# Patient Record
Sex: FEMALE | Race: ASIAN | NOT HISPANIC OR LATINO | Employment: OTHER | URBAN - METROPOLITAN AREA
[De-identification: names, ages, dates, MRNs, and addresses within clinical notes are randomized per-mention and may not be internally consistent; named-entity substitution may affect disease eponyms.]

---

## 2017-01-10 ENCOUNTER — HOSPITAL ENCOUNTER (OUTPATIENT)
Dept: RADIOLOGY | Facility: MEDICAL CENTER | Age: 67
End: 2017-01-10
Attending: NURSE PRACTITIONER
Payer: COMMERCIAL

## 2017-01-10 DIAGNOSIS — E04.1 THYROID NODULE: ICD-10-CM

## 2017-01-10 PROCEDURE — 88112 CYTOPATH CELL ENHANCE TECH: CPT

## 2017-01-10 PROCEDURE — 76942 ECHO GUIDE FOR BIOPSY: CPT

## 2017-01-10 PROCEDURE — 10022 HCHG FINE NEEDLE ASP W/IMAGING GUIDANCE: CPT

## 2017-01-10 NOTE — PROGRESS NOTES
US guided right thyroid nodule fine needle aspiration done by Dr. Portillo; right anterior aspect of neck access site; 1 jar of cytolyt obtained and sent to pathology lab; pt tolerated the procedure well; pt hemodynamically stable pre/intra/post procedure; all questions and concerns answered prior to being d/c; pt d/c home.

## 2017-01-20 ENCOUNTER — TELEPHONE (OUTPATIENT)
Dept: HEMATOLOGY ONCOLOGY | Facility: MEDICAL CENTER | Age: 67
End: 2017-01-20

## 2017-01-20 ENCOUNTER — OFFICE VISIT (OUTPATIENT)
Dept: HEMATOLOGY ONCOLOGY | Facility: MEDICAL CENTER | Age: 67
End: 2017-01-20
Payer: COMMERCIAL

## 2017-01-20 VITALS
WEIGHT: 181 LBS | RESPIRATION RATE: 16 BRPM | HEIGHT: 62 IN | HEART RATE: 82 BPM | DIASTOLIC BLOOD PRESSURE: 84 MMHG | BODY MASS INDEX: 33.31 KG/M2 | SYSTOLIC BLOOD PRESSURE: 122 MMHG | TEMPERATURE: 97.5 F | OXYGEN SATURATION: 93 %

## 2017-01-20 DIAGNOSIS — R93.89 ABNORMAL FINDING ON IMAGING: ICD-10-CM

## 2017-01-20 DIAGNOSIS — E04.1 THYROID NODULE: ICD-10-CM

## 2017-01-20 PROCEDURE — 99214 OFFICE O/P EST MOD 30 MIN: CPT | Performed by: INTERNAL MEDICINE

## 2017-01-20 NOTE — Clinical Note
[facility]    [address], [address]  [place] [address]      Date:1/20/2017                     Reference to Serenity Howe    Follow Up Note: Oncology       Date: 1/10/17  Time: 8 am       Primary care physician: Dr. Alex Garg oncologist: Dr. Alicea   Surgery: Dr. Denney  ENT: Dr. Anderson      Diagnosis: History of right breast ductal carcinoma, 1.5 cm, grade 3, positive LVI, 1/11 nodes, ER/LA negative, HER2 positive: pT1c N1a M0      Chief compliant: She is here for a follow up visit.        History of presenting illness:  History of presenting illness:  Ms. Howe is a 66 year old female with right breast malignancy diagnosed in 2006 secondary to palpable mass. He’s S/P right mastectomy and sentinel node biopsy. She was found to have invasive ductal carcinoma, 1.5 cm,  microscopic focus of invasive carcinoma about 1.0-2.0 mm with associated lymphactic vessel invasion, grade 3, positive for lymphatic invasion, 1/11 node positive, ER negative, LA negative and HER2 positive. She was staged as pT1c N1a M0.  She is S/P adjuvant chemotherapy and completion of single agent herceptin.  She did not receive radiation at the time. The records are unavailable despite multiple requests.  Was found to have an abnormality in the left breast imaging and underwent biopsy which was negative for malignancy. 10/2016 CT scan of 2.4 cm alveolar nodule adjacent to the pleura in the right lower lobe and 3.7 cm noncalcified nodule in the right lower lobe.  PET/CT showed interval improvement in the right lower lobe opacity otherwise uptake in the left fornix with mild asymmetry of soft tissue prominence focal uptake in the right neck which was felt to be reactive lymph node and 1 cm lesion in the left parotid gland without uptake. She was also found to have hepatic steatosis.  12/2016 mammogram showed no significant changes. Ultrasound of the neck showed a 2.1 cm thyroid nodule and minute left  colloid cysts.  She underwent biopsy of the thyroid nodule which was benign.          Interval History:  She is here for a follow up visit. She was feeling well since her last visit.  She had two nose bleeds this month.  The bleeding is very minimal.  She was seen Dr. Anderson and was found to have dry nose and no other concerning finding on direct examination. Clinically she has been feeling fairly stable otherwise. She has stable energy, appetite and weight. She denies any fevers, chills or night sweats.          Past Medical History:  1. 2006: right breast cancer  2. HTN  3. Chronic vertigo    4. Impaired fasting sugars  5. Asthma  6. Insomnia    7. Intermittent back pain       Allergies:  Nkda      Medications:  Current Outpatient Prescriptions on File Prior to Visit    Medication  Sig  Dispense  Refill    •   verapamil ER (CALAN SR) 120 MG CAPSULE SR 24 HR  TAKE ONE CAPSULE BY MOUTH EVERY DAY  90 Cap  3    •  benzonatate (TESSALON) 100 MG Cap  Take 1 Cap by mouth 3 times a day as needed for Cough.  60 Cap  0    •  levofloxacin (LEVAQUIN) 500 MG tablet  Take 1 Tab by mouth every day.  14 Tab  0    •  omeprazole (PRILOSEC) 20 MG delayed-release capsule  Take 1 Cap by mouth every day.  30 Cap  11    •  ondansetron (ZOFRAN) 4 MG Tab tablet  Take 1 Tab by mouth every four hours as needed for Nausea/Vomiting.  20 Tab  0    •  fluticasone-salmeterol (ADVAIR DISKUS) 250-50 MCG/DOSE AEROSOL POWDER, BREATH ACTIVATED  Inhale 1 Puff by mouth 2 times a day. INHALE 1 DOSE BY MOUTH TWICE DAILY. RINSE MOUTH AFTER USE  1 Inhaler  11    •  valsartan-hydrochlorothiazide (DIOVAN-HCT) 80-12.5 MG per tablet  TAKE 1 TABLET BY MOUTH EVERY DAY  30 Tab  9    •  Calcium Carbonate-Vit D-Min (CALTRATE PLUS PO)  Take  by mouth.        •  aspirin (ASA) 81 MG CHEW  Take 81 mg by mouth every day.        •  celecoxib (CELEBREX) 200 MG Cap  Take 1 Cap by mouth every day.  30 Cap  6    •  zolpidem (AMBIEN) 5 MG Tab  Take 1 Tab by mouth at bedtime as  "needed for Sleep.  30 Tab  0    •  albuterol (PROVENTIL) 2.5mg/3ml Nebu Soln solution for nebulization  3 mL by Nebulization route every four hours as needed for Shortness of Breath.  75 mL  11    •  fluticasone (FLONASE) 50 MCG/ACT nasal spray  Spray 2 Sprays in nose every day.  16 g  11          No current facility-administered medications on file prior to visit.          Review of Systems:  All other review of systems are negative except what was mentioned above in the HPI.  Constitutional: Negative for fever, chills, and weight loss. Positive for mild malaise/fatigue stable.    HENT: Negative for ear pain. Positive for intermittent vertigo   Eyes: Negative for blurred vision.    Respiratory: Negative for cough, sputum production, and shortness of breath.     Cardiovascular: Negative for chest pain.    Gastrointestinal: Negative for nausea, vomiting and abdominal pain.    Genitourinary: Negative for dysuria    Musculoskeletal: Negative for myalgias and joint pain.    Skin: Negative for rash.    Neurological: Negative for dizziness, sensory change, and focal weakness.    Endo/Heme/Allergies: No bruise/bleed easily.    Psychiatric/Behavioral: Negative for depression and memory loss.        Physical Exam:  Vitals:   Vitals   Filed Vitals:      01/20/17 0756    BP:  122/84    Pulse:  82    Temp:  36.4 °C (97.5 °F)    Resp:  16    Height:  1.575 m (5' 2.01\")    Weight:  82.101 kg (181 lb)    SpO2:  93%         General: Not in acute distress, alert and oriented x 3  HEENT: normalcephalic, atraumatic, pupils equally reactive to light bilaterally, extra ocular muscles intact, moist mucus membranes, and oral cavity without any lesions.  Neck: Supple neck and  full range of motion  Lymph nodes: No palpable bilateral cervical and supraclavicular lymphadenopathy.     CVS: regular rate and rhythm  RESP: Clear to auscultate bilaterally   Breast exam: No concerning masses in the breast, chest wall or bilateral axilla.  ABD: " Soft, non tender, non distended, and positive bowel sounds.  EXT: No lower extremity edema    CNS: Alert and oriented x3, cranial nerves grossly intact, and muscle strength grossly intact.            Imagin. 12/15/16 Mammogram: No change since prior mammograms.  No mammographic evidence of malignancy in the left breast.  Recommend continued annual mammography.  2. 12/15/16 Ultrasound neck: Right upper pole 2.1 cm solid thyroid mass has internal vascular flow and cystic components. This would be amenable to percutaneous biopsy.  Minute left colloid cysts      Pathology:  1. 1/10/17: Right thyroid:         Benign: consistent with benign follicular nodule.         Numerous macrophages.      Assessment and Plan:      Right breast ductal carcinoma, 1.5 cm, grade 3, positive LVI,  nodes, ER/MS negative, HER2 positive pT1c N1a M0: Diagnosed and treated in . She was found to have a ER/MS negative and HER-2 positive disease. She completed adjuvant chemotherapy. She has been on observation without any evidence of recurrence. She is continued on observation.  Her most recent mammogram showed no concerning changes. Her next mammogram is due in 2017.      Abnormal imaging:  Imaging showed 2.4 cm alveolar opacity in the right lower lobe and 3.7 cm noncalcified nodule in the right lower lobe.  She was treated with antibiotics.  PET showed  interval improvement in the right lower lobe lesion. A follow-up CT in 3 months has been ordered and pending.          Left pharynx uptake: PET scan he was found to have uptake in left pharynx in the region of the fossa of Rosenmuller with mild asymmetric soft tissue prominence and focally FDG avidity in the right neck may represent a small reactive lymph node. She was seen by Dr. Anderson and on exam there was no concerning findings and she was felt to have mild irritation.      Left parotid gland lesions: PET scan also showed a 1 cm left parotid gland lesion. I will order CT head  for reassessment.            She was continued on observation. She is to follow-up with CT scan or sooner as needed. She is to get labs prior to CT          She agreed and verbalized her agreement and understanding with the current plan.  I answered all questions and concerns she has at this time and advised her/him to call at any time in the interim with questions or concerns in regards to her care.  Thank you for allowing me to participate in her care, I will continue to follow.      Please note that this dictation was created using voice recognition software. I have made every reasonable attempt to correct obvious errors, but I expect that there are errors of grammar and possibly content that I did not discover before finalizing the note.      Sincerely,  Mirna Garcia  [address] [place] [address], [address]   [phone]

## 2017-01-20 NOTE — PROGRESS NOTES
Follow Up Note: Oncology     Date: 1/10/17  Time: 8 am     Primary care physician: Dr. Johnson  Prior oncologist: Dr. Alicea   Surgery: Dr. Denney  ENT: Dr. Anderson    Diagnosis: History of right breast ductal carcinoma, 1.5 cm, grade 3, positive LVI, 1/11 nodes, ER/[place] negative, HER2 positive: pT1c N1a M0    Chief compliant: She is here for a follow up visit.      History of presenting illness:  History of presenting illness:  Ms. Howe is a 66 year old female with right breast malignancy diagnosed in 2006 secondary to palpable mass. He’s S/P right mastectomy and sentinel node biopsy. She was found to have invasive ductal carcinoma, 1.5 cm,  microscopic focus of invasive carcinoma about 1.0-2.0 mm with associated lymphactic vessel invasion, grade 3, positive for lymphatic invasion, 1/11 node positive, ER negative, [place] negative and HER2 positive. She was staged as pT1c N1a M0.  She is S/P adjuvant chemotherapy and completion of single agent herceptin.  She did not receive radiation at the time. The records are unavailable despite multiple requests.  Was found to have an abnormality in the left breast imaging and underwent biopsy which was negative for malignancy. 10/2016 CT scan of 2.4 cm alveolar nodule adjacent to the pleura in the right lower lobe and 3.7 cm noncalcified nodule in the right lower lobe.  PET/CT showed interval improvement in the right lower lobe opacity otherwise uptake in the left fornix with mild asymmetry of soft tissue prominence focal uptake in the right neck which was felt to be reactive lymph node and 1 cm lesion in the left parotid gland without uptake. She was also found to have hepatic steatosis.  12/2016 mammogram showed no significant changes. Ultrasound of the neck showed a 2.1 cm thyroid nodule and minute left colloid cysts.  She underwent biopsy of the thyroid nodule which was benign.      Interval History:  She is here for a follow up visit. She was feeling well since her last  visit.  She had two nose bleeds this month.  The bleeding is very minimal.  She was seen Dr. Anderson and was found to have dry nose and no other concerning finding on direct examination. Clinically she has been feeling fairly stable otherwise. She has stable energy, appetite and weight. She denies any fevers, chills or night sweats.      Past Medical History:  1. 2006: right breast cancer  2. HTN  3. Chronic vertigo   4. Impaired fasting sugars  5. Asthma  6. Insomnia   7. Intermittent back pain     Allergies:  Nkda    Medications:  Current Outpatient Prescriptions on File Prior to Visit   Medication Sig Dispense Refill   •  verapamil ER (CALAN SR) 120 MG CAPSULE SR 24 HR TAKE ONE CAPSULE BY MOUTH EVERY DAY 90 Cap 3   • benzonatate (TESSALON) 100 MG Cap Take 1 Cap by mouth 3 times a day as needed for Cough. 60 Cap 0   • levofloxacin (LEVAQUIN) 500 MG tablet Take 1 Tab by mouth every day. 14 Tab 0   • omeprazole (PRILOSEC) 20 MG delayed-release capsule Take 1 Cap by mouth every day. 30 Cap 11   • ondansetron (ZOFRAN) 4 MG Tab tablet Take 1 Tab by mouth every four hours as needed for Nausea/Vomiting. 20 Tab 0   • fluticasone-salmeterol (ADVAIR DISKUS) 250-50 MCG/DOSE AEROSOL POWDER, BREATH ACTIVATED Inhale 1 Puff by mouth 2 times a day. INHALE 1 DOSE BY MOUTH TWICE DAILY. RINSE MOUTH AFTER USE 1 Inhaler 11   • valsartan-hydrochlorothiazide (DIOVAN-HCT) 80-12.5 MG per tablet TAKE 1 TABLET BY MOUTH EVERY DAY 30 Tab 9   • Calcium Carbonate-Vit D-Min (CALTRATE PLUS PO) Take  by mouth.     • aspirin (ASA) 81 MG CHEW Take 81 mg by mouth every day.     • celecoxib (CELEBREX) 200 MG Cap Take 1 Cap by mouth every day. 30 Cap 6   • zolpidem (AMBIEN) 5 MG Tab Take 1 Tab by mouth at bedtime as needed for Sleep. 30 Tab 0   • albuterol (PROVENTIL) 2.5mg/3ml Nebu Soln solution for nebulization 3 mL by Nebulization route every four hours as needed for Shortness of Breath. 75 mL 11   • fluticasone (FLONASE) 50 MCG/ACT nasal spray Spray  "2 Sprays in nose every day. 16 g 11     No current facility-administered medications on file prior to visit.       Review of Systems:  All other review of systems are negative except what was mentioned above in the HPI.  Constitutional: Negative for fever, chills, and weight loss. Positive for mild malaise/fatigue stable.    HENT: Negative for ear pain. Positive for intermittent vertigo   Eyes: Negative for blurred vision.    Respiratory: Negative for cough, sputum production, and shortness of breath.    Cardiovascular: Negative for chest pain.    Gastrointestinal: Negative for nausea, vomiting and abdominal pain.    Genitourinary: Negative for dysuria    Musculoskeletal: Negative for myalgias and joint pain.    Skin: Negative for rash.    Neurological: Negative for dizziness, sensory change, and focal weakness.    Endo/Heme/Allergies: No bruise/bleed easily.    Psychiatric/Behavioral: Negative for depression and memory loss.      Physical Exam:  Vitals:  Filed Vitals:    17 0756   BP: 122/84   Pulse: 82   Temp: 36.4 °C (97.5 °F)   Resp: 16   Height: 1.575 m (5' 2.01\")   Weight: 82.101 kg (181 lb)   SpO2: 93%     General: Not in acute distress, alert and oriented x 3  HEENT: normalcephalic, atraumatic, pupils equally reactive to light bilaterally, extra ocular muscles intact, moist mucus membranes, and oral cavity without any lesions.  Neck: Supple neck and  full range of motion  Lymph nodes: No palpable bilateral cervical and supraclavicular lymphadenopathy.    CVS: regular rate and rhythm  RESP: Clear to auscultate bilaterally   Breast exam: No concerning masses in the breast, chest wall or bilateral axilla.  ABD: Soft, non tender, non distended, and positive bowel sounds.  EXT: No lower extremity edema   CNS: Alert and oriented x3, cranial nerves grossly intact, and muscle strength grossly intact.       Imagin. 12/15/16 Mammogram: No change since prior mammograms.  No mammographic evidence of " malignancy in the left breast.  Recommend continued annual mammography.  2. 12/15/16 Ultrasound neck: Right upper pole 2.1 cm solid thyroid mass has internal vascular flow and cystic components. This would be amenable to percutaneous biopsy.  Minute left colloid cysts    Pathology:  1. 1/10/17: Right thyroid:         Benign: consistent with benign follicular nodule.         Numerous macrophages.    Assessment and Plan:    1. Right breast ductal carcinoma, 1.5 cm, grade 3, positive LVI, 1/11 nodes, ER/[place] negative, HER2 positive pT1c N1a M0: Diagnosed and treated in 2003. She was found to have a ER/IA negative and HER-2 positive disease. She completed adjuvant chemotherapy. She has been on observation without any evidence of recurrence. She is continued on observation.  Her most recent mammogram showed no concerning changes. Her next mammogram is due in 5/2017.    2. Abnormal imaging:  Imaging showed 2.4 cm alveolar opacity in the right lower lobe and 3.7 cm noncalcified nodule in the right lower lobe.  She was treated with antibiotics.  PET showed  interval improvement in the right lower lobe lesion. A follow-up CT in 3 months has been ordered and pending.      3. Left pharynx uptake: PET scan he was found to have uptake in left pharynx in the region of the fossa of Rosenmuller with mild asymmetric soft tissue prominence and focally FDG avidity in the right neck may represent a small reactive lymph node. She was seen by Dr. Anderson and on exam there was no concerning findings and she was felt to have mild irritation.    4. Left parotid gland lesions: PET scan also showed a 1 cm left parotid gland lesion. I will order CT head for reassessment.        5. She was continued on observation. She is to follow-up with CT scan or sooner as needed. She is to get labs prior to CT      She agreed and verbalized her agreement and understanding with the current plan.  I answered all questions and concerns she has at this time and  advised her/him to call at any time in the interim with questions or concerns in regards to her care.  Thank you for allowing me to participate in her care, I will continue to follow.    Please note that this dictation was created using voice recognition software. I have made every reasonable attempt to correct obvious errors, but I expect that there are errors of grammar and possibly content that I did not discover before finalizing the note.

## 2017-01-20 NOTE — MR AVS SNAPSHOT
"        Serenity Howe   2017 8:00 AM   Office Visit   MRN: [id number]    Department:  [facility]   Dept Phone:  [phone]    Description:  Female : 1950   Provider:  Mirna Garcia M.D.           Reason for Visit     Follow-Up biopsy results       Allergies as of 2017     Allergen Noted Reactions    Nkda [No Known Drug Allergy] 2008         You were diagnosed with     Abnormal finding on imaging   [991476]       Thyroid nodule   [197593]         Vital Signs     Blood Pressure Pulse Temperature Respirations Height Weight    122/84 mmHg 82 36.4 °C (97.5 °F) 16 1.575 m (5' 2.01\") 82.101 kg (181 lb)    Body Mass Index Oxygen Saturation Breastfeeding? Smoking Status          33.10 kg/m2 93% No Never Smoker         Basic Information     Date Of Birth Sex Race Ethnicity Preferred Language    1950 Female Other Non- English      Your appointments     Mar 02, 2017 10:00 AM   CT Ent With 20 with VISTA CT 1   [facility]    [address] [facility] [address]   [phone]            Mar 02, 2017 10:30 AM   CT BODY WITH with VISTA CT 1   [facility]    [address] [facility] [address]   [phone]           Taking medications as regularly scheduled is strongly encouraged.  *For Abdominal CT-Patient needs to  oral contrast and instruction from the department at least 2 hours prior to exam. Patient may  contrast at any imaging facility.            Mar 09, 2017 10:20 AM   ONCOLOGY EST PATIENT 30 MIN with Mirna Garcia M.D.   [facility] (--)    [address], [address]  [facility] [address] [phone]              Problem List              ICD-10-CM Priority Class Noted - Resolved    Essential hypertension, benign I10   3/16/2015 - Present    Insomnia G47.00   3/16/2015 - Present    Personal history of breast cancer Z85.3   3/16/2015 - Present    Allergic rhinitis J30.9   3/16/2015 - Present    Impaired fasting glucose " R73.01   3/16/2015 - Present    Recurrent knee pain M25.569   7/13/2015 - Present    Shoulder pain, right M25.511   7/29/2015 - Present    Tremor R25.1   7/29/2015 - Present    Vertigo R42   12/17/2015 - Present    Mild persistent asthma without complication (Chronic) J45.30   7/28/2016 - Present    Gastroesophageal reflux disease with esophagitis K21.0   7/28/2016 - Present    Malignant neoplasm of upper-outer quadrant of right female breast (CMS-HCC) C50.411   11/29/2016 - Present      Health Maintenance        Date Due Completion Dates    IMM DTaP/Tdap/Td Vaccine (1 - Tdap) 5/23/1968 ---    IMM ZOSTER VACCINE 5/23/2009 ---    IMM PNEUMOCOCCAL 65+ (ADULT) LOW/MEDIUM RISK SERIES (2 of 2 - PPSV23) 4/12/2017 4/12/2016    MAMMOGRAM 12/15/2017 12/15/2016, 11/2/2015, 10/28/2014, 10/22/2013, 10/17/2012, 10/18/2011, 10/12/2010, 10/8/2009, 10/8/2009, 11/21/2007, 11/21/2007, 6/19/2007, 6/19/2007, 3/20/2007, 3/20/2007    COLONOSCOPY 3/16/2025 3/16/2015 (Postponed)    Override on 3/16/2015: Postponed            Current Immunizations     13-VALENT PCV PREVNAR 4/12/2016    Influenza Vaccine Adult HD 9/30/2016, 9/12/2015      Below and/or attached are the medications your provider expects you to take. Review all of your home medications and newly ordered medications with your provider and/or pharmacist. Follow medication instructions as directed by your provider and/or pharmacist. Please keep your medication list with you and share with your provider. Update the information when medications are discontinued, doses are changed, or new medications (including over-the-counter products) are added; and carry medication information at all times in the event of emergency situations     Allergies:  NKDA - (reactions not documented)               Medications  Valid as of: January 20, 2017 -  8:30 AM    Generic Name Brand Name Tablet Size Instructions for use    Albuterol Sulfate (Nebu Soln) PROVENTIL 2.5mg/3ml 3 mL by Nebulization route  every four hours as needed for Shortness of Breath.        Aspirin (Chew Tab) ASA 81 MG Take 81 mg by mouth every day.        Benzonatate (Cap) TESSALON 100 MG Take 1 Cap by mouth 3 times a day as needed for Cough.        Calcium Carbonate-Vit D-Min   Take  by mouth.        Celecoxib (Cap) CELEBREX 200 MG Take 1 Cap by mouth every day.        Fluticasone Propionate (Suspension) FLONASE 50 MCG/ACT Spray 2 Sprays in nose every day.        Fluticasone-Salmeterol (AEROSOL POWDER, BREATH ACTIVATED) ADVAIR 250-50 MCG/DOSE Inhale 1 Puff by mouth 2 times a day. INHALE 1 DOSE BY MOUTH TWICE DAILY. RINSE MOUTH AFTER USE        LevoFLOXacin (Tab) LEVAQUIN 500 MG Take 1 Tab by mouth every day.        Omeprazole (CAPSULE DELAYED RELEASE) PRILOSEC 20 MG Take 1 Cap by mouth every day.        Ondansetron HCl (Tab) ZOFRAN 4 MG Take 1 Tab by mouth every four hours as needed for Nausea/Vomiting.        Valsartan-Hydrochlorothiazide (Tab) DIOVAN-HCT 80-12.5 MG TAKE 1 TABLET BY MOUTH EVERY DAY        Verapamil HCl (CAPSULE SR 24 HR) CALAN  MG TAKE ONE CAPSULE BY MOUTH EVERY DAY        Zolpidem Tartrate (Tab) AMBIEN 5 MG Take 1 Tab by mouth at bedtime as needed for Sleep.        .                 Medicines prescribed today were sent to:     [facility]/PHARMACY #[id number] - [place], [place] - [address] [place] [address]    Phone: [phone] Fax: [phone]    Open 24 Hours?: No      Medication refill instructions:       If your prescription bottle indicates you have medication refills left, it is not necessary to call your provider’s office. Please contact your pharmacy and they will refill your medication.    If your prescription bottle indicates you do not have any refills left, you may request refills at any time through one of the following ways: The online [employer] system (except Urgent Care), by calling your provider’s office, or by asking your pharmacy to contact your provider’s  office with a refill request. Medication refills are processed only during regular business hours and may not be available until the next business day. Your provider may request additional information or to have a follow-up visit with you prior to refilling your medication.   *Please Note: Medication refills are assigned a new Rx number when refilled electronically. Your pharmacy may indicate that no refills were authorized even though a new prescription for the same medication is available at the pharmacy. Please request the medicine by name with the pharmacy before contacting your provider for a refill.        Your To Do List     Future Labs/Procedures Complete By Expires    CBC WITH DIFFERENTIAL  As directed 1/20/2018    COMP METABOLIC PANEL  As directed 1/20/2018    CT-MAXILLOFACIAL WITH & W/O  As directed 3/5/2018    FREE THYROXINE  As directed 1/20/2018    T3 FREE  As directed 1/20/2018    TSH  As directed 1/20/2018         MyChart Status: Patient Declined

## 2017-01-28 ENCOUNTER — OFFICE VISIT (OUTPATIENT)
Dept: URGENT CARE | Facility: CLINIC | Age: 67
End: 2017-01-28
Payer: COMMERCIAL

## 2017-01-28 VITALS
HEART RATE: 106 BPM | SYSTOLIC BLOOD PRESSURE: 176 MMHG | TEMPERATURE: 98.9 F | OXYGEN SATURATION: 94 % | RESPIRATION RATE: 22 BRPM | DIASTOLIC BLOOD PRESSURE: 94 MMHG

## 2017-01-28 DIAGNOSIS — R06.2 WHEEZING: ICD-10-CM

## 2017-01-28 DIAGNOSIS — J45.41 MODERATE PERSISTENT ASTHMA WITH ACUTE EXACERBATION: ICD-10-CM

## 2017-01-28 DIAGNOSIS — J45.909 ACUTE BRONCHITIS WITH ASTHMA: ICD-10-CM

## 2017-01-28 DIAGNOSIS — R05.9 COUGH: ICD-10-CM

## 2017-01-28 DIAGNOSIS — J20.9 ACUTE BRONCHITIS WITH ASTHMA: ICD-10-CM

## 2017-01-28 PROCEDURE — 96372 THER/PROPH/DIAG INJ SC/IM: CPT | Performed by: NURSE PRACTITIONER

## 2017-01-28 PROCEDURE — 4040F PNEUMOC VAC/ADMIN/RCVD: CPT | Performed by: NURSE PRACTITIONER

## 2017-01-28 PROCEDURE — 99214 OFFICE O/P EST MOD 30 MIN: CPT | Mod: 25 | Performed by: NURSE PRACTITIONER

## 2017-01-28 PROCEDURE — G8432 DEP SCR NOT DOC, RNG: HCPCS | Performed by: NURSE PRACTITIONER

## 2017-01-28 PROCEDURE — G8482 FLU IMMUNIZE ORDER/ADMIN: HCPCS | Performed by: NURSE PRACTITIONER

## 2017-01-28 PROCEDURE — 94640 AIRWAY INHALATION TREATMENT: CPT | Performed by: NURSE PRACTITIONER

## 2017-01-28 PROCEDURE — 3014F SCREEN MAMMO DOC REV: CPT | Performed by: NURSE PRACTITIONER

## 2017-01-28 PROCEDURE — 1036F TOBACCO NON-USER: CPT | Performed by: NURSE PRACTITIONER

## 2017-01-28 PROCEDURE — 1101F PT FALLS ASSESS-DOCD LE1/YR: CPT | Performed by: NURSE PRACTITIONER

## 2017-01-28 PROCEDURE — G8419 CALC BMI OUT NRM PARAM NOF/U: HCPCS | Performed by: NURSE PRACTITIONER

## 2017-01-28 PROCEDURE — 3017F COLORECTAL CA SCREEN DOC REV: CPT | Mod: 1P | Performed by: NURSE PRACTITIONER

## 2017-01-28 RX ORDER — IPRATROPIUM BROMIDE AND ALBUTEROL SULFATE 2.5; .5 MG/3ML; MG/3ML
3 SOLUTION RESPIRATORY (INHALATION) ONCE
Status: COMPLETED | OUTPATIENT
Start: 2017-01-28 | End: 2017-01-28

## 2017-01-28 RX ORDER — PREDNISONE 20 MG/1
TABLET ORAL
Qty: 8 TAB | Refills: 0 | Status: SHIPPED | OUTPATIENT
Start: 2017-01-28 | End: 2017-12-27

## 2017-01-28 RX ORDER — DOXYCYCLINE HYCLATE 100 MG
100 TABLET ORAL 2 TIMES DAILY
Qty: 20 TAB | Refills: 0 | Status: SHIPPED | OUTPATIENT
Start: 2017-01-28 | End: 2017-09-15

## 2017-01-28 RX ORDER — METHYLPREDNISOLONE SODIUM SUCCINATE 125 MG/2ML
125 INJECTION, POWDER, LYOPHILIZED, FOR SOLUTION INTRAMUSCULAR; INTRAVENOUS ONCE
Status: COMPLETED | OUTPATIENT
Start: 2017-01-28 | End: 2017-01-28

## 2017-01-28 RX ADMIN — METHYLPREDNISOLONE SODIUM SUCCINATE 125 MG: 125 INJECTION, POWDER, LYOPHILIZED, FOR SOLUTION INTRAMUSCULAR; INTRAVENOUS at 10:45

## 2017-01-28 RX ADMIN — IPRATROPIUM BROMIDE AND ALBUTEROL SULFATE 3 ML: 2.5; .5 SOLUTION RESPIRATORY (INHALATION) at 10:56

## 2017-01-28 ASSESSMENT — ENCOUNTER SYMPTOMS
WHEEZING: 1
CHILLS: 0
SHORTNESS OF BREATH: 1
COUGH: 1
FEVER: 0

## 2017-01-28 NOTE — Clinical Note
January 28, 2017        Serenity Howe  [address] [place] [address]        Serenity was seen in our clinic today and she is excused from work for today, tomorrow and Monday.  If you have any questions or concerns, please don't hesitate to call.        Sincerely,        JAY TapiaPBETHANY.    Electronically Signed

## 2017-01-28 NOTE — PROGRESS NOTES
Subjective:      Serenity Howe is a 66 y.o. female who presents with Cough            Cough  This is a new problem. The current episode started yesterday. The problem has been unchanged. The problem occurs constantly. The cough is non-productive. Associated symptoms include nasal congestion, postnasal drip, shortness of breath and wheezing. Pertinent negatives include no chills or fever. Nothing aggravates the symptoms. She has tried a beta-agonist inhaler for the symptoms. The treatment provided no relief. Her past medical history is significant for asthma.   Allergies, medications and history reviewed by me today      Review of Systems   Constitutional: Negative for fever and chills.   HENT: Positive for postnasal drip.    Respiratory: Positive for cough, shortness of breath and wheezing.           Objective:     /94 mmHg  Pulse 106  Temp(Src) 37.2 °C (98.9 °F)  Resp 22  SpO2 94%     Physical Exam   Constitutional: She is oriented to person, place, and time. She appears well-developed and well-nourished. No distress.   HENT:   Head: Normocephalic and atraumatic.   Right Ear: External ear and ear canal normal. Tympanic membrane is not injected and not perforated. No middle ear effusion.   Left Ear: External ear and ear canal normal. Tympanic membrane is not injected and not perforated.  No middle ear effusion.   Nose: Mucosal edema present.   Mouth/Throat: Posterior oropharyngeal erythema present. No oropharyngeal exudate.   Eyes: Conjunctivae are normal. Right eye exhibits no discharge. Left eye exhibits no discharge.   Neck: Normal range of motion. Neck supple.   Cardiovascular: Normal rate, regular rhythm and normal heart sounds.    No murmur heard.  Pulmonary/Chest: She is in respiratory distress. She has wheezes.   Musculoskeletal: Normal range of motion.   Normal movement of all 4 extremities.   Lymphadenopathy:     She has no cervical adenopathy.        Right: No supraclavicular adenopathy present.         Left: No supraclavicular adenopathy present.   Neurological: She is alert and oriented to person, place, and time. Gait normal.   Skin: Skin is warm and dry.   Psychiatric: She has a normal mood and affect. Her behavior is normal. Thought content normal.   Nursing note and vitals reviewed.              Assessment/Plan:     1. Cough     2. Wheezing  methylPREDNISolone sod succ (SOLU-MEDROL) 125 MG injection 125 mg    ipratropium-albuterol (DUONEB) nebulizer solution 3 mL   3. Moderate persistent asthma with acute exacerbation     4. Acute bronchitis with asthma  doxycycline (VIBRAMYCIN) 100 MG Tab    predniSONE (DELTASONE) 20 MG Tab     o2 sat up to 94 following neb treatment.  Prednisone to start tomorrow.  Doxy/may continue nebs q 4 hours.  Differential diagnosis, natural history, supportive care, and indications for immediate follow-up discussed at length.

## 2017-01-28 NOTE — MR AVS SNAPSHOT
Serenity Howe   2017 10:45 AM   Office Visit   MRN: [id number]    Department:  [facility]   Dept Phone:  [phone]    Description:  Female : 1950   Provider:  KIMBERLY Tapia           Reason for Visit     Cough Congestion SOB X yesterday       Allergies as of 2017     Allergen Noted Reactions    Nkda [No Known Drug Allergy] 2008         You were diagnosed with     Cough   [786.2.ICD-9-CM]       Wheezing   [786.07.ICD-9-CM]       Moderate persistent asthma with acute exacerbation   [1455784]       Acute bronchitis with asthma   [842653]         Vital Signs     Blood Pressure Pulse Temperature Respirations Oxygen Saturation Smoking Status    176/94 mmHg 106 37.2 °C (98.9 °F) 22 94% Never Smoker       Basic Information     Date Of Birth Sex Race Ethnicity Preferred Language    1950 Female Other Non- English      Your appointments     Mar 02, 2017 10:00 AM   CT Ent With 20 with VISTA CT 1   [facility] ([place])    [address] [facility] [address]   [phone]            Mar 02, 2017 10:30 AM   CT BODY WITH with VISTA CT 1   [facility] ([place])    [address] [facility] [address]   [phone]           Taking medications as regularly scheduled is strongly encouraged.  *For Abdominal CT-Patient needs to  oral contrast and instruction from the department at least 2 hours prior to exam. Patient may  contrast at any imaging facility.            Mar 09, 2017 10:20 AM   ONCOLOGY EST PATIENT 30 MIN with Mirna Garcia M.D.   [facility] (--)    [address] [place] [address], [address]  [facility] [address]   [phone]              Problem List              ICD-10-CM Priority Class Noted - Resolved    Essential hypertension, benign I10   3/16/2015 - Present    Insomnia G47.00   3/16/2015 - Present    Personal history of breast cancer Z85.3   3/16/2015 - Present    Allergic rhinitis J30.9   3/16/2015 - Present    Impaired fasting glucose  R73.01   3/16/2015 - Present    Recurrent knee pain M25.569   7/13/2015 - Present    Shoulder pain, right M25.511   7/29/2015 - Present    Tremor R25.1   7/29/2015 - Present    Vertigo R42   12/17/2015 - Present    Mild persistent asthma without complication (Chronic) J45.30   7/28/2016 - Present    Gastroesophageal reflux disease with esophagitis K21.0   7/28/2016 - Present    Malignant neoplasm of upper-outer quadrant of right female breast (CMS-HCC) C50.411   11/29/2016 - Present      Health Maintenance        Date Due Completion Dates    IMM DTaP/Tdap/Td Vaccine (1 - Tdap) 5/23/1968 ---    IMM ZOSTER VACCINE 5/23/2009 ---    IMM PNEUMOCOCCAL 65+ (ADULT) LOW/MEDIUM RISK SERIES (2 of 2 - PPSV23) 4/12/2017 4/12/2016    MAMMOGRAM 12/15/2017 12/15/2016, 11/2/2015, 10/28/2014, 10/22/2013, 10/17/2012, 10/18/2011, 10/12/2010, 10/8/2009, 10/8/2009, 11/21/2007, 11/21/2007, 6/19/2007, 6/19/2007, 3/20/2007, 3/20/2007    COLONOSCOPY 3/16/2025 3/16/2015 (Postponed)    Override on 3/16/2015: Postponed            Current Immunizations     13-VALENT PCV PREVNAR 4/12/2016    Influenza Vaccine Adult HD 9/30/2016, 9/12/2015      Below and/or attached are the medications your provider expects you to take. Review all of your home medications and newly ordered medications with your provider and/or pharmacist. Follow medication instructions as directed by your provider and/or pharmacist. Please keep your medication list with you and share with your provider. Update the information when medications are discontinued, doses are changed, or new medications (including over-the-counter products) are added; and carry medication information at all times in the event of emergency situations     Allergies:  NKDA - (reactions not documented)               Medications  Valid as of: January 28, 2017 - 11:02 AM    Generic Name Brand Name Tablet Size Instructions for use    Albuterol Sulfate (Nebu Soln) PROVENTIL 2.5mg/3ml 3 mL by Nebulization route  every four hours as needed for Shortness of Breath.        Aspirin (Chew Tab) ASA 81 MG Take 81 mg by mouth every day.        Benzonatate (Cap) TESSALON 100 MG Take 1 Cap by mouth 3 times a day as needed for Cough.        Calcium Carbonate-Vit D-Min   Take  by mouth.        Celecoxib (Cap) CELEBREX 200 MG Take 1 Cap by mouth every day.        Doxycycline Hyclate (Tab) VIBRAMYCIN 100 MG Take 1 Tab by mouth 2 times a day.        Fluticasone Propionate (Suspension) FLONASE 50 MCG/ACT Spray 2 Sprays in nose every day.        Fluticasone-Salmeterol (AEROSOL POWDER, BREATH ACTIVATED) ADVAIR 250-50 MCG/DOSE Inhale 1 Puff by mouth 2 times a day. INHALE 1 DOSE BY MOUTH TWICE DAILY. RINSE MOUTH AFTER USE        LevoFLOXacin (Tab) LEVAQUIN 500 MG Take 1 Tab by mouth every day.        Omeprazole (CAPSULE DELAYED RELEASE) PRILOSEC 20 MG Take 1 Cap by mouth every day.        Ondansetron HCl (Tab) ZOFRAN 4 MG Take 1 Tab by mouth every four hours as needed for Nausea/Vomiting.        PredniSONE (Tab) DELTASONE 20 MG Take 2 tabs daily for 4 days.        Valsartan-Hydrochlorothiazide (Tab) DIOVAN-HCT 80-12.5 MG TAKE 1 TABLET BY MOUTH EVERY DAY        Verapamil HCl (CAPSULE SR 24 HR) CALAN  MG TAKE ONE CAPSULE BY MOUTH EVERY DAY        Zolpidem Tartrate (Tab) AMBIEN 5 MG Take 1 Tab by mouth at bedtime as needed for Sleep.        .                 Medicines prescribed today were sent to:     [facility]/PHARMACY #[id number] - [place], [place] - [address] [place] [address]    Phone: [phone] Fax: [phone]    Open 24 Hours?: No      Medication refill instructions:       If your prescription bottle indicates you have medication refills left, it is not necessary to call your provider’s office. Please contact your pharmacy and they will refill your medication.    If your prescription bottle indicates you do not have any refills left, you may request refills at any time through one of the  following ways: The online [employer] system (except Urgent Care), by calling your provider’s office, or by asking your pharmacy to contact your provider’s office with a refill request. Medication refills are processed only during regular business hours and may not be available until the next business day. Your provider may request additional information or to have a follow-up visit with you prior to refilling your medication.   *Please Note: Medication refills are assigned a new Rx number when refilled electronically. Your pharmacy may indicate that no refills were authorized even though a new prescription for the same medication is available at the pharmacy. Please request the medicine by name with the pharmacy before contacting your provider for a refill.           MyChart Status: Patient Declined

## 2017-03-02 ENCOUNTER — HOSPITAL ENCOUNTER (OUTPATIENT)
Dept: RADIOLOGY | Facility: MEDICAL CENTER | Age: 67
End: 2017-03-02
Attending: INTERNAL MEDICINE
Payer: COMMERCIAL

## 2017-03-02 DIAGNOSIS — R93.89 ABNORMAL CAT SCAN: ICD-10-CM

## 2017-03-02 DIAGNOSIS — R93.89 ABNORMAL FINDING ON IMAGING: ICD-10-CM

## 2017-03-02 PROCEDURE — 71260 CT THORAX DX C+: CPT

## 2017-03-02 PROCEDURE — 700117 HCHG RX CONTRAST REV CODE 255: Performed by: INTERNAL MEDICINE

## 2017-03-02 RX ADMIN — IOHEXOL 75 ML: 350 INJECTION, SOLUTION INTRAVENOUS at 10:25

## 2017-03-09 ENCOUNTER — OFFICE VISIT (OUTPATIENT)
Dept: HEMATOLOGY ONCOLOGY | Facility: MEDICAL CENTER | Age: 67
End: 2017-03-09
Payer: COMMERCIAL

## 2017-03-09 VITALS
OXYGEN SATURATION: 93 % | HEART RATE: 88 BPM | TEMPERATURE: 98.1 F | SYSTOLIC BLOOD PRESSURE: 138 MMHG | WEIGHT: 175.93 LBS | DIASTOLIC BLOOD PRESSURE: 88 MMHG | BODY MASS INDEX: 32.37 KG/M2 | HEIGHT: 62 IN | RESPIRATION RATE: 18 BRPM

## 2017-03-09 DIAGNOSIS — C50.411 MALIGNANT NEOPLASM OF UPPER-OUTER QUADRANT OF RIGHT FEMALE BREAST (HCC): ICD-10-CM

## 2017-03-09 PROCEDURE — G8482 FLU IMMUNIZE ORDER/ADMIN: HCPCS | Performed by: INTERNAL MEDICINE

## 2017-03-09 PROCEDURE — 3014F SCREEN MAMMO DOC REV: CPT | Performed by: INTERNAL MEDICINE

## 2017-03-09 PROCEDURE — 1036F TOBACCO NON-USER: CPT | Performed by: INTERNAL MEDICINE

## 2017-03-09 PROCEDURE — G8432 DEP SCR NOT DOC, RNG: HCPCS | Performed by: INTERNAL MEDICINE

## 2017-03-09 PROCEDURE — 1101F PT FALLS ASSESS-DOCD LE1/YR: CPT | Performed by: INTERNAL MEDICINE

## 2017-03-09 PROCEDURE — 3017F COLORECTAL CA SCREEN DOC REV: CPT | Mod: 1P | Performed by: INTERNAL MEDICINE

## 2017-03-09 PROCEDURE — 4040F PNEUMOC VAC/ADMIN/RCVD: CPT | Performed by: INTERNAL MEDICINE

## 2017-03-09 PROCEDURE — 99214 OFFICE O/P EST MOD 30 MIN: CPT | Performed by: INTERNAL MEDICINE

## 2017-03-09 PROCEDURE — G8419 CALC BMI OUT NRM PARAM NOF/U: HCPCS | Performed by: INTERNAL MEDICINE

## 2017-03-09 ASSESSMENT — PAIN SCALES - GENERAL: PAINLEVEL: NO PAIN

## 2017-03-09 NOTE — PROGRESS NOTES
Follow Up Note: Oncology     Date: 3/9/17  Time: 10:20 am     Primary care physician: Dr. Johnson  Prior oncologist: Dr. Alicea   Surgery: Dr. Denney  ENT: Dr. Anderson    Diagnosis: History of right breast ductal carcinoma, 1.5 cm, grade 3, positive LVI, 1/11 nodes, ER/MO negative, HER2 positive: pT1c N1a M0    Chief compliant: She is here for a follow up visit.      History of presenting illness:  Ms. Howe is a 66 year old female noticed in 2006 with right breast malignancy secondary to a palpable mass.  She underwent a right breast mastectomy and sentinel node biopsy. Pathology was consistent with invasive ductal carcinoma, 1.5 cm, microscopic focus of invasive carcinoma about 1.0-2.0 mm with associated lymphactic vessel invasion, grade 3, positive for lymphatic invasion, 1/11 node positive, ER negative, MO negative and HER2 positive.  She was staged pT1c N1a M0.  She underwent adjuvant chemotherapy with Herceptin and completed one year of Herceptin. Unclear the regimen she received as multiple attends were made to get records but unable to gets any records in regards to her chemotherapeutic regimen. She did not receive radiation at the time./2007 mammogram showed an abnormality in the left breast and she underwent biopsy of this lesion which was negative for malignancy. She had CT scans done in 10/2016 which showed a 2.4 cm alveolar nodule adjacent to the pleura in the right lower lobe and a 3.7 mm noncalcified nodule in the right lower lobe. PET scan at the time showed interval improvement of the right lower lobe opacity and uptake in the  left fornix with mild asymmetry of soft tissue prominence focal uptake in the right neck which was felt to be reactive lymph node and 1 cm lesion in the left parotid gland without uptake. She also had hepatic steatosis.  Last mammogram did not show any concerning findings. Ultrasound of the neck showed a 2.1 cm thyroid nodule and minute left colloid cysts.  She underwent  biopsy of the thyroid nodule which was benign. Repeat CT scan done on 3/2017 showed no significant changes and a stable 4 mm noncalcified pulmonary nodule. Opacification in the posterior lung base likely felt to be scarring or atelectasis. She was found to have a  low-attenuation lesion in the liver which was stable as well. CT of the face and maxillary area was ordered but pending.    Interval History:  She is here for a follow up visit.  He has been feeling fairly well since her last visit. She denies any further cough. She denies any significant shortness of breath. He has not had any further nosebleeds recently. She has stable energy, appetite and weight. She denies any fevers, chills or night sweats. She is anxious about her health.    Past Medical History:  1. 2006: right breast cancer  2. HTN  3. Chronic vertigo   4. Impaired fasting sugars  5. Asthma  6. Insomnia   7. Intermittent back pain     Allergies:  Nkda    Medications:  Current Outpatient Prescriptions on File Prior to Visit   Medication Sig Dispense Refill   • predniSONE (DELTASONE) 20 MG Tab Take 2 tabs daily for 4 days. 8 Tab 0   •  verapamil ER (CALAN SR) 120 MG CAPSULE SR 24 HR TAKE ONE CAPSULE BY MOUTH EVERY DAY 90 Cap 3   • benzonatate (TESSALON) 100 MG Cap Take 1 Cap by mouth 3 times a day as needed for Cough. 60 Cap 0   • omeprazole (PRILOSEC) 20 MG delayed-release capsule Take 1 Cap by mouth every day. 30 Cap 11   • fluticasone-salmeterol (ADVAIR DISKUS) 250-50 MCG/DOSE AEROSOL POWDER, BREATH ACTIVATED Inhale 1 Puff by mouth 2 times a day. INHALE 1 DOSE BY MOUTH TWICE DAILY. RINSE MOUTH AFTER USE 1 Inhaler 11   • valsartan-hydrochlorothiazide (DIOVAN-HCT) 80-12.5 MG per tablet TAKE 1 TABLET BY MOUTH EVERY DAY 30 Tab 9   • Calcium Carbonate-Vit D-Min (CALTRATE PLUS PO) Take  by mouth.     • aspirin (ASA) 81 MG CHEW Take 81 mg by mouth every day.     • doxycycline (VIBRAMYCIN) 100 MG Tab Take 1 Tab by mouth 2 times a day. (Patient not taking:  "Reported on 3/9/2017) 20 Tab 0   • levofloxacin (LEVAQUIN) 500 MG tablet Take 1 Tab by mouth every day. (Patient not taking: Reported on 3/9/2017) 14 Tab 0   • celecoxib (CELEBREX) 200 MG Cap Take 1 Cap by mouth every day. 30 Cap 6   • zolpidem (AMBIEN) 5 MG Tab Take 1 Tab by mouth at bedtime as needed for Sleep. 30 Tab 0   • ondansetron (ZOFRAN) 4 MG Tab tablet Take 1 Tab by mouth every four hours as needed for Nausea/Vomiting. (Patient not taking: Reported on 3/9/2017) 20 Tab 0   • albuterol (PROVENTIL) 2.5mg/3ml Nebu Soln solution for nebulization 3 mL by Nebulization route every four hours as needed for Shortness of Breath. 75 mL 11   • fluticasone (FLONASE) 50 MCG/ACT nasal spray Spray 2 Sprays in nose every day. 16 g 11     No current facility-administered medications on file prior to visit.       Review of Systems:  All other review of systems are negative except what was mentioned above in the HPI.  Constitutional: Negative for fever, chills, and weight loss. Positive for mild malaise/fatigue    HENT: Negative for ear pain. Positive for intermittent vertigo   Eyes: Negative for blurred vision.    Respiratory: Negative for cough, sputum production, and shortness of breath.    Cardiovascular: Negative for chest pain.    Gastrointestinal: Negative for nausea, vomiting and abdominal pain.    Genitourinary: Negative for dysuria    Musculoskeletal: Negative for myalgias and joint pain.    Skin: Negative for rash.    Neurological: Negative for dizziness, sensory change, and focal weakness.    Endo/Heme/Allergies: No bruise/bleed easily.    Psychiatric/Behavioral: Negative for depression and memory loss.      Physical Exam:  Vitals:  Filed Vitals:    03/09/17 1014   BP: 138/88   Pulse: 88   Temp: 36.7 °C (98.1 °F)   Resp: 18   Height: 1.575 m (5' 2.01\")   Weight: 79.8 kg (175 lb 14.8 oz)   SpO2: 93%     General: Not in acute distress, alert and oriented x 3  HEENT: normalcephalic, atraumatic, pupils equally reactive " to light bilaterally, extra ocular muscles intact, moist mucus membranes, and oral cavity without any lesions.  Neck: Supple neck and  full range of motion  Lymph nodes: No palpable bilateral cervical and supraclavicular lymphadenopathy.    CVS: regular rate and rhythm  RESP: Clear to auscultate bilaterally   Breast exam:   Right chest wall: Mastectomy site with no palpable nodules or masses in the chest wall or axilla  Left breast: No concerning palpable mass in the breast or axilla.  ABD: Soft, non tender, non distended, and positive bowel sounds.  EXT: No lower extremity edema   CNS: Alert and oriented x3, cranial nerves grossly intact, and muscle strength grossly intact.     Labs: Reviewed     Imagin. 3/2/17 CT chest:  No significant change in size or appearance of 4 mm noncalcified pulmonary nodule in the right lower pulmonary lobe. Follow-up CT is recommended in 6 months if clinically indicated.  Opacifications in the posterior lung bases bilaterally are again identified which are likely due to scarring or atelectasis.  1 cm low-attenuation liver lesion is again identified and unchanged.  No adenopathy or new abnormalities have developed since the prior exam.      Assessment and Plan:    1. Right breast ductal carcinoma, 1.5 cm, grade 3, positive LVI,  nodes, ER/LA negative, HER2 positive pT1c N1a M0: He was diagnosed and treated in 2006 with ER/LA negative and HER-2 positive disease. She underwent adjuvant chemotherapy and completed Herceptin. Unknown adjuvant regiment. He is stable clinically without any evidence of recurrence. Her next mammogram is due in 2017.    2. Abnormal imaging:  On CT imaging she was found to have alveolar opacity in the right lower lobe as well as a 3.7 mm right lower lobe pulmonary nodule. PET scan did not show any uptake. A repeat CT scan showed stable pulmonary nodule with a PSA D felt to be either atelectasis or scarring. Clinically she is asymptomatic. Continue to  monitor. Plan to repeat another CT of the chest in 6 months.    3.  Left pharynx uptake: PET scan she was found to have uptake in the left for her next in the region of the fossa of Rosenmuller with mild soft tissue asymmetry. Was also a small lymph node with uptake which was felt to be likely reactive lymph node. On direct examination there was no concerning findings but she was found to have some irritation. Repeat CT of the face and maxillary has been ordered with pending.    3. Left parotid gland lesions: PET scan also showed a 1 cm left parotid gland lesion. CT of the face and neck is pending.    4. Thyroid nodule: Patient on imaging was found to have a right thyroid mass biopsy of which was negative for malignancy. Recommend a repeat ultrasound of the neck in 6 months.    5. She is continued on observation. Ultrasound of the neck and CT of the chest have been ordered. She is to follow-up again in 6 months or sooner as needed.    She agreed and verbalized her agreement and understanding with the current plan.  I answered all questions and concerns she has at this time and advised her/him to call at any time in the interim with questions or concerns in regards to her care.  Thank you for allowing me to participate in her care, I will continue to follow.    Please note that this dictation was created using voice recognition software. I have made every reasonable attempt to correct obvious errors, but I expect that there are errors of grammar and possibly content that I did not discover before finalizing the note.

## 2017-03-09 NOTE — Clinical Note
[facility]    [address], [address]  [place] [id number]        Date:3/9/2017                     Reference to Serenity Howe    Follow Up Note: Oncology       Date: 3/9/17  Time: 10:20 am       Primary care physician: Dr. Alex Garg oncologist: Dr. Alicea   Surgery: Dr. Denney  ENT: Dr. Anderson      Diagnosis: History of right breast ductal carcinoma, 1.5 cm, grade 3, positive LVI, 1/11 nodes, ER/OK negative, HER2 positive: pT1c N1a M0      Chief compliant: She is here for a follow up visit.        History of presenting illness:  Ms. Howe is a 66 year old female noticed in 2006 with right breast malignancy secondary to a palpable mass.  She underwent a right breast mastectomy and sentinel node biopsy. Pathology was consistent with invasive ductal carcinoma, 1.5 cm, microscopic focus of invasive carcinoma about 1.0-2.0 mm with associated lymphactic vessel invasion, grade 3, positive for lymphatic invasion, 1/11 node positive, ER negative, OK negative and HER2 positive.  She was staged pT1c N1a M0.  She underwent adjuvant chemotherapy with Herceptin and completed one year of Herceptin. Unclear the regimen she received as multiple attends were made to get records but unable to gets any records in regards to her chemotherapeutic regimen. She did not receive radiation at the time./2007 mammogram showed an abnormality in the left breast and she underwent biopsy of this lesion which was negative for malignancy. She had CT scans done in 10/2016 which showed a 2.4 cm alveolar nodule adjacent to the pleura in the right lower lobe and a 3.7 mm noncalcified nodule in the right lower lobe. PET scan at the time showed interval improvement of the right lower lobe opacity and uptake in the  left fornix with mild asymmetry of soft tissue prominence focal uptake in the right neck which was felt to be reactive lymph node and 1 cm lesion in the left parotid gland without uptake. She also had  hepatic steatosis.  Last mammogram did not show any concerning findings. Ultrasound of the neck showed a 2.1 cm thyroid nodule and minute left colloid cysts.  She underwent biopsy of the thyroid nodule which was benign. Repeat CT scan done on 3/2017 showed no significant changes and a stable 4 mm noncalcified pulmonary nodule. Opacification in the posterior lung base likely felt to be scarring or atelectasis. She was found to have a  low-attenuation lesion in the liver which was stable as well. CT of the face and maxillary area was ordered but pending.      Interval History:  She is here for a follow up visit.  He has been feeling fairly well since her last visit. She denies any further cough. She denies any significant shortness of breath. He has not had any further nosebleeds recently. She has stable energy, appetite and weight. She denies any fevers, chills or night sweats. She is anxious about her health.      Past Medical History:  1. 2006: right breast cancer  2. HTN  3. Chronic vertigo    4. Impaired fasting sugars  5. Asthma  6. Insomnia    7. Intermittent back pain       Allergies:  Nkda      Medications:  Current Outpatient Prescriptions on File Prior to Visit    Medication  Sig  Dispense  Refill    •  predniSONE (DELTASONE) 20 MG Tab  Take 2 tabs daily for 4 days.  8 Tab  0    •   verapamil ER (CALAN SR) 120 MG CAPSULE SR 24 HR  TAKE ONE CAPSULE BY MOUTH EVERY DAY  90 Cap  3    •  benzonatate (TESSALON) 100 MG Cap  Take 1 Cap by mouth 3 times a day as needed for Cough.  60 Cap  0    •  omeprazole (PRILOSEC) 20 MG delayed-release capsule  Take 1 Cap by mouth every day.  30 Cap  11    •  fluticasone-salmeterol (ADVAIR DISKUS) 250-50 MCG/DOSE AEROSOL POWDER, BREATH ACTIVATED  Inhale 1 Puff by mouth 2 times a day. INHALE 1 DOSE BY MOUTH TWICE DAILY. RINSE MOUTH AFTER USE  1 Inhaler  11    •  valsartan-hydrochlorothiazide (DIOVAN-HCT) 80-12.5 MG per tablet  TAKE 1 TABLET BY MOUTH EVERY DAY  30 Tab  9    •   Calcium Carbonate-Vit D-Min (CALTRATE PLUS PO)  Take  by mouth.        •  aspirin (ASA) 81 MG CHEW  Take 81 mg by mouth every day.        •  doxycycline (VIBRAMYCIN) 100 MG Tab  Take 1 Tab by mouth 2 times a day. (Patient not taking: Reported on 3/9/2017)  20 Tab  0    •  levofloxacin (LEVAQUIN) 500 MG tablet  Take 1 Tab by mouth every day. (Patient not taking: Reported on 3/9/2017)  14 Tab  0    •  celecoxib (CELEBREX) 200 MG Cap  Take 1 Cap by mouth every day.  30 Cap  6    •  zolpidem (AMBIEN) 5 MG Tab  Take 1 Tab by mouth at bedtime as needed for Sleep.  30 Tab  0    •  ondansetron (ZOFRAN) 4 MG Tab tablet  Take 1 Tab by mouth every four hours as needed for Nausea/Vomiting. (Patient not taking: Reported on 3/9/2017)  20 Tab  0    •  albuterol (PROVENTIL) 2.5mg/3ml Nebu Soln solution for nebulization  3 mL by Nebulization route every four hours as needed for Shortness of Breath.  75 mL  11    •  fluticasone (FLONASE) 50 MCG/ACT nasal spray  Spray 2 Sprays in nose every day.  16 g  11          No current facility-administered medications on file prior to visit.          Review of Systems:  All other review of systems are negative except what was mentioned above in the HPI.  Constitutional: Negative for fever, chills, and weight loss. Positive for mild malaise/fatigue    HENT: Negative for ear pain. Positive for intermittent vertigo   Eyes: Negative for blurred vision.    Respiratory: Negative for cough, sputum production, and shortness of breath.     Cardiovascular: Negative for chest pain.    Gastrointestinal: Negative for nausea, vomiting and abdominal pain.    Genitourinary: Negative for dysuria    Musculoskeletal: Negative for myalgias and joint pain.    Skin: Negative for rash.    Neurological: Negative for dizziness, sensory change, and focal weakness.    Endo/Heme/Allergies: No bruise/bleed easily.    Psychiatric/Behavioral: Negative for depression and memory loss.        Physical Exam:  Vitals:   Vitals    "  Filed Vitals:      17 1014    BP:  138/88    Pulse:  88    Temp:  36.7 °C (98.1 °F)    Resp:  18    Height:  1.575 m (5' 2.01\")    Weight:  79.8 kg (175 lb 14.8 oz)    SpO2:  93%         General: Not in acute distress, alert and oriented x 3  HEENT: normalcephalic, atraumatic, pupils equally reactive to light bilaterally, extra ocular muscles intact, moist mucus membranes, and oral cavity without any lesions.  Neck: Supple neck and  full range of motion  Lymph nodes: No palpable bilateral cervical and supraclavicular lymphadenopathy.     CVS: regular rate and rhythm  RESP: Clear to auscultate bilaterally   Breast exam:    Right chest wall: Mastectomy site with no palpable nodules or masses in the chest wall or axilla  Left breast: No concerning palpable mass in the breast or axilla.  ABD: Soft, non tender, non distended, and positive bowel sounds.  EXT: No lower extremity edema    CNS: Alert and oriented x3, cranial nerves grossly intact, and muscle strength grossly intact.        Labs: Reviewed       Imagin. 3/2/17 CT chest:  No significant change in size or appearance of 4 mm noncalcified pulmonary nodule in the right lower pulmonary lobe. Follow-up CT is recommended in 6 months if clinically indicated.  Opacifications in the posterior lung bases bilaterally are again identified which are likely due to scarring or atelectasis.  1 cm low-attenuation liver lesion is again identified and unchanged.  No adenopathy or new abnormalities have developed since the prior exam.          Assessment and Plan:      Right breast ductal carcinoma, 1.5 cm, grade 3, positive LVI,  nodes, ER/AZ negative, HER2 positive pT1c N1a M0: He was diagnosed and treated in  with ER/AZ negative and HER-2 positive disease. She underwent adjuvant chemotherapy and completed Herceptin. Unknown adjuvant regiment. He is stable clinically without any evidence of recurrence. Her next mammogram is due in 2017.      Abnormal " imaging:  On CT imaging she was found to have alveolar opacity in the right lower lobe as well as a 3.7 mm right lower lobe pulmonary nodule. PET scan did not show any uptake. A repeat CT scan showed stable pulmonary nodule with a PSA D felt to be either atelectasis or scarring. Clinically she is asymptomatic. Continue to monitor. Plan to repeat another CT of the chest in 6 months.      3.  Left pharynx uptake: PET scan she was found to have uptake in the left for her next in the region of the fossa of Rosenmuller with mild soft tissue asymmetry. Was also a small lymph node with uptake which was felt to be likely reactive lymph node. On direct examination there was no concerning findings but she was found to have some irritation. Repeat CT of the face and maxillary has been ordered with pending.      Left parotid gland lesions: PET scan also showed a 1 cm left parotid gland lesion. CT of the face and neck is pending.      4. Thyroid nodule: Patient on imaging was found to have a right thyroid mass biopsy of which was negative for malignancy. Recommend a repeat ultrasound of the neck in 6 months.      5. She is continued on observation. Ultrasound of the neck and CT of the chest have been ordered. She is to follow-up again in 6 months or sooner as needed.      She agreed and verbalized her agreement and understanding with the current plan.  I answered all questions and concerns she has at this time and advised her/him to call at any time in the interim with questions or concerns in regards to her care.  Thank you for allowing me to participate in her care, I will continue to follow.      Please note that this dictation was created using voice recognition software. I have made every reasonable attempt to correct obvious errors, but I expect that there are errors of grammar and possibly content that I did not discover before finalizing the note.          Sincerely,  Mirna Garcia  [address], [address]      [phone]

## 2017-03-09 NOTE — MR AVS SNAPSHOT
"        Serenity Howe   3/9/2017 10:20 AM   Office Visit   MRN: [id number]    Department:  [facility]   Dept Phone:  [phone]    Description:  Female : 1950   Provider:  Mirna Garcia M.D.           Reason for Visit     Results ct      Allergies as of 3/9/2017     Allergen Noted Reactions    Nkda [No Known Drug Allergy] 2008         You were diagnosed with     Malignant neoplasm of upper-outer quadrant of right female breast (CMS-HCC)   [814651]         Vital Signs     Blood Pressure Pulse Temperature Respirations Height Weight    138/88 mmHg 88 36.7 °C (98.1 °F) 18 1.575 m (5' 2.01\") 79.8 kg (175 lb 14.8 oz)    Body Mass Index Oxygen Saturation Breastfeeding? Smoking Status          32.17 kg/m2 93% No Never Smoker         Basic Information     Date Of Birth Sex Race Ethnicity Preferred Language    1950 Female Other Non- English      Your appointments     Sep 20, 2017  9:30 AM   US [id number] with [employer] US 1   [facility]    [address] [facility] [address]   [phone]            Sep 20, 2017 10:00 AM   CT BODY WITH with VISTA CT 1   [facility] ([place])    [address]  [facility] [address]   [phone]           Taking medications as regularly scheduled is strongly encouraged.  *For Abdominal CT-Patient needs to  oral contrast and instruction from the department at least 2 hours prior to exam. Patient may  contrast at any imaging facility.            Sep 22, 2017 10:00 AM   ONCOLOGY EST PATIENT 30 MIN with Mirna Garcia M.D.   [facility] (--)    [address], [address]  [facility] [id number] [phone]              Problem List              ICD-10-CM Priority Class Noted - Resolved    Essential hypertension, benign I10   3/16/2015 - Present    Insomnia G47.00   3/16/2015 - Present    Personal history of breast cancer Z85.3   3/16/2015 - Present    Allergic rhinitis J30.9   3/16/2015 - Present    Impaired fasting glucose " R73.01   3/16/2015 - Present    Recurrent knee pain M25.569   7/13/2015 - Present    Shoulder pain, right M25.511   7/29/2015 - Present    Tremor R25.1   7/29/2015 - Present    Vertigo R42   12/17/2015 - Present    Mild persistent asthma without complication (Chronic) J45.30   7/28/2016 - Present    Gastroesophageal reflux disease with esophagitis K21.0   7/28/2016 - Present    Malignant neoplasm of upper-outer quadrant of right female breast (CMS-HCC) C50.411   11/29/2016 - Present      Health Maintenance        Date Due Completion Dates    IMM DTaP/Tdap/Td Vaccine (1 - Tdap) 5/23/1968 ---    IMM ZOSTER VACCINE 5/23/2009 ---    IMM PNEUMOCOCCAL 65+ (ADULT) LOW/MEDIUM RISK SERIES (2 of 2 - PPSV23) 4/12/2017 4/12/2016    MAMMOGRAM 12/15/2017 12/15/2016, 11/2/2015, 10/28/2014, 10/22/2013, 10/17/2012, 10/18/2011, 10/12/2010, 10/8/2009, 10/8/2009, 11/21/2007, 11/21/2007, 6/19/2007, 6/19/2007, 3/20/2007, 3/20/2007    COLONOSCOPY 3/16/2025 3/16/2015 (Postponed)    Override on 3/16/2015: Postponed            Current Immunizations     13-VALENT PCV PREVNAR 4/12/2016    Influenza Vaccine Adult HD 9/30/2016, 9/12/2015      Below and/or attached are the medications your provider expects you to take. Review all of your home medications and newly ordered medications with your provider and/or pharmacist. Follow medication instructions as directed by your provider and/or pharmacist. Please keep your medication list with you and share with your provider. Update the information when medications are discontinued, doses are changed, or new medications (including over-the-counter products) are added; and carry medication information at all times in the event of emergency situations     Allergies:  NKDA - (reactions not documented)               Medications  Valid as of: March 09, 2017 - 10:54 AM    Generic Name Brand Name Tablet Size Instructions for use    Albuterol Sulfate (Nebu Soln) PROVENTIL 2.5mg/3ml 3 mL by Nebulization route  every four hours as needed for Shortness of Breath.        Aspirin (Chew Tab) ASA 81 MG Take 81 mg by mouth every day.        Benzonatate (Cap) TESSALON 100 MG Take 1 Cap by mouth 3 times a day as needed for Cough.        Calcium Carbonate-Vit D-Min   Take  by mouth.        Celecoxib (Cap) CELEBREX 200 MG Take 1 Cap by mouth every day.        Doxycycline Hyclate (Tab) VIBRAMYCIN 100 MG Take 1 Tab by mouth 2 times a day.        Fluticasone Propionate (Suspension) FLONASE 50 MCG/ACT Spray 2 Sprays in nose every day.        Fluticasone-Salmeterol (AEROSOL POWDER, BREATH ACTIVATED) ADVAIR 250-50 MCG/DOSE Inhale 1 Puff by mouth 2 times a day. INHALE 1 DOSE BY MOUTH TWICE DAILY. RINSE MOUTH AFTER USE        LevoFLOXacin (Tab) LEVAQUIN 500 MG Take 1 Tab by mouth every day.        Omeprazole (CAPSULE DELAYED RELEASE) PRILOSEC 20 MG Take 1 Cap by mouth every day.        Ondansetron HCl (Tab) ZOFRAN 4 MG Take 1 Tab by mouth every four hours as needed for Nausea/Vomiting.        PredniSONE (Tab) DELTASONE 20 MG Take 2 tabs daily for 4 days.        Valsartan-Hydrochlorothiazide (Tab) DIOVAN-HCT 80-12.5 MG TAKE 1 TABLET BY MOUTH EVERY DAY        Verapamil HCl (CAPSULE SR 24 HR) CALAN  MG TAKE ONE CAPSULE BY MOUTH EVERY DAY        Zolpidem Tartrate (Tab) AMBIEN 5 MG Take 1 Tab by mouth at bedtime as needed for Sleep.        .                 Medicines prescribed today were sent to:     [facility]/PHARMACY #[id number] - [place], [place] - [address] [place] [id number]    Phone: [phone] Fax: [phone]    Open 24 Hours?: No      Medication refill instructions:       If your prescription bottle indicates you have medication refills left, it is not necessary to call your provider’s office. Please contact your pharmacy and they will refill your medication.    If your prescription bottle indicates you do not have any refills left, you may request refills at any time through one of the  following ways: The online [employer] system (except Urgent Care), by calling your provider’s office, or by asking your pharmacy to contact your provider’s office with a refill request. Medication refills are processed only during regular business hours and may not be available until the next business day. Your provider may request additional information or to have a follow-up visit with you prior to refilling your medication.   *Please Note: Medication refills are assigned a new Rx number when refilled electronically. Your pharmacy may indicate that no refills were authorized even though a new prescription for the same medication is available at the pharmacy. Please request the medicine by name with the pharmacy before contacting your provider for a refill.        Your To Do List     Future Labs/Procedures Complete By Expires    US-SOFT TISSUES OF HEAD - NECK  6/12/2017 3/9/2018    CT-CHEST (THORAX) WITH  9/18/2017 3/9/2018    CBC WITH DIFFERENTIAL  As directed 3/9/2018    COMP METABOLIC PANEL  As directed 3/9/2018      Referral     A referral request has been sent to our patient care coordination department. Please allow 3-5 business days for us to process this request and contact you either by phone or mail. If you do not hear from us by the 5th business day, please call us at [phone].           [employer] Status: Patient Declined

## 2017-03-10 ENCOUNTER — TELEPHONE (OUTPATIENT)
Dept: HEMATOLOGY ONCOLOGY | Facility: MEDICAL CENTER | Age: 67
End: 2017-03-10

## 2017-03-10 NOTE — TELEPHONE ENCOUNTER
CT Maxillofacial that was previously cancelled due to insurance denial was approved today after Dr. Garcia in medical oncology did a [id number]. Auth number is [id number].    I left the patient a VM with her new CT appointment time as well as a phone number she can call if that date does not work. She also needs to have labs done before her CT scan. Orders are in Epic.

## 2017-03-22 ENCOUNTER — HOSPITAL ENCOUNTER (OUTPATIENT)
Dept: RADIOLOGY | Facility: MEDICAL CENTER | Age: 67
End: 2017-03-22
Attending: INTERNAL MEDICINE
Payer: COMMERCIAL

## 2017-03-22 PROCEDURE — 700117 HCHG RX CONTRAST REV CODE 255: Performed by: INTERNAL MEDICINE

## 2017-03-22 PROCEDURE — 70487 CT MAXILLOFACIAL W/DYE: CPT

## 2017-03-22 RX ADMIN — IOHEXOL 80 ML: 350 INJECTION, SOLUTION INTRAVENOUS at 10:38

## 2017-03-30 ENCOUNTER — OFFICE VISIT (OUTPATIENT)
Dept: HEMATOLOGY ONCOLOGY | Facility: MEDICAL CENTER | Age: 67
End: 2017-03-30
Payer: COMMERCIAL

## 2017-03-30 VITALS
DIASTOLIC BLOOD PRESSURE: 98 MMHG | SYSTOLIC BLOOD PRESSURE: 136 MMHG | BODY MASS INDEX: 32.5 KG/M2 | HEART RATE: 87 BPM | HEIGHT: 62 IN | TEMPERATURE: 98.4 F | OXYGEN SATURATION: 92 % | WEIGHT: 176.59 LBS | RESPIRATION RATE: 18 BRPM

## 2017-03-30 DIAGNOSIS — R93.0 ABNORMAL CT SCAN OF HEAD: ICD-10-CM

## 2017-03-30 PROCEDURE — 99214 OFFICE O/P EST MOD 30 MIN: CPT | Performed by: INTERNAL MEDICINE

## 2017-03-30 PROCEDURE — 1036F TOBACCO NON-USER: CPT | Performed by: INTERNAL MEDICINE

## 2017-03-30 PROCEDURE — G8432 DEP SCR NOT DOC, RNG: HCPCS | Performed by: INTERNAL MEDICINE

## 2017-03-30 PROCEDURE — G8482 FLU IMMUNIZE ORDER/ADMIN: HCPCS | Performed by: INTERNAL MEDICINE

## 2017-03-30 PROCEDURE — 3014F SCREEN MAMMO DOC REV: CPT | Performed by: INTERNAL MEDICINE

## 2017-03-30 PROCEDURE — 4040F PNEUMOC VAC/ADMIN/RCVD: CPT | Performed by: INTERNAL MEDICINE

## 2017-03-30 PROCEDURE — G8419 CALC BMI OUT NRM PARAM NOF/U: HCPCS | Performed by: INTERNAL MEDICINE

## 2017-03-30 PROCEDURE — 1101F PT FALLS ASSESS-DOCD LE1/YR: CPT | Performed by: INTERNAL MEDICINE

## 2017-03-30 ASSESSMENT — PAIN SCALES - GENERAL: PAINLEVEL: NO PAIN

## 2017-03-30 NOTE — MR AVS SNAPSHOT
"        Serenity Howe   3/30/2017 9:00 AM   Office Visit   MRN: [id number]    Department:  [facility]   Dept Phone:  [phone]    Description:  Female : 1950   Provider:  Mirna Garcia M.D.           Reason for Visit     Follow-Up CT results      Allergies as of 3/30/2017     Allergen Noted Reactions    Nkda [No Known Drug Allergy] 2008         You were diagnosed with     Abnormal CT scan of head   [059434]         Vital Signs     Blood Pressure Pulse Temperature Respirations Height Weight    136/98 mmHg 87 36.9 °C (98.4 °F) 18 1.575 m (5' 2.01\") 80.1 kg (176 lb 9.4 oz)    Body Mass Index Oxygen Saturation Breastfeeding? Smoking Status          32.29 kg/m2 92% No Never Smoker         Basic Information     Date Of Birth Sex Race Ethnicity Preferred Language    1950 Female Other Non- English      Your appointments     Sep 20, 2017  9:30 AM   US [id number] with VISTA US 1   [facility] ([place]    [address] [facility] [address]   [phone]            Sep 20, 2017 10:00 AM   CT BODY WITH with VISTA CT 1   [facility] ([place])    [address] [facility] [address]   [phone]           Taking medications as regularly scheduled is strongly encouraged.  *For Abdominal CT-Patient needs to  oral contrast and instruction from the department at least 2 hours prior to exam. Patient may  contrast at any imaging facility.            Sep 22, 2017 10:00 AM   ONCOLOGY EST PATIENT 30 MIN with Mirna Garcia M.D.   [facility] (--)    [address], [address]  [facility] [address] [phone]              Problem List              ICD-10-CM Priority Class Noted - Resolved    Essential hypertension, benign I10   3/16/2015 - Present    Insomnia G47.00   3/16/2015 - Present    Personal history of breast cancer Z85.3   3/16/2015 - Present    Allergic rhinitis J30.9   3/16/2015 - Present    Impaired fasting glucose R73.01   3/16/2015 - Present    Recurrent " knee pain M25.569   7/13/2015 - Present    Shoulder pain, right M25.511   7/29/2015 - Present    Tremor R25.1   7/29/2015 - Present    Vertigo R42   12/17/2015 - Present    Mild persistent asthma without complication (Chronic) J45.30   7/28/2016 - Present    Gastroesophageal reflux disease with esophagitis K21.0   7/28/2016 - Present    Malignant neoplasm of upper-outer quadrant of right female breast (CMS-HCC) C50.411   11/29/2016 - Present      Health Maintenance        Date Due Completion Dates    IMM DTaP/Tdap/Td Vaccine (1 - Tdap) 5/23/1968 ---    IMM ZOSTER VACCINE 5/23/2009 ---    IMM PNEUMOCOCCAL 65+ (ADULT) LOW/MEDIUM RISK SERIES (2 of 2 - PPSV23) 4/12/2017 4/12/2016    MAMMOGRAM 12/15/2017 12/15/2016, 11/2/2015, 10/28/2014, 10/22/2013, 10/17/2012, 10/18/2011, 10/12/2010, 10/8/2009, 10/8/2009, 11/21/2007, 11/21/2007, 6/19/2007, 6/19/2007, 3/20/2007, 3/20/2007    COLONOSCOPY 3/16/2025 3/16/2015 (Postponed)    Override on 3/16/2015: Postponed            Current Immunizations     13-VALENT PCV PREVNAR 4/12/2016    Influenza Vaccine Adult HD 9/30/2016, 9/12/2015      Below and/or attached are the medications your provider expects you to take. Review all of your home medications and newly ordered medications with your provider and/or pharmacist. Follow medication instructions as directed by your provider and/or pharmacist. Please keep your medication list with you and share with your provider. Update the information when medications are discontinued, doses are changed, or new medications (including over-the-counter products) are added; and carry medication information at all times in the event of emergency situations     Allergies:  NKDA - (reactions not documented)               Medications  Valid as of: March 30, 2017 -  9:19 AM    Generic Name Brand Name Tablet Size Instructions for use    Albuterol Sulfate (Nebu Soln) PROVENTIL 2.5mg/3ml 3 mL by Nebulization route every four hours as needed for Shortness of  Breath.        Aspirin (Chew Tab) ASA 81 MG Take 81 mg by mouth every day.        Benzonatate (Cap) TESSALON 100 MG Take 1 Cap by mouth 3 times a day as needed for Cough.        Calcium Carbonate-Vit D-Min   Take  by mouth.        Celecoxib (Cap) CELEBREX 200 MG Take 1 Cap by mouth every day.        Doxycycline Hyclate (Tab) VIBRAMYCIN 100 MG Take 1 Tab by mouth 2 times a day.        Fluticasone Propionate (Suspension) FLONASE 50 MCG/ACT Spray 2 Sprays in nose every day.        Fluticasone-Salmeterol (AEROSOL POWDER, BREATH ACTIVATED) ADVAIR 250-50 MCG/DOSE Inhale 1 Puff by mouth 2 times a day. INHALE 1 DOSE BY MOUTH TWICE DAILY. RINSE MOUTH AFTER USE        LevoFLOXacin (Tab) LEVAQUIN 500 MG Take 1 Tab by mouth every day.        Omeprazole (CAPSULE DELAYED RELEASE) PRILOSEC 20 MG Take 1 Cap by mouth every day.        Ondansetron HCl (Tab) ZOFRAN 4 MG Take 1 Tab by mouth every four hours as needed for Nausea/Vomiting.        PredniSONE (Tab) DELTASONE 20 MG Take 2 tabs daily for 4 days.        Valsartan-Hydrochlorothiazide (Tab) DIOVAN-HCT 80-12.5 MG TAKE 1 TABLET BY MOUTH EVERY DAY        Verapamil HCl (CAPSULE SR 24 HR) CALAN  MG TAKE ONE CAPSULE BY MOUTH EVERY DAY        Zolpidem Tartrate (Tab) AMBIEN 5 MG Take 1 Tab by mouth at bedtime as needed for Sleep.        .                 Medicines prescribed today were sent to:     [facility]/PHARMACY #[id number] - [place], [place] - [address] [place] [address]    Phone: [phone] Fax: [phone]    Open 24 Hours?: No      Medication refill instructions:       If your prescription bottle indicates you have medication refills left, it is not necessary to call your provider’s office. Please contact your pharmacy and they will refill your medication.    If your prescription bottle indicates you do not have any refills left, you may request refills at any time through one of the following ways: The online [employer] system  (except Urgent Care), by calling your provider’s office, or by asking your pharmacy to contact your provider’s office with a refill request. Medication refills are processed only during regular business hours and may not be available until the next business day. Your provider may request additional information or to have a follow-up visit with you prior to refilling your medication.   *Please Note: Medication refills are assigned a new Rx number when refilled electronically. Your pharmacy may indicate that no refills were authorized even though a new prescription for the same medication is available at the pharmacy. Please request the medicine by name with the pharmacy before contacting your provider for a refill.        Your To Do List     Future Labs/Procedures Complete By Expires    IR-BIOPSY-GENERIC  As directed 3/30/2018         MyChart Status: Patient Declined

## 2017-03-30 NOTE — Clinical Note
[facility]    [address] [facility], [address]  [place] [id number]      Date:4/2/2017                     Reference to Serenity Howe    Follow Up Note: Oncology     Date: 3/30/17  Time: 9 am     Primary care physician: Dr. Alex Garg oncologist: Dr. Alicea   Surgery: Dr. Denney  ENT: Dr. Anderson    Diagnosis: History of right breast ductal carcinoma, 1.5 cm, grade 3, positive LVI, 1/11 nodes, ER/NV negative, HER2 positive: pT1c N1a M0    Chief compliant: She is here for a follow up visit.      History of presenting illness:  Ms. Howe is a 66 year old female with history of right breast malignancy diagnosed in 2006 secondary to a palpable mass.  She is s/p right breast mastectomy and sentinel node biopsy. Pathology was positive for invasive ductal carcinoma, 1.5 cm, microscopic focus of invasive carcinoma about 1.0-2.0 mm with associated lymphactic vessel invasion, grade 3, positive for lymphatic invasion, 1/11 node positive, ER negative, NV negative and HER2 positive. She was staged pT1c N1a M0. She s/p adjuvant chemotherapy with Herceptin and completed one year of Herceptin. Unclear the regimen she received as multiple attends were made to get records but unable to gets any records in regards to her chemotherapeutic regimen. She did not receive radiation at the time.  2007 mammogram showed an abnormality in the left breast and she underwent biopsy of this lesion which was negative for malignancy. 10/2016 CT scans showed a 2.4 cm alveolar nodule adjacent to the pleura in the right lower lobe and a 3.7 mm noncalcified nodule in the right lower lobe. PET scan at the time showed interval improvement of the right lower lobe opacity and uptake in the left fornix with mild asymmetry of soft tissue prominence focal uptake in the right neck which was felt to be reactive lymph node and 1 cm lesion in the left parotid gland without uptake. She also had hepatic steatosis. Her last mammogram did  not show any concerning findings. Ultrasound of the neck showed a 2.1 cm thyroid nodule and minute left colloid cysts. She underwent biopsy of the thyroid nodule which was benign. 3/2017 CT scan showed no significant changes and a stable 4 mm noncalcified pulmonary nodule. Opacification in the posterior lung base likely felt to be scarring or atelectasis. She was found to have a low-attenuation lesion in the liver which was stable as well. CT of the face and maxillary focal area of mild enhancement adjacent to the posterior aspect of the left parotid gland which appears to be partially  from the gland and may represent slightly enlarged lymph node. There was no other suspicious adenopathy.    Interval History:   She is here for a follow up visit. She has been fairly stable since her last visit without any significant issues. She continues to have anxiety which has been persistent. She otherwise has stable energy, appetite and weight. She denies any cough or shortness of breath. She denies any nausea, vomiting or abdominal pain. She denies any fevers, chills or night sweats.    Past Medical History:  1. 2006: right breast cancer  2. HTN  3. Chronic vertigo   4. Impaired fasting sugars  5. Asthma  6. Insomnia   7. Intermittent back pain     Allergies:  Nkda    Medications:  Current Outpatient Prescriptions on File Prior to Visit   Medication Sig Dispense Refill   • predniSONE (DELTASONE) 20 MG Tab Take 2 tabs daily for 4 days. 8 Tab 0   • levofloxacin (LEVAQUIN) 500 MG tablet Take 1 Tab by mouth every day. 14 Tab 0   • omeprazole (PRILOSEC) 20 MG delayed-release capsule Take 1 Cap by mouth every day. 30 Cap 11   • celecoxib (CELEBREX) 200 MG Cap Take 1 Cap by mouth every day. 30 Cap 6   • fluticasone-salmeterol (ADVAIR DISKUS) 250-50 MCG/DOSE AEROSOL POWDER, BREATH ACTIVATED Inhale 1 Puff by mouth 2 times a day. INHALE 1 DOSE BY MOUTH TWICE DAILY. RINSE MOUTH AFTER USE 1 Inhaler 11   • albuterol (PROVENTIL)  2.5mg/3ml Nebu Soln solution for nebulization 3 mL by Nebulization route every four hours as needed for Shortness of Breath. 75 mL 11   • fluticasone (FLONASE) 50 MCG/ACT nasal spray Spray 2 Sprays in nose every day. 16 g 11   • Calcium Carbonate-Vit D-Min (CALTRATE PLUS PO) Take  by mouth.     • aspirin (ASA) 81 MG CHEW Take 81 mg by mouth every day.     • doxycycline (VIBRAMYCIN) 100 MG Tab Take 1 Tab by mouth 2 times a day. (Patient not taking: Reported on 3/9/2017) 20 Tab 0   •  verapamil ER (CALAN SR) 120 MG CAPSULE SR 24 HR TAKE ONE CAPSULE BY MOUTH EVERY DAY 90 Cap 3   • benzonatate (TESSALON) 100 MG Cap Take 1 Cap by mouth 3 times a day as needed for Cough. 60 Cap 0   • zolpidem (AMBIEN) 5 MG Tab Take 1 Tab by mouth at bedtime as needed for Sleep. 30 Tab 0   • ondansetron (ZOFRAN) 4 MG Tab tablet Take 1 Tab by mouth every four hours as needed for Nausea/Vomiting. (Patient not taking: Reported on 3/9/2017) 20 Tab 0   • valsartan-hydrochlorothiazide (DIOVAN-HCT) 80-12.5 MG per tablet TAKE 1 TABLET BY MOUTH EVERY DAY 30 Tab 9     No current facility-administered medications on file prior to visit.       Review of Systems:  All other review of systems are negative except what was mentioned above in the HPI.  Constitutional: Negative for fever, chills, and weight loss. Positive for mild malaise/fatigue    HENT: Negative for ear pain. Positive for chronic intermittent vertigo   Eyes: Negative for blurred vision.    Respiratory: Negative for cough, sputum production, and shortness of breath.    Cardiovascular: Negative for chest pain.    Gastrointestinal: Negative for nausea, vomiting and abdominal pain.    Genitourinary: Negative for dysuria    Musculoskeletal: Negative for myalgias and joint pain.    Skin: Negative for rash.    Neurological: Negative for dizziness, sensory change, and focal weakness.    Endo/Heme/Allergies: No bruise/bleed easily.    Psychiatric/Behavioral: Negative for depression and memory  "loss.      Physical Exam:  Vitals:  Filed Vitals:    17 0900   BP: 136/98   Pulse: 87   Temp: 36.9 °C (98.4 °F)   Resp: 18   Height: 1.575 m (5' 2.01\")   Weight: 80.1 kg (176 lb 9.4 oz)   SpO2: 92%     General: Not in acute distress, alert and oriented x 3  HEENT: normalcephalic, atraumatic, pupils equally reactive to light bilaterally, extra ocular muscles intact, moist mucus membranes, and oral cavity without any lesions.  Neck: Supple neck and  full range of motion  Lymph nodes: No palpable bilateral cervical and supraclavicular lymphadenopathy.    CVS: regular rate and rhythm  RESP: Clear to auscultate bilaterally   Breast exam:Differed   ABD: Soft, non tender, non distended, and positive bowel sounds.  EXT: No lower extremity edema   CNS: Alert and oriented x3, cranial nerves grossly intact, and muscle strength grossly intact.     Labs: No new labs     Imagin. 3/22/17 CT maxilofacila: Focal area of mild enhancement adjacent to the posterior aspect of the left parotid gland which appears to be partially separate from the gland which may represent a slightly enlarged enhancing single lymph node.  No other suspected adenopathy identified in the anterior posterior cervical chain bilaterally.  Diagnostic/follow-up considerations include follow-up PET/CT or biopsy. Otherwise negative CT examination.    Assessment and Plan:  1. Right breast ductal carcinoma, 1.5 cm, grade 3, positive LVI, 1/11 nodes, ER/DE negative, HER2 positive pT1c N1a M0: Diagnosed and treated in 2006 with ER/DE negative and HER-2 positive disease. She is s/p adjuvant chemotherapy and completed Herceptin. Unknown adjuvant regiment. He is stable clinically without any evidence of recurrence. Her next mammogram is due in 2017.  2. Abnormal imaging: CT imaging she was found to have alveolar opacity in the right lower lobe as well as a 3.7 mm right lower lobe pulmonary nodule. PET scan did not show any uptake. Repeat CT scan showed " stable pulmonary nodule with opacity felt to be either atelectasis or scarring. Clinically she is asymptomatic. Continue to monitor. Plan to repeat another CT of the chest in 6 months.  3. Left pharynx uptake: PET scan she was found to have uptake in the left for her next in the region of the fossa of Rosenmuller with mild soft tissue asymmetry. There was also a small lymph node with uptake which was felt to be likely reactive lymph node. She was seen by ENT and on direct examination there was no concerning findings but she was found to have some irritation. Repeat showed no concerning findings.   4. Left parotid gland lesions: PET scan also showed a 1 cm left parotid gland lesion.  CT of the face and maxillary focal area of mild enhancement adjacent to the posterior aspect of the left parotid gland which appears to be partially  from the gland and may represent slightly enlarged lymph node. There was no other suspicious adenopathy.  I recommend biopsy of the lymph node which has been ordered.  5.  Thyroid nodule: Patient on imaging was found to have a right thyroid mass biopsy of which was negative for malignancy. Recommend a repeat ultrasound of the neck in 6 months.   6. She is advised to follow post biopsy or sooner as needed.    I informed the patient that I will be leaving the [place] area shortly. I went over various options for transition of care. After detailed discussion, patient will be scheduled to see Dr. Cha    She agreed and verbalized her agreement and understanding with the current plan.  I answered all questions and concerns she has at this time and advised her/him to call at any time in the interim with questions or concerns in regards to her care.  Thank you for allowing me to participate in her care.    Please note that this dictation was created using voice recognition software. I have made every reasonable attempt to correct obvious errors, but I expect that there are errors of grammar  and possibly content that I did not discover before finalizing the note.          Sincerely,  Mirna Garcia  [address] [place] [address], [address]   [phone]

## 2017-03-30 NOTE — PROGRESS NOTES
Follow Up Note: Oncology     Date: 3/30/17  Time: 9 am     Primary care physician: Dr. Johnson  Prior oncologist: Dr. Alicea   Surgery: Dr. Denney  ENT: Dr. Anderson    Diagnosis: History of right breast ductal carcinoma, 1.5 cm, grade 3, positive LVI, 1/11 nodes, ER/FL negative, HER2 positive: pT1c N1a M0    Chief compliant: She is here for a follow up visit.      History of presenting illness:  Ms. Howe is a 66 year old female with history of right breast malignancy diagnosed in 2006 secondary to a palpable mass.  She is s/p right breast mastectomy and sentinel node biopsy. Pathology was positive for invasive ductal carcinoma, 1.5 cm, microscopic focus of invasive carcinoma about 1.0-2.0 mm with associated lymphactic vessel invasion, grade 3, positive for lymphatic invasion, 1/11 node positive, ER negative, FL negative and HER2 positive. She was staged pT1c N1a M0. She s/p adjuvant chemotherapy with Herceptin and completed one year of Herceptin. Unclear the regimen she received as multiple attends were made to get records but unable to gets any records in regards to her chemotherapeutic regimen. She did not receive radiation at the time.  2007 mammogram showed an abnormality in the left breast and she underwent biopsy of this lesion which was negative for malignancy. 10/2016 CT scans showed a 2.4 cm alveolar nodule adjacent to the pleura in the right lower lobe and a 3.7 mm noncalcified nodule in the right lower lobe. PET scan at the time showed interval improvement of the right lower lobe opacity and uptake in the left fornix with mild asymmetry of soft tissue prominence focal uptake in the right neck which was felt to be reactive lymph node and 1 cm lesion in the left parotid gland without uptake. She also had hepatic steatosis. Her last mammogram did not show any concerning findings. Ultrasound of the neck showed a 2.1 cm thyroid nodule and minute left colloid cysts. She underwent biopsy of the thyroid  nodule which was benign. 3/2017 CT scan showed no significant changes and a stable 4 mm noncalcified pulmonary nodule. Opacification in the posterior lung base likely felt to be scarring or atelectasis. She was found to have a low-attenuation lesion in the liver which was stable as well. CT of the face and maxillary focal area of mild enhancement adjacent to the posterior aspect of the left parotid gland which appears to be partially  from the gland and may represent slightly enlarged lymph node. There was no other suspicious adenopathy.    Interval History:   She is here for a follow up visit. She has been fairly stable since her last visit without any significant issues. She continues to have anxiety which has been persistent. She otherwise has stable energy, appetite and weight. She denies any cough or shortness of breath. She denies any nausea, vomiting or abdominal pain. She denies any fevers, chills or night sweats.    Past Medical History:  1. 2006: right breast cancer  2. HTN  3. Chronic vertigo   4. Impaired fasting sugars  5. Asthma  6. Insomnia   7. Intermittent back pain     Allergies:  Nkda    Medications:  Current Outpatient Prescriptions on File Prior to Visit   Medication Sig Dispense Refill   • predniSONE (DELTASONE) 20 MG Tab Take 2 tabs daily for 4 days. 8 Tab 0   • levofloxacin (LEVAQUIN) 500 MG tablet Take 1 Tab by mouth every day. 14 Tab 0   • omeprazole (PRILOSEC) 20 MG delayed-release capsule Take 1 Cap by mouth every day. 30 Cap 11   • celecoxib (CELEBREX) 200 MG Cap Take 1 Cap by mouth every day. 30 Cap 6   • fluticasone-salmeterol (ADVAIR DISKUS) 250-50 MCG/DOSE AEROSOL POWDER, BREATH ACTIVATED Inhale 1 Puff by mouth 2 times a day. INHALE 1 DOSE BY MOUTH TWICE DAILY. RINSE MOUTH AFTER USE 1 Inhaler 11   • albuterol (PROVENTIL) 2.5mg/3ml Nebu Soln solution for nebulization 3 mL by Nebulization route every four hours as needed for Shortness of Breath. 75 mL 11   • fluticasone  (FLONASE) 50 MCG/ACT nasal spray Spray 2 Sprays in nose every day. 16 g 11   • Calcium Carbonate-Vit D-Min (CALTRATE PLUS PO) Take  by mouth.     • aspirin (ASA) 81 MG CHEW Take 81 mg by mouth every day.     • doxycycline (VIBRAMYCIN) 100 MG Tab Take 1 Tab by mouth 2 times a day. (Patient not taking: Reported on 3/9/2017) 20 Tab 0   •  verapamil ER (CALAN SR) 120 MG CAPSULE SR 24 HR TAKE ONE CAPSULE BY MOUTH EVERY DAY 90 Cap 3   • benzonatate (TESSALON) 100 MG Cap Take 1 Cap by mouth 3 times a day as needed for Cough. 60 Cap 0   • zolpidem (AMBIEN) 5 MG Tab Take 1 Tab by mouth at bedtime as needed for Sleep. 30 Tab 0   • ondansetron (ZOFRAN) 4 MG Tab tablet Take 1 Tab by mouth every four hours as needed for Nausea/Vomiting. (Patient not taking: Reported on 3/9/2017) 20 Tab 0   • valsartan-hydrochlorothiazide (DIOVAN-HCT) 80-12.5 MG per tablet TAKE 1 TABLET BY MOUTH EVERY DAY 30 Tab 9     No current facility-administered medications on file prior to visit.       Review of Systems:  All other review of systems are negative except what was mentioned above in the HPI.  Constitutional: Negative for fever, chills, and weight loss. Positive for mild malaise/fatigue    HENT: Negative for ear pain. Positive for chronic intermittent vertigo   Eyes: Negative for blurred vision.    Respiratory: Negative for cough, sputum production, and shortness of breath.    Cardiovascular: Negative for chest pain.    Gastrointestinal: Negative for nausea, vomiting and abdominal pain.    Genitourinary: Negative for dysuria    Musculoskeletal: Negative for myalgias and joint pain.    Skin: Negative for rash.    Neurological: Negative for dizziness, sensory change, and focal weakness.    Endo/Heme/Allergies: No bruise/bleed easily.    Psychiatric/Behavioral: Negative for depression and memory loss.      Physical Exam:  Vitals:  Filed Vitals:    03/30/17 0900   BP: 136/98   Pulse: 87   Temp: 36.9 °C (98.4 °F)   Resp: 18   Height: 1.575 m (5'  "2.01\")   Weight: 80.1 kg (176 lb 9.4 oz)   SpO2: 92%     General: Not in acute distress, alert and oriented x 3  HEENT: normalcephalic, atraumatic, pupils equally reactive to light bilaterally, extra ocular muscles intact, moist mucus membranes, and oral cavity without any lesions.  Neck: Supple neck and  full range of motion  Lymph nodes: No palpable bilateral cervical and supraclavicular lymphadenopathy.    CVS: regular rate and rhythm  RESP: Clear to auscultate bilaterally   Breast exam:Differed   ABD: Soft, non tender, non distended, and positive bowel sounds.  EXT: No lower extremity edema   CNS: Alert and oriented x3, cranial nerves grossly intact, and muscle strength grossly intact.     Labs: No new labs     Imagin. 3/22/17 CT maxilofacila: Focal area of mild enhancement adjacent to the posterior aspect of the left parotid gland which appears to be partially separate from the gland which may represent a slightly enlarged enhancing single lymph node.  No other suspected adenopathy identified in the anterior posterior cervical chain bilaterally.  Diagnostic/follow-up considerations include follow-up PET/CT or biopsy. Otherwise negative CT examination.    Assessment and Plan:  1. Right breast ductal carcinoma, 1.5 cm, grade 3, positive LVI, 1/11 nodes, ER/NV negative, HER2 positive pT1c N1a M0: Diagnosed and treated in 2006 with ER/NV negative and HER-2 positive disease. She is s/p adjuvant chemotherapy and completed Herceptin. Unknown adjuvant regiment. He is stable clinically without any evidence of recurrence. Her next mammogram is due in 2017.  2. Abnormal imaging: CT imaging she was found to have alveolar opacity in the right lower lobe as well as a 3.7 mm right lower lobe pulmonary nodule. PET scan did not show any uptake. Repeat CT scan showed stable pulmonary nodule with opacity felt to be either atelectasis or scarring. Clinically she is asymptomatic. Continue to monitor. Plan to repeat another " CT of the chest in 6 months.  3. Left pharynx uptake: PET scan she was found to have uptake in the left for her next in the region of the fossa of Rosenmuller with mild soft tissue asymmetry. There was also a small lymph node with uptake which was felt to be likely reactive lymph node. She was seen by ENT and on direct examination there was no concerning findings but she was found to have some irritation. Repeat showed no concerning findings.   4. Left parotid gland lesions: PET scan also showed a 1 cm left parotid gland lesion.  CT of the face and maxillary focal area of mild enhancement adjacent to the posterior aspect of the left parotid gland which appears to be partially  from the gland and may represent slightly enlarged lymph node. There was no other suspicious adenopathy.  I recommend biopsy of the lymph node which has been ordered.  5.  Thyroid nodule: Patient on imaging was found to have a right thyroid mass biopsy of which was negative for malignancy. Recommend a repeat ultrasound of the neck in 6 months.   6. She is advised to follow post biopsy or sooner as needed.    I informed the patient that I will be leaving the [facility] shortly. I went over various options for transition of care. After detailed discussion, patient will be scheduled to see Dr. Cha    She agreed and verbalized her agreement and understanding with the current plan.  I answered all questions and concerns she has at this time and advised her/him to call at any time in the interim with questions or concerns in regards to her care.  Thank you for allowing me to participate in her care.    Please note that this dictation was created using voice recognition software. I have made every reasonable attempt to correct obvious errors, but I expect that there are errors of grammar and possibly content that I did not discover before finalizing the note.

## 2017-04-06 DIAGNOSIS — Z79.899 MEDICATION MANAGEMENT: ICD-10-CM

## 2017-04-06 RX ORDER — VALSARTAN AND HYDROCHLOROTHIAZIDE 80; 12.5 MG/1; MG/1
1 TABLET, FILM COATED ORAL
Qty: 30 TAB | Refills: 9 | Status: SHIPPED | OUTPATIENT
Start: 2017-04-06 | End: 2018-03-21 | Stop reason: SDUPTHER

## 2017-04-11 ENCOUNTER — HOSPITAL ENCOUNTER (OUTPATIENT)
Dept: RADIOLOGY | Facility: MEDICAL CENTER | Age: 67
End: 2017-04-11
Attending: INTERNAL MEDICINE
Payer: COMMERCIAL

## 2017-04-11 DIAGNOSIS — R93.0 ABNORMAL CT SCAN OF HEAD: ICD-10-CM

## 2017-04-11 PROCEDURE — 76942 ECHO GUIDE FOR BIOPSY: CPT

## 2017-04-11 PROCEDURE — 10022 HCHG FINE NEEDLE ASP W/IMAGING GUIDANCE: CPT

## 2017-04-11 PROCEDURE — 88112 CYTOPATH CELL ENHANCE TECH: CPT

## 2017-04-11 NOTE — PROGRESS NOTES
US guided left parotid lesion fine needle aspiration done by Dr. Portillo; left anterior aspect of neck access site; 1 jar of cytolyt obtained and sent to pathology lab; pt tolerated the procedure well; pt hemodynamically stable pre/intra/post procedure; all questions and concerns answered prior to being d/c; patient provided with appropriate education for procedure; pt d/c home.

## 2017-04-13 DIAGNOSIS — C50.411 MALIGNANT NEOPLASM OF UPPER-OUTER QUADRANT OF RIGHT FEMALE BREAST (HCC): ICD-10-CM

## 2017-04-25 ENCOUNTER — APPOINTMENT (OUTPATIENT)
Dept: HEMATOLOGY ONCOLOGY | Facility: MEDICAL CENTER | Age: 67
End: 2017-04-25
Payer: MEDICARE

## 2017-04-28 ENCOUNTER — TELEPHONE (OUTPATIENT)
Dept: HEMATOLOGY ONCOLOGY | Facility: MEDICAL CENTER | Age: 67
End: 2017-04-28

## 2017-04-28 NOTE — TELEPHONE ENCOUNTER
Left voicemail for patient asking her to call me back at medical oncology. She was rescheduled for an appointment with Dr. Garcia on 5/3/17 that the patient does not need. Dr. Garcia has spoken with this patient multiple times and has already given this patient her last biopsy results over the phone. She has already been referred to [facility] to and is scheduled to see Dr. Cha on Monday 5/1/17.  This patient does not need a follow up appointment with Dr. Garcia as she will be establishing with Dr. Cha

## 2017-06-01 ENCOUNTER — HOSPITAL ENCOUNTER (OUTPATIENT)
Dept: RADIOLOGY | Facility: MEDICAL CENTER | Age: 67
End: 2017-06-01
Attending: SURGERY
Payer: COMMERCIAL

## 2017-06-01 DIAGNOSIS — E04.1 THYROID NODULE: ICD-10-CM

## 2017-06-01 PROCEDURE — 88112 CYTOPATH CELL ENHANCE TECH: CPT

## 2017-06-01 PROCEDURE — 10022 HCHG FINE NEEDLE ASP W/IMAGING GUIDANCE: CPT

## 2017-06-01 PROCEDURE — 76942 ECHO GUIDE FOR BIOPSY: CPT

## 2017-06-01 NOTE — PROGRESS NOTES
US guided RIGHT thyroid nodule fine needle aspiration done by Dr. VIGIL; RIGHT anterior aspect of neck access site; 1 jar of cytolyt obtained and sent to pathology lab; pt tolerated the procedure well; pt hemodynamically stable pre/intra/post procedure; all appropriate patient education provided for procedure, pt verbalizes understanding all questions and concerns answered prior to being d/c; pt d/c home; Dr. Vigil assisted by RANDY Abdi

## 2017-06-09 ENCOUNTER — OFFICE VISIT (OUTPATIENT)
Dept: URGENT CARE | Facility: PHYSICIAN GROUP | Age: 67
End: 2017-06-09
Payer: COMMERCIAL

## 2017-06-09 VITALS
SYSTOLIC BLOOD PRESSURE: 132 MMHG | WEIGHT: 176 LBS | HEIGHT: 62 IN | BODY MASS INDEX: 32.39 KG/M2 | OXYGEN SATURATION: 92 % | HEART RATE: 86 BPM | DIASTOLIC BLOOD PRESSURE: 80 MMHG | TEMPERATURE: 97.5 F

## 2017-06-09 DIAGNOSIS — J20.9 ACUTE BRONCHITIS, UNSPECIFIED ORGANISM: ICD-10-CM

## 2017-06-09 PROCEDURE — 99214 OFFICE O/P EST MOD 30 MIN: CPT | Performed by: PHYSICIAN ASSISTANT

## 2017-06-09 RX ORDER — CODEINE PHOSPHATE AND GUAIFENESIN 10; 100 MG/5ML; MG/5ML
5 SOLUTION ORAL EVERY 6 HOURS PRN
Qty: 90 ML | Refills: 0 | Status: SHIPPED | OUTPATIENT
Start: 2017-06-09 | End: 2017-09-15 | Stop reason: SDUPTHER

## 2017-06-09 RX ORDER — DOXYCYCLINE HYCLATE 100 MG
100 TABLET ORAL 2 TIMES DAILY
Qty: 20 TAB | Refills: 0 | Status: SHIPPED | OUTPATIENT
Start: 2017-06-09 | End: 2017-06-19

## 2017-06-09 ASSESSMENT — ENCOUNTER SYMPTOMS
RHINORRHEA: 1
CHILLS: 1
SORE THROAT: 1
HEADACHES: 1
SENSORY CHANGE: 0
ABDOMINAL PAIN: 0
SHORTNESS OF BREATH: 0
TINGLING: 0
FEVER: 1
COUGH: 1
SPUTUM PRODUCTION: 1
MYALGIAS: 0
VOMITING: 0
NAUSEA: 0
DIZZINESS: 0
DIARRHEA: 1
WHEEZING: 0
FOCAL WEAKNESS: 0
PALPITATIONS: 0
SWEATS: 0

## 2017-06-09 NOTE — PROGRESS NOTES
Subjective:      Serenity Howe is a 67 y.o. female who presents with Cough            Cough  This is a new problem. The current episode started yesterday. The problem has been gradually worsening. The cough is productive of purulent sputum. Associated symptoms include chills, a fever, headaches, nasal congestion, rhinorrhea and a sore throat. Pertinent negatives include no chest pain, ear congestion, ear pain, myalgias, postnasal drip, shortness of breath, sweats or wheezing. She has tried a beta-agonist inhaler and ipratropium inhaler (advil, ) for the symptoms. The treatment provided mild relief. Her past medical history is significant for asthma and bronchitis. There is no history of pneumonia.     Past Medical History   Diagnosis Date   • ASTHMA    • Hypertension    • Breast cancer (CMS-[facility])    • Shoulder pain, right 7/29/2015   • Tremor 7/29/2015   • History of breast cancer 7/29/2015     Past Surgical History   Procedure Laterality Date   • Gyn surgery       cs   • Mastectomy       right        Family History   Problem Relation Age of Onset   • Hyperlipidemia Mother        Allergies   Allergen Reactions   • Nkda [No Known Drug Allergy]        Medications, Allergies, and current problem list reviewed today in Epic      Review of Systems   Constitutional: Positive for fever, chills and malaise/fatigue.   HENT: Positive for congestion, rhinorrhea and sore throat. Negative for ear discharge, ear pain and postnasal drip.    Respiratory: Positive for cough and sputum production. Negative for shortness of breath and wheezing.    Cardiovascular: Negative for chest pain, palpitations and leg swelling.   Gastrointestinal: Positive for diarrhea. Negative for nausea, vomiting and abdominal pain.   Musculoskeletal: Negative for myalgias.   Neurological: Positive for headaches. Negative for dizziness, tingling, sensory change and focal weakness.     All other systems reviewed and are negative.        Objective:     BP  "132/80 mmHg  Pulse 86  Temp(Src) 36.4 °C (97.5 °F)  Ht 1.575 m (5' 2\")  Wt 79.833 kg (176 lb)  BMI 32.18 kg/m2  SpO2 92%     Physical Exam   Constitutional: She is oriented to person, place, and time. She appears well-developed and well-nourished. No distress.   HENT:   Head: Normocephalic and atraumatic.   Right Ear: Tympanic membrane, external ear and ear canal normal.   Left Ear: Tympanic membrane, external ear and ear canal normal.   Nose: Mucosal edema and rhinorrhea present.   Mouth/Throat: Uvula is midline and oropharynx is clear and moist. No oropharyngeal exudate or posterior oropharyngeal erythema.   Eyes: Conjunctivae are normal.   Neck: Neck supple.   Cardiovascular: Normal rate, regular rhythm and normal heart sounds.  Exam reveals no gallop and no friction rub.    No murmur heard.  Pulmonary/Chest: Effort normal. No respiratory distress. She has no decreased breath sounds. She has no wheezes. She has rhonchi in the right upper field and the left upper field. She has no rales.   Lymphadenopathy:     She has no cervical adenopathy.   Neurological: She is alert and oriented to person, place, and time. No cranial nerve deficit.   Skin: Skin is warm and dry. No rash noted.   Psychiatric: She has a normal mood and affect. Her behavior is normal. Judgment and thought content normal.               Assessment/Plan:     1. Acute bronchitis, unspecified organism  doxycycline (VIBRAMYCIN) 100 MG Tab    guaifenesin-codeine (CHERATUSSIN AC) Solution oral solution         Current outpatient prescriptions:   •  doxycycline (VIBRAMYCIN) 100 MG Tab, Take 1 Tab by mouth 2 times a day for 10 days., Disp: 20 Tab, Rfl: 0  •  guaifenesin-codeine (CHERATUSSIN AC) Solution oral solution, Take 5 mL by mouth every 6 hours as needed for Cough., Disp: 90 mL, Rfl: 0    Sedation side effects discussed. No Driving or alcohol with medication given.    Encouraged fluids, rest, humidification.  Continue Rescue and maintenance " inhaler.     Differential diagnoses, Supportive care, and indications for immediate follow-up discussed with patient.   Instructed to return to clinic or nearest emergency department for any change in condition, further concerns, or worsening of symptoms.    The patient demonstrated a good understanding and agreed with the treatment plan.    Michaela Ding PA-C

## 2017-06-09 NOTE — MR AVS SNAPSHOT
"Serenity Howe   2017 1:15 PM   Office Visit   MRN: [id number]    Department:  [facility]   Dept Phone:  [phone]    Description:  Female : 1950   Provider:  Michaela Ding PA-C           Reason for Visit     Cough cough, fever, mucus production x 1 day      Allergies as of 2017     Allergen Noted Reactions    Nkda [No Known Drug Allergy] 2008         You were diagnosed with     Acute bronchitis, unspecified organism   [[id number]]         Vital Signs     Blood Pressure Pulse Temperature Height Weight Body Mass Index    132/80 mmHg 86 36.4 °C (97.5 °F) 1.575 m (5' 2\") 79.833 kg (176 lb) 32.18 kg/m2    Oxygen Saturation Smoking Status                92% Never Smoker           Basic Information     Date Of Birth Sex Race Ethnicity Preferred Language    1950 Female Other Non- English      Your appointments     2017  8:00 AM   MR BODY WITH/WITHOUT (60) with VISTA MRI 1   [facility]    [address] [facility] [address]   [phone]            Sep 20, 2017  9:30 AM   US [id number] with VISTA US 1   [facility] ([place])    [address] [place] [address]   [phone]            Sep 20, 2017 10:00 AM   CT BODY WITH with VISTA CT 1   [facility] ([place])    [address] [place] [address]   [phone]           Some exams require specific prep instructions that would have been given to you at time of scheduling. If you have any additional questions about the prep instructions, please call Imaging Scheduling at [phone] and press #2.              Problem List              ICD-10-CM Priority Class Noted - Resolved    Essential hypertension, benign I10   3/16/2015 - Present    Insomnia G47.00   3/16/2015 - Present    Personal history of breast cancer Z85.3   3/16/2015 - Present    Allergic rhinitis J30.9   3/16/2015 - Present    Impaired fasting glucose R73.01   3/16/2015 - Present    Recurrent knee pain M25.569   2015 - Present   " Shoulder pain, right M25.511   7/29/2015 - Present    Tremor R25.1   7/29/2015 - Present    Vertigo R42   12/17/2015 - Present    Mild persistent asthma without complication (Chronic) J45.30   7/28/2016 - Present    Gastroesophageal reflux disease with esophagitis K21.0   7/28/2016 - Present    Malignant neoplasm of upper-outer quadrant of right female breast (CMS-HCC) C50.411   11/29/2016 - Present      Health Maintenance        Date Due Completion Dates    IMM DTaP/Tdap/Td Vaccine (1 - Tdap) 5/23/1968 ---    IMM ZOSTER VACCINE 5/23/2009 ---    IMM PNEUMOCOCCAL 65+ (ADULT) LOW/MEDIUM RISK SERIES (2 of 2 - PPSV23) 4/12/2017 4/12/2016    MAMMOGRAM 12/15/2017 12/15/2016, 11/2/2015, 10/28/2014, 10/22/2013, 10/17/2012, 10/18/2011, 10/12/2010, 10/8/2009, 10/8/2009, 11/21/2007, 11/21/2007, 6/19/2007, 6/19/2007, 3/20/2007, 3/20/2007    COLONOSCOPY 3/16/2025 3/16/2015 (Postponed)    Override on 3/16/2015: Postponed            Current Immunizations     13-VALENT PCV PREVNAR 4/12/2016    Influenza Vaccine Adult HD 9/30/2016, 9/12/2015      Below and/or attached are the medications your provider expects you to take. Review all of your home medications and newly ordered medications with your provider and/or pharmacist. Follow medication instructions as directed by your provider and/or pharmacist. Please keep your medication list with you and share with your provider. Update the information when medications are discontinued, doses are changed, or new medications (including over-the-counter products) are added; and carry medication information at all times in the event of emergency situations     Allergies:  NKDA - (reactions not documented)               Medications  Valid as of: June 09, 2017 -  3:46 PM    Generic Name Brand Name Tablet Size Instructions for use    Albuterol Sulfate (Nebu Soln) PROVENTIL 2.5mg/3ml 3 mL by Nebulization route every four hours as needed for Shortness of Breath.        Aspirin (Chew Tab) ASA 81 MG  Take 81 mg by mouth every day.        Benzonatate (Cap) TESSALON 100 MG Take 1 Cap by mouth 3 times a day as needed for Cough.        Calcium Carbonate-Vit D-Min   Take  by mouth.        Celecoxib (Cap) CELEBREX 200 MG Take 1 Cap by mouth every day.        Doxycycline Hyclate (Tab) VIBRAMYCIN 100 MG Take 1 Tab by mouth 2 times a day.        Doxycycline Hyclate (Tab) VIBRAMYCIN 100 MG Take 1 Tab by mouth 2 times a day for 10 days.        Fluticasone Propionate (Suspension) FLONASE 50 MCG/ACT Spray 2 Sprays in nose every day.        Fluticasone-Salmeterol (AEROSOL POWDER, BREATH ACTIVATED) ADVAIR 250-50 MCG/DOSE Inhale 1 Puff by mouth 2 times a day. INHALE 1 DOSE BY MOUTH TWICE DAILY. RINSE MOUTH AFTER USE        Guaifenesin-Codeine (Solution) ROBITUSSIN -10 mg/5mL Take 5 mL by mouth every 6 hours as needed for Cough.        LevoFLOXacin (Tab) LEVAQUIN 500 MG Take 1 Tab by mouth every day.        Omeprazole (CAPSULE DELAYED RELEASE) PRILOSEC 20 MG Take 1 Cap by mouth every day.        Ondansetron HCl (Tab) ZOFRAN 4 MG Take 1 Tab by mouth every four hours as needed for Nausea/Vomiting.        PredniSONE (Tab) DELTASONE 20 MG Take 2 tabs daily for 4 days.        Valsartan-Hydrochlorothiazide (Tab) DIOVAN-HCT 80-12.5 MG Take 1 Tab by mouth every day.        Verapamil HCl (CAPSULE SR 24 HR) CALAN  MG TAKE ONE CAPSULE BY MOUTH EVERY DAY        Zolpidem Tartrate (Tab) AMBIEN 5 MG Take 1 Tab by mouth at bedtime as needed for Sleep.        .                 Medicines prescribed today were sent to:     [facility]/PHARMACY #[id number] - [place], [place] - [address] [facility] AT [address] [address]    Phone: [phone] Fax: [phone]    Open 24 Hours?: No      Medication refill instructions:       If your prescription bottle indicates you have medication refills left, it is not necessary to call your provider’s office. Please contact your pharmacy and they will refill your medication.      If your prescription bottle indicates you do not have any refills left, you may request refills at any time through one of the following ways: The online [employer] system (except Urgent Care), by calling your provider’s office, or by asking your pharmacy to contact your provider’s office with a refill request. Medication refills are processed only during regular business hours and may not be available until the next business day. Your provider may request additional information or to have a follow-up visit with you prior to refilling your medication.   *Please Note: Medication refills are assigned a new Rx number when refilled electronically. Your pharmacy may indicate that no refills were authorized even though a new prescription for the same medication is available at the pharmacy. Please request the medicine by name with the pharmacy before contacting your provider for a refill.           MyChart Status: Patient Declined

## 2017-06-09 NOTE — Clinical Note
June 9, 2017         Patient: Serenity Howe   YOB: 1950   Date of Visit: 6/9/2017           To Whom it May Concern:    Serenity Howe was seen in my clinic on 6/9/2017 for medical reasons.    If you have any questions or concerns, please don't hesitate to call.        Sincerely,           Michaela Ding PA-C  Electronically Signed

## 2017-06-16 ENCOUNTER — HOSPITAL ENCOUNTER (OUTPATIENT)
Dept: RADIOLOGY | Facility: MEDICAL CENTER | Age: 67
End: 2017-06-16
Attending: OTOLARYNGOLOGY
Payer: COMMERCIAL

## 2017-06-16 DIAGNOSIS — R22.1 NECK MASS: ICD-10-CM

## 2017-06-16 PROCEDURE — 700117 HCHG RX CONTRAST REV CODE 255: Performed by: OTOLARYNGOLOGY

## 2017-06-16 PROCEDURE — 70543 MRI ORBT/FAC/NCK W/O &W/DYE: CPT

## 2017-06-16 PROCEDURE — A9577 INJ MULTIHANCE: HCPCS | Performed by: OTOLARYNGOLOGY

## 2017-06-16 RX ADMIN — GADOBENATE DIMEGLUMINE 10 ML: 529 INJECTION, SOLUTION INTRAVENOUS at 09:01

## 2017-07-18 ENCOUNTER — HOSPITAL ENCOUNTER (OUTPATIENT)
Dept: RADIOLOGY | Facility: MEDICAL CENTER | Age: 67
End: 2017-07-18
Attending: OTOLARYNGOLOGY
Payer: COMMERCIAL

## 2017-07-18 DIAGNOSIS — R22.1 LOCALIZED SWELLING, MASS AND LUMP, NECK: ICD-10-CM

## 2017-07-18 PROCEDURE — 76942 ECHO GUIDE FOR BIOPSY: CPT

## 2017-07-18 PROCEDURE — 10022 HCHG FINE NEEDLE ASP W/IMAGING GUIDANCE: CPT

## 2017-07-18 PROCEDURE — 88112 CYTOPATH CELL ENHANCE TECH: CPT

## 2017-07-18 NOTE — PROGRESS NOTES
US guided left parotid fine needle aspiration done by Dr. Kruse; left anterior aspect of neck access site; 1 jar of cytolyt obtained and sent to pathology lab; pt tolerated the procedure well; pt hemodynamically stable pre/intra/post procedure; all questions and concerns answered prior to being d/c; patient provided with appropriate education for procedure; pt d/c home.

## 2017-07-31 DIAGNOSIS — J44.9 CHRONIC AIRWAY OBSTRUCTION, MIXED TYPE (HCC): ICD-10-CM

## 2017-07-31 NOTE — TELEPHONE ENCOUNTER
Was the patient seen in the last year in this department? Yes     Does patient have an active prescription for medications requested? No     Received Request Via: Pharmacy     Patient not seen since 9/30/16? Please advise

## 2017-08-09 ENCOUNTER — OFFICE VISIT (OUTPATIENT)
Dept: URGENT CARE | Facility: CLINIC | Age: 67
End: 2017-08-09
Payer: COMMERCIAL

## 2017-08-09 ENCOUNTER — HOSPITAL ENCOUNTER (OUTPATIENT)
Dept: LAB | Facility: MEDICAL CENTER | Age: 67
End: 2017-08-09
Attending: FAMILY MEDICINE
Payer: MEDICARE

## 2017-08-09 ENCOUNTER — HOSPITAL ENCOUNTER (OUTPATIENT)
Facility: MEDICAL CENTER | Age: 67
End: 2017-08-09
Attending: FAMILY MEDICINE
Payer: COMMERCIAL

## 2017-08-09 VITALS
BODY MASS INDEX: 31.65 KG/M2 | TEMPERATURE: 97.9 F | HEIGHT: 62 IN | SYSTOLIC BLOOD PRESSURE: 126 MMHG | WEIGHT: 172 LBS | HEART RATE: 96 BPM | DIASTOLIC BLOOD PRESSURE: 90 MMHG | OXYGEN SATURATION: 93 %

## 2017-08-09 DIAGNOSIS — R55 NEAR SYNCOPE: ICD-10-CM

## 2017-08-09 DIAGNOSIS — R82.90 ABNORMAL URINALYSIS: ICD-10-CM

## 2017-08-09 LAB
ANION GAP SERPL CALC-SCNC: 6 MMOL/L (ref 0–11.9)
APPEARANCE UR: CLEAR
BASOPHILS # BLD AUTO: 0.9 % (ref 0–1.8)
BASOPHILS # BLD: 0.07 K/UL (ref 0–0.12)
BILIRUB UR STRIP-MCNC: NORMAL MG/DL
BUN SERPL-MCNC: 20 MG/DL (ref 8–22)
CALCIUM SERPL-MCNC: 10.1 MG/DL (ref 8.5–10.5)
CHLORIDE SERPL-SCNC: 106 MMOL/L (ref 96–112)
CO2 SERPL-SCNC: 29 MMOL/L (ref 20–33)
COLOR UR AUTO: YELLOW
CREAT SERPL-MCNC: 1.05 MG/DL (ref 0.5–1.4)
EOSINOPHIL # BLD AUTO: 0.21 K/UL (ref 0–0.51)
EOSINOPHIL NFR BLD: 2.7 % (ref 0–6.9)
ERYTHROCYTE [DISTWIDTH] IN BLOOD BY AUTOMATED COUNT: 48.9 FL (ref 35.9–50)
GFR SERPL CREATININE-BSD FRML MDRD: 52 ML/MIN/1.73 M 2
GLUCOSE SERPL-MCNC: 104 MG/DL (ref 65–99)
GLUCOSE UR STRIP.AUTO-MCNC: NORMAL MG/DL
HCT VFR BLD AUTO: 45.4 % (ref 37–47)
HGB BLD-MCNC: 15.1 G/DL (ref 12–16)
IMM GRANULOCYTES # BLD AUTO: 0.01 K/UL (ref 0–0.11)
IMM GRANULOCYTES NFR BLD AUTO: 0.1 % (ref 0–0.9)
KETONES UR STRIP.AUTO-MCNC: NORMAL MG/DL
LEUKOCYTE ESTERASE UR QL STRIP.AUTO: NORMAL
LYMPHOCYTES # BLD AUTO: 2.41 K/UL (ref 1–4.8)
LYMPHOCYTES NFR BLD: 30.5 % (ref 22–41)
MCH RBC QN AUTO: 32.3 PG (ref 27–33)
MCHC RBC AUTO-ENTMCNC: 33.3 G/DL (ref 33.6–35)
MCV RBC AUTO: 97 FL (ref 81.4–97.8)
MONOCYTES # BLD AUTO: 0.84 K/UL (ref 0–0.85)
MONOCYTES NFR BLD AUTO: 10.6 % (ref 0–13.4)
NEUTROPHILS # BLD AUTO: 4.36 K/UL (ref 2–7.15)
NEUTROPHILS NFR BLD: 55.2 % (ref 44–72)
NITRITE UR QL STRIP.AUTO: NORMAL
NRBC # BLD AUTO: 0 K/UL
NRBC BLD AUTO-RTO: 0 /100 WBC
PH UR STRIP.AUTO: 6 [PH] (ref 5–8)
PLATELET # BLD AUTO: 250 K/UL (ref 164–446)
PMV BLD AUTO: 9.6 FL (ref 9–12.9)
POTASSIUM SERPL-SCNC: 3.8 MMOL/L (ref 3.6–5.5)
PROT UR QL STRIP: NORMAL MG/DL
RBC # BLD AUTO: 4.68 M/UL (ref 4.2–5.4)
RBC UR QL AUTO: NORMAL
SODIUM SERPL-SCNC: 141 MMOL/L (ref 135–145)
SP GR UR STRIP.AUTO: 1.02
UROBILINOGEN UR STRIP-MCNC: NORMAL MG/DL
WBC # BLD AUTO: 7.9 K/UL (ref 4.8–10.8)

## 2017-08-09 PROCEDURE — 87086 URINE CULTURE/COLONY COUNT: CPT

## 2017-08-09 PROCEDURE — 81002 URINALYSIS NONAUTO W/O SCOPE: CPT | Performed by: FAMILY MEDICINE

## 2017-08-09 PROCEDURE — 85025 COMPLETE CBC W/AUTO DIFF WBC: CPT

## 2017-08-09 PROCEDURE — 99215 OFFICE O/P EST HI 40 MIN: CPT | Performed by: FAMILY MEDICINE

## 2017-08-09 PROCEDURE — 80048 BASIC METABOLIC PNL TOTAL CA: CPT

## 2017-08-09 PROCEDURE — 36415 COLL VENOUS BLD VENIPUNCTURE: CPT

## 2017-08-09 ASSESSMENT — ENCOUNTER SYMPTOMS
WHEEZING: 0
DIARRHEA: 0
COUGH: 0
VOMITING: 0
NERVOUS/ANXIOUS: 0
DOUBLE VISION: 0
HEADACHES: 0
ABDOMINAL PAIN: 0
BLURRED VISION: 0
WEIGHT LOSS: 0
BACK PAIN: 0
CHILLS: 0
DIZZINESS: 1
BLOOD IN STOOL: 0
FOCAL WEAKNESS: 0
MYALGIAS: 0
SENSORY CHANGE: 0
NECK PAIN: 0

## 2017-08-09 ASSESSMENT — LIFESTYLE VARIABLES: SUBSTANCE_ABUSE: 0

## 2017-08-09 NOTE — PROGRESS NOTES
"Subjective:      Serenity Howe is a 67 y.o. female who presents with Dizziness            Dizziness  This is a new problem. Episode onset: 2-3 days intermittent lightheadedness. Worse with am with slightly low BP noted at 115 with associated feeling of almost passing out. No LOC. Pertinent negatives include no abdominal pain, chills, coughing, headaches, myalgias, neck pain, rash or vomiting.   No clear trigger or trauma. Sx's worse when standing from seated position.   No unilateral weakness. No CP. No SOB. No palpitations. No limb swelling. No fever. Mild nausea resolved. No unilateral weakness. No mental status change.     Review of Systems   Constitutional: Negative for chills and weight loss.   HENT: Negative for hearing loss and tinnitus.    Eyes: Negative for blurred vision and double vision.   Respiratory: Negative for cough and wheezing.    Cardiovascular:        As above   Gastrointestinal: Negative for vomiting, abdominal pain, diarrhea and blood in stool.   Genitourinary: Negative for dysuria, urgency, frequency and hematuria.   Musculoskeletal: Negative for myalgias, back pain, joint pain and neck pain.   Skin: Negative for itching and rash.   Neurological: Positive for dizziness. Negative for sensory change, focal weakness and headaches.   Psychiatric/Behavioral: Negative for substance abuse. The patient is not nervous/anxious.    .  Medications, Allergies, and current problem list reviewed today in [facility]  Past Medical History   Diagnosis Date   • ASTHMA    • Hypertension    • Breast cancer (CMS-HCC)    • Shoulder pain, right 7/29/2015   • Tremor 7/29/2015   • History of breast cancer 7/29/2015     Family History   Problem Relation Age of Onset   • Hyperlipidemia Mother      Social History   Substance Use Topics   • Smoking status: Never Smoker    • Smokeless tobacco: Never Used   • Alcohol Use: No            Objective:     /100 mmHg  Pulse 86  Temp(Src) 36.6 °C (97.9 °F)  Ht 1.575 m (5' 2.01\") "  Wt 78.019 kg (172 lb)  BMI 31.45 kg/m2  SpO2 93%     Physical Exam   Constitutional: She is oriented to person, place, and time. She appears well-developed and well-nourished. No distress.   HENT:   Head: Normocephalic and atraumatic.   Right Ear: External ear normal.   Left Ear: External ear normal.   Mouth/Throat: Oropharynx is clear and moist.   Eyes: Conjunctivae and EOM are normal. Pupils are equal, round, and reactive to light.   Neck: Neck supple. No JVD present. No thyromegaly present.   Cardiovascular: Normal rate, regular rhythm and normal heart sounds.    No murmur heard.  Pulmonary/Chest: Effort normal and breath sounds normal. She has no wheezes.   Abdominal: Soft. Bowel sounds are normal. There is no tenderness.   Musculoskeletal: She exhibits no edema or tenderness.   Neurological: She is alert and oriented to person, place, and time. No cranial nerve deficit.   No pronator drift  No focal deficits   Skin: Skin is warm and dry. No rash noted.   Psychiatric: She has a normal mood and affect. Her behavior is normal.               Assessment/Plan:   UA reviewed: tr LE, tr blood  orthos reviewed  EKG: NSR rate:82, normal axis, normal intervals, no evidence of ischemia or hypertrophy.    1. Near syncope  ORTHOSTATIC BLOOD PRESSURE    POCT Urinalysis    EKG - Clinic performed    BASIC METABOLIC PANEL    CBC WITH DIFFERENTIAL   2. Abnormal urinalysis  URINE CULTURE(NEW)     Differential diagnosis, natural history, supportive care, and indications for immediate follow-up discussed at length.   Doing well on disposition from the urgent care. Currently asymptomatic  Will f/u lab and urine culture.

## 2017-08-09 NOTE — MR AVS SNAPSHOT
"        Serenity Howe   2017 9:00 AM   Office Visit   MRN: [id number]    Department:  [facility]   Dept Phone:  [phone]    Description:  Female : 1950   Provider:  Joaquim Meade M.D.           Reason for Visit     Dizziness pt feels very lightheaded has been going on for a few days this morning is the worse pt states she checked her /72      Allergies as of 2017     Allergen Noted Reactions    Nkda [No Known Drug Allergy] 2008         You were diagnosed with     Near syncope   [656289]       Abnormal urinalysis   [029303]         Vital Signs     Blood Pressure Pulse Temperature Height Weight Body Mass Index    126/90 mmHg 96 36.6 °C (97.9 °F) 1.575 m (5' 2.01\") 78.019 kg (172 lb) 31.45 kg/m2    Oxygen Saturation Smoking Status                93% Never Smoker           Basic Information     Date Of Birth Sex Race Ethnicity Preferred Language    1950 Female Other Non- English      Your appointments     Sep 20, 2017  9:30 AM   US [id number] with VISTA US 1   [facility]    [address] [facility] [address] [phone]            Sep 20, 2017 10:00 AM   CT BODY WITH with VISTA CT 1   [facility]    [address] [facility] [address] [phone]           Some exams require specific prep instructions that would have been given to you at time of scheduling. If you have any additional questions about the prep instructions, please call Imaging Scheduling at [phone] and press #2.              Problem List              ICD-10-CM Priority Class Noted - Resolved    Essential hypertension, benign I10   3/16/2015 - Present    Insomnia G47.00   3/16/2015 - Present    Personal history of breast cancer Z85.3   3/16/2015 - Present    Allergic rhinitis J30.9   3/16/2015 - Present    Impaired fasting glucose R73.01   3/16/2015 - Present    Recurrent knee pain M25.569   2015 - Present    Shoulder pain, right M25.511   2015 - Present    Tremor " R25.1   7/29/2015 - Present    Vertigo R42   12/17/2015 - Present    Mild persistent asthma without complication (Chronic) J45.30   7/28/2016 - Present    Gastroesophageal reflux disease with esophagitis K21.0   7/28/2016 - Present    Malignant neoplasm of upper-outer quadrant of right female breast (CMS-HCC) C50.411   11/29/2016 - Present      Health Maintenance        Date Due Completion Dates    IMM DTaP/Tdap/Td Vaccine (1 - Tdap) 5/23/1968 ---    IMM ZOSTER VACCINE 5/23/2009 ---    IMM PNEUMOCOCCAL 65+ (ADULT) LOW/MEDIUM RISK SERIES (2 of 2 - PPSV23) 4/12/2017 4/12/2016    IMM INFLUENZA (1) 9/1/2017 9/30/2016, 9/12/2015    MAMMOGRAM 12/15/2017 12/15/2016, 11/2/2015, 10/28/2014, 10/22/2013, 10/17/2012, 10/18/2011, 10/12/2010, 10/8/2009, 10/8/2009, 11/21/2007, 11/21/2007, 6/19/2007, 6/19/2007, 3/20/2007, 3/20/2007    COLONOSCOPY 3/16/2025 3/16/2015 (Postponed)    Override on 3/16/2015: Postponed            Results     POCT Urinalysis      Component Value Standard Range & Units    POC Color YELLOW Negative    POC Appearance CLEAR Negative    POC Leukocyte Esterase TR Negative    POC Nitrites NEG Negative    POC Urobiligen NEG Negative (0.2) mg/dL    POC Protein NEG Negative mg/dL    POC Urine PH 6.0 5.0 - 8.0    POC Blood TR Negative    POC Specific Gravity 1.025 <1.005 - >1.030    POC Ketones NEG Negative mg/dL    POC Biliruben NEG Negative mg/dL    POC Glucose NEG Negative mg/dL                        Current Immunizations     13-VALENT PCV PREVNAR 4/12/2016    Influenza Vaccine Adult HD 9/30/2016, 9/12/2015      Below and/or attached are the medications your provider expects you to take. Review all of your home medications and newly ordered medications with your provider and/or pharmacist. Follow medication instructions as directed by your provider and/or pharmacist. Please keep your medication list with you and share with your provider. Update the information when medications are discontinued, doses are  changed, or new medications (including over-the-counter products) are added; and carry medication information at all times in the event of emergency situations     Allergies:  NKDA - (reactions not documented)               Medications  Valid as of: August 09, 2017 - 11:50 AM    Generic Name Brand Name Tablet Size Instructions for use    Albuterol Sulfate (Nebu Soln) PROVENTIL 2.5mg/3ml 3 mL by Nebulization route every four hours as needed for Shortness of Breath.        Aspirin (Chew Tab) ASA 81 MG Take 81 mg by mouth every day.        Benzonatate (Cap) TESSALON 100 MG Take 1 Cap by mouth 3 times a day as needed for Cough.        Calcium Carbonate-Vit D-Min   Take  by mouth.        Celecoxib (Cap) CELEBREX 200 MG Take 1 Cap by mouth every day.        Doxycycline Hyclate (Tab) VIBRAMYCIN 100 MG Take 1 Tab by mouth 2 times a day.        Fluticasone Propionate (Suspension) FLONASE 50 MCG/ACT Spray 2 Sprays in nose every day.        Fluticasone-Salmeterol (AEROSOL POWDER, BREATH ACTIVATED) ADVAIR 250-50 MCG/DOSE Inhale 1 Puff by mouth 2 times a day. INHALE 1 DOSE BY MOUTH TWICE DAILY. RINSE MOUTH AFTER USE        Guaifenesin-Codeine (Solution) ROBITUSSIN -10 mg/5mL Take 5 mL by mouth every 6 hours as needed for Cough.        LevoFLOXacin (Tab) LEVAQUIN 500 MG Take 1 Tab by mouth every day.        Omeprazole (CAPSULE DELAYED RELEASE) PRILOSEC 20 MG Take 1 Cap by mouth every day.        Ondansetron HCl (Tab) ZOFRAN 4 MG Take 1 Tab by mouth every four hours as needed for Nausea/Vomiting.        PredniSONE (Tab) DELTASONE 20 MG Take 2 tabs daily for 4 days.        Valsartan-Hydrochlorothiazide (Tab) DIOVAN-HCT 80-12.5 MG Take 1 Tab by mouth every day.        Verapamil HCl (CAPSULE SR 24 HR) CALAN  MG TAKE ONE CAPSULE BY MOUTH EVERY DAY        Zolpidem Tartrate (Tab) AMBIEN 5 MG Take 1 Tab by mouth at bedtime as needed for Sleep.        .                 Medicines prescribed today were sent to:     [facility]/PHARMACY  #[id number] - [place], [place] - [address] [place] [address]    Phone: [phone] Fax: [phone]    Open 24 Hours?: No      Medication refill instructions:       If your prescription bottle indicates you have medication refills left, it is not necessary to call your provider’s office. Please contact your pharmacy and they will refill your medication.    If your prescription bottle indicates you do not have any refills left, you may request refills at any time through one of the following ways: The online [employer] system (except Urgent Care), by calling your provider’s office, or by asking your pharmacy to contact your provider’s office with a refill request. Medication refills are processed only during regular business hours and may not be available until the next business day. Your provider may request additional information or to have a follow-up visit with you prior to refilling your medication.   *Please Note: Medication refills are assigned a new Rx number when refilled electronically. Your pharmacy may indicate that no refills were authorized even though a new prescription for the same medication is available at the pharmacy. Please request the medicine by name with the pharmacy before contacting your provider for a refill.        Your To Do List     Future Labs/Procedures Complete By Expires    BASIC METABOLIC PANEL  As directed 8/9/2018    CBC WITH DIFFERENTIAL  As directed 8/10/2018    URINE CULTURE(NEW)  As directed 8/9/2018         [employer] Status: Patient Declined

## 2017-08-09 NOTE — Clinical Note
August 9, 2017         Patient: Serenity Howe   YOB: 1950   Date of Visit: 8/9/2017           To Whom it May Concern:    Serenity Howe was seen in my clinic on 8/9/2017. Please excuse 8/9 and 8/10/2017.       Sincerely,           Joaquim Meade M.D.  Electronically Signed

## 2017-08-11 LAB
BACTERIA UR CULT: NORMAL
SIGNIFICANT IND 70042: NORMAL
SOURCE SOURCE: NORMAL

## 2017-09-15 ENCOUNTER — OFFICE VISIT (OUTPATIENT)
Dept: MEDICAL GROUP | Facility: MEDICAL CENTER | Age: 67
End: 2017-09-15
Payer: COMMERCIAL

## 2017-09-15 VITALS
WEIGHT: 171 LBS | RESPIRATION RATE: 16 BRPM | HEIGHT: 62 IN | SYSTOLIC BLOOD PRESSURE: 114 MMHG | HEART RATE: 92 BPM | OXYGEN SATURATION: 97 % | TEMPERATURE: 98.8 F | BODY MASS INDEX: 31.47 KG/M2 | DIASTOLIC BLOOD PRESSURE: 70 MMHG

## 2017-09-15 DIAGNOSIS — J45.30 MILD PERSISTENT ASTHMA WITHOUT COMPLICATION: Chronic | ICD-10-CM

## 2017-09-15 DIAGNOSIS — I10 ESSENTIAL HYPERTENSION, BENIGN: ICD-10-CM

## 2017-09-15 DIAGNOSIS — J20.9 ACUTE BRONCHITIS, UNSPECIFIED ORGANISM: ICD-10-CM

## 2017-09-15 DIAGNOSIS — J30.1 NON-SEASONAL ALLERGIC RHINITIS DUE TO POLLEN: ICD-10-CM

## 2017-09-15 DIAGNOSIS — K21.00 GASTROESOPHAGEAL REFLUX DISEASE WITH ESOPHAGITIS: ICD-10-CM

## 2017-09-15 PROCEDURE — 99213 OFFICE O/P EST LOW 20 MIN: CPT | Performed by: INTERNAL MEDICINE

## 2017-09-15 RX ORDER — ALBUTEROL SULFATE 90 UG/1
1-2 AEROSOL, METERED RESPIRATORY (INHALATION) EVERY 6 HOURS PRN
Qty: 1 INHALER | Refills: 3 | Status: SHIPPED | OUTPATIENT
Start: 2017-09-15 | End: 2018-12-09

## 2017-09-15 RX ORDER — AZITHROMYCIN 250 MG/1
TABLET, FILM COATED ORAL
Qty: 6 TAB | Refills: 0 | Status: SHIPPED | OUTPATIENT
Start: 2017-09-15 | End: 2017-12-27

## 2017-09-15 RX ORDER — ALBUTEROL SULFATE 2.5 MG/3ML
2.5 SOLUTION RESPIRATORY (INHALATION) EVERY 4 HOURS PRN
Qty: 75 ML | Refills: 11 | Status: SHIPPED | OUTPATIENT
Start: 2017-09-15 | End: 2019-05-08 | Stop reason: SDUPTHER

## 2017-09-15 RX ORDER — CODEINE PHOSPHATE AND GUAIFENESIN 10; 100 MG/5ML; MG/5ML
5 SOLUTION ORAL EVERY 6 HOURS PRN
Qty: 90 ML | Refills: 0 | Status: SHIPPED | OUTPATIENT
Start: 2017-09-15 | End: 2017-12-27

## 2017-09-15 ASSESSMENT — PATIENT HEALTH QUESTIONNAIRE - PHQ9: CLINICAL INTERPRETATION OF PHQ2 SCORE: 0

## 2017-09-16 NOTE — ASSESSMENT & PLAN NOTE
She reports that her mild asthma has been under fairly good control with taking primarily Advair. She ran out of her albuterol for nebulizer and albuterol inhaler. She wants refills of both of them.

## 2017-09-16 NOTE — ASSESSMENT & PLAN NOTE
When she first came in here today her blood pressure was a little high but repeat reading was satisfactory.

## 2017-09-16 NOTE — ASSESSMENT & PLAN NOTE
Over the last 3 days she's had an increase in her cough productive of some yellowish sputum. The cough does keep her awake at night. She denies significant increase in dyspnea. She denies fevers or chills or pharyngitis. Her usual asthma has been slightly made worse over the last 2 or 3 days. She is out of her albuterol inhaler or albuterol for nebulizer. She is demanding antibiotics.

## 2017-09-16 NOTE — PROGRESS NOTES
Subjective:     Chief Complaint   Patient presents with   • Cough   • Fever   • Congestion     Serentiy Howe is a 67 y.o. female here today forEvaluation of an upper respiratory infection. Actually all she wants his antibiotics.    Mild persistent asthma without complication  She reports that her mild asthma has been under fairly good control with taking primarily Advair. She ran out of her albuterol for nebulizer and albuterol inhaler. She wants refills of both of them.    Gastroesophageal reflux disease with esophagitis  She reports that her gastroesophageal reflux is under good control with Prilosec. She remains overweight.    Essential hypertension, benign  When she first came in here today her blood pressure was a little high but repeat reading was satisfactory.    Allergic rhinitis  Her usual allergic rhinitis is under good control with Flonase.    Acute bronchitis  Over the last 3 days she's had an increase in her cough productive of some yellowish sputum. The cough does keep her awake at night. She denies significant increase in dyspnea. She denies fevers or chills or pharyngitis. Her usual asthma has been slightly made worse over the last 2 or 3 days. She is out of her albuterol inhaler or albuterol for nebulizer. She is demanding antibiotics.       Diagnoses of Acute bronchitis, unspecified organism, Mild persistent asthma without complication, Essential hypertension, benign, Non-seasonal allergic rhinitis due to pollen, and Gastroesophageal reflux disease with esophagitis were pertinent to this visit.    Allergies: Nkda [no known drug allergy]  Current medicines (including changes today)  Current Outpatient Prescriptions   Medication Sig Dispense Refill   • albuterol (PROVENTIL) 2.5mg/3ml Nebu Soln solution for nebulization 3 mL by Nebulization route every four hours as needed for Shortness of Breath. 75 mL 11   • guaifenesin-codeine (CHERATUSSIN AC) Solution oral solution Take 5 mL by mouth every 6 hours  as needed for Cough. 90 mL 0   • albuterol 108 (90 Base) MCG/ACT Aero Soln inhalation aerosol Inhale 1-2 Puffs by mouth every 6 hours as needed for Shortness of Breath. 1 Inhaler 3   • azithromycin (ZITHROMAX) 250 MG Tab 2 tabs by mouth day 1, 1 tab by mouth days 2-5 6 Tab 0   • fluticasone-salmeterol (ADVAIR DISKUS) 250-50 MCG/DOSE AEROSOL POWDER, BREATH ACTIVATED Inhale 1 Puff by mouth 2 times a day. INHALE 1 DOSE BY MOUTH TWICE DAILY. RINSE MOUTH AFTER USE 1 Inhaler 1   • valsartan-hydrochlorothiazide (DIOVAN-HCT) 80-12.5 MG per tablet Take 1 Tab by mouth every day. 30 Tab 9   • predniSONE (DELTASONE) 20 MG Tab Take 2 tabs daily for 4 days. 8 Tab 0   •  verapamil ER (CALAN SR) 120 MG CAPSULE SR 24 HR TAKE ONE CAPSULE BY MOUTH EVERY DAY 90 Cap 3   • benzonatate (TESSALON) 100 MG Cap Take 1 Cap by mouth 3 times a day as needed for Cough. 60 Cap 0   • omeprazole (PRILOSEC) 20 MG delayed-release capsule Take 1 Cap by mouth every day. 30 Cap 11   • celecoxib (CELEBREX) 200 MG Cap Take 1 Cap by mouth every day. 30 Cap 6   • zolpidem (AMBIEN) 5 MG Tab Take 1 Tab by mouth at bedtime as needed for Sleep. 30 Tab 0   • fluticasone (FLONASE) 50 MCG/ACT nasal spray Spray 2 Sprays in nose every day. 16 g 11   • Calcium Carbonate-Vit D-Min (CALTRATE PLUS PO) Take  by mouth.     • aspirin (ASA) 81 MG CHEW Take 81 mg by mouth every day.     • ondansetron (ZOFRAN) 4 MG Tab tablet Take 1 Tab by mouth every four hours as needed for Nausea/Vomiting. (Patient not taking: Reported on 3/9/2017) 20 Tab 0     No current facility-administered medications for this visit.        She  has a past medical history of ASTHMA; Breast cancer (CMS-HCC); History of breast cancer (7/29/2015); Hypertension; Shoulder pain, right (7/29/2015); and Tremor (7/29/2015). She also has no past medical history of COPD.  Health Maintenance:She will get her flu shot in the very near future.  ROS    Patient denies significant change in strength, weight or  "appetite.  No significant lightheadedness or headaches.  No change in vision, hearing, or swallowing. Head congestion.  No new dyspnea, coughing, chest pain, or palpitations except for the cough noted above productive of some yellowish sputum. Asthma is under moderately good control..  No indigestion, abdominal pain, or change in bowel habits. Gastroesophageal reflux is under fairly good control.  No change in urinating.  No new ankle swelling.       Objective:     PE:  /70   Pulse 92   Temp 37.1 °C (98.8 °F)   Resp 16   Ht 1.575 m (5' 2\")   Wt 77.6 kg (171 lb)   SpO2 97%   BMI 31.28 kg/m²    Neck is supple without significant lymphadenopathy or masses.No percussion tenderness over the sinuses.  Lungs are clear with normal breath sounds without wheezes or rales .  Cardiovascular: peripheral circulation is satisfactory, heart sounds are unchanged and unremarkable.  Abdomen is soft, without masses or tenderness, with normal bowel sounds.  Extremities are without significant edema, cyanosis or deformity.      Assessment and Plan:   The following treatment plan was discussed  1. Acute bronchitis, unspecified organism  guaifenesin-codeine (CHERATUSSIN AC) Solution oral solution    I refilled her cough medication and ordered a Z-Wojciech. I refilled her albuterol for nebulizer and inhaler. If she is not better or worsens, she will contact us.   2. Mild persistent asthma without complication      Continue usual dose Advair. I refilled her albuterol. She will report any worsening of asthma.   3. Essential hypertension, benign      She was encouraged to follow a low-salt diet.   4. Non-seasonal allergic rhinitis due to pollen      Continue Flonase.   5. Gastroesophageal reflux disease with esophagitis      We briefly reviewed conservative therapy of gastroesophageal reflux.       Followup:She will keep her next scheduled appointment or contact us as needed sooner.           "

## 2017-09-16 NOTE — ASSESSMENT & PLAN NOTE
She reports that her gastroesophageal reflux is under good control with Prilosec. She remains overweight.

## 2017-11-06 ENCOUNTER — HOSPITAL ENCOUNTER (OUTPATIENT)
Dept: RADIOLOGY | Facility: MEDICAL CENTER | Age: 67
End: 2017-11-06
Attending: INTERNAL MEDICINE
Payer: COMMERCIAL

## 2017-11-06 DIAGNOSIS — Z12.31 ENCOUNTER FOR MAMMOGRAM TO ESTABLISH BASELINE MAMMOGRAM: ICD-10-CM

## 2017-11-06 DIAGNOSIS — C50.211 MALIGNANT NEOPLASM OF UPPER-INNER QUADRANT OF RIGHT FEMALE BREAST (HCC): ICD-10-CM

## 2017-11-06 DIAGNOSIS — J44.9 CHRONIC AIRWAY OBSTRUCTION, MIXED TYPE (HCC): ICD-10-CM

## 2017-11-06 PROCEDURE — G0202 SCR MAMMO BI INCL CAD: HCPCS

## 2017-11-06 PROCEDURE — 71260 CT THORAX DX C+: CPT

## 2017-11-06 PROCEDURE — 700117 HCHG RX CONTRAST REV CODE 255: Performed by: INTERNAL MEDICINE

## 2017-11-06 RX ADMIN — IOHEXOL 75 ML: 300 INJECTION, SOLUTION INTRAVENOUS at 15:29

## 2017-12-06 DIAGNOSIS — J44.9 CHRONIC AIRWAY OBSTRUCTION, MIXED TYPE (HCC): ICD-10-CM

## 2017-12-27 ENCOUNTER — OFFICE VISIT (OUTPATIENT)
Dept: URGENT CARE | Facility: PHYSICIAN GROUP | Age: 67
End: 2017-12-27
Payer: COMMERCIAL

## 2017-12-27 VITALS
TEMPERATURE: 98.3 F | HEIGHT: 62 IN | BODY MASS INDEX: 31.28 KG/M2 | HEART RATE: 95 BPM | OXYGEN SATURATION: 94 % | DIASTOLIC BLOOD PRESSURE: 84 MMHG | SYSTOLIC BLOOD PRESSURE: 124 MMHG | WEIGHT: 170 LBS

## 2017-12-27 DIAGNOSIS — J02.9 PHARYNGITIS, UNSPECIFIED ETIOLOGY: ICD-10-CM

## 2017-12-27 DIAGNOSIS — J45.21 MILD INTERMITTENT ASTHMA WITH EXACERBATION: ICD-10-CM

## 2017-12-27 LAB
INT CON NEG: NEGATIVE
INT CON POS: POSITIVE
S PYO AG THROAT QL: NORMAL

## 2017-12-27 PROCEDURE — 99214 OFFICE O/P EST MOD 30 MIN: CPT | Performed by: NURSE PRACTITIONER

## 2017-12-27 PROCEDURE — 87880 STREP A ASSAY W/OPTIC: CPT | Performed by: NURSE PRACTITIONER

## 2017-12-27 RX ORDER — IPRATROPIUM BROMIDE AND ALBUTEROL SULFATE 2.5; .5 MG/3ML; MG/3ML
3 SOLUTION RESPIRATORY (INHALATION) EVERY 4 HOURS PRN
Qty: 30 BULLET | Refills: 0 | Status: SHIPPED | OUTPATIENT
Start: 2017-12-27 | End: 2018-03-03

## 2017-12-27 RX ORDER — IPRATROPIUM BROMIDE AND ALBUTEROL SULFATE 2.5; .5 MG/3ML; MG/3ML
3 SOLUTION RESPIRATORY (INHALATION) ONCE
Status: COMPLETED | OUTPATIENT
Start: 2017-12-27 | End: 2017-12-27

## 2017-12-27 RX ORDER — PREDNISONE 10 MG/1
40 TABLET ORAL DAILY
Qty: 20 TAB | Refills: 0 | Status: SHIPPED | OUTPATIENT
Start: 2017-12-27 | End: 2018-01-01

## 2017-12-27 RX ORDER — AZITHROMYCIN 250 MG/1
TABLET, FILM COATED ORAL
Qty: 6 TAB | Refills: 0 | Status: SHIPPED | OUTPATIENT
Start: 2017-12-27 | End: 2017-12-27 | Stop reason: CLARIF

## 2017-12-27 RX ORDER — AMOXICILLIN AND CLAVULANATE POTASSIUM 875; 125 MG/1; MG/1
1 TABLET, FILM COATED ORAL 2 TIMES DAILY
Qty: 20 TAB | Refills: 0 | Status: ON HOLD | OUTPATIENT
Start: 2017-12-27 | End: 2018-01-07

## 2017-12-27 RX ADMIN — IPRATROPIUM BROMIDE AND ALBUTEROL SULFATE 3 ML: 2.5; .5 SOLUTION RESPIRATORY (INHALATION) at 17:25

## 2017-12-27 ASSESSMENT — ENCOUNTER SYMPTOMS
FEVER: 1
DIZZINESS: 0
WHEEZING: 1
SORE THROAT: 0
EYE PAIN: 0
SHORTNESS OF BREATH: 1
COUGH: 1
MYALGIAS: 0
NAUSEA: 0
SPUTUM PRODUCTION: 1
CHILLS: 1
VOMITING: 0

## 2017-12-27 ASSESSMENT — PAIN SCALES - GENERAL: PAINLEVEL: NO PAIN

## 2017-12-27 NOTE — LETTER
December 27, 2017         Patient: Serenity Howe   YOB: 1950   Date of Visit: 12/27/2017           To Whom it May Concern:    Serenity Howe was seen in my clinic on 12/27/2017. She may return to work on 12/30/17.    If you have any questions or concerns, please don't hesitate to call.        Sincerely,           EM Giordano.  Electronically Signed

## 2017-12-28 NOTE — PROGRESS NOTES
Subjective:     Serenity Howe is a 67 y.o. female who presents for Cough (Cough chest congestion x2 days)       Cough   This is a new problem. The current episode started yesterday. The problem has been gradually worsening. The problem occurs constantly. The cough is productive of sputum, productive of purulent sputum and productive of brown sputum. Associated symptoms include chills, a fever, shortness of breath and wheezing. Pertinent negatives include no chest pain, myalgias, nasal congestion, rash or sore throat. Nothing aggravates the symptoms. She has tried OTC cough suppressant and a beta-agonist inhaler for the symptoms. The treatment provided no relief. Her past medical history is significant for asthma.     Past Medical History:   Diagnosis Date   • ASTHMA    • Breast cancer (CMS-[facility])    • History of breast cancer 7/29/2015   • Hypertension    • Shoulder pain, right 7/29/2015   • Tremor 7/29/2015     Past Surgical History:   Procedure Laterality Date   • GYN SURGERY      cs   • MASTECTOMY      right      Social History     Social History   • Marital status:      Spouse name: N/A   • Number of children: N/A   • Years of education: N/A     Occupational History   • Not on file.     Social History Main Topics   • Smoking status: Never Smoker   • Smokeless tobacco: Never Used   • Alcohol use No   • Drug use: No   • Sexual activity: Not Currently     Other Topics Concern   • Not on file     Social History Narrative   • No narrative on file      Family History   Problem Relation Age of Onset   • Hyperlipidemia Mother     Review of Systems   Constitutional: Positive for chills, fever and malaise/fatigue.   HENT: Negative for sore throat.    Eyes: Negative for pain.   Respiratory: Positive for cough, sputum production, shortness of breath and wheezing.    Cardiovascular: Negative for chest pain.   Gastrointestinal: Negative for nausea and vomiting.   Genitourinary: Negative for hematuria.  "  Musculoskeletal: Negative for myalgias.   Skin: Negative for rash.   Neurological: Negative for dizziness.     Allergies   Allergen Reactions   • Nkda [No Known Drug Allergy]       Objective:   /84   Pulse 95   Temp 36.8 °C (98.3 °F)   Ht 1.575 m (5' 2\")   Wt 77.1 kg (170 lb)   SpO2 94%   Breastfeeding? No   BMI 31.09 kg/m²   Physical Exam   Constitutional: She is oriented to person, place, and time. She appears well-developed and well-nourished. No distress.   HENT:   Head: Normocephalic and atraumatic.   Right Ear: Tympanic membrane normal.   Left Ear: Tympanic membrane normal.   Nose: Nose normal. Right sinus exhibits no maxillary sinus tenderness and no frontal sinus tenderness. Left sinus exhibits no maxillary sinus tenderness and no frontal sinus tenderness.   Mouth/Throat: Uvula is midline and mucous membranes are normal. Posterior oropharyngeal erythema present. No posterior oropharyngeal edema or tonsillar abscesses. No tonsillar exudate.   Eyes: Conjunctivae and EOM are normal. Pupils are equal, round, and reactive to light. Right eye exhibits no discharge. Left eye exhibits no discharge.   Cardiovascular: Normal rate and regular rhythm.    No murmur heard.  Pulmonary/Chest: Effort normal. No respiratory distress. She has wheezes. She has rhonchi.   Lung sounds improved with breathing treatment. PO2 reassessed and adequate.      Abdominal: Soft. She exhibits no distension. There is no tenderness.   Neurological: She is alert and oriented to person, place, and time. She has normal reflexes. No sensory deficit.   Skin: Skin is warm, dry and intact.   Psychiatric: She has a normal mood and affect.         Assessment/Plan:   Assessment    1. Pharyngitis, unspecified etiology  - POCT Rapid Strep A  Strep negative     2. Mild intermittent asthma with exacerbation  - ipratropium-albuterol (DUONEB) nebulizer solution 3 mL; 3 mL by Nebulization route Once.  - predniSONE (DELTASONE) 10 MG Tab; Take 4 " Tabs by mouth every day for 5 days.  Dispense: 20 Tab; Refill: 0  - ipratropium-albuterol (DUONEB) 0.5-2.5 (3) MG/3ML nebulizer solution; 3 mL by Nebulization route every four hours as needed for Shortness of Breath.  Dispense: 30 Bullet; Refill: 0  - amoxicillin-clavulanate (AUGMENTIN) 875-125 MG Tab; Take 1 Tab by mouth 2 times a day for 10 days.  Dispense: 20 Tab; Refill: 0    Lung sounds improved with breathing treatment. PO2 reassessed and adequate. Patient has nebulizer machine at home. We will refill nebulizer bullets advised patient to use as needed for shortness of breath.  Advised to continue supportive care with Tylenol and/or ibuprofen for fevers and discomfort. Increased fluids and electrolytes.     Patient given precautionary s/sx that mandate immediate follow up and evaluation in the ED. Advised of risks of not doing so.    DDX, Supportive care, and indications for immediate follow-up discussed with patient.    Instructed to return to clinic or nearest emergency department if we are not available for any change in condition, further concerns, or worsening of symptoms.    The patient demonstrated a good understanding and agreed with the treatment plan.

## 2018-01-05 ENCOUNTER — APPOINTMENT (OUTPATIENT)
Dept: RADIOLOGY | Facility: MEDICAL CENTER | Age: 68
DRG: 202 | End: 2018-01-05
Attending: EMERGENCY MEDICINE
Payer: COMMERCIAL

## 2018-01-05 ENCOUNTER — APPOINTMENT (OUTPATIENT)
Dept: RADIOLOGY | Facility: MEDICAL CENTER | Age: 68
DRG: 202 | End: 2018-01-05
Payer: COMMERCIAL

## 2018-01-05 ENCOUNTER — HOSPITAL ENCOUNTER (INPATIENT)
Facility: MEDICAL CENTER | Age: 68
LOS: 2 days | DRG: 202 | End: 2018-01-07
Attending: EMERGENCY MEDICINE | Admitting: INTERNAL MEDICINE
Payer: COMMERCIAL

## 2018-01-05 ENCOUNTER — RESOLUTE PROFESSIONAL BILLING HOSPITAL PROF FEE (OUTPATIENT)
Dept: HOSPITALIST | Facility: MEDICAL CENTER | Age: 68
End: 2018-01-05
Payer: COMMERCIAL

## 2018-01-05 DIAGNOSIS — R79.89 ELEVATED LACTIC ACID LEVEL: ICD-10-CM

## 2018-01-05 DIAGNOSIS — J18.9 PNEUMONIA DUE TO INFECTIOUS ORGANISM, UNSPECIFIED LATERALITY, UNSPECIFIED PART OF LUNG: ICD-10-CM

## 2018-01-05 PROBLEM — M79.89 LOCALIZED SWELLING OF LOWER EXTREMITY: Status: ACTIVE | Noted: 2018-01-05

## 2018-01-05 PROBLEM — J45.909 ASTHMA: Status: ACTIVE | Noted: 2018-01-05

## 2018-01-05 PROBLEM — J40 BRONCHITIS: Status: ACTIVE | Noted: 2018-01-05

## 2018-01-05 LAB
ALBUMIN SERPL BCP-MCNC: 3.7 G/DL (ref 3.2–4.9)
ALBUMIN/GLOB SERPL: 1 G/DL
ALP SERPL-CCNC: 61 U/L (ref 30–99)
ALT SERPL-CCNC: 22 U/L (ref 2–50)
ANION GAP SERPL CALC-SCNC: 10 MMOL/L (ref 0–11.9)
APPEARANCE UR: CLEAR
AST SERPL-CCNC: 19 U/L (ref 12–45)
BACTERIA #/AREA URNS HPF: NEGATIVE /HPF
BASOPHILS # BLD AUTO: 0.3 % (ref 0–1.8)
BASOPHILS # BLD: 0.04 K/UL (ref 0–0.12)
BILIRUB SERPL-MCNC: 0.5 MG/DL (ref 0.1–1.5)
BILIRUB UR QL STRIP.AUTO: NEGATIVE
BNP SERPL-MCNC: 52 PG/ML (ref 0–100)
BUN SERPL-MCNC: 17 MG/DL (ref 8–22)
CALCIUM SERPL-MCNC: 9.1 MG/DL (ref 8.5–10.5)
CAOX CRY #/AREA URNS HPF: NORMAL /HPF
CHLORIDE SERPL-SCNC: 106 MMOL/L (ref 96–112)
CO2 SERPL-SCNC: 21 MMOL/L (ref 20–33)
COLOR UR: YELLOW
CREAT SERPL-MCNC: 0.82 MG/DL (ref 0.5–1.4)
EKG IMPRESSION: NORMAL
EOSINOPHIL # BLD AUTO: 0.36 K/UL (ref 0–0.51)
EOSINOPHIL NFR BLD: 3 % (ref 0–6.9)
EPI CELLS #/AREA URNS HPF: NEGATIVE /HPF
ERYTHROCYTE [DISTWIDTH] IN BLOOD BY AUTOMATED COUNT: 47.5 FL (ref 35.9–50)
FLUAV RNA SPEC QL NAA+PROBE: NEGATIVE
FLUBV RNA SPEC QL NAA+PROBE: NEGATIVE
GFR SERPL CREATININE-BSD FRML MDRD: >60 ML/MIN/1.73 M 2
GLOBULIN SER CALC-MCNC: 3.8 G/DL (ref 1.9–3.5)
GLUCOSE SERPL-MCNC: 132 MG/DL (ref 65–99)
GLUCOSE UR STRIP.AUTO-MCNC: NEGATIVE MG/DL
HCT VFR BLD AUTO: 45.6 % (ref 37–47)
HGB BLD-MCNC: 15.4 G/DL (ref 12–16)
HYALINE CASTS #/AREA URNS LPF: NORMAL /LPF
IMM GRANULOCYTES # BLD AUTO: 0.04 K/UL (ref 0–0.11)
IMM GRANULOCYTES NFR BLD AUTO: 0.3 % (ref 0–0.9)
KETONES UR STRIP.AUTO-MCNC: NEGATIVE MG/DL
LACTATE BLD-SCNC: 1.6 MMOL/L (ref 0.5–2)
LACTATE BLD-SCNC: 2.1 MMOL/L (ref 0.5–2)
LEUKOCYTE ESTERASE UR QL STRIP.AUTO: ABNORMAL
LYMPHOCYTES # BLD AUTO: 2.24 K/UL (ref 1–4.8)
LYMPHOCYTES NFR BLD: 18.9 % (ref 22–41)
MCH RBC QN AUTO: 32.8 PG (ref 27–33)
MCHC RBC AUTO-ENTMCNC: 33.8 G/DL (ref 33.6–35)
MCV RBC AUTO: 97.2 FL (ref 81.4–97.8)
MICRO URNS: ABNORMAL
MONOCYTES # BLD AUTO: 0.95 K/UL (ref 0–0.85)
MONOCYTES NFR BLD AUTO: 8 % (ref 0–13.4)
NEUTROPHILS # BLD AUTO: 8.23 K/UL (ref 2–7.15)
NEUTROPHILS NFR BLD: 69.5 % (ref 44–72)
NITRITE UR QL STRIP.AUTO: NEGATIVE
NRBC # BLD AUTO: 0 K/UL
NRBC BLD-RTO: 0 /100 WBC
PH UR STRIP.AUTO: 5 [PH]
PLATELET # BLD AUTO: 241 K/UL (ref 164–446)
PMV BLD AUTO: 9 FL (ref 9–12.9)
POTASSIUM SERPL-SCNC: 3.7 MMOL/L (ref 3.6–5.5)
PROT SERPL-MCNC: 7.5 G/DL (ref 6–8.2)
PROT UR QL STRIP: NEGATIVE MG/DL
RBC # BLD AUTO: 4.69 M/UL (ref 4.2–5.4)
RBC # URNS HPF: NORMAL /HPF
RBC UR QL AUTO: NEGATIVE
SODIUM SERPL-SCNC: 137 MMOL/L (ref 135–145)
SP GR UR STRIP.AUTO: 1.02
TSH SERPL DL<=0.005 MIU/L-ACNC: 0.65 UIU/ML (ref 0.38–5.33)
URATE CRY #/AREA URNS HPF: POSITIVE /HPF
UROBILINOGEN UR STRIP.AUTO-MCNC: 0.2 MG/DL
WBC # BLD AUTO: 11.9 K/UL (ref 4.8–10.8)
WBC #/AREA URNS HPF: NORMAL /HPF

## 2018-01-05 PROCEDURE — 99223 1ST HOSP IP/OBS HIGH 75: CPT | Performed by: INTERNAL MEDICINE

## 2018-01-05 PROCEDURE — 84443 ASSAY THYROID STIM HORMONE: CPT

## 2018-01-05 PROCEDURE — 700102 HCHG RX REV CODE 250 W/ 637 OVERRIDE(OP): Performed by: INTERNAL MEDICINE

## 2018-01-05 PROCEDURE — 87502 INFLUENZA DNA AMP PROBE: CPT

## 2018-01-05 PROCEDURE — 87040 BLOOD CULTURE FOR BACTERIA: CPT | Mod: 91

## 2018-01-05 PROCEDURE — 99285 EMERGENCY DEPT VISIT HI MDM: CPT

## 2018-01-05 PROCEDURE — 36415 COLL VENOUS BLD VENIPUNCTURE: CPT

## 2018-01-05 PROCEDURE — 83880 ASSAY OF NATRIURETIC PEPTIDE: CPT

## 2018-01-05 PROCEDURE — 700105 HCHG RX REV CODE 258: Performed by: EMERGENCY MEDICINE

## 2018-01-05 PROCEDURE — 83605 ASSAY OF LACTIC ACID: CPT | Mod: 91

## 2018-01-05 PROCEDURE — 80053 COMPREHEN METABOLIC PANEL: CPT

## 2018-01-05 PROCEDURE — 94760 N-INVAS EAR/PLS OXIMETRY 1: CPT

## 2018-01-05 PROCEDURE — 93005 ELECTROCARDIOGRAM TRACING: CPT | Performed by: EMERGENCY MEDICINE

## 2018-01-05 PROCEDURE — 700111 HCHG RX REV CODE 636 W/ 250 OVERRIDE (IP): Performed by: INTERNAL MEDICINE

## 2018-01-05 PROCEDURE — 87086 URINE CULTURE/COLONY COUNT: CPT

## 2018-01-05 PROCEDURE — 770006 HCHG ROOM/CARE - MED/SURG/GYN SEMI*

## 2018-01-05 PROCEDURE — A9270 NON-COVERED ITEM OR SERVICE: HCPCS | Performed by: INTERNAL MEDICINE

## 2018-01-05 PROCEDURE — 81001 URINALYSIS AUTO W/SCOPE: CPT

## 2018-01-05 PROCEDURE — 93005 ELECTROCARDIOGRAM TRACING: CPT

## 2018-01-05 PROCEDURE — 96375 TX/PRO/DX INJ NEW DRUG ADDON: CPT

## 2018-01-05 PROCEDURE — 96361 HYDRATE IV INFUSION ADD-ON: CPT

## 2018-01-05 PROCEDURE — 85025 COMPLETE CBC W/AUTO DIFF WBC: CPT

## 2018-01-05 PROCEDURE — 71045 X-RAY EXAM CHEST 1 VIEW: CPT

## 2018-01-05 PROCEDURE — 96365 THER/PROPH/DIAG IV INF INIT: CPT

## 2018-01-05 PROCEDURE — 700111 HCHG RX REV CODE 636 W/ 250 OVERRIDE (IP): Performed by: EMERGENCY MEDICINE

## 2018-01-05 RX ORDER — AZITHROMYCIN 500 MG/1
500 INJECTION, POWDER, LYOPHILIZED, FOR SOLUTION INTRAVENOUS ONCE
Status: DISCONTINUED | OUTPATIENT
Start: 2018-01-05 | End: 2018-01-05

## 2018-01-05 RX ORDER — AZITHROMYCIN 250 MG/1
250 TABLET, FILM COATED ORAL DAILY
Status: DISCONTINUED | OUTPATIENT
Start: 2018-01-06 | End: 2018-01-06

## 2018-01-05 RX ORDER — CEFTRIAXONE 1 G/1
1 INJECTION, POWDER, FOR SOLUTION INTRAMUSCULAR; INTRAVENOUS ONCE
Status: COMPLETED | OUTPATIENT
Start: 2018-01-05 | End: 2018-01-05

## 2018-01-05 RX ORDER — BENZONATATE 100 MG/1
100 CAPSULE ORAL 3 TIMES DAILY PRN
Status: DISCONTINUED | OUTPATIENT
Start: 2018-01-05 | End: 2018-01-07 | Stop reason: HOSPADM

## 2018-01-05 RX ORDER — AMOXICILLIN 250 MG
2 CAPSULE ORAL 2 TIMES DAILY
Status: DISCONTINUED | OUTPATIENT
Start: 2018-01-05 | End: 2018-01-07 | Stop reason: HOSPADM

## 2018-01-05 RX ORDER — POLYETHYLENE GLYCOL 3350 17 G/17G
1 POWDER, FOR SOLUTION ORAL
Status: DISCONTINUED | OUTPATIENT
Start: 2018-01-05 | End: 2018-01-07 | Stop reason: HOSPADM

## 2018-01-05 RX ORDER — ASPIRIN 81 MG/1
81 TABLET, CHEWABLE ORAL DAILY
Status: DISCONTINUED | OUTPATIENT
Start: 2018-01-05 | End: 2018-01-07 | Stop reason: HOSPADM

## 2018-01-05 RX ORDER — VERAPAMIL HYDROCHLORIDE 120 MG/1
120 CAPSULE, EXTENDED RELEASE ORAL
Status: DISCONTINUED | OUTPATIENT
Start: 2018-01-05 | End: 2018-01-07 | Stop reason: HOSPADM

## 2018-01-05 RX ORDER — BUDESONIDE AND FORMOTEROL FUMARATE DIHYDRATE 160; 4.5 UG/1; UG/1
1 AEROSOL RESPIRATORY (INHALATION)
Status: DISCONTINUED | OUTPATIENT
Start: 2018-01-05 | End: 2018-01-07 | Stop reason: HOSPADM

## 2018-01-05 RX ORDER — ALBUTEROL SULFATE 2.5 MG/3ML
2.5 SOLUTION RESPIRATORY (INHALATION)
Status: DISCONTINUED | OUTPATIENT
Start: 2018-01-05 | End: 2018-01-07 | Stop reason: HOSPADM

## 2018-01-05 RX ORDER — ONDANSETRON 4 MG/1
4 TABLET, ORALLY DISINTEGRATING ORAL EVERY 4 HOURS PRN
Status: DISCONTINUED | OUTPATIENT
Start: 2018-01-05 | End: 2018-01-07 | Stop reason: HOSPADM

## 2018-01-05 RX ORDER — SODIUM CHLORIDE 9 MG/ML
1000 INJECTION, SOLUTION INTRAVENOUS CONTINUOUS
Status: ACTIVE | OUTPATIENT
Start: 2018-01-05 | End: 2018-01-05

## 2018-01-05 RX ORDER — OMEPRAZOLE 20 MG/1
20 CAPSULE, DELAYED RELEASE ORAL DAILY
Status: DISCONTINUED | OUTPATIENT
Start: 2018-01-06 | End: 2018-01-07 | Stop reason: HOSPADM

## 2018-01-05 RX ORDER — ONDANSETRON 2 MG/ML
4 INJECTION INTRAMUSCULAR; INTRAVENOUS EVERY 4 HOURS PRN
Status: DISCONTINUED | OUTPATIENT
Start: 2018-01-05 | End: 2018-01-07 | Stop reason: HOSPADM

## 2018-01-05 RX ORDER — BISACODYL 10 MG
10 SUPPOSITORY, RECTAL RECTAL
Status: DISCONTINUED | OUTPATIENT
Start: 2018-01-05 | End: 2018-01-07 | Stop reason: HOSPADM

## 2018-01-05 RX ORDER — HEPARIN SODIUM 5000 [USP'U]/ML
5000 INJECTION, SOLUTION INTRAVENOUS; SUBCUTANEOUS EVERY 8 HOURS
Status: DISCONTINUED | OUTPATIENT
Start: 2018-01-05 | End: 2018-01-07 | Stop reason: HOSPADM

## 2018-01-05 RX ORDER — HYDROCHLOROTHIAZIDE 12.5 MG/1
12.5 TABLET ORAL
Status: DISCONTINUED | OUTPATIENT
Start: 2018-01-06 | End: 2018-01-07 | Stop reason: HOSPADM

## 2018-01-05 RX ORDER — ACETAMINOPHEN 325 MG/1
650 TABLET ORAL EVERY 6 HOURS PRN
Status: DISCONTINUED | OUTPATIENT
Start: 2018-01-05 | End: 2018-01-07 | Stop reason: HOSPADM

## 2018-01-05 RX ORDER — VALSARTAN 80 MG/1
80 TABLET ORAL
Status: DISCONTINUED | OUTPATIENT
Start: 2018-01-06 | End: 2018-01-07 | Stop reason: HOSPADM

## 2018-01-05 RX ADMIN — BENZONATATE 100 MG: 100 CAPSULE ORAL at 20:39

## 2018-01-05 RX ADMIN — HEPARIN SODIUM 5000 UNITS: 5000 INJECTION, SOLUTION INTRAVENOUS; SUBCUTANEOUS at 20:40

## 2018-01-05 RX ADMIN — ASPIRIN 81 MG: 81 TABLET, CHEWABLE ORAL at 19:12

## 2018-01-05 RX ADMIN — HYDROCODONE BITARTRATE AND HOMATROPINE METHYLBROMIDE 2.5 ML: 5; 1.5 SOLUTION ORAL at 22:21

## 2018-01-05 RX ADMIN — AZITHROMYCIN FOR INJECTION INJECTION, POWDER, LYOPHILIZED, FOR SOLUTION 500 MG: 500 INJECTION INTRAVENOUS at 16:38

## 2018-01-05 RX ADMIN — CEFTRIAXONE SODIUM 1 G: 1 INJECTION, POWDER, FOR SOLUTION INTRAMUSCULAR; INTRAVENOUS at 15:51

## 2018-01-05 RX ADMIN — BUDESONIDE AND FORMOTEROL FUMARATE DIHYDRATE 1 PUFF: 160; 4.5 AEROSOL RESPIRATORY (INHALATION) at 22:02

## 2018-01-05 RX ADMIN — SODIUM CHLORIDE 1000 ML: 9 INJECTION, SOLUTION INTRAVENOUS at 15:39

## 2018-01-05 RX ADMIN — VERAPAMIL HYDROCHLORIDE 120 MG: 120 CAPSULE, DELAYED RELEASE PELLETS ORAL at 19:12

## 2018-01-05 ASSESSMENT — PATIENT HEALTH QUESTIONNAIRE - PHQ9
SUM OF ALL RESPONSES TO PHQ QUESTIONS 1-9: 0
SUM OF ALL RESPONSES TO PHQ9 QUESTIONS 1 AND 2: 0
2. FEELING DOWN, DEPRESSED, IRRITABLE, OR HOPELESS: NOT AT ALL
1. LITTLE INTEREST OR PLEASURE IN DOING THINGS: NOT AT ALL

## 2018-01-05 ASSESSMENT — COPD QUESTIONNAIRES
DO YOU EVER COUGH UP ANY MUCUS OR PHLEGM?: NO/ONLY WITH OCCASIONAL COLDS OR INFECTIONS
COPD SCREENING SCORE: 3
HAVE YOU SMOKED AT LEAST 100 CIGARETTES IN YOUR ENTIRE LIFE: NO/DON'T KNOW
DURING THE PAST 4 WEEKS HOW MUCH DID YOU FEEL SHORT OF BREATH: NONE/LITTLE OF THE TIME

## 2018-01-05 ASSESSMENT — ENCOUNTER SYMPTOMS
HEMOPTYSIS: 0
COUGH: 1
CHILLS: 0
LOSS OF CONSCIOUSNESS: 0
HEARTBURN: 0
NEUROLOGICAL NEGATIVE: 1
ABDOMINAL PAIN: 0
BRUISES/BLEEDS EASILY: 0
VOMITING: 1
DIZZINESS: 0
FOCAL WEAKNESS: 0
SPUTUM PRODUCTION: 0
PALPITATIONS: 0
FEVER: 0
MYALGIAS: 0
DEPRESSION: 0
SHORTNESS OF BREATH: 1
BLURRED VISION: 0
NAUSEA: 1

## 2018-01-05 ASSESSMENT — LIFESTYLE VARIABLES
EVER_SMOKED: NEVER
EVER_SMOKED: NEVER
ALCOHOL_USE: NO

## 2018-01-05 ASSESSMENT — PAIN SCALES - GENERAL: PAINLEVEL_OUTOF10: 0

## 2018-01-05 NOTE — ED NOTES
Pt comes in complaining of a cough for approx 3 days. Pt stating back pain and chest pain as well. Pt appears SOB in triage.

## 2018-01-05 NOTE — ED NOTES
The Medication Reconciliation process has been completed by interviewing the patient    Allergies have been reviewed  Antibiotic use in 30 days - On Augmentin since 12/27/17    Home Pharmacy:  [facility] [address]

## 2018-01-06 ENCOUNTER — APPOINTMENT (OUTPATIENT)
Dept: RADIOLOGY | Facility: MEDICAL CENTER | Age: 68
DRG: 202 | End: 2018-01-06
Attending: INTERNAL MEDICINE
Payer: COMMERCIAL

## 2018-01-06 PROBLEM — J45.901 ASTHMA WITH ACUTE EXACERBATION: Status: ACTIVE | Noted: 2018-01-05

## 2018-01-06 LAB
ANION GAP SERPL CALC-SCNC: 8 MMOL/L (ref 0–11.9)
BASOPHILS # BLD AUTO: 0.3 % (ref 0–1.8)
BASOPHILS # BLD: 0.03 K/UL (ref 0–0.12)
BUN SERPL-MCNC: 17 MG/DL (ref 8–22)
CALCIUM SERPL-MCNC: 8.4 MG/DL (ref 8.5–10.5)
CHLORIDE SERPL-SCNC: 108 MMOL/L (ref 96–112)
CO2 SERPL-SCNC: 22 MMOL/L (ref 20–33)
CREAT SERPL-MCNC: 0.9 MG/DL (ref 0.5–1.4)
EOSINOPHIL # BLD AUTO: 0.37 K/UL (ref 0–0.51)
EOSINOPHIL NFR BLD: 3.3 % (ref 0–6.9)
ERYTHROCYTE [DISTWIDTH] IN BLOOD BY AUTOMATED COUNT: 46.2 FL (ref 35.9–50)
GFR SERPL CREATININE-BSD FRML MDRD: >60 ML/MIN/1.73 M 2
GLUCOSE SERPL-MCNC: 96 MG/DL (ref 65–99)
HCT VFR BLD AUTO: 40.2 % (ref 37–47)
HGB BLD-MCNC: 13.6 G/DL (ref 12–16)
IMM GRANULOCYTES # BLD AUTO: 0.04 K/UL (ref 0–0.11)
IMM GRANULOCYTES NFR BLD AUTO: 0.4 % (ref 0–0.9)
LV EJECT FRACT  99904: 70
LV EJECT FRACT MOD 2C 99903: 74.03
LV EJECT FRACT MOD 4C 99902: 72.08
LV EJECT FRACT MOD BP 99901: 72.68
LYMPHOCYTES # BLD AUTO: 1.9 K/UL (ref 1–4.8)
LYMPHOCYTES NFR BLD: 16.8 % (ref 22–41)
MAGNESIUM SERPL-MCNC: 1.8 MG/DL (ref 1.5–2.5)
MCH RBC QN AUTO: 32.5 PG (ref 27–33)
MCHC RBC AUTO-ENTMCNC: 33.8 G/DL (ref 33.6–35)
MCV RBC AUTO: 95.9 FL (ref 81.4–97.8)
MONOCYTES # BLD AUTO: 1.18 K/UL (ref 0–0.85)
MONOCYTES NFR BLD AUTO: 10.5 % (ref 0–13.4)
NEUTROPHILS # BLD AUTO: 7.77 K/UL (ref 2–7.15)
NEUTROPHILS NFR BLD: 68.7 % (ref 44–72)
NRBC # BLD AUTO: 0 K/UL
NRBC BLD-RTO: 0 /100 WBC
PLATELET # BLD AUTO: 231 K/UL (ref 164–446)
PMV BLD AUTO: 9.3 FL (ref 9–12.9)
POTASSIUM SERPL-SCNC: 3.5 MMOL/L (ref 3.6–5.5)
RBC # BLD AUTO: 4.19 M/UL (ref 4.2–5.4)
SODIUM SERPL-SCNC: 138 MMOL/L (ref 135–145)
WBC # BLD AUTO: 11.3 K/UL (ref 4.8–10.8)

## 2018-01-06 PROCEDURE — 36415 COLL VENOUS BLD VENIPUNCTURE: CPT

## 2018-01-06 PROCEDURE — 83735 ASSAY OF MAGNESIUM: CPT

## 2018-01-06 PROCEDURE — A9270 NON-COVERED ITEM OR SERVICE: HCPCS | Performed by: INTERNAL MEDICINE

## 2018-01-06 PROCEDURE — 80048 BASIC METABOLIC PNL TOTAL CA: CPT

## 2018-01-06 PROCEDURE — 700111 HCHG RX REV CODE 636 W/ 250 OVERRIDE (IP): Performed by: INTERNAL MEDICINE

## 2018-01-06 PROCEDURE — 770006 HCHG ROOM/CARE - MED/SURG/GYN SEMI*

## 2018-01-06 PROCEDURE — 93306 TTE W/DOPPLER COMPLETE: CPT

## 2018-01-06 PROCEDURE — 71045 X-RAY EXAM CHEST 1 VIEW: CPT

## 2018-01-06 PROCEDURE — 700102 HCHG RX REV CODE 250 W/ 637 OVERRIDE(OP): Performed by: INTERNAL MEDICINE

## 2018-01-06 PROCEDURE — 99232 SBSQ HOSP IP/OBS MODERATE 35: CPT | Performed by: INTERNAL MEDICINE

## 2018-01-06 PROCEDURE — 93306 TTE W/DOPPLER COMPLETE: CPT | Mod: 26 | Performed by: INTERNAL MEDICINE

## 2018-01-06 PROCEDURE — 700101 HCHG RX REV CODE 250: Performed by: INTERNAL MEDICINE

## 2018-01-06 PROCEDURE — 85025 COMPLETE CBC W/AUTO DIFF WBC: CPT

## 2018-01-06 RX ORDER — MAGNESIUM SULFATE HEPTAHYDRATE 40 MG/ML
2 INJECTION, SOLUTION INTRAVENOUS ONCE
Status: COMPLETED | OUTPATIENT
Start: 2018-01-06 | End: 2018-01-06

## 2018-01-06 RX ORDER — FLUTICASONE PROPIONATE 50 MCG
2 SPRAY, SUSPENSION (ML) NASAL DAILY
Status: DISCONTINUED | OUTPATIENT
Start: 2018-01-06 | End: 2018-01-07 | Stop reason: HOSPADM

## 2018-01-06 RX ORDER — POTASSIUM CHLORIDE 20 MEQ/1
40 TABLET, EXTENDED RELEASE ORAL ONCE
Status: COMPLETED | OUTPATIENT
Start: 2018-01-06 | End: 2018-01-06

## 2018-01-06 RX ORDER — LEVOFLOXACIN 750 MG/1
750 TABLET, FILM COATED ORAL DAILY
Status: DISCONTINUED | OUTPATIENT
Start: 2018-01-06 | End: 2018-01-07 | Stop reason: HOSPADM

## 2018-01-06 RX ADMIN — HEPARIN SODIUM 5000 UNITS: 5000 INJECTION, SOLUTION INTRAVENOUS; SUBCUTANEOUS at 05:35

## 2018-01-06 RX ADMIN — HYDROCHLOROTHIAZIDE 12.5 MG: 25 TABLET ORAL at 08:32

## 2018-01-06 RX ADMIN — AZITHROMYCIN 250 MG: 250 TABLET, FILM COATED ORAL at 08:31

## 2018-01-06 RX ADMIN — VALSARTAN 80 MG: 80 TABLET ORAL at 08:32

## 2018-01-06 RX ADMIN — OMEPRAZOLE 20 MG: 20 CAPSULE, DELAYED RELEASE ORAL at 08:31

## 2018-01-06 RX ADMIN — BUDESONIDE AND FORMOTEROL FUMARATE DIHYDRATE 1 PUFF: 160; 4.5 AEROSOL RESPIRATORY (INHALATION) at 22:42

## 2018-01-06 RX ADMIN — POTASSIUM CHLORIDE 40 MEQ: 1500 TABLET, EXTENDED RELEASE ORAL at 08:30

## 2018-01-06 RX ADMIN — HEPARIN SODIUM 5000 UNITS: 5000 INJECTION, SOLUTION INTRAVENOUS; SUBCUTANEOUS at 15:12

## 2018-01-06 RX ADMIN — BUDESONIDE AND FORMOTEROL FUMARATE DIHYDRATE 1 PUFF: 160; 4.5 AEROSOL RESPIRATORY (INHALATION) at 08:32

## 2018-01-06 RX ADMIN — VERAPAMIL HYDROCHLORIDE 120 MG: 120 CAPSULE, DELAYED RELEASE PELLETS ORAL at 18:40

## 2018-01-06 RX ADMIN — WATER 1000 MG: 1 INJECTION INTRAMUSCULAR; INTRAVENOUS; SUBCUTANEOUS at 08:35

## 2018-01-06 RX ADMIN — LEVOFLOXACIN 750 MG: 750 TABLET, FILM COATED ORAL at 12:38

## 2018-01-06 RX ADMIN — FLUTICASONE PROPIONATE 100 MCG: 50 SPRAY, METERED NASAL at 12:40

## 2018-01-06 RX ADMIN — HEPARIN SODIUM 5000 UNITS: 5000 INJECTION, SOLUTION INTRAVENOUS; SUBCUTANEOUS at 22:34

## 2018-01-06 RX ADMIN — STANDARDIZED SENNA CONCENTRATE AND DOCUSATE SODIUM 2 TABLET: 8.6; 5 TABLET, FILM COATED ORAL at 08:31

## 2018-01-06 RX ADMIN — MAGNESIUM SULFATE IN WATER 2 G: 40 INJECTION, SOLUTION INTRAVENOUS at 10:42

## 2018-01-06 RX ADMIN — ASPIRIN 81 MG: 81 TABLET, CHEWABLE ORAL at 08:31

## 2018-01-06 ASSESSMENT — PAIN SCALES - GENERAL
PAINLEVEL_OUTOF10: 0

## 2018-01-06 NOTE — PROGRESS NOTES
"Report received from Nurse Bushra. Patient transported to room via [place].  Patient is AAOx4. Patient appears calm and in no acute distress. Patient oriented to room and call light. 2 RN skin check completed. Labs and orders reviewed. Assessment completed. Patient denies any pain at this time. Patient complain of having a cough, patient provided with PRN cough medication, refer to MAR. Patient provided with scheduled medication, refer to MAR. Plan of care discussed with patient; questions have been answered at this time. Patient needs have been met at this time. Patient educated to call when needing assistance. Call light within reach. Non-Skid socks in place. Bed locked and in the lowest position. Hourly rounding in place. /61   Pulse 96   Temp 37.3 °C (99.2 °F)   Resp 18   Ht 1.575 m (5' 2\")   Wt 77.1 kg (170 lb)   SpO2 95%   Breastfeeding? No   BMI 31.09 kg/m² .   "

## 2018-01-06 NOTE — ED PROVIDER NOTES
"ED Provider Note    CHIEF COMPLAINT  No chief complaint on file.      HPI  Serenity Howe is a 67 y.o. female who presents to emergency room today with complaint of cough, shortness of breath. Patient states his symptoms with progressive for over a week with productive cough. Seen at urgent care placed on Augmentin but she states \"this is not helping\". Patient short of breath and states she has chills at times sore throat and difficulty swallowing. Symptoms have progressively worse her home and last 12-24 hours problem to come to the emergency room. Is noted to have elevated heart rate with fever and placed on septic protocol.    REVIEW OF SYSTEMS  See HPI for further details. All other systems are negative.     PAST MEDICAL HISTORY  Past Medical History:   Diagnosis Date   • Shoulder pain, right 7/29/2015   • Tremor 7/29/2015   • History of breast cancer 7/29/2015   • ASTHMA    • Breast cancer (CMS-[facility])    • Hypertension        FAMILY HISTORY  [unfilled]    SOCIAL HISTORY  Social History     Social History   • Marital status:      Spouse name: N/A   • Number of children: N/A   • Years of education: N/A     Social History Main Topics   • Smoking status: Never Smoker   • Smokeless tobacco: Never Used   • Alcohol use No   • Drug use: No   • Sexual activity: Not Currently     Other Topics Concern   • Not on file     Social History Narrative   • No narrative on file       SURGICAL HISTORY  Past Surgical History:   Procedure Laterality Date   • GYN SURGERY      cs   • MASTECTOMY      right        CURRENT MEDICATIONS  Home Medications     Reviewed by Trista Zavala (Pharmacy Tech) on 01/05/18 at 1536  Med List Status: Complete   Medication Last Dose Status    verapamil ER (CALAN SR) 120 MG CAPSULE SR 24 HR 1/4/2018 Active   ADVAIR DISKUS 250-50 MCG/DOSE AEROSOL POWDER, BREATH ACTIVATED 1/5/2018 Active   albuterol (PROVENTIL) 2.5mg/3ml Nebu Soln solution for nebulization 1/4/2018 Active   albuterol 108 " (90 Base) MCG/ACT Aero Soln inhalation aerosol 1/5/2018 Active   amoxicillin-clavulanate (AUGMENTIN) 875-125 MG Tab 1/5/2018 Active   aspirin (ASA) 81 MG CHEW 1/4/2018 Active   benzonatate (TESSALON) 100 MG Cap 1/4/2018 Active   Calcium Carbonate-Vit D-Min (CALTRATE PLUS PO) 1/4/2018 Active   ipratropium-albuterol (DUONEB) 0.5-2.5 (3) MG/3ML nebulizer solution 1/4/2018 Active   omeprazole (PRILOSEC) 20 MG delayed-release capsule 1/5/2018 Active   valsartan-hydrochlorothiazide (DIOVAN-HCT) 80-12.5 MG per tablet 1/5/2018 Active                ALLERGIES  Allergies   Allergen Reactions   • Nkda [No Known Drug Allergy]        PHYSICAL EXAM  VITAL SIGNS: /72   Pulse 98   Temp 37.8 °C (100.1 °F)   Resp 20   Wt 77.4 kg (170 lb 10.2 oz)   SpO2 92%   BMI 31.21 kg/m²  Room air O2: 95    Constitutional: Well developed, Well nourished,  acute distress, Non-toxic appearance.   HENT: Normocephalic, Atraumatic, Bilateral external ears normal, Oropharynx moist, No oral exudates, Nose normal.   Eyes: PERRLA, EOMI, Conjunctiva normal, No discharge.   Neck: Normal range of motion, No tenderness, Supple, No stridor.   Lymphatic: No lymphadenopathy noted.   Cardiovascular: Normal heart rate, Normal rhythm, No murmurs, No rubs, No gallops.   Thorax & Lungs: Rhonchi type breath sounds auscultated in both lung fields, No respiratory distress, No wheezing, No chest tenderness.   Abdomen: Bowel sounds normal, Soft, No tenderness, No masses, No pulsatile masses.   Skin: Warm, Dry, No erythema, No rash.   Back: No tenderness, No CVA tenderness.    Extremities: Intact distal pulses, No edema, No tenderness, No cyanosis, No clubbing.   Musculoskeletal: Good range of motion in all major joints. No tenderness to palpation or major deformities noted.   Neurologic: Alert & oriented x 3, Normal motor function, Normal sensory function, No focal deficits noted.   Psychiatric: Affect normal, Judgment normal, Mood normal.     EKG  Normal sinus  rhythm at 90 beats per minute, no ST elevation or depression, no widening of the QRS complex, good R wave progression, KS intervals are normal, no diagnostic Q waves, this a 12-lead EKG there is no previous EKG available at this time for comparison.    RADIOLOGY/PROCEDURES  DX-CHEST-PORTABLE (1 VIEW)   Final Result      No consolidation.      ECHOCARDIOGRAM COMP W/O CONT    (Results Pending)         COURSE & MEDICAL DECISION MAKING  Pertinent Labs & Imaging studies reviewed. (See chart for details)  Patient admitted to the hospital for possible occult pneumonia she has elevated white count with left shift and elevated lactic acid, mildly elevated.  IV fluids here in emergency starting antibiotics Rocephin and Zithromax. Discussed case with hospitalist resources for further planning care.    FINAL IMPRESSION  1. Acute occult pneumonia  2. Failure of outpatient treatment  3. Hypertension by history  4. Asthma by history  5. Elevated lactic acid         Electronically signed by: Theodore eH, 1/5/2018 6:34 PM

## 2018-01-06 NOTE — PROGRESS NOTES
[facility] Hospitalist Progress Note    Date of Service: 2018    Chief Complaint  Shyam is a 67 y.o. female w/h/o history of asthma and recent bronchitis who presents worsening of cough and shortness of breath, failure of outpatient treatment of acute bronchitis.    Interval Problem Update  Patient reports significant subjective improvement. However, bilateral tight wheezes noted on exam. White blood cells 11.3, comparing to 11.9 yesterday. Afebrile, vitals stable. On RA.  She was started on IV antibiotic for developing pneumonia. We'll probably repeat chest x-ray and pro-calcitonin  Potassium and magnesium replaced.      Consultants/Specialty  None    Disposition   Home when medically cleared, when wheezes improved        ROS  12 system have been reviewed. Negative except as mentioned in interval problem update note.    Physical Exam  Laboratory/Imaging   Hemodynamics  Temp (24hrs), Av.4 °C (99.4 °F), Min:37.3 °C (99.2 °F), Max:37.7 °C (99.8 °F)   Temperature: 37.4 °C (99.3 °F)  Pulse  Av.6  Min: 86  Max: 98 Heart Rate (Monitored): 96  Blood Pressure : 132/61, NIBP: 157/68      Respiratory      Respiration: 18, Pulse Oximetry: 92 %, O2 Daily Delivery Respiratory : Room Air with O2 Available        RUL Breath Sounds: Diminished, RML Breath Sounds: Diminished, RLL Breath Sounds: Diminished, GARRETT Breath Sounds: Diminished, LLL Breath Sounds: Diminished    Fluids    Intake/Output Summary (Last 24 hours) at 18 1143  Last data filed at 18 0600   Gross per 24 hour   Intake              240 ml   Output                0 ml   Net              240 ml       Nutrition  Orders Placed This Encounter   Procedures   • Diet Order     Standing Status:   Standing     Number of Occurrences:   1     Order Specific Question:   Diet:     Answer:   Cardiac [6]     Physical Exam  Constitutional:  well developed, well nourished, non-toxic, no acute distress  HENMT: Normocephalic, atraumatic, b/l ears normal, nose  normal  Eyes:  EOMI, conjunctiva normal, no discharge  Neck: no tracheal deviation, supple  Cardiovascular: normal heart rate, normal rhythm, no murmurs, no rubs or gallops; no cyanosis, clubbing, 2+ pitting edema bilateral lower extremity is equal and symmetric  Lungs: Respiratory effort is normal, normal breath sounds. Bilateral diffuse wheezes, rhonchi.   Abdomen: soft, non-tender, no guarding or rebound  Skin: warm, dry, no erythema, no rash  Neurologic: Alert and oriented, strength 5 out of 5 throughout, no focal deficits, CN II-XII normal  Psychiatric: No anxiety or depression      Recent Labs      01/05/18   1411  01/06/18   0024   WBC  11.9*  11.3*   RBC  4.69  4.19*   HEMOGLOBIN  15.4  13.6   HEMATOCRIT  45.6  40.2   MCV  97.2  95.9   MCH  32.8  32.5   MCHC  33.8  33.8   RDW  47.5  46.2   PLATELETCT  241  231   MPV  9.0  9.3     Recent Labs      01/05/18   1411  01/06/18   0024   SODIUM  137  138   POTASSIUM  3.7  3.5*   CHLORIDE  106  108   CO2  21  22   GLUCOSE  132*  96   BUN  17  17   CREATININE  0.82  0.90   CALCIUM  9.1  8.4*         Recent Labs      01/05/18   1411   BNPBTYPENAT  52              Assessment/Plan     * Bronchitis- (present on admission)   Assessment & Plan    -clear chest x-ray; white blood cells elevation noted.  -Started on IV ceftriaxone and azithromycin on admission. Sputum cultures. I suspect it could be bacterial infection resistant to penicillins and cephalosporins, as patient was treated on Augmentin on outpatient before admission to the hospital, so I'll change to levofloxacin  - echocardiogram to rule out underlying heart failure done, however BNP is low and clinically I don't suspect CHF.  Patient might be able to go home once clinically improved, wheezes resolved.        Asthma with acute exacerbation- (present on admission)   Assessment & Plan    Probably secondary to upper respiratory infection  -expiratory respiratory wheezing  -Bronchodilators and respiratory therapy  protocol and outpatient inhalers        Allergic rhinitis- (present on admission)   Assessment & Plan    -Start Flonase        Essential hypertension, benign- (present on admission)   Assessment & Plan    continue outpatient blood pressure medications and adjust as needed          Quality-Core Measures

## 2018-01-06 NOTE — RESPIRATORY CARE
COPD EDUCATION by COPD CLINICAL EDUCATOR  1/6/2018 at 6:46 AM by Airam Garcia     Patient reviewed by COPD education team. Patient does not qualify for COPD program.

## 2018-01-06 NOTE — CARE PLAN
Problem: Safety  Goal: Will remain free from injury    Intervention: Provide assistance with mobility  Patient is up self, steady gait. Patient educated to call when needing assistance. Call light within reach. Non-skid socks in placed. Bed locked and in the lowest position. Hourly rounding in place.       Problem: Knowledge Deficit  Goal: Knowledge of the prescribed therapeutic regimen will improve    Intervention: Discuss information regarding therpeutic regimen and document in education  Plan of care discussed with patient, questions have been answered at this time. Patient encouraged to voice feeling.

## 2018-01-06 NOTE — PROGRESS NOTES
Isabella Liu Fall Risk Assessment:     Last Known Fall: No falls  Mobility: No limitations  Medications: Cardiovascular or central nervous system meds  Mental Status/LOC/Awareness: Awake, alert, and oriented to date, place, and person  Toileting Needs: No needs  Volume/Electrolyte Status: No problems  Communication/Sensory: Visual (Glasses)/hearing deficit  Behavior: Appropriate behavior  Isabella Liu Fall Risk Total: 5  Fall Risk Level: NO RISK     Universal Fall Precautions:  call light/belongings in reach, bed in low position and locked, siderails up x 2, use non-slip footwear, adequate lighting, clutter free and spill free environment, educate to call for assistance     Fall Risk Level Interventions:           Patient Specific Interventions:      Medication: review medications with patient and family  Mental Status/LOC/Awareness: check on patient hourly and reinforce the use of call light  Toileting: instruct patient/family on the need to call for assistance when toileting  Volume/Electrolyte Status: ensure patient remains hydrated and monitor abnormal lab values  Communication/Sensory: update plan of care on whiteboard, ensure proper positioning when transferrng/ambulating and ensure patient has glasses/contacts and hearing aids/dentures  Behavioral: encourage patient to voice feelings and administer medication as ordered  Mobility: ensure bed is locked and in lowest position, provide appropriate assistive device and instruct patient to exit bed on their strongest side

## 2018-01-06 NOTE — CARE PLAN
Problem: Infection  Goal: Will remain free from infection  Pt educated on s/s infection, pneumonia, antibiotics, hand washing. Pt educated on infection prevention and hand washing. Pt encouraged to use I.s.

## 2018-01-06 NOTE — ASSESSMENT & PLAN NOTE
-clear chest x-ray; white blood cells elevation noted.  -Started on IV ceftriaxone and azithromycin on admission. Sputum cultures. I suspect it could be bacterial infection resistant to penicillins and cephalosporins, as patient was treated on Augmentin on outpatient before admission to the hospital, so I'll change to levofloxacin  - echocardiogram to rule out underlying heart failure done, however BNP is low and clinically I don't suspect CHF.  Patient might be able to go home once clinically improved, wheezes resolved.

## 2018-01-06 NOTE — H&P
HOSPITAL MEDICINE HISTORY/ PHYSICAL    Date of Service:  1/5/2018   6:39 PM       Patient ID:   Name: Serenity Howe. YOB: 1950. Age: 67 y.o. female. MRN: [id number]    Admitting Attending:  Rudolph Castillo     PCP : Everett Johnson M.D.          Chief Complaint:       Worsening cough and shortness of breath    History of Present Illness:    Shyam is a 67 y.o. female w/h/o history of asthma and recent bronchitis who presents with above chief complaint. Patient visited urgent care on December 27, 2017 where she had persistent cough and chest congestion and was prescribed Augmentin for possible acute bronchitis. She's been taking his medication diligently presented no relief in symptoms and came in today because she's had worsening dry cough that's very annoying to the point where she can't even sleep at night or during the day. She has been using her Advair at home with no relief in symptoms has been using this twice a day. She also has some mild nausea and vomiting but denies any actual fevers at home. She says her appetite is quite poor denies any diarrhea. She is unsure if she got a flu shot or not this year. She has a new skin rash lesion excoriations denies any obvious paroxysmal nocturnal dyspnea or orthopnea or lower extremity swelling.    Review of Systems:    Has Review of Systems   Constitutional: Negative for chills and fever.   HENT: Negative for hearing loss and tinnitus.    Eyes: Negative for blurred vision.   Respiratory: Positive for cough and shortness of breath. Negative for hemoptysis and sputum production.    Cardiovascular: Negative for chest pain, palpitations and leg swelling.   Gastrointestinal: Positive for nausea and vomiting. Negative for abdominal pain and heartburn.   Genitourinary: Negative for dysuria and urgency.   Musculoskeletal: Negative for myalgias.   Skin: Negative for itching.   Neurological: Negative.  Negative for dizziness, focal weakness and loss of  consciousness.   Endo/Heme/Allergies: Does not bruise/bleed easily.   Psychiatric/Behavioral: Negative for depression.   All other systems reviewed and are negative.    Please see HPI, all other systems were reviewed and are negative (AMA/CMS criteria)              Past Medical/ Family / Social history (PFSH):   Past Medical History:   Diagnosis Date   • Shoulder pain, right 7/29/2015   • Tremor 7/29/2015   • History of breast cancer 7/29/2015   • ASTHMA    • Breast cancer (CMS-HCC)    • Hypertension        Past Surgical History:   Procedure Laterality Date   • GYN SURGERY      cs   • MASTECTOMY      right        Current Outpatient Medications:  No current facility-administered medications on file prior to encounter.      Current Outpatient Prescriptions on File Prior to Encounter   Medication Sig Dispense Refill   • ipratropium-albuterol (DUONEB) 0.5-2.5 (3) MG/3ML nebulizer solution 3 mL by Nebulization route every four hours as needed for Shortness of Breath. 30 Bullet 0   • amoxicillin-clavulanate (AUGMENTIN) 875-125 MG Tab Take 1 Tab by mouth 2 times a day for 10 days. 20 Tab 0   • ADVAIR DISKUS 250-50 MCG/DOSE AEROSOL POWDER, BREATH ACTIVATED INHALE 1 PUFF BY MOUTH 2 TIMES A DAY. INHALE 1 DOSE BY MOUTH TWICE DAILY. RINSE MOUTH AFTER USE 1 Inhaler 6   • albuterol (PROVENTIL) 2.5mg/3ml Nebu Soln solution for nebulization 3 mL by Nebulization route every four hours as needed for Shortness of Breath. 75 mL 11   • albuterol 108 (90 Base) MCG/ACT Aero Soln inhalation aerosol Inhale 1-2 Puffs by mouth every 6 hours as needed for Shortness of Breath. 1 Inhaler 3   • valsartan-hydrochlorothiazide (DIOVAN-HCT) 80-12.5 MG per tablet Take 1 Tab by mouth every day. 30 Tab 9   •  verapamil ER (CALAN SR) 120 MG CAPSULE SR 24 HR TAKE ONE CAPSULE BY MOUTH EVERY DAY 90 Cap 3   • benzonatate (TESSALON) 100 MG Cap Take 1 Cap by mouth 3 times a day as needed for Cough. 60 Cap 0   • omeprazole (PRILOSEC) 20 MG delayed-release  capsule Take 1 Cap by mouth every day. 30 Cap 11   • Calcium Carbonate-Vit D-Min (CALTRATE PLUS PO) Take 1 Tab by mouth every day.     • aspirin (ASA) 81 MG CHEW Take 81 mg by mouth every day.         Medication Allergy/Sensitivities:  Allergies   Allergen Reactions   • Nkda [No Known Drug Allergy]        Family History:  Family History   Problem Relation Age of Onset   • Hyperlipidemia Mother       Family History   Problem Relation Age of Onset   • Hyperlipidemia Mother        Social History:  Social History   Substance Use Topics   • Smoking status: Never Smoker   • Smokeless tobacco: Never Used   • Alcohol use No     #################################################################  Physical Exam:   Vitals/ General Appearance:   Weight/BMI: Body mass index is 31.21 kg/m².  Blood pressure 141/72, pulse 98, temperature 37.8 °C (100.1 °F), resp. rate 20, weight 77.4 kg (170 lb 10.2 oz), SpO2 92 %.   Vitals:    01/05/18 1538 01/05/18 1701 01/05/18 1726 01/05/18 1801   BP:       Pulse: 86 89  98   Resp: 16 18 20    Temp:       SpO2: 97% 94%  92%   Weight:        Oxygen Therapy:  Pulse Oximetry: 92 %, O2 (LPM): 0, O2 Delivery: None (Room Air)    Constitutional:  well developed, well nourished, non-toxic, no acute distress  HENMT: Normocephalic, atraumatic, b/l ears normal, nose normal  Eyes:  EOMI, conjunctiva normal, no discharge  Neck: no tracheal deviation, supple  Cardiovascular: normal heart rate, normal rhythm, no murmurs, no rubs or gallops; no cyanosis, clubbing, 2+ pitting edema bilateral lower extremity is equal and symmetric  Lungs: Respiratory effort is normal, normal breath sounds, breath sounds clear to auscultation b/l, no rales, rhonchi or wheezing, speaking in full sentences  Abdomen: soft, non-tender, no guarding or rebound  Skin: warm, dry, no erythema, no rash  Neurologic: Alert and oriented, strength 5 out of 5 throughout, no focal deficits, CN II-XII normal  Psychiatric: No anxiety or  depression    #################################################################  Lab Data Review:    Objective   Recent Results (from the past 24 hour(s))   EKG (ER)    Collection Time: 18 11:25 AM   Result Value Ref Range    Report       [facility] Emergency Dept.    Test Date:  2018  Pt Name:    STEPHEN BETANCUR                Department: ER  MRN:        [id number]                      Room:       [facility]  Gender:     Female                       Technician: [id number]  :        1950                   Requested By:ER TRIAGE PROTOCOL  Order #:    [id number]                    Reading MD: ROBBIE COELLO DO    Measurements  Intervals                                Axis  Rate:       84                           P:          69  IA:         148                          QRS:        50  QRSD:       82                           T:          52  QT:         368  QTc:        436    Interpretive Statements  SINUS RHYTHM  Compared to ECG 2014 12:50:36  No significant changes    Electronically Signed On 2018 18:36:45 PST by ROBBIE COELLO DO     Lactic acid (lactate)    Collection Time: 18  2:11 PM   Result Value Ref Range    Lactic Acid 2.1 (H) 0.5 - 2.0 mmol/L   CBC WITH DIFFERENTIAL    Collection Time: 18  2:11 PM   Result Value Ref Range    WBC 11.9 (H) 4.8 - 10.8 K/uL    RBC 4.69 4.20 - 5.40 M/uL    Hemoglobin 15.4 12.0 - 16.0 g/dL    Hematocrit 45.6 37.0 - 47.0 %    MCV 97.2 81.4 - 97.8 fL    MCH 32.8 27.0 - 33.0 pg    MCHC 33.8 33.6 - 35.0 g/dL    RDW 47.5 35.9 - 50.0 fL    Platelet Count 241 164 - 446 K/uL    MPV 9.0 9.0 - 12.9 fL    Neutrophils-Polys 69.50 44.00 - 72.00 %    Lymphocytes 18.90 (L) 22.00 - 41.00 %    Monocytes 8.00 0.00 - 13.40 %    Eosinophils 3.00 0.00 - 6.90 %    Basophils 0.30 0.00 - 1.80 %    Immature Granulocytes 0.30 0.00 - 0.90 %    Nucleated RBC 0.00 /100 WBC    Neutrophils (Absolute) 8.23 (H) 2.00 - 7.15 K/uL    Lymphs (Absolute) 2.24 1.00  - 4.80 K/uL    Monos (Absolute) 0.95 (H) 0.00 - 0.85 K/uL    Eos (Absolute) 0.36 0.00 - 0.51 K/uL    Baso (Absolute) 0.04 0.00 - 0.12 K/uL    Immature Granulocytes (abs) 0.04 0.00 - 0.11 K/uL    NRBC (Absolute) 0.00 K/uL   COMP METABOLIC PANEL    Collection Time: 01/05/18  2:11 PM   Result Value Ref Range    Sodium 137 135 - 145 mmol/L    Potassium 3.7 3.6 - 5.5 mmol/L    Chloride 106 96 - 112 mmol/L    Co2 21 20 - 33 mmol/L    Anion Gap 10.0 0.0 - 11.9    Glucose 132 (H) 65 - 99 mg/dL    Bun 17 8 - 22 mg/dL    Creatinine 0.82 0.50 - 1.40 mg/dL    Calcium 9.1 8.5 - 10.5 mg/dL    AST(SGOT) 19 12 - 45 U/L    ALT(SGPT) 22 2 - 50 U/L    Alkaline Phosphatase 61 30 - 99 U/L    Total Bilirubin 0.5 0.1 - 1.5 mg/dL    Albumin 3.7 3.2 - 4.9 g/dL    Total Protein 7.5 6.0 - 8.2 g/dL    Globulin 3.8 (H) 1.9 - 3.5 g/dL    A-G Ratio 1.0 g/dL   INFLUENZA A/B BY PCR    Collection Time: 01/05/18  2:11 PM   Result Value Ref Range    Influenza virus A RNA Negative Negative    Influenza virus B, PCR Negative Negative   ESTIMATED GFR    Collection Time: 01/05/18  2:11 PM   Result Value Ref Range    GFR If African American >60 >60 mL/min/1.73 m 2    GFR If Non African American >60 >60 mL/min/1.73 m 2   TSH (Thyroid Stimulating Hormone)    Collection Time: 01/05/18  2:11 PM   Result Value Ref Range    TSH 0.650 0.380 - 5.330 uIU/mL   BTYPE NATRIURETIC PEPTIDE    Collection Time: 01/05/18  2:11 PM   Result Value Ref Range    B Natriuretic Peptide 52 0 - 100 pg/mL   URINALYSIS    Collection Time: 01/05/18  3:35 PM   Result Value Ref Range    Color Yellow     Character Clear     Specific Gravity 1.017 <1.035    Ph 5.0 5.0 - 8.0    Glucose Negative Negative mg/dL    Ketones Negative Negative mg/dL    Protein Negative Negative mg/dL    Bilirubin Negative Negative    Urobilinogen, Urine 0.2 Negative    Nitrite Negative Negative    Leukocyte Esterase Trace (A) Negative    Occult Blood Negative Negative    Micro Urine Req Microscopic    URINE  MICROSCOPIC (W/UA)    Collection Time: 01/05/18  3:35 PM   Result Value Ref Range    WBC 0-2 /hpf    RBC 0-2 /hpf    Bacteria Negative None /hpf    Epithelial Cells Negative /hpf    Ca Oxalate Crystal Few /hpf    Uric Acid Crystal Positive /hpf    Hyaline Cast 0-2 /lpf   Lactic acid (lactate)    Collection Time: 01/05/18  4:48 PM   Result Value Ref Range    Lactic Acid 1.6 0.5 - 2.0 mmol/L       (click the triangle to expand results)    My interpretation of lab results:     Imaging/Procedures Review:    DX-CHEST-PORTABLE (1 VIEW)   Final Result      No consolidation.      ECHOCARDIOGRAM COMP W/O CONT    (Results Pending)       EKG:   per my independent read:  Sinus rhythm heart rate 81 no evidence of acute ST segment changes    Assessment and Plan:      * Bronchitis- (present on admission)   Assessment & Plan    -No rhonchi or rales on exam and clear chest x-ray  -Would expect to have a pneumonia developing by now but will convert to IV ceftriaxone and azithromycin. Sputum cultures  -And concern of her lower extremity edema and will also get an echocardiogram to rule out underlying heart failure and check a BNP        Asthma- (present on admission)   Assessment & Plan    -No clear exacerbation is no expiratory respiratory wheezing  -Bronchodilators and respiratory therapy protocol and outpatient inhalers        Localized swelling of lower extremity- (present on admission)   Assessment & Plan    -Check an echocardiogram, asymmetrical therefore my suspicion for blood clot is low        Allergic rhinitis- (present on admission)   Assessment & Plan    -Consider Flonase        Essential hypertension, benign- (present on admission)   Assessment & Plan    -Elevated here in the hospital and continue outpatient blood pressure medications and adjust as needed              1. Prophylaxis: sc heparin  2. Code: Full code per patient with herself present  3. Dispo: She will be admitted to inpatient for management that is expected  to take greater than 2 midnights    This dictation was created using voice recognition software. The accuracy of the dictation is limited to the abilities of the software. I expect there may be some errors of grammar and possibly content.

## 2018-01-06 NOTE — ASSESSMENT & PLAN NOTE
Probably secondary to upper respiratory infection  -expiratory respiratory wheezing  -Bronchodilators and respiratory therapy protocol and outpatient inhalers

## 2018-01-07 VITALS
RESPIRATION RATE: 16 BRPM | HEART RATE: 73 BPM | DIASTOLIC BLOOD PRESSURE: 76 MMHG | SYSTOLIC BLOOD PRESSURE: 133 MMHG | BODY MASS INDEX: 31.28 KG/M2 | TEMPERATURE: 98.1 F | WEIGHT: 170 LBS | HEIGHT: 62 IN | OXYGEN SATURATION: 93 %

## 2018-01-07 LAB
ANION GAP SERPL CALC-SCNC: 9 MMOL/L (ref 0–11.9)
BACTERIA UR CULT: NORMAL
BASOPHILS # BLD AUTO: 0.7 % (ref 0–1.8)
BASOPHILS # BLD: 0.06 K/UL (ref 0–0.12)
BUN SERPL-MCNC: 16 MG/DL (ref 8–22)
CALCIUM SERPL-MCNC: 8.8 MG/DL (ref 8.5–10.5)
CHLORIDE SERPL-SCNC: 106 MMOL/L (ref 96–112)
CO2 SERPL-SCNC: 22 MMOL/L (ref 20–33)
CREAT SERPL-MCNC: 0.89 MG/DL (ref 0.5–1.4)
EOSINOPHIL # BLD AUTO: 0.43 K/UL (ref 0–0.51)
EOSINOPHIL NFR BLD: 5.3 % (ref 0–6.9)
ERYTHROCYTE [DISTWIDTH] IN BLOOD BY AUTOMATED COUNT: 45.5 FL (ref 35.9–50)
GFR SERPL CREATININE-BSD FRML MDRD: >60 ML/MIN/1.73 M 2
GLUCOSE SERPL-MCNC: 105 MG/DL (ref 65–99)
HCT VFR BLD AUTO: 41.9 % (ref 37–47)
HGB BLD-MCNC: 14.5 G/DL (ref 12–16)
IMM GRANULOCYTES # BLD AUTO: 0.02 K/UL (ref 0–0.11)
IMM GRANULOCYTES NFR BLD AUTO: 0.2 % (ref 0–0.9)
LYMPHOCYTES # BLD AUTO: 2.17 K/UL (ref 1–4.8)
LYMPHOCYTES NFR BLD: 27 % (ref 22–41)
MAGNESIUM SERPL-MCNC: 2.1 MG/DL (ref 1.5–2.5)
MCH RBC QN AUTO: 33 PG (ref 27–33)
MCHC RBC AUTO-ENTMCNC: 34.6 G/DL (ref 33.6–35)
MCV RBC AUTO: 95.4 FL (ref 81.4–97.8)
MONOCYTES # BLD AUTO: 1.29 K/UL (ref 0–0.85)
MONOCYTES NFR BLD AUTO: 16 % (ref 0–13.4)
NEUTROPHILS # BLD AUTO: 4.08 K/UL (ref 2–7.15)
NEUTROPHILS NFR BLD: 50.8 % (ref 44–72)
NRBC # BLD AUTO: 0 K/UL
NRBC BLD-RTO: 0 /100 WBC
PLATELET # BLD AUTO: 216 K/UL (ref 164–446)
PMV BLD AUTO: 9.2 FL (ref 9–12.9)
POTASSIUM SERPL-SCNC: 3.4 MMOL/L (ref 3.6–5.5)
PROCALCITONIN SERPL-MCNC: 0.06 NG/ML
RBC # BLD AUTO: 4.39 M/UL (ref 4.2–5.4)
SIGNIFICANT IND 70042: NORMAL
SITE SITE: NORMAL
SODIUM SERPL-SCNC: 137 MMOL/L (ref 135–145)
SOURCE SOURCE: NORMAL
WBC # BLD AUTO: 8.1 K/UL (ref 4.8–10.8)

## 2018-01-07 PROCEDURE — 84145 PROCALCITONIN (PCT): CPT

## 2018-01-07 PROCEDURE — 700102 HCHG RX REV CODE 250 W/ 637 OVERRIDE(OP): Performed by: INTERNAL MEDICINE

## 2018-01-07 PROCEDURE — 85025 COMPLETE CBC W/AUTO DIFF WBC: CPT

## 2018-01-07 PROCEDURE — 80048 BASIC METABOLIC PNL TOTAL CA: CPT

## 2018-01-07 PROCEDURE — 36415 COLL VENOUS BLD VENIPUNCTURE: CPT

## 2018-01-07 PROCEDURE — 99239 HOSP IP/OBS DSCHRG MGMT >30: CPT | Performed by: HOSPITALIST

## 2018-01-07 PROCEDURE — 700111 HCHG RX REV CODE 636 W/ 250 OVERRIDE (IP): Performed by: INTERNAL MEDICINE

## 2018-01-07 PROCEDURE — A9270 NON-COVERED ITEM OR SERVICE: HCPCS | Performed by: INTERNAL MEDICINE

## 2018-01-07 PROCEDURE — 83735 ASSAY OF MAGNESIUM: CPT

## 2018-01-07 RX ORDER — PREDNISONE 10 MG/1
TABLET ORAL
Qty: 10.5 TAB | Refills: 0 | Status: SHIPPED | OUTPATIENT
Start: 2018-01-07 | End: 2018-03-03

## 2018-01-07 RX ORDER — LEVOFLOXACIN 750 MG/1
750 TABLET, FILM COATED ORAL DAILY
Qty: 2 TAB | Refills: 0 | Status: SHIPPED | OUTPATIENT
Start: 2018-01-07 | End: 2018-01-09

## 2018-01-07 RX ORDER — GUAIFENESIN 600 MG/1
600 TABLET, EXTENDED RELEASE ORAL EVERY 12 HOURS
Qty: 28 TAB | Refills: 0 | Status: SHIPPED | OUTPATIENT
Start: 2018-01-07 | End: 2018-03-14

## 2018-01-07 RX ORDER — FLUTICASONE PROPIONATE 50 MCG
2 SPRAY, SUSPENSION (ML) NASAL DAILY
Qty: 16 G | Refills: 0 | Status: SHIPPED | OUTPATIENT
Start: 2018-01-07 | End: 2018-03-03

## 2018-01-07 RX ADMIN — HYDROCHLOROTHIAZIDE 12.5 MG: 25 TABLET ORAL at 09:34

## 2018-01-07 RX ADMIN — BUDESONIDE AND FORMOTEROL FUMARATE DIHYDRATE 1 PUFF: 160; 4.5 AEROSOL RESPIRATORY (INHALATION) at 09:38

## 2018-01-07 RX ADMIN — LEVOFLOXACIN 750 MG: 750 TABLET, FILM COATED ORAL at 09:34

## 2018-01-07 RX ADMIN — OMEPRAZOLE 20 MG: 20 CAPSULE, DELAYED RELEASE ORAL at 09:35

## 2018-01-07 RX ADMIN — HEPARIN SODIUM 5000 UNITS: 5000 INJECTION, SOLUTION INTRAVENOUS; SUBCUTANEOUS at 05:35

## 2018-01-07 RX ADMIN — VALSARTAN 80 MG: 80 TABLET ORAL at 09:35

## 2018-01-07 RX ADMIN — ASPIRIN 81 MG: 81 TABLET, CHEWABLE ORAL at 09:35

## 2018-01-07 NOTE — PROGRESS NOTES
Reviewed discharge paperwork with patient. No farther questions or concerns. Dressed appropriately, all belongings accounted for and IV removed. Awaiting transport.

## 2018-01-07 NOTE — PROGRESS NOTES
Received report and assumed care of pt upon transfer to the floor.  Pt is stable and independent.  Pt oriented to the unit and the room.  Two RN skin check complete.  Generalized dry skin noted.  No wounds or skin injury present.  No complaints at this time.  Will continue to monitor.

## 2018-01-07 NOTE — CARE PLAN
Problem: Communication  Goal: The ability to communicate needs accurately and effectively will improve  Outcome: PROGRESSING AS EXPECTED      Problem: Safety  Goal: Will remain free from injury  Outcome: PROGRESSING AS EXPECTED      Problem: Discharge Barriers/Planning  Goal: Patient's continuum of care needs will be met  Outcome: PROGRESSING AS EXPECTED      Problem: Respiratory:  Goal: Respiratory status will improve  Outcome: PROGRESSING AS EXPECTED

## 2018-01-07 NOTE — CARE PLAN
Problem: Safety  Goal: Will remain free from injury  Outcome: PROGRESSING AS EXPECTED    Goal: Will remain free from falls  Outcome: PROGRESSING AS EXPECTED      Problem: Venous Thromboembolism (VTW)/Deep Vein Thrombosis (DVT) Prevention:  Goal: Patient will participate in Venous Thrombosis (VTE)/Deep Vein Thrombosis (DVT)Prevention Measures  Outcome: PROGRESSING AS EXPECTED

## 2018-01-07 NOTE — PROGRESS NOTES
Report received from day RN. Assumed patient care at 1900. Patient appears calm and in no acute distress. Patient is transferring to [address]. Report given to Nurse Gricelda. Patient informed of transport, patient verbalizes understanding. Patient transported off unit via wheelchair @2003.

## 2018-01-07 NOTE — DISCHARGE INSTRUCTIONS
Discharge patient Home   Diet regular with 9-13 servings of fruits and vegetables   Activities as tolerated   Follow ups with PCP in 7-10 days, call for appointment   Meds per med rec sheet   No smoking, no alcohol, no caffeine   Wear seat belt in motorized vehicle   Take medications as perscribed   Keep appointments   If symptoms worsen call PCP, 911 or urgent care.      Bronchitis  Bronchitis is a problem of the air tubes leading to your lungs. This problem makes it hard for air to get in and out of the lungs. You may cough a lot because your air tubes are narrow. Going without care can cause lasting (chronic) bronchitis.  HOME CARE   · Drink enough fluids to keep your pee (urine) clear or pale yellow.  · Use a cool mist humidifier.  · Quit smoking if you smoke. If you keep smoking, the bronchitis might not get better.  · Only take medicine as told by your doctor.  GET HELP RIGHT AWAY IF:   · Coughing keeps you awake.  · You start to wheeze.  · You become more sick or weak.  · You have a hard time breathing or get short of breath.  · You cough up blood.  · Coughing lasts more than 2 weeks.  · You have a fever.  · Your baby is older than 3 months with a rectal temperature of 102° F (38.9° C) or higher.  · Your baby is 3 months old or younger with a rectal temperature of 100.4° F (38° C) or higher.  MAKE SURE YOU:  · Understand these instructions.  · Will watch your condition.  · Will get help right away if you are not doing well or get worse.  Document Released: 06/05/2009 Document Revised: 03/11/2013 Document Reviewed: 11/19/2010  [employer]® Patient Information ©2014 [employer].    Discharge Instructions    Discharged to home by car with relative. Discharged via wheelchair, hospital escort: Yes.  Special equipment needed: Not Applicable    Be sure to schedule a follow-up appointment with your primary care doctor or any specialists as instructed.     Discharge Plan:   Pneumococcal Vaccine Given - only chart on  this line when given:  (Not given-contraindicated per pharm D)  Influenza Vaccine Indication: Patient Refuses    I understand that a diet low in cholesterol, fat, and sodium is recommended for good health. Unless I have been given specific instructions below for another diet, I accept this instruction as my diet prescription.   Other diet: Cardiac healthy as tolerated    Special Instructions: None    · Is patient discharged on Warfarin / Coumadin?   No     · Is patient Post Blood Transfusion?  No    Depression / Suicide Risk    As you are discharged from this [facility] facility, it is important to learn how to keep safe from harming yourself.    Recognize the warning signs:  · Abrupt changes in personality, positive or negative- including increase in energy   · Giving away possessions  · Change in eating patterns- significant weight changes-  positive or negative  · Change in sleeping patterns- unable to sleep or sleeping all the time   · Unwillingness or inability to communicate  · Depression  · Unusual sadness, discouragement and loneliness  · Talk of wanting to die  · Neglect of personal appearance   · Rebelliousness- reckless behavior  · Withdrawal from people/activities they love  · Confusion- inability to concentrate     If you or a loved one observes any of these behaviors or has concerns about self-harm, here's what you can do:  · Talk about it- your feelings and reasons for harming yourself  · Remove any means that you might use to hurt yourself (examples: pills, rope, extension cords, firearm)  · Get professional help from the community (Mental Health, Substance Abuse, psychological counseling)  · Do not be alone:Call your Safe Contact- someone whom you trust who will be there for you.  · Call your local CRISIS HOTLINE 431-9555 or 079-996-7396  · Call your local Children's Mobile Crisis Response Team [place] (015) 651-8160 or www.[employer]  · Call the toll free National Suicide Prevention  Hotlines   · National Suicide Prevention Lifeline 829-505-ZTAV (3232)  · National Hope Line Network 800-SUICIDE (466-6573)

## 2018-01-08 NOTE — DISCHARGE SUMMARY
CHIEF COMPLAINT ON ADMISSION  No chief complaint on file.      CODE STATUS  Prior    HPI & HOSPITAL COURSE  This is a 67 y.o. female here with acute shortness of breath and upper respiratory tract infection patient was diagnosed to have bronchitis. Patient's chest x-ray this morning is clear. The patient at this point has had her vitals evaluated and she has stabilized. She does not need oxygen. The patient's course at this point was controlled with antibiotics and the patient this point can be discharged with outpatient follow-ups and management. For discharge the patient is discharged with oral antibiotic in the form of Levaquin. The patient is also given at this point Mucinex or nuchal lysis the patient resumes her outpatient inhalers. Patient does have chronic asthma she did not have an asthma exacerbation though.    Therefore, she is discharged in good and stable condition with close outpatient follow-up.    SPECIFIC OUTPATIENT FOLLOW-UP  Follow-up with the primary care physician in 7-10 days    DISCHARGE PROBLEM LIST  Principal Problem:    Bronchitis POA: Yes  Active Problems:    Essential hypertension, benign POA: Yes    Allergic rhinitis POA: Yes    Localized swelling of lower extremity POA: Yes    Asthma with acute exacerbation POA: Yes  Resolved Problems:    * No resolved hospital problems. *      FOLLOW UP  No future appointments.  Everett Johnson M.D.  [address] [place] [id number]  [phone]      7-10 days      MEDICATIONS ON DISCHARGE   Serenity Howe   Home Medication Instructions KAMILA:[id number]    Printed on:01/07/18 7870   Medication Information                       verapamil ER (CALAN SR) 120 MG CAPSULE SR 24 HR  TAKE ONE CAPSULE BY MOUTH EVERY DAY             ADVAIR DISKUS 250-50 MCG/DOSE AEROSOL POWDER, BREATH ACTIVATED  INHALE 1 PUFF BY MOUTH 2 TIMES A DAY. INHALE 1 DOSE BY MOUTH TWICE DAILY. RINSE MOUTH AFTER USE             albuterol (PROVENTIL) 2.5mg/3ml Nebu Soln  solution for nebulization  3 mL by Nebulization route every four hours as needed for Shortness of Breath.             albuterol 108 (90 Base) MCG/ACT Aero Soln inhalation aerosol  Inhale 1-2 Puffs by mouth every 6 hours as needed for Shortness of Breath.             aspirin (ASA) 81 MG CHEW  Take 81 mg by mouth every day.             Calcium Carbonate-Vit D-Min (CALTRATE PLUS PO)  Take 1 Tab by mouth every day.             fluticasone (FLONASE) 50 MCG/ACT nasal spray  Spray 2 Sprays in nose every day.             guaifenesin LA (MUCINEX) 600 MG TABLET SR 12 HR  Take 1 Tab by mouth every 12 hours.             ipratropium-albuterol (DUONEB) 0.5-2.5 (3) MG/3ML nebulizer solution  3 mL by Nebulization route every four hours as needed for Shortness of Breath.             levofloxacin (LEVAQUIN) 750 MG tablet  Take 1 Tab by mouth every day for 2 days.             omeprazole (PRILOSEC) 20 MG delayed-release capsule  Take 1 Cap by mouth every day.             predniSONE (DELTASONE) 10 MG Tab  20 mg daily for 3 days than 10 mg daily for 3 days than 5 mg daily for 3 days than stop             valsartan-hydrochlorothiazide (DIOVAN-HCT) 80-12.5 MG per tablet  Take 1 Tab by mouth every day.                 DIET  No orders of the defined types were placed in this encounter.      ACTIVITY  As tolerated.  Weight bearing as tolerated      CONSULTATIONS  None    PROCEDURES  None    LABORATORY  Lab Results   Component Value Date/Time    SODIUM 137 01/07/2018 03:09 AM    POTASSIUM 3.4 (L) 01/07/2018 03:09 AM    CHLORIDE 106 01/07/2018 03:09 AM    CO2 22 01/07/2018 03:09 AM    GLUCOSE 105 (H) 01/07/2018 03:09 AM    BUN 16 01/07/2018 03:09 AM    CREATININE 0.89 01/07/2018 03:09 AM    CREATININE 1.0 03/19/2008 10:45 PM        Lab Results   Component Value Date/Time    WBC 8.1 01/07/2018 03:09 AM    HEMOGLOBIN 14.5 01/07/2018 03:09 AM    HEMATOCRIT 41.9 01/07/2018 03:09 AM    PLATELETCT 216 01/07/2018 03:09 AM        Total time of the  discharge process exceeds 45 minutes

## 2018-01-10 LAB
BACTERIA BLD CULT: NORMAL
BACTERIA BLD CULT: NORMAL
SIGNIFICANT IND 70042: NORMAL
SIGNIFICANT IND 70042: NORMAL
SITE SITE: NORMAL
SITE SITE: NORMAL
SOURCE SOURCE: NORMAL
SOURCE SOURCE: NORMAL

## 2018-01-17 RX ORDER — VERAPAMIL HYDROCHLORIDE 120 MG/1
120 CAPSULE, EXTENDED RELEASE ORAL
Qty: 90 CAP | Refills: 3 | Status: SHIPPED | OUTPATIENT
Start: 2018-01-17 | End: 2018-03-03

## 2018-02-21 ENCOUNTER — OFFICE VISIT (OUTPATIENT)
Dept: URGENT CARE | Facility: PHYSICIAN GROUP | Age: 68
End: 2018-02-21
Payer: COMMERCIAL

## 2018-02-21 ENCOUNTER — HOSPITAL ENCOUNTER (OUTPATIENT)
Facility: MEDICAL CENTER | Age: 68
End: 2018-02-21
Attending: PHYSICIAN ASSISTANT
Payer: COMMERCIAL

## 2018-02-21 VITALS
HEART RATE: 85 BPM | BODY MASS INDEX: 31.28 KG/M2 | RESPIRATION RATE: 16 BRPM | OXYGEN SATURATION: 95 % | DIASTOLIC BLOOD PRESSURE: 92 MMHG | SYSTOLIC BLOOD PRESSURE: 136 MMHG | TEMPERATURE: 97.7 F | WEIGHT: 170 LBS | HEIGHT: 62 IN

## 2018-02-21 DIAGNOSIS — R42 VERTIGO: ICD-10-CM

## 2018-02-21 LAB
ALBUMIN SERPL BCP-MCNC: 4.1 G/DL (ref 3.2–4.9)
ALBUMIN/GLOB SERPL: 1.1 G/DL
ALP SERPL-CCNC: 59 U/L (ref 30–99)
ALT SERPL-CCNC: 20 U/L (ref 2–50)
ANION GAP SERPL CALC-SCNC: 6 MMOL/L (ref 0–11.9)
AST SERPL-CCNC: 22 U/L (ref 12–45)
BASOPHILS # BLD AUTO: 0.7 % (ref 0–1.8)
BASOPHILS # BLD: 0.05 K/UL (ref 0–0.12)
BILIRUB SERPL-MCNC: 0.5 MG/DL (ref 0.1–1.5)
BUN SERPL-MCNC: 21 MG/DL (ref 8–22)
CALCIUM SERPL-MCNC: 9.8 MG/DL (ref 8.5–10.5)
CHLORIDE SERPL-SCNC: 105 MMOL/L (ref 96–112)
CO2 SERPL-SCNC: 26 MMOL/L (ref 20–33)
CREAT SERPL-MCNC: 0.86 MG/DL (ref 0.5–1.4)
EOSINOPHIL # BLD AUTO: 0.14 K/UL (ref 0–0.51)
EOSINOPHIL NFR BLD: 2 % (ref 0–6.9)
ERYTHROCYTE [DISTWIDTH] IN BLOOD BY AUTOMATED COUNT: 45.5 FL (ref 35.9–50)
GLOBULIN SER CALC-MCNC: 3.7 G/DL (ref 1.9–3.5)
GLUCOSE BLD-MCNC: 95 MG/DL (ref 70–100)
GLUCOSE SERPL-MCNC: 89 MG/DL (ref 65–99)
HCT VFR BLD AUTO: 47.2 % (ref 37–47)
HGB BLD-MCNC: 16.2 G/DL (ref 12–16)
IMM GRANULOCYTES # BLD AUTO: 0.02 K/UL (ref 0–0.11)
IMM GRANULOCYTES NFR BLD AUTO: 0.3 % (ref 0–0.9)
LYMPHOCYTES # BLD AUTO: 2.48 K/UL (ref 1–4.8)
LYMPHOCYTES NFR BLD: 36.1 % (ref 22–41)
MCH RBC QN AUTO: 33.3 PG (ref 27–33)
MCHC RBC AUTO-ENTMCNC: 34.3 G/DL (ref 33.6–35)
MCV RBC AUTO: 97.1 FL (ref 81.4–97.8)
MONOCYTES # BLD AUTO: 0.87 K/UL (ref 0–0.85)
MONOCYTES NFR BLD AUTO: 12.7 % (ref 0–13.4)
NEUTROPHILS # BLD AUTO: 3.31 K/UL (ref 2–7.15)
NEUTROPHILS NFR BLD: 48.2 % (ref 44–72)
NRBC # BLD AUTO: 0 K/UL
NRBC BLD-RTO: 0 /100 WBC
PLATELET # BLD AUTO: 237 K/UL (ref 164–446)
PMV BLD AUTO: 10.5 FL (ref 9–12.9)
POTASSIUM SERPL-SCNC: 3.6 MMOL/L (ref 3.6–5.5)
PROT SERPL-MCNC: 7.8 G/DL (ref 6–8.2)
RBC # BLD AUTO: 4.86 M/UL (ref 4.2–5.4)
SODIUM SERPL-SCNC: 137 MMOL/L (ref 135–145)
WBC # BLD AUTO: 6.9 K/UL (ref 4.8–10.8)

## 2018-02-21 PROCEDURE — 80053 COMPREHEN METABOLIC PANEL: CPT

## 2018-02-21 PROCEDURE — 82962 GLUCOSE BLOOD TEST: CPT | Performed by: PHYSICIAN ASSISTANT

## 2018-02-21 PROCEDURE — 99214 OFFICE O/P EST MOD 30 MIN: CPT | Performed by: PHYSICIAN ASSISTANT

## 2018-02-21 PROCEDURE — 85025 COMPLETE CBC W/AUTO DIFF WBC: CPT

## 2018-02-21 RX ORDER — MECLIZINE HYDROCHLORIDE 25 MG/1
25 TABLET ORAL 3 TIMES DAILY PRN
Qty: 30 TAB | Refills: 0 | Status: SHIPPED | OUTPATIENT
Start: 2018-02-21 | End: 2018-03-03

## 2018-02-21 ASSESSMENT — ENCOUNTER SYMPTOMS
LOSS OF CONSCIOUSNESS: 0
DIZZINESS: 1
WEAKNESS: 0
NAUSEA: 0
FEVER: 0
MUSCULOSKELETAL NEGATIVE: 1
HEADACHES: 0
VOMITING: 0
DIARRHEA: 0
COUGH: 0
SORE THROAT: 0
CHANGE IN BOWEL HABIT: 0
SPUTUM PRODUCTION: 0
VISUAL CHANGE: 0
CHILLS: 0
ABDOMINAL PAIN: 0
SHORTNESS OF BREATH: 0
FATIGUE: 0

## 2018-02-21 ASSESSMENT — PAIN SCALES - GENERAL: PAINLEVEL: NO PAIN

## 2018-02-21 NOTE — LETTER
February 21, 2018         Patient: Serenity Howe   YOB: 1950   Date of Visit: 2/21/2018           To Whom it May Concern:    Serenity Howe was seen in my clinic on 2/21/2018. Please excuse her absence today, 2/21/18.    If you have any questions or concerns, please don't hesitate to call.        Sincerely,           Geri Kapoor P.A.-C.  Electronically Signed

## 2018-02-21 NOTE — PROGRESS NOTES
"Subjective:      Serenity Howe is a 67 y.o. female who presents with Dizziness (intermittent, x 3 weeks)            Dizziness   This is a recurrent problem. The current episode started 1 to 4 weeks ago (3 weeks). The problem occurs intermittently. The problem has been waxing and waning. Pertinent negatives include no abdominal pain, change in bowel habit, chest pain, chills, congestion, coughing, fatigue, fever, headaches, nausea, rash, sore throat, urinary symptoms, visual change, vomiting or weakness. Exacerbated by: movement. She has tried nothing for the symptoms.     Patient presents to urgent care reporting a 3 week history of intermittent dizziness, usually associated with movement. She was recently treated in the hospital for pneumonia and has finished all antibiotics and states she is feeling better. Her symptoms started about 1 week after leaving the hospital. She denies any fevers, chills, body aches, chest pain, palpitations, SOB, change of vision, headaches, abdominal pain, or episodes of syncope or near syncope. Patient denies history of cardiac disease. She had an EKG and echo performed while in the hospital, results were reviewed and no significant abnormalities were identified.     Review of Systems   Constitutional: Negative for chills, fatigue and fever.   HENT: Negative for congestion, ear pain and sore throat.    Respiratory: Negative for cough, sputum production and shortness of breath.    Cardiovascular: Negative for chest pain.   Gastrointestinal: Negative for abdominal pain, change in bowel habit, diarrhea, nausea and vomiting.   Genitourinary: Negative.    Musculoskeletal: Negative.    Skin: Negative for rash.   Neurological: Positive for dizziness. Negative for loss of consciousness, weakness and headaches.        Objective:     /92   Pulse 85   Temp 36.5 °C (97.7 °F)   Resp 16   Ht 1.575 m (5' 2\")   Wt 77.1 kg (170 lb)   SpO2 95%   BMI 31.09 kg/m²      Physical Exam "   Constitutional: She is oriented to person, place, and time. She appears well-developed and well-nourished. No distress.   HENT:   Head: Normocephalic and atraumatic.   Right Ear: Hearing, tympanic membrane, external ear and ear canal normal.   Left Ear: Hearing, tympanic membrane, external ear and ear canal normal.   Mouth/Throat: Oropharynx is clear and moist. No oropharyngeal exudate, posterior oropharyngeal edema or posterior oropharyngeal erythema.   Eyes: Conjunctivae, EOM and lids are normal. Pupils are equal, round, and reactive to light. Lids are everted and swept, no foreign bodies found. Right eye exhibits no discharge. Left eye exhibits no discharge.   Neck: Normal range of motion.   Cardiovascular: Normal rate, regular rhythm and normal heart sounds.    No murmur heard.  Orthostatic blood pressure readings:   Supine: 142/82  Sittin/90  Standin/92   Pulmonary/Chest: Effort normal and breath sounds normal. No respiratory distress. She has no wheezes. She has no rales.   Musculoskeletal: Normal range of motion.   Lymphadenopathy:     She has no cervical adenopathy.   Neurological: She is alert and oriented to person, place, and time.   Skin: Skin is warm and dry. She is not diaphoretic.   Psychiatric: She has a normal mood and affect. Her behavior is normal.   Nursing note and vitals reviewed.         PMH:  has a past medical history of ASTHMA; Breast cancer (CMS-[facility]); History of breast cancer (2015); Hypertension; Shoulder pain, right (2015); and Tremor (2015). She also has no past medical history of COPD.  MEDS:   Current Outpatient Prescriptions:   •  meclizine (ANTIVERT) 25 MG Tab, Take 1 Tab by mouth 3 times a day as needed., Disp: 30 Tab, Rfl: 0  •  ipratropium-albuterol (DUONEB) 0.5-2.5 (3) MG/3ML nebulizer solution, 3 mL by Nebulization route every four hours as needed for Shortness of Breath., Disp: 30 Bullet, Rfl: 0  •  ADVAIR DISKUS 250-50 MCG/DOSE AEROSOL POWDER,  BREATH ACTIVATED, INHALE 1 PUFF BY MOUTH 2 TIMES A DAY. INHALE 1 DOSE BY MOUTH TWICE DAILY. RINSE MOUTH AFTER USE, Disp: 1 Inhaler, Rfl: 6  •  albuterol (PROVENTIL) 2.5mg/3ml Nebu Soln solution for nebulization, 3 mL by Nebulization route every four hours as needed for Shortness of Breath., Disp: 75 mL, Rfl: 11  •  albuterol 108 (90 Base) MCG/ACT Aero Soln inhalation aerosol, Inhale 1-2 Puffs by mouth every 6 hours as needed for Shortness of Breath., Disp: 1 Inhaler, Rfl: 3  •  valsartan-hydrochlorothiazide (DIOVAN-HCT) 80-12.5 MG per tablet, Take 1 Tab by mouth every day., Disp: 30 Tab, Rfl: 9  •  omeprazole (PRILOSEC) 20 MG delayed-release capsule, Take 1 Cap by mouth every day., Disp: 30 Cap, Rfl: 11  •  Calcium Carbonate-Vit D-Min (CALTRATE PLUS PO), Take 1 Tab by mouth every day., Disp: , Rfl:   •  aspirin (ASA) 81 MG CHEW, Take 81 mg by mouth every day., Disp: , Rfl:   •   verapamil ER (CALAN SR) 120 MG CAPSULE SR 24 HR, Take 1 Cap by mouth every day., Disp: 90 Cap, Rfl: 3  •  fluticasone (FLONASE) 50 MCG/ACT nasal spray, Spray 2 Sprays in nose every day., Disp: 16 g, Rfl: 0  •  guaifenesin LA (MUCINEX) 600 MG TABLET SR 12 HR, Take 1 Tab by mouth every 12 hours., Disp: 28 Tab, Rfl: 0  •  predniSONE (DELTASONE) 10 MG Tab, 20 mg daily for 3 days than 10 mg daily for 3 days than 5 mg daily for 3 days than stop, Disp: 10.5 Tab, Rfl: 0  ALLERGIES:   Allergies   Allergen Reactions   • Nkda [No Known Drug Allergy]      SURGHX:   Past Surgical History:   Procedure Laterality Date   • GYN SURGERY      cs   • MASTECTOMY      right      SOCHX:  reports that she has never smoked. She has never used smokeless tobacco. She reports that she does not drink alcohol or use drugs.  FH: family history includes Hyperlipidemia in her mother.       Assessment/Plan:     1. Vertigo  - POCT Glucose --> 95 (non-fasting)  - CBC WITH DIFFERENTIAL; Future  - COMP METABOLIC PANEL; Future  - meclizine (ANTIVERT) 25 MG Tab; Take 1 Tab by  mouth 3 times a day as needed.  Dispense: 30 Tab; Refill: 0    Trial of meclizine given at today's visit and get blood work, pending results. Encouraged patient to follow up with her PCP if symptoms persist/worsen over the next 1-2 weeks. The patient demonstrated a good understanding and agreed with the treatment plan.

## 2018-02-22 ENCOUNTER — TELEPHONE (OUTPATIENT)
Dept: URGENT CARE | Facility: CLINIC | Age: 68
End: 2018-02-22

## 2018-02-23 NOTE — TELEPHONE ENCOUNTER
Spoke to patient and informed her of normal blood work results. She states she is feeling better and appreciates the phone call.

## 2018-03-03 ENCOUNTER — APPOINTMENT (OUTPATIENT)
Dept: RADIOLOGY | Facility: MEDICAL CENTER | Age: 68
DRG: 190 | End: 2018-03-03
Attending: EMERGENCY MEDICINE
Payer: COMMERCIAL

## 2018-03-03 ENCOUNTER — HOSPITAL ENCOUNTER (INPATIENT)
Facility: MEDICAL CENTER | Age: 68
LOS: 7 days | DRG: 190 | End: 2018-03-10
Attending: EMERGENCY MEDICINE | Admitting: INTERNAL MEDICINE
Payer: COMMERCIAL

## 2018-03-03 ENCOUNTER — RESOLUTE PROFESSIONAL BILLING HOSPITAL PROF FEE (OUTPATIENT)
Dept: HOSPITALIST | Facility: MEDICAL CENTER | Age: 68
End: 2018-03-03
Payer: COMMERCIAL

## 2018-03-03 LAB
ALBUMIN SERPL BCP-MCNC: 3.8 G/DL (ref 3.2–4.9)
ALBUMIN/GLOB SERPL: 1 G/DL
ALP SERPL-CCNC: 55 U/L (ref 30–99)
ALT SERPL-CCNC: 16 U/L (ref 2–50)
ANION GAP SERPL CALC-SCNC: 13 MMOL/L (ref 0–11.9)
APPEARANCE UR: CLEAR
APTT PPP: 31.6 SEC (ref 24.7–36)
AST SERPL-CCNC: 24 U/L (ref 12–45)
BACTERIA #/AREA URNS HPF: NEGATIVE /HPF
BASOPHILS # BLD AUTO: 0.5 % (ref 0–1.8)
BASOPHILS # BLD: 0.05 K/UL (ref 0–0.12)
BILIRUB SERPL-MCNC: 0.4 MG/DL (ref 0.1–1.5)
BILIRUB UR QL STRIP.AUTO: NEGATIVE
BNP SERPL-MCNC: 109 PG/ML (ref 0–100)
BUN SERPL-MCNC: 12 MG/DL (ref 8–22)
CALCIUM SERPL-MCNC: 8.6 MG/DL (ref 8.5–10.5)
CHLORIDE SERPL-SCNC: 106 MMOL/L (ref 96–112)
CO2 SERPL-SCNC: 18 MMOL/L (ref 20–33)
COLOR UR: YELLOW
CREAT SERPL-MCNC: 0.78 MG/DL (ref 0.5–1.4)
EKG IMPRESSION: NORMAL
EOSINOPHIL # BLD AUTO: 0.04 K/UL (ref 0–0.51)
EOSINOPHIL NFR BLD: 0.4 % (ref 0–6.9)
EPI CELLS #/AREA URNS HPF: ABNORMAL /HPF
ERYTHROCYTE [DISTWIDTH] IN BLOOD BY AUTOMATED COUNT: 45.5 FL (ref 35.9–50)
FLUAV RNA SPEC QL NAA+PROBE: NEGATIVE
FLUBV RNA SPEC QL NAA+PROBE: NEGATIVE
GLOBULIN SER CALC-MCNC: 3.8 G/DL (ref 1.9–3.5)
GLUCOSE SERPL-MCNC: 86 MG/DL (ref 65–99)
GLUCOSE UR STRIP.AUTO-MCNC: NEGATIVE MG/DL
HCT VFR BLD AUTO: 43.7 % (ref 37–47)
HGB BLD-MCNC: 14.9 G/DL (ref 12–16)
HYALINE CASTS #/AREA URNS LPF: ABNORMAL /LPF
IMM GRANULOCYTES # BLD AUTO: 0.03 K/UL (ref 0–0.11)
IMM GRANULOCYTES NFR BLD AUTO: 0.3 % (ref 0–0.9)
INR PPP: 1.1 (ref 0.87–1.13)
KETONES UR STRIP.AUTO-MCNC: NEGATIVE MG/DL
LACTATE BLD-SCNC: 1.9 MMOL/L (ref 0.5–2)
LACTATE BLD-SCNC: 2.8 MMOL/L (ref 0.5–2)
LEUKOCYTE ESTERASE UR QL STRIP.AUTO: ABNORMAL
LYMPHOCYTES # BLD AUTO: 1.1 K/UL (ref 1–4.8)
LYMPHOCYTES NFR BLD: 10.5 % (ref 22–41)
MCH RBC QN AUTO: 32.8 PG (ref 27–33)
MCHC RBC AUTO-ENTMCNC: 34.1 G/DL (ref 33.6–35)
MCV RBC AUTO: 96.3 FL (ref 81.4–97.8)
MICRO URNS: ABNORMAL
MONOCYTES # BLD AUTO: 1.63 K/UL (ref 0–0.85)
MONOCYTES NFR BLD AUTO: 15.6 % (ref 0–13.4)
NEUTROPHILS # BLD AUTO: 7.62 K/UL (ref 2–7.15)
NEUTROPHILS NFR BLD: 72.7 % (ref 44–72)
NITRITE UR QL STRIP.AUTO: NEGATIVE
NRBC # BLD AUTO: 0 K/UL
NRBC BLD-RTO: 0 /100 WBC
PH UR STRIP.AUTO: 6.5 [PH]
PLATELET # BLD AUTO: 191 K/UL (ref 164–446)
PMV BLD AUTO: 9.6 FL (ref 9–12.9)
POTASSIUM SERPL-SCNC: 4.5 MMOL/L (ref 3.6–5.5)
PROCALCITONIN SERPL-MCNC: 0.09 NG/ML
PROT SERPL-MCNC: 7.6 G/DL (ref 6–8.2)
PROT UR QL STRIP: NEGATIVE MG/DL
PROTHROMBIN TIME: 13.9 SEC (ref 12–14.6)
RBC # BLD AUTO: 4.54 M/UL (ref 4.2–5.4)
RBC # URNS HPF: ABNORMAL /HPF
RBC UR QL AUTO: NEGATIVE
SODIUM SERPL-SCNC: 137 MMOL/L (ref 135–145)
SP GR UR STRIP.AUTO: 1.03
TROPONIN I SERPL-MCNC: <0.01 NG/ML (ref 0–0.04)
TSH SERPL DL<=0.005 MIU/L-ACNC: 0.6 UIU/ML (ref 0.38–5.33)
UROBILINOGEN UR STRIP.AUTO-MCNC: 0.2 MG/DL
WBC # BLD AUTO: 10.5 K/UL (ref 4.8–10.8)
WBC #/AREA URNS HPF: ABNORMAL /HPF

## 2018-03-03 PROCEDURE — 83880 ASSAY OF NATRIURETIC PEPTIDE: CPT

## 2018-03-03 PROCEDURE — 85025 COMPLETE CBC W/AUTO DIFF WBC: CPT

## 2018-03-03 PROCEDURE — 99285 EMERGENCY DEPT VISIT HI MDM: CPT

## 2018-03-03 PROCEDURE — 99223 1ST HOSP IP/OBS HIGH 75: CPT | Mod: AI | Performed by: INTERNAL MEDICINE

## 2018-03-03 PROCEDURE — 85730 THROMBOPLASTIN TIME PARTIAL: CPT

## 2018-03-03 PROCEDURE — 83605 ASSAY OF LACTIC ACID: CPT | Mod: 91

## 2018-03-03 PROCEDURE — 96367 TX/PROPH/DG ADDL SEQ IV INF: CPT

## 2018-03-03 PROCEDURE — 700111 HCHG RX REV CODE 636 W/ 250 OVERRIDE (IP): Performed by: INTERNAL MEDICINE

## 2018-03-03 PROCEDURE — 700105 HCHG RX REV CODE 258: Performed by: INTERNAL MEDICINE

## 2018-03-03 PROCEDURE — 84443 ASSAY THYROID STIM HORMONE: CPT

## 2018-03-03 PROCEDURE — 96361 HYDRATE IV INFUSION ADD-ON: CPT

## 2018-03-03 PROCEDURE — 80053 COMPREHEN METABOLIC PANEL: CPT

## 2018-03-03 PROCEDURE — 700117 HCHG RX CONTRAST REV CODE 255: Performed by: EMERGENCY MEDICINE

## 2018-03-03 PROCEDURE — 94760 N-INVAS EAR/PLS OXIMETRY 1: CPT

## 2018-03-03 PROCEDURE — 84484 ASSAY OF TROPONIN QUANT: CPT

## 2018-03-03 PROCEDURE — 700111 HCHG RX REV CODE 636 W/ 250 OVERRIDE (IP): Performed by: EMERGENCY MEDICINE

## 2018-03-03 PROCEDURE — 96375 TX/PRO/DX INJ NEW DRUG ADDON: CPT

## 2018-03-03 PROCEDURE — 87040 BLOOD CULTURE FOR BACTERIA: CPT

## 2018-03-03 PROCEDURE — 93005 ELECTROCARDIOGRAM TRACING: CPT | Performed by: EMERGENCY MEDICINE

## 2018-03-03 PROCEDURE — 700101 HCHG RX REV CODE 250: Performed by: EMERGENCY MEDICINE

## 2018-03-03 PROCEDURE — 87086 URINE CULTURE/COLONY COUNT: CPT

## 2018-03-03 PROCEDURE — 87502 INFLUENZA DNA AMP PROBE: CPT

## 2018-03-03 PROCEDURE — 71045 X-RAY EXAM CHEST 1 VIEW: CPT

## 2018-03-03 PROCEDURE — 96372 THER/PROPH/DIAG INJ SC/IM: CPT

## 2018-03-03 PROCEDURE — A9270 NON-COVERED ITEM OR SERVICE: HCPCS | Performed by: INTERNAL MEDICINE

## 2018-03-03 PROCEDURE — 700105 HCHG RX REV CODE 258: Performed by: EMERGENCY MEDICINE

## 2018-03-03 PROCEDURE — 81001 URINALYSIS AUTO W/SCOPE: CPT

## 2018-03-03 PROCEDURE — 71275 CT ANGIOGRAPHY CHEST: CPT

## 2018-03-03 PROCEDURE — 94640 AIRWAY INHALATION TREATMENT: CPT

## 2018-03-03 PROCEDURE — 700101 HCHG RX REV CODE 250: Performed by: INTERNAL MEDICINE

## 2018-03-03 PROCEDURE — 96365 THER/PROPH/DIAG IV INF INIT: CPT

## 2018-03-03 PROCEDURE — 770020 HCHG ROOM/CARE - TELE (206)

## 2018-03-03 PROCEDURE — 84145 PROCALCITONIN (PCT): CPT

## 2018-03-03 PROCEDURE — 96366 THER/PROPH/DIAG IV INF ADDON: CPT

## 2018-03-03 PROCEDURE — 85610 PROTHROMBIN TIME: CPT

## 2018-03-03 PROCEDURE — 700102 HCHG RX REV CODE 250 W/ 637 OVERRIDE(OP): Performed by: INTERNAL MEDICINE

## 2018-03-03 RX ORDER — IPRATROPIUM BROMIDE AND ALBUTEROL SULFATE 2.5; .5 MG/3ML; MG/3ML
3 SOLUTION RESPIRATORY (INHALATION)
Status: COMPLETED | OUTPATIENT
Start: 2018-03-03 | End: 2018-03-03

## 2018-03-03 RX ORDER — ACETAMINOPHEN 325 MG/1
650 TABLET ORAL EVERY 6 HOURS PRN
Status: DISCONTINUED | OUTPATIENT
Start: 2018-03-03 | End: 2018-03-10 | Stop reason: HOSPADM

## 2018-03-03 RX ORDER — BISACODYL 10 MG
10 SUPPOSITORY, RECTAL RECTAL
Status: DISCONTINUED | OUTPATIENT
Start: 2018-03-03 | End: 2018-03-10 | Stop reason: HOSPADM

## 2018-03-03 RX ORDER — SODIUM CHLORIDE 9 MG/ML
30 INJECTION, SOLUTION INTRAVENOUS
Status: DISCONTINUED | OUTPATIENT
Start: 2018-03-03 | End: 2018-03-10 | Stop reason: HOSPADM

## 2018-03-03 RX ORDER — AMOXICILLIN 250 MG
2 CAPSULE ORAL 2 TIMES DAILY
Status: DISCONTINUED | OUTPATIENT
Start: 2018-03-03 | End: 2018-03-10 | Stop reason: HOSPADM

## 2018-03-03 RX ORDER — SODIUM CHLORIDE 9 MG/ML
1000 INJECTION, SOLUTION INTRAVENOUS ONCE
Status: COMPLETED | OUTPATIENT
Start: 2018-03-03 | End: 2018-03-03

## 2018-03-03 RX ORDER — ONDANSETRON 2 MG/ML
4 INJECTION INTRAMUSCULAR; INTRAVENOUS EVERY 4 HOURS PRN
Status: DISCONTINUED | OUTPATIENT
Start: 2018-03-03 | End: 2018-03-10 | Stop reason: HOSPADM

## 2018-03-03 RX ORDER — MECLIZINE HYDROCHLORIDE 25 MG/1
25 TABLET ORAL 3 TIMES DAILY PRN
COMMUNITY
End: 2020-06-03

## 2018-03-03 RX ORDER — VERAPAMIL HYDROCHLORIDE 120 MG/1
120 CAPSULE, EXTENDED RELEASE ORAL EVERY EVENING
COMMUNITY
End: 2018-12-09

## 2018-03-03 RX ORDER — ONDANSETRON 4 MG/1
4 TABLET, ORALLY DISINTEGRATING ORAL EVERY 4 HOURS PRN
Status: DISCONTINUED | OUTPATIENT
Start: 2018-03-03 | End: 2018-03-10 | Stop reason: HOSPADM

## 2018-03-03 RX ORDER — LABETALOL HYDROCHLORIDE 5 MG/ML
10 INJECTION, SOLUTION INTRAVENOUS EVERY 4 HOURS PRN
Status: DISCONTINUED | OUTPATIENT
Start: 2018-03-03 | End: 2018-03-10 | Stop reason: HOSPADM

## 2018-03-03 RX ORDER — POLYETHYLENE GLYCOL 3350 17 G/17G
1 POWDER, FOR SOLUTION ORAL
Status: DISCONTINUED | OUTPATIENT
Start: 2018-03-03 | End: 2018-03-10 | Stop reason: HOSPADM

## 2018-03-03 RX ORDER — SODIUM CHLORIDE 9 MG/ML
1000 INJECTION, SOLUTION INTRAVENOUS
Status: DISCONTINUED | OUTPATIENT
Start: 2018-03-03 | End: 2018-03-10 | Stop reason: HOSPADM

## 2018-03-03 RX ORDER — SODIUM CHLORIDE 9 MG/ML
INJECTION, SOLUTION INTRAVENOUS CONTINUOUS
Status: DISCONTINUED | OUTPATIENT
Start: 2018-03-03 | End: 2018-03-04

## 2018-03-03 RX ORDER — GUAIFENESIN 600 MG/1
600 TABLET, EXTENDED RELEASE ORAL EVERY 12 HOURS
Status: DISCONTINUED | OUTPATIENT
Start: 2018-03-03 | End: 2018-03-10 | Stop reason: HOSPADM

## 2018-03-03 RX ADMIN — SODIUM CHLORIDE 1000 ML: 9 INJECTION, SOLUTION INTRAVENOUS at 14:45

## 2018-03-03 RX ADMIN — ACETAMINOPHEN 650 MG: 325 TABLET, FILM COATED ORAL at 22:42

## 2018-03-03 RX ADMIN — IOHEXOL 50 ML: 350 INJECTION, SOLUTION INTRAVENOUS at 16:47

## 2018-03-03 RX ADMIN — IPRATROPIUM BROMIDE AND ALBUTEROL SULFATE 3 ML: .5; 3 SOLUTION RESPIRATORY (INHALATION) at 15:01

## 2018-03-03 RX ADMIN — STANDARDIZED SENNA CONCENTRATE AND DOCUSATE SODIUM 2 TABLET: 8.6; 5 TABLET, FILM COATED ORAL at 21:25

## 2018-03-03 RX ADMIN — ENOXAPARIN SODIUM 40 MG: 100 INJECTION SUBCUTANEOUS at 21:27

## 2018-03-03 RX ADMIN — CEFEPIME 2 G: 2 INJECTION, POWDER, FOR SOLUTION INTRAMUSCULAR; INTRAVENOUS at 18:00

## 2018-03-03 RX ADMIN — LABETALOL HYDROCHLORIDE 10 MG: 5 INJECTION, SOLUTION INTRAVENOUS at 21:38

## 2018-03-03 RX ADMIN — VANCOMYCIN HYDROCHLORIDE 1900 MG: 100 INJECTION, POWDER, LYOPHILIZED, FOR SOLUTION INTRAVENOUS at 18:45

## 2018-03-03 RX ADMIN — GUAIFENESIN 600 MG: 600 TABLET, EXTENDED RELEASE ORAL at 22:42

## 2018-03-03 RX ADMIN — SODIUM CHLORIDE: 9 INJECTION, SOLUTION INTRAVENOUS at 21:26

## 2018-03-03 ASSESSMENT — PATIENT HEALTH QUESTIONNAIRE - PHQ9
1. LITTLE INTEREST OR PLEASURE IN DOING THINGS: NOT AT ALL
2. FEELING DOWN, DEPRESSED, IRRITABLE, OR HOPELESS: NOT AT ALL
SUM OF ALL RESPONSES TO PHQ QUESTIONS 1-9: 0
SUM OF ALL RESPONSES TO PHQ9 QUESTIONS 1 AND 2: 0

## 2018-03-03 ASSESSMENT — COGNITIVE AND FUNCTIONAL STATUS - GENERAL
CLIMB 3 TO 5 STEPS WITH RAILING: A LITTLE
WALKING IN HOSPITAL ROOM: A LITTLE
SUGGESTED CMS G CODE MODIFIER DAILY ACTIVITY: CJ
MOBILITY SCORE: 22
TOILETING: A LITTLE
SUGGESTED CMS G CODE MODIFIER MOBILITY: CJ
DAILY ACTIVITIY SCORE: 22
HELP NEEDED FOR BATHING: A LITTLE

## 2018-03-03 ASSESSMENT — PAIN SCALES - GENERAL: PAINLEVEL_OUTOF10: 0

## 2018-03-03 ASSESSMENT — LIFESTYLE VARIABLES
EVER_SMOKED: NEVER
ALCOHOL_USE: NO

## 2018-03-03 NOTE — FLOWSHEET NOTE
03/03/18 1502   Interdisciplinary Plan of Care-Goals (Indications)   Obstructive Ventilatory Defect or Pulmonary Disease without Obvious Obstruction History / Diagnosis   Interdisciplinary Plan of Care-Outcomes    Bronchodilator Outcome Diminished Wheezing and Volume of Air Movement Increased   Education   Education Yes - Pt. / Family has been Instructed in use of Respiratory Equipment   RT Assessment of Delivered Medications   Evaluation of Medication Delivery Daily Yes-- Pt /Family has been Instructed in use of Respiratory Medications and Adverse Reactions   SVN Group   #SVN Performed Yes   Given By: Mouthpiece   Date SVN Last Changed 03/03/18   Date SVN Next Change Due (Q 7 Days) 03/10/18   Chest Exam   Respiration 16   Pulse 98   Breath Sounds   RUL Breath Sounds Expiratory Wheezes   RML Breath Sounds Expiratory Wheezes   RLL Breath Sounds Expiratory Wheezes   GARRETT Breath Sounds Expiratory Wheezes   LLL Breath Sounds Expiratory Wheezes   Oxygen   Pulse Oximetry 95 %   O2 (LPM) 0   O2 (FiO2) 21   O2 Daily Delivery Respiratory  Room Air with O2 Available

## 2018-03-03 NOTE — ED NOTES
Med rec updated and complete.  Allergies reviewed . Met with pt at bedside and dicussed  Current medications and last doses taken.  Pt denies antibiotic use in last 30 days.  Removed al medications that pt is not currently taking.

## 2018-03-04 PROBLEM — J18.9 HCAP (HEALTHCARE-ASSOCIATED PNEUMONIA): Status: ACTIVE | Noted: 2018-03-04

## 2018-03-04 PROBLEM — I10 HYPERTENSION: Status: ACTIVE | Noted: 2018-03-04

## 2018-03-04 LAB
ANION GAP SERPL CALC-SCNC: 7 MMOL/L (ref 0–11.9)
BASOPHILS # BLD AUTO: 0.4 % (ref 0–1.8)
BASOPHILS # BLD: 0.03 K/UL (ref 0–0.12)
BUN SERPL-MCNC: 11 MG/DL (ref 8–22)
CALCIUM SERPL-MCNC: 8.6 MG/DL (ref 8.5–10.5)
CHLORIDE SERPL-SCNC: 106 MMOL/L (ref 96–112)
CHOLEST SERPL-MCNC: 133 MG/DL (ref 100–199)
CO2 SERPL-SCNC: 22 MMOL/L (ref 20–33)
CREAT SERPL-MCNC: 0.88 MG/DL (ref 0.5–1.4)
EOSINOPHIL # BLD AUTO: 0.21 K/UL (ref 0–0.51)
EOSINOPHIL NFR BLD: 2.6 % (ref 0–6.9)
ERYTHROCYTE [DISTWIDTH] IN BLOOD BY AUTOMATED COUNT: 46.6 FL (ref 35.9–50)
GLUCOSE SERPL-MCNC: 104 MG/DL (ref 65–99)
GRAM STN SPEC: NORMAL
HCT VFR BLD AUTO: 40.8 % (ref 37–47)
HDLC SERPL-MCNC: 48 MG/DL
HGB BLD-MCNC: 13.9 G/DL (ref 12–16)
IMM GRANULOCYTES # BLD AUTO: 0.03 K/UL (ref 0–0.11)
IMM GRANULOCYTES NFR BLD AUTO: 0.4 % (ref 0–0.9)
LDLC SERPL CALC-MCNC: 70 MG/DL
LYMPHOCYTES # BLD AUTO: 1.39 K/UL (ref 1–4.8)
LYMPHOCYTES NFR BLD: 17.4 % (ref 22–41)
MCH RBC QN AUTO: 33 PG (ref 27–33)
MCHC RBC AUTO-ENTMCNC: 34.1 G/DL (ref 33.6–35)
MCV RBC AUTO: 96.9 FL (ref 81.4–97.8)
MONOCYTES # BLD AUTO: 0.96 K/UL (ref 0–0.85)
MONOCYTES NFR BLD AUTO: 12 % (ref 0–13.4)
MRSA DNA SPEC QL NAA+PROBE: ABNORMAL
NEUTROPHILS # BLD AUTO: 5.38 K/UL (ref 2–7.15)
NEUTROPHILS NFR BLD: 67.2 % (ref 44–72)
NRBC # BLD AUTO: 0 K/UL
NRBC BLD-RTO: 0 /100 WBC
PLATELET # BLD AUTO: 198 K/UL (ref 164–446)
PMV BLD AUTO: 9.8 FL (ref 9–12.9)
POTASSIUM SERPL-SCNC: 3.2 MMOL/L (ref 3.6–5.5)
PROCALCITONIN SERPL-MCNC: 0.13 NG/ML
RBC # BLD AUTO: 4.21 M/UL (ref 4.2–5.4)
SIGNIFICANT IND 70042: ABNORMAL
SIGNIFICANT IND 70042: NORMAL
SITE SITE: ABNORMAL
SITE SITE: NORMAL
SODIUM SERPL-SCNC: 135 MMOL/L (ref 135–145)
SOURCE SOURCE: ABNORMAL
SOURCE SOURCE: NORMAL
TRIGL SERPL-MCNC: 77 MG/DL (ref 0–149)
WBC # BLD AUTO: 8 K/UL (ref 4.8–10.8)

## 2018-03-04 PROCEDURE — 80061 LIPID PANEL: CPT

## 2018-03-04 PROCEDURE — 700101 HCHG RX REV CODE 250: Performed by: STUDENT IN AN ORGANIZED HEALTH CARE EDUCATION/TRAINING PROGRAM

## 2018-03-04 PROCEDURE — 99232 SBSQ HOSP IP/OBS MODERATE 35: CPT | Performed by: FAMILY MEDICINE

## 2018-03-04 PROCEDURE — 80048 BASIC METABOLIC PNL TOTAL CA: CPT

## 2018-03-04 PROCEDURE — 700102 HCHG RX REV CODE 250 W/ 637 OVERRIDE(OP): Performed by: FAMILY MEDICINE

## 2018-03-04 PROCEDURE — 84145 PROCALCITONIN (PCT): CPT

## 2018-03-04 PROCEDURE — 94640 AIRWAY INHALATION TREATMENT: CPT

## 2018-03-04 PROCEDURE — 700101 HCHG RX REV CODE 250: Performed by: INTERNAL MEDICINE

## 2018-03-04 PROCEDURE — 85025 COMPLETE CBC W/AUTO DIFF WBC: CPT

## 2018-03-04 PROCEDURE — 700102 HCHG RX REV CODE 250 W/ 637 OVERRIDE(OP): Performed by: INTERNAL MEDICINE

## 2018-03-04 PROCEDURE — 770020 HCHG ROOM/CARE - TELE (206)

## 2018-03-04 PROCEDURE — 87205 SMEAR GRAM STAIN: CPT

## 2018-03-04 PROCEDURE — A9270 NON-COVERED ITEM OR SERVICE: HCPCS | Performed by: FAMILY MEDICINE

## 2018-03-04 PROCEDURE — 36415 COLL VENOUS BLD VENIPUNCTURE: CPT

## 2018-03-04 PROCEDURE — 700111 HCHG RX REV CODE 636 W/ 250 OVERRIDE (IP): Performed by: INTERNAL MEDICINE

## 2018-03-04 PROCEDURE — 700105 HCHG RX REV CODE 258: Performed by: INTERNAL MEDICINE

## 2018-03-04 PROCEDURE — A9270 NON-COVERED ITEM OR SERVICE: HCPCS | Performed by: INTERNAL MEDICINE

## 2018-03-04 PROCEDURE — 87070 CULTURE OTHR SPECIMN AEROBIC: CPT

## 2018-03-04 PROCEDURE — 87641 MR-STAPH DNA AMP PROBE: CPT

## 2018-03-04 PROCEDURE — 94760 N-INVAS EAR/PLS OXIMETRY 1: CPT

## 2018-03-04 RX ORDER — VERAPAMIL HYDROCHLORIDE 120 MG/1
120 CAPSULE, EXTENDED RELEASE ORAL EVERY EVENING
Status: DISCONTINUED | OUTPATIENT
Start: 2018-03-04 | End: 2018-03-10 | Stop reason: HOSPADM

## 2018-03-04 RX ORDER — VALSARTAN 80 MG/1
80 TABLET ORAL
Status: DISCONTINUED | OUTPATIENT
Start: 2018-03-04 | End: 2018-03-10 | Stop reason: HOSPADM

## 2018-03-04 RX ORDER — ASPIRIN 81 MG/1
81 TABLET, CHEWABLE ORAL DAILY
Status: DISCONTINUED | OUTPATIENT
Start: 2018-03-04 | End: 2018-03-10 | Stop reason: HOSPADM

## 2018-03-04 RX ORDER — IPRATROPIUM BROMIDE AND ALBUTEROL SULFATE 2.5; .5 MG/3ML; MG/3ML
3 SOLUTION RESPIRATORY (INHALATION)
Status: DISCONTINUED | OUTPATIENT
Start: 2018-03-04 | End: 2018-03-05

## 2018-03-04 RX ORDER — HYDRALAZINE HYDROCHLORIDE 20 MG/ML
20 INJECTION INTRAMUSCULAR; INTRAVENOUS EVERY 6 HOURS PRN
Status: DISCONTINUED | OUTPATIENT
Start: 2018-03-04 | End: 2018-03-10 | Stop reason: HOSPADM

## 2018-03-04 RX ORDER — LABETALOL HYDROCHLORIDE 5 MG/ML
10 INJECTION, SOLUTION INTRAVENOUS ONCE
Status: COMPLETED | OUTPATIENT
Start: 2018-03-04 | End: 2018-03-04

## 2018-03-04 RX ORDER — BUDESONIDE AND FORMOTEROL FUMARATE DIHYDRATE 160; 4.5 UG/1; UG/1
2 AEROSOL RESPIRATORY (INHALATION)
Status: DISCONTINUED | OUTPATIENT
Start: 2018-03-04 | End: 2018-03-10 | Stop reason: HOSPADM

## 2018-03-04 RX ORDER — HYDROCHLOROTHIAZIDE 25 MG/1
12.5 TABLET ORAL
Status: DISCONTINUED | OUTPATIENT
Start: 2018-03-04 | End: 2018-03-04

## 2018-03-04 RX ORDER — POTASSIUM CHLORIDE 20 MEQ/1
40 TABLET, EXTENDED RELEASE ORAL ONCE
Status: COMPLETED | OUTPATIENT
Start: 2018-03-04 | End: 2018-03-04

## 2018-03-04 RX ORDER — VALSARTAN AND HYDROCHLOROTHIAZIDE 80; 12.5 MG/1; MG/1
1 TABLET, FILM COATED ORAL
Status: DISCONTINUED | OUTPATIENT
Start: 2018-03-04 | End: 2018-03-04

## 2018-03-04 RX ADMIN — CEFEPIME 2 G: 2 INJECTION, POWDER, FOR SOLUTION INTRAMUSCULAR; INTRAVENOUS at 20:31

## 2018-03-04 RX ADMIN — IPRATROPIUM BROMIDE AND ALBUTEROL SULFATE 3 ML: .5; 3 SOLUTION RESPIRATORY (INHALATION) at 18:49

## 2018-03-04 RX ADMIN — HYDROCHLOROTHIAZIDE 12.5 MG: 25 TABLET ORAL at 08:53

## 2018-03-04 RX ADMIN — BUDESONIDE AND FORMOTEROL FUMARATE DIHYDRATE 2 PUFF: 160; 4.5 AEROSOL RESPIRATORY (INHALATION) at 08:41

## 2018-03-04 RX ADMIN — CEFEPIME 2 G: 2 INJECTION, POWDER, FOR SOLUTION INTRAMUSCULAR; INTRAVENOUS at 12:51

## 2018-03-04 RX ADMIN — VERAPAMIL HYDROCHLORIDE 120 MG: 120 CAPSULE, DELAYED RELEASE PELLETS ORAL at 01:17

## 2018-03-04 RX ADMIN — GUAIFENESIN 600 MG: 600 TABLET, EXTENDED RELEASE ORAL at 20:15

## 2018-03-04 RX ADMIN — IPRATROPIUM BROMIDE AND ALBUTEROL SULFATE 3 ML: .5; 3 SOLUTION RESPIRATORY (INHALATION) at 15:13

## 2018-03-04 RX ADMIN — ACETAMINOPHEN 650 MG: 325 TABLET, FILM COATED ORAL at 20:25

## 2018-03-04 RX ADMIN — POTASSIUM CHLORIDE 40 MEQ: 1500 TABLET, EXTENDED RELEASE ORAL at 12:51

## 2018-03-04 RX ADMIN — GUAIFENESIN 600 MG: 600 TABLET, EXTENDED RELEASE ORAL at 08:53

## 2018-03-04 RX ADMIN — VANCOMYCIN HYDROCHLORIDE 1600 MG: 100 INJECTION, POWDER, LYOPHILIZED, FOR SOLUTION INTRAVENOUS at 18:16

## 2018-03-04 RX ADMIN — VALSARTAN 80 MG: 80 TABLET ORAL at 08:52

## 2018-03-04 RX ADMIN — SODIUM CHLORIDE: 9 INJECTION, SOLUTION INTRAVENOUS at 08:59

## 2018-03-04 RX ADMIN — ENOXAPARIN SODIUM 40 MG: 100 INJECTION SUBCUTANEOUS at 08:55

## 2018-03-04 RX ADMIN — CEFEPIME 2 G: 2 INJECTION, POWDER, FOR SOLUTION INTRAMUSCULAR; INTRAVENOUS at 02:56

## 2018-03-04 RX ADMIN — IPRATROPIUM BROMIDE AND ALBUTEROL SULFATE 3 ML: .5; 3 SOLUTION RESPIRATORY (INHALATION) at 08:39

## 2018-03-04 RX ADMIN — LABETALOL HYDROCHLORIDE 10 MG: 5 INJECTION, SOLUTION INTRAVENOUS at 00:16

## 2018-03-04 RX ADMIN — IPRATROPIUM BROMIDE AND ALBUTEROL SULFATE 3 ML: .5; 3 SOLUTION RESPIRATORY (INHALATION) at 23:10

## 2018-03-04 RX ADMIN — BUDESONIDE AND FORMOTEROL FUMARATE DIHYDRATE 2 PUFF: 160; 4.5 AEROSOL RESPIRATORY (INHALATION) at 18:49

## 2018-03-04 RX ADMIN — IPRATROPIUM BROMIDE AND ALBUTEROL SULFATE 3 ML: .5; 3 SOLUTION RESPIRATORY (INHALATION) at 03:02

## 2018-03-04 RX ADMIN — ASPIRIN 81 MG: 81 TABLET, CHEWABLE ORAL at 08:53

## 2018-03-04 RX ADMIN — VERAPAMIL HYDROCHLORIDE 120 MG: 120 CAPSULE, DELAYED RELEASE PELLETS ORAL at 20:15

## 2018-03-04 RX ADMIN — IPRATROPIUM BROMIDE AND ALBUTEROL SULFATE 3 ML: .5; 3 SOLUTION RESPIRATORY (INHALATION) at 11:14

## 2018-03-04 ASSESSMENT — PATIENT HEALTH QUESTIONNAIRE - PHQ9
2. FEELING DOWN, DEPRESSED, IRRITABLE, OR HOPELESS: NOT AT ALL
1. LITTLE INTEREST OR PLEASURE IN DOING THINGS: NOT AT ALL
SUM OF ALL RESPONSES TO PHQ9 QUESTIONS 1 AND 2: 0
SUM OF ALL RESPONSES TO PHQ QUESTIONS 1-9: 0

## 2018-03-04 ASSESSMENT — ENCOUNTER SYMPTOMS
NAUSEA: 0
WEAKNESS: 1
DOUBLE VISION: 0
BACK PAIN: 0
FEVER: 1
NECK PAIN: 0
CLAUDICATION: 0
SPUTUM PRODUCTION: 1
TINGLING: 0
PHOTOPHOBIA: 0
COUGH: 1
CHILLS: 1
HEADACHES: 0
BLURRED VISION: 0
SHORTNESS OF BREATH: 1
ABDOMINAL PAIN: 0
VOMITING: 0
TREMORS: 0
DIAPHORESIS: 0

## 2018-03-04 ASSESSMENT — PAIN SCALES - GENERAL
PAINLEVEL_OUTOF10: 0

## 2018-03-04 ASSESSMENT — COPD QUESTIONNAIRES
DO YOU EVER COUGH UP ANY MUCUS OR PHLEGM?: NO/ONLY WITH OCCASIONAL COLDS OR INFECTIONS
HAVE YOU SMOKED AT LEAST 100 CIGARETTES IN YOUR ENTIRE LIFE: NO/DON'T KNOW
COPD SCREENING SCORE: 4
DURING THE PAST 4 WEEKS HOW MUCH DID YOU FEEL SHORT OF BREATH: SOME OF THE TIME

## 2018-03-04 ASSESSMENT — LIFESTYLE VARIABLES
DO YOU DRINK ALCOHOL: NO
EVER_SMOKED: NEVER

## 2018-03-04 NOTE — PROGRESS NOTES
[facility] Progress Note    Date of Service: 3/4/2018    Chief Complaint  67 y.o. female admitted 3/3/2018 with PNEUMONIA AND ASTHMA    Interval Problem Update  NONE    Consultants/Specialty  NONE    Disposition  PENDING        Review of Systems   Constitutional: Positive for malaise/fatigue. Negative for diaphoresis.   HENT: Negative for ear pain, hearing loss and tinnitus.    Eyes: Negative for blurred vision, double vision and photophobia.   Respiratory: Positive for cough, sputum production and shortness of breath.    Cardiovascular: Negative for chest pain and claudication.   Gastrointestinal: Negative for abdominal pain, nausea and vomiting.   Genitourinary: Negative for dysuria, frequency and urgency.   Musculoskeletal: Negative for back pain and neck pain.   Skin: Negative for itching and rash.   Neurological: Positive for weakness. Negative for tingling, tremors and headaches.      Physical Exam  Laboratory/Imaging   Hemodynamics  Temp (24hrs), Av.6 °C (99.6 °F), Min:36.8 °C (98.3 °F), Max:38.4 °C (101.1 °F)   Temperature: 37.1 °C (98.7 °F)  Pulse  Av.9  Min: 78  Max: 107    Blood Pressure : 144/77, NIBP: 156/71      Respiratory      Respiration: 20, Pulse Oximetry: 97 %, O2 Daily Delivery Respiratory : Silicone Nasal Cannula     Given By:: Mouthpiece, #MDI/DPI Given: MDI/DPI x 1, Work Of Breathing / Effort: Mild  RUL Breath Sounds: Diminished, RML Breath Sounds: Diminished, RLL Breath Sounds: Diminished;Expiratory Wheezes, GARRETT Breath Sounds: Diminished, LLL Breath Sounds: Diminished;Expiratory Wheezes    Fluids  No intake or output data in the 24 hours ending 18 1122    Nutrition  Orders Placed This Encounter   Procedures   • Diet Order     Standing Status:   Standing     Number of Occurrences:   1     Order Specific Question:   Diet:     Answer:   Regular [1]     Physical Exam   Constitutional: She is oriented to person, place, and time. No distress.   HENT:   Head: Normocephalic  and atraumatic.   Eyes: Right eye exhibits no discharge.   Neck: Neck supple. No JVD present.   Cardiovascular: Normal rate and regular rhythm.    Pulmonary/Chest: No stridor. She has wheezes. She has rales.   Abdominal: She exhibits no distension. There is no tenderness. There is no rebound.   Musculoskeletal: She exhibits no edema or tenderness.   Neurological: She is alert and oriented to person, place, and time.   Skin: Skin is warm and dry. She is not diaphoretic.       Recent Labs      03/03/18   1441  03/04/18   0311   WBC  10.5  8.0   RBC  4.54  4.21   HEMOGLOBIN  14.9  13.9   HEMATOCRIT  43.7  40.8   MCV  96.3  96.9   MCH  32.8  33.0   MCHC  34.1  34.1   RDW  45.5  46.6   PLATELETCT  191  198   MPV  9.6  9.8     Recent Labs      03/03/18   1441  03/04/18   0311   SODIUM  137  135   POTASSIUM  4.5  3.2*   CHLORIDE  106  106   CO2  18*  22   GLUCOSE  86  104*   BUN  12  11   CREATININE  0.78  0.88   CALCIUM  8.6  8.6     Recent Labs      03/03/18   2150   APTT  31.6   INR  1.10     Recent Labs      03/03/18   1441   BNPBTYPENAT  109*     Recent Labs      03/04/18   0311   TRIGLYCERIDE  77   HDL  48   LDL  70          Assessment/Plan     HCAP (healthcare-associated pneumonia)- (present on admission)   Assessment & Plan    PT HAS BEEN IN THE HOSPITAL RECENTLY  VANCOMYCIN AS PER PHARMACY  CEFEPIME  CTA RULES OUT PE  PT NEEDS REPEAT CT OF THE CHEST AS AN OUTPT WITH THIS CT FINDINGS        Mild persistent asthma with acute exacerbation- (present on admission)   Assessment & Plan    PREDNISONE  RESP PROTOCOL  DUONEB  SYMBICORT          Hypertension   Assessment & Plan    VERAPAMIL  VALSARTAN  IV HYDRALAZINE PRN        Malignant neoplasm of upper-outer quadrant of right female breast (CMS-HCC)- (present on admission)   Assessment & Plan    Status post right mastectomy and chemotherapy  Follow-up with oncology          Quality-Core Measures   DVT prophylaxis pharmacological::  Enoxaparin (Lovenox)

## 2018-03-04 NOTE — PROGRESS NOTES
2 RN skin check completed with Sara RN    Patient bilateral ears pink and blanching. Patient wears glasses. Silicone oxygen tubing in place.   Bilateral feet/heels dry and flaky.   Overall skin condition is dry.   All other skin is intact.

## 2018-03-04 NOTE — PROGRESS NOTES
"Pharmacy Kinetics 67 y.o. female on vancomycin day # 1 3/3/2018    Patient received Vancomycin 1,900 mg IV x 1 loading dose in the ER    Indication for Treatment: Pneumonia (HCAP)    Pertinent history per medical record: Admitted on 3/3/2018 for pneumonia/sepsis.    Mrs. Howe is a 66 y/o female who presented to the [facility] ER with productive cough and SOB for two days. She was discharged from [facility] on [address] after a two-day admission for bronchitis (was discharged on levofloxacin PO). The patient has a PMHx of breast cancer s/p R mastectomy, asthma, and HTN. Her chest CT showed \"Lungs show bilateral dependent atelectasis\". On physical exam, the patient had crackles and mild end expiratory wheezing.     Other antibiotics: Cefepime 2 g IV every 8 hours     Allergies: Nkda [no known drug allergy]     List concerns for renal function: BMI: 31.25 kg/m 2, age    Pertinent cultures to date:   3/3/18 Blood culture x 2 - in process   3/3/18 Urine culture - in process   3/3/18 Respiratory culture with gram stain - in process     Recent Labs      18   1441   WBC  10.5   NEUTSPOLYS  72.70*     Recent Labs      18   1441   BUN  12   CREATININE  0.78   ALBUMIN  3.8      3/3/2018 16:04   Procalcitonin 0.09      3/3/2018 16:04   Lactic Acid 1.9     Blood pressure 156/84, pulse 86, temperature (!) 38.4 °C (101.1 °F), resp. rate 18, height 1.575 m (5' 2\"), weight 77.5 kg (170 lb 13.7 oz), SpO2 96 %, not currently breastfeeding. Temp (24hrs), Av.8 °C (100 °F), Min:36.8 °C (98.3 °F), Max:38.4 °C (101.1 °F)    A/P   1. Vancomycin dose change: 1,600 mg IV Q24h (~20 mg/kg) (1800)  2. Next vancomycin level: Recommend to obtain a trough prior to the 3rd total dose (level not yet ordered)  3. Goal trough: 16-20 mcg/mL  4. Comments: Patient's renal function currently appears to be at baseline (CrCl ~ 66 mL/min). Will schedule patient on vancomycin maintenance regimen of ~20 mg/kg daily. Will obtain trough level at steady " state. Consider trending procalcitonin levels. Pharmacy will continue to follow and recommend abx de-escalation as appropriate.      Angela Gauthier, PharmD, BCPS

## 2018-03-04 NOTE — RESPIRATORY CARE
COPD EDUCATION by COPD CLINICAL EDUCATOR  3/4/2018 at 7:46 AM by Jessica Floyd     Patient reviewed by COPD education team. Patient does not qualify for COPD program.

## 2018-03-04 NOTE — PROGRESS NOTES
Notified Dr. Maria of patient's elevated blood pressure. Orders received for another one time dose of labetalol 10mg.

## 2018-03-04 NOTE — ASSESSMENT & PLAN NOTE
Symptoms much improved.  Continue treatment with cefepime  Nasal swab positive for MRSA, likely colonization.  No need to treat.  Continue RT protocol, BT, and supplemental o2.

## 2018-03-04 NOTE — PROGRESS NOTES
Phone report received from ED RN April. Patient's chart and MAR reviewed. Patient is awake in bed. Patient is AOx4. Patient was updated on plan of care for the shift. Patient denies any additional needs at this time. White board updated. Call light and personal belongings within reach, bed in lowest position.

## 2018-03-04 NOTE — H&P
Hospital Medicine History and Physical    Date of Service  3/3/2018    Chief Complaint  Chief Complaint   Patient presents with   • Shortness of Breath   • Cough       History of Presenting Illness  67 y.o. female with a past medical history of breast cancer status post right mastectomy and chemotherapy, asthma, hypertension who presented 3/3/2018 with shortness of breath and cough for the past 3 days. Patient reports a productive cough with green phlegm. She has been using her inhalers without relief. She also endorses fevers and chills. She denies any chest pain, leg swelling, nausea, vomiting, abdominal pain or diarrhea. She was hospitalized in January for pneumonia.      Primary Care Physician  Everett Johnson M.D.    Consultants  none    Code Status  Full code    Review of Systems  Review of Systems   Constitutional: Positive for chills, fever and malaise/fatigue.   Respiratory: Positive for cough, sputum production and shortness of breath.    Cardiovascular: Negative for chest pain and leg swelling.   Gastrointestinal: Negative for abdominal pain and nausea.   Genitourinary: Negative for dysuria.   All other systems reviewed and are negative.       Past Medical History  Past Medical History:   Diagnosis Date   • Shoulder pain, right 7/29/2015   • Tremor 7/29/2015   • History of breast cancer 7/29/2015   • ASTHMA    • Breast cancer (CMS-[facility], located [facility])    • Hypertension        Surgical History  Past Surgical History:   Procedure Laterality Date   • GYN SURGERY      cs   • MASTECTOMY      right        Medications  No current facility-administered medications on file prior to encounter.      Current Outpatient Prescriptions on File Prior to Encounter   Medication Sig Dispense Refill   • guaifenesin LA (MUCINEX) 600 MG TABLET SR 12 HR Take 1 Tab by mouth every 12 hours. 28 Tab 0   • ADVAIR DISKUS 250-50 MCG/DOSE AEROSOL POWDER, BREATH ACTIVATED INHALE 1 PUFF BY MOUTH 2 TIMES A DAY. INHALE 1 DOSE BY MOUTH TWICE DAILY. RINSE  MOUTH AFTER USE 1 Inhaler 6   • albuterol (PROVENTIL) 2.5mg/3ml Nebu Soln solution for nebulization 3 mL by Nebulization route every four hours as needed for Shortness of Breath. 75 mL 11   • albuterol 108 (90 Base) MCG/ACT Aero Soln inhalation aerosol Inhale 1-2 Puffs by mouth every 6 hours as needed for Shortness of Breath. 1 Inhaler 3   • valsartan-hydrochlorothiazide (DIOVAN-HCT) 80-12.5 MG per tablet Take 1 Tab by mouth every day. 30 Tab 9   • Calcium Carbonate-Vit D-Min (CALTRATE PLUS PO) Take 1 Tab by mouth every day.     • aspirin (ASA) 81 MG CHEW Take 81 mg by mouth every day.         Family History  Family History   Problem Relation Age of Onset   • Hyperlipidemia Mother        Social History  Social History   Substance Use Topics   • Smoking status: Never Smoker   • Smokeless tobacco: Never Used   • Alcohol use No       Allergies  Allergies   Allergen Reactions   • Nkda [No Known Drug Allergy]         Physical Exam  Laboratory   Hemodynamics  Temp (24hrs), Av.8 °C (100 °F), Min:36.8 °C (98.3 °F), Max:38.4 °C (101.1 °F)   Temperature: 37.7 °C (99.8 °F)  Pulse  Av.9  Min: 86  Max: 107    Blood Pressure : (!) 175/98 (rn notified), NIBP: 156/71      Respiratory      Respiration: 18, Pulse Oximetry: 98 %, O2 Daily Delivery Respiratory : Room Air with O2 Available     Given By:: Mouthpiece, Work Of Breathing / Effort: Mild  RUL Breath Sounds: Expiratory Wheezes, RML Breath Sounds: Expiratory Wheezes, RLL Breath Sounds: Expiratory Wheezes, GARRETT Breath Sounds: Expiratory Wheezes, LLL Breath Sounds: Expiratory Wheezes    Physical Exam   Constitutional: She is oriented to person, place, and time. She appears well-developed and well-nourished. No distress.   HENT:   Head: Normocephalic and atraumatic.   Mouth/Throat: Oropharynx is clear and moist.   Eyes: Pupils are equal, round, and reactive to light.   Neck: Neck supple.   Cardiovascular: Regular rhythm.    Tachycardic   Pulmonary/Chest: She is in  respiratory distress.   Diminished air exchange  Scattered crackles  Mild end expiratory wheezing   Abdominal: Soft. Bowel sounds are normal. She exhibits no distension. There is no tenderness.   Musculoskeletal: Normal range of motion. She exhibits no edema.   Neurological: She is alert and oriented to person, place, and time. No cranial nerve deficit. Coordination normal.   Skin: Skin is warm.   Psychiatric: She has a normal mood and affect. Her behavior is normal.   Nursing note and vitals reviewed.      Recent Labs      03/03/18   1441   WBC  10.5   RBC  4.54   HEMOGLOBIN  14.9   HEMATOCRIT  43.7   MCV  96.3   MCH  32.8   MCHC  34.1   RDW  45.5   PLATELETCT  191   MPV  9.6     Recent Labs      03/03/18   1441   SODIUM  137   POTASSIUM  4.5   CHLORIDE  106   CO2  18*   GLUCOSE  86   BUN  12   CREATININE  0.78   CALCIUM  8.6     Recent Labs      03/03/18   1441   ALTSGPT  16   ASTSGOT  24   ALKPHOSPHAT  55   TBILIRUBIN  0.4   GLUCOSE  86     Recent Labs      03/03/18   2150   APTT  31.6   INR  1.10     Recent Labs      03/03/18   1441   BNPBTYPENAT  109*         Lab Results   Component Value Date    TROPONINI <0.01 03/03/2018     Urinalysis:    Lab Results  Component Value Date/Time   SPECGRAVITY 1.028 03/03/2018 1657   GLUCOSEUR Negative 03/03/2018 1657   KETONES Negative 03/03/2018 1657   NITRITE Negative 03/03/2018 1657   WBCURINE 10-20 (A) 03/03/2018 1657   RBCURINE 0-2 03/03/2018 1657   BACTERIA Negative 03/03/2018 1657   EPITHELCELL Moderate (A) 03/03/2018 1657        Imaging  CT-CTA CHEST PULMONARY ARTERY W/ RECONS   Final Result      1.  No CT evidence for pulmonary emboli.   2.  Clusters of ill-defined nodules in the RIGHT middle lobe and superior segment RIGHT lower lobe which may be inflammatory or neoplastic.   3.  Mild bilateral dependent atelectasis.   4.  Stable RIGHT lower lobe nodule.   5.  Stable low-density lesion in the lateral segment LEFT lobe liver.               DX-CHEST-PORTABLE (1 VIEW)    Final Result      No acute cardiopulmonary disease.           Assessment/Plan     I anticipate this patient will require at least two midnights for appropriate medical management, necessitating inpatient admission.    HCAP (healthcare-associated pneumonia)- (present on admission)   Assessment & Plan    Patient is started on IV vancomycin and cefepime  Continue RT protocol and supplemental oxygen  Mucinex for mucus expectoration  Check pro calcitonin  Follow blood cultures        Mild persistent asthma with acute exacerbation- (present on admission)   Assessment & Plan    I'll start the patient on prednisone 40 mg daily ×5 days  DuoNeb breathing treatments  Continue Symbicort  RT protocol and supplemental oxygen          Hypertension   Assessment & Plan    Uncontrolled  Continue home regimen of verapamil, valsartan and HCTZ  IV hydralazine when necessary for SBP greater than 160        Malignant neoplasm of upper-outer quadrant of right female breast (CMS-HCC)- (present on admission)   Assessment & Plan    Status post right mastectomy and chemotherapy  Follow-up with oncology            VTE prophylaxis: lovenox.

## 2018-03-04 NOTE — PROGRESS NOTES
"Pharmacy Kinetics 67 y.o. female on vancomycin day # 2 3/4/2018    Currently on Vancomycin  1600 mg iv q24hr    Indication for Treatment: Pneumonia    Pertinent history per medical record: Admitted on 3/3/2018 for pneumonia/sepsis. Presented to ED with cough and SOB x 2 days. Recent admission in January d/t bronchitis and d/c'ed on levofloxacin. PMH breast CA s/p R mastectomy, asthma, and HTN. Lung CT shows bilateral dependent atelectasis.     Other antibiotics: cefepime 2 g IV q8h,     Allergies: Nkda [no known drug allergy]     List concerns for renal function: advanced age, BMI 31 kg/m2    Pertinent cultures to date:   3/3 PBC x2 - in process  3/3 urine - in process  3/3 sputum - in process    Recent Labs      18   1441  18   0311   WBC  10.5  8.0   NEUTSPOLYS  72.70*  67.20     Recent Labs      18   1441  18   0311   BUN  12  11   CREATININE  0.78  0.88   ALBUMIN  3.8   --      Blood pressure 132/68, pulse 80, temperature 37.1 °C (98.8 °F), resp. rate 18, height 1.575 m (5' 2\"), weight 77.5 kg (170 lb 13.7 oz), SpO2 98 %, not currently breastfeeding. Temp (24hrs), Av.6 °C (99.7 °F), Min:36.8 °C (98.3 °F), Max:38.4 °C (101.1 °F)      A/P   1. Vancomycin dose change: continue with same  2. Next vancomycin level: 3/5 @1730 (prior to 3rd dose)  3. Goal trough: 16-20 mcg/mL  4. Comments: Patient afebrile, no leukocytosis. Renal function stable. Concerns for accumulation as mentioned above. Cultures are all in process. Initial procalcitonin was 0.09. Potentially caught early in the disease progression, will order another level for tonight. Will recommend MRSA nares. Trough ordered prior to 3 rd dose. Pharmacy will continue to follow.    Leila Pozo, PharmD      "

## 2018-03-04 NOTE — CARE PLAN
Problem: Oxygenation:  Goal: Maintain adequate oxygenation dependent on patient condition    Pt O2 titrated to 1LNC throughout shift with sats holding > 92%, pt states she does not normally wear O2        Problem: Bronchoconstriction:  Goal: Improve in air movement and diminished wheezing    Duoneb Q4 with improving SOB

## 2018-03-05 LAB
ANION GAP SERPL CALC-SCNC: 12 MMOL/L (ref 0–11.9)
BACTERIA UR CULT: NORMAL
BASOPHILS # BLD AUTO: 0.6 % (ref 0–1.8)
BASOPHILS # BLD: 0.04 K/UL (ref 0–0.12)
BUN SERPL-MCNC: 10 MG/DL (ref 8–22)
CALCIUM SERPL-MCNC: 8.7 MG/DL (ref 8.5–10.5)
CHLORIDE SERPL-SCNC: 104 MMOL/L (ref 96–112)
CO2 SERPL-SCNC: 22 MMOL/L (ref 20–33)
CREAT SERPL-MCNC: 0.86 MG/DL (ref 0.5–1.4)
EOSINOPHIL # BLD AUTO: 0.13 K/UL (ref 0–0.51)
EOSINOPHIL NFR BLD: 1.9 % (ref 0–6.9)
ERYTHROCYTE [DISTWIDTH] IN BLOOD BY AUTOMATED COUNT: 45.8 FL (ref 35.9–50)
GLUCOSE SERPL-MCNC: 96 MG/DL (ref 65–99)
HCT VFR BLD AUTO: 43.5 % (ref 37–47)
HGB BLD-MCNC: 14.7 G/DL (ref 12–16)
IMM GRANULOCYTES # BLD AUTO: 0.03 K/UL (ref 0–0.11)
IMM GRANULOCYTES NFR BLD AUTO: 0.4 % (ref 0–0.9)
LYMPHOCYTES # BLD AUTO: 1.18 K/UL (ref 1–4.8)
LYMPHOCYTES NFR BLD: 17.1 % (ref 22–41)
MCH RBC QN AUTO: 32.7 PG (ref 27–33)
MCHC RBC AUTO-ENTMCNC: 33.8 G/DL (ref 33.6–35)
MCV RBC AUTO: 96.7 FL (ref 81.4–97.8)
MONOCYTES # BLD AUTO: 0.91 K/UL (ref 0–0.85)
MONOCYTES NFR BLD AUTO: 13.2 % (ref 0–13.4)
NEUTROPHILS # BLD AUTO: 4.6 K/UL (ref 2–7.15)
NEUTROPHILS NFR BLD: 66.8 % (ref 44–72)
NRBC # BLD AUTO: 0 K/UL
NRBC BLD-RTO: 0 /100 WBC
PLATELET # BLD AUTO: 177 K/UL (ref 164–446)
PMV BLD AUTO: 9.6 FL (ref 9–12.9)
POTASSIUM SERPL-SCNC: 3.4 MMOL/L (ref 3.6–5.5)
RBC # BLD AUTO: 4.5 M/UL (ref 4.2–5.4)
SIGNIFICANT IND 70042: NORMAL
SITE SITE: NORMAL
SODIUM SERPL-SCNC: 138 MMOL/L (ref 135–145)
SOURCE SOURCE: NORMAL
VANCOMYCIN TROUGH SERPL-MCNC: 8.2 UG/ML (ref 10–20)
WBC # BLD AUTO: 6.9 K/UL (ref 4.8–10.8)

## 2018-03-05 PROCEDURE — 700111 HCHG RX REV CODE 636 W/ 250 OVERRIDE (IP): Performed by: INTERNAL MEDICINE

## 2018-03-05 PROCEDURE — 85025 COMPLETE CBC W/AUTO DIFF WBC: CPT

## 2018-03-05 PROCEDURE — 700105 HCHG RX REV CODE 258: Performed by: INTERNAL MEDICINE

## 2018-03-05 PROCEDURE — 700111 HCHG RX REV CODE 636 W/ 250 OVERRIDE (IP): Performed by: FAMILY MEDICINE

## 2018-03-05 PROCEDURE — 94640 AIRWAY INHALATION TREATMENT: CPT

## 2018-03-05 PROCEDURE — A9270 NON-COVERED ITEM OR SERVICE: HCPCS | Performed by: INTERNAL MEDICINE

## 2018-03-05 PROCEDURE — 36415 COLL VENOUS BLD VENIPUNCTURE: CPT

## 2018-03-05 PROCEDURE — 80048 BASIC METABOLIC PNL TOTAL CA: CPT

## 2018-03-05 PROCEDURE — 700102 HCHG RX REV CODE 250 W/ 637 OVERRIDE(OP): Performed by: FAMILY MEDICINE

## 2018-03-05 PROCEDURE — A9270 NON-COVERED ITEM OR SERVICE: HCPCS | Performed by: FAMILY MEDICINE

## 2018-03-05 PROCEDURE — 700101 HCHG RX REV CODE 250

## 2018-03-05 PROCEDURE — 700101 HCHG RX REV CODE 250: Performed by: INTERNAL MEDICINE

## 2018-03-05 PROCEDURE — 80202 ASSAY OF VANCOMYCIN: CPT

## 2018-03-05 PROCEDURE — 94760 N-INVAS EAR/PLS OXIMETRY 1: CPT

## 2018-03-05 PROCEDURE — 99232 SBSQ HOSP IP/OBS MODERATE 35: CPT | Performed by: FAMILY MEDICINE

## 2018-03-05 PROCEDURE — 700102 HCHG RX REV CODE 250 W/ 637 OVERRIDE(OP): Performed by: INTERNAL MEDICINE

## 2018-03-05 PROCEDURE — 770020 HCHG ROOM/CARE - TELE (206)

## 2018-03-05 RX ORDER — POTASSIUM CHLORIDE 20 MEQ/1
40 TABLET, EXTENDED RELEASE ORAL ONCE
Status: COMPLETED | OUTPATIENT
Start: 2018-03-05 | End: 2018-03-05

## 2018-03-05 RX ORDER — IPRATROPIUM BROMIDE AND ALBUTEROL SULFATE 2.5; .5 MG/3ML; MG/3ML
SOLUTION RESPIRATORY (INHALATION)
Status: COMPLETED
Start: 2018-03-05 | End: 2018-03-05

## 2018-03-05 RX ORDER — IPRATROPIUM BROMIDE AND ALBUTEROL SULFATE 2.5; .5 MG/3ML; MG/3ML
3 SOLUTION RESPIRATORY (INHALATION)
Status: DISCONTINUED | OUTPATIENT
Start: 2018-03-06 | End: 2018-03-10 | Stop reason: HOSPADM

## 2018-03-05 RX ORDER — IPRATROPIUM BROMIDE AND ALBUTEROL SULFATE 2.5; .5 MG/3ML; MG/3ML
SOLUTION RESPIRATORY (INHALATION)
Status: ACTIVE
Start: 2018-03-05 | End: 2018-03-05

## 2018-03-05 RX ORDER — PREDNISONE 50 MG/1
50 TABLET ORAL DAILY
Status: DISCONTINUED | OUTPATIENT
Start: 2018-03-05 | End: 2018-03-06

## 2018-03-05 RX ADMIN — VALSARTAN 80 MG: 80 TABLET ORAL at 11:28

## 2018-03-05 RX ADMIN — IPRATROPIUM BROMIDE AND ALBUTEROL SULFATE 3 ML: .5; 3 SOLUTION RESPIRATORY (INHALATION) at 19:11

## 2018-03-05 RX ADMIN — VANCOMYCIN HYDROCHLORIDE 1600 MG: 100 INJECTION, POWDER, LYOPHILIZED, FOR SOLUTION INTRAVENOUS at 18:15

## 2018-03-05 RX ADMIN — IPRATROPIUM BROMIDE AND ALBUTEROL SULFATE 3 ML: .5; 3 SOLUTION RESPIRATORY (INHALATION) at 07:52

## 2018-03-05 RX ADMIN — PREDNISONE 50 MG: 50 TABLET ORAL at 11:27

## 2018-03-05 RX ADMIN — CEFEPIME 2 G: 2 INJECTION, POWDER, FOR SOLUTION INTRAMUSCULAR; INTRAVENOUS at 20:49

## 2018-03-05 RX ADMIN — IPRATROPIUM BROMIDE AND ALBUTEROL SULFATE 3 ML: .5; 3 SOLUTION RESPIRATORY (INHALATION) at 11:08

## 2018-03-05 RX ADMIN — HYDRALAZINE HYDROCHLORIDE 20 MG: 20 INJECTION INTRAMUSCULAR; INTRAVENOUS at 20:48

## 2018-03-05 RX ADMIN — IPRATROPIUM BROMIDE AND ALBUTEROL SULFATE 3 ML: .5; 3 SOLUTION RESPIRATORY (INHALATION) at 15:50

## 2018-03-05 RX ADMIN — BUDESONIDE AND FORMOTEROL FUMARATE DIHYDRATE 2 PUFF: 160; 4.5 AEROSOL RESPIRATORY (INHALATION) at 19:14

## 2018-03-05 RX ADMIN — GUAIFENESIN 600 MG: 600 TABLET, EXTENDED RELEASE ORAL at 11:28

## 2018-03-05 RX ADMIN — VERAPAMIL HYDROCHLORIDE 120 MG: 120 CAPSULE, DELAYED RELEASE PELLETS ORAL at 20:48

## 2018-03-05 RX ADMIN — CEFEPIME 2 G: 2 INJECTION, POWDER, FOR SOLUTION INTRAMUSCULAR; INTRAVENOUS at 12:16

## 2018-03-05 RX ADMIN — CEFEPIME 2 G: 2 INJECTION, POWDER, FOR SOLUTION INTRAMUSCULAR; INTRAVENOUS at 02:43

## 2018-03-05 RX ADMIN — GUAIFENESIN 600 MG: 600 TABLET, EXTENDED RELEASE ORAL at 20:48

## 2018-03-05 RX ADMIN — ENOXAPARIN SODIUM 40 MG: 100 INJECTION SUBCUTANEOUS at 11:29

## 2018-03-05 RX ADMIN — IPRATROPIUM BROMIDE AND ALBUTEROL SULFATE 3 ML: .5; 3 SOLUTION RESPIRATORY (INHALATION) at 22:33

## 2018-03-05 RX ADMIN — BUDESONIDE AND FORMOTEROL FUMARATE DIHYDRATE 2 PUFF: 160; 4.5 AEROSOL RESPIRATORY (INHALATION) at 07:53

## 2018-03-05 RX ADMIN — ASPIRIN 81 MG: 81 TABLET, CHEWABLE ORAL at 11:28

## 2018-03-05 RX ADMIN — POTASSIUM CHLORIDE 40 MEQ: 1500 TABLET, EXTENDED RELEASE ORAL at 11:29

## 2018-03-05 ASSESSMENT — ENCOUNTER SYMPTOMS
VOMITING: 0
CLAUDICATION: 0
DOUBLE VISION: 0
DIAPHORESIS: 0
NAUSEA: 0
TINGLING: 0
SHORTNESS OF BREATH: 1
COUGH: 1
BACK PAIN: 0
HEADACHES: 0
WEAKNESS: 1
ABDOMINAL PAIN: 0
TREMORS: 0
NECK PAIN: 0
PHOTOPHOBIA: 0
SPUTUM PRODUCTION: 1
BLURRED VISION: 0

## 2018-03-05 ASSESSMENT — PAIN SCALES - GENERAL
PAINLEVEL_OUTOF10: 0

## 2018-03-05 ASSESSMENT — LIFESTYLE VARIABLES: EVER_SMOKED: NEVER

## 2018-03-05 NOTE — PROGRESS NOTES
Received bedside report from day shift nurse.      Patient declines any needs at this time. No s/s of distress. Patient instructed to call for assistance. Call light within reach, bed in low and locked position.   See specific flowchart for details.

## 2018-03-05 NOTE — CARE PLAN
Problem: Oxygenation:  Goal: Maintain adequate oxygenation dependent on patient condition  Outcome: PROGRESSING AS EXPECTED  Respiratory Therapy Update    Interdisciplinary Plan of Care-Goals (Indications)  Obstructive Ventilatory Defect or Pulmonary Disease without Obvious Obstruction: History / Diagnosis (03/04/18 1851)  Interdisciplinary Plan of Care-Outcomes   Bronchodilator Outcome: Diminished Wheezing and Volume of Air Movement Increased (03/04/18 1851)          #SVN Performed: Yes (03/04/18 2311)    Cough: Non Productive (03/04/18 2311)  Sputum Amount: Unable to Evaluate (03/04/18 2311)  Sputum Color: Unable to Evaluate (03/04/18 1514)  Sputum Consistency: Unable to Evaluate (03/04/18 1514)               O2 (FiO2): 28 (03/04/18 2311)  O2 (LPM): 2 (03/04/18 2311)  O2 Daily Delivery Respiratory : Silicone Nasal Cannula (03/04/18 2311)    Breath Sounds  Pre/Post Intervention: Pre Intervention Assessment (03/04/18 2311)  RUL Breath Sounds: Diminished;Expiratory Wheezes (03/04/18 2311)  RML Breath Sounds: Diminished;Expiratory Wheezes (03/04/18 2311)  RLL Breath Sounds: Diminished (03/04/18 2311)  GARRETT Breath Sounds: Diminished;Expiratory Wheezes (03/04/18 2311)  LLL Breath Sounds: Diminished (03/04/18 2311)  Events/Summary/Plan: svn given (03/04/18 1514)

## 2018-03-05 NOTE — PROGRESS NOTES
[facility]    Date of Service: 3/5/2018    Chief Complaint  67 y.o. female admitted 3/3/2018 with PNEUMONIA AND ASTHMA    Interval Problem Update  NONE    Consultants/Specialty  NONE    Disposition  PENDING        Review of Systems   Constitutional: Positive for malaise/fatigue. Negative for diaphoresis.   HENT: Negative for ear pain, hearing loss and tinnitus.    Eyes: Negative for blurred vision, double vision and photophobia.   Respiratory: Positive for cough, sputum production and shortness of breath.    Cardiovascular: Negative for chest pain and claudication.   Gastrointestinal: Negative for abdominal pain, nausea and vomiting.   Genitourinary: Negative for dysuria, frequency and urgency.   Musculoskeletal: Negative for back pain and neck pain.   Skin: Negative for itching and rash.   Neurological: Positive for weakness. Negative for tingling, tremors and headaches.      Physical Exam  Laboratory/Imaging   Hemodynamics  Temp (24hrs), Av.8 °C (100.1 °F), Min:36.9 °C (98.5 °F), Max:39.6 °C (103.3 °F)   Temperature: 37 °C (98.6 °F)  Pulse  Av.7  Min: 78  Max: 107 Heart Rate (Monitored): 99  Blood Pressure : 153/89      Respiratory      Respiration: 18, Pulse Oximetry: 96 %, O2 Daily Delivery Respiratory : Silicone Nasal Cannula     Given By:: Mouthpiece, #MDI/DPI Given: MDI/DPI x 1, Work Of Breathing / Effort: Mild  RUL Breath Sounds: Diminished, RML Breath Sounds: Diminished, RLL Breath Sounds: Diminished, GARRETT Breath Sounds: Expiratory Wheezes, LLL Breath Sounds: Diminished    Fluids    Intake/Output Summary (Last 24 hours) at 18 1112  Last data filed at 18 0400   Gross per 24 hour   Intake                0 ml   Output              500 ml   Net             -500 ml       Nutrition  Orders Placed This Encounter   Procedures   • Diet Order     Standing Status:   Standing     Number of Occurrences:   1     Order Specific Question:   Diet:     Answer:   Regular [1]      Physical Exam   Constitutional: She is oriented to person, place, and time. No distress.   HENT:   Head: Normocephalic and atraumatic.   Eyes: Right eye exhibits no discharge.   Neck: Neck supple. No JVD present.   Cardiovascular: Normal rate and regular rhythm.    Pulmonary/Chest: No stridor. She has wheezes. She has rales.   Abdominal: She exhibits no distension. There is no tenderness. There is no rebound.   Musculoskeletal: She exhibits no edema or tenderness.   Neurological: She is alert and oriented to person, place, and time.   Skin: Skin is warm and dry. She is not diaphoretic.       Recent Labs      03/03/18   1441  03/04/18   0311  03/05/18   0418   WBC  10.5  8.0  6.9   RBC  4.54  4.21  4.50   HEMOGLOBIN  14.9  13.9  14.7   HEMATOCRIT  43.7  40.8  43.5   MCV  96.3  96.9  96.7   MCH  32.8  33.0  32.7   MCHC  34.1  34.1  33.8   RDW  45.5  46.6  45.8   PLATELETCT  191  198  177   MPV  9.6  9.8  9.6     Recent Labs      03/03/18   1441  03/04/18   0311  03/05/18   0419   SODIUM  137  135  138   POTASSIUM  4.5  3.2*  3.4*   CHLORIDE  106  106  104   CO2  18*  22  22   GLUCOSE  86  104*  96   BUN  12  11  10   CREATININE  0.78  0.88  0.86   CALCIUM  8.6  8.6  8.7     Recent Labs      03/03/18   2150   APTT  31.6   INR  1.10     Recent Labs      03/03/18   1441   BNPBTYPENAT  109*     Recent Labs      03/04/18   0311   TRIGLYCERIDE  77   HDL  48   LDL  70          Assessment/Plan     HCAP (healthcare-associated pneumonia)- (present on admission)   Assessment & Plan    PT HAS BEEN IN THE HOSPITAL RECENTLY  VANCOMYCIN AS PER PHARMACY  CEFEPIME  CTA RULES OUT PE  PT NEEDS REPEAT CT OF THE CHEST AS AN OUTPT   SHE IS STILL SHORT OF BREATH        Mild persistent asthma with acute exacerbation- (present on admission)   Assessment & Plan    INCREASED PREDNISONE  RESP PROTOCOL  DUONEB  SYMBICORT          Hypertension   Assessment & Plan    VERAPAMIL  VALSARTAN  IV HYDRALAZINE PRN        Malignant neoplasm of  upper-outer quadrant of right female breast (CMS-HCC)- (present on admission)   Assessment & Plan    Status post right mastectomy and chemotherapy  Follow-up with oncology          Quality-Core Measures   DVT prophylaxis pharmacological::  Enoxaparin (Lovenox)

## 2018-03-05 NOTE — PROGRESS NOTES
Bedside report received, assumed pt care @5309. Pt A&Ox4. She denies any pain. POC discussed, she verbalized understanding. Pt up self and steady on her feet. Call light within reach

## 2018-03-05 NOTE — PROGRESS NOTES
Pharmacy Kinetics 67 y.o. female on vancomycin day # 3  3/5/2018    Currently Dose: Vancomycin 1600 mg iv q24hr (~20 mg/kg)  Received Load Dose: Yes    Indication for Treatment: pneumonia  ID Service Following: No    Pertinent history per medical record: Admitted on 3/3/2018 for sepsis secondary to suspected pneumonia. Provider with HCAP concerns given noted hospital exposure prior to admission (history of malignancy and asthma). Currently admitted to telemetry.    Other antibiotics: cefepime 2 gm iv q8h    Allergies: Nkda [no known drug allergy]     List concerns for accumulation of vancomycin: age ~67, electrolyte derangement,     Pertinent cultures to date:   Results     Procedure Component Value Units Date/Time    URINE CULTURE(NEW) [[id number]] Collected:  03/03/18 1657    Order Status:  Completed Specimen:  Urine Updated:  03/05/18 0905     Significant Indicator NEG     Source UR     Site --     Urine Culture Mixed skin windy ;* ,000 cfu/mL    Narrative:       Indication for culture:->Emergency Room Patient    GRAM STAIN [[id number]] Collected:  03/04/18 1300    Order Status:  Completed Specimen:  Respirate Updated:  03/04/18 2139     Significant Indicator .     Source RESP     Site SPUTUM     Gram Stain Result Many WBCs.  Many mixed bacteria, no predominant organism seen.  Moderate epithelial cells.  Specimen Quality Score: 2+      Narrative:       Collected By:[id number] MYRTLE SALES    MRSA BY PCR (AMP) [[id number]]  (Abnormal) Collected:  03/04/18 1334    Order Status:  Completed Specimen:  Respirate from Nares Updated:  03/04/18 1854     Significant Indicator POS (POS)     Source RESP     Site NARES     MRSA PCR POSITIVE for MRSA by PCR. (A)    Narrative:       Collected By:[id number] MATTHEW ROBERTS RN please collect to assist in de escalation of antibiotics    CULTURE RESPIRATORY W/ GRM STN [[id number]] Collected:  03/04/18 1300    Order Status:  Completed Specimen:  Respirate from Sputum Updated:   "03/04/18 1330    Narrative:       Collected By:[id number] MYRTLE SALES    BLOOD CULTURE [[id number]] Collected:  03/03/18 1559    Order Status:  Completed Specimen:  Blood from Peripheral Updated:  03/04/18 0933     Significant Indicator NEG     Source BLD     Site PERIPHERAL     Blood Culture No Growth    Note: Blood cultures are incubated for 5 days and  are monitored continuously.Positive blood cultures  are called to the RN and reported as soon as  they are identified.      Narrative:       Per Hospital Policy: Only change Specimen Src: to \"Line\" if  specified by physician order.    BLOOD CULTURE [[id number]] Collected:  03/03/18 1606    Order Status:  Completed Specimen:  Blood from Peripheral Updated:  03/04/18 0933     Significant Indicator NEG     Source BLD     Site PERIPHERAL     Blood Culture No Growth    Note: Blood cultures are incubated for 5 days and  are monitored continuously.Positive blood cultures  are called to the RN and reported as soon as  they are identified.      Narrative:       Per Hospital Policy: Only change Specimen Src: to \"Line\" if  specified by physician order.    Culture Respiratory W/ GRM STN [[id number]] Collected:  03/03/18 2130    Order Status:  Canceled Specimen:  Sputum from Sputum Updated:  03/03/18 2151    URINALYSIS [[id number]]  (Abnormal) Collected:  03/03/18 1657    Order Status:  Completed Specimen:  Urine Updated:  03/03/18 1704     Color Yellow     Character Clear     Specific Gravity 1.028     Ph 6.5     Glucose Negative mg/dL      Ketones Negative mg/dL      Protein Negative mg/dL      Bilirubin Negative     Urobilinogen, Urine 0.2     Nitrite Negative     Leukocyte Esterase Moderate (A)     Occult Blood Negative     Micro Urine Req Microscopic    Narrative:       Indication for culture:->Emergency Room Patient    Influenza By PCR, A/B [[id number]] Collected:  03/03/18 1440    Order Status:  Completed Specimen:  Urine from Nasopharyngeal Updated:  03/03/18 1527     " "Influenza virus A RNA Negative     Influenza virus B, PCR Negative    Narrative:       Indication for culture:->Emergency Room Patient    BLOOD CULTURE [[id number]] Collected:  18 0000    Order Status:  Canceled Specimen:  Other from Peripheral     BLOOD CULTURE [[id number]] Collected:  18 0000    Order Status:  Canceled Specimen:  Other from Peripheral     Culture Respiratory W/ GRM STN [[id number]] Collected:  18 0000    Order Status:  Canceled Specimen:  Sputum from Sputum         Recent Labs      18   1441  18   0311  18   0418   WBC  10.5  8.0  6.9   NEUTSPOLYS  72.70*  67.20  66.80     Recent Labs      18   1441  18   0311  18   0419   BUN  12  11  10   CREATININE  0.78  0.88  0.86   ALBUMIN  3.8   --    --      Intake/Output Summary (Last 24 hours) at 18 1047  Last data filed at 18 0400   Gross per 24 hour   Intake                0 ml   Output              500 ml   Net             -500 ml      Blood pressure 153/89, pulse 90, temperature 37 °C (98.6 °F), resp. rate 20, height 1.575 m (5' 2\"), weight 80.7 kg (177 lb 14.6 oz), SpO2 98 %, not currently breastfeeding. Temp (24hrs), Av.8 °C (100.1 °F), Min:36.9 °C (98.5 °F), Max:39.6 °C (103.3 °F)    Estimated Creatinine Clearance: 61.6 mL/min (by C-G formula based on SCr of 0.86 mg/dL).    A/P   1. Vancomycin dose change: not indicated   2. Next vancomycin level: 3/5/18 @1730  3. Goal trough: 16-20 mcg/mL  4. Comments: VS stable. Afebrile. WBC down/stable. CrCl ~62 mL/min (SCr with up trend since admission). MRSA nares positive. Other microbiology pending. Would consider ID consult. Trough in place prior to 3/5/18 dose to ensure clearance. Pharmacy will follow and continue to adjust as appropriate.     Alex Alexander, PharmD  "

## 2018-03-06 LAB
ANION GAP SERPL CALC-SCNC: 8 MMOL/L (ref 0–11.9)
BACTERIA SPEC RESP CULT: NORMAL
BASOPHILS # BLD AUTO: 0.2 % (ref 0–1.8)
BASOPHILS # BLD: 0.01 K/UL (ref 0–0.12)
BUN SERPL-MCNC: 14 MG/DL (ref 8–22)
CALCIUM SERPL-MCNC: 9.8 MG/DL (ref 8.5–10.5)
CHLORIDE SERPL-SCNC: 108 MMOL/L (ref 96–112)
CO2 SERPL-SCNC: 20 MMOL/L (ref 20–33)
CREAT SERPL-MCNC: 0.75 MG/DL (ref 0.5–1.4)
EOSINOPHIL # BLD AUTO: 0 K/UL (ref 0–0.51)
EOSINOPHIL NFR BLD: 0 % (ref 0–6.9)
ERYTHROCYTE [DISTWIDTH] IN BLOOD BY AUTOMATED COUNT: 45 FL (ref 35.9–50)
GLUCOSE SERPL-MCNC: 106 MG/DL (ref 65–99)
GRAM STN SPEC: NORMAL
HCT VFR BLD AUTO: 42.3 % (ref 37–47)
HGB BLD-MCNC: 14.3 G/DL (ref 12–16)
IMM GRANULOCYTES # BLD AUTO: 0.02 K/UL (ref 0–0.11)
IMM GRANULOCYTES NFR BLD AUTO: 0.3 % (ref 0–0.9)
LYMPHOCYTES # BLD AUTO: 1.08 K/UL (ref 1–4.8)
LYMPHOCYTES NFR BLD: 17.5 % (ref 22–41)
MCH RBC QN AUTO: 32.6 PG (ref 27–33)
MCHC RBC AUTO-ENTMCNC: 33.8 G/DL (ref 33.6–35)
MCV RBC AUTO: 96.4 FL (ref 81.4–97.8)
MONOCYTES # BLD AUTO: 0.8 K/UL (ref 0–0.85)
MONOCYTES NFR BLD AUTO: 12.9 % (ref 0–13.4)
NEUTROPHILS # BLD AUTO: 4.27 K/UL (ref 2–7.15)
NEUTROPHILS NFR BLD: 69.1 % (ref 44–72)
NRBC # BLD AUTO: 0 K/UL
NRBC BLD-RTO: 0 /100 WBC
PLATELET # BLD AUTO: 202 K/UL (ref 164–446)
PMV BLD AUTO: 9.5 FL (ref 9–12.9)
POTASSIUM SERPL-SCNC: 3.9 MMOL/L (ref 3.6–5.5)
RBC # BLD AUTO: 4.39 M/UL (ref 4.2–5.4)
SIGNIFICANT IND 70042: NORMAL
SITE SITE: NORMAL
SODIUM SERPL-SCNC: 136 MMOL/L (ref 135–145)
SOURCE SOURCE: NORMAL
WBC # BLD AUTO: 6.2 K/UL (ref 4.8–10.8)

## 2018-03-06 PROCEDURE — 85025 COMPLETE CBC W/AUTO DIFF WBC: CPT

## 2018-03-06 PROCEDURE — 700111 HCHG RX REV CODE 636 W/ 250 OVERRIDE (IP): Performed by: HOSPITALIST

## 2018-03-06 PROCEDURE — 700105 HCHG RX REV CODE 258: Performed by: INTERNAL MEDICINE

## 2018-03-06 PROCEDURE — A9270 NON-COVERED ITEM OR SERVICE: HCPCS | Performed by: HOSPITALIST

## 2018-03-06 PROCEDURE — 700101 HCHG RX REV CODE 250: Performed by: FAMILY MEDICINE

## 2018-03-06 PROCEDURE — 80048 BASIC METABOLIC PNL TOTAL CA: CPT

## 2018-03-06 PROCEDURE — 99233 SBSQ HOSP IP/OBS HIGH 50: CPT | Performed by: HOSPITALIST

## 2018-03-06 PROCEDURE — 94760 N-INVAS EAR/PLS OXIMETRY 1: CPT

## 2018-03-06 PROCEDURE — 94640 AIRWAY INHALATION TREATMENT: CPT

## 2018-03-06 PROCEDURE — 770006 HCHG ROOM/CARE - MED/SURG/GYN SEMI*

## 2018-03-06 PROCEDURE — 700102 HCHG RX REV CODE 250 W/ 637 OVERRIDE(OP): Performed by: INTERNAL MEDICINE

## 2018-03-06 PROCEDURE — A9270 NON-COVERED ITEM OR SERVICE: HCPCS | Performed by: INTERNAL MEDICINE

## 2018-03-06 PROCEDURE — 700111 HCHG RX REV CODE 636 W/ 250 OVERRIDE (IP): Performed by: INTERNAL MEDICINE

## 2018-03-06 PROCEDURE — 700102 HCHG RX REV CODE 250 W/ 637 OVERRIDE(OP): Performed by: HOSPITALIST

## 2018-03-06 PROCEDURE — 36415 COLL VENOUS BLD VENIPUNCTURE: CPT

## 2018-03-06 PROCEDURE — 700111 HCHG RX REV CODE 636 W/ 250 OVERRIDE (IP): Performed by: FAMILY MEDICINE

## 2018-03-06 RX ORDER — TEMAZEPAM 15 MG/1
15 CAPSULE ORAL
Status: DISCONTINUED | OUTPATIENT
Start: 2018-03-06 | End: 2018-03-10 | Stop reason: HOSPADM

## 2018-03-06 RX ORDER — METHYLPREDNISOLONE SODIUM SUCCINATE 125 MG/2ML
62.5 INJECTION, POWDER, LYOPHILIZED, FOR SOLUTION INTRAMUSCULAR; INTRAVENOUS EVERY 8 HOURS
Status: DISCONTINUED | OUTPATIENT
Start: 2018-03-06 | End: 2018-03-07

## 2018-03-06 RX ADMIN — IPRATROPIUM BROMIDE AND ALBUTEROL SULFATE 3 ML: .5; 3 SOLUTION RESPIRATORY (INHALATION) at 14:53

## 2018-03-06 RX ADMIN — CEFEPIME 2 G: 2 INJECTION, POWDER, FOR SOLUTION INTRAMUSCULAR; INTRAVENOUS at 12:29

## 2018-03-06 RX ADMIN — PREDNISONE 50 MG: 50 TABLET ORAL at 08:21

## 2018-03-06 RX ADMIN — BUDESONIDE AND FORMOTEROL FUMARATE DIHYDRATE 2 PUFF: 160; 4.5 AEROSOL RESPIRATORY (INHALATION) at 07:27

## 2018-03-06 RX ADMIN — METHYLPREDNISOLONE SODIUM SUCCINATE 62.5 MG: 125 INJECTION, POWDER, FOR SOLUTION INTRAMUSCULAR; INTRAVENOUS at 15:09

## 2018-03-06 RX ADMIN — VERAPAMIL HYDROCHLORIDE 120 MG: 120 CAPSULE, DELAYED RELEASE PELLETS ORAL at 23:50

## 2018-03-06 RX ADMIN — IPRATROPIUM BROMIDE AND ALBUTEROL SULFATE 3 ML: .5; 3 SOLUTION RESPIRATORY (INHALATION) at 11:28

## 2018-03-06 RX ADMIN — BUDESONIDE AND FORMOTEROL FUMARATE DIHYDRATE 2 PUFF: 160; 4.5 AEROSOL RESPIRATORY (INHALATION) at 20:16

## 2018-03-06 RX ADMIN — VANCOMYCIN HYDROCHLORIDE 1100 MG: 100 INJECTION, POWDER, LYOPHILIZED, FOR SOLUTION INTRAVENOUS at 09:07

## 2018-03-06 RX ADMIN — GUAIFENESIN 600 MG: 600 TABLET, EXTENDED RELEASE ORAL at 08:21

## 2018-03-06 RX ADMIN — ASPIRIN 81 MG: 81 TABLET, CHEWABLE ORAL at 08:21

## 2018-03-06 RX ADMIN — VALSARTAN 80 MG: 80 TABLET ORAL at 08:21

## 2018-03-06 RX ADMIN — METHYLPREDNISOLONE SODIUM SUCCINATE 62.5 MG: 125 INJECTION, POWDER, FOR SOLUTION INTRAMUSCULAR; INTRAVENOUS at 21:13

## 2018-03-06 RX ADMIN — HYDRALAZINE HYDROCHLORIDE 20 MG: 20 INJECTION INTRAMUSCULAR; INTRAVENOUS at 16:29

## 2018-03-06 RX ADMIN — TEMAZEPAM 15 MG: 15 CAPSULE ORAL at 21:13

## 2018-03-06 RX ADMIN — IPRATROPIUM BROMIDE AND ALBUTEROL SULFATE 3 ML: .5; 3 SOLUTION RESPIRATORY (INHALATION) at 20:16

## 2018-03-06 RX ADMIN — CEFEPIME 2 G: 2 INJECTION, POWDER, FOR SOLUTION INTRAMUSCULAR; INTRAVENOUS at 18:26

## 2018-03-06 RX ADMIN — CEFEPIME 2 G: 2 INJECTION, POWDER, FOR SOLUTION INTRAMUSCULAR; INTRAVENOUS at 04:50

## 2018-03-06 RX ADMIN — ENOXAPARIN SODIUM 40 MG: 100 INJECTION SUBCUTANEOUS at 08:21

## 2018-03-06 RX ADMIN — GUAIFENESIN 600 MG: 600 TABLET, EXTENDED RELEASE ORAL at 21:13

## 2018-03-06 RX ADMIN — STANDARDIZED SENNA CONCENTRATE AND DOCUSATE SODIUM 2 TABLET: 8.6; 5 TABLET, FILM COATED ORAL at 08:21

## 2018-03-06 RX ADMIN — IPRATROPIUM BROMIDE AND ALBUTEROL SULFATE 3 ML: .5; 3 SOLUTION RESPIRATORY (INHALATION) at 07:27

## 2018-03-06 ASSESSMENT — ENCOUNTER SYMPTOMS
NERVOUS/ANXIOUS: 0
CHILLS: 0
VOMITING: 0
CONSTITUTIONAL NEGATIVE: 1
SHORTNESS OF BREATH: 1
MUSCULOSKELETAL NEGATIVE: 1
FEVER: 0
INSOMNIA: 1
BRUISES/BLEEDS EASILY: 0
GASTROINTESTINAL NEGATIVE: 1
NAUSEA: 0
DIZZINESS: 0
LOSS OF CONSCIOUSNESS: 0
WEAKNESS: 0
WHEEZING: 1
NEUROLOGICAL NEGATIVE: 1
DEPRESSION: 0
BACK PAIN: 0
FLANK PAIN: 0
ABDOMINAL PAIN: 0
SPUTUM PRODUCTION: 1
COUGH: 1
WEIGHT LOSS: 0
MYALGIAS: 0
CARDIOVASCULAR NEGATIVE: 1

## 2018-03-06 ASSESSMENT — PAIN SCALES - GENERAL
PAINLEVEL_OUTOF10: 0

## 2018-03-06 NOTE — PROGRESS NOTES
Discussed plan for possible D/C with pt. Pt concerned about leaving today. Pt still SOB SpO2 at 90 -91% at rest, increased SOB with ambulation. Will consult dr about pt concern.

## 2018-03-06 NOTE — CARE PLAN
Problem: Bronchoconstriction:  Goal: Improve in air movement and diminished wheezing  Outcome: PROGRESSING AS EXPECTED  DUO Q4HRS  Intervention: Evaluate and manage medication effects  DUO Q4HRS

## 2018-03-06 NOTE — PROGRESS NOTES
Received report from Argelia, at pt bedside. Pt C/O SOB at times with SPO2 above 94% on RA, POC discussed. Call light and belongings within reach. Bed locked and in low position. Alarm on and fall precautions in place.

## 2018-03-06 NOTE — PROGRESS NOTES
Assumed care of patient bedside report received from RANDY De Los Santos updated on POC, call light within reach and fall precautions in place. Bed locked and in lowest position. Patient instructed to call for assistance before getting out of bed. All questions answered, no other needs at this time.

## 2018-03-06 NOTE — PROGRESS NOTES
Pt complaining of dyspnea with exertion, O2 at 94% on room air.  Pt is not complaining of any pain or dizziness.

## 2018-03-06 NOTE — PROGRESS NOTES
Pharmacy Kinetics Addendum:    67 y.o. female on vancomycin day #  3/6/2018    Currently Dose: Vancomycin 1600 mg iv q24hr (~20 mg/kg)  Trough prior to the 3rd dose - 8.2 mcg/ml    Indication for Treatment: pneumonia    Recent Labs      03/03/18   1441  03/04/18   0311  03/05/18   0419  03/06/18   0252   BUN  12  11  10  14   CREATININE  0.78  0.88  0.86  0.75   ALBUMIN  3.8   --    --    --      Recent Labs      03/05/18   1720   VANCOTROUGH  8.2*       A/P   1. Vancomycin dose change: increase to 14mg/kg q12hr (1100mg)  2. Next vancomycin level: To be determined by day team, consider prior to 3rd or 4th on new interval  3. Goal trough: 16-20 mcg/mL  4. Comments: Trough subtherapeutic, do not expect will be within goal once reaches steady state. Will increase to q12 hr dosing. Concerned for accumulation due to age and body habitus (BMI 32). Pharmacy will follow.    Jordin Coburn, PharmD, BCPS

## 2018-03-07 ENCOUNTER — PATIENT OUTREACH (OUTPATIENT)
Dept: HEALTH INFORMATION MANAGEMENT | Facility: OTHER | Age: 68
End: 2018-03-07

## 2018-03-07 LAB — PROCALCITONIN SERPL-MCNC: 0.1 NG/ML

## 2018-03-07 PROCEDURE — 36415 COLL VENOUS BLD VENIPUNCTURE: CPT

## 2018-03-07 PROCEDURE — 94760 N-INVAS EAR/PLS OXIMETRY 1: CPT

## 2018-03-07 PROCEDURE — 700101 HCHG RX REV CODE 250: Performed by: FAMILY MEDICINE

## 2018-03-07 PROCEDURE — 700102 HCHG RX REV CODE 250 W/ 637 OVERRIDE(OP): Performed by: INTERNAL MEDICINE

## 2018-03-07 PROCEDURE — 700111 HCHG RX REV CODE 636 W/ 250 OVERRIDE (IP): Performed by: INTERNAL MEDICINE

## 2018-03-07 PROCEDURE — 700105 HCHG RX REV CODE 258: Performed by: INTERNAL MEDICINE

## 2018-03-07 PROCEDURE — 770006 HCHG ROOM/CARE - MED/SURG/GYN SEMI*

## 2018-03-07 PROCEDURE — A9270 NON-COVERED ITEM OR SERVICE: HCPCS | Performed by: INTERNAL MEDICINE

## 2018-03-07 PROCEDURE — 99233 SBSQ HOSP IP/OBS HIGH 50: CPT | Performed by: HOSPITALIST

## 2018-03-07 PROCEDURE — 94640 AIRWAY INHALATION TREATMENT: CPT

## 2018-03-07 PROCEDURE — 700105 HCHG RX REV CODE 258

## 2018-03-07 PROCEDURE — 700102 HCHG RX REV CODE 250 W/ 637 OVERRIDE(OP): Performed by: HOSPITALIST

## 2018-03-07 PROCEDURE — 700111 HCHG RX REV CODE 636 W/ 250 OVERRIDE (IP): Performed by: HOSPITALIST

## 2018-03-07 PROCEDURE — 84145 PROCALCITONIN (PCT): CPT

## 2018-03-07 PROCEDURE — A9270 NON-COVERED ITEM OR SERVICE: HCPCS | Performed by: HOSPITALIST

## 2018-03-07 RX ORDER — METHYLPREDNISOLONE SODIUM SUCCINATE 125 MG/2ML
125 INJECTION, POWDER, LYOPHILIZED, FOR SOLUTION INTRAMUSCULAR; INTRAVENOUS EVERY 8 HOURS
Status: DISCONTINUED | OUTPATIENT
Start: 2018-03-07 | End: 2018-03-09

## 2018-03-07 RX ORDER — SODIUM CHLORIDE 9 MG/ML
INJECTION, SOLUTION INTRAVENOUS
Status: COMPLETED
Start: 2018-03-07 | End: 2018-03-07

## 2018-03-07 RX ADMIN — ENOXAPARIN SODIUM 40 MG: 100 INJECTION SUBCUTANEOUS at 07:42

## 2018-03-07 RX ADMIN — GUAIFENESIN 600 MG: 600 TABLET, EXTENDED RELEASE ORAL at 07:42

## 2018-03-07 RX ADMIN — METOPROLOL TARTRATE 25 MG: 25 TABLET, FILM COATED ORAL at 12:27

## 2018-03-07 RX ADMIN — ACETAMINOPHEN 650 MG: 325 TABLET, FILM COATED ORAL at 17:56

## 2018-03-07 RX ADMIN — IPRATROPIUM BROMIDE AND ALBUTEROL SULFATE 3 ML: .5; 3 SOLUTION RESPIRATORY (INHALATION) at 07:23

## 2018-03-07 RX ADMIN — STANDARDIZED SENNA CONCENTRATE AND DOCUSATE SODIUM 2 TABLET: 8.6; 5 TABLET, FILM COATED ORAL at 07:42

## 2018-03-07 RX ADMIN — TEMAZEPAM 15 MG: 15 CAPSULE ORAL at 21:44

## 2018-03-07 RX ADMIN — BUDESONIDE AND FORMOTEROL FUMARATE DIHYDRATE 2 PUFF: 160; 4.5 AEROSOL RESPIRATORY (INHALATION) at 07:23

## 2018-03-07 RX ADMIN — GUAIFENESIN 600 MG: 600 TABLET, EXTENDED RELEASE ORAL at 20:05

## 2018-03-07 RX ADMIN — VALSARTAN 80 MG: 80 TABLET ORAL at 07:42

## 2018-03-07 RX ADMIN — SODIUM CHLORIDE 500 ML: 9 INJECTION, SOLUTION INTRAVENOUS at 05:05

## 2018-03-07 RX ADMIN — IPRATROPIUM BROMIDE AND ALBUTEROL SULFATE 3 ML: .5; 3 SOLUTION RESPIRATORY (INHALATION) at 19:17

## 2018-03-07 RX ADMIN — VERAPAMIL HYDROCHLORIDE 120 MG: 120 CAPSULE, DELAYED RELEASE PELLETS ORAL at 20:05

## 2018-03-07 RX ADMIN — CEFEPIME 2 G: 2 INJECTION, POWDER, FOR SOLUTION INTRAMUSCULAR; INTRAVENOUS at 17:56

## 2018-03-07 RX ADMIN — HYDRALAZINE HYDROCHLORIDE 20 MG: 20 INJECTION INTRAMUSCULAR; INTRAVENOUS at 05:06

## 2018-03-07 RX ADMIN — IPRATROPIUM BROMIDE AND ALBUTEROL SULFATE 3 ML: .5; 3 SOLUTION RESPIRATORY (INHALATION) at 11:08

## 2018-03-07 RX ADMIN — METHYLPREDNISOLONE SODIUM SUCCINATE 125 MG: 125 INJECTION, POWDER, FOR SOLUTION INTRAMUSCULAR; INTRAVENOUS at 20:05

## 2018-03-07 RX ADMIN — CEFEPIME 2 G: 2 INJECTION, POWDER, FOR SOLUTION INTRAMUSCULAR; INTRAVENOUS at 03:43

## 2018-03-07 RX ADMIN — METHYLPREDNISOLONE SODIUM SUCCINATE 62.5 MG: 125 INJECTION, POWDER, FOR SOLUTION INTRAMUSCULAR; INTRAVENOUS at 05:05

## 2018-03-07 RX ADMIN — METOPROLOL TARTRATE 25 MG: 25 TABLET, FILM COATED ORAL at 20:05

## 2018-03-07 RX ADMIN — ASPIRIN 81 MG: 81 TABLET, CHEWABLE ORAL at 07:42

## 2018-03-07 RX ADMIN — HYDRALAZINE HYDROCHLORIDE 20 MG: 20 INJECTION INTRAMUSCULAR; INTRAVENOUS at 15:45

## 2018-03-07 RX ADMIN — METHYLPREDNISOLONE SODIUM SUCCINATE 62.5 MG: 125 INJECTION, POWDER, FOR SOLUTION INTRAMUSCULAR; INTRAVENOUS at 14:26

## 2018-03-07 RX ADMIN — IPRATROPIUM BROMIDE AND ALBUTEROL SULFATE 3 ML: .5; 3 SOLUTION RESPIRATORY (INHALATION) at 15:28

## 2018-03-07 RX ADMIN — CEFEPIME 2 G: 2 INJECTION, POWDER, FOR SOLUTION INTRAMUSCULAR; INTRAVENOUS at 10:56

## 2018-03-07 RX ADMIN — BUDESONIDE AND FORMOTEROL FUMARATE DIHYDRATE 2 PUFF: 160; 4.5 AEROSOL RESPIRATORY (INHALATION) at 19:22

## 2018-03-07 ASSESSMENT — PAIN SCALES - GENERAL
PAINLEVEL_OUTOF10: 0
PAINLEVEL_OUTOF10: 6
PAINLEVEL_OUTOF10: 0

## 2018-03-07 ASSESSMENT — ENCOUNTER SYMPTOMS
LOSS OF CONSCIOUSNESS: 0
FLANK PAIN: 0
FEVER: 0
INSOMNIA: 0
SPUTUM PRODUCTION: 1
NAUSEA: 0
DIZZINESS: 0
WHEEZING: 1
COUGH: 1
WEIGHT LOSS: 0
MUSCULOSKELETAL NEGATIVE: 1
CARDIOVASCULAR NEGATIVE: 1
NERVOUS/ANXIOUS: 0
WEAKNESS: 0
CONSTITUTIONAL NEGATIVE: 1
VOMITING: 0
ABDOMINAL PAIN: 0
SHORTNESS OF BREATH: 1
NEUROLOGICAL NEGATIVE: 1
DEPRESSION: 0
CHILLS: 0
BRUISES/BLEEDS EASILY: 0
GASTROINTESTINAL NEGATIVE: 1
MYALGIAS: 0
BACK PAIN: 0

## 2018-03-07 ASSESSMENT — LIFESTYLE VARIABLES: DO YOU DRINK ALCOHOL: NO

## 2018-03-07 NOTE — PROGRESS NOTES
[facility] Hospitalist Progress Note    Date of Service: 3/6/2018    Chief Complaint  67 y.o. female admitted 3/3/2018 with shortness of breath cough, wheezing; found to have pneumonia. Recently in the hospital.    Interval Problem Update  She complains of wheezing still short of breath and coughing. She is very concerned about her blood pressure. Patient had considerable shortness of breath just with using the restroom today.    Consultants/Specialty  None    Disposition  Home when medically cleared        Review of Systems   Constitutional: Negative.  Negative for chills, fever, malaise/fatigue and weight loss.   HENT: Negative.    Respiratory: Positive for cough, sputum production, shortness of breath and wheezing.    Cardiovascular: Negative.  Negative for chest pain and leg swelling.   Gastrointestinal: Negative.  Negative for abdominal pain, nausea and vomiting.   Genitourinary: Negative.  Negative for dysuria and flank pain.   Musculoskeletal: Negative.  Negative for back pain and myalgias.   Neurological: Negative.  Negative for dizziness, loss of consciousness and weakness.   Endo/Heme/Allergies: Negative.  Does not bruise/bleed easily.   Psychiatric/Behavioral: Negative for depression. The patient has insomnia. The patient is not nervous/anxious.    All other systems reviewed and are negative.     Physical Exam  Laboratory/Imaging   Hemodynamics  Temp (24hrs), Av.8 °C (98.3 °F), Min:36.4 °C (97.6 °F), Max:37.3 °C (99.1 °F)   Temperature: 37.3 °C (99.1 °F)  Pulse  Av.4  Min: 78  Max: 110    Blood Pressure : (!) 174/84 (RN notified)      Respiratory      Respiration: (!) 21 (RN notified), Pulse Oximetry: 93 %, O2 Daily Delivery Respiratory : Room Air with O2 Available     Given By:: Mouthpiece, #MDI/DPI Given: MDI/DPI x 1, Work Of Breathing / Effort: Mild  RUL Breath Sounds: Expiratory Wheezes, RML Breath Sounds: Expiratory Wheezes, RLL Breath Sounds: Expiratory Wheezes, GARRETT Breath Sounds: Expiratory  Wheezes, LLL Breath Sounds: Expiratory Wheezes    Fluids  No intake or output data in the 24 hours ending 03/06/18 1730    Nutrition  Orders Placed This Encounter   Procedures   • Diet Order     Standing Status:   Standing     Number of Occurrences:   1     Order Specific Question:   Diet:     Answer:   Regular [1]     Physical Exam   Constitutional: She is oriented to person, place, and time. She appears well-developed and well-nourished. No distress.   HENT:   Nose: Nose normal.   Mouth/Throat: Oropharynx is clear and moist. No oropharyngeal exudate.   Eyes: Conjunctivae are normal. Right eye exhibits no discharge. Left eye exhibits no discharge. No scleral icterus.   Neck: No JVD present. No tracheal deviation present.   Cardiovascular: Normal rate, regular rhythm and normal heart sounds.    Pulmonary/Chest: No stridor. She is in respiratory distress. She has wheezes. She has no rales. She exhibits no tenderness.   Abdominal: Soft. Bowel sounds are normal. She exhibits no distension. There is no tenderness.   Musculoskeletal: She exhibits no edema or tenderness.   Neurological: She is alert and oriented to person, place, and time. No cranial nerve deficit. She exhibits normal muscle tone.   Skin: Skin is warm and dry. She is not diaphoretic. No pallor.   Psychiatric: She has a normal mood and affect. Her behavior is normal.   Nursing note and vitals reviewed.      Recent Labs      03/04/18 0311 03/05/18 0418 03/06/18   0252   WBC  8.0  6.9  6.2   RBC  4.21  4.50  4.39   HEMOGLOBIN  13.9  14.7  14.3   HEMATOCRIT  40.8  43.5  42.3   MCV  96.9  96.7  96.4   MCH  33.0  32.7  32.6   MCHC  34.1  33.8  33.8   RDW  46.6  45.8  45.0   PLATELETCT  198  177  202   MPV  9.8  9.6  9.5     Recent Labs      03/04/18 0311 03/05/18 0419 03/06/18   0252   SODIUM  135  138  136   POTASSIUM  3.2*  3.4*  3.9   CHLORIDE  106  104  108   CO2  22  22  20   GLUCOSE  104*  96  106*   BUN  11  10  14   CREATININE  0.88  0.86   0.75   CALCIUM  8.6  8.7  9.8     Recent Labs      03/03/18   2150   APTT  31.6   INR  1.10         Recent Labs      03/04/18   0311   TRIGLYCERIDE  77   HDL  48   LDL  70          Assessment/Plan     HCAP (healthcare-associated pneumonia)- (present on admission)   Assessment & Plan    Continue treatment with cefepime  Stop vancomycin MRSA swab of nares is negative  RT protocol and treatment of copd exacerbation associated with pneumonia        Mild persistent asthma with acute exacerbation- (present on admission)   Assessment & Plan    Not improving still wheezing despite breathing treatments and oral prednisone.  Change to IV methylprednisolone, every 8 hours and observe.  Continue RT protocol.          Hypertension- (present on admission)   Assessment & Plan    Continue valsartan and verapamil        Malignant neoplasm of upper-outer quadrant of right female breast (CMS-HCC)- (present on admission)   Assessment & Plan    Status post right mastectomy and chemotherapy  Follow-up with oncology        Insomnia- (present on admission)   Assessment & Plan    Patient requests sleeping pill, start restoril at night          Quality-Core Measures   Reviewed items::  Labs reviewed, Medications reviewed and Radiology images reviewed

## 2018-03-07 NOTE — CARE PLAN
Problem: Oxygenation:  Goal: Maintain adequate oxygenation dependent on patient condition  Outcome: PROGRESSING AS EXPECTED  Respiratory Therapy Update    Interdisciplinary Plan of Care-Goals (Indications)  Obstructive Ventilatory Defect or Pulmonary Disease without Obvious Obstruction: History / Diagnosis (03/06/18 2017)  Interdisciplinary Plan of Care-Outcomes   Bronchodilator Outcome: Diminished Wheezing and Volume of Air Movement Increased (03/06/18 2017)          #SVN Performed: Yes (03/06/18 2017)    Cough: Productive (03/06/18 2017)  Sputum Amount: Unable to Evaluate (03/06/18 2017)  Sputum Color: Unable to Evaluate (03/06/18 2000)  Sputum Consistency: Unable to Evaluate (03/06/18 2000)               O2 (FiO2): 21 (03/06/18 2017)  O2 (LPM): 0 (03/07/18 0421)  O2 Daily Delivery Respiratory : Room Air with O2 Available (03/06/18 2017)    Breath Sounds  Pre/Post Intervention: Pre Intervention Assessment (03/06/18 0478)  RUL Breath Sounds: Diminished (03/06/18 2017)  RML Breath Sounds: Diminished (03/06/18 2017)  RLL Breath Sounds: Diminished (03/06/18 2017)  GARRETT Breath Sounds: Diminished (03/06/18 2017)  LLL Breath Sounds: Diminished (03/06/18 2017)      Events/Summary/Plan: SVN (03/06/18 1129)

## 2018-03-07 NOTE — CARE PLAN
Problem: Safety  Goal: Will remain free from injury  Outcome: PROGRESSING AS EXPECTED      Problem: Infection  Goal: Will remain free from infection  Outcome: PROGRESSING AS EXPECTED      Problem: Venous Thromboembolism (VTW)/Deep Vein Thrombosis (DVT) Prevention:  Goal: Patient will participate in Venous Thrombosis (VTE)/Deep Vein Thrombosis (DVT)Prevention Measures  Outcome: PROGRESSING AS EXPECTED      Problem: Knowledge Deficit  Goal: Knowledge of the prescribed therapeutic regimen will improve  Outcome: PROGRESSING AS EXPECTED

## 2018-03-07 NOTE — PROGRESS NOTES
Received report from Argelia, at pt bedside. Pt resting in bed, POC discussed. Call light and belongings within reach. Bed locked and in low position. Alarm on and fall precautions in place.

## 2018-03-07 NOTE — CARE PLAN
Problem: Communication  Goal: The ability to communicate needs accurately and effectively will improve  Outcome: PROGRESSING AS EXPECTED      Problem: Safety  Goal: Will remain free from injury  Outcome: PROGRESSING AS EXPECTED      Problem: Venous Thromboembolism (VTW)/Deep Vein Thrombosis (DVT) Prevention:  Goal: Patient will participate in Venous Thrombosis (VTE)/Deep Vein Thrombosis (DVT)Prevention Measures  Outcome: PROGRESSING AS EXPECTED      Problem: Respiratory:  Goal: Respiratory status will improve  Outcome: PROGRESSING AS EXPECTED

## 2018-03-07 NOTE — CARE PLAN
Problem: Knowledge Deficit  Goal: Knowledge of disease process/condition, treatment plan, diagnostic tests, and medications will improve  Outcome: PROGRESSING AS EXPECTED  Discussed POC with pt. Answered all questions appropriately. White board updated. All medications and interventions explained before performed. Pt verbalized understanding.    Problem: Respiratory:  Goal: Respiratory status will improve  Outcome: PROGRESSING AS EXPECTED  Will continue to assess and monitor breathing and O2 saturation.

## 2018-03-08 PROCEDURE — 770006 HCHG ROOM/CARE - MED/SURG/GYN SEMI*

## 2018-03-08 PROCEDURE — A9270 NON-COVERED ITEM OR SERVICE: HCPCS | Performed by: INTERNAL MEDICINE

## 2018-03-08 PROCEDURE — 700102 HCHG RX REV CODE 250 W/ 637 OVERRIDE(OP): Performed by: INTERNAL MEDICINE

## 2018-03-08 PROCEDURE — 700105 HCHG RX REV CODE 258: Performed by: INTERNAL MEDICINE

## 2018-03-08 PROCEDURE — 700111 HCHG RX REV CODE 636 W/ 250 OVERRIDE (IP): Performed by: HOSPITALIST

## 2018-03-08 PROCEDURE — 99233 SBSQ HOSP IP/OBS HIGH 50: CPT | Performed by: HOSPITALIST

## 2018-03-08 PROCEDURE — A9270 NON-COVERED ITEM OR SERVICE: HCPCS | Performed by: HOSPITALIST

## 2018-03-08 PROCEDURE — 94760 N-INVAS EAR/PLS OXIMETRY 1: CPT

## 2018-03-08 PROCEDURE — 700102 HCHG RX REV CODE 250 W/ 637 OVERRIDE(OP): Performed by: HOSPITALIST

## 2018-03-08 PROCEDURE — 94640 AIRWAY INHALATION TREATMENT: CPT

## 2018-03-08 PROCEDURE — 700101 HCHG RX REV CODE 250: Performed by: FAMILY MEDICINE

## 2018-03-08 PROCEDURE — 700111 HCHG RX REV CODE 636 W/ 250 OVERRIDE (IP): Performed by: INTERNAL MEDICINE

## 2018-03-08 RX ORDER — TRIAMTERENE AND HYDROCHLOROTHIAZIDE 37.5; 25 MG/1; MG/1
1 CAPSULE ORAL
Status: DISCONTINUED | OUTPATIENT
Start: 2018-03-08 | End: 2018-03-10 | Stop reason: HOSPADM

## 2018-03-08 RX ADMIN — CEFEPIME 2 G: 2 INJECTION, POWDER, FOR SOLUTION INTRAMUSCULAR; INTRAVENOUS at 18:25

## 2018-03-08 RX ADMIN — ASPIRIN 81 MG: 81 TABLET, CHEWABLE ORAL at 08:29

## 2018-03-08 RX ADMIN — VERAPAMIL HYDROCHLORIDE 120 MG: 120 CAPSULE, DELAYED RELEASE PELLETS ORAL at 20:53

## 2018-03-08 RX ADMIN — IPRATROPIUM BROMIDE AND ALBUTEROL SULFATE 3 ML: .5; 3 SOLUTION RESPIRATORY (INHALATION) at 07:37

## 2018-03-08 RX ADMIN — TEMAZEPAM 15 MG: 15 CAPSULE ORAL at 20:53

## 2018-03-08 RX ADMIN — BUDESONIDE AND FORMOTEROL FUMARATE DIHYDRATE 2 PUFF: 160; 4.5 AEROSOL RESPIRATORY (INHALATION) at 07:38

## 2018-03-08 RX ADMIN — ENOXAPARIN SODIUM 40 MG: 100 INJECTION SUBCUTANEOUS at 08:30

## 2018-03-08 RX ADMIN — CEFEPIME 2 G: 2 INJECTION, POWDER, FOR SOLUTION INTRAMUSCULAR; INTRAVENOUS at 11:26

## 2018-03-08 RX ADMIN — IPRATROPIUM BROMIDE AND ALBUTEROL SULFATE 3 ML: .5; 3 SOLUTION RESPIRATORY (INHALATION) at 21:54

## 2018-03-08 RX ADMIN — BUDESONIDE AND FORMOTEROL FUMARATE DIHYDRATE 2 PUFF: 160; 4.5 AEROSOL RESPIRATORY (INHALATION) at 21:55

## 2018-03-08 RX ADMIN — METOPROLOL TARTRATE 25 MG: 25 TABLET, FILM COATED ORAL at 08:30

## 2018-03-08 RX ADMIN — IPRATROPIUM BROMIDE AND ALBUTEROL SULFATE 3 ML: .5; 3 SOLUTION RESPIRATORY (INHALATION) at 15:20

## 2018-03-08 RX ADMIN — HYDRALAZINE HYDROCHLORIDE 20 MG: 20 INJECTION INTRAMUSCULAR; INTRAVENOUS at 08:46

## 2018-03-08 RX ADMIN — METHYLPREDNISOLONE SODIUM SUCCINATE 125 MG: 125 INJECTION, POWDER, FOR SOLUTION INTRAMUSCULAR; INTRAVENOUS at 20:53

## 2018-03-08 RX ADMIN — METHYLPREDNISOLONE SODIUM SUCCINATE 125 MG: 125 INJECTION, POWDER, FOR SOLUTION INTRAMUSCULAR; INTRAVENOUS at 14:15

## 2018-03-08 RX ADMIN — VALSARTAN 80 MG: 80 TABLET ORAL at 08:30

## 2018-03-08 RX ADMIN — TRIAMTERENE AND HYDROCHLOROTHIAZIDE 1 CAPSULE: 25; 37.5 CAPSULE ORAL at 17:40

## 2018-03-08 RX ADMIN — IPRATROPIUM BROMIDE AND ALBUTEROL SULFATE 3 ML: .5; 3 SOLUTION RESPIRATORY (INHALATION) at 11:28

## 2018-03-08 RX ADMIN — GUAIFENESIN 600 MG: 600 TABLET, EXTENDED RELEASE ORAL at 08:30

## 2018-03-08 RX ADMIN — METOPROLOL TARTRATE 25 MG: 25 TABLET, FILM COATED ORAL at 20:53

## 2018-03-08 RX ADMIN — CEFEPIME 2 G: 2 INJECTION, POWDER, FOR SOLUTION INTRAMUSCULAR; INTRAVENOUS at 03:51

## 2018-03-08 RX ADMIN — STANDARDIZED SENNA CONCENTRATE AND DOCUSATE SODIUM 2 TABLET: 8.6; 5 TABLET, FILM COATED ORAL at 08:30

## 2018-03-08 RX ADMIN — METHYLPREDNISOLONE SODIUM SUCCINATE 125 MG: 125 INJECTION, POWDER, FOR SOLUTION INTRAMUSCULAR; INTRAVENOUS at 05:01

## 2018-03-08 ASSESSMENT — ENCOUNTER SYMPTOMS
ABDOMINAL PAIN: 0
SHORTNESS OF BREATH: 1
GASTROINTESTINAL NEGATIVE: 1
WHEEZING: 1
NAUSEA: 0
LOSS OF CONSCIOUSNESS: 0
DEPRESSION: 0
MUSCULOSKELETAL NEGATIVE: 1
DIZZINESS: 0
WEIGHT LOSS: 0
CARDIOVASCULAR NEGATIVE: 1
INSOMNIA: 0
BRUISES/BLEEDS EASILY: 0
CONSTITUTIONAL NEGATIVE: 1
SPUTUM PRODUCTION: 1
HEADACHES: 1
FEVER: 0
COUGH: 1
VOMITING: 0
NERVOUS/ANXIOUS: 0
MYALGIAS: 0
FLANK PAIN: 0
CHILLS: 0
BACK PAIN: 0
WEAKNESS: 0

## 2018-03-08 ASSESSMENT — PAIN SCALES - GENERAL
PAINLEVEL_OUTOF10: 0

## 2018-03-08 NOTE — PROGRESS NOTES
Patient resting quietly in bed, no S/S of distress, AA&Ox4. Denies SOB, chest pain, dizziness. Medications and IV antibiotics given per MAR. Call light within reach, pt calls appropriately, up self to restroom. Bed in lowest position, bed locked, RN and CNA numbers provided, no further needs at this time. No changes from EPIC. Hourly rounding in place.

## 2018-03-08 NOTE — CARE PLAN
Problem: Communication  Goal: The ability to communicate needs accurately and effectively will improve  Outcome: PROGRESSING AS EXPECTED  Pt educated on POC and medications.     Problem: Infection  Goal: Will remain free from infection  Outcome: PROGRESSING AS EXPECTED  Assessed pt for signs and symptoms of infection. Handwashing performed before and after pt encounter. Scrubbed the hub for 10-15 seconds before administering IV medications.

## 2018-03-08 NOTE — CARE PLAN
Problem: Bronchoconstriction:  Goal: Improve in air movement and diminished wheezing  Outcome: PROGRESSING AS EXPECTED    Intervention: Implement inhaled treatments  DUO QID   Symbicort BID

## 2018-03-08 NOTE — FLOWSHEET NOTE
03/07/18 1922   Interdisciplinary Plan of Care-Goals (Indications)   Obstructive Ventilatory Defect or Pulmonary Disease without Obvious Obstruction History / Diagnosis   Interdisciplinary Plan of Care-Outcomes    Bronchodilator Outcome Diminished Wheezing and Volume of Air Movement Increased   Education   Education Yes - Pt. / Family has been Instructed in use of Respiratory Equipment;Yes - Pt. / Family has been Instructed in use of Respiratory Medications and Adverse Reactions   RT Assessment of Delivered Medications   Evaluation of Medication Delivery Daily Yes-- Pt /Family has been Instructed in use of Respiratory Medications and Adverse Reactions   SVN Group   #SVN Performed Yes   Given By: Mouthpiece   MDI/DPI Group   #MDI/DPI Given MDI/DPI x 1   Respiratory WDL   Respiratory (WDL) X   Chest Exam   Work Of Breathing / Effort Mild   Respiration 18   Pulse 96   Breath Sounds   Pre/Post Intervention Post Intervention Assessment   RUL Breath Sounds Expiratory Wheezes   RML Breath Sounds Expiratory Wheezes   RLL Breath Sounds Diminished   GARRETT Breath Sounds Expiratory Wheezes   LLL Breath Sounds Diminished   Oximetry   Continuous Oximetry Yes   Oxygen   Pulse Oximetry 94 %   O2 (LPM) 0   O2 (FiO2) 21   O2 Daily Delivery Respiratory  Room Air with O2 Available

## 2018-03-08 NOTE — PROGRESS NOTES
Assumed care of patient bedside report received from RANDY Elder updated on POC, call light within reach and fall precautions in place. Bed locked and in lowest position. Patient instructed to call for assistance before getting out of bed. All questions answered, no other needs at this time.

## 2018-03-08 NOTE — CARE PLAN
Problem: Safety  Goal: Will remain free from falls  Outcome: PROGRESSING AS EXPECTED  Fall precautions in place: treaded slipper socks on, mobility signs posted, hourly rounding, bed in lowest position, belongings and call light are within reach    Problem: Venous Thromboembolism (VTW)/Deep Vein Thrombosis (DVT) Prevention:  Goal: Patient will participate in Venous Thrombosis (VTE)/Deep Vein Thrombosis (DVT)Prevention Measures  Outcome: PROGRESSING AS EXPECTED  Pt given Lovenox for DVT/VTE prophylaxis.  Pt ambulates frequently in room and halls.    Problem: Respiratory:  Goal: Respiratory status will improve  Outcome: PROGRESSING AS EXPECTED  On room air, sats above 90%. Patient verbalizes understanding of oxygen and other therapeutic interventions. Patient participates in procedures to optimize oxygenation and in management regimen within level of capability/condition. Patient displays no symptoms of respiratory distress.

## 2018-03-08 NOTE — PROGRESS NOTES
[facility] Hospitalist Progress Note    Date of Service: 3/7/2018    Chief Complaint  67 y.o. female admitted 3/3/2018 with shortness of breath cough, wheezing; found to have pneumonia. Recently in the hospital.    Interval Problem Update  She complains of wheezing still short of breath and coughing. She is very concerned about her blood pressure. Patient had considerable shortness of breath just with using the restroom today.    Consultants/Specialty  None    Disposition  Home when medically cleared        Review of Systems   Constitutional: Negative.  Negative for chills, fever, malaise/fatigue and weight loss.   HENT: Negative.    Respiratory: Positive for cough, sputum production, shortness of breath and wheezing.    Cardiovascular: Negative.  Negative for chest pain and leg swelling.   Gastrointestinal: Negative.  Negative for abdominal pain, nausea and vomiting.   Genitourinary: Negative.  Negative for dysuria and flank pain.   Musculoskeletal: Negative.  Negative for back pain and myalgias.   Neurological: Negative.  Negative for dizziness, loss of consciousness and weakness.   Endo/Heme/Allergies: Negative.  Does not bruise/bleed easily.   Psychiatric/Behavioral: Negative for depression. The patient is not nervous/anxious and does not have insomnia.    All other systems reviewed and are negative.     Physical Exam  Laboratory/Imaging   Hemodynamics  Temp (24hrs), Av.6 °C (97.9 °F), Min:36 °C (96.8 °F), Max:37.1 °C (98.8 °F)   Temperature: 36.9 °C (98.5 °F)  Pulse  Av.4  Min: 78  Max: 110    Blood Pressure : (!) 162/87      Respiratory      Respiration: 16, Pulse Oximetry: 95 %, O2 Daily Delivery Respiratory : Room Air with O2 Available     Given By:: Mouthpiece, #MDI/DPI Given: MDI/DPI x 1, Work Of Breathing / Effort: Mild  RUL Breath Sounds: Expiratory Wheezes, RML Breath Sounds: Diminished, RLL Breath Sounds: Diminished, GARRETT Breath Sounds: Expiratory Wheezes, LLL Breath Sounds:  Diminished    Fluids    Intake/Output Summary (Last 24 hours) at 03/07/18 1715  Last data filed at 03/06/18 2000   Gross per 24 hour   Intake               60 ml   Output                0 ml   Net               60 ml       Nutrition  Orders Placed This Encounter   Procedures   • Diet Order     Standing Status:   Standing     Number of Occurrences:   1     Order Specific Question:   Diet:     Answer:   Regular [1]     Physical Exam   Constitutional: She is oriented to person, place, and time. She appears well-developed and well-nourished. No distress.   HENT:   Nose: Nose normal.   Mouth/Throat: Oropharynx is clear and moist. No oropharyngeal exudate.   Eyes: Conjunctivae are normal. Right eye exhibits no discharge. Left eye exhibits no discharge. No scleral icterus.   Neck: No JVD present. No tracheal deviation present.   Cardiovascular: Normal rate, regular rhythm and normal heart sounds.    Pulmonary/Chest: No stridor. She is in respiratory distress. She has wheezes. She has no rales. She exhibits no tenderness.   Abdominal: Soft. Bowel sounds are normal. She exhibits no distension. There is no tenderness.   Musculoskeletal: She exhibits no edema or tenderness.   Neurological: She is alert and oriented to person, place, and time. No cranial nerve deficit. She exhibits normal muscle tone.   Skin: Skin is warm and dry. She is not diaphoretic. No pallor.   Psychiatric: She has a normal mood and affect. Her behavior is normal.   Nursing note and vitals reviewed.      Recent Labs      03/05/18 0418 03/06/18   0252   WBC  6.9  6.2   RBC  4.50  4.39   HEMOGLOBIN  14.7  14.3   HEMATOCRIT  43.5  42.3   MCV  96.7  96.4   MCH  32.7  32.6   MCHC  33.8  33.8   RDW  45.8  45.0   PLATELETCT  177  202   MPV  9.6  9.5     Recent Labs      03/05/18   0419  03/06/18   0252   SODIUM  138  136   POTASSIUM  3.4*  3.9   CHLORIDE  104  108   CO2  22  20   GLUCOSE  96  106*   BUN  10  14   CREATININE  0.86  0.75   CALCIUM  8.7  9.8                       Assessment/Plan     HCAP (healthcare-associated pneumonia)- (present on admission)   Assessment & Plan    Continue treatment with cefepime  Stopped vancomycin MRSA swab of nares is negative  RT protocol and treatment of copd exacerbation associated with pneumonia        Mild persistent asthma with acute exacerbation- (present on admission)   Assessment & Plan    Not improving still wheezing despite breathing treatments and methylprednisolone  Changed to IV methylprednisolone increase to 125 mg, every 8 hours and observe.  Continue RT protocol.          Hypertension- (present on admission)   Assessment & Plan    Continue valsartan and verapamil  Pressures still quite high and with heart rate in the 90s will start metoprolol 25 mg twice daily and observe.        Malignant neoplasm of upper-outer quadrant of right female breast (CMS-HCC)- (present on admission)   Assessment & Plan    Status post right mastectomy and chemotherapy  Follow-up with oncology        Insomnia- (present on admission)   Assessment & Plan    Restoril helping, continue.          Quality-Core Measures   Reviewed items::  Labs reviewed, Medications reviewed and Radiology images reviewed

## 2018-03-09 PROCEDURE — 700102 HCHG RX REV CODE 250 W/ 637 OVERRIDE(OP): Performed by: HOSPITALIST

## 2018-03-09 PROCEDURE — 99233 SBSQ HOSP IP/OBS HIGH 50: CPT | Performed by: INTERNAL MEDICINE

## 2018-03-09 PROCEDURE — 700105 HCHG RX REV CODE 258: Performed by: INTERNAL MEDICINE

## 2018-03-09 PROCEDURE — 770006 HCHG ROOM/CARE - MED/SURG/GYN SEMI*

## 2018-03-09 PROCEDURE — 700111 HCHG RX REV CODE 636 W/ 250 OVERRIDE (IP): Performed by: NURSE PRACTITIONER

## 2018-03-09 PROCEDURE — 700101 HCHG RX REV CODE 250: Performed by: FAMILY MEDICINE

## 2018-03-09 PROCEDURE — 94640 AIRWAY INHALATION TREATMENT: CPT

## 2018-03-09 PROCEDURE — 700102 HCHG RX REV CODE 250 W/ 637 OVERRIDE(OP): Performed by: INTERNAL MEDICINE

## 2018-03-09 PROCEDURE — 94760 N-INVAS EAR/PLS OXIMETRY 1: CPT

## 2018-03-09 PROCEDURE — 700111 HCHG RX REV CODE 636 W/ 250 OVERRIDE (IP): Performed by: INTERNAL MEDICINE

## 2018-03-09 PROCEDURE — A9270 NON-COVERED ITEM OR SERVICE: HCPCS | Performed by: INTERNAL MEDICINE

## 2018-03-09 PROCEDURE — A9270 NON-COVERED ITEM OR SERVICE: HCPCS | Performed by: NURSE PRACTITIONER

## 2018-03-09 PROCEDURE — 700102 HCHG RX REV CODE 250 W/ 637 OVERRIDE(OP): Performed by: NURSE PRACTITIONER

## 2018-03-09 PROCEDURE — A9270 NON-COVERED ITEM OR SERVICE: HCPCS | Performed by: HOSPITALIST

## 2018-03-09 PROCEDURE — 700111 HCHG RX REV CODE 636 W/ 250 OVERRIDE (IP): Performed by: HOSPITALIST

## 2018-03-09 RX ORDER — METHYLPREDNISOLONE SODIUM SUCCINATE 125 MG/2ML
62.5 INJECTION, POWDER, LYOPHILIZED, FOR SOLUTION INTRAMUSCULAR; INTRAVENOUS EVERY 8 HOURS
Status: DISCONTINUED | OUTPATIENT
Start: 2018-03-09 | End: 2018-03-10 | Stop reason: HOSPADM

## 2018-03-09 RX ADMIN — METHYLPREDNISOLONE SODIUM SUCCINATE 62.5 MG: 125 INJECTION, POWDER, FOR SOLUTION INTRAMUSCULAR; INTRAVENOUS at 20:57

## 2018-03-09 RX ADMIN — IPRATROPIUM BROMIDE AND ALBUTEROL SULFATE 3 ML: .5; 3 SOLUTION RESPIRATORY (INHALATION) at 08:01

## 2018-03-09 RX ADMIN — IPRATROPIUM BROMIDE AND ALBUTEROL SULFATE 3 ML: .5; 3 SOLUTION RESPIRATORY (INHALATION) at 22:30

## 2018-03-09 RX ADMIN — METOPROLOL TARTRATE 50 MG: 25 TABLET, FILM COATED ORAL at 08:50

## 2018-03-09 RX ADMIN — CEFEPIME 2 G: 2 INJECTION, POWDER, FOR SOLUTION INTRAMUSCULAR; INTRAVENOUS at 18:10

## 2018-03-09 RX ADMIN — ASPIRIN 81 MG: 81 TABLET, CHEWABLE ORAL at 08:51

## 2018-03-09 RX ADMIN — METOPROLOL TARTRATE 50 MG: 25 TABLET, FILM COATED ORAL at 20:57

## 2018-03-09 RX ADMIN — GUAIFENESIN 600 MG: 600 TABLET, EXTENDED RELEASE ORAL at 20:57

## 2018-03-09 RX ADMIN — CEFEPIME 2 G: 2 INJECTION, POWDER, FOR SOLUTION INTRAMUSCULAR; INTRAVENOUS at 02:49

## 2018-03-09 RX ADMIN — ENOXAPARIN SODIUM 40 MG: 100 INJECTION SUBCUTANEOUS at 08:51

## 2018-03-09 RX ADMIN — HYDRALAZINE HYDROCHLORIDE 20 MG: 20 INJECTION INTRAMUSCULAR; INTRAVENOUS at 02:54

## 2018-03-09 RX ADMIN — CEFEPIME 2 G: 2 INJECTION, POWDER, FOR SOLUTION INTRAMUSCULAR; INTRAVENOUS at 12:01

## 2018-03-09 RX ADMIN — BUDESONIDE AND FORMOTEROL FUMARATE DIHYDRATE 2 PUFF: 160; 4.5 AEROSOL RESPIRATORY (INHALATION) at 08:05

## 2018-03-09 RX ADMIN — GUAIFENESIN 600 MG: 600 TABLET, EXTENDED RELEASE ORAL at 08:51

## 2018-03-09 RX ADMIN — STANDARDIZED SENNA CONCENTRATE AND DOCUSATE SODIUM 2 TABLET: 8.6; 5 TABLET, FILM COATED ORAL at 20:57

## 2018-03-09 RX ADMIN — IPRATROPIUM BROMIDE AND ALBUTEROL SULFATE 3 ML: .5; 3 SOLUTION RESPIRATORY (INHALATION) at 15:15

## 2018-03-09 RX ADMIN — BUDESONIDE AND FORMOTEROL FUMARATE DIHYDRATE 2 PUFF: 160; 4.5 AEROSOL RESPIRATORY (INHALATION) at 20:57

## 2018-03-09 RX ADMIN — TEMAZEPAM 15 MG: 15 CAPSULE ORAL at 21:05

## 2018-03-09 RX ADMIN — STANDARDIZED SENNA CONCENTRATE AND DOCUSATE SODIUM 2 TABLET: 8.6; 5 TABLET, FILM COATED ORAL at 08:50

## 2018-03-09 RX ADMIN — IPRATROPIUM BROMIDE AND ALBUTEROL SULFATE 3 ML: .5; 3 SOLUTION RESPIRATORY (INHALATION) at 11:12

## 2018-03-09 RX ADMIN — METHYLPREDNISOLONE SODIUM SUCCINATE 125 MG: 125 INJECTION, POWDER, FOR SOLUTION INTRAMUSCULAR; INTRAVENOUS at 06:09

## 2018-03-09 RX ADMIN — TRIAMTERENE AND HYDROCHLOROTHIAZIDE 1 CAPSULE: 25; 37.5 CAPSULE ORAL at 08:51

## 2018-03-09 RX ADMIN — METHYLPREDNISOLONE SODIUM SUCCINATE 62.5 MG: 125 INJECTION, POWDER, FOR SOLUTION INTRAMUSCULAR; INTRAVENOUS at 14:53

## 2018-03-09 RX ADMIN — VERAPAMIL HYDROCHLORIDE 120 MG: 120 CAPSULE, DELAYED RELEASE PELLETS ORAL at 20:57

## 2018-03-09 RX ADMIN — VALSARTAN 80 MG: 80 TABLET ORAL at 08:51

## 2018-03-09 ASSESSMENT — ENCOUNTER SYMPTOMS
DEPRESSION: 0
CONSTIPATION: 0
DIARRHEA: 0
PALPITATIONS: 0
SORE THROAT: 0
NERVOUS/ANXIOUS: 0
TINGLING: 0
SHORTNESS OF BREATH: 0
NAUSEA: 0
MYALGIAS: 0
WHEEZING: 1
SENSORY CHANGE: 0
COUGH: 1
HEADACHES: 0
DIZZINESS: 0
SPUTUM PRODUCTION: 0
CHILLS: 0
INSOMNIA: 0
TREMORS: 0
VOMITING: 0
FEVER: 0
DOUBLE VISION: 0
ABDOMINAL PAIN: 0
WEAKNESS: 0
BRUISES/BLEEDS EASILY: 0
BLURRED VISION: 0

## 2018-03-09 ASSESSMENT — PAIN SCALES - GENERAL
PAINLEVEL_OUTOF10: 4
PAINLEVEL_OUTOF10: 0
PAINLEVEL_OUTOF10: 0

## 2018-03-09 NOTE — CARE PLAN
Problem: Safety  Goal: Will remain free from falls  Outcome: PROGRESSING AS EXPECTED  Pt up self with steady gait, fall precautions in place     Problem: Respiratory:  Goal: Respiratory status will improve  Outcome: PROGRESSING AS EXPECTED  Pt maintaining >90% O2 sats on RA

## 2018-03-09 NOTE — PROGRESS NOTES
Pt arrived to floor. AAOx4, STAS. Denies pain. Denies SOB, satting well on RA. Up self in room with steady gait. Some wheezing heard on expiration. Oriented to room. Call light given. Denies further needs.

## 2018-03-09 NOTE — PROGRESS NOTES
Assumed care at 1915. Bedside report received from Day RN Gricelda. Patient's chart and MAR reviewed. 12 hour chart check complete. Patient was updated on plan of care for the night. Questions answered and concerns addressed.  Pt denies any additional needs at this time. White board updated. Call light, phone and personal belongings within reach. Vital signs stable

## 2018-03-09 NOTE — CARE PLAN
Problem: Safety  Goal: Will remain free from falls    Intervention: Assess risk factors for falls  Up ad jeanne with steady gait.       Problem: Respiratory:  Goal: Respiratory status will improve    Intervention: Assess and monitor pulmonary status  Denies SOB. On RA saturating greater than 90%.

## 2018-03-09 NOTE — PROGRESS NOTES
Pt transferred from T827-1 to S189-2 with this RN. Pt states she will update family of her transfer. All belongings with patient. Medications and chart tubed to floor.

## 2018-03-09 NOTE — PROGRESS NOTES
[facility] Hospitalist Progress Note    Date of Service: 3/9/2018    Chief Complaint  67 y.o. female admitted 3/3/2018 with shortness of breath cough, wheezing; found to have pneumonia. Recently in the hospital.    Interval Problem Update  Wheezing and shortness of breath much improved.  Wean steroids.  Stable on RA.  VSS.  Afebrile.    Consultants/Specialty  None    Disposition  Home when medically cleared        Review of Systems   Constitutional: Negative for chills and fever.   HENT: Negative for congestion and sore throat.    Eyes: Negative for blurred vision and double vision.   Respiratory: Positive for cough and wheezing. Negative for sputum production and shortness of breath.    Cardiovascular: Negative for chest pain, palpitations and leg swelling.   Gastrointestinal: Negative for abdominal pain, constipation, diarrhea, nausea and vomiting.   Genitourinary: Negative for dysuria.   Musculoskeletal: Negative for myalgias.   Skin: Negative for itching and rash.   Neurological: Negative for dizziness, tingling, tremors, sensory change, weakness and headaches.   Endo/Heme/Allergies: Negative.  Does not bruise/bleed easily.   Psychiatric/Behavioral: Negative for depression. The patient is not nervous/anxious and does not have insomnia.       Physical Exam  Laboratory/Imaging   Hemodynamics  Temp (24hrs), Av.6 °C (97.9 °F), Min:36.3 °C (97.3 °F), Max:36.8 °C (98.3 °F)   Temperature: 36.8 °C (98.2 °F)  Pulse  Av.4  Min: 78  Max: 110    Blood Pressure : 160/80      Respiratory      Respiration: 18, Pulse Oximetry: 96 %, O2 Daily Delivery Respiratory : Room Air with O2 Available     Given By:: Mouthpiece, #MDI/DPI Given: MDI/DPI x 1, Work Of Breathing / Effort: Mild  RUL Breath Sounds: Expiratory Wheezes, RML Breath Sounds: Expiratory Wheezes, RLL Breath Sounds: Diminished, GARRETT Breath Sounds: Expiratory Wheezes, LLL Breath Sounds: Diminished    Fluids  No intake or output data in the 24 hours ending 18  1347    Nutrition  Orders Placed This Encounter   Procedures   • Diet Order     Standing Status:   Standing     Number of Occurrences:   1     Order Specific Question:   Diet:     Answer:   Regular [1]     Physical Exam   Constitutional: She is oriented to person, place, and time. She appears well-developed and well-nourished.   HENT:   Head: Normocephalic and atraumatic.   Eyes: Conjunctivae are normal. No scleral icterus.   Neck: Neck supple. No tracheal deviation present.   Cardiovascular: Normal rate, regular rhythm and normal heart sounds.    No murmur heard.  Pulmonary/Chest: Effort normal. No stridor. No respiratory distress. She has wheezes.   Abdominal: Soft. Bowel sounds are normal. She exhibits no distension. There is no tenderness. There is no rebound.   Musculoskeletal: She exhibits no edema.   Neurological: She is alert and oriented to person, place, and time. No cranial nerve deficit.   Skin: Skin is warm and dry. She is not diaphoretic. No erythema.   Psychiatric: She has a normal mood and affect. Her behavior is normal.   Nursing note and vitals reviewed.                               Assessment/Plan     HCAP (healthcare-associated pneumonia)- (present on admission)   Assessment & Plan    Symptoms much improved.  Continue treatment with cefepime  Nasal swab positive for MRSA, likely colonization.  No need to treat.  Continue RT protocol, BT, and supplemental o2.        Mild persistent asthma with acute exacerbation- (present on admission)   Assessment & Plan    Wheezing much improved.  Stable on RA.  Start tapering IV steroids.  Continue RT protocol.            Hypertension- (present on admission)   Assessment & Plan    Continue valsartan, triamterene/hctz, and verapamil  Increase metoprolol.          Malignant neoplasm of upper-outer quadrant of right female breast (CMS-HCC)- (present on admission)   Assessment & Plan    Status post right mastectomy and chemotherapy  Continue outpatient f/u with  oncology.        Insomnia- (present on admission)   Assessment & Plan    Continue restoril.          Quality-Core Measures   Reviewed items::  Labs reviewed and Medications reviewed  Ramírez catheter::  No Ramírez

## 2018-03-09 NOTE — PROGRESS NOTES
Report received from night shift RN and care accepted with preceptor RN. Patient is A&Ox4, pleasant, and voices no further concerns at this time.

## 2018-03-09 NOTE — PROGRESS NOTES
[facility] Hospitalist Progress Note    Date of Service: 3/8/2018    Chief Complaint  67 y.o. female admitted 3/3/2018 with shortness of breath cough, wheezing; found to have pneumonia. Recently in the hospital.    Interval Problem Update  Shortness of breath and wheezing still present but improving. Blood pressures remain high she had received several doses of IV hydralazine in last 24 hours. She has a slight headache.    Consultants/Specialty  None    Disposition  Home when medically cleared        Review of Systems   Constitutional: Negative.  Negative for chills, fever, malaise/fatigue and weight loss.   HENT: Negative.    Respiratory: Positive for cough, sputum production, shortness of breath and wheezing.    Cardiovascular: Negative.  Negative for chest pain and leg swelling.   Gastrointestinal: Negative.  Negative for abdominal pain, nausea and vomiting.   Genitourinary: Negative.  Negative for dysuria and flank pain.   Musculoskeletal: Negative.  Negative for back pain and myalgias.   Neurological: Positive for headaches. Negative for dizziness, loss of consciousness and weakness.   Endo/Heme/Allergies: Negative.  Does not bruise/bleed easily.   Psychiatric/Behavioral: Negative for depression. The patient is not nervous/anxious and does not have insomnia.    All other systems reviewed and are negative.     Physical Exam  Laboratory/Imaging   Hemodynamics  Temp (24hrs), Av.8 °C (98.3 °F), Min:36.7 °C (98 °F), Max:37.3 °C (99.1 °F)   Temperature: 36.7 °C (98 °F)  Pulse  Av  Min: 78  Max: 110    Blood Pressure : 144/70      Respiratory      Respiration: 20, Pulse Oximetry: 95 %, O2 Daily Delivery Respiratory : Room Air with O2 Available     Given By:: Mouthpiece, #MDI/DPI Given: MDI/DPI x 1, Work Of Breathing / Effort: Mild  RUL Breath Sounds: Expiratory Wheezes, RML Breath Sounds: Expiratory Wheezes, RLL Breath Sounds: Diminished, GARRETT Breath Sounds: Expiratory Wheezes, LLL Breath Sounds:  Diminished    Fluids    Intake/Output Summary (Last 24 hours) at 03/08/18 1649  Last data filed at 03/07/18 2000   Gross per 24 hour   Intake              120 ml   Output                0 ml   Net              120 ml       Nutrition  Orders Placed This Encounter   Procedures   • Diet Order     Standing Status:   Standing     Number of Occurrences:   1     Order Specific Question:   Diet:     Answer:   Regular [1]     Physical Exam   Constitutional: She is oriented to person, place, and time. She appears well-developed and well-nourished. No distress.   HENT:   Nose: Nose normal.   Mouth/Throat: Oropharynx is clear and moist. No oropharyngeal exudate.   Eyes: Conjunctivae are normal. Right eye exhibits no discharge. Left eye exhibits no discharge. No scleral icterus.   Neck: No JVD present. No tracheal deviation present.   Cardiovascular: Normal rate, regular rhythm and normal heart sounds.    Pulmonary/Chest: No stridor. She is in respiratory distress. She has wheezes. She has no rales. She exhibits no tenderness.   Abdominal: Soft. Bowel sounds are normal. She exhibits no distension. There is no tenderness.   Musculoskeletal: She exhibits no edema or tenderness.   Neurological: She is alert and oriented to person, place, and time. No cranial nerve deficit. She exhibits normal muscle tone.   Skin: Skin is warm and dry. She is not diaphoretic. No pallor.   Psychiatric: She has a normal mood and affect. Her behavior is normal.   Nursing note and vitals reviewed.      Recent Labs      03/06/18   0252   WBC  6.2   RBC  4.39   HEMOGLOBIN  14.3   HEMATOCRIT  42.3   MCV  96.4   MCH  32.6   MCHC  33.8   RDW  45.0   PLATELETCT  202   MPV  9.5     Recent Labs      03/06/18   0252   SODIUM  136   POTASSIUM  3.9   CHLORIDE  108   CO2  20   GLUCOSE  106*   BUN  14   CREATININE  0.75   CALCIUM  9.8                      Assessment/Plan     HCAP (healthcare-associated pneumonia)- (present on admission)   Assessment & Plan     Continue treatment with cefepime  Stopped vancomycin MRSA swab of nares is negative  RT protocol and treatment of copd exacerbation associated with pneumonia        Mild persistent asthma with acute exacerbation- (present on admission)   Assessment & Plan    Not improving still wheezing despite breathing treatments and methylprednisolone  Changed to IV methylprednisolone increase to 125 mg, every 8 hours starting to improve.  Continue RT protocol.          Hypertension- (present on admission)   Assessment & Plan    Continue valsartan, metoprolol and verapamil  Pressures still quite high, try addition of dyazide  Continue IV hydralazine prn        Malignant neoplasm of upper-outer quadrant of right female breast (CMS-HCC)- (present on admission)   Assessment & Plan    Status post right mastectomy and chemotherapy  Follow-up with oncology        Insomnia- (present on admission)   Assessment & Plan    Restoril helping, continue.          Quality-Core Measures   Reviewed items::  Labs reviewed, Medications reviewed and Radiology images reviewed

## 2018-03-10 VITALS
HEART RATE: 84 BPM | HEIGHT: 62 IN | WEIGHT: 174.6 LBS | OXYGEN SATURATION: 93 % | BODY MASS INDEX: 32.13 KG/M2 | DIASTOLIC BLOOD PRESSURE: 87 MMHG | SYSTOLIC BLOOD PRESSURE: 155 MMHG | RESPIRATION RATE: 16 BRPM | TEMPERATURE: 97.5 F

## 2018-03-10 PROCEDURE — 700105 HCHG RX REV CODE 258: Performed by: INTERNAL MEDICINE

## 2018-03-10 PROCEDURE — 94640 AIRWAY INHALATION TREATMENT: CPT

## 2018-03-10 PROCEDURE — 700111 HCHG RX REV CODE 636 W/ 250 OVERRIDE (IP): Performed by: NURSE PRACTITIONER

## 2018-03-10 PROCEDURE — 99239 HOSP IP/OBS DSCHRG MGMT >30: CPT | Performed by: INTERNAL MEDICINE

## 2018-03-10 PROCEDURE — 700102 HCHG RX REV CODE 250 W/ 637 OVERRIDE(OP): Performed by: HOSPITALIST

## 2018-03-10 PROCEDURE — A9270 NON-COVERED ITEM OR SERVICE: HCPCS | Performed by: INTERNAL MEDICINE

## 2018-03-10 PROCEDURE — A9270 NON-COVERED ITEM OR SERVICE: HCPCS | Performed by: HOSPITALIST

## 2018-03-10 PROCEDURE — 94760 N-INVAS EAR/PLS OXIMETRY 1: CPT

## 2018-03-10 PROCEDURE — 700102 HCHG RX REV CODE 250 W/ 637 OVERRIDE(OP): Performed by: INTERNAL MEDICINE

## 2018-03-10 PROCEDURE — 700111 HCHG RX REV CODE 636 W/ 250 OVERRIDE (IP): Performed by: INTERNAL MEDICINE

## 2018-03-10 PROCEDURE — A9270 NON-COVERED ITEM OR SERVICE: HCPCS | Performed by: NURSE PRACTITIONER

## 2018-03-10 PROCEDURE — 700102 HCHG RX REV CODE 250 W/ 637 OVERRIDE(OP): Performed by: NURSE PRACTITIONER

## 2018-03-10 PROCEDURE — 700101 HCHG RX REV CODE 250: Performed by: FAMILY MEDICINE

## 2018-03-10 RX ORDER — METOPROLOL TARTRATE 50 MG/1
50 TABLET, FILM COATED ORAL 2 TIMES DAILY
Qty: 60 TAB | Refills: 3 | Status: SHIPPED | OUTPATIENT
Start: 2018-03-10 | End: 2018-12-09

## 2018-03-10 RX ORDER — PREDNISONE 20 MG/1
TABLET ORAL
Qty: 13 TAB | Refills: 0 | Status: SHIPPED | OUTPATIENT
Start: 2018-03-10 | End: 2018-03-14

## 2018-03-10 RX ADMIN — METHYLPREDNISOLONE SODIUM SUCCINATE 62.5 MG: 125 INJECTION, POWDER, FOR SOLUTION INTRAMUSCULAR; INTRAVENOUS at 04:28

## 2018-03-10 RX ADMIN — IPRATROPIUM BROMIDE AND ALBUTEROL SULFATE 3 ML: .5; 3 SOLUTION RESPIRATORY (INHALATION) at 10:18

## 2018-03-10 RX ADMIN — CEFEPIME 2 G: 2 INJECTION, POWDER, FOR SOLUTION INTRAMUSCULAR; INTRAVENOUS at 04:28

## 2018-03-10 RX ADMIN — METOPROLOL TARTRATE 50 MG: 25 TABLET, FILM COATED ORAL at 08:15

## 2018-03-10 RX ADMIN — ASPIRIN 81 MG: 81 TABLET, CHEWABLE ORAL at 08:13

## 2018-03-10 RX ADMIN — BUDESONIDE AND FORMOTEROL FUMARATE DIHYDRATE 2 PUFF: 160; 4.5 AEROSOL RESPIRATORY (INHALATION) at 06:50

## 2018-03-10 RX ADMIN — IPRATROPIUM BROMIDE AND ALBUTEROL SULFATE 3 ML: .5; 3 SOLUTION RESPIRATORY (INHALATION) at 06:48

## 2018-03-10 RX ADMIN — GUAIFENESIN 600 MG: 600 TABLET, EXTENDED RELEASE ORAL at 08:14

## 2018-03-10 RX ADMIN — TRIAMTERENE AND HYDROCHLOROTHIAZIDE 1 CAPSULE: 25; 37.5 CAPSULE ORAL at 08:14

## 2018-03-10 RX ADMIN — ENOXAPARIN SODIUM 40 MG: 100 INJECTION SUBCUTANEOUS at 08:15

## 2018-03-10 RX ADMIN — VALSARTAN 80 MG: 80 TABLET ORAL at 08:14

## 2018-03-10 ASSESSMENT — PAIN SCALES - GENERAL
PAINLEVEL_OUTOF10: 0

## 2018-03-10 ASSESSMENT — LIFESTYLE VARIABLES: EVER_SMOKED: NEVER

## 2018-03-10 NOTE — PROGRESS NOTES
Patient VSS, alert and oriented x 4, motor strength 5/5.  Discharge instructions and medications reviewed.  Return to work note, prescriptions, and medication held in pharmacy were given to patient.

## 2018-03-10 NOTE — DISCHARGE INSTRUCTIONS
Discharge Instructions    Discharged to home by car with relative. Discharged via wheelchair, hospital escort: Yes.  Special equipment needed: Not Applicable    Be sure to schedule a follow-up appointment with your primary care doctor or any specialists as instructed.     Discharge Plan:   Diet Plan: Discussed  Activity Level: Discussed  Confirmed Follow up Appointment: No (Comments)  Confirmed Symptoms Management: Discussed  Medication Reconciliation Updated: Yes  Influenza Vaccine Indication: Not indicated: Previously immunized this influenza season and > 8 years of age    I understand that a diet low in cholesterol, fat, and sodium is recommended for good health. Unless I have been given specific instructions below for another diet, I accept this instruction as my diet prescription.   Other diet: Regular     Special Instructions: None    · Is patient discharged on Warfarin / Coumadin?   No     Depression / Suicide Risk    As you are discharged from this [facility] facility, it is important to learn how to keep safe from harming yourself.    Recognize the warning signs:  · Abrupt changes in personality, positive or negative- including increase in energy   · Giving away possessions  · Change in eating patterns- significant weight changes-  positive or negative  · Change in sleeping patterns- unable to sleep or sleeping all the time   · Unwillingness or inability to communicate  · Depression  · Unusual sadness, discouragement and loneliness  · Talk of wanting to die  · Neglect of personal appearance   · Rebelliousness- reckless behavior  · Withdrawal from people/activities they love  · Confusion- inability to concentrate     If you or a loved one observes any of these behaviors or has concerns about self-harm, here's what you can do:  · Talk about it- your feelings and reasons for harming yourself  · Remove any means that you might use to hurt yourself (examples: pills, rope, extension cords, firearm)  · Get  professional help from the community (Mental Health, Substance Abuse, psychological counseling)  · Do not be alone:Call your Safe Contact- someone whom you trust who will be there for you.  · Call your local CRISIS HOTLINE 670-3527 or 426-915-2113  · Call your local Children's Mobile Crisis Response Team [place] (610) 476-7596 or www.[employer]  · Call the toll free National Suicide Prevention Hotlines   · National Suicide Prevention Lifeline 533-257-SHJM (3312)  · National Hope Line Network 800-SUICIDE (251-2769)

## 2018-03-10 NOTE — DISCHARGE SUMMARY
CHIEF COMPLAINT ON ADMISSION  Shortness of breath, cough    HPI & HOSPITAL COURSE  This is a 67 y.o. year old female with a past medical history of asthma, breast cancer status post right mastectomy and chemotherapy here with complaints of worsening shortness of breath and productive cough. The patient had recently been discharged in January for treatment of pneumonia. The patient on admission was treated for recurrent pneumonia and due to her recent hospitalization she was started on vancomycin and cefepime. The patient was ultimately taken off of vancomycin as there is no indication for MRSA bacteremia. Her nasal swab was positive for MRSA but this was likely secondary to colonization and requires no treatment. The patient also appeared to be having an asthma exacerbation and she was wheezy throughout all lung fields. She was started on breathing treatments, supplemental O2, and high-dose steroids. She had progressive improvement of her symptoms. She remained afebrile. She ultimately had complete resolution of her shortness of breath and productive cough. She was found to have a consistently elevated blood pressure which was improved with adjustment of her antihypertensive medications. She did undergo CTA chest which did reveal multiple pulmonary nodules. She does need follow-up imaging in 6-12 months. At this time the patient's symptoms have improved and she has no further need for acute intervention. She will be discharged home with close outpatient follow-up and continued stable condition.    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 [address]       MEDICATIONS ON DISCHARGE   Serenity Howe   Home Medication Instructions KAMILA:[id number]    Printed on:03/10/18 1312   Medication Information                       verapamil ER (VERELAN) 120 MG CAPSULE SR 24 HR  Take 120 mg by mouth every evening.             ADVAIR DISKUS 250-50 MCG/DOSE AEROSOL POWDER, BREATH ACTIVATED  INHALE 1 PUFF BY MOUTH 2 TIMES A DAY. INHALE 1 DOSE BY MOUTH TWICE DAILY. RINSE MOUTH AFTER USE             albuterol (PROVENTIL) 2.5mg/3ml Nebu Soln solution for nebulization  3 mL by Nebulization route every four hours as needed for Shortness of Breath.             albuterol 108 (90 Base) MCG/ACT Aero Soln inhalation aerosol  Inhale 1-2 Puffs by mouth every 6 hours as needed for Shortness of Breath.             aspirin (ASA) 81 MG CHEW  Take 81 mg by mouth every day.             Calcium Carbonate-Vit D-Min (CALTRATE PLUS PO)  Take 1 Tab by mouth every day.             guaifenesin LA (MUCINEX) 600 MG TABLET SR 12 HR  Take 1 Tab by mouth every 12 hours.             meclizine (ANTIVERT) 25 MG Tab  Take 25 mg by mouth 3 times a day as needed for Vertigo.             metoprolol (LOPRESSOR) 50 MG Tab  Take 1 Tab by mouth 2 Times a Day.             predniSONE (DELTASONE) 20 MG Tab  Day 1-2:  Take 60 mg by mouth daily.  Day 3-4:  Take 40 mg by mouth daily.  Day 5-6:  Take 20 mg by mouth daily.  Day 7-8:  Take 10 mg by mouth daily.             valsartan-hydrochlorothiazide (DIOVAN-HCT) 80-12.5 MG per tablet  Take 1 Tab by mouth every day.                 DIET  Orders Placed This Encounter   Procedures   • Diet Order     Standing Status:   Standing     Number of Occurrences:   1     Order Specific Question:   Diet:     Answer:   Regular [1]       ACTIVITY  As tolerated.      LABORATORY  Lab Results   Component Value Date/Time    SODIUM 136 03/06/2018 02:52 AM    POTASSIUM 3.9 03/06/2018 02:52 AM    CHLORIDE 108 03/06/2018 02:52 AM     CO2 20 03/06/2018 02:52 AM    GLUCOSE 106 (H) 03/06/2018 02:52 AM    BUN 14 03/06/2018 02:52 AM    CREATININE 0.75 03/06/2018 02:52 AM    CREATININE 1.0 03/19/2008 10:45 PM        Lab Results   Component Value Date/Time    WBC 6.2 03/06/2018 02:52 AM    HEMOGLOBIN 14.3 03/06/2018 02:52 AM    HEMATOCRIT 42.3 03/06/2018 02:52 AM    PLATELETCT 202 03/06/2018 02:52 AM        Total time of the discharge process was 34 minutes.

## 2018-03-10 NOTE — CARE PLAN
Problem: Bronchoconstriction:  Goal: Improve in air movement and diminished wheezing  Outcome: PROGRESSING AS EXPECTED  Respiratory Therapy Update    Interdisciplinary Plan of Care-Goals (Indications)  Obstructive Ventilatory Defect or Pulmonary Disease without Obvious Obstruction: History / Diagnosis (03/09/18 2230)  Interdisciplinary Plan of Care-Outcomes   Bronchodilator Outcome: Diminished Wheezing and Volume of Air Movement Increased;Patient at Stable Baseline (03/09/18 2230)          #SVN Performed: Yes (03/09/18 2230)    Cough: Strong;Congested (03/09/18 2230)  Sputum Amount: Unable to Evaluate (03/09/18 2230)  Sputum Color: Unable to Evaluate (03/09/18 2230)  Sputum Consistency: Unable to Evaluate (03/09/18 2230)               O2 (FiO2): 21 (03/09/18 2230)  O2 (LPM): 0 (03/09/18 2230)  O2 Daily Delivery Respiratory : Room Air with O2 Available (03/09/18 2230)    Breath Sounds  Pre/Post Intervention: Pre Intervention Assessment (03/09/18 2230)  RUL Breath Sounds: Clear (wheezing in the upper throat area ) (03/09/18 2230)  RML Breath Sounds: Clear (03/09/18 2230)  RLL Breath Sounds: Diminished (03/09/18 2230)  GARRETT Breath Sounds: Clear (03/09/18 2230)  LLL Breath Sounds: Diminished (03/09/18 2230)    Therapy changed to   Events/Summary/Plan: svn given mdi rn gave  (03/09/18 2230)

## 2018-03-14 ENCOUNTER — OFFICE VISIT (OUTPATIENT)
Dept: MEDICAL GROUP | Facility: MEDICAL CENTER | Age: 68
End: 2018-03-14
Payer: COMMERCIAL

## 2018-03-14 VITALS
SYSTOLIC BLOOD PRESSURE: 122 MMHG | HEIGHT: 62 IN | HEART RATE: 88 BPM | RESPIRATION RATE: 16 BRPM | DIASTOLIC BLOOD PRESSURE: 68 MMHG | TEMPERATURE: 98 F | WEIGHT: 166 LBS | BODY MASS INDEX: 30.55 KG/M2 | OXYGEN SATURATION: 92 %

## 2018-03-14 DIAGNOSIS — E66.9 BMI (BODY MASS INDEX) PEDIATRIC, > 99% FOR AGE, OBESE CHILD, TERTIARY CARE INTERVENTION: ICD-10-CM

## 2018-03-14 DIAGNOSIS — R91.8 LUNG NODULES: ICD-10-CM

## 2018-03-14 DIAGNOSIS — Z85.3 PERSONAL HISTORY OF BREAST CANCER: ICD-10-CM

## 2018-03-14 DIAGNOSIS — J18.9 HCAP (HEALTHCARE-ASSOCIATED PNEUMONIA): ICD-10-CM

## 2018-03-14 DIAGNOSIS — R19.7 DIARRHEA, UNSPECIFIED TYPE: ICD-10-CM

## 2018-03-14 PROBLEM — J40 BRONCHITIS: Status: RESOLVED | Noted: 2018-01-05 | Resolved: 2018-03-14

## 2018-03-14 PROCEDURE — 99214 OFFICE O/P EST MOD 30 MIN: CPT | Performed by: INTERNAL MEDICINE

## 2018-03-14 RX ORDER — AMOXICILLIN AND CLAVULANATE POTASSIUM 875; 125 MG/1; MG/1
1 TABLET, FILM COATED ORAL 2 TIMES DAILY
Qty: 20 TAB | Refills: 0 | Status: SHIPPED | OUTPATIENT
Start: 2018-03-14 | End: 2018-03-24

## 2018-03-14 RX ORDER — METHYLPREDNISOLONE 4 MG/1
TABLET ORAL
Qty: 21 TAB | Refills: 0 | Status: SHIPPED | OUTPATIENT
Start: 2018-03-14 | End: 2018-04-16

## 2018-03-14 NOTE — PROGRESS NOTES
CC: follow-up hospitalization for pneumonia with abnormal CT.    HPI:   Serenity presents today with the following.    1. HCAP (healthcare-associated pneumonia)  Presents having been treated with IV antibiotics discharge from the hospital without further antibiotic course. She was given a course of steroids but not have them filled. She does report continued chills but does not have a thermometer. Cough is improved but still present. X-ray was normal however CT shows possible pneumonia in the upper lobes but cannot be distinguished from neoplasm.    2. Personal history of breast cancer  No obvious recurrence followed by oncology.    3. Lung nodules  Again as mentioned above CT showing concerns for possible malignancy and upper lobes.    4. BMI (body mass index) pediatric, > 99% for age, obese child, tertiary care intervention  Persistently elevated has gone down with recent illness.    5. Diarrhea, unspecified type  She is complaining of episodes of diarrhea over the last 2 days. No abdominal pain no blood in her stool or black stools.      Patient Active Problem List    Diagnosis Date Noted   • HCAP (healthcare-associated pneumonia) 03/04/2018     Priority: High   • Mild persistent asthma with acute exacerbation 07/28/2016     Priority: Medium   • Hypertension 03/04/2018   • Localized swelling of lower extremity 01/05/2018   • Asthma with acute exacerbation 01/05/2018   • Malignant neoplasm of upper-outer quadrant of right female breast (CMS-HCC) 11/29/2016   • Gastroesophageal reflux disease with esophagitis 07/28/2016   • Vertigo 12/17/2015   • Shoulder pain, right 07/29/2015   • Tremor 07/29/2015   • Recurrent knee pain 07/13/2015   • Essential hypertension, benign 03/16/2015   • Insomnia 03/16/2015   • Personal history of breast cancer 03/16/2015   • Allergic rhinitis 03/16/2015   • Impaired fasting glucose 03/16/2015       Current Outpatient Prescriptions   Medication Sig Dispense Refill   •  "amoxicillin-clavulanate (AUGMENTIN) 875-125 MG Tab Take 1 Tab by mouth 2 times a day for 10 days. 20 Tab 0   • MethylPREDNISolone (MEDROL DOSEPAK) 4 MG Tablet Therapy Pack Per package insert. 21 Tab 0   • predniSONE (DELTASONE) 20 MG Tab Day 1-2:  Take 60 mg by mouth daily.  Day 3-4:  Take 40 mg by mouth daily.  Day 5-6:  Take 20 mg by mouth daily.  Day 7-8:  Take 10 mg by mouth daily. 13 Tab 0   •  verapamil ER (VERELAN) 120 MG CAPSULE SR 24 HR Take 120 mg by mouth every evening.     • meclizine (ANTIVERT) 25 MG Tab Take 25 mg by mouth 3 times a day as needed for Vertigo.     • ADVAIR DISKUS 250-50 MCG/DOSE AEROSOL POWDER, BREATH ACTIVATED INHALE 1 PUFF BY MOUTH 2 TIMES A DAY. INHALE 1 DOSE BY MOUTH TWICE DAILY. RINSE MOUTH AFTER USE 1 Inhaler 6   • albuterol (PROVENTIL) 2.5mg/3ml Nebu Soln solution for nebulization 3 mL by Nebulization route every four hours as needed for Shortness of Breath. 75 mL 11   • albuterol 108 (90 Base) MCG/ACT Aero Soln inhalation aerosol Inhale 1-2 Puffs by mouth every 6 hours as needed for Shortness of Breath. 1 Inhaler 3   • valsartan-hydrochlorothiazide (DIOVAN-HCT) 80-12.5 MG per tablet Take 1 Tab by mouth every day. 30 Tab 9   • Calcium Carbonate-Vit D-Min (CALTRATE PLUS PO) Take 1 Tab by mouth every day.     • aspirin (ASA) 81 MG CHEW Take 81 mg by mouth every day.     • metoprolol (LOPRESSOR) 50 MG Tab Take 1 Tab by mouth 2 Times a Day. (Patient not taking: Reported on 3/14/2018) 60 Tab 3     No current facility-administered medications for this visit.          Allergies as of 03/14/2018 - Reviewed 03/14/2018   Allergen Reaction Noted   • Nkda [no known drug allergy]  03/19/2008   • Shellfish allergy Hives 03/08/2018        ROS: Denies Chest pain, SOB, LE edema.    /68   Pulse 88   Temp 36.7 °C (98 °F)   Resp 16   Ht 1.575 m (5' 2\")   Wt 75.3 kg (166 lb)   SpO2 92%   BMI 30.36 kg/m²      Physical Exam:  Gen:         Alert and oriented, No apparent distress.  Neck:    "     No Lymphadenopathy or Bruits.  Lungs:     Clear to auscultation bilaterally  CV:          Regular rate and rhythm. No murmurs, rubs or gallops.               Ext:          No clubbing, cyanosis, edema.      Assessment and Plan.   67 y.o. female with the following issues.    1. HCAP (healthcare-associated pneumonia)  Placing on Augmentin for an additional 10 days. Will await results of stool tests decide if need to continue.  - amoxicillin-clavulanate (AUGMENTIN) 875-125 MG Tab; Take 1 Tab by mouth 2 times a day for 10 days.  Dispense: 20 Tab; Refill: 0    2. Personal history of breast cancer  Get notes from pulmonology.      3. Lung nodules  Repeat CT in approximately 3 weeks.    - CT-CHEST (THORAX) W/O; Future    4. BMI (body mass index) pediatric, > 99% for age, obese child, tertiary care intervention  Discussion about weight loss  - Patient identified as having weight management issue.  Appropriate orders and counseling given.    5. Diarrhea, unspecified type  Sending to the lab for C. difficile.  - CDIFF BY PCR; Future

## 2018-03-14 NOTE — LETTER
March 14, 2018        Serenity Howe      Please excuse work 3/14/18.                      Everett Johnson

## 2018-03-21 DIAGNOSIS — Z79.899 MEDICATION MANAGEMENT: ICD-10-CM

## 2018-03-21 RX ORDER — VALSARTAN AND HYDROCHLOROTHIAZIDE 80; 12.5 MG/1; MG/1
1 TABLET, FILM COATED ORAL
Qty: 30 TAB | Refills: 9 | Status: SHIPPED | OUTPATIENT
Start: 2018-03-21 | End: 2018-11-22 | Stop reason: SDUPTHER

## 2018-04-03 ENCOUNTER — HOSPITAL ENCOUNTER (OUTPATIENT)
Dept: RADIOLOGY | Facility: MEDICAL CENTER | Age: 68
End: 2018-04-03
Attending: INTERNAL MEDICINE
Payer: COMMERCIAL

## 2018-04-03 DIAGNOSIS — R91.8 LUNG NODULES: ICD-10-CM

## 2018-04-03 PROCEDURE — 71250 CT THORAX DX C-: CPT

## 2018-04-16 ENCOUNTER — OFFICE VISIT (OUTPATIENT)
Dept: MEDICAL GROUP | Facility: MEDICAL CENTER | Age: 68
End: 2018-04-16
Payer: COMMERCIAL

## 2018-04-16 VITALS
HEIGHT: 62 IN | WEIGHT: 169 LBS | TEMPERATURE: 98.1 F | RESPIRATION RATE: 16 BRPM | BODY MASS INDEX: 31.1 KG/M2 | SYSTOLIC BLOOD PRESSURE: 136 MMHG | HEART RATE: 87 BPM | DIASTOLIC BLOOD PRESSURE: 82 MMHG | OXYGEN SATURATION: 95 %

## 2018-04-16 DIAGNOSIS — R91.1 PULMONARY NODULE: ICD-10-CM

## 2018-04-16 PROBLEM — J18.9 HCAP (HEALTHCARE-ASSOCIATED PNEUMONIA): Status: RESOLVED | Noted: 2018-03-04 | Resolved: 2018-04-16

## 2018-04-16 PROCEDURE — 99213 OFFICE O/P EST LOW 20 MIN: CPT | Performed by: INTERNAL MEDICINE

## 2018-04-16 NOTE — PROGRESS NOTES
CC: Follow-up CT of the lungs.    HPI:   Serenity presents today with the following.    1. Pulmonary nodule  Presents after an episode of pneumonia showing abnormalities on CT. She had a repeat CT completed showing normalization of the groundglass opacities there is a persistent nodule that is unchanged in size. She denies any significant smoking history has no cough and reports her breathing status is back to baseline.      Patient Active Problem List    Diagnosis Date Noted   • Mild persistent asthma with acute exacerbation 07/28/2016     Priority: Medium   • Pulmonary nodule 04/16/2018   • Hypertension 03/04/2018   • Localized swelling of lower extremity 01/05/2018   • Asthma with acute exacerbation 01/05/2018   • Malignant neoplasm of upper-outer quadrant of right female breast (CMS-HCC) 11/29/2016   • Gastroesophageal reflux disease with esophagitis 07/28/2016   • Vertigo 12/17/2015   • Shoulder pain, right 07/29/2015   • Tremor 07/29/2015   • Recurrent knee pain 07/13/2015   • Essential hypertension, benign 03/16/2015   • Insomnia 03/16/2015   • Personal history of breast cancer 03/16/2015   • Allergic rhinitis 03/16/2015   • Impaired fasting glucose 03/16/2015       Current Outpatient Prescriptions   Medication Sig Dispense Refill   • valsartan-hydrochlorothiazide (DIOVAN-HCT) 80-12.5 MG per tablet Take 1 Tab by mouth every day. 30 Tab 9   • MethylPREDNISolone (MEDROL DOSEPAK) 4 MG Tablet Therapy Pack Per package insert. 21 Tab 0   •  verapamil ER (VERELAN) 120 MG CAPSULE SR 24 HR Take 120 mg by mouth every evening.     • meclizine (ANTIVERT) 25 MG Tab Take 25 mg by mouth 3 times a day as needed for Vertigo.     • ADVAIR DISKUS 250-50 MCG/DOSE AEROSOL POWDER, BREATH ACTIVATED INHALE 1 PUFF BY MOUTH 2 TIMES A DAY. INHALE 1 DOSE BY MOUTH TWICE DAILY. RINSE MOUTH AFTER USE 1 Inhaler 6   • albuterol (PROVENTIL) 2.5mg/3ml Nebu Soln solution for nebulization 3 mL by Nebulization route every four hours as needed for  "Shortness of Breath. 75 mL 11   • albuterol 108 (90 Base) MCG/ACT Aero Soln inhalation aerosol Inhale 1-2 Puffs by mouth every 6 hours as needed for Shortness of Breath. 1 Inhaler 3   • Calcium Carbonate-Vit D-Min (CALTRATE PLUS PO) Take 1 Tab by mouth every day.     • aspirin (ASA) 81 MG CHEW Take 81 mg by mouth every day.     • metoprolol (LOPRESSOR) 50 MG Tab Take 1 Tab by mouth 2 Times a Day. (Patient not taking: Reported on 3/14/2018) 60 Tab 3     No current facility-administered medications for this visit.          Allergies as of 04/16/2018 - Reviewed 04/16/2018   Allergen Reaction Noted   • Nkda [no known drug allergy]  03/19/2008   • Shellfish allergy Hives 03/08/2018        ROS: Denies Chest pain, SOB, LE edema.    /82   Pulse 87   Temp 36.7 °C (98.1 °F)   Resp 16   Ht 1.575 m (5' 2\")   Wt 76.7 kg (169 lb)   SpO2 95%   BMI 30.91 kg/m²      Physical Exam:  Gen:         Alert and oriented, No apparent distress.  Neck:        No Lymphadenopathy or Bruits.  Lungs:     Clear to auscultation bilaterally  CV:          Regular rate and rhythm. No murmurs, rubs or gallops.               Ext:          No clubbing, cyanosis, edema.      Assessment and Plan.   67 y.o. female with the following issues.    1. Pulmonary nodule  Given lack of risk factors repeat CT in one year. Otherwise doing well she will follow-up as needed breathing symptoms should return.      "

## 2018-06-25 ENCOUNTER — OFFICE VISIT (OUTPATIENT)
Dept: URGENT CARE | Facility: PHYSICIAN GROUP | Age: 68
End: 2018-06-25
Payer: COMMERCIAL

## 2018-06-25 VITALS
RESPIRATION RATE: 16 BRPM | HEIGHT: 62 IN | WEIGHT: 169 LBS | DIASTOLIC BLOOD PRESSURE: 78 MMHG | OXYGEN SATURATION: 92 % | TEMPERATURE: 97.3 F | BODY MASS INDEX: 31.1 KG/M2 | HEART RATE: 94 BPM | SYSTOLIC BLOOD PRESSURE: 120 MMHG

## 2018-06-25 DIAGNOSIS — R05.9 COUGH: ICD-10-CM

## 2018-06-25 DIAGNOSIS — J22 LRTI (LOWER RESPIRATORY TRACT INFECTION): ICD-10-CM

## 2018-06-25 DIAGNOSIS — Z87.01 HISTORY OF PNEUMONIA: ICD-10-CM

## 2018-06-25 DIAGNOSIS — J45.41 MODERATE PERSISTENT ASTHMA WITH EXACERBATION: ICD-10-CM

## 2018-06-25 PROCEDURE — 94760 N-INVAS EAR/PLS OXIMETRY 1: CPT | Performed by: FAMILY MEDICINE

## 2018-06-25 PROCEDURE — 99214 OFFICE O/P EST MOD 30 MIN: CPT | Performed by: FAMILY MEDICINE

## 2018-06-25 RX ORDER — PROMETHAZINE HYDROCHLORIDE AND CODEINE PHOSPHATE 6.25; 1 MG/5ML; MG/5ML
5 SYRUP ORAL 4 TIMES DAILY PRN
Qty: 120 ML | Refills: 0 | Status: SHIPPED | OUTPATIENT
Start: 2018-06-25 | End: 2018-07-02

## 2018-06-25 RX ORDER — DOXYCYCLINE HYCLATE 100 MG
100 TABLET ORAL 2 TIMES DAILY
Qty: 20 TAB | Refills: 0 | Status: SHIPPED | OUTPATIENT
Start: 2018-06-25 | End: 2018-07-05

## 2018-06-25 RX ORDER — PREDNISONE 20 MG/1
40 TABLET ORAL DAILY
Qty: 10 TAB | Refills: 0 | Status: SHIPPED | OUTPATIENT
Start: 2018-06-25 | End: 2018-06-30

## 2018-06-25 RX ORDER — PROMETHAZINE HYDROCHLORIDE AND CODEINE PHOSPHATE 6.25; 1 MG/5ML; MG/5ML
5 SYRUP ORAL 4 TIMES DAILY PRN
Qty: 70 ML | Refills: 0 | Status: SHIPPED | OUTPATIENT
Start: 2018-06-25 | End: 2018-06-25

## 2018-06-25 ASSESSMENT — ENCOUNTER SYMPTOMS
HEADACHES: 0
WEIGHT LOSS: 0
MYALGIAS: 1

## 2018-06-25 ASSESSMENT — PAIN SCALES - GENERAL: PAINLEVEL: NO PAIN

## 2018-06-25 NOTE — LETTER
June 25, 2018         Patient: Serenity Howe   YOB: 1950   Date of Visit: 6/25/2018           To Whom it May Concern:    Serenity Howe was seen in my clinic on 6/25/2018. Please excuse 6/26 and 6/27/2018. May return sooner if symptoms are improving.       Sincerely,           Joaquim Meade M.D.  Electronically Signed

## 2018-06-25 NOTE — PROGRESS NOTES
"Subjective:      Serenity Howe is a 68 y.o. female who presents with Cough (chest congestion, x 3 days)            3-4 productive cough without blood in sputum. No fever. +SOB/wheeze. +PMH asthma and needed albuterol x2 yesterday. +PMH CAP requiring hospitalization last year. +ST. No nasal congestion. No OTC medications. Sx's moderate and worse at night. No other aggravating or alleviating factors.          Review of Systems   Constitutional: Positive for malaise/fatigue. Negative for weight loss.   HENT: Negative for ear discharge and ear pain.    Cardiovascular: Negative for leg swelling.   Musculoskeletal: Positive for myalgias. Negative for joint pain.   Skin: Negative for itching and rash.   Neurological: Negative for headaches.     .  Medications, Allergies, and current problem list reviewed today in Epic       Objective:     /78   Pulse 94   Temp 36.3 °C (97.3 °F)   Resp 16   Ht 1.575 m (5' 2\")   Wt 76.7 kg (169 lb)   SpO2 92%   BMI 30.91 kg/m²      Physical Exam   Constitutional: She appears well-developed and well-nourished. No distress.   HENT:   Head: Normocephalic.   Eyes: Conjunctivae are normal.   Cardiovascular: Normal rate, regular rhythm and normal heart sounds.    Pulmonary/Chest: Effort normal. Wheezes: scattered expiratory.   Neurological:   Speech is clear. Patient is appropriate and cooperative.     Skin: Skin is warm and dry. No rash noted.               Assessment/Plan:     Pulse ox adequate    1. LRTI (lower respiratory tract infection)  doxycycline (VIBRAMYCIN) 100 MG Tab   2. Moderate persistent asthma with exacerbation  predniSONE (DELTASONE) 20 MG Tab   3. History of pneumonia  doxycycline (VIBRAMYCIN) 100 MG Tab   4. Cough  promethazine-codeine (PHENERGAN-CODEINE) 6.25-10 MG/5ML Syrup    DISCONTINUED: promethazine-codeine (PHENERGAN-CODEINE) 6.25-10 MG/5ML Syrup     Differential diagnosis, natural history, supportive care, and indications for immediate follow-up " discussed at length.

## 2018-07-16 ENCOUNTER — HOSPITAL ENCOUNTER (OUTPATIENT)
Dept: RADIOLOGY | Facility: MEDICAL CENTER | Age: 68
End: 2018-07-16
Attending: FAMILY MEDICINE
Payer: COMMERCIAL

## 2018-07-16 ENCOUNTER — OFFICE VISIT (OUTPATIENT)
Dept: URGENT CARE | Facility: PHYSICIAN GROUP | Age: 68
End: 2018-07-16
Payer: COMMERCIAL

## 2018-07-16 VITALS
WEIGHT: 167 LBS | HEART RATE: 92 BPM | OXYGEN SATURATION: 93 % | HEIGHT: 62 IN | BODY MASS INDEX: 30.73 KG/M2 | RESPIRATION RATE: 16 BRPM | SYSTOLIC BLOOD PRESSURE: 148 MMHG | DIASTOLIC BLOOD PRESSURE: 90 MMHG | TEMPERATURE: 97.1 F

## 2018-07-16 DIAGNOSIS — S29.019A THORACIC MYOFASCIAL STRAIN, INITIAL ENCOUNTER: ICD-10-CM

## 2018-07-16 PROCEDURE — 99214 OFFICE O/P EST MOD 30 MIN: CPT | Performed by: FAMILY MEDICINE

## 2018-07-16 PROCEDURE — 72072 X-RAY EXAM THORAC SPINE 3VWS: CPT

## 2018-07-16 RX ORDER — CYCLOBENZAPRINE HCL 5 MG
5-10 TABLET ORAL 3 TIMES DAILY PRN
Qty: 30 TAB | Refills: 0 | Status: SHIPPED | OUTPATIENT
Start: 2018-07-16 | End: 2018-12-09

## 2018-07-16 ASSESSMENT — PAIN SCALES - GENERAL: PAINLEVEL: 7=MODERATE-SEVERE PAIN

## 2018-07-16 NOTE — PROGRESS NOTES
"Chief Complaint   Patient presents with   • Back Pain     Right upper [id number]          Back Pain  This is a new problem. The current episode started in the past 7 days. The problem occurs constantly. The problem is unchanged. The pain is present in the bilat thoracic spine. The quality of the pain is described as aching. The pain does not radiate. The pain is moderate. The symptoms are aggravated by position. Pertinent negatives include no bladder incontinence, bowel incontinence, dysuria, fever, headaches, numbness or weakness. Treatments tried: motrin.  The treatment provided no relief.     Social History   Substance Use Topics   • Smoking status: Never Smoker   • Smokeless tobacco: Never Used   • Alcohol use No       Family hx was reviewed - no pertinent past family hx      Past medical history was unremarkable and not pertinent to current issue    Review of Systems   Constitutional: Negative for fever.   Gastrointestinal: Negative for bowel incontinence.   Genitourinary: Negative for bladder incontinence, dysuria, urgency and frequency.   Musculoskeletal: Positive for back pain.   Neurological: Negative for weakness, numbness and headaches.   All other systems reviewed and are negative.         Objective:     Blood pressure 148/90, pulse 92, temperature 36.2 °C (97.1 °F), resp. rate 16, height 1.575 m (5' 2\"), weight 75.8 kg (167 lb), SpO2 93 %.    Physical Exam   Constitutional: pt is oriented to person, place, and time. Pt appears well-developed and well-nourished. No distress.   HENT:   Head: Normocephalic and atraumatic.   Eyes: EOM are normal. Pupils are equal, round, and reactive to light. No scleral icterus.   Neck: Normal range of motion. Neck supple. No thyromegaly present.   Cardiovascular: Normal rate, regular rhythm and normal heart sounds.    Pulmonary/Chest: Effort normal and breath sounds normal. No respiratory distress.  no wheezes.   no rales.  no tenderness.   Musculoskeletal: pt exhibits no " edema.           Thoracic spine: pt exhibits   bilat spasm and tenderness . Pt exhibits no bony tenderness, no swelling, no edema, no deformity.    Neurological: patient is alert and oriented to person, place, and time. Patient has normal reflexes. No cranial nerve deficit. Patient exhibits normal muscle tone.      Skin: Skin is warm and dry. No rash noted. No erythema.   Psychiatric: patient has a normal mood and affect.  behavior is normal.   Nursing note and vitals reviewed.              Assessment/Plan:     1. Thoracic myofascial strain, initial encounter     - Diclofenac Sodium (VOLTAREN) 1 % Gel; Apply 4 g to skin as directed 3 times a day as needed.  Dispense: 1 Tube; Refill: 0  - cyclobenzaprine (FLEXERIL) 5 MG tablet; Take 1-2 Tabs by mouth 3 times a day as needed.  Dispense: 30 Tab; Refill: 0     Follow up in one week if no improvement, sooner if symptoms worsen.

## 2018-08-29 ENCOUNTER — OFFICE VISIT (OUTPATIENT)
Dept: URGENT CARE | Facility: PHYSICIAN GROUP | Age: 68
End: 2018-08-29
Payer: COMMERCIAL

## 2018-08-29 ENCOUNTER — HOSPITAL ENCOUNTER (OUTPATIENT)
Dept: LAB | Facility: MEDICAL CENTER | Age: 68
End: 2018-08-29
Attending: NURSE PRACTITIONER
Payer: COMMERCIAL

## 2018-08-29 ENCOUNTER — HOSPITAL ENCOUNTER (OUTPATIENT)
Dept: RADIOLOGY | Facility: MEDICAL CENTER | Age: 68
End: 2018-08-29
Attending: NURSE PRACTITIONER
Payer: COMMERCIAL

## 2018-08-29 VITALS
HEART RATE: 100 BPM | WEIGHT: 165 LBS | OXYGEN SATURATION: 94 % | HEIGHT: 62 IN | SYSTOLIC BLOOD PRESSURE: 140 MMHG | TEMPERATURE: 96.4 F | BODY MASS INDEX: 30.36 KG/M2 | RESPIRATION RATE: 14 BRPM | DIASTOLIC BLOOD PRESSURE: 72 MMHG

## 2018-08-29 DIAGNOSIS — M79.671 RIGHT FOOT PAIN: ICD-10-CM

## 2018-08-29 LAB — URATE SERPL-MCNC: 10.4 MG/DL (ref 1.9–8.2)

## 2018-08-29 PROCEDURE — 73630 X-RAY EXAM OF FOOT: CPT | Mod: RT

## 2018-08-29 PROCEDURE — 99213 OFFICE O/P EST LOW 20 MIN: CPT | Performed by: NURSE PRACTITIONER

## 2018-08-29 PROCEDURE — 36415 COLL VENOUS BLD VENIPUNCTURE: CPT

## 2018-08-29 PROCEDURE — 84550 ASSAY OF BLOOD/URIC ACID: CPT

## 2018-08-29 RX ORDER — INDOMETHACIN 50 MG/1
50 CAPSULE ORAL 3 TIMES DAILY
Qty: 30 CAP | Refills: 0 | Status: SHIPPED | OUTPATIENT
Start: 2018-08-29 | End: 2018-10-05

## 2018-08-29 ASSESSMENT — PAIN SCALES - GENERAL: PAINLEVEL: 9=SEVERE PAIN

## 2018-08-29 ASSESSMENT — ENCOUNTER SYMPTOMS
CHILLS: 0
FEVER: 0

## 2018-08-29 NOTE — PROGRESS NOTES
"Subjective:      Serenity Howe is a 68 y.o. female who presents with Foot Pain (R [id number])     Past Medical History:   Diagnosis Date   • ASTHMA    • Breast cancer (HCC)    • History of breast cancer 7/29/2015   • Hypertension    • Shoulder pain, right 7/29/2015   • Tremor 7/29/2015     Social History     Social History   • Marital status:      Spouse name: N/A   • Number of children: N/A   • Years of education: N/A     Occupational History   • Not on file.     Social History Main Topics   • Smoking status: Never Smoker   • Smokeless tobacco: Never Used   • Alcohol use No   • Drug use: No   • Sexual activity: Not Currently     Other Topics Concern   • Not on file     Social History Narrative   • No narrative on file     Family History   Problem Relation Age of Onset   • Hyperlipidemia Mother        Allergies: Nkda [no known drug allergy] and Shellfish allergy    Patient is a 68-year-old female who presents with complaint of right foot pain for last 2 days. No known injury. States she is having pain throughout the foot and she is having pain with weightbearing. Patient states she has a positive cardiac history of gout checked. She is taking Tylenol for this without relief.           Other   This is a new problem. The problem occurs constantly. Pertinent negatives include no chills or fever. Associated symptoms comments: Foot pain . Nothing aggravates the symptoms. She has tried nothing for the symptoms. The treatment provided no relief.       Review of Systems   Constitutional: Negative for chills and fever.   Musculoskeletal:        Foot pain    All other systems reviewed and are negative.         Objective:     /72   Pulse 100   Temp (!) 35.8 °C (96.4 °F)   Resp 14   Ht 1.575 m (5' 2\")   Wt 74.8 kg (165 lb)   SpO2 94%   BMI 30.18 kg/m²      Physical Exam   Constitutional: She appears well-developed and well-nourished.   Musculoskeletal:        Feet:    Tenderness throughout the dorsal " aspect of the foot. No erythema, redness, or swelling.    Skin: Skin is warm. Capillary refill takes less than 2 seconds.   Psychiatric: She has a normal mood and affect. Her behavior is normal. Judgment and thought content normal.   Vitals reviewed.    XR foot:           HISTORY/REASON FOR EXAM:  Atraumatic Pain/Swelling/Deformity  Right foot pain    TECHNIQUE/EXAM DESCRIPTION AND NUMBER OF VIEWS:  3 views of the RIGHT foot.    COMPARISON:  None.    FINDINGS:    There is no fracture.    There is no malalignment.    Midfoot and interphalangeal joint degenerative changes are present.    There is a small bunion.    There is spurring at the insertion of achilles' tendon and origin of plantar fascia.   Impression       1.  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  Calcaneal spurring    3.  Small bunion            Assessment/Plan:     1. Right foot pain  -Uric acid; will call  -indocin  -elevate  -ice PRN  -follow up for persistent or worsening of symptoms .

## 2018-08-30 ENCOUNTER — TELEPHONE (OUTPATIENT)
Dept: URGENT CARE | Facility: PHYSICIAN GROUP | Age: 68
End: 2018-08-30

## 2018-08-30 NOTE — TELEPHONE ENCOUNTER
Patient notified by phone of uric acid level. Elevated at 10.4. She states significant relief from indocin.  I have advised patient also to follow up with her PCP for frequent episodes of gout as she may need to be on daily treatment. Patient verbalized understanding and agreement with plan of care.

## 2018-10-05 ENCOUNTER — OFFICE VISIT (OUTPATIENT)
Dept: MEDICAL GROUP | Facility: MEDICAL CENTER | Age: 68
End: 2018-10-05
Payer: COMMERCIAL

## 2018-10-05 VITALS
WEIGHT: 166.89 LBS | HEART RATE: 96 BPM | BODY MASS INDEX: 30.71 KG/M2 | SYSTOLIC BLOOD PRESSURE: 112 MMHG | DIASTOLIC BLOOD PRESSURE: 80 MMHG | OXYGEN SATURATION: 96 % | HEIGHT: 62 IN | TEMPERATURE: 96.3 F

## 2018-10-05 DIAGNOSIS — J45.31 MILD PERSISTENT ASTHMA WITH ACUTE EXACERBATION: ICD-10-CM

## 2018-10-05 DIAGNOSIS — I10 ESSENTIAL HYPERTENSION: ICD-10-CM

## 2018-10-05 DIAGNOSIS — M10.9 GOUT, ARTHRITIS: ICD-10-CM

## 2018-10-05 PROBLEM — J45.901 ASTHMA WITH ACUTE EXACERBATION: Status: RESOLVED | Noted: 2018-01-05 | Resolved: 2018-10-05

## 2018-10-05 PROCEDURE — 99214 OFFICE O/P EST MOD 30 MIN: CPT | Performed by: INTERNAL MEDICINE

## 2018-10-05 RX ORDER — AZITHROMYCIN 250 MG/1
250 TABLET, FILM COATED ORAL DAILY
Qty: 6 TAB | Refills: 0 | Status: SHIPPED | OUTPATIENT
Start: 2018-10-05 | End: 2018-10-10

## 2018-10-05 RX ORDER — METHYLPREDNISOLONE 4 MG/1
TABLET ORAL
Qty: 21 TAB | Refills: 0 | Status: SHIPPED | OUTPATIENT
Start: 2018-10-05 | End: 2018-12-09

## 2018-10-05 NOTE — PROGRESS NOTES
CC: Follow-up gout, asthma, blood pressure.    HPI:   Serenity presents today with the following.    1. Gout, arthritis  Presents recently being seen in urgent care with acutely swollen and red ankle.  Uric acid at that time was 10.4 diagnosed with gout treated with indomethacin with significant improvement.  She reports a similar event several years ago and does report her brother has gout as well.  She has not had any further attacks.  She was not started on prophylactic medications    2. Mild persistent asthma with acute exacerbation  Does have asthma currently complaining of a cough for the past 3-4 days.  Slightly productive no difficulty breathing no sore throat earaches only mild congestion.  No other localizing infectious symptoms.  Current no efforts to treat.    3. Essential hypertension  Well-controlled on current regimen.      Patient Active Problem List    Diagnosis Date Noted   • Gout, arthritis 10/05/2018   • Pulmonary nodule 04/16/2018   • Localized swelling of lower extremity 01/05/2018   • Malignant neoplasm of upper-outer quadrant of right female breast (HCC) 11/29/2016   • Gastroesophageal reflux disease with esophagitis 07/28/2016   • Vertigo 12/17/2015   • Shoulder pain, right 07/29/2015   • Tremor 07/29/2015   • Recurrent knee pain 07/13/2015   • Essential hypertension 03/16/2015   • Insomnia 03/16/2015   • Personal history of breast cancer 03/16/2015   • Allergic rhinitis 03/16/2015   • Impaired fasting glucose 03/16/2015       Current Outpatient Prescriptions   Medication Sig Dispense Refill   • MethylPREDNISolone (MEDROL DOSEPAK) 4 MG Tablet Therapy Pack Per package insert. 21 Tab 0   • azithromycin (ZITHROMAX Z-JUAN) 250 MG Tab Take 1 Tab by mouth every day for 5 days. Take 2 tabs on day 1 6 Tab 0   • Diclofenac Sodium (VOLTAREN) 1 % Gel Apply 4 g to skin as directed 3 times a day as needed. 1 Tube 0   • cyclobenzaprine (FLEXERIL) 5 MG tablet Take 1-2 Tabs by mouth 3 times a day as needed. 30  "Tab 0   • valsartan-hydrochlorothiazide (DIOVAN-HCT) 80-12.5 MG per tablet Take 1 Tab by mouth every day. 30 Tab 9   • metoprolol (LOPRESSOR) 50 MG Tab Take 1 Tab by mouth 2 Times a Day. 60 Tab 3   •  verapamil ER (VERELAN) 120 MG CAPSULE SR 24 HR Take 120 mg by mouth every evening.     • meclizine (ANTIVERT) 25 MG Tab Take 25 mg by mouth 3 times a day as needed for Vertigo.     • ADVAIR DISKUS 250-50 MCG/DOSE AEROSOL POWDER, BREATH ACTIVATED INHALE 1 PUFF BY MOUTH 2 TIMES A DAY. INHALE 1 DOSE BY MOUTH TWICE DAILY. RINSE MOUTH AFTER USE 1 Inhaler 6   • albuterol (PROVENTIL) 2.5mg/3ml Nebu Soln solution for nebulization 3 mL by Nebulization route every four hours as needed for Shortness of Breath. 75 mL 11   • albuterol 108 (90 Base) MCG/ACT Aero Soln inhalation aerosol Inhale 1-2 Puffs by mouth every 6 hours as needed for Shortness of Breath. 1 Inhaler 3   • Calcium Carbonate-Vit D-Min (CALTRATE PLUS PO) Take 1 Tab by mouth every day.     • aspirin (ASA) 81 MG CHEW Take 81 mg by mouth every day.       No current facility-administered medications for this visit.          Allergies as of 10/05/2018 - Reviewed 10/05/2018   Allergen Reaction Noted   • Nkda [no known drug allergy]  03/19/2008   • Shellfish allergy Hives 03/08/2018        ROS: Denies Chest pain, SOB, LE edema.    /80 (BP Location: Left arm, Patient Position: Sitting, BP Cuff Size: Adult)   Pulse 96   Temp (!) 35.7 °C (96.3 °F)   Ht 1.575 m (5' 2\")   Wt 75.7 kg (166 lb 14.2 oz)   SpO2 96%   BMI 30.52 kg/m²     Physical Exam:  Gen:         Alert and oriented, No apparent distress.  Heent:       TMs clear bilaterally, oropharynx without erythema or exudates  Neck:        No Lymphadenopathy or Bruits.  Lungs:     Clear to auscultation bilaterally  CV:          Regular rate and rhythm. No murmurs, rubs or gallops.               Ext:          No clubbing, cyanosis, edema.      Assessment and Plan.   68 y.o. female with the following issues.    1. " Gout, arthritis  Discussion about diet for gout if she has another attack will follow-up to discuss prophylactic medications.    2. Mild persistent asthma with acute exacerbation  Current upper respiratory infection likely viral in nature with no abnormal breath sounds or difficulty breathing.  Have given a Medrol Dosepak and antibiotic to have on hand for exacerbations otherwise no change to therapy other than supportive care.  - MethylPREDNISolone (MEDROL DOSEPAK) 4 MG Tablet Therapy Pack; Per package insert.  Dispense: 21 Tab; Refill: 0  - azithromycin (ZITHROMAX Z-JUAN) 250 MG Tab; Take 1 Tab by mouth every day for 5 days. Take 2 tabs on day 1  Dispense: 6 Tab; Refill: 0    3. Essential hypertension  Currently well controlled, Discuss diet, exercise and salt restriction.  No change to medication therapy.

## 2018-10-05 NOTE — LETTER
October 5, 2018        Serenity Howe        Please excuse work 10/5/18-10/8/18.  May retrurn early as able.                       Everett Johnson MD

## 2018-11-21 DIAGNOSIS — J44.9 CHRONIC AIRWAY OBSTRUCTION, MIXED TYPE (HCC): ICD-10-CM

## 2018-11-22 DIAGNOSIS — Z79.899 MEDICATION MANAGEMENT: ICD-10-CM

## 2018-11-24 RX ORDER — VALSARTAN AND HYDROCHLOROTHIAZIDE 80; 12.5 MG/1; MG/1
1 TABLET, FILM COATED ORAL
Qty: 90 TAB | Refills: 3 | Status: SHIPPED | OUTPATIENT
Start: 2018-11-24 | End: 2019-12-02 | Stop reason: SDUPTHER

## 2018-12-09 ENCOUNTER — OFFICE VISIT (OUTPATIENT)
Dept: URGENT CARE | Facility: PHYSICIAN GROUP | Age: 68
End: 2018-12-09
Payer: COMMERCIAL

## 2018-12-09 VITALS
WEIGHT: 174 LBS | OXYGEN SATURATION: 92 % | RESPIRATION RATE: 18 BRPM | SYSTOLIC BLOOD PRESSURE: 144 MMHG | HEART RATE: 99 BPM | BODY MASS INDEX: 32.02 KG/M2 | HEIGHT: 62 IN | TEMPERATURE: 98.6 F | DIASTOLIC BLOOD PRESSURE: 88 MMHG

## 2018-12-09 DIAGNOSIS — J22 ACUTE LOWER RESPIRATORY INFECTION: ICD-10-CM

## 2018-12-09 DIAGNOSIS — J20.9 ACUTE BRONCHITIS, UNSPECIFIED ORGANISM: ICD-10-CM

## 2018-12-09 DIAGNOSIS — R05.9 COUGH: ICD-10-CM

## 2018-12-09 PROCEDURE — 99214 OFFICE O/P EST MOD 30 MIN: CPT | Performed by: FAMILY MEDICINE

## 2018-12-09 RX ORDER — AMOXICILLIN AND CLAVULANATE POTASSIUM 875; 125 MG/1; MG/1
TABLET, FILM COATED ORAL
Qty: 20 TAB | Refills: 0 | Status: SHIPPED | OUTPATIENT
Start: 2018-12-09 | End: 2019-01-29

## 2018-12-09 RX ORDER — METHYLPREDNISOLONE 4 MG/1
TABLET ORAL
Qty: 21 TAB | Refills: 0 | Status: SHIPPED | OUTPATIENT
Start: 2018-12-09 | End: 2019-01-29

## 2018-12-09 RX ORDER — PROMETHAZINE HYDROCHLORIDE AND CODEINE PHOSPHATE 6.25; 1 MG/5ML; MG/5ML
SYRUP ORAL
Qty: 140 ML | Refills: 0 | Status: SHIPPED | OUTPATIENT
Start: 2018-12-09 | End: 2018-12-17

## 2018-12-09 RX ORDER — ALBUTEROL SULFATE 90 UG/1
AEROSOL, METERED RESPIRATORY (INHALATION)
Qty: 1 INHALER | Refills: 5 | Status: SHIPPED | OUTPATIENT
Start: 2018-12-09 | End: 2020-01-11

## 2018-12-09 NOTE — PROGRESS NOTES
Chief Complaint:    Chief Complaint   Patient presents with   • Cough     fever, [id number]       History of Present Illness:    This is a new problem. Symptoms x 3 days; has had subjective fever, headache, and cough productive of purulent mucus. Overall at least moderate severity and not getting better. No significant nasal symptoms. No sore throat. OTC meds have not been helpful. Has Asthma and has Advair to use but has run out of her Albuterol MDI and she is requesting refill. She has been rx'd different meds for similar symptoms in past, feels Augmentin, Medrol Dose Wojciech, and Promethazine-Codeine have been most helpful for similar previous symptoms.      Review of Systems:    Constitutional: See HPI.  Eyes: Negative for change in vision, photophobia, pain, redness, and discharge.  ENT: Negative for ear pain, ear discharge, hearing loss, tinnitus, nasal congestion, nosebleeds, and sore throat.  Respiratory: See HPI.  Cardiovascular: Negative for chest pain, palpitations, orthopnea, claudication, leg swelling, and PND.   Gastrointestinal: Negative for abdominal pain, nausea, vomiting, diarrhea, constipation, blood in stool, and melena.   Genitourinary: Negative for dysuria, urinary urgency, urinary frequency, hematuria, and flank pain.   Musculoskeletal: Negative for myalgias, joint pain, neck pain, and back pain.   Skin: Negative for rash and itching.   Neurological: See HPI.   Endo: Negative for polydipsia.   Heme: Does not bruise/bleed easily.   Psychiatric/Behavioral: Negative for depression, suicidal ideas, hallucinations, memory loss and substance abuse. The patient is not nervous/anxious and does not have insomnia.        Past Medical History:    Past Medical History:   Diagnosis Date   • ASTHMA    • Breast cancer (HCC)    • History of breast cancer 7/29/2015   • Hypertension    • Shoulder pain, right 7/29/2015   • Tremor 7/29/2015     Past Surgical History:    Past Surgical History:   Procedure  "Laterality Date   • GYN SURGERY      cs   • MASTECTOMY      right      Social History:    Social History     Social History   • Marital status:      Spouse name: N/A   • Number of children: N/A   • Years of education: N/A     Occupational History   • Not on file.     Social History Main Topics   • Smoking status: Never Smoker   • Smokeless tobacco: Never Used   • Alcohol use No   • Drug use: No   • Sexual activity: No     Other Topics Concern   • Not on file     Social History Narrative   • No narrative on file     Family History:    Family History   Problem Relation Age of Onset   • Hyperlipidemia Mother      Medications:    Current Outpatient Prescriptions on File Prior to Visit   Medication Sig Dispense Refill   • valsartan-hydrochlorothiazide (DIOVAN-HCT) 80-12.5 MG per tablet TAKE 1 TAB BY MOUTH EVERY DAY. 90 Tab 3   • ADVAIR DISKUS 250-50 MCG/DOSE AEROSOL POWDER, BREATH ACTIVATED INHALE 1 PUFF BY MOUTH 2 TIMES A DAY. INHALE 1 DOSE BY MOUTH TWICE DAILY. RINSE MOUTH AFTER USE 3 Inhaler 3   • meclizine (ANTIVERT) 25 MG Tab Take 25 mg by mouth 3 times a day as needed for Vertigo.     • albuterol (PROVENTIL) 2.5mg/3ml Nebu Soln solution for nebulization 3 mL by Nebulization route every four hours as needed for Shortness of Breath. 75 mL 11   • Calcium Carbonate-Vit D-Min (CALTRATE PLUS PO) Take 1 Tab by mouth every day.     • aspirin (ASA) 81 MG CHEW Take 81 mg by mouth every day.       No current facility-administered medications on file prior to visit.      Allergies:    Allergies   Allergen Reactions   • Nkda [No Known Drug Allergy]    • Shellfish Allergy Hives       Vitals:    Vitals:    12/09/18 1005   BP: 144/88   Pulse: 99   Resp: 18   Temp: 37 °C (98.6 °F)   TempSrc: Temporal   SpO2: 92%   Weight: 78.9 kg (174 lb)   Height: 1.575 m (5' 2\")       Physical Exam:    Constitutional: Vital signs reviewed. Appears well-developed and well-nourished. Occl cough. No acute distress.   Eyes: Sclera white, " conjunctivae clear.   ENT: External ears normal. Hearing normal. Nasal mucosa pink. Lips/teeth are normal. Oral mucosa pink and moist. Posterior pharynx: WNL.  Neck: Neck supple.   Cardiovascular: Regular rate and rhythm. No murmur.  Pulmonary/Chest: Respirations non-labored. Mild rales and rhonchi bilaterally.  Lymph: Cervical nodes without tenderness or enlargement.  Musculoskeletal: Normal gait. No muscular atrophy or weakness.  Neurological: Alert and oriented to person, place, and time. Muscle tone normal. Coordination normal.   Skin: No rashes or lesions. Warm, dry, normal turgor.  Psychiatric: Normal mood and affect. Behavior is normal. Judgment and thought content normal.       Assessment / Plan:    1. Acute lower respiratory infection  - amoxicillin-clavulanate (AUGMENTIN) 875-125 MG Tab; 1 TAB TWICE A DAY X 10 DAYS. TAKE WITH FOOD.  Dispense: 20 Tab; Refill: 0    2. Acute bronchitis, unspecified organism  - MethylPREDNISolone (MEDROL DOSEPAK) 4 MG Tablet Therapy Pack; TAKE AS DIRECTED ON PACKAGE.  Dispense: 21 Tab; Refill: 0  - albuterol 108 (90 Base) MCG/ACT Aero Soln inhalation aerosol; 2 PUFFS EVERY 4 HOURS ONLY IF NEEDED FOR COUGH, WHEEZING, OR SHORTNESS OF BREATH.  Dispense: 1 Inhaler; Refill: 5  - promethazine-codeine (PHENERGAN-CODEINE) 6.25-10 MG/5ML Syrup; 5 ML (1 TEASPOON) EVERY 6 HOURS ONLY IF NEEDED FOR COUGH FOR UP TO 7 DAYS. MAY CAUSE DROWSINESS.  Dispense: 140 mL; Refill: 0    3. Cough  - promethazine-codeine (PHENERGAN-CODEINE) 6.25-10 MG/5ML Syrup; 5 ML (1 TEASPOON) EVERY 6 HOURS ONLY IF NEEDED FOR COUGH FOR UP TO 7 DAYS. MAY CAUSE DROWSINESS.  Dispense: 140 mL; Refill: 0      Discussed with her DDX, management options, and risks, benefits, and alternatives to treatment plan agreed upon.    Agreeable to medications prescribed.  report checked - last Rx was Promethazine-Codeine #120 ml on 6/25/18.    Discussed expected course of duration, time for improvement, and to seek follow-up in  Emergency Room, urgent care, or with PCP if getting worse at any time or not improving within expected time frame.

## 2019-01-21 RX ORDER — VERAPAMIL HYDROCHLORIDE 120 MG/1
120 CAPSULE, EXTENDED RELEASE ORAL
Qty: 90 CAP | Refills: 2 | Status: SHIPPED | OUTPATIENT
Start: 2019-01-21 | End: 2019-10-06 | Stop reason: SDUPTHER

## 2019-01-29 ENCOUNTER — APPOINTMENT (OUTPATIENT)
Dept: RADIOLOGY | Facility: MEDICAL CENTER | Age: 69
End: 2019-01-29
Attending: EMERGENCY MEDICINE
Payer: COMMERCIAL

## 2019-01-29 ENCOUNTER — HOSPITAL ENCOUNTER (EMERGENCY)
Facility: MEDICAL CENTER | Age: 69
End: 2019-01-29
Attending: EMERGENCY MEDICINE
Payer: COMMERCIAL

## 2019-01-29 VITALS
TEMPERATURE: 99.9 F | HEIGHT: 62 IN | BODY MASS INDEX: 31.28 KG/M2 | DIASTOLIC BLOOD PRESSURE: 95 MMHG | SYSTOLIC BLOOD PRESSURE: 178 MMHG | WEIGHT: 169.97 LBS | HEART RATE: 100 BPM | OXYGEN SATURATION: 94 % | RESPIRATION RATE: 18 BRPM

## 2019-01-29 DIAGNOSIS — J20.9 ACUTE BRONCHITIS, UNSPECIFIED ORGANISM: ICD-10-CM

## 2019-01-29 DIAGNOSIS — J44.9 CHRONIC AIRWAY OBSTRUCTION, MIXED TYPE (HCC): ICD-10-CM

## 2019-01-29 DIAGNOSIS — J22 ACUTE LOWER RESPIRATORY INFECTION: ICD-10-CM

## 2019-01-29 LAB
ALBUMIN SERPL BCP-MCNC: 3.7 G/DL (ref 3.2–4.9)
ALBUMIN/GLOB SERPL: 0.9 G/DL
ALP SERPL-CCNC: 53 U/L (ref 30–99)
ALT SERPL-CCNC: 23 U/L (ref 2–50)
ANION GAP SERPL CALC-SCNC: 8 MMOL/L (ref 0–11.9)
AST SERPL-CCNC: 25 U/L (ref 12–45)
BASOPHILS # BLD AUTO: 0.7 % (ref 0–1.8)
BASOPHILS # BLD: 0.05 K/UL (ref 0–0.12)
BILIRUB SERPL-MCNC: 0.5 MG/DL (ref 0.1–1.5)
BNP SERPL-MCNC: 92 PG/ML (ref 0–100)
BUN SERPL-MCNC: 13 MG/DL (ref 8–22)
CALCIUM SERPL-MCNC: 9.2 MG/DL (ref 8.5–10.5)
CHLORIDE SERPL-SCNC: 103 MMOL/L (ref 96–112)
CO2 SERPL-SCNC: 29 MMOL/L (ref 20–33)
CREAT SERPL-MCNC: 0.94 MG/DL (ref 0.5–1.4)
EKG IMPRESSION: NORMAL
EOSINOPHIL # BLD AUTO: 0.22 K/UL (ref 0–0.51)
EOSINOPHIL NFR BLD: 3.1 % (ref 0–6.9)
ERYTHROCYTE [DISTWIDTH] IN BLOOD BY AUTOMATED COUNT: 44.7 FL (ref 35.9–50)
FLUAV RNA SPEC QL NAA+PROBE: NEGATIVE
FLUBV RNA SPEC QL NAA+PROBE: NEGATIVE
GLOBULIN SER CALC-MCNC: 4 G/DL (ref 1.9–3.5)
GLUCOSE SERPL-MCNC: 76 MG/DL (ref 65–99)
HCT VFR BLD AUTO: 47 % (ref 37–47)
HGB BLD-MCNC: 15.9 G/DL (ref 12–16)
IMM GRANULOCYTES # BLD AUTO: 0.02 K/UL (ref 0–0.11)
IMM GRANULOCYTES NFR BLD AUTO: 0.3 % (ref 0–0.9)
LYMPHOCYTES # BLD AUTO: 1.45 K/UL (ref 1–4.8)
LYMPHOCYTES NFR BLD: 20.3 % (ref 22–41)
MCH RBC QN AUTO: 33.3 PG (ref 27–33)
MCHC RBC AUTO-ENTMCNC: 33.8 G/DL (ref 33.6–35)
MCV RBC AUTO: 98.5 FL (ref 81.4–97.8)
MONOCYTES # BLD AUTO: 0.43 K/UL (ref 0–0.85)
MONOCYTES NFR BLD AUTO: 6 % (ref 0–13.4)
NEUTROPHILS # BLD AUTO: 4.96 K/UL (ref 2–7.15)
NEUTROPHILS NFR BLD: 69.6 % (ref 44–72)
NRBC # BLD AUTO: 0 K/UL
NRBC BLD-RTO: 0 /100 WBC
PLATELET # BLD AUTO: 211 K/UL (ref 164–446)
PMV BLD AUTO: 9.4 FL (ref 9–12.9)
POTASSIUM SERPL-SCNC: 3.7 MMOL/L (ref 3.6–5.5)
PROT SERPL-MCNC: 7.7 G/DL (ref 6–8.2)
RBC # BLD AUTO: 4.77 M/UL (ref 4.2–5.4)
SODIUM SERPL-SCNC: 140 MMOL/L (ref 135–145)
TROPONIN I SERPL-MCNC: <0.01 NG/ML (ref 0–0.04)
WBC # BLD AUTO: 7.1 K/UL (ref 4.8–10.8)

## 2019-01-29 PROCEDURE — 700111 HCHG RX REV CODE 636 W/ 250 OVERRIDE (IP): Performed by: EMERGENCY MEDICINE

## 2019-01-29 PROCEDURE — 87502 INFLUENZA DNA AMP PROBE: CPT

## 2019-01-29 PROCEDURE — 71045 X-RAY EXAM CHEST 1 VIEW: CPT

## 2019-01-29 PROCEDURE — 99284 EMERGENCY DEPT VISIT MOD MDM: CPT

## 2019-01-29 PROCEDURE — A9270 NON-COVERED ITEM OR SERVICE: HCPCS | Performed by: EMERGENCY MEDICINE

## 2019-01-29 PROCEDURE — 85025 COMPLETE CBC W/AUTO DIFF WBC: CPT

## 2019-01-29 PROCEDURE — 700102 HCHG RX REV CODE 250 W/ 637 OVERRIDE(OP): Performed by: EMERGENCY MEDICINE

## 2019-01-29 PROCEDURE — 700101 HCHG RX REV CODE 250: Performed by: EMERGENCY MEDICINE

## 2019-01-29 PROCEDURE — 94640 AIRWAY INHALATION TREATMENT: CPT

## 2019-01-29 PROCEDURE — 84484 ASSAY OF TROPONIN QUANT: CPT

## 2019-01-29 PROCEDURE — 93005 ELECTROCARDIOGRAM TRACING: CPT

## 2019-01-29 PROCEDURE — 83880 ASSAY OF NATRIURETIC PEPTIDE: CPT

## 2019-01-29 PROCEDURE — 80053 COMPREHEN METABOLIC PANEL: CPT

## 2019-01-29 PROCEDURE — 93005 ELECTROCARDIOGRAM TRACING: CPT | Performed by: EMERGENCY MEDICINE

## 2019-01-29 RX ORDER — PREDNISONE 20 MG/1
60 TABLET ORAL ONCE
Status: DISCONTINUED | OUTPATIENT
Start: 2019-01-29 | End: 2019-01-29 | Stop reason: HOSPADM

## 2019-01-29 RX ORDER — AZITHROMYCIN 250 MG/1
TABLET, FILM COATED ORAL
Qty: 6 TAB | Refills: 0 | Status: SHIPPED | OUTPATIENT
Start: 2019-01-29 | End: 2019-05-08

## 2019-01-29 RX ORDER — PREDNISONE 20 MG/1
40 TABLET ORAL DAILY
Qty: 10 TAB | Refills: 0 | Status: SHIPPED | OUTPATIENT
Start: 2019-01-29 | End: 2019-02-03

## 2019-01-29 RX ORDER — AZITHROMYCIN 250 MG/1
500 TABLET, FILM COATED ORAL ONCE
Status: DISCONTINUED | OUTPATIENT
Start: 2019-01-29 | End: 2019-01-29 | Stop reason: HOSPADM

## 2019-01-29 RX ADMIN — ALBUTEROL SULFATE 2.5 MG: 2.5 SOLUTION RESPIRATORY (INHALATION) at 14:12

## 2019-01-29 NOTE — DISCHARGE INSTRUCTIONS
Use your inhalers as prescribed  Take Z-Wojciech for bronchitis as prescribed  Return to the ER for worsening symptoms, difficulty breathing, or other concerns  Follow-up with Dr. Johnson for recheck in 24-48 hours

## 2019-01-29 NOTE — ED PROVIDER NOTES
"ED Provider Note    CHIEF COMPLAINT  Chief Complaint   Patient presents with   • Cough     Productive cough since Sunday   • Fever   • Shortness of Breath     Hx pneumonia       HPI  Serenity Howe is a 68 y.o. female with a history of asthma, pneumonia and hypertension who presents complaining of cough and shortness of breath.    Patient states 2 days ago she had a subjective fever at home.  She took ibuprofen at 1:00 this morning.  She reports a cough that is productive of yellow sputum with shortness of breath noticed mainly on activity.  Patient reports rigors.      She denies sick contacts, chest pain, nausea, vomiting, leg swelling, calf pain, hemoptysis, abdominal pain, diarrhea.    Patient states she ran out of her Advair and has not used her albuterol inhaler recently.      ALLERGIES  Allergies   Allergen Reactions   • Shellfish Allergy Hives       CURRENT MEDICATIONS  Verapamil, albuterol, Advair    PAST MEDICAL HISTORY   has a past medical history of ASTHMA; Breast cancer (HCC); History of breast cancer (7/29/2015); Hypertension; Shoulder pain, right (7/29/2015); and Tremor (7/29/2015).    SURGICAL HISTORY   has a past surgical history that includes gyn surgery and mastectomy.    SOCIAL HISTORY  Social History     Social History Main Topics   • Smoking status: Never Smoker   • Smokeless tobacco: Never Used   • Alcohol use No   • Drug use: No   • Sexual activity: No       REVIEW OF SYSTEMS  See HPI for further details.  All other systems are negative except as above in HPI.      PHYSICAL EXAM  VITAL SIGNS: BP (!) 178/95   Pulse 100   Temp 36.9 °C (98.4 °F) (Temporal)   Resp 18   Ht 1.575 m (5' 2\")   Wt 77.1 kg (169 lb 15.6 oz)   SpO2 94%   BMI 31.09 kg/m²     General:  WD obese, nontoxic appearing in NAD; A+Ox3; V/S as above   Skin: warm and dry; good color; no rash  HEENT: NCAT; EOMs intact; PERRL; no scleral icterus   Neck: FROM; no meningismus, no LAD; no JVD  Cardiovascular: Regular heart " rate and rhythm.  No murmurs, rubs, or gallops; pulses 2+ bilaterally radially and DP areas  Lungs: Clear to auscultation except for crackles at the right base with decreased air movement bilaterally.  No wheezes or rhonchi.  Abdomen: BS present; soft; NTND; no rebound, guarding, or rigidity.  No organomegaly or pulsatile mass  Extremities: MCCLELLAND x 4; no e/o trauma; no pedal edema; neg Elena's  Neurologic: CNs III-XII grossly intact; speech clear; distal sensation intact; strength 5/5 UE/LEs  Psychiatric: Appropriate affect, normal mood    LABS  Results for orders placed or performed during the hospital encounter of 01/29/19   CBC WITH DIFFERENTIAL   Result Value Ref Range    WBC 7.1 4.8 - 10.8 K/uL    RBC 4.77 4.20 - 5.40 M/uL    Hemoglobin 15.9 12.0 - 16.0 g/dL    Hematocrit 47.0 37.0 - 47.0 %    MCV 98.5 (H) 81.4 - 97.8 fL    MCH 33.3 (H) 27.0 - 33.0 pg    MCHC 33.8 33.6 - 35.0 g/dL    RDW 44.7 35.9 - 50.0 fL    Platelet Count 211 164 - 446 K/uL    MPV 9.4 9.0 - 12.9 fL    Neutrophils-Polys 69.60 44.00 - 72.00 %    Lymphocytes 20.30 (L) 22.00 - 41.00 %    Monocytes 6.00 0.00 - 13.40 %    Eosinophils 3.10 0.00 - 6.90 %    Basophils 0.70 0.00 - 1.80 %    Immature Granulocytes 0.30 0.00 - 0.90 %    Nucleated RBC 0.00 /100 WBC    Neutrophils (Absolute) 4.96 2.00 - 7.15 K/uL    Lymphs (Absolute) 1.45 1.00 - 4.80 K/uL    Monos (Absolute) 0.43 0.00 - 0.85 K/uL    Eos (Absolute) 0.22 0.00 - 0.51 K/uL    Baso (Absolute) 0.05 0.00 - 0.12 K/uL    Immature Granulocytes (abs) 0.02 0.00 - 0.11 K/uL    NRBC (Absolute) 0.00 K/uL   COMP METABOLIC PANEL   Result Value Ref Range    Sodium 140 135 - 145 mmol/L    Potassium 3.7 3.6 - 5.5 mmol/L    Chloride 103 96 - 112 mmol/L    Co2 29 20 - 33 mmol/L    Anion Gap 8.0 0.0 - 11.9    Glucose 76 65 - 99 mg/dL    Bun 13 8 - 22 mg/dL    Creatinine 0.94 0.50 - 1.40 mg/dL    Calcium 9.2 8.5 - 10.5 mg/dL    AST(SGOT) 25 12 - 45 U/L    ALT(SGPT) 23 2 - 50 U/L    Alkaline Phosphatase 53 30 - 99  U/L    Total Bilirubin 0.5 0.1 - 1.5 mg/dL    Albumin 3.7 3.2 - 4.9 g/dL    Total Protein 7.7 6.0 - 8.2 g/dL    Globulin 4.0 (H) 1.9 - 3.5 g/dL    A-G Ratio 0.9 g/dL   ESTIMATED GFR   Result Value Ref Range    GFR If African American >60 >60 mL/min/1.73 m 2    GFR If Non African American 59 (A) >60 mL/min/1.73 m 2   BNP   Result Value Ref Range    B Natriuretic Peptide 92 0 - 100 pg/mL   TROPONIN   Result Value Ref Range    Troponin I <0.01 0.00 - 0.04 ng/mL   Influenza A/B By PCR   Result Value Ref Range    Influenza virus A RNA Negative Negative    Influenza virus B, PCR Negative Negative   EKG (Now)   Result Value Ref Range    Report       [facility] Emergency Dept[facility]    Test Date:  2019  Pt Name:    STEPHEN HOWE                Department: ER  MRN:        [id number]                      Room:  Gender:     Female                       Technician: [id number]  :        1950                   Requested By:ER TRIAGE PROTOCOL  Order #:    [id number]                    Reading MD: VALERIA NICE MD    Measurements  Intervals                                Axis  Rate:       74                           P:          48  AR:         156                          QRS:        45  QRSD:       88                           T:          53  QT:         376  QTc:        418    Interpretive Statements  SINUS RHYTHM  Compared to ECG 2018 15:05:48  ST (T wave) deviation no longer present    Electronically Signed On 2019 14:04:48 PST by VALERIA NICE MD           IMAGING  DX-CHEST-PORTABLE (1 VIEW)   Final Result      1.  There is no acute cardiopulmonary process.          MEDICAL RECORD  I have reviewed patient's medical record and pertinent results are listed below.      COURSE & MEDICAL DECISION MAKING  I have reviewed any medical record information, laboratory studies and radiographic results as noted.    Stephen Howe is a 68 y.o. female who presents complaining of cough,  subjective fever, and shortness of breath.  Cough is productive of yellow sputum.  Patient reports dyspnea on exertion.  She has a history of asthma.  I do not suspect PE.    Chest x-ray demonstrates no acute pathology.      EKG demonstrates no acute ST elevation.    Nebulizer, prednisone, and Zithromax 500 mg p.o. was ordered.    Labs demonstrate no leukocytosis and chemistry panel without significant abnormalities.    Pt was re-evaluated at 4:12 PM  Pt reports feeling cold.  Temperature was rechecked and she was afebrile.  Patient was able to ambulate to the bathroom without respiratory distress.  We will discharge home with a prescription for Zithromax, Advair, and prednisone.  Patient was advised to return to the ER for worsening symptoms such as difficulty breathing, vomiting, chest pain, or other concerns.  She is advised to follow-up with her primary care provider within 24-48 hours.  She is advised to obtain and continue with her Advair as well as her rescue inhaler as needed at home.    Pt's blood pressure was noted to be above 120/80 here in the ER.  Pt was informed and advised to follow up as an outpatient for recheck for possible dx/management of hypertension.      FINAL IMPRESSION  1. Acute bronchitis, unspecified organism      Electronically signed by: Arabella Means, 1/29/2019 12:42 PM

## 2019-01-29 NOTE — ED NOTES
Pt ambulated stable/steady gait to bathroom. Pt refusing to do road test or move to bed from chair or move chair closer to monitor to have cardiac monitoring. RN explained importance of road test and cardiac monitoring. Pt 94% RA. ERP notified

## 2019-01-29 NOTE — ED NOTES
Pt refused medications. Pt reports she will not take medications until she has a sandwich and cookies. ERP notified.

## 2019-01-29 NOTE — ED TRIAGE NOTES
"Chief Complaint   Patient presents with   • Cough     Productive cough since Sunday   • Fever   • Shortness of Breath     Hx pneumonia     BP (!) 172/89   Pulse 99   Temp 36.9 °C (98.4 °F) (Temporal)   Resp 18   Ht 1.575 m (5' 2\")   Wt 77.1 kg (169 lb 15.6 oz)   SpO2 94%   BMI 31.09 kg/m²     Pt ambulated into triage, complaints as above. VS as above, NAD, encouraged to return to the triage nurse or tech with any new complaints or symptoms.  "

## 2019-03-31 ENCOUNTER — OFFICE VISIT (OUTPATIENT)
Dept: URGENT CARE | Facility: PHYSICIAN GROUP | Age: 69
End: 2019-03-31
Payer: COMMERCIAL

## 2019-03-31 VITALS
BODY MASS INDEX: 31.1 KG/M2 | HEIGHT: 62 IN | HEART RATE: 85 BPM | WEIGHT: 169 LBS | TEMPERATURE: 98 F | RESPIRATION RATE: 14 BRPM | SYSTOLIC BLOOD PRESSURE: 140 MMHG | DIASTOLIC BLOOD PRESSURE: 88 MMHG | OXYGEN SATURATION: 96 %

## 2019-03-31 DIAGNOSIS — M10.9 ACUTE GOUT OF RIGHT FOOT, UNSPECIFIED CAUSE: ICD-10-CM

## 2019-03-31 PROCEDURE — 99214 OFFICE O/P EST MOD 30 MIN: CPT | Performed by: PHYSICIAN ASSISTANT

## 2019-03-31 RX ORDER — INDOMETHACIN 50 MG/1
50 CAPSULE ORAL 3 TIMES DAILY
Qty: 30 CAP | Refills: 0 | Status: SHIPPED | OUTPATIENT
Start: 2019-03-31 | End: 2020-06-03

## 2019-03-31 ASSESSMENT — ENCOUNTER SYMPTOMS
CHILLS: 0
SHORTNESS OF BREATH: 0
BLURRED VISION: 0
VOMITING: 0
SENSORY CHANGE: 0
FEVER: 0
PALPITATIONS: 0
NAUSEA: 0
TINGLING: 0
SORE THROAT: 0

## 2019-03-31 NOTE — PROGRESS NOTES
Subjective:      Serenity Howe is a 68 y.o. female who presents with Foot Pain (swelling, painful, poss gout inflammation, x 3 days)      Foot Problem   This is a new problem. The current episode started in the past 7 days (Started 2 days ago). The problem occurs constantly. The problem has been gradually worsening. Pertinent negatives include no chest pain, chills, fever, nausea, sore throat or vomiting. The symptoms are aggravated by standing and walking. She has tried nothing for the symptoms.   Patient reports history of gout.  Per her records she was prescribed indomethacin last time which cleared it up quite quickly.  She denies any injury.  She said she has been avoiding red meat but does eat a lot of pork.  She denies any alcohol use.      Review of Systems   Constitutional: Negative for chills and fever.   HENT: Negative for sore throat.    Eyes: Negative for blurred vision.   Respiratory: Negative for shortness of breath.    Cardiovascular: Negative for chest pain and palpitations.   Gastrointestinal: Negative for nausea and vomiting.   Musculoskeletal: Positive for joint pain (Right foot/right ankle).   Neurological: Negative for tingling and sensory change.       PMH:  has a past medical history of ASTHMA; Breast cancer (HCC); History of breast cancer (7/29/2015); Hypertension; Shoulder pain, right (7/29/2015); and Tremor (7/29/2015). She also has no past medical history of COPD.  MEDS:   Current Outpatient Prescriptions:   •  indomethacin (INDOCIN) 50 MG Cap, Take 1 Cap by mouth 3 times a day., Disp: 30 Cap, Rfl: 0  •  fluticasone-salmeterol (ADVAIR DISKUS) 250-50 MCG/DOSE AEROSOL POWDER, BREATH ACTIVATED, Inhale 1 Puff by mouth 2 times a day. INHALE 1 DOSE BY MOUTH TWICE DAILY. RINSE MOUTH AFTER USE, Disp: 3 Inhaler, Rfl: 3  •  verapamil ER (CALAN SR) 120 MG CAPSULE SR 24 HR, TAKE 1 CAP BY MOUTH EVERY DAY., Disp: 90 Cap, Rfl: 2  •  valsartan-hydrochlorothiazide (DIOVAN-HCT) 80-12.5 MG per  "tablet, TAKE 1 TAB BY MOUTH EVERY DAY., Disp: 90 Tab, Rfl: 3  •  Calcium Carbonate-Vit D-Min (CALTRATE PLUS PO), Take 1 Tab by mouth every day., Disp: , Rfl:   •  aspirin (ASA) 81 MG CHEW, Take 81 mg by mouth every day., Disp: , Rfl:   •  azithromycin (ZITHROMAX) 250 MG Tab, Take two tabs by mouth on day one, then one tab by mouth daily on days 2-5., Disp: 6 Tab, Rfl: 0  •  azithromycin (ZITHROMAX) 250 MG Tab, Take two tabs by mouth on day one, then one tab by mouth daily on days 2-5., Disp: 6 Tab, Rfl: 0  •  albuterol 108 (90 Base) MCG/ACT Aero Soln inhalation aerosol, 2 PUFFS EVERY 4 HOURS ONLY IF NEEDED FOR COUGH, WHEEZING, OR SHORTNESS OF BREATH., Disp: 1 Inhaler, Rfl: 5  •  meclizine (ANTIVERT) 25 MG Tab, Take 25 mg by mouth 3 times a day as needed for Vertigo., Disp: , Rfl:   •  albuterol (PROVENTIL) 2.5mg/3ml Nebu Soln solution for nebulization, 3 mL by Nebulization route every four hours as needed for Shortness of Breath., Disp: 75 mL, Rfl: 11  ALLERGIES:   Allergies   Allergen Reactions   • Shellfish Allergy Hives     SURGHX:   Past Surgical History:   Procedure Laterality Date   • GYN SURGERY      cs   • MASTECTOMY      right      SOCHX:  reports that she has never smoked. She has never used smokeless tobacco. She reports that she does not drink alcohol or use drugs.  FH: Family history was reviewed, no pertinent findings to report     Objective:     /88   Pulse 85   Temp 36.7 °C (98 °F) (Temporal)   Resp 14   Ht 1.575 m (5' 2\")   Wt 76.7 kg (169 lb)   SpO2 96%   BMI 30.91 kg/m²      Physical Exam   Constitutional: She is oriented to person, place, and time. She appears well-developed and well-nourished.   HENT:   Head: Normocephalic and atraumatic.   Right Ear: External ear normal.   Left Ear: External ear normal.   Eyes: Pupils are equal, round, and reactive to light. Conjunctivae are normal.   Cardiovascular: Normal rate, regular rhythm and normal heart sounds.    No murmur " heard.  Pulmonary/Chest: Effort normal and breath sounds normal. She has no wheezes.   Musculoskeletal:        Right foot: There is tenderness.        Feet:    Neurological: She is alert and oriented to person, place, and time.   Skin: Skin is warm and dry. Capillary refill takes less than 2 seconds.   Psychiatric: She has a normal mood and affect. Her behavior is normal. Judgment normal.          Assessment/Plan:     1. Acute gout of right foot, unspecified cause  - indomethacin (INDOCIN) 50 MG Cap; Take 1 Cap by mouth 3 times a day.  Dispense: 30 Cap; Refill: 0  -Patient symptoms are presenting just like her previous gout flareup.  Therefore, will prescribe indomethacin.  Advised her to follow-up with her PCP  -Note given for work      Differential Diagnosis, natural history, and supportive care discussed. Return to the Urgent Care or follow up with your PCP if symptoms fail to resolve, or for any new or worsening symptoms. Emergency room precautions discussed. Patient and/or family appears understanding of information.

## 2019-03-31 NOTE — LETTER
March 31, 2019         Patient: Serenity Howe   YOB: 1950   Date of Visit: 3/31/2019           To Whom it May Concern:    Serenity Howe was seen in my clinic on 3/31/2019. She may return to work on 4/2/2019.    If you have any questions or concerns, please don't hesitate to call.        Sincerely,           Selma Nguyen P.A.-C.  Electronically Signed

## 2019-05-08 ENCOUNTER — OFFICE VISIT (OUTPATIENT)
Dept: URGENT CARE | Facility: PHYSICIAN GROUP | Age: 69
End: 2019-05-08
Payer: COMMERCIAL

## 2019-05-08 VITALS
WEIGHT: 170 LBS | HEART RATE: 101 BPM | RESPIRATION RATE: 18 BRPM | DIASTOLIC BLOOD PRESSURE: 80 MMHG | HEIGHT: 62 IN | SYSTOLIC BLOOD PRESSURE: 138 MMHG | TEMPERATURE: 99.1 F | BODY MASS INDEX: 31.28 KG/M2 | OXYGEN SATURATION: 93 %

## 2019-05-08 DIAGNOSIS — J22 LOWER RESPIRATORY TRACT INFECTION: ICD-10-CM

## 2019-05-08 DIAGNOSIS — J45.31 MILD PERSISTENT ASTHMA WITH ACUTE EXACERBATION: ICD-10-CM

## 2019-05-08 PROCEDURE — 99214 OFFICE O/P EST MOD 30 MIN: CPT | Performed by: PHYSICIAN ASSISTANT

## 2019-05-08 RX ORDER — IPRATROPIUM BROMIDE AND ALBUTEROL SULFATE 2.5; .5 MG/3ML; MG/3ML
3 SOLUTION RESPIRATORY (INHALATION) ONCE
Status: COMPLETED | OUTPATIENT
Start: 2019-05-08 | End: 2019-05-08

## 2019-05-08 RX ORDER — ALBUTEROL SULFATE 2.5 MG/3ML
2.5 SOLUTION RESPIRATORY (INHALATION) EVERY 4 HOURS PRN
Qty: 60 BULLET | Refills: 0 | Status: SHIPPED | OUTPATIENT
Start: 2019-05-08 | End: 2020-01-11

## 2019-05-08 RX ORDER — AMOXICILLIN AND CLAVULANATE POTASSIUM 875; 125 MG/1; MG/1
1 TABLET, FILM COATED ORAL 2 TIMES DAILY
Qty: 20 TAB | Refills: 0 | Status: SHIPPED | OUTPATIENT
Start: 2019-05-08 | End: 2019-05-18

## 2019-05-08 RX ADMIN — IPRATROPIUM BROMIDE AND ALBUTEROL SULFATE 3 ML: 2.5; .5 SOLUTION RESPIRATORY (INHALATION) at 10:15

## 2019-05-08 ASSESSMENT — ENCOUNTER SYMPTOMS
WHEEZING: 1
EYE DISCHARGE: 0
SPUTUM PRODUCTION: 1
RHINORRHEA: 0
MYALGIAS: 1
HEADACHES: 0
EYE PAIN: 0
SINUS PAIN: 0
CHILLS: 0
VOMITING: 0
ABDOMINAL PAIN: 0
SHORTNESS OF BREATH: 1
NAUSEA: 0
DIARRHEA: 0
EYE REDNESS: 0
SORE THROAT: 1
FEVER: 1
HEMOPTYSIS: 0
CONSTIPATION: 0
COUGH: 1

## 2019-05-08 NOTE — PROGRESS NOTES
Subjective:   Serenity Howe is a 68 y.o. female who presents for Cough (shortness of [id number] )       Cough   This is a new problem. The current episode started in the past 7 days. The problem has been gradually worsening. The problem occurs every few minutes. The cough is productive of sputum. Associated symptoms include a fever (subjective), myalgias, nasal congestion, a sore throat, shortness of breath and wheezing. Pertinent negatives include no chest pain, chills, ear congestion, ear pain, eye redness, headaches, hemoptysis or rhinorrhea. She has tried a beta-agonist inhaler (Theraflu) for the symptoms. The treatment provided mild relief. Her past medical history is significant for asthma and bronchitis.     Review of Systems   Constitutional: Positive for fever (subjective). Negative for chills.   HENT: Positive for congestion and sore throat. Negative for ear discharge, ear pain, rhinorrhea and sinus pain.    Eyes: Negative for pain, discharge and redness.   Respiratory: Positive for cough, sputum production, shortness of breath and wheezing. Negative for hemoptysis.    Cardiovascular: Negative for chest pain.   Gastrointestinal: Negative for abdominal pain, constipation, diarrhea, nausea and vomiting.   Musculoskeletal: Positive for myalgias.   Neurological: Negative for headaches.   All other systems reviewed and are negative.      PMH:  has a past medical history of ASTHMA; Breast cancer (HCC); History of breast cancer (7/29/2015); Hypertension; Shoulder pain, right (7/29/2015); and Tremor (7/29/2015). She also has no past medical history of COPD.    MEDS:   Current Outpatient Prescriptions:   •  amoxicillin-clavulanate (AUGMENTIN) 875-125 MG Tab, Take 1 Tab by mouth 2 times a day for 10 days., Disp: 20 Tab, Rfl: 0  •  albuterol (PROVENTIL) 2.5mg/3ml Nebu Soln solution for nebulization, 3 mL by Nebulization route every four hours as needed for Shortness of Breath., Disp: 60 Bullet, Rfl: 0  •   "indomethacin (INDOCIN) 50 MG Cap, Take 1 Cap by mouth 3 times a day., Disp: 30 Cap, Rfl: 0  •  verapamil ER (CALAN SR) 120 MG CAPSULE SR 24 HR, TAKE 1 CAP BY MOUTH EVERY DAY., Disp: 90 Cap, Rfl: 2  •  valsartan-hydrochlorothiazide (DIOVAN-HCT) 80-12.5 MG per tablet, TAKE 1 TAB BY MOUTH EVERY DAY., Disp: 90 Tab, Rfl: 3  •  meclizine (ANTIVERT) 25 MG Tab, Take 25 mg by mouth 3 times a day as needed for Vertigo., Disp: , Rfl:   •  Calcium Carbonate-Vit D-Min (CALTRATE PLUS PO), Take 1 Tab by mouth every day., Disp: , Rfl:   •  aspirin (ASA) 81 MG CHEW, Take 81 mg by mouth every day., Disp: , Rfl:   •  fluticasone-salmeterol (ADVAIR DISKUS) 250-50 MCG/DOSE AEROSOL POWDER, BREATH ACTIVATED, Inhale 1 Puff by mouth 2 times a day. INHALE 1 DOSE BY MOUTH TWICE DAILY. RINSE MOUTH AFTER USE, Disp: 3 Inhaler, Rfl: 3  •  albuterol 108 (90 Base) MCG/ACT Aero Soln inhalation aerosol, 2 PUFFS EVERY 4 HOURS ONLY IF NEEDED FOR COUGH, WHEEZING, OR SHORTNESS OF BREATH., Disp: 1 Inhaler, Rfl: 5    ALLERGIES:   Allergies   Allergen Reactions   • Shellfish Allergy Hives       SURGHX:   Past Surgical History:   Procedure Laterality Date   • GYN SURGERY      cs   • MASTECTOMY      right        SOCHX:  reports that she has never smoked. She has never used smokeless tobacco. She reports that she does not drink alcohol or use drugs.    FH: Reviewed with patient, not pertinent to this visit.     Objective:   /80   Pulse (!) 101   Temp 37.3 °C (99.1 °F) (Temporal)   Resp 18   Ht 1.575 m (5' 2\")   Wt 77.1 kg (170 lb)   SpO2 93%   BMI 31.09 kg/m²   Physical Exam   Constitutional: She is oriented to person, place, and time. She appears well-developed and well-nourished. No distress.   HENT:   Head: Normocephalic and atraumatic.   Right Ear: Tympanic membrane, external ear and ear canal normal.   Left Ear: Tympanic membrane, external ear and ear canal normal.   Nose: Mucosal edema present. No sinus tenderness.   Mouth/Throat: Uvula is " midline and mucous membranes are normal. Posterior oropharyngeal erythema present. No oropharyngeal exudate or posterior oropharyngeal edema.   Eyes: Pupils are equal, round, and reactive to light. Conjunctivae and EOM are normal.   Neck: Normal range of motion. Neck supple. No tracheal deviation present.   Cardiovascular: Normal rate, regular rhythm and normal heart sounds.    Pulmonary/Chest: Effort normal. No respiratory distress. She has wheezes. She has rhonchi. She has no rales.   Wheezing improved after Duoneb   Musculoskeletal:   ROM normal all four extremities   Lymphadenopathy:     She has no cervical adenopathy.   Neurological: She is alert and oriented to person, place, and time.   Skin: Skin is warm and dry.   Psychiatric: She has a normal mood and affect. Her behavior is normal. Judgment and thought content normal.   Vitals reviewed.      Assessment/Plan:   1. Mild persistent asthma with acute exacerbation  - ipratropium-albuterol (DUONEB) nebulizer solution; 3 mL by Nebulization route Once.  - albuterol (PROVENTIL) 2.5mg/3ml Nebu Soln solution for nebulization; 3 mL by Nebulization route every four hours as needed for Shortness of Breath.  Dispense: 60 Bullet; Refill: 0    2. Lower respiratory tract infection  - amoxicillin-clavulanate (AUGMENTIN) 875-125 MG Tab; Take 1 Tab by mouth 2 times a day for 10 days.  Dispense: 20 Tab; Refill: 0  - albuterol (PROVENTIL) 2.5mg/3ml Nebu Soln solution for nebulization; 3 mL by Nebulization route every four hours as needed for Shortness of Breath.  Dispense: 60 Bullet; Refill: 0    - Patient expresses relief after DuoNeb  - Advised to continue previously prescribed inhalers prn  - Advised to take abx with food/yogurt and to complete course  - Work note provided  - Advised to return if symptoms worsen or do not improve    Differential diagnosis, natural history, supportive care, and indications for immediate follow-up discussed.

## 2019-05-08 NOTE — LETTER
May 8, 2019         Patient: Serenity Howe   YOB: 1950   Date of Visit: 5/8/2019           To Whom it May Concern:    Serenity Howe was seen in my clinic on 5/8/2019. Please excuse her from work from 5/8/19 through 5/10/19.    If you have any questions or concerns, please don't hesitate to call.        Sincerely,           Linwood Abrams P.A.-C.

## 2019-05-24 ENCOUNTER — HOSPITAL ENCOUNTER (OUTPATIENT)
Dept: RADIOLOGY | Facility: MEDICAL CENTER | Age: 69
End: 2019-05-24
Attending: INTERNAL MEDICINE
Payer: COMMERCIAL

## 2019-05-24 DIAGNOSIS — Z12.31 VISIT FOR SCREENING MAMMOGRAM: ICD-10-CM

## 2019-05-24 PROCEDURE — 77063 BREAST TOMOSYNTHESIS BI: CPT

## 2019-05-28 ENCOUNTER — OFFICE VISIT (OUTPATIENT)
Dept: MEDICAL GROUP | Facility: MEDICAL CENTER | Age: 69
End: 2019-05-28
Payer: COMMERCIAL

## 2019-05-28 VITALS
SYSTOLIC BLOOD PRESSURE: 118 MMHG | WEIGHT: 165 LBS | OXYGEN SATURATION: 100 % | BODY MASS INDEX: 30.36 KG/M2 | DIASTOLIC BLOOD PRESSURE: 70 MMHG | HEART RATE: 83 BPM | HEIGHT: 62 IN | TEMPERATURE: 98 F

## 2019-05-28 DIAGNOSIS — M79.672 PAIN IN BOTH FEET: ICD-10-CM

## 2019-05-28 DIAGNOSIS — L60.2 NAIL THICKENING: ICD-10-CM

## 2019-05-28 DIAGNOSIS — Z12.11 SCREEN FOR COLON CANCER: ICD-10-CM

## 2019-05-28 DIAGNOSIS — I10 ESSENTIAL HYPERTENSION: ICD-10-CM

## 2019-05-28 DIAGNOSIS — J45.41 MODERATE PERSISTENT ASTHMA WITH ACUTE EXACERBATION: ICD-10-CM

## 2019-05-28 DIAGNOSIS — M79.671 PAIN IN BOTH FEET: ICD-10-CM

## 2019-05-28 PROCEDURE — 99214 OFFICE O/P EST MOD 30 MIN: CPT | Performed by: INTERNAL MEDICINE

## 2019-05-28 RX ORDER — ALBUTEROL SULFATE 90 UG/1
2 AEROSOL, METERED RESPIRATORY (INHALATION) EVERY 6 HOURS PRN
Qty: 8.5 G | Refills: 11
Start: 2019-05-28 | End: 2020-01-11

## 2019-05-28 ASSESSMENT — PATIENT HEALTH QUESTIONNAIRE - PHQ9: CLINICAL INTERPRETATION OF PHQ2 SCORE: 0

## 2019-05-28 NOTE — PROGRESS NOTES
CC: Follow-up urgent care asthma, hypertension, foot pain.                                                                                                                                      HPI:   Serenity presents today with the following.    1. Moderate persistent asthma with acute exacerbation  Presents after having difficulty breathing going to urgent care found to be in exacerbation of asthma.  She reports her breathing is now back to baseline she is compliant with her Advair twice daily and has an albuterol inhaler on hand.  The most part she does well with intermittent exacerbations.    2. Essential hypertension  Blood pressure well controlled denying any chest pain or shortness of breath currently.    3. Pain in both feet  She does report a history of gout and when asked she says she has a flare currently but is pointing to her midfoot no signs of redness or swelling.  She does report swollen of the first joint in the past but again the symptoms are not consistent with gout.    4. Nail thickening  This was bilateral nail thickening she seen podiatry in the past.    5. Screen for colon cancer        Patient Active Problem List    Diagnosis Date Noted   • Gout, arthritis 10/05/2018   • Pulmonary nodule 04/16/2018   • Localized swelling of lower extremity 01/05/2018   • Moderate persistent asthma with acute exacerbation 01/05/2018   • Malignant neoplasm of upper-outer quadrant of right female breast (HCC) 11/29/2016   • Gastroesophageal reflux disease with esophagitis 07/28/2016   • Vertigo 12/17/2015   • Shoulder pain, right 07/29/2015   • Tremor 07/29/2015   • Recurrent knee pain 07/13/2015   • Essential hypertension 03/16/2015   • Insomnia 03/16/2015   • Personal history of breast cancer 03/16/2015   • Allergic rhinitis 03/16/2015   • Impaired fasting glucose 03/16/2015       Current Outpatient Prescriptions   Medication Sig Dispense Refill   • albuterol 108 (90 Base) MCG/ACT Aero Soln inhalation aerosol  "Inhale 2 Puffs by mouth every 6 hours as needed for Shortness of Breath. 8.5 g 11   • albuterol (PROVENTIL) 2.5mg/3ml Nebu Soln solution for nebulization 3 mL by Nebulization route every four hours as needed for Shortness of Breath. 60 Bullet 0   • indomethacin (INDOCIN) 50 MG Cap Take 1 Cap by mouth 3 times a day. 30 Cap 0   • fluticasone-salmeterol (ADVAIR DISKUS) 250-50 MCG/DOSE AEROSOL POWDER, BREATH ACTIVATED Inhale 1 Puff by mouth 2 times a day. INHALE 1 DOSE BY MOUTH TWICE DAILY. RINSE MOUTH AFTER USE 3 Inhaler 3   • verapamil ER (CALAN SR) 120 MG CAPSULE SR 24 HR TAKE 1 CAP BY MOUTH EVERY DAY. 90 Cap 2   • albuterol 108 (90 Base) MCG/ACT Aero Soln inhalation aerosol 2 PUFFS EVERY 4 HOURS ONLY IF NEEDED FOR COUGH, WHEEZING, OR SHORTNESS OF BREATH. 1 Inhaler 5   • valsartan-hydrochlorothiazide (DIOVAN-HCT) 80-12.5 MG per tablet TAKE 1 TAB BY MOUTH EVERY DAY. 90 Tab 3   • meclizine (ANTIVERT) 25 MG Tab Take 25 mg by mouth 3 times a day as needed for Vertigo.     • Calcium Carbonate-Vit D-Min (CALTRATE PLUS PO) Take 1 Tab by mouth every day.     • aspirin (ASA) 81 MG CHEW Take 81 mg by mouth every day.       No current facility-administered medications for this visit.          Allergies as of 05/28/2019 - Reviewed 05/28/2019   Allergen Reaction Noted   • Shellfish allergy Hives 03/08/2018        ROS: Denies Chest pain, SOB, LE edema.    /70 (BP Location: Left arm, Patient Position: Sitting)   Pulse 83   Temp 36.7 °C (98 °F)   Ht 1.575 m (5' 2\")   Wt 74.8 kg (165 lb)   SpO2 100%   BMI 30.18 kg/m²     Physical Exam:  Gen:         Alert and oriented, No apparent distress.  Neck:        No Lymphadenopathy or Bruits.  Lungs:     Clear to auscultation bilaterally  CV:          Regular rate and rhythm. No murmurs, rubs or gallops.               Ext:          No clubbing, cyanosis, edema.      Assessment and Plan.   69 y.o. female with the following issues.    1. Moderate persistent asthma with acute " exacerbation  Clinically resolved in terms of exacerbation continue maintenance.    2. Essential hypertension  Currently well controlled, Discuss diet, exercise and salt restriction.  No change to medication therapy.    3. Pain in both feet  Consistent with arthritic changes recommended topicals referring to podiatry  - REFERRAL TO PODIATRY    4. Nail thickening  Again referred to podiatry  - REFERRAL TO PODIATRY    5. Screen for colon cancer    - REFERRAL TO GASTROENTEROLOGY

## 2019-08-20 ENCOUNTER — OFFICE VISIT (OUTPATIENT)
Dept: URGENT CARE | Facility: PHYSICIAN GROUP | Age: 69
End: 2019-08-20
Payer: COMMERCIAL

## 2019-08-20 VITALS
DIASTOLIC BLOOD PRESSURE: 78 MMHG | HEART RATE: 92 BPM | TEMPERATURE: 98.5 F | HEIGHT: 62 IN | RESPIRATION RATE: 18 BRPM | OXYGEN SATURATION: 93 % | BODY MASS INDEX: 29.44 KG/M2 | WEIGHT: 160 LBS | SYSTOLIC BLOOD PRESSURE: 122 MMHG

## 2019-08-20 DIAGNOSIS — J45.41 MODERATE PERSISTENT ASTHMA WITH EXACERBATION: ICD-10-CM

## 2019-08-20 LAB
FLUAV+FLUBV AG SPEC QL IA: NEGATIVE
INT CON NEG: NEGATIVE
INT CON POS: POSITIVE

## 2019-08-20 PROCEDURE — 87804 INFLUENZA ASSAY W/OPTIC: CPT | Performed by: FAMILY MEDICINE

## 2019-08-20 PROCEDURE — 99214 OFFICE O/P EST MOD 30 MIN: CPT | Performed by: FAMILY MEDICINE

## 2019-08-20 RX ORDER — DOXYCYCLINE HYCLATE 100 MG
100 TABLET ORAL 2 TIMES DAILY
Qty: 14 TAB | Refills: 0 | Status: SHIPPED | OUTPATIENT
Start: 2019-08-20 | End: 2019-10-03

## 2019-08-20 RX ORDER — BENZONATATE 100 MG/1
100 CAPSULE ORAL 3 TIMES DAILY PRN
Qty: 60 CAP | Refills: 0 | Status: SHIPPED | OUTPATIENT
Start: 2019-08-20 | End: 2020-06-03

## 2019-08-20 RX ORDER — METHYLPREDNISOLONE 4 MG/1
4 TABLET ORAL DAILY
Qty: 1 KIT | Refills: 0 | Status: SHIPPED | OUTPATIENT
Start: 2019-08-20 | End: 2020-01-11

## 2019-08-20 ASSESSMENT — ENCOUNTER SYMPTOMS
HEADACHES: 0
COUGH: 1
DIARRHEA: 0
MYALGIAS: 1
NAUSEA: 0
WHEEZING: 1
SHORTNESS OF BREATH: 1
SORE THROAT: 1
VOMITING: 0
ABDOMINAL PAIN: 0

## 2019-08-20 NOTE — LETTER
August 20, 2019    To Whom It May Concern:         This is confirmation that Serenity Howe attended her scheduled appointment with Dedrick Wagner M.D. on 8/20/19.  She is anticipated to be well enough to return to work on 8/22/19.           If you have any questions please do not hesitate to call me at the phone number listed below.    Sincerely,          Dedrick Wagner M.D.  [phone]

## 2019-08-20 NOTE — PROGRESS NOTES
Subjective:     Serenity Howe is a 69 y.o. female who presents for Cough (x1day productive cough, congestion, sore throat, letter for work)    HPI  Pt presents for evaluation of a new problem   Pt with cough, sore throat, and congestion for the past 24 hours  Pt coughing constantly with a dry cough  Cough did not keep her up at night   Nothing makes cough better   Feeling out of breath when coughing   Thinks she is wheezing sometimes which was helped by albuterol nebulizer   Was recently in the [facility] for the past 3 weeks and just returned to the [place] 4 days ago.  Claims that the man sitting next to her on the airplane was coughing constantly.  Symptoms overall worsening     Review of Systems   Constitutional: Positive for malaise/fatigue.   HENT: Positive for congestion and sore throat.    Respiratory: Positive for cough, shortness of breath and wheezing.    Cardiovascular: Negative for chest pain.   Gastrointestinal: Negative for abdominal pain, diarrhea, nausea and vomiting.   Musculoskeletal: Positive for myalgias.   Skin: Negative for rash.   Neurological: Negative for headaches.     PMH:  has a past medical history of ASTHMA, Breast cancer ([facility]), History of breast cancer (7/29/2015), Hypertension, Shoulder pain, right (7/29/2015), and Tremor (7/29/2015). She also has no past medical history of COPD.  MEDS:   Current Outpatient Medications:   •  albuterol 108 (90 Base) MCG/ACT Aero Soln inhalation aerosol, Inhale 2 Puffs by mouth every 6 hours as needed for Shortness of Breath., Disp: 8.5 g, Rfl: 11  •  indomethacin (INDOCIN) 50 MG Cap, Take 1 Cap by mouth 3 times a day., Disp: 30 Cap, Rfl: 0  •  fluticasone-salmeterol (ADVAIR DISKUS) 250-50 MCG/DOSE AEROSOL POWDER, BREATH ACTIVATED, Inhale 1 Puff by mouth 2 times a day. INHALE 1 DOSE BY MOUTH TWICE DAILY. RINSE MOUTH AFTER USE, Disp: 3 Inhaler, Rfl: 3  •  verapamil ER (CALAN SR) 120 MG CAPSULE SR 24 HR, TAKE 1 CAP BY MOUTH EVERY DAY., Disp: 90 Cap,  "Rfl: 2  •  albuterol 108 (90 Base) MCG/ACT Aero Soln inhalation aerosol, 2 PUFFS EVERY 4 HOURS ONLY IF NEEDED FOR COUGH, WHEEZING, OR SHORTNESS OF BREATH., Disp: 1 Inhaler, Rfl: 5  •  valsartan-hydrochlorothiazide (DIOVAN-HCT) 80-12.5 MG per tablet, TAKE 1 TAB BY MOUTH EVERY DAY., Disp: 90 Tab, Rfl: 3  •  meclizine (ANTIVERT) 25 MG Tab, Take 25 mg by mouth 3 times a day as needed for Vertigo., Disp: , Rfl:   •  Calcium Carbonate-Vit D-Min (CALTRATE PLUS PO), Take 1 Tab by mouth every day., Disp: , Rfl:   •  aspirin (ASA) 81 MG CHEW, Take 81 mg by mouth every day., Disp: , Rfl:   •  albuterol (PROVENTIL) 2.5mg/3ml Nebu Soln solution for nebulization, 3 mL by Nebulization route every four hours as needed for Shortness of Breath., Disp: 60 Bullet, Rfl: 0  ALLERGIES:   Allergies   Allergen Reactions   • Shellfish Allergy Hives     SURGHX:   Past Surgical History:   Procedure Laterality Date   • GYN SURGERY      cs   • MASTECTOMY      right      SOCHX:  reports that she has never smoked. She has never used smokeless tobacco. She reports that she does not drink alcohol or use drugs.  FH: Family history was reviewed, not contributing to acute illness     Objective:   /78   Pulse 92   Temp 36.9 °C (98.5 °F) (Temporal)   Resp 18   Ht 1.575 m (5' 2\")   Wt 72.6 kg (160 lb)   SpO2 93%   BMI 29.26 kg/m²     Physical Exam   Constitutional: She appears well-developed and well-nourished. No distress.   HENT:   Head: Normocephalic and atraumatic.   Right Ear: Tympanic membrane, external ear and ear canal normal.   Left Ear: Tympanic membrane, external ear and ear canal normal.   Nose: Mucosal edema and rhinorrhea present.   Mouth/Throat: Uvula is midline, oropharynx is clear and moist and mucous membranes are normal.   Eyes: Conjunctivae and EOM are normal. Right eye exhibits no discharge. Left eye exhibits no discharge. No scleral icterus.   Neck: Normal range of motion. No tracheal deviation present.   Cardiovascular: " Normal rate and regular rhythm.   Pulmonary/Chest:   Moderate air movement bilaterally, faint and expiratory wheezes present throughout, no focal rales appreciated   Lymphadenopathy:     She has no cervical adenopathy.   Neurological: She is alert. Coordination normal.   Skin: Skin is warm and dry. No rash noted. She is not diaphoretic.   Psychiatric: She has a normal mood and affect. Her behavior is normal. Judgment and thought content normal.     Assessment/Plan:   Assessment    1. Moderate persistent asthma with exacerbation  Point-of-care influenza negative.  Will treat asthma exacerbation with steroids and antibiotics.  Patient will follow-up on an as-needed basis.  - POCT Influenza A/B  - methylPREDNISolone (MEDROL DOSEPAK) 4 MG Tablet Therapy Pack; Take 1 Tab by mouth every day.  Dispense: 1 Kit; Refill: 0  - doxycycline (VIBRAMYCIN) 100 MG Tab; Take 1 Tab by mouth 2 times a day.  Dispense: 14 Tab; Refill: 0

## 2019-09-04 ENCOUNTER — HOSPITAL ENCOUNTER (OUTPATIENT)
Dept: RADIOLOGY | Facility: MEDICAL CENTER | Age: 69
End: 2019-09-04
Attending: PHYSICIAN ASSISTANT
Payer: COMMERCIAL

## 2019-09-04 ENCOUNTER — OFFICE VISIT (OUTPATIENT)
Dept: URGENT CARE | Facility: PHYSICIAN GROUP | Age: 69
End: 2019-09-04
Payer: COMMERCIAL

## 2019-09-04 VITALS
WEIGHT: 169 LBS | RESPIRATION RATE: 18 BRPM | DIASTOLIC BLOOD PRESSURE: 94 MMHG | OXYGEN SATURATION: 96 % | SYSTOLIC BLOOD PRESSURE: 138 MMHG | HEIGHT: 62 IN | HEART RATE: 97 BPM | BODY MASS INDEX: 31.1 KG/M2 | TEMPERATURE: 97.4 F

## 2019-09-04 DIAGNOSIS — R09.1 PLEURISY: ICD-10-CM

## 2019-09-04 DIAGNOSIS — R05.9 COUGH: ICD-10-CM

## 2019-09-04 PROCEDURE — 71046 X-RAY EXAM CHEST 2 VIEWS: CPT

## 2019-09-04 PROCEDURE — 99214 OFFICE O/P EST MOD 30 MIN: CPT | Performed by: PHYSICIAN ASSISTANT

## 2019-09-04 ASSESSMENT — ENCOUNTER SYMPTOMS
HEADACHES: 0
COUGH: 1
SORE THROAT: 0
SHORTNESS OF BREATH: 1
FEVER: 0
EYE REDNESS: 0
NECK PAIN: 0
HEARTBURN: 0
BACK PAIN: 1
DIZZINESS: 0
NAUSEA: 0
FALLS: 0
SPUTUM PRODUCTION: 0
VOMITING: 0
CHILLS: 0
ABDOMINAL PAIN: 0

## 2019-09-04 NOTE — LETTER
September 4, 2019         Patient: Serenity Howe   YOB: 1950   Date of Visit: 9/4/2019           To Whom it May Concern:    Serenity Howe was seen in my clinic on 9/4/2019. She may return to work tomorrow 9/5/2019.    If you have any questions or concerns, please don't hesitate to call.        Sincerely,           Aleksandra Morales P.A.-C.  Electronically Signed

## 2019-09-04 NOTE — PROGRESS NOTES
Subjective:      Serenity Howe is a 69 y.o. female who presents with Back Pain (upper back [id number] )          HPI  69-year-old female presents to urgent care with new problem of upper back pain that radiates to the chest onset about 3 days ago.  Pain is worse with coughing and certain movements. Pain is intermittent and rated as mild. Patient denies any history of same.  Patient was recently seen here in urgent care 8/20/2019 for cough.  She denies any fevers, other recent illness, dysuria, nausea/vomiting, abdominal pain, or shortness of breath.  Recent travel from the [facility].  She returned on 8/16/2019. Denies lower extremity swelling, calf pain, or palpitations.    Review of Systems   Constitutional: Negative for chills and fever.   HENT: Negative for congestion, ear pain and sore throat.    Eyes: Negative for redness.   Respiratory: Positive for cough and shortness of breath. Negative for sputum production.    Cardiovascular: Positive for chest pain.   Gastrointestinal: Negative for abdominal pain, heartburn, nausea and vomiting.   Genitourinary: Negative for dysuria, frequency and urgency.   Musculoskeletal: Positive for back pain. Negative for falls and neck pain.   Skin: Negative for rash.   Neurological: Negative for dizziness and headaches.   Endo/Heme/Allergies: Negative for environmental allergies.       Past Medical History:   Diagnosis Date   • ASTHMA    • Breast cancer (HCC)    • History of breast cancer 7/29/2015   • Hypertension    • Shoulder pain, right 7/29/2015   • Tremor 7/29/2015     Current Outpatient Medications on File Prior to Visit   Medication Sig Dispense Refill   • albuterol 108 (90 Base) MCG/ACT Aero Soln inhalation aerosol Inhale 2 Puffs by mouth every 6 hours as needed for Shortness of Breath. 8.5 g 11   • albuterol (PROVENTIL) 2.5mg/3ml Nebu Soln solution for nebulization 3 mL by Nebulization route every four hours as needed for Shortness of Breath. 60 Bullet 0   •  "indomethacin (INDOCIN) 50 MG Cap Take 1 Cap by mouth 3 times a day. 30 Cap 0   • fluticasone-salmeterol (ADVAIR DISKUS) 250-50 MCG/DOSE AEROSOL POWDER, BREATH ACTIVATED Inhale 1 Puff by mouth 2 times a day. INHALE 1 DOSE BY MOUTH TWICE DAILY. RINSE MOUTH AFTER USE 3 Inhaler 3   • verapamil ER (CALAN SR) 120 MG CAPSULE SR 24 HR TAKE 1 CAP BY MOUTH EVERY DAY. 90 Cap 2   • albuterol 108 (90 Base) MCG/ACT Aero Soln inhalation aerosol 2 PUFFS EVERY 4 HOURS ONLY IF NEEDED FOR COUGH, WHEEZING, OR SHORTNESS OF BREATH. 1 Inhaler 5   • valsartan-hydrochlorothiazide (DIOVAN-HCT) 80-12.5 MG per tablet TAKE 1 TAB BY MOUTH EVERY DAY. 90 Tab 3   • meclizine (ANTIVERT) 25 MG Tab Take 25 mg by mouth 3 times a day as needed for Vertigo.     • Calcium Carbonate-Vit D-Min (CALTRATE PLUS PO) Take 1 Tab by mouth every day.     • aspirin (ASA) 81 MG CHEW Take 81 mg by mouth every day.     • methylPREDNISolone (MEDROL DOSEPAK) 4 MG Tablet Therapy Pack Take 1 Tab by mouth every day. (Patient not taking: Reported on 9/4/2019) 1 Kit 0   • doxycycline (VIBRAMYCIN) 100 MG Tab Take 1 Tab by mouth 2 times a day. (Patient not taking: Reported on 9/4/2019) 14 Tab 0   • benzonatate (TESSALON) 100 MG Cap Take 1 Cap by mouth 3 times a day as needed for Cough. (Patient not taking: Reported on 9/4/2019) 60 Cap 0     No current facility-administered medications on file prior to visit.      Allergies   Allergen Reactions   • Shellfish Allergy Hives     Social History     Tobacco Use   • Smoking status: Never Smoker   • Smokeless tobacco: Never Used   Substance Use Topics   • Alcohol use: No     Alcohol/week: 0.0 oz      Objective:     /94   Pulse 97   Temp 36.3 °C (97.4 °F) (Temporal)   Resp 18   Ht 1.575 m (5' 2\")   Wt 76.7 kg (169 lb)   SpO2 96%   BMI 30.91 kg/m²      Physical Exam   Constitutional: She is oriented to person, place, and time. She appears well-developed and well-nourished. No distress.   HENT:   Head: Normocephalic and " atraumatic.   Right Ear: Tympanic membrane and ear canal normal.   Left Ear: Tympanic membrane and ear canal normal.   Nose: Nose normal.   Mouth/Throat: Uvula is midline, oropharynx is clear and moist and mucous membranes are normal.   Eyes: Conjunctivae and EOM are normal.   Neck: Normal range of motion. Neck supple.   Cardiovascular: Normal rate, regular rhythm and normal heart sounds.   Pulmonary/Chest: Effort normal. No respiratory distress. She has wheezes.   Abdominal: Soft. Bowel sounds are normal. She exhibits no distension. There is no tenderness.   Musculoskeletal: Normal range of motion. She exhibits no edema.   Lymphadenopathy:     She has no cervical adenopathy.   Neurological: She is alert and oriented to person, place, and time.   Skin: Skin is warm and dry. No rash noted.   Psychiatric: She has a normal mood and affect. Her behavior is normal. Judgment and thought content normal.   Vitals reviewed.          Assessment/Plan:     1. Cough  DX-CHEST-2 VIEWS   2. Pleurisy  EKG - Clinic Performed     CXR:   Impression       No active disease.     EKG reviewed: NSR, rate 83  Normal axis, no ST depression or elevation     Recommend OTC ibuprofen and tylenol for pain management.   PT should follow up with PCP in 1-2 days for re-evaluation if symptoms have not improved.  Discussed red flags and reasons to return to UC or ED.  Pt and/or family verbalized understanding of diagnosis and follow up instructions and was offered informational handout on diagnosis.  PT discharged.

## 2019-10-03 ENCOUNTER — OFFICE VISIT (OUTPATIENT)
Dept: URGENT CARE | Facility: PHYSICIAN GROUP | Age: 69
End: 2019-10-03
Payer: COMMERCIAL

## 2019-10-03 VITALS
DIASTOLIC BLOOD PRESSURE: 74 MMHG | SYSTOLIC BLOOD PRESSURE: 136 MMHG | RESPIRATION RATE: 15 BRPM | TEMPERATURE: 98.8 F | BODY MASS INDEX: 30.36 KG/M2 | WEIGHT: 165 LBS | HEIGHT: 62 IN | HEART RATE: 94 BPM | OXYGEN SATURATION: 95 %

## 2019-10-03 DIAGNOSIS — M15.9 OSTEOARTHRITIS OF MULTIPLE JOINTS, UNSPECIFIED OSTEOARTHRITIS TYPE: ICD-10-CM

## 2019-10-03 DIAGNOSIS — J20.9 ACUTE BRONCHITIS, UNSPECIFIED ORGANISM: ICD-10-CM

## 2019-10-03 PROCEDURE — 99214 OFFICE O/P EST MOD 30 MIN: CPT | Performed by: INTERNAL MEDICINE

## 2019-10-03 RX ORDER — DOXYCYCLINE HYCLATE 100 MG
100 TABLET ORAL 2 TIMES DAILY
Qty: 14 TAB | Refills: 0 | Status: SHIPPED | OUTPATIENT
Start: 2019-10-03 | End: 2019-10-10

## 2019-10-03 ASSESSMENT — ENCOUNTER SYMPTOMS
SORE THROAT: 0
SHORTNESS OF BREATH: 1
RHINORRHEA: 0
CHILLS: 1
COUGH: 1
WHEEZING: 0
FEVER: 0

## 2019-10-03 NOTE — LETTER
October 3, 2019       Patient: Serenity Howe   YOB: 1950   Date of Visit: 10/3/2019         To Whom It May Concern:    It is my medical opinion that Serenity Howe remain out of work until 10/5/19.    If you have any questions or concerns, please don't hesitate to call [phone]          Sincerely,          Leopoldo Lebron M.D.  Electronically Signed

## 2019-10-03 NOTE — PROGRESS NOTES
"Subjective:   Serenity Howe is a 69 y.o. female who presents for Cough (SOB, Onset 1 day ago)        Cough   This is a new problem. The current episode started in the past 7 days. The problem has been gradually worsening. The problem occurs constantly. The cough is productive of purulent sputum. Associated symptoms include chills and shortness of breath. Pertinent negatives include no fever, nasal congestion, rhinorrhea, sore throat or wheezing.     Review of Systems   Constitutional: Positive for chills. Negative for fever.   HENT: Negative for rhinorrhea and sore throat.    Respiratory: Positive for cough and shortness of breath. Negative for wheezing.      Allergies   Allergen Reactions   • Shellfish Allergy Hives      Objective:   /74   Pulse 94   Temp 37.1 °C (98.8 °F)   Resp 15   Ht 1.575 m (5' 2\")   Wt 74.8 kg (165 lb)   SpO2 95%   BMI 30.18 kg/m²   Physical Exam   Constitutional: She is oriented to person, place, and time. She appears well-developed and well-nourished. No distress.   HENT:   Head: Normocephalic and atraumatic.   Eyes: Pupils are equal, round, and reactive to light. Conjunctivae and EOM are normal.   Neck: Normal range of motion. Neck supple.   Cardiovascular: Normal rate and regular rhythm.   No murmur heard.  Pulmonary/Chest: Effort normal. No respiratory distress. She has no wheezes. She has rales.   Abdominal: Soft. She exhibits no distension. There is no tenderness.   Neurological: She is alert and oriented to person, place, and time. She has normal reflexes. She displays normal reflexes. No sensory deficit.   Skin: Skin is warm and dry. Capillary refill takes less than 2 seconds.   Psychiatric: She has a normal mood and affect.   Vitals reviewed.        Assessment/Plan:   1. Acute bronchitis, unspecified organism  - doxycycline (VIBRAMYCIN) 100 MG Tab; Take 1 Tab by mouth 2 times a day for 7 days.  Dispense: 14 Tab; Refill: 0    Differential diagnosis, natural " history, supportive care, and indications for immediate follow-up discussed.

## 2019-10-07 RX ORDER — VERAPAMIL HYDROCHLORIDE 120 MG/1
CAPSULE, EXTENDED RELEASE ORAL
Qty: 90 CAP | Refills: 2 | Status: SHIPPED | OUTPATIENT
Start: 2019-10-07 | End: 2020-07-14

## 2019-12-02 DIAGNOSIS — J44.9 CHRONIC AIRWAY OBSTRUCTION, MIXED TYPE (HCC): ICD-10-CM

## 2019-12-02 DIAGNOSIS — Z79.899 MEDICATION MANAGEMENT: ICD-10-CM

## 2019-12-02 RX ORDER — VALSARTAN AND HYDROCHLOROTHIAZIDE 80; 12.5 MG/1; MG/1
1 TABLET, FILM COATED ORAL
Qty: 90 TAB | Refills: 3 | Status: SHIPPED | OUTPATIENT
Start: 2019-12-02 | End: 2020-10-05

## 2019-12-10 ENCOUNTER — HOSPITAL ENCOUNTER (EMERGENCY)
Facility: MEDICAL CENTER | Age: 69
End: 2019-12-10
Attending: EMERGENCY MEDICINE
Payer: COMMERCIAL

## 2019-12-10 VITALS
BODY MASS INDEX: 30.14 KG/M2 | HEIGHT: 62 IN | HEART RATE: 79 BPM | WEIGHT: 163.8 LBS | OXYGEN SATURATION: 98 % | DIASTOLIC BLOOD PRESSURE: 81 MMHG | SYSTOLIC BLOOD PRESSURE: 167 MMHG | TEMPERATURE: 97.5 F | RESPIRATION RATE: 18 BRPM

## 2019-12-10 DIAGNOSIS — M79.672 FOOT PAIN, LEFT: ICD-10-CM

## 2019-12-10 DIAGNOSIS — M10.9 ACUTE GOUT OF LEFT FOOT, UNSPECIFIED CAUSE: ICD-10-CM

## 2019-12-10 PROCEDURE — 99284 EMERGENCY DEPT VISIT MOD MDM: CPT

## 2019-12-10 RX ORDER — HYDROCODONE BITARTRATE AND ACETAMINOPHEN 5; 325 MG/1; MG/1
1-2 TABLET ORAL EVERY 6 HOURS PRN
Qty: 20 TAB | Refills: 0 | Status: SHIPPED | OUTPATIENT
Start: 2019-12-10 | End: 2019-12-13

## 2019-12-10 RX ORDER — BENZONATATE 100 MG/1
100 CAPSULE ORAL 3 TIMES DAILY PRN
Qty: 60 CAP | Refills: 0 | Status: SHIPPED | OUTPATIENT
Start: 2019-12-10 | End: 2020-06-03

## 2019-12-10 RX ORDER — INDOMETHACIN 25 MG/1
25 CAPSULE ORAL 3 TIMES DAILY
Qty: 20 CAP | Refills: 0 | Status: SHIPPED | OUTPATIENT
Start: 2019-12-10 | End: 2020-06-03

## 2019-12-10 ASSESSMENT — LIFESTYLE VARIABLES
DO YOU DRINK ALCOHOL: NO
CONSUMPTION TOTAL: INCOMPLETE
HAVE PEOPLE ANNOYED YOU BY CRITICIZING YOUR DRINKING: NO
EVER HAD A DRINK FIRST THING IN THE MORNING TO STEADY YOUR NERVES TO GET RID OF A HANGOVER: NO
TOTAL SCORE: 0
EVER FELT BAD OR GUILTY ABOUT YOUR DRINKING: NO
TOTAL SCORE: 0
HAVE YOU EVER FELT YOU SHOULD CUT DOWN ON YOUR DRINKING: NO
TOTAL SCORE: 0

## 2019-12-10 NOTE — ED PROVIDER NOTES
ED Provider Note    CHIEF COMPLAINT  Chief Complaint   Patient presents with   • Foot Pain     left, x 1 week, denies trauma       HPI  Serenity Howe is a 69 y.o. female who presents for evaluation of left foot pain.  The patient denies any trauma.  She states it is diffusely across the top of her foot.  She is able to ambulate.  She is had no fevers.  She does have a history of gout.  She is on no gout medicines currently.    REVIEW OF SYSTEMS  See HPI for further details. All other systems negative.    PAST MEDICAL HISTORY  Past Medical History:   Diagnosis Date   • ASTHMA    • Breast cancer (HCC)    • History of breast cancer 7/29/2015   • Hypertension    • Shoulder pain, right 7/29/2015   • Tremor 7/29/2015       FAMILY HISTORY  Family History   Problem Relation Age of Onset   • Hyperlipidemia Mother        SOCIAL HISTORY  Social History     Socioeconomic History   • Marital status:      Spouse name: Not on file   • Number of children: Not on file   • Years of education: Not on file   • Highest education level: Not on file   Occupational History   • Not on file   Social Needs   • Financial resource strain: Not on file   • Food insecurity:     Worry: Not on file     Inability: Not on file   • Transportation needs:     Medical: Not on file     Non-medical: Not on file   Tobacco Use   • Smoking status: Never Smoker   • Smokeless tobacco: Never Used   Substance and Sexual Activity   • Alcohol use: No     Alcohol/week: 0.0 oz   • Drug use: No   • Sexual activity: Never     Partners: Male   Lifestyle   • Physical activity:     Days per week: Not on file     Minutes per session: Not on file   • Stress: Not on file   Relationships   • Social connections:     Talks on phone: Not on file     Gets together: Not on file     Attends [employer] witness service: Not on file     Active member of club or organization: Not on file     Attends meetings of clubs or organizations: Not on file     Relationship status: Not on  "file   • Intimate partner violence:     Fear of current or ex partner: Not on file     Emotionally abused: Not on file     Physically abused: Not on file     Forced sexual activity: Not on file   Other Topics Concern   • Not on file   Social History Narrative   • Not on file       SURGICAL HISTORY  Past Surgical History:   Procedure Laterality Date   • GYN SURGERY      cs   • MASTECTOMY      right        CURRENT MEDICATIONS  Home Medications    **Home medications have not yet been reviewed for this encounter**         ALLERGIES  Allergies   Allergen Reactions   • Shellfish Allergy Hives       PHYSICAL EXAM  VITAL SIGNS: BP (!) 171/84   Pulse 89   Temp 36.4 °C (97.5 °F) (Temporal)   Resp 14   Ht 1.575 m (5' 2\")   Wt 74.3 kg (163 lb 12.8 oz)   SpO2 95%   BMI 29.96 kg/m²   Constitutional: Well developed, Well nourished, No acute distress, Non-toxic appearance.   HENT: Normocephalic, Atraumatic.  Cardiovascular: Normal heart rate.   Thorax & Lungs: No respiratory distress.  Skin: Warm, Dry.  Musculoskeletal: Left foot shows no obvious deformity.  She does have pain with range of motion of the foot.  There is no erythema.  Neurologic: Awake and alert, No focal deficits noted.       COURSE & MEDICAL DECISION MAKING  Pertinent Labs & Imaging studies reviewed. (See chart for details)  This is a 69-year-old here for evaluation of left foot pain.  She has had symptoms for the past week.  She denies any trauma.  Based on her history and exam I see no indication for imaging.  I believe this all represents gout.  She is provided a prescription for Indocin and [place].  I reviewed her prescription monitoring report and she will sign a consent for treatment with narcotics.  She is given a discharge instruction sheet on gout.    FINAL IMPRESSION  1.  Left foot pain  2.  Gout  3.         Electronically signed by: Jason Liu, 12/10/2019 10:36 AM    "

## 2019-12-10 NOTE — ED TRIAGE NOTES
".  Chief Complaint   Patient presents with   • Foot Pain     left, x 1 week, denies trauma     .BP (!) 171/84   Pulse 89   Temp 36.4 °C (97.5 °F) (Temporal)   Resp 14   Ht 1.575 m (5' 2\")   Wt 74.3 kg (163 lb 12.8 oz)   SpO2 95%   BMI 29.96 kg/m²     Ambulatory to triage with above complaints, educated on triage process, placed in lobby, told to inform staff of any changes in condition.    "

## 2019-12-10 NOTE — ED NOTES
Patient understands discharge instructions. Given all DC education, prescriptions, f/u appointments. Pt verbalized understanding.  In no distress. IV and telemetry dc'd. Has all belongings.  Ambulating well with steady gait. Will return for worsening symptoms.

## 2020-01-11 ENCOUNTER — OFFICE VISIT (OUTPATIENT)
Dept: URGENT CARE | Facility: PHYSICIAN GROUP | Age: 70
End: 2020-01-11
Payer: COMMERCIAL

## 2020-01-11 VITALS
HEART RATE: 96 BPM | WEIGHT: 162 LBS | SYSTOLIC BLOOD PRESSURE: 126 MMHG | TEMPERATURE: 99 F | BODY MASS INDEX: 29.81 KG/M2 | OXYGEN SATURATION: 94 % | DIASTOLIC BLOOD PRESSURE: 66 MMHG | RESPIRATION RATE: 20 BRPM | HEIGHT: 62 IN

## 2020-01-11 DIAGNOSIS — J02.8 ACUTE PHARYNGITIS DUE TO OTHER SPECIFIED ORGANISMS: ICD-10-CM

## 2020-01-11 DIAGNOSIS — J22 LOWER RESPIRATORY INFECTION (E.G., BRONCHITIS, PNEUMONIA, PNEUMONITIS, PULMONITIS): ICD-10-CM

## 2020-01-11 PROCEDURE — 99214 OFFICE O/P EST MOD 30 MIN: CPT | Performed by: INTERNAL MEDICINE

## 2020-01-11 RX ORDER — ALBUTEROL SULFATE 90 UG/1
AEROSOL, METERED RESPIRATORY (INHALATION)
COMMUNITY
Start: 2015-11-19 | End: 2020-03-06 | Stop reason: SDUPTHER

## 2020-01-11 RX ORDER — ALBUTEROL SULFATE 2.5 MG/3ML
SOLUTION RESPIRATORY (INHALATION)
COMMUNITY
Start: 2015-12-17 | End: 2021-07-26 | Stop reason: SDUPTHER

## 2020-01-11 RX ORDER — PREDNISONE 20 MG/1
20 TABLET ORAL DAILY
Qty: 5 TAB | Refills: 0 | Status: SHIPPED | OUTPATIENT
Start: 2020-01-11 | End: 2020-01-16

## 2020-01-11 RX ORDER — FLUTICASONE PROPIONATE 50 MCG
SPRAY, SUSPENSION (ML) NASAL
COMMUNITY
Start: 2015-12-04 | End: 2022-03-02

## 2020-01-11 RX ORDER — IPRATROPIUM BROMIDE AND ALBUTEROL SULFATE 2.5; .5 MG/3ML; MG/3ML
3 SOLUTION RESPIRATORY (INHALATION) ONCE
Status: COMPLETED | OUTPATIENT
Start: 2020-01-11 | End: 2020-01-11

## 2020-01-11 RX ORDER — DOXYCYCLINE 100 MG/1
100 TABLET ORAL 2 TIMES DAILY
Qty: 14 TAB | Refills: 0 | Status: SHIPPED | OUTPATIENT
Start: 2020-01-11 | End: 2020-06-03

## 2020-01-11 RX ADMIN — IPRATROPIUM BROMIDE AND ALBUTEROL SULFATE 3 ML: 2.5; .5 SOLUTION RESPIRATORY (INHALATION) at 11:30

## 2020-01-11 ASSESSMENT — ENCOUNTER SYMPTOMS
SHORTNESS OF BREATH: 1
WHEEZING: 1
HEADACHES: 1
FEVER: 1
SORE THROAT: 1
RHINORRHEA: 1
COUGH: 1

## 2020-01-11 NOTE — LETTER
January 11, 2020       Patient: Serenity Howe   YOB: 1950   Date of Visit: 1/11/2020         To Whom It May Concern:    It is my medical opinion that Serenity Howe remain out of work until 1/15/20.    If you have any questions or concerns, please don't hesitate to call [phone]          Sincerely,          Leopoldo Lebron M.D.  Electronically Signed

## 2020-01-11 NOTE — PROGRESS NOTES
"Subjective:   Serenity Howe is a 69 y.o. female who presents for Cough (onset last night. sob, cough, congestion, green phelgm )        Cough   This is a new problem. The current episode started yesterday. The problem has been rapidly worsening. The problem occurs constantly. The cough is productive of purulent sputum. Associated symptoms include a fever, headaches, nasal congestion, rhinorrhea, a sore throat, shortness of breath and wheezing. Pertinent negatives include no chest pain.     Review of Systems   Constitutional: Positive for fever.   HENT: Positive for rhinorrhea and sore throat.    Respiratory: Positive for cough, shortness of breath and wheezing.    Cardiovascular: Negative for chest pain.   Neurological: Positive for headaches.     Allergies   Allergen Reactions   • Shellfish Allergy Hives      Objective:   /66   Pulse 96   Temp 37.2 °C (99 °F) (Temporal)   Resp 20   Ht 1.575 m (5' 2\")   Wt 73.5 kg (162 lb)   SpO2 94%   BMI 29.63 kg/m²   Physical Exam  Constitutional:       General: She is not in acute distress.     Appearance: She is well-developed.   HENT:      Head: Normocephalic and atraumatic.      Right Ear: Tympanic membrane, ear canal and external ear normal.      Left Ear: Tympanic membrane, ear canal and external ear normal.      Nose: Mucosal edema and rhinorrhea present.      Mouth/Throat:      Mouth: Mucous membranes are moist.      Pharynx: Oropharynx is clear. Uvula midline. Posterior oropharyngeal erythema present.      Tonsils: No tonsillar abscesses.   Eyes:      Conjunctiva/sclera: Conjunctivae normal.   Neck:      Musculoskeletal: No neck rigidity.   Cardiovascular:      Rate and Rhythm: Normal rate and regular rhythm.   Pulmonary:      Effort: Pulmonary effort is normal. No respiratory distress.      Breath sounds: Wheezing and rales present.   Lymphadenopathy:      Cervical: No cervical adenopathy.   Skin:     General: Skin is warm and dry.      Capillary " Refill: Capillary refill takes less than 2 seconds.   Neurological:      Mental Status: She is alert and oriented to person, place, and time.      Sensory: No sensory deficit.      Deep Tendon Reflexes: Reflexes are normal and symmetric.   Psychiatric:         Mood and Affect: Mood normal.         Behavior: Behavior normal.           Assessment/Plan:   1. Lower respiratory infection (e.g., bronchitis, pneumonia, pneumonitis, pulmonitis)  - doxycycline monohydrate (ADOXA) 100 MG tablet; Take 1 Tab by mouth 2 times a day.  Dispense: 14 Tab; Refill: 0  - predniSONE (DELTASONE) 20 MG Tab; Take 1 Tab by mouth every day for 5 days.  Dispense: 5 Tab; Refill: 0  - ipratropium-albuterol (DUONEB) nebulizer solution    2. Acute pharyngitis due to other specified organisms    Other orders  - fluticasone (FLONASE) 50 MCG/ACT nasal spray      Differential diagnosis, natural history, supportive care, and indications for immediate follow-up discussed.

## 2020-01-19 ENCOUNTER — OFFICE VISIT (OUTPATIENT)
Dept: URGENT CARE | Facility: PHYSICIAN GROUP | Age: 70
End: 2020-01-19
Payer: COMMERCIAL

## 2020-01-19 ENCOUNTER — HOSPITAL ENCOUNTER (OUTPATIENT)
Dept: RADIOLOGY | Facility: MEDICAL CENTER | Age: 70
End: 2020-01-19
Attending: PHYSICIAN ASSISTANT
Payer: COMMERCIAL

## 2020-01-19 VITALS
HEART RATE: 88 BPM | DIASTOLIC BLOOD PRESSURE: 60 MMHG | TEMPERATURE: 98 F | HEIGHT: 62 IN | SYSTOLIC BLOOD PRESSURE: 116 MMHG | BODY MASS INDEX: 29.81 KG/M2 | RESPIRATION RATE: 24 BRPM | WEIGHT: 162 LBS | OXYGEN SATURATION: 95 %

## 2020-01-19 DIAGNOSIS — J45.31 MILD PERSISTENT ASTHMA WITH ACUTE EXACERBATION: ICD-10-CM

## 2020-01-19 DIAGNOSIS — R05.9 COUGH: ICD-10-CM

## 2020-01-19 DIAGNOSIS — S86.912A KNEE STRAIN, LEFT, INITIAL ENCOUNTER: ICD-10-CM

## 2020-01-19 DIAGNOSIS — M17.12 OSTEOARTHRITIS OF LEFT KNEE, UNSPECIFIED OSTEOARTHRITIS TYPE: ICD-10-CM

## 2020-01-19 DIAGNOSIS — J44.1 COPD EXACERBATION (HCC): ICD-10-CM

## 2020-01-19 PROCEDURE — 71046 X-RAY EXAM CHEST 2 VIEWS: CPT

## 2020-01-19 PROCEDURE — 99214 OFFICE O/P EST MOD 30 MIN: CPT | Performed by: PHYSICIAN ASSISTANT

## 2020-01-19 PROCEDURE — 73564 X-RAY EXAM KNEE 4 OR MORE: CPT | Mod: LT

## 2020-01-19 RX ORDER — IPRATROPIUM BROMIDE AND ALBUTEROL SULFATE 2.5; .5 MG/3ML; MG/3ML
3 SOLUTION RESPIRATORY (INHALATION) ONCE
Status: COMPLETED | OUTPATIENT
Start: 2020-01-19 | End: 2020-01-19

## 2020-01-19 RX ORDER — METHYLPREDNISOLONE 4 MG/1
TABLET ORAL
Qty: 21 TAB | Refills: 0 | Status: SHIPPED | OUTPATIENT
Start: 2020-01-19 | End: 2020-06-03

## 2020-01-19 RX ORDER — BENZONATATE 100 MG/1
100 CAPSULE ORAL 3 TIMES DAILY PRN
Qty: 60 CAP | Refills: 0 | Status: SHIPPED | OUTPATIENT
Start: 2020-01-19 | End: 2020-06-03

## 2020-01-19 RX ADMIN — IPRATROPIUM BROMIDE AND ALBUTEROL SULFATE 3 ML: 2.5; .5 SOLUTION RESPIRATORY (INHALATION) at 11:10

## 2020-01-19 ASSESSMENT — ENCOUNTER SYMPTOMS
DIARRHEA: 0
PALPITATIONS: 0
CONSTIPATION: 0
VOMITING: 0
NAUSEA: 0
WHEEZING: 1
SHORTNESS OF BREATH: 0
FEVER: 0
SPUTUM PRODUCTION: 0
FOCAL WEAKNESS: 0
CHILLS: 0
SORE THROAT: 0
TINGLING: 0
BRUISES/BLEEDS EASILY: 1
COUGH: 1
SENSORY CHANGE: 0
BLOOD IN STOOL: 0
ABDOMINAL PAIN: 0

## 2020-01-19 NOTE — LETTER
January 19, 2020       Patient: Serenity Howe   YOB: 1950   Date of Visit: 1/19/2020         To Whom It May Concern:    It is my medical opinion that Serenity Howe should be excused from work for Monday (tomorrow, 1/20) and Tuesday (1/21) of this week due to illness.      If you have any questions or concerns, please don't hesitate to call [phone]          Sincerely,          Ambrose Thompson P.A.-C.  Electronically Signed

## 2020-01-19 NOTE — PROGRESS NOTES
Subjective:   Serenity Howe  is a 69 y.o. female who presents for Cough (cough is worse, sob, L knee swelling x4days )        Patient returns to clinic 8 days status post last evaluation for lower respiratory tract infection.  At that time she was treated with prednisone x5 days as well as a course of doxycycline.  She notes minimal improvement with course of antibiotic.  She denies fevers chills.  She notes wheezy cough and chest congestion have persisted.  She denies chest pain or palpitations of heart.  She denies leg swelling or shortness of breath.  She notes wheezy coughing worsens when lying flat.  She notes past medical history of similar.  She notes past medical history of pneumonia within the last 2 or 3 years.  Her daughter recommended she return to clinic for further evaluation due to persistent coughing despite treatment.  She denies nausea vomiting abdominal pain diarrhea or rash.     Additionally patient mentions left knee swelling that is been present for weeks to months.  She denies recent trauma or injury.  She notes increased achy discomfort to left knee over the last few days.  She denies catching locking.  She denies instability.  She complains of achy fullness and discomfort worse to back of knee particularly after long periods of time on her feet or sitting still.  She denies history of left knee surgery.  She states she took naproxen over the last few days and this is helped with her left knee pain.  Patient notes she has continued to use her Advair MDI daily as well as intermittent use of Ventolin as needed which does help.    Cough   Associated symptoms include wheezing. Pertinent negatives include no chest pain, chills, ear pain, fever, rash, sore throat or shortness of breath.     Review of Systems   Constitutional: Negative for chills and fever.   HENT: Positive for congestion. Negative for ear pain and sore throat.    Respiratory: Positive for cough and wheezing. Negative for  "sputum production and shortness of breath.    Cardiovascular: Negative for chest pain, palpitations and leg swelling.   Gastrointestinal: Negative for abdominal pain, blood in stool, constipation, diarrhea, melena, nausea and vomiting.   Genitourinary: Negative.    Musculoskeletal: Positive for joint pain ( left knee).   Skin: Negative for rash.   Neurological: Negative for tingling, sensory change and focal weakness.   Endo/Heme/Allergies: Bruises/bleeds easily ( 81 mg asa).     Allergies   Allergen Reactions   • Shellfish Allergy Hives   I have worn a mask for the entire encounter with this patient.    Objective:   /60   Pulse 88   Temp 36.7 °C (98 °F) (Temporal)   Resp (!) 24   Ht 1.575 m (5' 2\")   Wt 73.5 kg (162 lb)   SpO2 95%   BMI 29.63 kg/m²   Physical Exam  Vitals signs and nursing note reviewed.   Constitutional:       General: She is not in acute distress.     Appearance: She is well-developed. She is not diaphoretic.   HENT:      Head: Normocephalic and atraumatic.      Right Ear: Tympanic membrane, ear canal and external ear normal.      Left Ear: Tympanic membrane, ear canal and external ear normal.      Nose: Nose normal.      Mouth/Throat:      Lips: Pink.      Mouth: Mucous membranes are moist.      Pharynx: Oropharynx is clear. Uvula midline. Posterior oropharyngeal erythema ( mild PND) present. No pharyngeal swelling or oropharyngeal exudate.      Tonsils: No tonsillar exudate or tonsillar abscesses.   Eyes:      General: Lids are normal. No scleral icterus.        Right eye: No discharge.         Left eye: No discharge.      Conjunctiva/sclera: Conjunctivae normal.   Neck:      Musculoskeletal: Neck supple.   Cardiovascular:      Rate and Rhythm: Normal rate and regular rhythm.  No extrasystoles are present.     Pulses: Normal pulses.   Pulmonary:      Effort: Pulmonary effort is normal. No respiratory distress.      Breath sounds: No stridor. Wheezing present. No decreased breath " sounds, rhonchi ( clears w/ coughing) or rales.   Musculoskeletal:      Left knee: She exhibits decreased range of motion ( 2/2 swelling) and effusion. She exhibits no ecchymosis, no erythema and no bony tenderness. Tenderness found. Medial joint line and lateral joint line tenderness noted.   Lymphadenopathy:      Cervical: Cervical adenopathy ( mild bilat) present.   Skin:     General: Skin is warm and dry.      Coloration: Skin is not pale.      Findings: No erythema.   Neurological:      Mental Status: She is alert and oriented to person, place, and time. She is not disoriented.   Psychiatric:         Speech: Speech normal.         Behavior: Behavior normal.     DUONEB - tolerates well    CXR -   IMPRESSION:     1.  No acute cardiopulmonary abnormality identified.     2.  Chronic obstructive pulmonary disease             Last Resulted: 01/19/20 11:14 AM Order Details View Encounter Lab and Collection Details Routing           Dx KNEE -   IMPRESSION:     1.  Moderate-severe osteoarthritis of the medial femorotibial and patellofemoral compartments     2.  At least one associated intra-articular ossific body     3.  Quadriceps and patellar tendon insertion enthesophyte             Last Resulted: 01/19/20 11:15 AM              Assessment/Plan:   1. Knee strain, left, initial encounter  - DX-KNEE COMPLETE 4+ LEFT; Future    2. Mild persistent asthma with acute exacerbation  - ipratropium-albuterol (DUONEB) nebulizer solution  - DX-CHEST-2 VIEWS; Future  - methylPREDNISolone (MEDROL DOSEPAK) 4 MG Tablet Therapy Pack; Follow schedule on package instructions.  Dispense: 21 Tab; Refill: 0    3. Cough  - DX-CHEST-2 VIEWS; Future  - methylPREDNISolone (MEDROL DOSEPAK) 4 MG Tablet Therapy Pack; Follow schedule on package instructions.  Dispense: 21 Tab; Refill: 0  - benzonatate (TESSALON) 100 MG Cap; Take 1 Cap by mouth 3 times a day as needed for Cough.  Dispense: 60 Cap; Refill: 0    4. COPD exacerbation (HCC)  -  methylPREDNISolone (MEDROL DOSEPAK) 4 MG Tablet Therapy Pack; Follow schedule on package instructions.  Dispense: 21 Tab; Refill: 0    5. Osteoarthritis of left knee, unspecified osteoarthritis type  Supportive care is reviewed with patient/caregiver - recommend to push PO fluids and electrolytes, Nsaids/tylenol, netti pot/saline irrig, humidifier in home, medrol now -we will extend methylprednisolone with Medrol taper now, Cautioned regarding potential side effects of steroid, avoid nsaids while using  No indication for antibiotics -patient is encouraged to follow-up with primary care regarding DJD knee  Sent with work note per her request  Return to clinic with lack of resolution or progression of symptoms.  ER precautions with any worsening symptoms are reviewed with patient/caregiver and they do express understanding      Differential diagnosis, natural history, supportive care, and indications for immediate follow-up discussed.

## 2020-03-07 RX ORDER — ALBUTEROL SULFATE 90 UG/1
2 AEROSOL, METERED RESPIRATORY (INHALATION) EVERY 6 HOURS PRN
Qty: 8.5 G | Refills: 6 | Status: SHIPPED | OUTPATIENT
Start: 2020-03-07 | End: 2020-04-29

## 2020-04-15 ENCOUNTER — APPOINTMENT (OUTPATIENT)
Dept: MEDICAL GROUP | Facility: MEDICAL CENTER | Age: 70
End: 2020-04-15
Payer: MEDICARE

## 2020-04-29 ENCOUNTER — TELEPHONE (OUTPATIENT)
Dept: MEDICAL GROUP | Facility: MEDICAL CENTER | Age: 70
End: 2020-04-29

## 2020-04-29 RX ORDER — ALBUTEROL SULFATE 90 UG/1
2 AEROSOL, METERED RESPIRATORY (INHALATION) EVERY 4 HOURS PRN
Qty: 1 INHALER | Refills: 11 | Status: SHIPPED | OUTPATIENT
Start: 2020-04-29 | End: 2021-06-21 | Stop reason: SDUPTHER

## 2020-04-29 NOTE — TELEPHONE ENCOUNTER
Spoke with patient and Proair is not covered by insurance but generic Albuterol or Ventolin might be. She was wondering if you can change the rx. Please advise.

## 2020-05-30 DIAGNOSIS — J44.9 CHRONIC AIRWAY OBSTRUCTION, MIXED TYPE (HCC): ICD-10-CM

## 2020-06-03 ENCOUNTER — OFFICE VISIT (OUTPATIENT)
Dept: MEDICAL GROUP | Facility: MEDICAL CENTER | Age: 70
End: 2020-06-03
Payer: COMMERCIAL

## 2020-06-03 VITALS
SYSTOLIC BLOOD PRESSURE: 112 MMHG | HEART RATE: 90 BPM | OXYGEN SATURATION: 99 % | WEIGHT: 167 LBS | TEMPERATURE: 97.8 F | DIASTOLIC BLOOD PRESSURE: 64 MMHG | BODY MASS INDEX: 30.73 KG/M2 | HEIGHT: 62 IN

## 2020-06-03 DIAGNOSIS — Z11.59 NEED FOR HEPATITIS C SCREENING TEST: ICD-10-CM

## 2020-06-03 DIAGNOSIS — Z12.11 SCREEN FOR COLON CANCER: ICD-10-CM

## 2020-06-03 DIAGNOSIS — M54.2 NECK PAIN: ICD-10-CM

## 2020-06-03 DIAGNOSIS — J45.41 MODERATE PERSISTENT ASTHMA WITH ACUTE EXACERBATION: ICD-10-CM

## 2020-06-03 DIAGNOSIS — Z13.6 SCREENING FOR CARDIOVASCULAR CONDITION: ICD-10-CM

## 2020-06-03 DIAGNOSIS — I10 ESSENTIAL HYPERTENSION: ICD-10-CM

## 2020-06-03 DIAGNOSIS — Z12.31 VISIT FOR SCREENING MAMMOGRAM: ICD-10-CM

## 2020-06-03 DIAGNOSIS — M15.9 OSTEOARTHRITIS OF MULTIPLE JOINTS, UNSPECIFIED OSTEOARTHRITIS TYPE: ICD-10-CM

## 2020-06-03 PROCEDURE — 99214 OFFICE O/P EST MOD 30 MIN: CPT | Performed by: INTERNAL MEDICINE

## 2020-06-03 ASSESSMENT — FIBROSIS 4 INDEX: FIB4 SCORE: 1.75

## 2020-06-03 ASSESSMENT — PATIENT HEALTH QUESTIONNAIRE - PHQ9: CLINICAL INTERPRETATION OF PHQ2 SCORE: 0

## 2020-06-03 NOTE — PROGRESS NOTES
CC: Neck pain, asthma, hypertension.                                                                                                                                      HPI:   Serenity presents today with the following.    1. Neck pain  Resents complaining of upper back and neck pain.  Slight stiffness turning side to side.  Previous MRI does show degenerative changes.  She denies any numbness or weakness in extremities.    2. Moderate persistent asthma with acute exacerbation  She reports she is compliant with inhalers breathing is at baseline.    3. Essential hypertension  Controlled on current regimen denying any side effects to meds no chest pain.        Patient Active Problem List    Diagnosis Date Noted   • Gout, arthritis 10/05/2018   • Pulmonary nodule 04/16/2018   • Localized swelling of lower extremity 01/05/2018   • Moderate persistent asthma with acute exacerbation 01/05/2018   • Malignant neoplasm of upper-outer quadrant of right female breast (HCC) 11/29/2016   • Gastroesophageal reflux disease with esophagitis 07/28/2016   • Vertigo 12/17/2015   • Shoulder pain, right 07/29/2015   • Tremor 07/29/2015   • Recurrent knee pain 07/13/2015   • Essential hypertension 03/16/2015   • Insomnia 03/16/2015   • Personal history of breast cancer 03/16/2015   • Allergic rhinitis 03/16/2015   • Impaired fasting glucose 03/16/2015       Current Outpatient Medications   Medication Sig Dispense Refill   • Diclofenac Sodium (DICLO GEL) 1 % Kit Apply 1 Application to skin as directed 4 times a day as needed. 2 Kit 3   • WIXELA INHUB 250-50 MCG/DOSE AEROSOL POWDER, BREATH ACTIVATED INHALE 1 PUFF BY MOUTH TWICE DAILY RINSE MOUTH AFTER USE 3 Inhaler 3   • albuterol 108 (90 Base) MCG/ACT Aero Soln inhalation aerosol Inhale 2 Puffs by mouth every four hours as needed for Shortness of Breath. 1 Inhaler 11   • fluticasone (FLONASE) 50 MCG/ACT nasal spray      • albuterol (PROVENTIL) 2.5mg/3ml Nebu Soln solution for  "nebulization      • valsartan-hydrochlorothiazide (DIOVAN-HCT) 80-12.5 MG per tablet Take 1 Tab by mouth every day. 90 Tab 3   • verapamil ER (CALAN SR) 120 MG CAPSULE SR 24 HR TAKE 1 CAPSULE BY MOUTH EVERY DAY 90 Cap 2   • Calcium Carbonate-Vit D-Min (CALTRATE PLUS PO) Take 1 Tab by mouth every day.     • aspirin (ASA) 81 MG CHEW Take 81 mg by mouth every day.       No current facility-administered medications for this visit.          Allergies as of 06/03/2020 - Reviewed 06/03/2020   Allergen Reaction Noted   • Shellfish allergy Hives 03/08/2018        ROS: Denies Chest pain, SOB, LE edema.    /64 (BP Location: Right arm, Patient Position: Sitting)   Pulse 90   Temp 36.6 °C (97.8 °F)   Ht 1.575 m (5' 2\")   Wt 75.8 kg (167 lb)   SpO2 99%   BMI 30.54 kg/m²     Physical Exam:  Gen:         Alert and oriented, No apparent distress.  Neck:        No Lymphadenopathy or Bruits.  Lungs:     Clear to auscultation bilaterally  CV:          Regular rate and rhythm. No murmurs, rubs or gallops.               Ext:          No clubbing, cyanosis, edema.      Assessment and Plan.   70 y.o. female with the following issues.    1. Neck pain  Referring to physical therapy recommending topical gels  - REFERRAL TO PHYSICAL THERAPY Reason for Therapy: Eval/Treat/Report    2. Moderate persistent asthma with acute exacerbation  Stable no change to therapy    3. Essential hypertension  Currently well controlled, Discuss diet, exercise and salt restriction.  No change to medication therapy.    4. Screen for colon cancer    - REFERRAL TO GASTROENTEROLOGY    5. Visit for screening mammogram    - MA-SCREENING MAMMO BILAT W/CAD; Future    6. Screening for cardiovascular condition    - Comp Metabolic Panel; Future  - Lipid Profile; Future    7. Need for hepatitis C screening test    - HEP C VIRUS ANTIBODY; Future    "

## 2020-06-10 ENCOUNTER — HOSPITAL ENCOUNTER (OUTPATIENT)
Dept: RADIOLOGY | Facility: MEDICAL CENTER | Age: 70
End: 2020-06-10
Attending: INTERNAL MEDICINE
Payer: COMMERCIAL

## 2020-06-10 ENCOUNTER — PATIENT OUTREACH (OUTPATIENT)
Dept: SCHEDULING | Facility: IMAGING CENTER | Age: 70
End: 2020-06-10

## 2020-06-10 DIAGNOSIS — Z12.31 VISIT FOR SCREENING MAMMOGRAM: ICD-10-CM

## 2020-06-10 DIAGNOSIS — R92.8 ABNORMAL MAMMOGRAM: ICD-10-CM

## 2020-06-10 PROCEDURE — 77067 SCR MAMMO BI INCL CAD: CPT

## 2020-06-10 NOTE — PROGRESS NOTES
Outcome: Left Message  Please transfer to Patient Outreach Team at [phone] when patient returns call.  Attempt # [phone]

## 2020-06-17 ENCOUNTER — HOSPITAL ENCOUNTER (OUTPATIENT)
Dept: RADIOLOGY | Facility: MEDICAL CENTER | Age: 70
End: 2020-06-17
Attending: INTERNAL MEDICINE
Payer: COMMERCIAL

## 2020-06-17 DIAGNOSIS — R92.8 ABNORMAL MAMMOGRAM: ICD-10-CM

## 2020-06-17 PROCEDURE — 76642 ULTRASOUND BREAST LIMITED: CPT | Mod: LT

## 2020-06-17 PROCEDURE — G0279 TOMOSYNTHESIS, MAMMO: HCPCS | Mod: LT

## 2020-07-11 ENCOUNTER — HOSPITAL ENCOUNTER (OUTPATIENT)
Dept: RADIOLOGY | Facility: MEDICAL CENTER | Age: 70
End: 2020-07-11
Attending: NURSE PRACTITIONER
Payer: COMMERCIAL

## 2020-07-11 ENCOUNTER — OFFICE VISIT (OUTPATIENT)
Dept: URGENT CARE | Facility: PHYSICIAN GROUP | Age: 70
End: 2020-07-11
Payer: COMMERCIAL

## 2020-07-11 VITALS
HEART RATE: 87 BPM | HEIGHT: 62 IN | TEMPERATURE: 97.8 F | WEIGHT: 169 LBS | SYSTOLIC BLOOD PRESSURE: 108 MMHG | RESPIRATION RATE: 18 BRPM | BODY MASS INDEX: 31.1 KG/M2 | DIASTOLIC BLOOD PRESSURE: 72 MMHG | OXYGEN SATURATION: 96 %

## 2020-07-11 DIAGNOSIS — M79.672 LEFT FOOT PAIN: ICD-10-CM

## 2020-07-11 DIAGNOSIS — M25.571 ACUTE RIGHT ANKLE PAIN: ICD-10-CM

## 2020-07-11 DIAGNOSIS — M79.671 RIGHT FOOT PAIN: ICD-10-CM

## 2020-07-11 DIAGNOSIS — M25.572 ACUTE LEFT ANKLE PAIN: ICD-10-CM

## 2020-07-11 DIAGNOSIS — M19.079 ARTHRITIS OF FOOT: ICD-10-CM

## 2020-07-11 PROCEDURE — 73610 X-RAY EXAM OF ANKLE: CPT | Mod: LT

## 2020-07-11 PROCEDURE — 99214 OFFICE O/P EST MOD 30 MIN: CPT | Performed by: NURSE PRACTITIONER

## 2020-07-11 RX ORDER — KETOROLAC TROMETHAMINE 30 MG/ML
30 INJECTION, SOLUTION INTRAMUSCULAR; INTRAVENOUS ONCE
Status: DISCONTINUED | OUTPATIENT
Start: 2020-07-11 | End: 2020-07-11

## 2020-07-11 RX ORDER — KETOROLAC TROMETHAMINE 30 MG/ML
30 INJECTION, SOLUTION INTRAMUSCULAR; INTRAVENOUS ONCE
Status: COMPLETED | OUTPATIENT
Start: 2020-07-11 | End: 2020-07-11

## 2020-07-11 RX ORDER — MELOXICAM 7.5 MG/1
7.5 TABLET ORAL DAILY
Qty: 30 TAB | Refills: 0 | Status: SHIPPED | OUTPATIENT
Start: 2020-07-11 | End: 2021-06-13

## 2020-07-11 RX ORDER — ACETAMINOPHEN 325 MG/1
650 TABLET ORAL EVERY 6 HOURS PRN
COMMUNITY

## 2020-07-11 RX ADMIN — KETOROLAC TROMETHAMINE 30 MG: 30 INJECTION, SOLUTION INTRAMUSCULAR; INTRAVENOUS at 11:23

## 2020-07-11 ASSESSMENT — ENCOUNTER SYMPTOMS
RESPIRATORY NEGATIVE: 1
FEVER: 0
NEUROLOGICAL NEGATIVE: 1
SENSORY CHANGE: 0
CONSTITUTIONAL NEGATIVE: 1
SHORTNESS OF BREATH: 0
WEAKNESS: 0
CARDIOVASCULAR NEGATIVE: 1

## 2020-07-11 ASSESSMENT — VISUAL ACUITY: OU: 1

## 2020-07-11 ASSESSMENT — FIBROSIS 4 INDEX: FIB4 SCORE: 1.75

## 2020-07-11 ASSESSMENT — PAIN SCALES - GENERAL: PAINLEVEL: 10=SEVERE PAIN

## 2020-07-11 NOTE — PATIENT INSTRUCTIONS
Arthritis  Arthritis is a term that is commonly used to refer to joint pain or joint disease. There are more than 100 types of arthritis.  What are the causes?  The most common cause of this condition is wear and tear of a joint. Other causes include:  · Gout.  · Inflammation of a joint.  · An infection of a joint.  · Sprains and other injuries near the joint.  · A reaction to medicines or drugs, or an allergic reaction.  In some cases, the cause may not be known.  What are the signs or symptoms?  The main symptom of this condition is pain in the joint during movement. Other symptoms include:  · Redness, swelling, or stiffness at a joint.  · Warmth coming from the joint.  · Fever.  · Overall feeling of illness.  How is this diagnosed?  This condition may be diagnosed with a physical exam and tests, including:  · Blood tests.  · Urine tests.  · Imaging tests, such as X-rays, an MRI, or a CT scan.  Sometimes, fluid is removed from a joint for testing.  How is this treated?  This condition may be treated with:  · Treatment of the cause, if it is known.  · Rest.  · Raising (elevating) the joint.  · Applying cold or hot packs to the joint.  · Medicines to improve symptoms and reduce inflammation.  · Injections of a steroid such as cortisone into the joint to help reduce pain and inflammation.  Depending on the cause of your arthritis, you may need to make lifestyle changes to reduce stress on your joint. Changes may include:  · Exercising more.  · Losing weight.  Follow these instructions at home:  Medicines  · Take over-the-counter and prescription medicines only as told by your health care provider.  · Do not take aspirin to relieve pain if your health care provider thinks that gout may be causing your pain.  Activity  · Rest your joint if told by your health care provider. Rest is important when your disease is active and your joint feels painful, swollen, or stiff.  · Avoid activities that make the pain worse. It is  important to balance activity with rest.  · Exercise your joint regularly with range-of-motion exercises as told by your health care provider. Try doing low-impact exercise, such as:  ? Swimming.  ? Water aerobics.  ? Biking.  ? Walking.  Managing pain, stiffness, and swelling         · If directed, put ice on the joint.  ? Put ice in a plastic bag.  ? Place a towel between your skin and the bag.  ? Leave the ice on for 20 minutes, 2-3 times per day.  · If your joint is swollen, raise (elevate) it above the level of your heart if directed by your health care provider.  · If your joint feels stiff in the morning, try taking a warm shower.  · If directed, apply heat to the affected area as often as told by your health care provider. Use the heat source that your health care provider recommends, such as a moist heat pack or a heating pad. If you have diabetes, do not apply heat without permission from your health care provider. To apply heat:  ? Place a towel between your skin and the heat source.  ? Leave the heat on for 20-30 minutes.  ? Remove the heat if your skin turns bright red. This is especially important if you are unable to feel pain, heat, or cold. You may have a greater risk of getting burned.  General instructions  · Do not use any products that contain nicotine or tobacco, such as cigarettes, e-cigarettes, and chewing tobacco. If you need help quitting, ask your health care provider.  · Keep all follow-up visits as told by your health care provider. This is important.  Contact a health care provider if:  · The pain gets worse.  · You have a fever.  Get help right away if:  · You develop severe joint pain, swelling, or redness.  · Many joints become painful and swollen.  · You develop severe back pain.  · You develop severe weakness in your leg.  · You cannot control your bladder or bowels.  Summary  · Arthritis is a term that is commonly used to refer to joint pain or joint disease. There are more than  100 types of arthritis.  · The most common cause of this condition is wear and tear of a joint. Other causes include gout, inflammation or infection of the joint, sprains, or allergies.  · Symptoms of this condition include redness, swelling, or stiffness of the joint. Other symptoms include warmth, fever, or feeling ill.  · This condition is treated with rest, elevation, medicines, and applying cold or hot packs.  · Follow your health care provider's instructions about medicines, activity, exercises, and other home care treatments.  This information is not intended to replace advice given to you by your health care provider. Make sure you discuss any questions you have with your health care provider.  Document Released: 01/25/2006 Document Revised: 11/25/2019 Document Reviewed: 11/25/2019  [employer]vier Patient Education © 2020 [employer]

## 2020-07-11 NOTE — LETTER
July 11, 2020         Patient: Serenity Howe   YOB: 1950   Date of Visit: 7/11/2020           To Whom it May Concern:    Serenity Howe was seen in my clinic on 7/11/2020 due to illness. Due to medical necessity, please excuse patient from work for up to 3 days as needed. Patient may return to work 7/14/2020.    If you have any questions or concerns, please don't hesitate to call.        Sincerely,           EM Hunt.  Electronically Signed

## 2020-07-11 NOTE — PROGRESS NOTES
Subjective:     Serenity Howe is a 70 y.o. female who presents for Foot Pain (Bilateral, left is worse, onset last night)       Foot Problem   This is a new problem. Episode onset: Last night. The problem has been gradually worsening. Pertinent negatives include no chest pain, fever, rash or weakness.     Patient reporting spontaneous onset of severe left ankle and proximal foot pain.  Also reporting similar symptoms on the right although not as severe.  Prior therapy: Tylenol with no improvement in the symptoms.  Denies acute injury.  Denies a history of blood clots or circulatory problems.  Denies shortness of breath, chest pain, fever, or other symptoms.  History of gout.    Patient was screened prior to rooming and denied COVID-19 diagnosis or contact with a person who has been diagnosed or is suspected to have COVID-19. During this visit, appropriate PPE was worn, hand hygiene was performed, and the patient and any visitors were masked.     PMH:  has a past medical history of ASTHMA, Breast cancer (HCC), History of breast cancer (7/29/2015), Hypertension, Shoulder pain, right (7/29/2015), and Tremor (7/29/2015). She also has no past medical history of COPD.    MEDS:   Current Outpatient Medications:   •  acetaminophen (TYLENOL) 325 MG Tab, Take 650 mg by mouth every four hours as needed., Disp: , Rfl:   •  meloxicam (MOBIC) 7.5 MG Tab, Take 1 Tab by mouth every day., Disp: 30 Tab, Rfl: 0  •  WIXELA INHUB 250-50 MCG/DOSE AEROSOL POWDER, BREATH ACTIVATED, INHALE 1 PUFF BY MOUTH TWICE DAILY RINSE MOUTH AFTER USE, Disp: 3 Inhaler, Rfl: 3  •  albuterol 108 (90 Base) MCG/ACT Aero Soln inhalation aerosol, Inhale 2 Puffs by mouth every four hours as needed for Shortness of Breath., Disp: 1 Inhaler, Rfl: 11  •  albuterol (PROVENTIL) 2.5mg/3ml Nebu Soln solution for nebulization, , Disp: , Rfl:   •  valsartan-hydrochlorothiazide (DIOVAN-HCT) 80-12.5 MG per tablet, Take 1 Tab by mouth every day., Disp: 90 Tab,  "Rfl: 3  •  verapamil ER (CALAN SR) 120 MG CAPSULE SR 24 HR, TAKE 1 CAPSULE BY MOUTH EVERY DAY, Disp: 90 Cap, Rfl: 2  •  fluticasone (FLONASE) 50 MCG/ACT nasal spray, , Disp: , Rfl:   •  Calcium Carbonate-Vit D-Min (CALTRATE PLUS PO), Take 1 Tab by mouth every day., Disp: , Rfl:   •  aspirin (ASA) 81 MG CHEW, Take 81 mg by mouth every day., Disp: , Rfl:     ALLERGIES:   Allergies   Allergen Reactions   • Shellfish Allergy Hives     SURGHX:   Past Surgical History:   Procedure Laterality Date   • GYN SURGERY      cs   • MASTECTOMY      right      SOCHX:  reports that she has never smoked. She has never used smokeless tobacco. She reports that she does not drink alcohol or use drugs.     FH: Reviewed with patient, not pertinent to this visit.    Review of Systems   Constitutional: Negative.  Negative for fever.   Respiratory: Negative.  Negative for shortness of breath.    Cardiovascular: Negative.  Negative for chest pain.   Musculoskeletal:        Left ankle/foot and right ankle/foot pain   Skin: Negative.  Negative for rash.   Neurological: Negative.  Negative for sensory change and weakness.   All other systems reviewed and are negative.    Additional details per HPI.      Objective:     /72   Pulse 87   Temp 36.6 °C (97.8 °F)   Resp 18   Ht 1.575 m (5' 2\")   Wt 76.7 kg (169 lb)   SpO2 96%   BMI 30.91 kg/m²     Physical Exam  Vitals signs reviewed.   Constitutional:       General: She is not in acute distress.     Appearance: She is well-developed. She is not toxic-appearing.   HENT:      Head: Normocephalic.      Right Ear: External ear normal.      Left Ear: External ear normal.      Nose: Nose normal.   Eyes:      General: Vision grossly intact.      Extraocular Movements: Extraocular movements intact.      Conjunctiva/sclera: Conjunctivae normal.   Neck:      Musculoskeletal: Normal range of motion.   Cardiovascular:      Rate and Rhythm: Normal rate.      Pulses: Normal pulses.   Pulmonary:      " Effort: Pulmonary effort is normal. No respiratory distress.   Musculoskeletal: Normal range of motion.         General: No deformity.      Right ankle: She exhibits swelling. She exhibits normal range of motion, no ecchymosis, no deformity and no laceration. Tenderness.      Left ankle: She exhibits swelling. She exhibits normal range of motion, no ecchymosis, no deformity and no laceration. Tenderness.      Right lower leg: Normal.      Left lower leg: Normal.      Right foot: Normal range of motion and normal capillary refill. Tenderness (Dorsal, proximal) present. No swelling, deformity or laceration.      Left foot: Normal range of motion and normal capillary refill. Tenderness (Dorsal, proximal) present. No swelling, deformity or laceration.   Skin:     General: Skin is warm and dry.      Capillary Refill: Capillary refill takes less than 2 seconds.      Coloration: Skin is not pale.      Findings: No erythema or rash.   Neurological:      Mental Status: She is alert and oriented to person, place, and time.      Sensory: No sensory deficit.      Motor: No weakness.      Coordination: Coordination normal.      Gait: Gait abnormal.   Psychiatric:         Behavior: Behavior normal. Behavior is cooperative.     X-ray of left ankle:    Details     Reading Physician  Reading Date  Result Priority    Clayton Urena M.D.  [phone]  7/11/2020  Urgent Care       Narrative & Impression         7/11/2020 10:52 AM     HISTORY/REASON FOR EXAM:  Atraumatic Pain/Swelling/Deformity.  Left ankle pain and swelling     TECHNIQUE/EXAM DESCRIPTION AND NUMBER OF VIEWS:  3 views of the LEFT ankle.     COMPARISON: None of the left ankle     FINDINGS:  There is no fracture.     Alignment is normal.     Midfoot degenerative changes are present.     There is spurring at the insertion of achilles' tendon and origin of plantar fascia.     IMPRESSION:     1.  Negative for left ankle fracture or malalignment     2.  Midfoot  osteoarthritis     3.  Calcaneal spurring             Last Resulted: 07/11/20 11:04 AM           X-ray of right ankle:    Details     Reading Physician  Reading Date  Result Priority    Clayton Urena M.D.  [phone]  7/11/2020  Urgent Care       Narrative & Impression         7/11/2020 10:52 AM     HISTORY/REASON FOR EXAM:  Atraumatic Pain/Swelling/Deformity.  Right ankle pain and swelling     TECHNIQUE/EXAM DESCRIPTION AND NUMBER OF VIEWS:  3 views of the RIGHT ankle.     COMPARISON: Right foot x-ray 8/29/2018     FINDINGS:  There is no fracture.     Alignment is normal.     Mild midfoot degenerative changes are present.     There is spurring at the insertion of achilles' tendon and origin of plantar fascia.     IMPRESSION:     1.  Negative for fracture     2.  Midfoot osteoarthritis     3.  Calcaneal spurring             Last Resulted: 07/11/20 11:03 AM           Assessment/Plan:     1. Arthritis of foot  - meloxicam (MOBIC) 7.5 MG Tab; Take 1 Tab by mouth every day.  Dispense: 30 Tab; Refill: 0    2. Acute left ankle pain  - DX-ANKLE 3+ VIEWS LEFT; Future    3. Left foot pain  - DX-ANKLE 3+ VIEWS LEFT; Future  - ketorolac (TORADOL) injection 30 mg    4. Acute right ankle pain  - DX-ANKLE 3+ VIEWS RIGHT; Future    5. Right foot pain  - DX-ANKLE 3+ VIEWS RIGHT; Future  - ketorolac (TORADOL) injection 30 mg    X-ray of left and right ankle ordered. Radiology report and images reviewed by myself. Osteoarthritis.    Rx as above sent electronically. RICE, Tylenol. Work note provided.     Differential diagnosis, natural history, supportive care, over-the-counter symptom management per 's instructions, close monitoring, and indications for immediate follow-up discussed.     Patient advised to: Return for 1) Symptoms that worsen/don't improve, or go to ER, 2) Follow up with primary care in 7-10 days.    All questions answered. Patient agrees with the plan of care.    Discharge summary provided.

## 2020-07-12 ENCOUNTER — HOSPITAL ENCOUNTER (EMERGENCY)
Facility: MEDICAL CENTER | Age: 70
End: 2020-07-12
Attending: EMERGENCY MEDICINE
Payer: COMMERCIAL

## 2020-07-12 VITALS
RESPIRATION RATE: 16 BRPM | TEMPERATURE: 98.5 F | OXYGEN SATURATION: 94 % | HEIGHT: 62 IN | DIASTOLIC BLOOD PRESSURE: 80 MMHG | BODY MASS INDEX: 31.12 KG/M2 | HEART RATE: 90 BPM | WEIGHT: 169.09 LBS | SYSTOLIC BLOOD PRESSURE: 120 MMHG

## 2020-07-12 DIAGNOSIS — M10.9 GOUTY ARTHRITIS: ICD-10-CM

## 2020-07-12 DIAGNOSIS — M19.90 ARTHRITIS: ICD-10-CM

## 2020-07-12 PROCEDURE — 99283 EMERGENCY DEPT VISIT LOW MDM: CPT

## 2020-07-12 PROCEDURE — A9270 NON-COVERED ITEM OR SERVICE: HCPCS | Performed by: EMERGENCY MEDICINE

## 2020-07-12 PROCEDURE — 700102 HCHG RX REV CODE 250 W/ 637 OVERRIDE(OP): Performed by: EMERGENCY MEDICINE

## 2020-07-12 RX ORDER — HYDROCODONE BITARTRATE AND ACETAMINOPHEN 5; 325 MG/1; MG/1
1 TABLET ORAL ONCE
Status: COMPLETED | OUTPATIENT
Start: 2020-07-12 | End: 2020-07-12

## 2020-07-12 RX ORDER — OMEPRAZOLE 20 MG/1
20 CAPSULE, DELAYED RELEASE ORAL DAILY
Qty: 20 CAP | Refills: 0 | Status: SHIPPED | OUTPATIENT
Start: 2020-07-12 | End: 2021-06-13

## 2020-07-12 RX ORDER — INDOMETHACIN 75 MG/1
75 CAPSULE, EXTENDED RELEASE ORAL DAILY
Qty: 30 CAP | Refills: 0 | Status: SHIPPED | OUTPATIENT
Start: 2020-07-12 | End: 2021-06-13

## 2020-07-12 RX ORDER — IBUPROFEN 600 MG/1
600 TABLET ORAL ONCE
Status: COMPLETED | OUTPATIENT
Start: 2020-07-12 | End: 2020-07-12

## 2020-07-12 RX ORDER — HYDROCODONE BITARTRATE AND ACETAMINOPHEN 5; 325 MG/1; MG/1
1-2 TABLET ORAL EVERY 6 HOURS PRN
Qty: 20 TAB | Refills: 0 | Status: SHIPPED | OUTPATIENT
Start: 2020-07-12 | End: 2020-07-15

## 2020-07-12 RX ADMIN — HYDROCODONE BITARTRATE AND ACETAMINOPHEN 1 TABLET: 5; 325 TABLET ORAL at 13:57

## 2020-07-12 RX ADMIN — IBUPROFEN 600 MG: 600 TABLET ORAL at 13:57

## 2020-07-12 ASSESSMENT — FIBROSIS 4 INDEX: FIB4 SCORE: 1.75

## 2020-07-12 NOTE — ED NOTES
Pt discharged home. Explained discharge and medication instructions. Pt advised of risk/benefits of opioid medications, pt verbalized understanding, consent signed. Questions and comments addressed. Pt verbalized understanding of instructions. Pt advised to follow-up with PCP or return to ED for any new or worsening of symptoms. Pt is ambulating well and steady on feet. VS stable. Pt instructed no driving while taking narcotic medications, pt's stated family would be driving pt home.

## 2020-07-12 NOTE — ED PROVIDER NOTES
ED Provider Note    CHIEF COMPLAINT  Chief Complaint   Patient presents with   • Leg Pain     left lower pain since yesterday, was seen at  yesterday for same       HPI  Serenity Howe is a 70 y.o. female who presents complaining of ankle pain.  The start the night before last.  She was seen in the urgent care yesterday had x-rays which showed arthritis she was diagnosed with this is a diagnosis and sent home with meloxicam.  Previously both of her ankles have been hurting, however not as simply her left.  Hurts worse than it did yesterday but she points out that the swelling and redness are much improved.  Hurts to put weight on it.  She is getting some trouble from her boss because of the fact that she cannot walk.  She denies any fever chills.  There is no injury at all.  No recent cough or cold symptoms.  No recent infection.  No exposure to sick contact specifically COVID-19.  Interestingly, the patient states she is had pain and swelling similar to this previously in the past.  She is been diagnosed with gout.  This pain is worse than it had been previously however.  There is no other complaint.    PAST MEDICAL HISTORY  Past Medical History:   Diagnosis Date   • ASTHMA    • Breast cancer (HCC)    • History of breast cancer 7/29/2015   • Hypertension    • Shoulder pain, right 7/29/2015   • Tremor 7/29/2015       FAMILY HISTORY  Family History   Problem Relation Age of Onset   • Hyperlipidemia Mother        SOCIAL HISTORY  Social History     Tobacco Use   • Smoking status: Never Smoker   • Smokeless tobacco: Never Used   Substance Use Topics   • Alcohol use: No     Alcohol/week: 0.0 oz   • Drug use: No         SURGICAL HISTORY  Past Surgical History:   Procedure Laterality Date   • GYN SURGERY      cs   • MASTECTOMY      right        CURRENT MEDICATIONS    I have reviewed the nurses notes and/or the list brought with the patient.    ALLERGIES  Allergies   Allergen Reactions   • Shellfish Allergy Hives  "      REVIEW OF SYSTEMS  See HPI for further details. Review of systems as above, otherwise all other systems are negative.     PHYSICAL EXAM  VITAL SIGNS: /77   Pulse 100   Temp 36.9 °C (98.5 °F) (Temporal)   Resp 14   Ht 1.575 m (5' 2\")   Wt 76.7 kg (169 lb 1.5 oz)   SpO2 93%   BMI 30.93 kg/m²     Constitutional: Well appearing patient in no acute distress.  Not toxic, nor ill in appearance.  HENT: Mucus membranes moist.  Oropharynx is clear.  Eyes: Pupils equally round.  No scleral icterus.   Neck: Full nontender range of motion.  Lymphatic: No popliteal lymphadenopathy noted.   Cardiovascular: Regular heart rate and rhythm.  No murmurs, rubs, nor gallop appreciated.   Thorax & Lungs: Chest is nontender.  Lungs are clear to auscultation with good air movement bilaterally.  No wheeze, rhonchi, nor rales.   Abdomen: Soft, with no tenderness, rebound nor guarding.  No mass, pulsatile mass, nor hepatosplenomegaly appreciated.  Skin: No purpura nor petechia noted.  There is no break in the skin integrity.  Extremities/Musculoskeletal: Some minimal edema of the left ankle.  There is some erythema over the lateral aspect.  There is tenderness to touch.  More so with range of motion.  The toes are warm and well-perfused.  She has bounding pulses.  There is no leg pain or swelling no Homans sign.  No cords or edema.  Neurologic: Alert & oriented.  Strength and sensation is intact all around.  Gait is normal.  Psychiatric: Normal affect appropriate for the clinical situation.      MEDICAL RECORD  I have reviewed patient's medical record and pertinent results are listed above.    COURSE & MEDICAL DECISION MAKING  I have reviewed any medical record information, laboratory studies and radiographic results as noted above.  Patient presents with ankle pain and swelling.  This looks awfully suspicious for gout.  When asked about this, the patient tells me that she has had gout here in the past.  There is no evidence " of arterial insufficiency nor venous clot.  I think the infection is less likely.  She was given some ibuprofen as well as a hydrocodone tablet here, she feels much better, able to ambulate.  I carefully discussed with her that I like her to take the next few days off, taking it very easy, minimizing activity.  She should take Indocin which I am prescribing.  She is to stop the meloxicam which was prescribed yesterday.  While taking the Indocin, I want her to take Prilosec.  Like her to follow-up in 2 3 days time for recheck with Dr. Johnson, her primary doctor.  I discussed with her that infection was not ruled out although I think that it is unlikely.  If she is worsening symptoms despite her therapy, she is return to the ER.  She is on gout.        FINAL IMPRESSION  1. Arthritis    2. Gouty arthritis    3.  Suspect gout       This dictation was created using voice recognition software.    Electronically signed by: Wesley Salas M.D., 7/12/2020 1:50 PM

## 2020-07-12 NOTE — DISCHARGE INSTRUCTIONS
Rest, take it easy, minimize weight bearing and walking for the next 3 days.  I want you to follow-up with Dr. Johnson in 2 to 3 days for recheck.  As we discussed, I suspect that this is gout.  However, it is possible that something else is going on such as infection.  It is important to return here for any turn for the worse.  STOP meloxicam.  For the next 3 days I want you taking the Indocin.  Take this with food.  Take Prilosec while doing this.  Should you need something stronger for pain, I have provided a prescription for opiates.

## 2020-07-12 NOTE — ED NOTES
Wheeled to rm 65, transferred to the [facility] independently, her L foot/ankle pain feels worse than it did yesterday.

## 2020-07-12 NOTE — ED TRIAGE NOTES
"Serenity Rhoades Bells  Chief Complaint   Patient presents with   • Leg Pain     left lower pain since yesterday, was seen at  yesterday for same     Pt to triage in w/c with above complaint.  VSS, no acute distress  Pt states was seen at  yesterday for same was \"given a shot and meloxicam\" with no relief.  CMS intact.   Pt denies fever/ cough or contact with anyone positive for Covid/ Corona.  Pt/staff masked and in appropriate PPE during encounter.   Pt returned to lobby, educated on triage process, and to inform staff of any changes or concerns.     "

## 2020-07-14 RX ORDER — VERAPAMIL HYDROCHLORIDE 120 MG/1
CAPSULE, EXTENDED RELEASE ORAL
Qty: 90 CAP | Refills: 2 | Status: SHIPPED | OUTPATIENT
Start: 2020-07-14 | End: 2021-06-21 | Stop reason: SDUPTHER

## 2020-08-05 ENCOUNTER — HOSPITAL ENCOUNTER (OUTPATIENT)
Facility: MEDICAL CENTER | Age: 70
End: 2020-08-05
Attending: PHYSICIAN ASSISTANT
Payer: COMMERCIAL

## 2020-08-05 ENCOUNTER — OFFICE VISIT (OUTPATIENT)
Dept: URGENT CARE | Facility: PHYSICIAN GROUP | Age: 70
End: 2020-08-05
Payer: COMMERCIAL

## 2020-08-05 VITALS
SYSTOLIC BLOOD PRESSURE: 118 MMHG | BODY MASS INDEX: 31.1 KG/M2 | DIASTOLIC BLOOD PRESSURE: 80 MMHG | TEMPERATURE: 97.8 F | HEIGHT: 62 IN | RESPIRATION RATE: 18 BRPM | OXYGEN SATURATION: 97 % | WEIGHT: 169 LBS | HEART RATE: 90 BPM

## 2020-08-05 DIAGNOSIS — N89.8 VAGINAL ITCHING: ICD-10-CM

## 2020-08-05 LAB
APPEARANCE UR: CLEAR
BILIRUB UR STRIP-MCNC: ABNORMAL MG/DL
COLOR UR AUTO: ABNORMAL
GLUCOSE UR STRIP.AUTO-MCNC: ABNORMAL MG/DL
KETONES UR STRIP.AUTO-MCNC: ABNORMAL MG/DL
LEUKOCYTE ESTERASE UR QL STRIP.AUTO: ABNORMAL
NITRITE UR QL STRIP.AUTO: ABNORMAL
PH UR STRIP.AUTO: 1.02 [PH] (ref 5–8)
PROT UR QL STRIP: ABNORMAL MG/DL
RBC UR QL AUTO: ABNORMAL
SP GR UR STRIP.AUTO: 1.02
UROBILINOGEN UR STRIP-MCNC: ABNORMAL MG/DL

## 2020-08-05 PROCEDURE — 87480 CANDIDA DNA DIR PROBE: CPT

## 2020-08-05 PROCEDURE — 87660 TRICHOMONAS VAGIN DIR PROBE: CPT

## 2020-08-05 PROCEDURE — 81002 URINALYSIS NONAUTO W/O SCOPE: CPT | Performed by: PHYSICIAN ASSISTANT

## 2020-08-05 PROCEDURE — 99214 OFFICE O/P EST MOD 30 MIN: CPT | Performed by: PHYSICIAN ASSISTANT

## 2020-08-05 PROCEDURE — 87510 GARDNER VAG DNA DIR PROBE: CPT

## 2020-08-05 ASSESSMENT — FIBROSIS 4 INDEX: FIB4 SCORE: 1.75

## 2020-08-05 ASSESSMENT — ENCOUNTER SYMPTOMS
FLANK PAIN: 0
HEADACHES: 0
DIARRHEA: 0
FEVER: 0
VOMITING: 0
CHILLS: 0
ABDOMINAL PAIN: 0
NAUSEA: 0

## 2020-08-05 NOTE — PROGRESS NOTES
"Subjective:      Serenity Howe is a 70 y.o. female who presents with Vaginal Itching (Pt denies any other sx)            HPI  70 year old female presents to urgent care with vaginal itching onset about 1 week ago. Denies vaginal discharge, urinary symptoms, abdominal pain, or vaginal bleeding/pain. No recent antibiotic treatments. Patient reports history of similar about 1 year ago, treated with topical cream that she can not recall. Denies other associated aggravating or alleviating factors.     Review of Systems   Constitutional: Negative for chills, fever and malaise/fatigue.   Gastrointestinal: Negative for abdominal pain, diarrhea, nausea and vomiting.   Genitourinary: Negative for dysuria, flank pain, frequency, hematuria and urgency.        Vaginal itching   Neurological: Negative for headaches.   All other systems reviewed and are negative.      Past Medical History:   Diagnosis Date   • ASTHMA    • Breast cancer (HCC)    • History of breast cancer 7/29/2015   • Hypertension    • Shoulder pain, right 7/29/2015   • Tremor 7/29/2015     Medications and allergies reviewed in [facility].  Social History     Tobacco Use   • Smoking status: Never Smoker   • Smokeless tobacco: Never Used   Substance Use Topics   • Alcohol use: No     Alcohol/week: 0.0 oz      Objective:     /80   Pulse 90   Temp 36.6 °C (97.8 °F)   Resp 18   Ht 1.575 m (5' 2\")   Wt 76.7 kg (169 lb)   SpO2 97%   BMI 30.91 kg/m²      Physical Exam  Vitals signs reviewed.   Constitutional:       General: She is not in acute distress.     Appearance: Normal appearance. She is well-developed. She is not ill-appearing.   HENT:      Head: Normocephalic and atraumatic.      Nose: Nose normal.      Mouth/Throat:      Mouth: Mucous membranes are moist.      Pharynx: Oropharynx is clear.   Eyes:      General: No scleral icterus.     Conjunctiva/sclera: Conjunctivae normal.   Neck:      Musculoskeletal: Normal range of motion and neck supple. "   Cardiovascular:      Rate and Rhythm: Normal rate and regular rhythm.      Heart sounds: Normal heart sounds.   Pulmonary:      Effort: Pulmonary effort is normal. No respiratory distress.      Breath sounds: Normal breath sounds.   Abdominal:      Palpations: Abdomen is soft.      Tenderness: There is no abdominal tenderness. There is no right CVA tenderness, left CVA tenderness or guarding.   Genitourinary:     Comments: Patient defers  exam  Musculoskeletal: Normal range of motion.   Skin:     General: Skin is warm and dry.   Neurological:      General: No focal deficit present.      Mental Status: She is alert and oriented to person, place, and time.   Psychiatric:         Mood and Affect: Mood normal.         Behavior: Behavior normal.         Thought Content: Thought content normal.         Judgment: Judgment normal.                 Assessment/Plan:     1. Vaginal itching  VAGINAL PATHOGENS DNA PANEL    miconazole (MICOTIN) 2 % Cream    POCT Urinalysis     POCT Urinalysis shows trace leukocytes, otherwise normal. The pH was entered into the computer incorrectly. I reviewed results myself and found only trace leuks.     I will follow up pending lab results.   PT should follow up with PCP in 1-2 days for re-evaluation if symptoms have not improved.  Discussed red flags and reasons to return to .  Pt and/or family verbalized understanding of diagnosis and follow up instructions and was offered informational handout on diagnosis.  PT discharged.

## 2020-08-06 ENCOUNTER — TELEPHONE (OUTPATIENT)
Dept: URGENT CARE | Facility: CLINIC | Age: 70
End: 2020-08-06

## 2020-08-06 DIAGNOSIS — N89.8 VAGINAL ITCHING: ICD-10-CM

## 2020-08-06 LAB
CANDIDA DNA VAG QL PROBE+SIG AMP: NEGATIVE
G VAGINALIS DNA VAG QL PROBE+SIG AMP: NEGATIVE
T VAGINALIS DNA VAG QL PROBE+SIG AMP: NEGATIVE

## 2020-08-15 ENCOUNTER — HOSPITAL ENCOUNTER (EMERGENCY)
Facility: MEDICAL CENTER | Age: 70
End: 2020-08-15
Attending: EMERGENCY MEDICINE
Payer: COMMERCIAL

## 2020-08-15 VITALS
OXYGEN SATURATION: 94 % | BODY MASS INDEX: 30.91 KG/M2 | SYSTOLIC BLOOD PRESSURE: 136 MMHG | HEART RATE: 81 BPM | TEMPERATURE: 97.8 F | RESPIRATION RATE: 16 BRPM | HEIGHT: 62 IN | DIASTOLIC BLOOD PRESSURE: 71 MMHG

## 2020-08-15 DIAGNOSIS — M10.072 ACUTE IDIOPATHIC GOUT OF LEFT ANKLE: ICD-10-CM

## 2020-08-15 PROCEDURE — 99284 EMERGENCY DEPT VISIT MOD MDM: CPT

## 2020-08-15 RX ORDER — PREDNISONE 20 MG/1
40 TABLET ORAL DAILY
Qty: 10 TAB | Refills: 0 | Status: SHIPPED | OUTPATIENT
Start: 2020-08-15 | End: 2020-08-20

## 2020-08-15 RX ORDER — HYDROCODONE BITARTRATE AND ACETAMINOPHEN 5; 325 MG/1; MG/1
1-2 TABLET ORAL EVERY 6 HOURS PRN
Qty: 20 TAB | Refills: 0 | Status: SHIPPED | OUTPATIENT
Start: 2020-08-15 | End: 2020-08-18

## 2020-08-15 NOTE — ED TRIAGE NOTES
".  Chief Complaint   Patient presents with   • Leg Pain     c/o of lower leg pain and swelling starting from the anklet to the knee. pt reports \"i can barely walk\".    ../74   Pulse 85   Temp 36.7 °C (98.1 °F) (Temporal)   Resp 14   Ht 1.575 m (5' 2\")   SpO2 91%      ..Explained triage process, to waiting room. Asked to inform RN if questions or concerns arise.   "

## 2020-08-15 NOTE — ED NOTES
Pt brought back to rm [address] from triage. Pt able to transfer self to bed, call light in reach. Chart up for ERP.

## 2020-08-15 NOTE — ED NOTES
Discharge orders received, instructions and education given, follow-up discussed, prescription x2 and work note given, pt verbalized understanding. Pt taken out to front lobby in WC.

## 2020-08-15 NOTE — Clinical Note
Serenity Howe was seen and treated in our emergency department on 8/15/2020.  She may return to work on 08/18/2020.       If you have any questions or concerns, please don't hesitate to call.      Jason Liu M.D.

## 2020-09-02 ENCOUNTER — OFFICE VISIT (OUTPATIENT)
Dept: URGENT CARE | Facility: CLINIC | Age: 70
End: 2020-09-02
Payer: COMMERCIAL

## 2020-09-02 ENCOUNTER — APPOINTMENT (OUTPATIENT)
Dept: RADIOLOGY | Facility: IMAGING CENTER | Age: 70
End: 2020-09-02
Attending: NURSE PRACTITIONER
Payer: COMMERCIAL

## 2020-09-02 VITALS
SYSTOLIC BLOOD PRESSURE: 120 MMHG | TEMPERATURE: 97.5 F | HEIGHT: 62 IN | BODY MASS INDEX: 31.28 KG/M2 | OXYGEN SATURATION: 95 % | HEART RATE: 90 BPM | DIASTOLIC BLOOD PRESSURE: 70 MMHG | WEIGHT: 170 LBS

## 2020-09-02 DIAGNOSIS — M79.671 RIGHT FOOT PAIN: ICD-10-CM

## 2020-09-02 DIAGNOSIS — M10.9 ACUTE GOUT OF RIGHT ANKLE, UNSPECIFIED CAUSE: ICD-10-CM

## 2020-09-02 PROCEDURE — 99214 OFFICE O/P EST MOD 30 MIN: CPT | Performed by: NURSE PRACTITIONER

## 2020-09-02 PROCEDURE — 73610 X-RAY EXAM OF ANKLE: CPT | Mod: TC,RT | Performed by: NURSE PRACTITIONER

## 2020-09-02 RX ORDER — OXYCODONE HYDROCHLORIDE AND ACETAMINOPHEN 5; 325 MG/1; MG/1
2 TABLET ORAL EVERY 4 HOURS PRN
Qty: 15 TAB | Refills: 0 | Status: SHIPPED | OUTPATIENT
Start: 2020-09-02 | End: 2020-09-05

## 2020-09-02 RX ORDER — OXYCODONE HYDROCHLORIDE AND ACETAMINOPHEN 5; 325 MG/1; MG/1
1 TABLET ORAL EVERY 4 HOURS PRN
Qty: 15 TAB | Refills: 0 | Status: CANCELLED | OUTPATIENT
Start: 2020-09-02

## 2020-09-02 RX ORDER — PREDNISONE 10 MG/1
40 TABLET ORAL DAILY
Qty: 20 TAB | Refills: 0 | Status: SHIPPED | OUTPATIENT
Start: 2020-09-02 | End: 2020-09-07

## 2020-09-02 ASSESSMENT — ENCOUNTER SYMPTOMS
WEAKNESS: 0
SORE THROAT: 0
FEVER: 0
MYALGIAS: 0
EYE REDNESS: 0
NUMBNESS: 0
DIZZINESS: 0
CHILLS: 0
ABDOMINAL PAIN: 0
SHORTNESS OF BREATH: 0
VOMITING: 0
NAUSEA: 0
JOINT SWELLING: 1

## 2020-09-02 ASSESSMENT — FIBROSIS 4 INDEX: FIB4 SCORE: 1.75

## 2020-09-03 NOTE — PROGRESS NOTES
"Subjective:   Serenity Howe  is a 70 y.o. female who presents for Foot Pain (x4days, getting worse, right foot pain)        Foot Problem  This is a new problem. Episode onset: 4 days; severe right foot pain no injury . has a hx of gout. The problem occurs constantly. The problem has been gradually worsening. Associated symptoms include joint swelling. Pertinent negatives include no abdominal pain, chest pain, chills, fever, myalgias, nausea, numbness, rash, sore throat, urinary symptoms, vomiting or weakness. The symptoms are aggravated by walking. Treatments tried: NOrco; \"made her very Sick\" The treatment provided no relief.   Patient was prescribed hydrocodone 8/15 in the emergency room in which she did trial for this severe pain however unable to titrate tolerate medication as it is made her profusely ill.  Patient has been vomiting since taking the medication.  Patient did bring in the old prescription in a bottle with 10 tablets left.  Review of Systems   Constitutional: Negative for chills and fever.   HENT: Negative for sore throat.    Eyes: Negative for redness.   Respiratory: Negative for shortness of breath.    Cardiovascular: Negative for chest pain.   Gastrointestinal: Negative for abdominal pain, nausea and vomiting.   Genitourinary: Negative for dysuria.   Musculoskeletal: Positive for joint pain and joint swelling. Negative for myalgias.   Skin: Negative for rash.   Neurological: Negative for dizziness, weakness and numbness.     Allergies   Allergen Reactions   • Shellfish Allergy Hives      Objective:   /70   Pulse 90   Temp 36.4 °C (97.5 °F) (Temporal)   Ht 1.575 m (5' 2\")   Wt 77.1 kg (170 lb)   SpO2 95%   BMI 31.09 kg/m²   Physical Exam  Vitals signs and nursing note reviewed.   Constitutional:       General: She is not in acute distress.     Appearance: She is well-developed.   HENT:      Head: Normocephalic and atraumatic.      Right Ear: External ear normal.      Left Ear: " External ear normal.      Nose: Nose normal.      Mouth/Throat:      Mouth: Mucous membranes are moist.   Eyes:      Conjunctiva/sclera: Conjunctivae normal.   Cardiovascular:      Rate and Rhythm: Normal rate.   Pulmonary:      Effort: Pulmonary effort is normal. No respiratory distress.      Breath sounds: Normal breath sounds.   Abdominal:      General: There is no distension.   Musculoskeletal:      Right ankle: She exhibits decreased range of motion and swelling. Tenderness. Achilles tendon normal.        Feet:    Skin:     General: Skin is warm and dry.   Neurological:      General: No focal deficit present.      Mental Status: She is alert and oriented to person, place, and time. Mental status is at baseline.      Gait: Gait (gait at baseline) normal.   Psychiatric:         Judgment: Judgment normal.           Assessment/Plan:     1. Right foot pain  DX-ANKLE 3+ VIEWS RIGHT    oxyCODONE-acetaminophen (PERCOCET) 5-325 MG Tab   2. Acute gout of right ankle, unspecified cause  predniSONE (DELTASONE) 10 MG Tab    a  Xray results     1.  No fracture or dislocation of RIGHT ankle.  2.  Mild lateral soft tissue swelling.    Patient is a 70-year-old female patient with the stated above, no fracture noted on x-ray.  Patient having atraumatic severe right foot pain with a history of gout presenting symptoms consistent with gout.  Patient did return a bottle of hydrocodone and acetaminophen 5mg-325 which was prescribed to her 8/15 in the emergency room.  Patient did have 10 tablets left in the bottle in which I did discard in the sharps container.  Patient advised to rest, ice, elevate when at rest.  Patient has tolerated prednisone in the past in which she does state it has alleviated her discomfort.  In prescribing controlled substances to this patient, I certify that I have obtained and reviewed the medical history of Serenity Margaritaloida Howe. I have also made a good rosario effort to obtain applicable records from other  providers who have treated the patient and records demonstrating the following: Norco 5 mg - 325 mg dispense 8/15/2020. .     I have conducted a physical exam and documented it. I have reviewed Ms. Howe’s prescription history as maintained by the [place] Prescription Monitoring Program.     I have assessed the patient’s risk for abuse, dependency, and addiction using the validated Opioid Risk Tool available at https://www.[employer].com/woquuj-mnlv-dbes-ort-narcotic-abuse.     Given the above, I believe the benefits of controlled substance therapy outweigh the risks. The reasons for prescribing controlled substances include non-narcotic, oral analgesic alternatives have been inadequate for pain control. Accordingly, I have discussed the risk and benefits, treatment plan, and alternative therapies with the patient.     Differential diagnosis, natural history, supportive care, and indications for immediate follow-up discussed.

## 2020-10-03 DIAGNOSIS — Z79.899 MEDICATION MANAGEMENT: ICD-10-CM

## 2020-10-05 RX ORDER — VALSARTAN AND HYDROCHLOROTHIAZIDE 80; 12.5 MG/1; MG/1
TABLET, FILM COATED ORAL
Qty: 90 TAB | Refills: 3 | Status: SHIPPED | OUTPATIENT
Start: 2020-10-05 | End: 2021-10-27 | Stop reason: SDUPTHER

## 2020-10-25 ENCOUNTER — HOSPITAL ENCOUNTER (EMERGENCY)
Facility: MEDICAL CENTER | Age: 70
End: 2020-10-25
Attending: EMERGENCY MEDICINE
Payer: COMMERCIAL

## 2020-10-25 ENCOUNTER — APPOINTMENT (OUTPATIENT)
Dept: RADIOLOGY | Facility: MEDICAL CENTER | Age: 70
End: 2020-10-25
Attending: EMERGENCY MEDICINE
Payer: COMMERCIAL

## 2020-10-25 VITALS
HEART RATE: 74 BPM | TEMPERATURE: 98 F | HEIGHT: 62 IN | WEIGHT: 163.8 LBS | SYSTOLIC BLOOD PRESSURE: 161 MMHG | RESPIRATION RATE: 16 BRPM | BODY MASS INDEX: 30.14 KG/M2 | DIASTOLIC BLOOD PRESSURE: 71 MMHG | OXYGEN SATURATION: 96 %

## 2020-10-25 DIAGNOSIS — M25.562 LEFT KNEE PAIN, UNSPECIFIED CHRONICITY: ICD-10-CM

## 2020-10-25 LAB
APPEARANCE FLD: NORMAL
BODY FLD TYPE: NORMAL
COLOR FLD: YELLOW
CRYSTALS FLD MICRO: NORMAL
CSF COMMENTS 1658: NORMAL
EOSINOPHIL NFR FLD: 2 %
GRAM STN SPEC: NORMAL
LYMPHOCYTES NFR FLD: 22 %
MONONUC CELLS NFR FLD: 24 %
NEUTROPHILS NFR FLD: 52 %
RBC # FLD: <2000 CELLS/UL
SIGNIFICANT IND 70042: NORMAL
SITE SITE: NORMAL
SOURCE SOURCE: NORMAL
WBC # FLD: 34 CELLS/UL

## 2020-10-25 PROCEDURE — 89060 EXAM SYNOVIAL FLUID CRYSTALS: CPT

## 2020-10-25 PROCEDURE — 73564 X-RAY EXAM KNEE 4 OR MORE: CPT | Mod: LT

## 2020-10-25 PROCEDURE — 700101 HCHG RX REV CODE 250: Performed by: EMERGENCY MEDICINE

## 2020-10-25 PROCEDURE — 99283 EMERGENCY DEPT VISIT LOW MDM: CPT

## 2020-10-25 PROCEDURE — 89051 BODY FLUID CELL COUNT: CPT

## 2020-10-25 PROCEDURE — 20610 DRAIN/INJ JOINT/BURSA W/O US: CPT

## 2020-10-25 PROCEDURE — 87205 SMEAR GRAM STAIN: CPT

## 2020-10-25 PROCEDURE — A9270 NON-COVERED ITEM OR SERVICE: HCPCS | Performed by: EMERGENCY MEDICINE

## 2020-10-25 PROCEDURE — 87070 CULTURE OTHR SPECIMN AEROBIC: CPT

## 2020-10-25 PROCEDURE — 700102 HCHG RX REV CODE 250 W/ 637 OVERRIDE(OP): Performed by: EMERGENCY MEDICINE

## 2020-10-25 RX ORDER — IBUPROFEN 600 MG/1
600 TABLET ORAL ONCE
Status: COMPLETED | OUTPATIENT
Start: 2020-10-25 | End: 2020-10-25

## 2020-10-25 RX ORDER — LIDOCAINE HYDROCHLORIDE AND EPINEPHRINE BITARTRATE 20; .01 MG/ML; MG/ML
10 INJECTION, SOLUTION SUBCUTANEOUS ONCE
Status: COMPLETED | OUTPATIENT
Start: 2020-10-25 | End: 2020-10-25

## 2020-10-25 RX ORDER — ACETAMINOPHEN 325 MG/1
650 TABLET ORAL ONCE
Status: COMPLETED | OUTPATIENT
Start: 2020-10-25 | End: 2020-10-25

## 2020-10-25 RX ORDER — HYDROCODONE BITARTRATE AND ACETAMINOPHEN 5; 325 MG/1; MG/1
1 TABLET ORAL EVERY 6 HOURS PRN
Qty: 20 TAB | Refills: 0 | Status: SHIPPED | OUTPATIENT
Start: 2020-10-25 | End: 2020-10-28

## 2020-10-25 RX ORDER — LIDOCAINE HYDROCHLORIDE AND EPINEPHRINE 10; 10 MG/ML; UG/ML
20 INJECTION, SOLUTION INFILTRATION; PERINEURAL ONCE
Status: DISCONTINUED | OUTPATIENT
Start: 2020-10-25 | End: 2020-10-25

## 2020-10-25 RX ADMIN — IBUPROFEN 600 MG: 600 TABLET, FILM COATED ORAL at 12:43

## 2020-10-25 RX ADMIN — ACETAMINOPHEN 650 MG: 325 TABLET, FILM COATED ORAL at 12:44

## 2020-10-25 RX ADMIN — LIDOCAINE HYDROCHLORIDE AND EPINEPHRINE 10 ML: 20; 10 INJECTION, SOLUTION INFILTRATION; PERINEURAL at 13:00

## 2020-10-25 ASSESSMENT — FIBROSIS 4 INDEX: FIB4 SCORE: 1.75

## 2020-10-25 ASSESSMENT — PAIN DESCRIPTION - PAIN TYPE: TYPE: ACUTE PAIN

## 2020-10-25 NOTE — ED TRIAGE NOTES
"Serenity Jf Jet  Chief Complaint   Patient presents with   • Leg Pain     L anterior knee pain to L shin 1-2 days     Pt ambulated to triage with slight difficulty. Pt c/o L leg pain, mainly to L knee radiating to L shin. Pt reports pain has been on-going for 1-2 days. Pt stands frequently at work, occasionally sits down. Pt reports pain feels better when standing, worsens with walking. Denies recent travel, trauma or hx DVTs.     Patient informed of triage process and to inform staff of any changes/worsening symptoms. Pt verbalized understanding. Pt denies concerns. Pt back to waiting room.     /91   Pulse 91   Temp 36.5 °C (97.7 °F) (Temporal)   Resp 16   Ht 1.575 m (5' 2\")   Wt 74.3 kg (163 lb 12.8 oz)   SpO2 94%   "

## 2020-10-25 NOTE — ED NOTES
Report from Jaye PADILLA.  Pt ambulated to BR with steady gait.  States pain medications helped out only a bit with pain in L knee.

## 2020-10-25 NOTE — ED PROVIDER NOTES
ED Provider Note    CHIEF COMPLAINT  Chief Complaint   Patient presents with   • Leg Pain     L anterior knee pain to L shin 1-2 days       HPI  Serenity Howe is a 70 y.o. female who presents to the emergency department complaining of left knee pain.  The patient says that she has had no trauma or recognized precipitating event but for the last 2 days she has been having pain over the anterior aspect of the left knee this radiates into the upper shin basically in the area of the tibial plateau as she demonstrates during our interview.  She does have a history of gout but typically she has flareups in her right ankle she has never had gout in her knee that she can recall.  In addition she has not been ill she has had no fever or chills.    REVIEW OF SYSTEMS no history of DVT no chest pain no difficulty breathing no hemoptysis,.  All other systems negative    PAST MEDICAL HISTORY  Past Medical History:   Diagnosis Date   • ASTHMA    • Breast cancer (HCC)    • History of breast cancer 7/29/2015   • Hypertension    • Shoulder pain, right 7/29/2015   • Tremor 7/29/2015       FAMILY HISTORY  Family History   Problem Relation Age of Onset   • Hyperlipidemia Mother        SOCIAL HISTORY  Social History     Socioeconomic History   • Marital status:      Spouse name: Not on file   • Number of children: Not on file   • Years of education: Not on file   • Highest education level: Not on file   Occupational History   • Not on file   Social Needs   • Financial resource strain: Not on file   • Food insecurity     Worry: Not on file     Inability: Not on file   • Transportation needs     Medical: Not on file     Non-medical: Not on file   Tobacco Use   • Smoking status: Never Smoker   • Smokeless tobacco: Never Used   Substance and Sexual Activity   • Alcohol use: No     Alcohol/week: 0.0 oz   • Drug use: No   • Sexual activity: Never     Partners: Male   Lifestyle   • Physical activity     Days per week: Not on file  "    Minutes per session: Not on file   • Stress: Not on file   Relationships   • Social connections     Talks on phone: Not on file     Gets together: Not on file     Attends Jehovah's witness service: Not on file     Active member of club or organization: Not on file     Attends meetings of clubs or organizations: Not on file     Relationship status: Not on file   • Intimate partner violence     Fear of current or ex partner: Not on file     Emotionally abused: Not on file     Physically abused: Not on file     Forced sexual activity: Not on file   Other Topics Concern   • Not on file   Social History Narrative   • Not on file       SURGICAL HISTORY  Past Surgical History:   Procedure Laterality Date   • GYN SURGERY      cs   • MASTECTOMY      right        CURRENT MEDICATIONS  Home Medications    **Home medications have not yet been reviewed for this encounter**         ALLERGIES  Allergies   Allergen Reactions   • Shellfish Allergy Hives       PHYSICAL EXAM  VITAL SIGNS: BP (!) 180/84   Pulse 88   Temp 36.5 °C (97.7 °F) (Temporal)   Resp 16   Ht 1.575 m (5' 2\")   Wt 74.3 kg (163 lb 12.8 oz)   SpO2 97%   BMI 29.96 kg/m²    Oxygen saturation is interpreted as adequate  Constitutional: Awake pleasant well-appearing individual in no distress  Eyes: No erythema discharge or jaundice  Neck: Trachea midline no JVD  Cardiovascular: Regular rate and rhythm  Lungs: Clear and equal bilaterally with no apparent difficulty breathing  Skin: Warm and dry  Musculoskeletal: No acute bony deformity, the left knee is not red or inflamed.  There is tenderness over the patella and tibial plateau there is no posterior tenderness no calf tenderness no tenderness of the thigh.  There is adipose tissue around the knee and difficult to  whether or not there is a small effusion but there is certainly not a large effusion present.  Pain is made worse, but still seems mild, by flexing and extending the knee.  Neurologic: Awake verbal " moving her other extremities with no difficulty    Laboratory  Synovial fluid cell count showed fewer than 2000 red blood cells only 34 white blood cells and no organisms were seen on the Gram stain.  A culture was sent to the lab.  Crystal studies showed rare calcium pyrophosphate crystals.    Procedure  Risks and benefits were discussed with the patient and verbal consent obtained for arthrocentesis of the left knee with the purpose being to evaluate for possible gout or infection.  The knee was scrubbed with Betadine and the appropriate location was identified on the medial aspect of the patella and the area locally infiltrated with lidocaine and epinephrine to achieve adequate anesthesia.  I then rescrubbed the knee with chlorhexidine and using sterile gloves instruments and technique I withdrew approximately 5 cc of clear dorothy fluid from the knee joint.  There was a little flash of blood at the end of the procedure which somewhat contaminated the specimen with blood.  The patient tolerated the procedure very well.  The area was then washed again with water and dried and a Band-Aid placed.    Radiology  DX-KNEE COMPLETE 4+ LEFT   Final Result      1.  Moderate degenerative change of LEFT knee primarily involving medial compartment.   2.  No fracture or dislocation.        Chart review  Review of the patient's narcotics report showed that the patient has received prescriptions for Norco once each month since July from 3 different doctors.    MEDICAL DECISION MAKING and DISPOSITION  In the emergency department the patient was given Tylenol and Motrin for discomfort but otherwise kept n.p.o.  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  I have discussed pain control with the patient and I have written her a 3-day prescription for  Norco but I have also advised her that all further narcotic prescriptions must come from her primary care doctor.  I have advised the patient to rest ice elevate or use warm packs if that feels better and she is to call her doctor in the morning and arrange office follow-up this week.    IMPRESSION  1.  Left knee pain  2.  Differential diagnosis includes crystal arthropathy with finding of calcium pyrophosphate crystals in the synovial fluid         Electronically signed by: Ronn Philip M.D., 10/25/2020 1:16 PM

## 2020-10-25 NOTE — DISCHARGE INSTRUCTIONS
Call your doctor Monday morning and arrange office recheck during the week.  You may use hot and cold packs on the knee if this is helpful.  Please note that no further narcotic prescriptions can be given from the emergency department and any further narcotics must come from your primary care doctor.

## 2020-10-28 LAB
BACTERIA FLD AEROBE CULT: NORMAL
GRAM STN SPEC: NORMAL
SIGNIFICANT IND 70042: NORMAL
SITE SITE: NORMAL
SOURCE SOURCE: NORMAL

## 2020-11-25 ENCOUNTER — HOSPITAL ENCOUNTER (EMERGENCY)
Facility: MEDICAL CENTER | Age: 70
End: 2020-11-25
Attending: EMERGENCY MEDICINE
Payer: COMMERCIAL

## 2020-11-25 ENCOUNTER — APPOINTMENT (OUTPATIENT)
Dept: RADIOLOGY | Facility: MEDICAL CENTER | Age: 70
End: 2020-11-25
Attending: EMERGENCY MEDICINE
Payer: COMMERCIAL

## 2020-11-25 VITALS
HEART RATE: 85 BPM | TEMPERATURE: 98.4 F | SYSTOLIC BLOOD PRESSURE: 135 MMHG | RESPIRATION RATE: 18 BRPM | HEIGHT: 62 IN | OXYGEN SATURATION: 97 % | BODY MASS INDEX: 29.53 KG/M2 | DIASTOLIC BLOOD PRESSURE: 80 MMHG | WEIGHT: 160.5 LBS

## 2020-11-25 DIAGNOSIS — M79.605 LEFT LEG PAIN: ICD-10-CM

## 2020-11-25 PROCEDURE — 93971 EXTREMITY STUDY: CPT | Mod: 26 | Performed by: INTERNAL MEDICINE

## 2020-11-25 PROCEDURE — 99283 EMERGENCY DEPT VISIT LOW MDM: CPT

## 2020-11-25 PROCEDURE — 93971 EXTREMITY STUDY: CPT | Mod: LT

## 2020-11-25 RX ORDER — NAPROXEN 500 MG/1
500 TABLET ORAL
Qty: 20 TAB | Refills: 0 | Status: SHIPPED | OUTPATIENT
Start: 2020-11-25 | End: 2020-11-30

## 2020-11-25 ASSESSMENT — FIBROSIS 4 INDEX: FIB4 SCORE: 1.75

## 2020-11-25 NOTE — ED PROVIDER NOTES
"ED Provider Note    ER PROVIDER NOTE        CHIEF COMPLAINT  Chief Complaint   Patient presents with   • Leg Pain     left leg pain \"from my knee all the way down to my foot\" x2 days; worse since yesterday       HPI  Serenity Howe is a 70 y.o. female who presents to the emergency department complaining of left-sided leg and calf pain.  Patient began having pain gradually 2 days ago, has been persistent, seems more in her foot but up into her calf as well.  She states no injury, no falls or twisting.  No fevers or chills per no focal weakness numbness or tingling.  She does have some mild pain to her right foot as well.  No abdominal pain, no chest pain or shortness of breath.  No recent travel or immobilization.    REVIEW OF SYSTEMS  Pertinent positives include leg pain. Pertinent negatives include no chest pain. See HPI for details. All other systems reviewed and are negative.    PAST MEDICAL HISTORY   has a past medical history of ASTHMA, History of breast cancer (7/29/2015), Hypertension, Shoulder pain, right (7/29/2015), and Tremor (7/29/2015).    SURGICAL HISTORY   has a past surgical history that includes mastectomy and gyn surgery.    FAMILY HISTORY  Family History   Problem Relation Age of Onset   • Hyperlipidemia Mother        SOCIAL HISTORY  Social History     Socioeconomic History   • Marital status:      Spouse name: Not on file   • Number of children: Not on file   • Years of education: Not on file   • Highest education level: Not on file   Occupational History   • Not on file   Social Needs   • Financial resource strain: Not on file   • Food insecurity     Worry: Not on file     Inability: Not on file   • Transportation needs     Medical: Not on file     Non-medical: Not on file   Tobacco Use   • Smoking status: Never Smoker   • Smokeless tobacco: Never Used   Substance and Sexual Activity   • Alcohol use: No   • Drug use: No   • Sexual activity: Never     Partners: Male   Lifestyle   • " "Physical activity     Days per week: Not on file     Minutes per session: Not on file   • Stress: Not on file   Relationships   • Social connections     Talks on phone: Not on file     Gets together: Not on file     Attends Gnosticist service: Not on file     Active member of club or organization: Not on file     Attends meetings of clubs or organizations: Not on file     Relationship status: Not on file   • Intimate partner violence     Fear of current or ex partner: Not on file     Emotionally abused: Not on file     Physically abused: Not on file     Forced sexual activity: Not on file   Other Topics Concern   • Not on file   Social History Narrative   • Not on file      Social History     Substance and Sexual Activity   Drug Use No       CURRENT MEDICATIONS  Home Medications    **Home medications have not yet been reviewed for this encounter**         ALLERGIES  Allergies   Allergen Reactions   • Shellfish Allergy Hives       PHYSICAL EXAM  VITAL SIGNS: /82   Pulse 87   Temp 36.8 °C (98.3 °F) (Temporal)   Resp 18   Ht 1.575 m (5' 2\")   Wt 72.8 kg (160 lb 7.9 oz)   SpO2 96%   BMI 29.35 kg/m²   Pulse ox interpretation: I interpret this pulse ox as normal.    Constitutional: Alert in no apparent distress.  HENT: No signs of trauma, Bilateral external ears normal, Nose normal.   Eyes: Pupils are equal and reactive, Conjunctiva normal, Non-icteric.   Neck: Normal range of motion, No tenderness, Supple, No stridor.   Lymphatic: No lymphadenopathy noted.   Cardiovascular: Regular rate and rhythm, no murmurs.   Thorax & Lungs: Normal breath sounds, No respiratory distress, No wheezing, No chest tenderness.   Abdomen: Bowel sounds normal, Soft, No tenderness, No masses, No pulsatile masses. No peritoneal signs.  Skin: Warm, Dry, No erythema, No rash.   Back: No bony tenderness, No CVA tenderness.   Extremities: Intact distal pulses bilaterally with DP and PT, No edema,  No cyanosis,   Musculoskeletal: Mild " tenderness over the posterior calf on the left without erythema or swelling, otherwise good range of motion in all major joints. No tenderness to palpation or major deformities noted.   Neurologic: Alert , Normal motor function, Normal sensory function, No focal deficits noted.   Psychiatric: Affect normal, Judgment normal, Mood normal.     DIAGNOSTIC STUDIES / PROCEDURES      RADIOLOGY  US-EXTREMITY VENOUS LOWER UNILAT LEFT   Final Result        The radiologist's interpretation of all radiological studies have been reviewed and images independently viewed by me.    COURSE & MEDICAL DECISION MAKING  Nursing notes, VS, PMSFHx reviewed in chart.    9:49 AM Patient seen and examined at bedside.  Ordered for US to evaluate her symptoms.     11:14 AM  Patient reevaluated, updated on all results she is comfortable with plan for discharge        Decision Making:  This is a 70 y.o. female presenting with some leg pain.  Ultrasound was obtained there is no evidence of DVT.  Patient does have history of gout although recent arthrocentesis without crystals.  No injury recently or focal bony tenderness suggestive of fracture. no findings on exam suggestive of infectious process she has appropriate range of motion in her knee and ankle without significant pain.  No fevers.  She is neurovascular intact on exam.  Will start on Naprosyn, follow-up with primary care    The patient will return for new or worsening symptoms and is stable at the time of discharge.    The patient is referred to a primary physician for blood pressure management, diabetic screening, and for all other preventative health concerns.        DISPOSITION:  Patient will be discharged home in stable condition.    FOLLOW UP:  Everett Johnson M.D.  [address]  [place] [address]  [phone]    In 1 week        OUTPATIENT MEDICATIONS:  New Prescriptions    NAPROXEN (NAPROSYN) 500 MG TAB    Take 1 Tab by mouth 2 times daily with meals as needed (pain) for up  to 5 days.         FINAL IMPRESSION  1. Left leg pain         The note accurately reflects work and decisions made by me.  Everett Coleman M.D.  11/25/2020  11:26 AM

## 2020-11-25 NOTE — ED NOTES
Discharge instructions, prescription x 1, and follow-up care reviewed with patient. All questions answered and patient verbalized understanding. Vss. Patient stable and ambulatory upon d/c from ED.

## 2020-11-25 NOTE — ED TRIAGE NOTES
"Chief Complaint   Patient presents with   • Leg Pain     left leg pain \"from my knee all the way down to my foot\" x2 days; worse since yesterday     Pt ambulatory to triage for above complaint. Pt denies injury to left leg. Pt states she was here a couple months ago for the same pain and was told it was gout. Pt states that she was not given any medication for gout after that visit and has been unable to get in to see PCM (appt scheduled for December 11th).     Pt is alert/oriented and follows commands. Pt speaking in full sentences and responds appropriately to questions. No acute distress noted in triage and respirations are even and unlabored.     Pt placed in lobby and educated on triage process. Pt encouraged to alert staff for any changes in condition.  "

## 2021-01-11 ENCOUNTER — OFFICE VISIT (OUTPATIENT)
Dept: URGENT CARE | Facility: PHYSICIAN GROUP | Age: 71
End: 2021-01-11
Payer: COMMERCIAL

## 2021-01-11 VITALS
OXYGEN SATURATION: 96 % | RESPIRATION RATE: 16 BRPM | DIASTOLIC BLOOD PRESSURE: 78 MMHG | BODY MASS INDEX: 31.28 KG/M2 | HEIGHT: 62 IN | WEIGHT: 170 LBS | HEART RATE: 74 BPM | SYSTOLIC BLOOD PRESSURE: 136 MMHG | TEMPERATURE: 97.4 F

## 2021-01-11 DIAGNOSIS — S16.1XXA ACUTE STRAIN OF NECK MUSCLE, INITIAL ENCOUNTER: ICD-10-CM

## 2021-01-11 PROCEDURE — 99213 OFFICE O/P EST LOW 20 MIN: CPT | Performed by: NURSE PRACTITIONER

## 2021-01-11 ASSESSMENT — FIBROSIS 4 INDEX: FIB4 SCORE: 1.75

## 2021-01-15 DIAGNOSIS — Z23 NEED FOR VACCINATION: ICD-10-CM

## 2021-01-17 ASSESSMENT — ENCOUNTER SYMPTOMS
LEG PAIN: 0
NECK PAIN: 1
PARESIS: 0
TROUBLE SWALLOWING: 0
BACK PAIN: 0
FEVER: 0
HEADACHES: 0
NAUSEA: 0
FOCAL WEAKNESS: 0
TINGLING: 0
CHILLS: 0

## 2021-01-17 NOTE — PROGRESS NOTES
Subjective:      Serenity Howe is a 70 y.o. female who presents with Neck Pain (x4days R side of neck)            Neck Pain   This is a new problem. The current episode started in the past 7 days. The problem occurs constantly. The problem has been unchanged. The pain is associated with nothing. The pain is present in the right side. The quality of the pain is described as aching. The pain is moderate. Nothing aggravates the symptoms. Pertinent negatives include no chest pain, fever, headaches, leg pain, pain with swallowing, paresis, tingling or trouble swallowing. She has tried nothing for the symptoms. The treatment provided no relief.     Shellfish allergy  Current Outpatient Medications on File Prior to Visit   Medication Sig Dispense Refill   • valsartan-hydrochlorothiazide (DIOVAN-HCT) 80-12.5 MG per tablet TAKE 1 TABLET BY MOUTH EVERY DAY 90 Tab 3   • verapamil ER (CALAN SR) 120 MG CAPSULE SR 24 HR TAKE 1 CAPSULE BY MOUTH EVERY DAY 90 Cap 2   • WIXELA INHUB 250-50 MCG/DOSE AEROSOL POWDER, BREATH ACTIVATED INHALE 1 PUFF BY MOUTH TWICE DAILY RINSE MOUTH AFTER USE 3 Inhaler 3   • albuterol 108 (90 Base) MCG/ACT Aero Soln inhalation aerosol Inhale 2 Puffs by mouth every four hours as needed for Shortness of Breath. 1 Inhaler 11   • albuterol (PROVENTIL) 2.5mg/3ml Nebu Soln solution for nebulization      • Calcium Carbonate-Vit D-Min (CALTRATE PLUS PO) Take 1 Tab by mouth every day.     • aspirin (ASA) 81 MG CHEW Take 81 mg by mouth every day.     • miconazole (MICOTIN) 2 % Cream Apply topically to affected area twice daily for up 2 weeks 30 g 0   • omeprazole (PRILOSEC) 20 MG delayed-release capsule Take 1 Cap by mouth every day. (Patient not taking: Reported on 8/5/2020) 20 Cap 0   • indomethacin SR (INDOCIN SR) 75 MG Cap CR Take 1 Cap by mouth every day. (Patient not taking: Reported on 8/5/2020) 30 Cap 0   • acetaminophen (TYLENOL) 325 MG Tab Take 650 mg by mouth every four hours as needed.     •  meloxicam (MOBIC) 7.5 MG Tab Take 1 Tab by mouth every day. (Patient not taking: Reported on 8/5/2020) 30 Tab 0   • fluticasone (FLONASE) 50 MCG/ACT nasal spray        No current facility-administered medications on file prior to visit.      Social History     Socioeconomic History   • Marital status:      Spouse name: Not on file   • Number of children: Not on file   • Years of education: Not on file   • Highest education level: Not on file   Occupational History   • Not on file   Social Needs   • Financial resource strain: Not on file   • Food insecurity     Worry: Not on file     Inability: Not on file   • Transportation needs     Medical: Not on file     Non-medical: Not on file   Tobacco Use   • Smoking status: Never Smoker   • Smokeless tobacco: Never Used   Substance and Sexual Activity   • Alcohol use: No   • Drug use: No   • Sexual activity: Never     Partners: Male   Lifestyle   • Physical activity     Days per week: Not on file     Minutes per session: Not on file   • Stress: Not on file   Relationships   • Social connections     Talks on phone: Not on file     Gets together: Not on file     Attends Orthodoxy service: Not on file     Active member of club or organization: Not on file     Attends meetings of clubs or organizations: Not on file     Relationship status: Not on file   • Intimate partner violence     Fear of current or ex partner: Not on file     Emotionally abused: Not on file     Physically abused: Not on file     Forced sexual activity: Not on file   Other Topics Concern   • Not on file   Social History Narrative   • Not on file     Breast Cancer-related family history is not on file.    Review of Systems   Constitutional: Negative for chills and fever.   HENT: Positive for ear pain. Negative for trouble swallowing.    Cardiovascular: Negative for chest pain.   Gastrointestinal: Negative for nausea.   Musculoskeletal: Positive for neck pain. Negative for back pain.   Skin: Negative  "for rash.   Neurological: Negative for tingling, focal weakness and headaches.          Objective:     /78   Pulse 74   Temp 36.3 °C (97.4 °F) (Temporal)   Resp 16   Ht 1.575 m (5' 2\")   Wt 77.1 kg (170 lb)   SpO2 96%   BMI 31.09 kg/m²      Physical Exam  Vitals signs and nursing note reviewed.   Constitutional:       Appearance: Normal appearance.   Neck:      Musculoskeletal: Normal range of motion. Pain with movement and muscular tenderness present. No erythema or neck rigidity.     Cardiovascular:      Rate and Rhythm: Normal rate and regular rhythm.      Heart sounds: No murmur.   Pulmonary:      Effort: Pulmonary effort is normal.      Breath sounds: Normal breath sounds.   Musculoskeletal: Normal range of motion.   Skin:     General: Skin is warm and dry.   Neurological:      General: No focal deficit present.      Mental Status: She is alert and oriented to person, place, and time.   Psychiatric:         Mood and Affect: Mood normal.                 Assessment/Plan:        1. Acute strain of neck muscle, initial encounter       Patient advised on nsaids and icing as well as gentle rom for this.  Differential diagnosis, natural history, supportive care, and indications for immediate follow-up discussed at length.       "

## 2021-01-19 ENCOUNTER — HOSPITAL ENCOUNTER (EMERGENCY)
Facility: MEDICAL CENTER | Age: 71
End: 2021-01-19
Attending: EMERGENCY MEDICINE | Admitting: EMERGENCY MEDICINE
Payer: COMMERCIAL

## 2021-01-19 ENCOUNTER — APPOINTMENT (OUTPATIENT)
Dept: RADIOLOGY | Facility: MEDICAL CENTER | Age: 71
End: 2021-01-19
Attending: EMERGENCY MEDICINE
Payer: COMMERCIAL

## 2021-01-19 VITALS
HEIGHT: 62 IN | TEMPERATURE: 98.6 F | BODY MASS INDEX: 29.62 KG/M2 | RESPIRATION RATE: 20 BRPM | SYSTOLIC BLOOD PRESSURE: 172 MMHG | WEIGHT: 160.94 LBS | HEART RATE: 100 BPM | OXYGEN SATURATION: 96 % | DIASTOLIC BLOOD PRESSURE: 96 MMHG

## 2021-01-19 DIAGNOSIS — R51.9 ACUTE INTRACTABLE HEADACHE, UNSPECIFIED HEADACHE TYPE: ICD-10-CM

## 2021-01-19 PROCEDURE — 99284 EMERGENCY DEPT VISIT MOD MDM: CPT

## 2021-01-19 PROCEDURE — 70450 CT HEAD/BRAIN W/O DYE: CPT

## 2021-01-19 ASSESSMENT — FIBROSIS 4 INDEX: FIB4 SCORE: 1.75

## 2021-01-19 NOTE — ED PROVIDER NOTES
ED Provider Note    Scribed for Siddhartha Box M.D. by Rafael Gilliam. 1/19/2021  10:58 AM    Primary care provider: Everett Johnson M.D.  Means of arrival: Walk-In  History obtained from: Patient  History limited by: None    CHIEF COMPLAINT  Chief Complaint   Patient presents with   • Headache     x7 days, intermittent throbbing.       HPI  Serenity Howe is a 70 y.o. female who presents to the Emergency Department for evaluation of a headache onset one week. She notes that her headache is mainly on the right side. Patient describes her pain as intermittent. Denies any vision changes or abdominal pain. No alleviating or exacerbating factors reported. Patient has a history of high blood pressure. She reports that she checks her blood pressure daily, however, did not check it today. Yesterday, she checked her blood pressure at home, getting a reading on 130 systolic. Patient has a history of breast cancer.     REVIEW OF SYSTEMS  Pertinent negatives include no vision changes or abdominal pain. As above, all other systems reviewed and are negative.   See HPI for further details.     PAST MEDICAL HISTORY   has a past medical history of ASTHMA, History of breast cancer (7/29/2015), Hypertension, Shoulder pain, right (7/29/2015), and Tremor (7/29/2015).    SURGICAL HISTORY   has a past surgical history that includes mastectomy and gyn surgery.    SOCIAL HISTORY  Social History     Tobacco Use   • Smoking status: Never Smoker   • Smokeless tobacco: Never Used   Substance Use Topics   • Alcohol use: No   • Drug use: No      Social History     Substance and Sexual Activity   Drug Use No       FAMILY HISTORY  Family History   Problem Relation Age of Onset   • Hyperlipidemia Mother        CURRENT MEDICATIONS  Home Medications     Reviewed by Delores Jarrett R.N. (Registered Nurse) on 01/19/21 at 1037  Med List Status: Partial   Medication Last Dose Status   acetaminophen (TYLENOL) 325 MG Tab  Active   albuterol  "(PROVENTIL) 2.5mg/3ml Nebu Soln solution for nebulization  Active   albuterol 108 (90 Base) MCG/ACT Aero Soln inhalation aerosol  Active   aspirin (ASA) 81 MG CHEW  Active   Calcium Carbonate-Vit D-Min (CALTRATE PLUS PO)  Active   fluticasone (FLONASE) 50 MCG/ACT nasal spray  Active   indomethacin SR (INDOCIN SR) 75 MG Cap CR  Active   meloxicam (MOBIC) 7.5 MG Tab  Active   miconazole (MICOTIN) 2 % Cream  Active   omeprazole (PRILOSEC) 20 MG delayed-release capsule  Active   valsartan-hydrochlorothiazide (DIOVAN-HCT) 80-12.5 MG per tablet  Active   verapamil ER (CALAN SR) 120 MG CAPSULE SR 24 HR  Active   WIXELA INHUB 250-50 MCG/DOSE AEROSOL POWDER, BREATH ACTIVATED  Active                ALLERGIES  Allergies   Allergen Reactions   • Shellfish Allergy Hives       PHYSICAL EXAM  VITAL SIGNS: BP (!) 172/96   Pulse 100   Temp 37 °C (98.6 °F) (Temporal)   Resp 20   Ht 1.575 m (5' 2\")   Wt 73 kg (160 lb 15 oz)   SpO2 96%   BMI 29.44 kg/m²   Constitutional: Well developed, Well nourished, No acute distress, Non-toxic appearance.   HENT: Normocephalic, Atraumatic, Bilateral external ears normal, Oropharynx is clear mucous membranes are moist. No oral exudates or nasal discharge.   Eyes: Pupils are equal round and reactive, EOMI, Conjunctiva normal, No discharge.   Neck: Normal range of motion, No tenderness, Supple, No stridor. No meningismus.  Lymphatic: No lymphadenopathy noted.   Cardiovascular: Regular rate and rhythm without murmur rub or gallop.  Thorax & Lungs: Clear breath sounds bilaterally without wheezes, rhonchi or rales. There is no chest wall tenderness.   Abdomen: Soft non-tender non-distended. There is no rebound or guarding. No organomegaly is appreciated. Bowel sounds are normal.  Skin: Normal without rash.   Back: No CVA or spinal tenderness.   Extremities: Intact distal pulses, No edema, No tenderness, No cyanosis, No clubbing. Capillary refill is less than 2 seconds.  Musculoskeletal: Good range " of motion in all major joints. No tenderness to palpation or major deformities noted.   Neurologic: Alert & oriented x 3, Normal motor function, Normal sensory function, No focal deficits noted. Reflexes are normal.  Psychiatric: Affect normal, Judgment normal, Mood normal. There is no suicidal ideation or patient reported hallucinations.     DIAGNOSTIC STUDIES / PROCEDURES    RADIOLOGY  CT-HEAD W/O   Final Result      No CT evidence of acute infarct, hemorrhage or mass.        The radiologist's interpretation of all radiological studies have been reviewed by me.    COURSE & MEDICAL DECISION MAKING  Nursing notes, VS, PMSFHx reviewed in chart.    10:58 AM Patient seen and examined at bedside. I discussed the plan of care with the patient, which includes obtaining imaging for further evaluation. Patient understands and verbalizes agreement to plan of care. Ordered for CT-head without to evaluate. Patient would not like to receive any pain medication at this time. Patient's son drove her to the ED.     CAT scan of the head shows no evidence of mass or intracranial bleed.  I think that she may have some occasional headaches from elevated blood pressure and she is encouraged to use her blood pressure cuff to measure twice daily and follow-up with her primary care doctor to discuss the possibility of modification of her dosing    12:52 PM - Patient was reevaluated at bedside. Discussed radiology results with the patient. I then informed the patient of my plan of care, which includes strict return precautions for any new or worsening symptoms. Patient understands and verbalizes agreement to plan of care. Patient is comfortable going home at this time.      Patient has had high blood pressure while in the emergency department, felt likely secondary to medical condition. Counseled patient to monitor blood pressure at home and follow up with primary care physician.     The patient will return for new or worsening symptoms  and is stable at the time of discharge.    PPE Note: I personally donned full PPE for all patient encounters during this visit, including being clean-shaven with an N95 respirator mask, gloves, and goggles.     Scribe remained outside the patient's room and did not have any contact with the patient for the duration of patient encounter.     DISPOSITION:  Patient will be discharged home in stable condition.    FINAL IMPRESSION  1. Acute intractable headache, unspecified headache type          I, Rafael Gilliam (Scribe), am scribing for, and in the presence of, Siddhartha Box M.D..    Electronically signed by: Rafael Gilliam (Scribe), 1/19/2021    ISiddhartha M.D. personally performed the services described in this documentation, as scribed by Rafael Gilliam in my presence, and it is both accurate and complete.    C    The note accurately reflects work and decisions made by me.  Siddhartha Box M.D.  1/19/2021  12:58 PM

## 2021-01-19 NOTE — ED TRIAGE NOTES
Chief Complaint   Patient presents with   • Headache     x7 days, intermittent throbbing.     Patient to triage via ambulation, with a steady gait, patient A&O x4.  Appropriate precautions in place.     Patient reports hx of HTN, takes medication as prescribed.  Reports tylenol with relief, then pain returns after 6 hours.    Explained wait time and triage process to pt. Pt placed back in lobby, told to notify ED tech or triage RN of any changes, verbalized understanding.

## 2021-05-18 ENCOUNTER — APPOINTMENT (OUTPATIENT)
Dept: RADIOLOGY | Facility: MEDICAL CENTER | Age: 71
End: 2021-05-18
Attending: EMERGENCY MEDICINE
Payer: COMMERCIAL

## 2021-05-18 ENCOUNTER — HOSPITAL ENCOUNTER (EMERGENCY)
Facility: MEDICAL CENTER | Age: 71
End: 2021-05-18
Attending: EMERGENCY MEDICINE
Payer: COMMERCIAL

## 2021-05-18 VITALS
WEIGHT: 163.14 LBS | BODY MASS INDEX: 30.02 KG/M2 | DIASTOLIC BLOOD PRESSURE: 74 MMHG | RESPIRATION RATE: 17 BRPM | SYSTOLIC BLOOD PRESSURE: 155 MMHG | HEART RATE: 82 BPM | HEIGHT: 62 IN | OXYGEN SATURATION: 95 % | TEMPERATURE: 98 F

## 2021-05-18 DIAGNOSIS — M54.32 SCIATICA OF LEFT SIDE: ICD-10-CM

## 2021-05-18 PROCEDURE — 99283 EMERGENCY DEPT VISIT LOW MDM: CPT

## 2021-05-18 PROCEDURE — 700102 HCHG RX REV CODE 250 W/ 637 OVERRIDE(OP): Performed by: EMERGENCY MEDICINE

## 2021-05-18 PROCEDURE — 72100 X-RAY EXAM L-S SPINE 2/3 VWS: CPT

## 2021-05-18 PROCEDURE — A9270 NON-COVERED ITEM OR SERVICE: HCPCS | Performed by: EMERGENCY MEDICINE

## 2021-05-18 RX ORDER — HYDROCODONE BITARTRATE AND ACETAMINOPHEN 5; 325 MG/1; MG/1
1 TABLET ORAL EVERY 6 HOURS PRN
Qty: 12 TABLET | Refills: 0 | Status: SHIPPED | OUTPATIENT
Start: 2021-05-18 | End: 2021-05-23

## 2021-05-18 RX ORDER — METHYLPREDNISOLONE 4 MG/1
TABLET ORAL
Qty: 21 TABLET | Refills: 0 | Status: SHIPPED | OUTPATIENT
Start: 2021-05-18 | End: 2021-06-21

## 2021-05-18 RX ORDER — OXYCODONE HYDROCHLORIDE AND ACETAMINOPHEN 5; 325 MG/1; MG/1
1 TABLET ORAL ONCE
Status: COMPLETED | OUTPATIENT
Start: 2021-05-18 | End: 2021-05-18

## 2021-05-18 RX ADMIN — OXYCODONE HYDROCHLORIDE AND ACETAMINOPHEN 1 TABLET: 5; 325 TABLET ORAL at 09:32

## 2021-05-18 ASSESSMENT — PAIN DESCRIPTION - PAIN TYPE: TYPE: ACUTE PAIN

## 2021-05-18 ASSESSMENT — PAIN DESCRIPTION - DESCRIPTORS: DESCRIPTORS: ACHING

## 2021-05-18 NOTE — ED TRIAGE NOTES
Ambulatory to triage with limp with   Chief Complaint   Patient presents with   • Leg Pain     Left   • Buttocks Pain     Left   Recurrent LLE pain 8/10. States difficulty with standing and walking. Denies hx of DVT/PE.

## 2021-05-18 NOTE — ED PROVIDER NOTES
ED Provider Note    Scribed for Sravan Bernstein M.D. by Jorge Danielle. 5/18/2021, 9:08 AM.    Primary care provider: Pcp Pt States None  Means of arrival: Walk in  History obtained from: Patient  History limited by: None    CHIEF COMPLAINT  Chief Complaint   Patient presents with    Leg Pain     Left    Buttocks Pain     Left     HPI  Serenity Howe is a 70 y.o. female who presents to the Emergency Department for evaluation of severe left buttock pain onset 4 days ago. She admits to associated symptoms of radiating left lower extremity pain down to her foot, and difficulty walking secondary to the pain, but denies bowel or bladder symptoms or weakness. No alleviating factors were reported.      PPE Note: I personally donned PPE for all patient encounters during this visit, including being clean-shaven with a surgical mask and gloves.     Scribe remained outside the patient's room and did not have any contact with the patient for the duration of patient encounter.       REVIEW OF SYSTEMS  Pertinent positives include left buttock pain, left lower extremity pain down to her foot, and difficulty walking secondary to the pain.   Pertinent negatives include no bowel or bladder symptoms, or weakness.      PAST MEDICAL HISTORY   has a past medical history of ASTHMA, History of breast cancer (7/29/2015), Hypertension, Shoulder pain, right (7/29/2015), and Tremor (7/29/2015).    SURGICAL HISTORY   has a past surgical history that includes mastectomy and gyn surgery.    SOCIAL HISTORY  Social History     Tobacco Use    Smoking status: Never Smoker    Smokeless tobacco: Never Used   Vaping Use    Vaping Use: Never used   Substance Use Topics    Alcohol use: No    Drug use: No      Social History     Substance and Sexual Activity   Drug Use No     FAMILY HISTORY  Family History   Problem Relation Age of Onset    Hyperlipidemia Mother      CURRENT MEDICATIONS  None noted    ALLERGIES  Allergies   Allergen Reactions     "Shellfish Allergy Hives     PHYSICAL EXAM  VITAL SIGNS: BP (!) 99/74   Pulse 90   Temp 36.4 °C (97.6 °F) (Temporal)   Resp 18   Ht 1.575 m (5' 2\")   Wt 74 kg (163 lb 2.3 oz)   SpO2 96%   BMI 29.84 kg/m²   Nursing note and vitals reviewed.  Constitutional: Well developed, Well nourished, mild distress secondary to pain, Non-toxic appearance.   Neck: Normal range of motion, No tenderness, Supple, No stridor.   Cardiovascular: Normal heart rate, Normal rhythm, No murmurs, No rubs, No gallops. No pulsatile masses.  Thorax & Lungs: Normal breath sounds, No respiratory distress, No wheezing, No chest tenderness.   Abdomen: Bowel sounds normal, Soft, No tenderness, No masses, No pulsatile masses.   Skin: Warm, Dry, No erythema, No rash.   Back: No midline TTP, no point tenderness, no lumbar paraspinal tenderness. Tenderness to palpation to the left buttock. Positive straight leg raise at 20 degrees on the left.   Extremities: Intact distal pulses, No edema, No tenderness, No cyanosis, No clubbing.   Neurologic: Alert & oriented x 3, Normal motor function, Normal sensory function, No focal deficits noted. No saddle anesthesia.     DIAGNOSTIC STUDIES / PROCEDURES    RADIOLOGY  DX-LUMBAR SPINE-2 OR 3 VIEWS   Final Result      1.  Mild L5-S1 spondylosis, with grade 1 degenerative anterolisthesis.   2.  Otherwise unremarkable lumbar spine. No acute fracture.        The radiologist's interpretation of all radiological studies have been reviewed by me.    COURSE & MEDICAL DECISION MAKING  Nursing notes, VS, PMSFHx reviewed in chart.    9:08 AM - Patient seen and examined at bedside. At this point the patient presents with left buttock pain with radiating left lower extremity pain. The patient's physical exam is essentially benign, other than tenderness to palpation to the left buttock and positive straight leg raise at 20 degrees on the left. There is no evidence of cord impingement. No evidence of cauda equina. Clinically " and historically there is no concern for epidural abscess, or epidural hematoma. There is no history of recent trauma. Given the patient's history of breast cancer, we will obtain an x-ray. Ordered DX-Lumber Spine. She will be treated with Percocet 5-325 mg tab.     10:45 PM - I reevaluated the patient at bedside. I discussed the patient's diagnostic study results which show mild L5-S1 spondylosis, with grade 1 degenerative anterolisthesis, but otherwise unremarkable lumbar spine. No acute fracture. At this time I feel that the most appropriate treatment for what is apparently mechanical low back pain with is pain control. I will prescribe Medrol and Norco for pain. I discussed plan for discharge and follow up as outlined below. The patient verbalizes they feel comfortable going home. The patient is stable for discharge at this time and will return for any new or worsening symptoms. Patient verbalizes understanding and support with my plan for discharge.     I reviewed prescription monitoring program for patient's narcotic use before prescribing a scheduled drug.The patient will not drink alcohol nor drive with prescribed medications. The patient will return for new or worsening symptoms and is stable at the time of discharge.    In prescribing controlled substances to this patient, I certify that I have obtained and reviewed the medical history of Serenity Howe. I have also made a good rosario effort to obtain applicable records from other providers who have treated the patient and records did not demonstrate any increased risk of substance abuse that would prevent me from prescribing controlled substances.     I have conducted a physical exam and documented it. I have reviewed Ms. Howe’s prescription history as maintained by the [place] Prescription Monitoring Program.     I have assessed the patient’s risk for abuse, dependency, and addiction using the validated Opioid Risk Tool available at  https://www.mdcalc.com/xdfjrf-eqqg-kwxh-ort-narcotic-abuse.     Given the above, I believe the benefits of controlled substance therapy outweigh the risks. The reasons for prescribing controlled substances include non-narcotic, oral analgesic alternatives have been inadequate for pain control. Accordingly, I have discussed the risk and benefits, treatment plan, and alternative therapies with the patient.       DISPOSITION:  Patient will be discharged home in stable condition.    FOLLOW UP:  [facility], Emergency Dept  [facility] [facility] [address] [phone]  Schedule an appointment as soon as possible for a visit       OUTPATIENT MEDICATIONS:  Discharge Medication List as of 5/18/2021 10:37 AM        START taking these medications    Details   methylPREDNISolone (MEDROL) 4 MG Tab Take as per the package instructions., Disp-21 tablet, R-0, Normal      HYDROcodone-acetaminophen (NORCO) 5-325 MG Tab per tablet Take 1 tablet by mouth every 6 hours as needed for up to 5 days., Disp-12 tablet, R-0, Normal           The patient was discharged home with an information sheet on sciatica and told to return immediately for any signs or symptoms listed.  The patient agreed to the discharge precautions and follow-up plan which is documented in EPIC.    FINAL IMPRESSION  1. Sciatica of left side        Jorge ZIMMERMAN ([place]), am scribing for, and in the presence of, Sravan Bernstein M.D..    Electronically signed by: Jorge Danielle ([place]), 5/18/2021    Sravan ZIMMERMAN M.D. personally performed the services described in this documentation, as scribed by Jorge Danielle in my presence, and it is both accurate and complete.    The note accurately reflects work and decisions made by me.  Sravan Bernstein M.D.  5/18/2021  2:35 PM

## 2021-05-18 NOTE — ED NOTES
Discharge teaching and paperwork provided regarding sciatic pain and all questions/concerns answered. VSS, assessment stable. Given information regarding home care and pain Rx. Patient discharged to the care of herself and ambulated out of the ED.

## 2021-05-18 NOTE — ED NOTES
Patient ambulatory back to room for triaged complaint. Starting on Saturday with no associated trauma. Patient states the last time her foot her she was diagnosed with gout, however, this time her left hip is now hurting.

## 2021-06-13 ENCOUNTER — OFFICE VISIT (OUTPATIENT)
Dept: URGENT CARE | Facility: PHYSICIAN GROUP | Age: 71
End: 2021-06-13
Payer: COMMERCIAL

## 2021-06-13 ENCOUNTER — HOSPITAL ENCOUNTER (OUTPATIENT)
Dept: RADIOLOGY | Facility: MEDICAL CENTER | Age: 71
End: 2021-06-13
Attending: NURSE PRACTITIONER
Payer: COMMERCIAL

## 2021-06-13 VITALS
HEART RATE: 86 BPM | TEMPERATURE: 97 F | SYSTOLIC BLOOD PRESSURE: 118 MMHG | DIASTOLIC BLOOD PRESSURE: 62 MMHG | BODY MASS INDEX: 30 KG/M2 | HEIGHT: 62 IN | OXYGEN SATURATION: 97 % | RESPIRATION RATE: 18 BRPM | WEIGHT: 163 LBS

## 2021-06-13 DIAGNOSIS — R73.01 IMPAIRED FASTING GLUCOSE: ICD-10-CM

## 2021-06-13 DIAGNOSIS — K21.00 GASTROESOPHAGEAL REFLUX DISEASE WITH ESOPHAGITIS WITHOUT HEMORRHAGE: ICD-10-CM

## 2021-06-13 DIAGNOSIS — M79.672 LEFT FOOT PAIN: ICD-10-CM

## 2021-06-13 DIAGNOSIS — R22.42 LOCALIZED SWELLING OF LEFT FOOT: ICD-10-CM

## 2021-06-13 DIAGNOSIS — M19.072 PRIMARY OSTEOARTHRITIS OF LEFT FOOT: ICD-10-CM

## 2021-06-13 DIAGNOSIS — I10 ESSENTIAL HYPERTENSION: ICD-10-CM

## 2021-06-13 PROCEDURE — 99214 OFFICE O/P EST MOD 30 MIN: CPT | Performed by: NURSE PRACTITIONER

## 2021-06-13 PROCEDURE — 73630 X-RAY EXAM OF FOOT: CPT | Mod: LT

## 2021-06-13 RX ORDER — NAPROXEN 500 MG/1
500 TABLET ORAL 2 TIMES DAILY WITH MEALS
Qty: 60 TABLET | Refills: 0 | Status: SHIPPED | OUTPATIENT
Start: 2021-06-13 | End: 2021-06-21

## 2021-06-13 ASSESSMENT — ENCOUNTER SYMPTOMS
GASTROINTESTINAL NEGATIVE: 1
PSYCHIATRIC NEGATIVE: 1
NEUROLOGICAL NEGATIVE: 1
EYES NEGATIVE: 1
CONSTITUTIONAL NEGATIVE: 1
CARDIOVASCULAR NEGATIVE: 1
RESPIRATORY NEGATIVE: 1

## 2021-06-13 NOTE — PROGRESS NOTES
Subjective:   Serenity Howe is a 71 y.o. female who presents for Foot Pain (x1 month, pt was seen at ED x1 month ago and medication does not work )       Foot Problem  This is a new problem. The current episode started 1 to 4 weeks ago. The problem occurs constantly. The problem has been unchanged. The symptoms are aggravated by walking and standing. She has tried acetaminophen, NSAIDs and position changes for the symptoms. The treatment provided mild relief.     Pt presents for evaluation of a new problem, reports left foot pain with intermittent swelling over the past few weeks.  Patient was seen in the ED a month ago, however that was for low back pain, which has resolved.  Patient reports that she has pain with standing, walking, and palpation to her left foot.  Patient has been taking OTC naproxen and Tylenol with minimal relief.  Denies known history of injury or trauma.    Review of Systems   Constitutional: Negative.    HENT: Negative.    Eyes: Negative.    Respiratory: Negative.    Cardiovascular: Negative.    Gastrointestinal: Negative.    Genitourinary: Negative.    Musculoskeletal: Positive for joint pain (left mid foot).   Skin: Negative.    Neurological: Negative.    Psychiatric/Behavioral: Negative.    All other systems reviewed and are negative.      MEDS:   Current Outpatient Medications:   •  methylPREDNISolone (MEDROL) 4 MG Tab, Take as per the package instructions., Disp: 21 tablet, Rfl: 0  •  valsartan-hydrochlorothiazide (DIOVAN-HCT) 80-12.5 MG per tablet, TAKE 1 TABLET BY MOUTH EVERY DAY, Disp: 90 Tab, Rfl: 3  •  miconazole (MICOTIN) 2 % Cream, Apply topically to affected area twice daily for up 2 weeks, Disp: 30 g, Rfl: 0  •  verapamil ER (CALAN SR) 120 MG CAPSULE SR 24 HR, TAKE 1 CAPSULE BY MOUTH EVERY DAY, Disp: 90 Cap, Rfl: 2  •  acetaminophen (TYLENOL) 325 MG Tab, Take 650 mg by mouth every four hours as needed., Disp: , Rfl:   •  WIXELA INHUB 250-50 MCG/DOSE AEROSOL POWDER,  "BREATH ACTIVATED, INHALE 1 PUFF BY MOUTH TWICE DAILY RINSE MOUTH AFTER USE, Disp: 3 Inhaler, Rfl: 3  •  albuterol 108 (90 Base) MCG/ACT Aero Soln inhalation aerosol, Inhale 2 Puffs by mouth every four hours as needed for Shortness of Breath., Disp: 1 Inhaler, Rfl: 11  •  fluticasone (FLONASE) 50 MCG/ACT nasal spray, , Disp: , Rfl:   •  albuterol (PROVENTIL) 2.5mg/3ml Nebu Soln solution for nebulization, , Disp: , Rfl:   •  Calcium Carbonate-Vit D-Min (CALTRATE PLUS PO), Take 1 Tab by mouth every day., Disp: , Rfl:   •  aspirin (ASA) 81 MG CHEW, Take 81 mg by mouth every day., Disp: , Rfl:   ALLERGIES:   Allergies   Allergen Reactions   • Shellfish Allergy Hives       Patient's PMH, SocHx, SurgHx, FamHx, Drug allergies and medications were reviewed.     Objective:   /62   Pulse 86   Temp 36.1 °C (97 °F) (Temporal)   Resp 18   Ht 1.575 m (5' 2\")   Wt 73.9 kg (163 lb)   SpO2 97%   BMI 29.81 kg/m²     Physical Exam  Vitals and nursing note reviewed.   Constitutional:       General: She is awake.      Appearance: Normal appearance. She is well-developed.   HENT:      Head: Normocephalic and atraumatic.      Right Ear: External ear normal.      Left Ear: External ear normal.      Nose: Nose normal.      Mouth/Throat:      Mouth: Mucous membranes are moist.      Pharynx: Oropharynx is clear.   Eyes:      Extraocular Movements: Extraocular movements intact.      Conjunctiva/sclera: Conjunctivae normal.   Cardiovascular:      Rate and Rhythm: Normal rate and regular rhythm.   Pulmonary:      Effort: Pulmonary effort is normal.      Breath sounds: Normal breath sounds.   Musculoskeletal:      Cervical back: Normal range of motion and neck supple.      Left foot: Decreased range of motion.        Feet:    Feet:      Comments: Left midfoot +TTP  Skin:     General: Skin is warm and dry.   Neurological:      Mental Status: She is alert and oriented to person, place, and time.      Gait: Gait abnormal.      Comments: " Gait is stable and unassisted, however favors left foot.   Psychiatric:         Mood and Affect: Mood normal.         Behavior: Behavior normal.         Thought Content: Thought content normal.       Narrative & Impression     6/13/2021 1:19 PM     HISTORY/REASON FOR EXAM:  Atraumatic Pain/Swelling/Deformity  Left plantar foot pain.     TECHNIQUE/EXAM DESCRIPTION AND NUMBER OF VIEWS:  3 views of the LEFT foot.     COMPARISON:  None.     FINDINGS:     No acute fracture or dislocation.  Plantar and dorsal calcaneal enthesophytes.  Mild osteoarthritis of the midfoot.        IMPRESSION:        No acute osseous abnormality.         Last Resulted: 06/13/21  1:34 PM            Assessment/Plan:   Assessment    1. Primary osteoarthritis of left foot  - naproxen (NAPROSYN) 500 MG Tab; Take 1 tablet by mouth 2 times a day with meals for 60 doses.  Dispense: 60 tablet; Refill: 0  - AMB REFERRAL TO ESTABLISH WITH [facility]CP  - REFERRAL TO PHYSICAL THERAPY    2. Left foot pain  - DX-FOOT-COMPLETE 3+ LEFT; Future  - naproxen (NAPROSYN) 500 MG Tab; Take 1 tablet by mouth 2 times a day with meals for 60 doses.  Dispense: 60 tablet; Refill: 0  - AMB REFERRAL TO ESTABLISH WITH [facility]CP  - REFERRAL TO PHYSICAL THERAPY    3. Localized swelling of left foot  - DX-FOOT-COMPLETE 3+ LEFT; Future  - naproxen (NAPROSYN) 500 MG Tab; Take 1 tablet by mouth 2 times a day with meals for 60 doses.  Dispense: 60 tablet; Refill: 0  - AMB REFERRAL TO ESTABLISH WITH [facility]CP  - REFERRAL TO PHYSICAL THERAPY    4. Essential hypertension  - AMB REFERRAL TO ESTABLISH WITH [facility]CP    5. Impaired fasting glucose  - AMB REFERRAL TO ESTABLISH WITH [facility]CP    6. Gastroesophageal reflux disease with esophagitis without hemorrhage  - AMB REFERRAL TO ESTABLISH WITH [facility] PCP    Vital signs stable at today's acute urgent care visit. Reviewed test results that were completed in the clinic.  Begin medications as listed, in addition to supportive footwear  and over-the-counter anti-inflammatory creams.. Discussed management options (risks, benefits, and alternatives to treatment).  Will refer the patient to physical therapy.    Patient reports her PCP recently retired, asked for new referral due to several chronic health concerns. Return to urgent care with any worsening symptoms or if there is no improvement in their current condition. Red flags discussed and indications to immediately call 911 or present to the ED.  All questions were encouraged and answered to the patient's satisfaction and understanding, and they agree to the plan of care.     I personally reviewed prior external notes and test results pertinent to today's visit.  I have independently reviewed and interpreted all diagnostics ordered during this urgent care acute visit. Time spent evaluating this patient was a minimum of 30 minutes and includes preparing for visit, counseling/education, exam, evaluation, obtaining history, and ordering lab/test/procedures.      Please note that this dictation was created using voice recognition software. I have made a reasonable attempt to correct obvious errors, but I expect that there are errors of grammar and possibly content that I did not discover before finalizing the note.

## 2021-06-14 ENCOUNTER — TELEPHONE (OUTPATIENT)
Dept: MEDICAL GROUP | Facility: PHYSICIAN GROUP | Age: 71
End: 2021-06-14

## 2021-06-14 NOTE — TELEPHONE ENCOUNTER
Future Appointments       Provider Department Center    6/21/2021 1:00 PM BRITNEY Davis [facility] [place]        ESTABLISHED PATIENT PRE-VISIT PLANNING     Patient was NOT contacted to complete PVP.     Note: Patient will not be contacted if there is no indication to call.     1.  Reviewed notes from the last few office visits within the medical group: Yes, LOV 06/30/2020    2.  If any orders were placed at last visit or intended to be done for this visit (i.e. 6 mos follow-up), do we have Results/Consult Notes?         •  Labs - Labs were not ordered at last office visit.  Note: If patient appointment is for lab review and patient did not complete labs, check with provider if OK to reschedule patient until labs completed.       •  Imaging - Imaging was not ordered at last office visit.       •  Referrals - No referrals were ordered at last office visit.    3. Is this appointment scheduled as a Hospital Follow-Up? No    4.  Immunizations were updated in Epic using Reconcile Outside Information activity? Yes    5.  Patient is due for the following Health Maintenance Topics:   Health Maintenance Due   Topic Date Due   • Annual Pulmonary Function Test / Spirometry  Never done   • COLONOSCOPY  05/08/2018   • MAMMOGRAM  06/10/2021     6.  AHA (Pulse8) form printed for Provider? N/A

## 2021-06-21 ENCOUNTER — OFFICE VISIT (OUTPATIENT)
Dept: MEDICAL GROUP | Facility: PHYSICIAN GROUP | Age: 71
End: 2021-06-21
Payer: COMMERCIAL

## 2021-06-21 VITALS
WEIGHT: 156 LBS | DIASTOLIC BLOOD PRESSURE: 72 MMHG | RESPIRATION RATE: 18 BRPM | SYSTOLIC BLOOD PRESSURE: 120 MMHG | OXYGEN SATURATION: 95 % | HEART RATE: 92 BPM | BODY MASS INDEX: 28.71 KG/M2 | HEIGHT: 62 IN | TEMPERATURE: 98.2 F

## 2021-06-21 DIAGNOSIS — Z12.11 SCREENING FOR COLORECTAL CANCER: ICD-10-CM

## 2021-06-21 DIAGNOSIS — Z76.89 ENCOUNTER TO ESTABLISH CARE: ICD-10-CM

## 2021-06-21 DIAGNOSIS — R73.01 ELEVATED FASTING GLUCOSE: ICD-10-CM

## 2021-06-21 DIAGNOSIS — Z12.12 SCREENING FOR COLORECTAL CANCER: ICD-10-CM

## 2021-06-21 DIAGNOSIS — J30.1 NON-SEASONAL ALLERGIC RHINITIS DUE TO POLLEN: ICD-10-CM

## 2021-06-21 DIAGNOSIS — I10 ESSENTIAL HYPERTENSION: ICD-10-CM

## 2021-06-21 DIAGNOSIS — Z85.3 PERSONAL HISTORY OF BREAST CANCER: ICD-10-CM

## 2021-06-21 DIAGNOSIS — J45.40 MODERATE PERSISTENT ASTHMA WITHOUT COMPLICATION: ICD-10-CM

## 2021-06-21 DIAGNOSIS — R92.8 ABNORMAL SCREENING MAMMOGRAM: ICD-10-CM

## 2021-06-21 PROBLEM — M79.89 LOCALIZED SWELLING OF LOWER EXTREMITY: Status: RESOLVED | Noted: 2018-01-05 | Resolved: 2021-06-21

## 2021-06-21 PROCEDURE — 99214 OFFICE O/P EST MOD 30 MIN: CPT | Performed by: NURSE PRACTITIONER

## 2021-06-21 RX ORDER — VERAPAMIL HYDROCHLORIDE 120 MG/1
120 CAPSULE, EXTENDED RELEASE ORAL
Qty: 90 CAPSULE | Refills: 3 | Status: SHIPPED | OUTPATIENT
Start: 2021-06-21 | End: 2022-06-27

## 2021-06-21 RX ORDER — ALBUTEROL SULFATE 90 UG/1
2 AEROSOL, METERED RESPIRATORY (INHALATION) EVERY 4 HOURS PRN
Qty: 8 G | Refills: 5 | Status: SHIPPED | OUTPATIENT
Start: 2021-06-21 | End: 2021-09-01

## 2021-06-21 ASSESSMENT — PATIENT HEALTH QUESTIONNAIRE - PHQ9: CLINICAL INTERPRETATION OF PHQ2 SCORE: 0

## 2021-06-21 NOTE — ASSESSMENT & PLAN NOTE
Chronic medical problem.  She is taking valsartan-hydrochlorothiazide 80-12.5 mg daily and verapamil  mg daily.  She would like a medication refill on her verapamil today.  She does check her blood pressure at home and gets readings ranging 120-130's/70-80's.  She denies any chest pain, shortness of breath, does, blurry vision, or headaches.

## 2021-06-21 NOTE — ASSESSMENT & PLAN NOTE
Chronic medical problem.  She has history of right mastectomy due to cancer.  She is due for updated diagnostic mammogram.   bones(Osteoporosis,prev fx,steroid use,metastatic bone ca/age(85 years old or older)

## 2021-06-21 NOTE — PROGRESS NOTES
Subjective:     CC:  Diagnoses of Encounter to establish care, Essential hypertension, Moderate persistent asthma without complication, Elevated fasting glucose, Non-seasonal allergic rhinitis due to pollen, Personal history of breast cancer, Abnormal screening mammogram, and Screening for colorectal cancer were pertinent to this visit.    HISTORY OF THE PRESENT ILLNESS: Patient is a 71 y.o. female. This pleasant patient is here today to establish care and discuss the following. Her prior PCP was Dr. Everett Johnson.    Essential hypertension  Chronic medical problem.  She is taking valsartan-hydrochlorothiazide 80-12.5 mg daily and verapamil  mg daily.  She would like a medication refill on her verapamil today.  She does check her blood pressure at home and gets readings ranging 120-130's/70-80's.  She denies any chest pain, shortness of breath, does, blurry vision, or headaches.    Moderate persistent asthma without complication  Chronic medical problem.  She is using albuterol PRN inhaler and nebulizer.  And taking Wixela daily.  She denies any chest pain, shortness of breath or cough today.    Personal history of breast cancer  Chronic medical problem.  She has history of right mastectomy due to cancer.  She is due for updated diagnostic mammogram.    Allergic rhinitis  Chronic medical problem.  She uses Flonase as needed.  No acute complaints today.      Allergies: Shellfish allergy    Current Outpatient Medications Ordered in Epic   Medication Sig Dispense Refill   • verapamil ER (CALAN SR) 120 MG CAPSULE SR 24 HR Take 1 capsule by mouth every day. 90 capsule 3   • albuterol 108 (90 Base) MCG/ACT Aero Soln inhalation aerosol Inhale 2 Puffs every four hours as needed for Shortness of Breath. 8 g 5   • valsartan-hydrochlorothiazide (DIOVAN-HCT) 80-12.5 MG per tablet TAKE 1 TABLET BY MOUTH EVERY DAY 90 Tab 3   • acetaminophen (TYLENOL) 325 MG Tab Take 650 mg by mouth every four hours as needed.     • WIXELA  "INHUB 250-50 MCG/DOSE AEROSOL POWDER, BREATH ACTIVATED INHALE 1 PUFF BY MOUTH TWICE DAILY RINSE MOUTH AFTER USE 3 Inhaler 3   • fluticasone (FLONASE) 50 MCG/ACT nasal spray      • albuterol (PROVENTIL) 2.5mg/3ml Nebu Soln solution for nebulization      • Calcium Carbonate-Vit D-Min (CALTRATE PLUS PO) Take 1 Tab by mouth every day.     • aspirin (ASA) 81 MG CHEW Take 81 mg by mouth every day.       No current [facility]-ordered facility-administered medications on file.       Past Medical History:   Diagnosis Date   • ASTHMA    • History of breast cancer 2015   • Hypertension    • Shoulder pain, right 2015   • Tremor 2015       Past Surgical History:   Procedure Laterality Date   • GYN SURGERY       x1   • MASTECTOMY      right        Social History     Tobacco Use   • Smoking status: Never Smoker   • Smokeless tobacco: Never Used   Vaping Use   • Vaping Use: Never used   Substance Use Topics   • Alcohol use: No   • Drug use: No       Social History     Social History Narrative   • Not on file       Family History   Problem Relation Age of Onset   • Hyperlipidemia Mother    • No Known Problems Father    • No Known Problems Sister    • No Known Problems Brother    • No Known Problems Daughter    • No Known Problems Daughter    • No Known Problems Son        Health Maintenance: Due for mammogram and colon cancer screening.    ROS:   Gen: no fevers/chills, no changes in weight  Eyes: no changes in vision  ENT: no sore throat  Pulm: no shortness of breath, no cough  CV: no chest pain, no palpitations  GI: no nausea/vomiting, no diarrhea  : no dysuria  MSk: no myalgias  Skin: no rash  Neuro: no headaches, no dizziness      Objective:     Vital signs reviewed  Exam: /72 (BP Location: Right arm, Patient Position: Sitting, BP Cuff Size: Adult)   Pulse 92   Temp 36.8 °C (98.2 °F) (Temporal)   Resp 18   Ht 1.562 m (5' 1.5\")   Wt 70.8 kg (156 lb)   SpO2 95%  Body mass index is 29 " kg/m².    General: Normal appearing. No distress.  Eyes: Eyes conjunctiva clear lids without ptosis, lids normal.  Neck: Supple without JVD. Thyroid is not enlarged.  Pulmonary: Clear to ausculation.  Normal effort. No rales, ronchi, or wheezing.  Cardiovascular: Regular rate and rhythm without murmur. Radial pulses are intact and equal bilaterally.  Abdomen: Soft,nondistended.   Neurologic: Grossly nonfocal  Lymph: No cervical or supraclavicular lymph nodes are palpable  Skin: Warm and dry.  No obvious lesions.  Musculoskeletal: Normal gait. No extremity cyanosis, clubbing, or edema.  Psych: Normal mood and affect. Alert and oriented x3. Judgment and insight is normal.      Assessment & Plan:   71 y.o. female with the following -    1. Encounter to establish care  Acute uncomplicated problem.  Care established today.    2. Essential hypertension  Chronic stable problem.  Blood pressure is at goal today.  She will continue with her verapamil and valsartan-hydrochlorothiazide.  She will continue with home blood pressure monitoring.  She is due for updated labs.  Orders placed.  Red flags discussed.  - verapamil ER (CALAN SR) 120 MG CAPSULE SR 24 HR; Take 1 capsule by mouth every day.  Dispense: 90 capsule; Refill: 3  - CBC WITH DIFFERENTIAL; Future  - Comp Metabolic Panel; Future  - Lipid Profile; Future    3. Moderate persistent asthma without complication  Chronic stable problem.  She will continue with her Wixela and albuterol as needed.  Albuterol refilled today.  No acute complaints today.  - albuterol 108 (90 Base) MCG/ACT Aero Soln inhalation aerosol; Inhale 2 Puffs every four hours as needed for Shortness of Breath.  Dispense: 8 g; Refill: 5    4. Elevated fasting glucose  Chronic stable problem.  She is due for updated labs.  Orders placed.  - HEMOGLOBIN A1C; Future    5. Non-seasonal allergic rhinitis due to pollen  Chronic stable problem.  Should continue with Flonase as needed.  No acute complaints  today.    6. Personal history of breast cancer  Chronic stable problem.  See #7 below    7. Abnormal screening mammogram  Chronic stable problem.  Her last mammogram was 6/10/2020 recommended a follow-up diagnostic mammogram for left breast asymmetry, she did not complete.  She is requesting new orders.  Orders placed today.  - MA-DIAGNOSTIC MAMMO BILAT W/TOMOSYNTHESIS W/CAD; Future    8. Screening for colorectal cancer  Chronic stable problem.  Screening indicated.  Patient is in agreement.  Orders placed.  - REFERRAL TO GI FOR COLONOSCOPY      Return in about 1 week (around 6/28/2021).    Educated in proper administration of medication(s) ordered today including safety, possible SE, risks, benefits, rationale and alternatives to therapy.     Please note that this dictation was created using voice recognition software. I have made every reasonable attempt to correct obvious errors, but I expect that there are errors of grammar and possibly content that I did not discover before finalizing the note.

## 2021-06-21 NOTE — ASSESSMENT & PLAN NOTE
Chronic medical problem.  She is using albuterol PRN inhaler and nebulizer.  And taking Wixela daily.  She denies any chest pain, shortness of breath or cough today.

## 2021-07-06 ENCOUNTER — TELEPHONE (OUTPATIENT)
Dept: MEDICAL GROUP | Facility: PHYSICIAN GROUP | Age: 71
End: 2021-07-06

## 2021-07-06 NOTE — TELEPHONE ENCOUNTER
Phone Number Called: [phone] (home)     Call outcome: Did not leave a detailed message. Requested patient to call back.    Message: No Show Policy was not reviewed    Serenity Howe No Showed a established apt on 7/2/21 with BRITNEY Davis.     YES  -1 Serenity Howe was reminded of appointment.

## 2021-07-08 ENCOUNTER — HOSPITAL ENCOUNTER (OUTPATIENT)
Dept: RADIOLOGY | Facility: MEDICAL CENTER | Age: 71
End: 2021-07-08
Attending: NURSE PRACTITIONER
Payer: COMMERCIAL

## 2021-07-08 DIAGNOSIS — R92.8 ABNORMAL SCREENING MAMMOGRAM: ICD-10-CM

## 2021-07-08 PROCEDURE — 76642 ULTRASOUND BREAST LIMITED: CPT | Mod: LT

## 2021-07-08 PROCEDURE — G0279 TOMOSYNTHESIS, MAMMO: HCPCS

## 2021-07-23 ENCOUNTER — HOSPITAL ENCOUNTER (OUTPATIENT)
Dept: LAB | Facility: MEDICAL CENTER | Age: 71
End: 2021-07-23
Attending: NURSE PRACTITIONER
Payer: COMMERCIAL

## 2021-07-23 DIAGNOSIS — R73.01 ELEVATED FASTING GLUCOSE: ICD-10-CM

## 2021-07-23 DIAGNOSIS — I10 ESSENTIAL HYPERTENSION: ICD-10-CM

## 2021-07-23 LAB
ALBUMIN SERPL BCP-MCNC: 3.8 G/DL (ref 3.2–4.9)
ALBUMIN/GLOB SERPL: 1 G/DL
ALP SERPL-CCNC: 70 U/L (ref 30–99)
ALT SERPL-CCNC: 16 U/L (ref 2–50)
ANION GAP SERPL CALC-SCNC: 10 MMOL/L (ref 7–16)
AST SERPL-CCNC: 23 U/L (ref 12–45)
BASOPHILS # BLD AUTO: 0.9 % (ref 0–1.8)
BASOPHILS # BLD: 0.06 K/UL (ref 0–0.12)
BILIRUB SERPL-MCNC: 0.6 MG/DL (ref 0.1–1.5)
BUN SERPL-MCNC: 23 MG/DL (ref 8–22)
CALCIUM SERPL-MCNC: 9.9 MG/DL (ref 8.5–10.5)
CHLORIDE SERPL-SCNC: 110 MMOL/L (ref 96–112)
CHOLEST SERPL-MCNC: 157 MG/DL (ref 100–199)
CO2 SERPL-SCNC: 24 MMOL/L (ref 20–33)
CREAT SERPL-MCNC: 1.04 MG/DL (ref 0.5–1.4)
EOSINOPHIL # BLD AUTO: 0.24 K/UL (ref 0–0.51)
EOSINOPHIL NFR BLD: 3.7 % (ref 0–6.9)
ERYTHROCYTE [DISTWIDTH] IN BLOOD BY AUTOMATED COUNT: 45.7 FL (ref 35.9–50)
EST. AVERAGE GLUCOSE BLD GHB EST-MCNC: 128 MG/DL
GLOBULIN SER CALC-MCNC: 3.7 G/DL (ref 1.9–3.5)
GLUCOSE SERPL-MCNC: 99 MG/DL (ref 65–99)
HBA1C MFR BLD: 6.1 % (ref 4–5.6)
HCT VFR BLD AUTO: 43.9 % (ref 37–47)
HDLC SERPL-MCNC: 51 MG/DL
HGB BLD-MCNC: 14.9 G/DL (ref 12–16)
IMM GRANULOCYTES # BLD AUTO: 0.01 K/UL (ref 0–0.11)
IMM GRANULOCYTES NFR BLD AUTO: 0.2 % (ref 0–0.9)
LDLC SERPL CALC-MCNC: 86 MG/DL
LYMPHOCYTES # BLD AUTO: 2.2 K/UL (ref 1–4.8)
LYMPHOCYTES NFR BLD: 34 % (ref 22–41)
MCH RBC QN AUTO: 33.3 PG (ref 27–33)
MCHC RBC AUTO-ENTMCNC: 33.9 G/DL (ref 33.6–35)
MCV RBC AUTO: 98 FL (ref 81.4–97.8)
MONOCYTES # BLD AUTO: 0.7 K/UL (ref 0–0.85)
MONOCYTES NFR BLD AUTO: 10.8 % (ref 0–13.4)
NEUTROPHILS # BLD AUTO: 3.27 K/UL (ref 2–7.15)
NEUTROPHILS NFR BLD: 50.4 % (ref 44–72)
NRBC # BLD AUTO: 0 K/UL
NRBC BLD-RTO: 0 /100 WBC
PLATELET # BLD AUTO: 242 K/UL (ref 164–446)
PMV BLD AUTO: 10.2 FL (ref 9–12.9)
POTASSIUM SERPL-SCNC: 3.9 MMOL/L (ref 3.6–5.5)
PROT SERPL-MCNC: 7.5 G/DL (ref 6–8.2)
RBC # BLD AUTO: 4.48 M/UL (ref 4.2–5.4)
SODIUM SERPL-SCNC: 144 MMOL/L (ref 135–145)
TRIGL SERPL-MCNC: 101 MG/DL (ref 0–149)
WBC # BLD AUTO: 6.5 K/UL (ref 4.8–10.8)

## 2021-07-23 PROCEDURE — 83036 HEMOGLOBIN GLYCOSYLATED A1C: CPT | Mod: GA

## 2021-07-23 PROCEDURE — 80061 LIPID PANEL: CPT

## 2021-07-23 PROCEDURE — 80053 COMPREHEN METABOLIC PANEL: CPT

## 2021-07-23 PROCEDURE — 85025 COMPLETE CBC W/AUTO DIFF WBC: CPT

## 2021-07-23 PROCEDURE — 36415 COLL VENOUS BLD VENIPUNCTURE: CPT | Mod: GA

## 2021-07-26 ENCOUNTER — OFFICE VISIT (OUTPATIENT)
Dept: MEDICAL GROUP | Facility: PHYSICIAN GROUP | Age: 71
End: 2021-07-26
Payer: COMMERCIAL

## 2021-07-26 VITALS
WEIGHT: 156 LBS | TEMPERATURE: 98.2 F | HEART RATE: 86 BPM | BODY MASS INDEX: 28.71 KG/M2 | DIASTOLIC BLOOD PRESSURE: 80 MMHG | HEIGHT: 62 IN | SYSTOLIC BLOOD PRESSURE: 138 MMHG | RESPIRATION RATE: 18 BRPM | OXYGEN SATURATION: 95 %

## 2021-07-26 DIAGNOSIS — R73.03 PRE-DIABETES: ICD-10-CM

## 2021-07-26 DIAGNOSIS — I10 ESSENTIAL HYPERTENSION: ICD-10-CM

## 2021-07-26 DIAGNOSIS — J45.40 MODERATE PERSISTENT ASTHMA WITHOUT COMPLICATION: ICD-10-CM

## 2021-07-26 DIAGNOSIS — R92.8 ABNORMAL MAMMOGRAM OF LEFT BREAST: ICD-10-CM

## 2021-07-26 DIAGNOSIS — N18.31 STAGE 3A CHRONIC KIDNEY DISEASE: ICD-10-CM

## 2021-07-26 PROCEDURE — 99214 OFFICE O/P EST MOD 30 MIN: CPT | Performed by: NURSE PRACTITIONER

## 2021-07-26 RX ORDER — ALBUTEROL SULFATE 2.5 MG/3ML
2.5 SOLUTION RESPIRATORY (INHALATION) EVERY 6 HOURS PRN
Qty: 60 ML | Refills: 2 | Status: SHIPPED | OUTPATIENT
Start: 2021-07-26 | End: 2021-09-01

## 2021-07-26 ASSESSMENT — FIBROSIS 4 INDEX: FIB4 SCORE: 1.71

## 2021-07-26 NOTE — ASSESSMENT & PLAN NOTE
Chronic medical problem.  She takes naproxen as needed for her arthritis.  Her blood pressure is at goal today.  She is on valsartan-hydrochlorothiazide and verapamil.  She had recent labs that she would like to review today.  Last lab results:  Results for STEPHEN BETANCUR (MRN [id number]) as of 7/26/2021 09:29   Ref. Range 7/23/2021 08:38   Bun Latest Ref Range: 8 - 22 mg/dL 23 (H)   Creatinine Latest Ref Range: 0.50 - 1.40 mg/dL 1.04   GFR If  Latest Ref Range: >60 mL/min/1.73 m 2 >60   GFR If Non  Latest Ref Range: >60 mL/min/1.73 m 2 52 (A)

## 2021-07-26 NOTE — ASSESSMENT & PLAN NOTE
Chronic medical problem.  Her blood pressure is at goal today.  She is taking verapamil ER milligrams daily and valsartan-hydrochlorothiazide 80-12.5 mg daily.  She had recent labs that she like to review today.  She denies any chest pain, shortness of breath, dizziness, blurry vision, or headaches.

## 2021-07-26 NOTE — PROGRESS NOTES
Subjective:     CC: Lab results and medication refill    HPI:   Stephen presents today with the following:    Pre-diabetes  Chronic medical problem. She had recent labs that she would like to review today. Last lab results:  Results for STEPHEN BETANCUR (MRN [id number]) as of 7/26/2021 09:12   Ref. Range 7/23/2021 08:38   Glycohemoglobin Latest Ref Range: 4.0 - 5.6 % 6.1 (H)   Estim. Avg Glu Latest Units: mg/dL 128       Stage 3a chronic kidney disease (HCC)  Chronic medical problem.  She takes naproxen as needed for her arthritis.  Her blood pressure is at goal today.  She is on valsartan-hydrochlorothiazide and verapamil.  She had recent labs that she would like to review today.  Last lab results:  Results for STEPHEN BETANCUR (MRN [id number]) as of 7/26/2021 09:29   Ref. Range 7/23/2021 08:38   Bun Latest Ref Range: 8 - 22 mg/dL 23 (H)   Creatinine Latest Ref Range: 0.50 - 1.40 mg/dL 1.04   GFR If  Latest Ref Range: >60 mL/min/1.73 m 2 >60   GFR If Non  Latest Ref Range: >60 mL/min/1.73 m 2 52 (A)       Moderate persistent asthma without complication  Chronic medical problem.  She is taking wixela daily.  She uses her albuterol inhaler and albuterol nebulizer as needed.  She would like a medication refill on her nebulizer today.  Denies any chest pain, shortness of breath or cough today.    Essential hypertension  Chronic medical problem.  Her blood pressure is at goal today.  She is taking verapamil ER milligrams daily and valsartan-hydrochlorothiazide 80-12.5 mg daily.  She had recent labs that she like to review today.  She denies any chest pain, shortness of breath, dizziness, blurry vision, or headaches.    Abnormal mammogram of left breast  Chronic medical problem.  She had recent mammogram and ultrasound of her left breast that she would like to review today.    IMPRESSION:     Probably benign-appearing well-defined low level echo containing left retroareolar masses  "which correspond to stable mammographic findings left breast. Six-month follow-up ultrasound reassessment is recommended.      Past Medical History:   Diagnosis Date   • ASTHMA    • History of breast cancer 7/29/2015   • Hypertension    • Shoulder pain, right 7/29/2015   • Tremor 7/29/2015       Social History     Tobacco Use   • Smoking status: Never Smoker   • Smokeless tobacco: Never Used   Vaping Use   • Vaping Use: Never used   Substance Use Topics   • Alcohol use: No   • Drug use: No       Current Outpatient Medications Ordered in Epic   Medication Sig Dispense Refill   • albuterol (PROVENTIL) 2.5mg/3ml Nebu Soln solution for nebulization Take 3 mL by nebulization every 6 hours as needed for Shortness of Breath. 60 mL 2   • verapamil ER (CALAN SR) 120 MG CAPSULE SR 24 HR Take 1 capsule by mouth every day. 90 capsule 3   • albuterol 108 (90 Base) MCG/ACT Aero Soln inhalation aerosol Inhale 2 Puffs every four hours as needed for Shortness of Breath. 8 g 5   • valsartan-hydrochlorothiazide (DIOVAN-HCT) 80-12.5 MG per tablet TAKE 1 TABLET BY MOUTH EVERY DAY 90 Tab 3   • acetaminophen (TYLENOL) 325 MG Tab Take 650 mg by mouth every four hours as needed.     • WIXELA INHUB 250-50 MCG/DOSE AEROSOL POWDER, BREATH ACTIVATED INHALE 1 PUFF BY MOUTH TWICE DAILY RINSE MOUTH AFTER USE 3 Inhaler 3   • fluticasone (FLONASE) 50 MCG/ACT nasal spray      • Calcium Carbonate-Vit D-Min (CALTRATE PLUS PO) Take 1 Tab by mouth every day.     • aspirin (ASA) 81 MG CHEW Take 81 mg by mouth every day.       No current [facility]-ordered facility-administered medications on file.       Allergies:  Shellfish allergy    Health Maintenance: Due for pulmonary function test    ROS:  Per HPI    Objective:     Vital signs reviewed  Exam:  /80 (BP Location: Left arm, Patient Position: Sitting, BP Cuff Size: Adult)   Pulse 86   Temp 36.8 °C (98.2 °F) (Temporal)   Resp 18   Ht 1.562 m (5' 1.5\")   Wt 70.8 kg (156 lb)   SpO2 95%   BMI 29.00 " kg/m²  Body mass index is 29 kg/m².    Gen: Alert and oriented, No apparent distress.  Neck: Neck is supple without lymphadenopathy.  Lungs: Normal effort, CTA bilaterally, no wheezes, rhonchi, or rales  CV: Regular rate and rhythm. No murmurs, rubs, or gallops.  Ext: No clubbing, cyanosis, edema.      Assessment & Plan:     71 y.o. female with the following -     1. Moderate persistent asthma without complication  Chronic stable problem.  She will continue with her Wixela, albuterol inhaler and albuterol nebulizer as needed.  No acute complaints today.  Medication refilled today.  - albuterol (PROVENTIL) 2.5mg/3ml Nebu Soln solution for nebulization; Take 3 mL by nebulization every 6 hours as needed for Shortness of Breath.  Dispense: 60 mL; Refill: 2    2. Essential hypertension  Chronic stable problem.  Her blood pressure is at goal.  She will continue with her verapamil and valsartan-hydrochlorothiazide.  Red flags discussed.    3. Pre-diabetes  Chronic stable problem.  Discussed and reviewed her recent lab results.  Encourage diet and lifestyle applications.    4. Stage 3a chronic kidney disease (HCC)  Chronic stable problem.  Discussed limiting her NSAIDs intake.  We discussed that she is having arthritis pain to try Tylenol arthritis.  She verbalized understanding.  We will check updated labs around January 2022.  - Basic Metabolic Panel; Future    5. Abnormal mammogram of left breast  Chronic stable problem.  She will be due for follow-up ultrasound around January 2022.  Order placed today.  - US-BREAST LIMITED-LEFT; Future      Return in about 6 months (around 1/26/2022).    Please note that this dictation was created using voice recognition software. I have made every reasonable attempt to correct obvious errors, but I expect that there are errors of grammar and possibly content that I did not discover before finalizing the note.

## 2021-07-26 NOTE — ASSESSMENT & PLAN NOTE
Chronic medical problem.  She is taking wixela daily.  She uses her albuterol inhaler and albuterol nebulizer as needed.  She would like a medication refill on her nebulizer today.  Denies any chest pain, shortness of breath or cough today.

## 2021-07-26 NOTE — ASSESSMENT & PLAN NOTE
Chronic medical problem.  She had recent mammogram and ultrasound of her left breast that she would like to review today.    IMPRESSION:     Probably benign-appearing well-defined low level echo containing left retroareolar masses which correspond to stable mammographic findings left breast. Six-month follow-up ultrasound reassessment is recommended.

## 2021-07-26 NOTE — ASSESSMENT & PLAN NOTE
Chronic medical problem. She had recent labs that she would like to review today. Last lab results:  Results for STEPHEN BETANCUR (MRN [id number]) as of 7/26/2021 09:12   Ref. Range 7/23/2021 08:38   Glycohemoglobin Latest Ref Range: 4.0 - 5.6 % 6.1 (H)   Estim. Avg Glu Latest Units: mg/dL 128

## 2021-08-01 ENCOUNTER — APPOINTMENT (OUTPATIENT)
Dept: RADIOLOGY | Facility: MEDICAL CENTER | Age: 71
End: 2021-08-01
Attending: EMERGENCY MEDICINE
Payer: COMMERCIAL

## 2021-08-01 ENCOUNTER — HOSPITAL ENCOUNTER (EMERGENCY)
Facility: MEDICAL CENTER | Age: 71
End: 2021-08-01
Attending: EMERGENCY MEDICINE
Payer: COMMERCIAL

## 2021-08-01 VITALS
WEIGHT: 159.83 LBS | BODY MASS INDEX: 29.41 KG/M2 | RESPIRATION RATE: 16 BRPM | DIASTOLIC BLOOD PRESSURE: 83 MMHG | TEMPERATURE: 98.6 F | SYSTOLIC BLOOD PRESSURE: 165 MMHG | OXYGEN SATURATION: 95 % | HEART RATE: 73 BPM | HEIGHT: 62 IN

## 2021-08-01 DIAGNOSIS — M54.50 LUMBAR BACK PAIN: ICD-10-CM

## 2021-08-01 PROCEDURE — 99283 EMERGENCY DEPT VISIT LOW MDM: CPT

## 2021-08-01 PROCEDURE — 72131 CT LUMBAR SPINE W/O DYE: CPT

## 2021-08-01 PROCEDURE — A9270 NON-COVERED ITEM OR SERVICE: HCPCS | Performed by: EMERGENCY MEDICINE

## 2021-08-01 PROCEDURE — 700102 HCHG RX REV CODE 250 W/ 637 OVERRIDE(OP): Performed by: EMERGENCY MEDICINE

## 2021-08-01 RX ORDER — HYDROCODONE BITARTRATE AND ACETAMINOPHEN 5; 325 MG/1; MG/1
0.5 TABLET ORAL EVERY 6 HOURS PRN
Qty: 10 TABLET | Refills: 0 | Status: SHIPPED | OUTPATIENT
Start: 2021-08-01 | End: 2021-08-06

## 2021-08-01 RX ORDER — HYDROCODONE BITARTRATE AND ACETAMINOPHEN 5; 325 MG/1; MG/1
1 TABLET ORAL ONCE
Status: COMPLETED | OUTPATIENT
Start: 2021-08-01 | End: 2021-08-01

## 2021-08-01 RX ADMIN — HYDROCODONE BITARTRATE AND ACETAMINOPHEN 1 TABLET: 5; 325 TABLET ORAL at 17:18

## 2021-08-01 ASSESSMENT — ENCOUNTER SYMPTOMS
NAUSEA: 0
ABDOMINAL PAIN: 0
BACK PAIN: 1
FEVER: 0
CHILLS: 0
VOMITING: 0

## 2021-08-01 ASSESSMENT — FIBROSIS 4 INDEX: FIB4 SCORE: 1.71

## 2021-08-01 NOTE — ED PROVIDER NOTES
ED Provider Note    Scribed for Mathew Hassan M.D. by Darrius Potter. 8/1/2021, 4:01 PM.    Primary care provider: BRITNEY Davis  Means of arrival: Walk-in  History obtained from: Patient  History limited by: None    CHIEF COMPLAINT  Chief Complaint   Patient presents with   • Low Back Pain     L lower back X 4 days, denies trauma, unknown cause, non-radiating, told she has sciatica 'months ago'. Pt is ambulatory.        HPI  Serenity Howe is a 71 y.o. female who presents to the Emergency Department for evaluation of lower back pain onset 4 days. She says she has had no recent events of trauma that could've caused her pain. She notes that she took extra strength tylenol with mild alleviation. She notes that walking exacerbates her pain. She also notes that laying down alleviates her pain. She says her pain does not radiate down her leg. No abdominal pain, nausea, vomiting, fever, chills, or urinary or bowel changes.    REVIEW OF SYSTEMS  Review of Systems   Constitutional: Negative for chills and fever.   Gastrointestinal: Negative for abdominal pain, nausea and vomiting.   Musculoskeletal: Positive for back pain.       PAST MEDICAL HISTORY   has a past medical history of ASTHMA, History of breast cancer (7/29/2015), Hypertension, Shoulder pain, right (7/29/2015), and Tremor (7/29/2015).    SURGICAL HISTORY   has a past surgical history that includes mastectomy and gyn surgery.    SOCIAL HISTORY  Social History     Tobacco Use   • Smoking status: Never Smoker   • Smokeless tobacco: Never Used   Vaping Use   • Vaping Use: Never used   Substance Use Topics   • Alcohol use: No   • Drug use: No      Social History     Substance and Sexual Activity   Drug Use No       FAMILY HISTORY  Family History   Problem Relation Age of Onset   • Hyperlipidemia Mother    • No Known Problems Father    • No Known Problems Sister    • No Known Problems Brother    • No Known Problems Daughter    • No Known  "Problems Daughter    • No Known Problems Son        CURRENT MEDICATIONS  Home Medications     Reviewed by Linwood Nieves R.N. (Registered Nurse) on 08/01/21 at 1450  Med List Status: <None>   Medication Last Dose Status   acetaminophen (TYLENOL) 325 MG Tab  Active   albuterol (PROVENTIL) 2.5mg/3ml Nebu Soln solution for nebulization  Active   albuterol 108 (90 Base) MCG/ACT Aero Soln inhalation aerosol  Active   aspirin (ASA) 81 MG CHEW  Active   Calcium Carbonate-Vit D-Min (CALTRATE PLUS PO)  Active   fluticasone (FLONASE) 50 MCG/ACT nasal spray  Active   valsartan-hydrochlorothiazide (DIOVAN-HCT) 80-12.5 MG per tablet  Active   verapamil ER (CALAN SR) 120 MG CAPSULE SR 24 HR  Active   WIXELA INHUB 250-50 MCG/DOSE AEROSOL POWDER, BREATH ACTIVATED  Active                ALLERGIES  Allergies   Allergen Reactions   • Shellfish Allergy Hives       PHYSICAL EXAM  VITAL SIGNS: /80   Pulse 80   Temp 37 °C (98.6 °F) (Temporal)   Resp 16   Ht 1.575 m (5' 2\")   Wt 72.5 kg (159 lb 13.3 oz)   SpO2 96%   BMI 29.23 kg/m²     Constitutional:  No acute distress  HENT: Moist mucous membranes  Eyes: No conjunctivitis or icterus  Neck: trachea is midline, no palpable thyroid  Lymphatic: No cervical lymphadenopathy  Cardiovascular: Regular rate and rhythm, no murmurs  Thorax & Lungs: Normal breath sounds, no rhonchi  Abdomen: Soft, Non-tender  Skin:. no rash  Back: Non-tender, no CVA tenderness . Palpation of the lumbar shows tenderness. Hurts patient to move and sit up in the left upper glute.  Extremities:  no edema  Vascular: symmetric radial pulse  Neurologic: Normal gross motor. Normal dorsi and plantar flexion. Sensation intact.    RADIOLOGY  CT-LSPINE W/O PLUS RECONS   Final Result      1.  Multilevel degenerative disc disease and facet arthropathy as described.      2.  No acute fracture is identified.      3.  Grade 1 spondylolisthesis at L4-5.      4.  Diverticulosis.      5.  Uterine leiomyoma.      6.  Right " nephrolithiasis.        The radiologist's interpretation of all radiological studies have been reviewed by me.    COURSE & MEDICAL DECISION MAKING  Pertinent Labs & Imaging studies reviewed. (See chart for details)    4:01 PM - Patient seen and examined at bedside. Patient will be treated with Norco 1 tablet. Ordered CT-LSpine w/o  to evaluate her symptoms. The differential diagnoses include but are not limited to: Compression fracture    5:31 PM - Patient was reevaluated at bedside. Patient reports feeling improved. Discussed radiology results with the patient and informed them that they are reassuring. Discussed discharge instructions and return precautions with the patient and they were cleared for discharge. Patient was given the opportunity to ask any further questions. She is comfortable with discharge at this time.    Medical Decision Making:   The patient's neurologic exam is intact she has spondylolisthesis and degenerative joint disease.  I do not think further evaluation is needed at this time.  She does need follow-up and I referred her back to her primary care physician.  She was given hydrocodone with improvement I am to give her 10 prescription to take a half at a time.  She is given return precautions    I reviewed prescription monitoring program for patient's narcotic use before prescribing a scheduled drug.The patient will not drink alcohol nor drive with prescribed medications. The patient will return for new or worsening symptoms and is stable at the time of discharge.    The patient is referred to a primary physician for blood pressure management, diabetic screening, and for all other preventative health concerns.    In prescribing controlled substances to this patient, I certify that I have obtained and reviewed the medical history of Serenity Howe. I have also made a good rosario effort to obtain applicable records from other providers who have treated the patient and records did not  demonstrate any increased risk of substance abuse that would prevent me from prescribing controlled substances.     I have conducted a physical exam and documented it. I have reviewed Ms. Howe’s prescription history as maintained by the [place] Prescription Monitoring Program.     I have assessed the patient’s risk for abuse, dependency, and addiction using the validated Opioid Risk Tool available at https://www.mdcalc.com/patqlk-ghbv-elxq-ort-narcotic-abuse.     Given the above, I believe the benefits of controlled substance therapy outweigh the risks. The reasons for prescribing controlled substances include non-narcotic, oral analgesic alternatives have been inadequate for pain control. Accordingly, I have discussed the risk and benefits, treatment plan, and alternative therapies with the patient.       DISPOSITION:  Patient will be discharged home in stable condition.    FOLLOW UP:  Yun Allred A.P.R.NKarin  [address] [address]  [phone]            OUTPATIENT MEDICATIONS:  New Prescriptions    HYDROCODONE-ACETAMINOPHEN (NORCO) 5-325 MG TAB PER TABLET    Take 0.5 Tablets by mouth every 6 hours as needed for up to 5 days.       FINAL IMPRESSION  1. Lumbar back pain          Darrius ZIMMERMAN ([place]), am scribing for, and in the presence of, Mathew Hassan M.D..    Electronically signed by: Darrius Potter ([place]), 8/1/2021    Mathew ZIMMERMAN M.D. personally performed the services described in this documentation, as scribed by Darrius Potter in my presence, and it is both accurate and complete.    The note accurately reflects work and decisions made by me.  Mathew Hassan M.D.  8/1/2021  5:41 PM     C

## 2021-08-01 NOTE — ED TRIAGE NOTES
"Chief Complaint   Patient presents with   • Low Back Pain     L lower back X 4 days, denies trauma, unknown cause, non-radiating, told she has sciatica 'months ago'. Pt is ambulatory.      Pt ambulatory to triage for above complaints, VSS on RA, GCS 15, NAD. Pt taking tylenol for pain with minimal relief.    Denies all s/sx of covid, denies recent travel, denies fevers.    Pt returned to lobby. Educated on triage process and to inform staff of any changes.     /80   Pulse 80   Temp 37 °C (98.6 °F) (Temporal)   Resp 16   Ht 1.575 m (5' 2\")   Wt 72.5 kg (159 lb 13.3 oz)   SpO2 96%   BMI 29.23 kg/m²      "

## 2021-08-20 ENCOUNTER — OFFICE VISIT (OUTPATIENT)
Dept: URGENT CARE | Facility: PHYSICIAN GROUP | Age: 71
End: 2021-08-20
Payer: COMMERCIAL

## 2021-08-20 ENCOUNTER — HOSPITAL ENCOUNTER (OUTPATIENT)
Dept: RADIOLOGY | Facility: MEDICAL CENTER | Age: 71
End: 2021-08-20
Attending: FAMILY MEDICINE
Payer: COMMERCIAL

## 2021-08-20 VITALS
HEIGHT: 62 IN | SYSTOLIC BLOOD PRESSURE: 132 MMHG | TEMPERATURE: 97.6 F | WEIGHT: 169 LBS | HEART RATE: 74 BPM | RESPIRATION RATE: 16 BRPM | BODY MASS INDEX: 31.1 KG/M2 | OXYGEN SATURATION: 98 % | DIASTOLIC BLOOD PRESSURE: 84 MMHG

## 2021-08-20 DIAGNOSIS — M54.50 ACUTE LEFT-SIDED LOW BACK PAIN, UNSPECIFIED WHETHER SCIATICA PRESENT: ICD-10-CM

## 2021-08-20 DIAGNOSIS — M25.511 ACUTE PAIN OF RIGHT SHOULDER: ICD-10-CM

## 2021-08-20 PROCEDURE — 73030 X-RAY EXAM OF SHOULDER: CPT | Mod: RT

## 2021-08-20 PROCEDURE — 99214 OFFICE O/P EST MOD 30 MIN: CPT | Performed by: FAMILY MEDICINE

## 2021-08-20 RX ORDER — CYCLOBENZAPRINE HCL 5 MG
5 TABLET ORAL 3 TIMES DAILY PRN
Qty: 20 TABLET | Refills: 0 | Status: SHIPPED | OUTPATIENT
Start: 2021-08-20 | End: 2022-03-02

## 2021-08-20 ASSESSMENT — ENCOUNTER SYMPTOMS
EYE DISCHARGE: 0
WEIGHT LOSS: 0
VOMITING: 0
NAUSEA: 0
EYE REDNESS: 0
MYALGIAS: 0

## 2021-08-20 ASSESSMENT — FIBROSIS 4 INDEX: FIB4 SCORE: 1.71

## 2021-08-20 NOTE — PROGRESS NOTES
"Saroj Howe is a 71 y.o. female who presents with Shoulder Pain (right, getting worse, no injury, constant pain, x1 day) and Back Pain (left, lower back, hurts to move,  x1 week)            Onset yesterday right shoulder pain. Severe with abduction. No trauma or clear trigger. No prior injury or surgery. No neck pain. 1 week mild left low back pain. No injury. Intermittent radiation to left leg. No weakness. No fever. No myelopathy. PMH breast cancer/negative surveillance for 15 years. No unwanted loss. No other aggravating or alleviating factors.        Review of Systems   Constitutional: Negative for malaise/fatigue and weight loss.   Eyes: Negative for discharge and redness.   Gastrointestinal: Negative for nausea and vomiting.   Musculoskeletal: Negative for joint pain and myalgias.   Skin: Negative for itching and rash.              Objective     /84   Pulse 74   Temp 36.4 °C (97.6 °F) (Temporal)   Resp 16   Ht 1.575 m (5' 2\")   Wt 76.7 kg (169 lb)   SpO2 98%   BMI 30.91 kg/m²      Physical Exam  Constitutional:       Appearance: Normal appearance.   Musculoskeletal:      Comments: Right shoulder tender posterior aspect.  No deformity.  Pain reproduced with movement in all planes.  No weakness.  Distal neurovascular intact    Back: Tender left paralumbar and SI region.  There is associated spasm.  Range of motion is intact.  No point bony tenderness or step-off.   Skin:     General: Skin is warm and dry.   Neurological:      Mental Status: She is alert.      Deep Tendon Reflexes: Reflexes normal.      Comments: Bilateral lower extremity strength and sensory intact.  Negative straight leg raise.                               Assessment & Plan      Right shoulder x-ray per radiology:  1.  Minor degenerative change of the right AC joint.     2.  Otherwise negative right shoulder series.          1. Acute pain of right shoulder  DX-SHOULDER 2+ RIGHT   2. Acute left-sided low " back pain, unspecified whether sciatica present  cyclobenzaprine (FLEXERIL) 5 mg tablet     Differential diagnosis, natural history, supportive care, and indications for immediate follow-up discussed at length.     Ice and Tylenol as needed.  Work note given.  Follow-up with primary care.

## 2021-08-20 NOTE — LETTER
August 20, 2021         Patient: Serenity Howe   YOB: 1950   Date of Visit: 8/20/2021           To Whom it May Concern:    Serenity Howe was seen in my clinic on 8/20/2021. Please excuse from work 8/20 through 8/24/2021.       Sincerely,           Joaquim Meade M.D.  Electronically Signed

## 2021-09-01 ENCOUNTER — APPOINTMENT (OUTPATIENT)
Dept: RADIOLOGY | Facility: MEDICAL CENTER | Age: 71
End: 2021-09-01
Attending: EMERGENCY MEDICINE
Payer: COMMERCIAL

## 2021-09-01 ENCOUNTER — HOSPITAL ENCOUNTER (EMERGENCY)
Facility: MEDICAL CENTER | Age: 71
End: 2021-09-01
Attending: EMERGENCY MEDICINE
Payer: COMMERCIAL

## 2021-09-01 VITALS
DIASTOLIC BLOOD PRESSURE: 76 MMHG | RESPIRATION RATE: 18 BRPM | BODY MASS INDEX: 29.09 KG/M2 | HEART RATE: 90 BPM | HEIGHT: 62 IN | SYSTOLIC BLOOD PRESSURE: 131 MMHG | TEMPERATURE: 97 F | OXYGEN SATURATION: 93 % | WEIGHT: 158.07 LBS

## 2021-09-01 DIAGNOSIS — J45.41 MODERATE PERSISTENT ASTHMA WITH ACUTE EXACERBATION: ICD-10-CM

## 2021-09-01 LAB
SARS-COV-2 RNA RESP QL NAA+PROBE: DETECTED
SPECIMEN SOURCE: ABNORMAL

## 2021-09-01 PROCEDURE — U0005 INFEC AGEN DETEC AMPLI PROBE: HCPCS

## 2021-09-01 PROCEDURE — 71045 X-RAY EXAM CHEST 1 VIEW: CPT

## 2021-09-01 PROCEDURE — 700102 HCHG RX REV CODE 250 W/ 637 OVERRIDE(OP): Performed by: EMERGENCY MEDICINE

## 2021-09-01 PROCEDURE — 700111 HCHG RX REV CODE 636 W/ 250 OVERRIDE (IP): Performed by: EMERGENCY MEDICINE

## 2021-09-01 PROCEDURE — A9270 NON-COVERED ITEM OR SERVICE: HCPCS | Performed by: EMERGENCY MEDICINE

## 2021-09-01 PROCEDURE — 99284 EMERGENCY DEPT VISIT MOD MDM: CPT

## 2021-09-01 PROCEDURE — U0003 INFECTIOUS AGENT DETECTION BY NUCLEIC ACID (DNA OR RNA); SEVERE ACUTE RESPIRATORY SYNDROME CORONAVIRUS 2 (SARS-COV-2) (CORONAVIRUS DISEASE [COVID-19]), AMPLIFIED PROBE TECHNIQUE, MAKING USE OF HIGH THROUGHPUT TECHNOLOGIES AS DESCRIBED BY CMS-2020-01-R: HCPCS

## 2021-09-01 RX ORDER — PREDNISONE 20 MG/1
60 TABLET ORAL ONCE
Status: COMPLETED | OUTPATIENT
Start: 2021-09-01 | End: 2021-09-01

## 2021-09-01 RX ORDER — ALBUTEROL SULFATE 90 UG/1
2 AEROSOL, METERED RESPIRATORY (INHALATION) EVERY 6 HOURS PRN
Qty: 8.5 G | Refills: 0 | Status: ON HOLD | OUTPATIENT
Start: 2021-09-01 | End: 2022-07-27

## 2021-09-01 RX ORDER — ALBUTEROL SULFATE 90 UG/1
4 AEROSOL, METERED RESPIRATORY (INHALATION)
Status: COMPLETED | OUTPATIENT
Start: 2021-09-01 | End: 2021-09-01

## 2021-09-01 RX ORDER — PREDNISONE 20 MG/1
40 TABLET ORAL DAILY
Qty: 8 TABLET | Refills: 0 | Status: SHIPPED | OUTPATIENT
Start: 2021-09-01 | End: 2021-09-05

## 2021-09-01 RX ADMIN — PREDNISONE 60 MG: 20 TABLET ORAL at 10:30

## 2021-09-01 RX ADMIN — ALBUTEROL SULFATE 4 PUFF: 90 AEROSOL, METERED RESPIRATORY (INHALATION) at 10:26

## 2021-09-01 ASSESSMENT — FIBROSIS 4 INDEX: FIB4 SCORE: 1.71

## 2021-09-01 NOTE — ED PROVIDER NOTES
"CHIEF COMPLAINT  Chief Complaint   Patient presents with   • Cough     symptoms began yesterday, pt is fully vaccinated against Covid-19.   • Fever       HPI  Serenity Howe is a 71 y.o. female who presents with cough, congestion, runny nose.  Has had tactile temperature.  No documented fever.  Has a sore throat.  No shortness of breath.  Cough is dry nonproductive.  No pleuritic pain or hemoptysis.  Denies leg swelling or leg pain.  No known ill contacts.  Fully vaccinated against Covid.    REVIEW OF SYSTEMS  As per HPI, otherwise a 10 point review of systems is negative    PAST MEDICAL HISTORY  Past Medical History:   Diagnosis Date   • ASTHMA    • History of breast cancer 2015   • Hypertension    • Shoulder pain, right 2015   • Tremor 2015       SOCIAL HISTORY  Social History     Tobacco Use   • Smoking status: Never Smoker   • Smokeless tobacco: Never Used   Vaping Use   • Vaping Use: Never used   Substance Use Topics   • Alcohol use: No   • Drug use: No       SURGICAL HISTORY  Past Surgical History:   Procedure Laterality Date   • GYN SURGERY       x1   • MASTECTOMY      right        CURRENT MEDICATIONS  Home Medications    **Home medications have not yet been reviewed for this encounter**         ALLERGIES  Allergies   Allergen Reactions   • Shellfish Allergy Hives       PHYSICAL EXAM  VITAL SIGNS: /76   Pulse 90   Temp 36.1 °C (97 °F) (Temporal)   Resp 18   Ht 1.575 m (5' 2\")   Wt 71.7 kg (158 lb 1.1 oz)   SpO2 93%   BMI 28.91 kg/m²    Constitutional: Awake and alert  HENT: Nares with clear rhinorrhea  Eyes: Normal inspection  Neck: Grossly normal range of motion.  Cardiovascular: Normal heart rate, Normal rhythm.  Symmetric peripheral pulses.   Thorax & Lungs: No respiratory distress, diffuse wheezing.  No crackles or rhonchi  Abdomen: Bowel sounds normal, soft, non-distended, nontender, no mass  Skin: No obvious rash.  Back: No tenderness, No CVA tenderness. "   Extremities: No clubbing, cyanosis, edema, no Homans or cords.  Neurologic: Grossly normal   Psychiatric: Normal for situation    RADIOLOGY/PROCEDURES  DX-CHEST-PORTABLE (1 VIEW)   Final Result         Linear opacity in the left lower lobe, likely atelectasis.           Imaging is interpreted by radiologist    Labs:  Results for orders placed or performed during the hospital encounter of 09/01/21   SARS-CoV-2 PCR (24 hour In-House): Collect NP swab in VTM    Specimen: Nasopharyngeal; Respirate   Result Value Ref Range    SARS-CoV-2 Source NP Swab        Medications   albuterol inhaler 4 Puff (4 Puffs Inhalation Given 9/1/21 1026)   predniSONE (DELTASONE) tablet 60 mg (60 mg Oral Given 9/1/21 1030)       COURSE & MEDICAL DECISION MAKING  Patient presents with cough and URI symptoms.  Patient is identified to have bronchospasm on examination.  History of asthma.  Findings compatible with asthma exacerbation likely related to upper respiratory infection.  Obtain chest x-ray that shows atelectasis.  No focal infiltrates/pneumonia.  Vital signs are stable.  Was given 4 puffs of albuterol and wheezing improved.  Was given prednisone in the ER.  I sent a Covid screen which is currently pending.  I have advised symptomatic care.  I will give a pulse of prednisone.  Follow-up with primary doctor in 1 week.  Patient should return to the ER for any difficulty breathing, worsening symptoms, not improving or concern.      FINAL IMPRESSION  1.  Asthma with acute exacerbation  2.  Viral upper respiratory infection      This dictation was created using voice recognition software. The accuracy of the dictation is limited to the abilities of the software.  The nursing notes were reviewed and certain aspects of this information were incorporated into this note.      Electronically signed by: Jordy Dorman M.D., 9/1/2021 11:32 AM

## 2021-09-01 NOTE — ED TRIAGE NOTES
"72 y/o female ambulatory to triage with   Chief Complaint   Patient presents with   • Cough     symptoms began yesterday, pt is fully vaccinated against Covid-19.   • Fever     Pt reports taking tylenol pta with improvement of her fever.     /68   Pulse 100   Temp 36.9 °C (98.4 °F) (Temporal)   Resp 16   Ht 1.575 m (5' 2\")   Wt 71.7 kg (158 lb 1.1 oz)   SpO2 94%   BMI 28.91 kg/m²     "

## 2021-09-14 ENCOUNTER — TELEPHONE (OUTPATIENT)
Dept: MEDICAL GROUP | Facility: PHYSICIAN GROUP | Age: 71
End: 2021-09-14

## 2021-09-14 NOTE — TELEPHONE ENCOUNTER
Phone Number Called: [phone] (home)     I attempted to reach the patient to follow-up on her ER visit and positive COVID test.  I reached a phone recording, which did not identify that it belonged to the patient.  I did not leave a message.

## 2021-09-22 ENCOUNTER — OFFICE VISIT (OUTPATIENT)
Dept: URGENT CARE | Facility: PHYSICIAN GROUP | Age: 71
End: 2021-09-22
Payer: COMMERCIAL

## 2021-09-22 VITALS
SYSTOLIC BLOOD PRESSURE: 128 MMHG | HEIGHT: 62 IN | RESPIRATION RATE: 18 BRPM | HEART RATE: 87 BPM | BODY MASS INDEX: 31.1 KG/M2 | TEMPERATURE: 98.4 F | DIASTOLIC BLOOD PRESSURE: 78 MMHG | WEIGHT: 169 LBS | OXYGEN SATURATION: 96 %

## 2021-09-22 DIAGNOSIS — R25.2 MUSCLE CRAMP: ICD-10-CM

## 2021-09-22 PROCEDURE — 99213 OFFICE O/P EST LOW 20 MIN: CPT | Performed by: STUDENT IN AN ORGANIZED HEALTH CARE EDUCATION/TRAINING PROGRAM

## 2021-09-22 ASSESSMENT — PAIN SCALES - GENERAL: PAINLEVEL: 8=MODERATE-SEVERE PAIN

## 2021-09-22 ASSESSMENT — FIBROSIS 4 INDEX: FIB4 SCORE: 1.71

## 2021-09-22 NOTE — LETTER
September 22, 2021       Patient: Serenity Howe   YOB: 1950   Date of Visit: 9/22/2021         To Whom It May Concern:    Serenity Howe is excused from work from 9/22 through 9/23 for medical reasons.     If you have any questions or concerns, please don't hesitate to call [phone]    Sincerely,    Teto Carey D.O.  Electronically Signed

## 2021-09-23 NOTE — PROGRESS NOTES
Subjective:   CHIEF COMPLAINT  Chief Complaint   Patient presents with   • Arm Pain     cramping pain radiating in hands, occasional stiffness, x6 hours.        HPI  Serenity Howe is a 71 y.o. female who presents with a chief complaint of cramping in her bilateral hands.  Symptoms developed a a few hours ago while at work typing and lasted approximately 30 minutes.  Says she has had the symptoms in the past however they have never lasted 30 minutes previously.  She did not try taking any medications.  Cramping sensation extended to all 5 digits, hand and wrist.  No numbness or tingling.      REVIEW OF SYSTEMS  General: no fever or chills  GI: no nausea or vomiting  See HPI for further details.    PAST MEDICAL HISTORY  Patient Active Problem List    Diagnosis Date Noted   • Pre-diabetes 07/26/2021   • Stage 3a chronic kidney disease (HCC) 07/26/2021   • Abnormal mammogram of left breast 07/26/2021   • Gout, arthritis 10/05/2018   • Pulmonary nodule 04/16/2018   • Moderate persistent asthma without complication 01/05/2018   • Essential hypertension 03/16/2015   • Insomnia 03/16/2015   • Personal history of breast cancer 03/16/2015   • Allergic rhinitis 03/16/2015       SURGICAL HISTORY   has a past surgical history that includes mastectomy and gyn surgery.    ALLERGIES  Allergies   Allergen Reactions   • Shellfish Allergy Hives       CURRENT MEDICATIONS  Home Medications     Reviewed by Bhupendra Roger'kim (Medical Assistant) on 09/22/21 at 1805  Med List Status: <None>   Medication Last Dose Status   acetaminophen (TYLENOL) 325 MG Tab PRN Active   albuterol 108 (90 Base) MCG/ACT Aero Soln inhalation aerosol  Active   aspirin (ASA) 81 MG CHEW Taking Active   Calcium Carbonate-Vit D-Min (CALTRATE PLUS PO) Taking Active   cyclobenzaprine (FLEXERIL) 5 mg tablet  Active   fluticasone (FLONASE) 50 MCG/ACT nasal spray Taking Active   valsartan-hydrochlorothiazide (DIOVAN-HCT) 80-12.5 MG per tablet Taking Active  "  verapamil ER (CALAN SR) 120 MG CAPSULE SR 24 HR Taking Active   WIXELA INHUB 250-50 MCG/DOSE AEROSOL POWDER, BREATH ACTIVATED Taking Active                SOCIAL HISTORY  Social History     Tobacco Use   • Smoking status: Never Smoker   • Smokeless tobacco: Never Used   Vaping Use   • Vaping Use: Never used   Substance and Sexual Activity   • Alcohol use: No   • Drug use: No   • Sexual activity: Never     Partners: Male       FAMILY HISTORY  Family History   Problem Relation Age of Onset   • Hyperlipidemia Mother    • No Known Problems Father    • No Known Problems Sister    • No Known Problems Brother    • No Known Problems Daughter    • No Known Problems Daughter    • No Known Problems Son           Objective:   PHYSICAL EXAM  VITAL SIGNS: /78   Pulse 87   Temp 36.9 °C (98.4 °F) (Temporal)   Resp 18   Ht 1.575 m (5' 2\")   Wt 76.7 kg (169 lb)   SpO2 96%   BMI 30.91 kg/m²     Gen: no acute distress, normal voice  Skin: dry, intact, moist mucosal membranes  Head: Atraumatic, normocephalic  Psych: normal affect, normal judgement, alert, awake  Neuro:Speech: conversant, fluent, no aphasia  Mental status: AAOx3  Gait: normal   CN: 2-12 grossly intact  Sensory exam: globally intact  Motor exam: no global deficits   Musculoskeletal: Bilateral wrist/hands: No erythema, ecchymosis or edema.  Full range of motion without any discernible discomfort.  No focal tenderness to palpation.  Negative Phalen's test.  Negative Tinel's test.  Distal sensation and motor fully intact      Assessment/Plan:     1. Muscle cramp     Chronic issue with an acute exacerbation.  Patient is currently asymptomatic.  I encouraged her to maintain her hydration and to drink at least 2 L of water a day.  If symptoms return okay to try taking ibuprofen or Tylenol.  Follow-up as needed.    Differential diagnosis, natural history, supportive care, and indications for immediate follow-up discussed. All questions answered. Patient agrees " with the plan of care.    Follow-up as needed if symptoms worsen or fail to improve to PCP, Urgent care or Emergency Room.    Please note that this dictation was created using voice recognition software. I have made a reasonable attempt to correct obvious errors, but I expect that there are errors of grammar and possibly content that I did not discover before finalizing the note.

## 2021-10-27 ENCOUNTER — TELEPHONE (OUTPATIENT)
Dept: MEDICAL GROUP | Facility: PHYSICIAN GROUP | Age: 71
End: 2021-10-27

## 2021-10-27 ENCOUNTER — HOSPITAL ENCOUNTER (EMERGENCY)
Facility: MEDICAL CENTER | Age: 71
End: 2021-10-27
Attending: EMERGENCY MEDICINE
Payer: COMMERCIAL

## 2021-10-27 VITALS
WEIGHT: 157.63 LBS | SYSTOLIC BLOOD PRESSURE: 141 MMHG | BODY MASS INDEX: 29.01 KG/M2 | HEART RATE: 81 BPM | OXYGEN SATURATION: 95 % | HEIGHT: 62 IN | RESPIRATION RATE: 15 BRPM | DIASTOLIC BLOOD PRESSURE: 71 MMHG | TEMPERATURE: 98 F

## 2021-10-27 DIAGNOSIS — I10 ESSENTIAL HYPERTENSION: ICD-10-CM

## 2021-10-27 DIAGNOSIS — R04.0 EPISTAXIS: ICD-10-CM

## 2021-10-27 DIAGNOSIS — Z79.899 MEDICATION MANAGEMENT: ICD-10-CM

## 2021-10-27 PROCEDURE — 700102 HCHG RX REV CODE 250 W/ 637 OVERRIDE(OP)

## 2021-10-27 PROCEDURE — A9270 NON-COVERED ITEM OR SERVICE: HCPCS

## 2021-10-27 PROCEDURE — 99284 EMERGENCY DEPT VISIT MOD MDM: CPT

## 2021-10-27 RX ORDER — VALSARTAN AND HYDROCHLOROTHIAZIDE 80; 12.5 MG/1; MG/1
1 TABLET, FILM COATED ORAL
Qty: 90 TABLET | Refills: 2 | Status: SHIPPED | OUTPATIENT
Start: 2021-10-27 | End: 2022-06-24

## 2021-10-27 RX ORDER — OXYMETAZOLINE HYDROCHLORIDE 0.05 G/100ML
SPRAY NASAL
Status: COMPLETED
Start: 2021-10-27 | End: 2021-10-27

## 2021-10-27 RX ADMIN — OXYMETAZOLINE HCL: 0.05 SPRAY NASAL at 15:55

## 2021-10-27 ASSESSMENT — FIBROSIS 4 INDEX: FIB4 SCORE: 1.71

## 2021-10-27 ASSESSMENT — LIFESTYLE VARIABLES: DO YOU DRINK ALCOHOL: NO

## 2021-10-27 NOTE — ED NOTES
"Pt discharged home via ambulatory to lobby with steady gait, AOx4. Pt in possession of belongings. Pt provided discharge education and information pertaining to medications and follow up appointments. Pt received copy of discharge instructions and verbalized understanding.     /71   Pulse 81   Temp 36.7 °C (98 °F) (Temporal)   Resp 15   Ht 1.575 m (5' 2\")   Wt 71.5 kg (157 lb 10.1 oz)   SpO2 95%   BMI 28.83 kg/m²      Please note pt was seen and discharged during system downtime on 10/27/21  "

## 2021-10-27 NOTE — ED PROVIDER NOTES
ED Provider Note   10/27/2021  4:14 PM    Means of Arrival: Walk In  History obtained by: patient  Limitations: None    CHIEF COMPLAINT  Chief Complaint   Patient presents with   • Epistaxis       HPI  Serenity Howe is a 71 y.o. female who presents with recent nosebleeds.  Yesterday she had a brief nosebleed out of her left nare.  She again had a similar nosebleed out of her left nare.  She says she decided to come to the emergency room today because she has never had a nosebleed 2 days in a row.  She has had nosebleeds in the past.  No other bleeding symptoms.  Nosebleed stopped on its own.  She does not take any anticoagulants but does take a daily aspirin.    REVIEW OF SYSTEMS  ROS  See HPI for further details.     PAST MEDICAL HISTORY   has a past medical history of ASTHMA, History of breast cancer (7/29/2015), Hypertension, Shoulder pain, right (7/29/2015), and Tremor (7/29/2015).    FAMILY HISTORY  Family History   Problem Relation Age of Onset   • Hyperlipidemia Mother    • No Known Problems Father    • No Known Problems Sister    • No Known Problems Brother    • No Known Problems Daughter    • No Known Problems Daughter    • No Known Problems Son        SOCIAL HISTORY  Social History     Tobacco Use   • Smoking status: Never Smoker   • Smokeless tobacco: Never Used   Vaping Use   • Vaping Use: Never used   Substance and Sexual Activity   • Alcohol use: No   • Drug use: No   • Sexual activity: Never     Partners: Male       SURGICAL HISTORY   has a past surgical history that includes mastectomy and gyn surgery.    CURRENT MEDICATIONS  Home Medications     Reviewed by Cecily Grimes R.N. (Registered Nurse) on 10/27/21 at 1314  Med List Status: Not Addressed   Medication Last Dose Status   acetaminophen (TYLENOL) 325 MG Tab  Active   albuterol 108 (90 Base) MCG/ACT Aero Soln inhalation aerosol  Active   aspirin (ASA) 81 MG CHEW  Active   Calcium Carbonate-Vit D-Min (CALTRATE PLUS PO)  Active  "  cyclobenzaprine (FLEXERIL) 5 mg tablet  Active   fluticasone (FLONASE) 50 MCG/ACT nasal spray  Active   valsartan-hydrochlorothiazide (DIOVAN-HCT) 80-12.5 MG per tablet  Active   verapamil ER (CALAN SR) 120 MG CAPSULE SR 24 HR  Active   WIXELA INHUB 250-50 MCG/DOSE AEROSOL POWDER, BREATH ACTIVATED  Active                ALLERGIES  Allergies   Allergen Reactions   • Shellfish Allergy Hives       PHYSICAL EXAM  VITAL SIGNS: /71   Pulse 81   Temp 36.7 °C (98 °F) (Temporal)   Resp 15   Ht 1.575 m (5' 2\")   Wt 71.5 kg (157 lb 10.1 oz)   SpO2 95%   BMI 28.83 kg/m²    Pulse ox interpretation:I interpret this pulse ox as normal.  Constitutional: Alert in no apparent distress.  HENT: Normocephalic, Atraumatic, Bilateral external ears normal.  Dried blood in left nare.  Redness at the left septum.  Eyes: Pupils are equal and reactive. Conjunctiva normal, non-icteric.   Heart: Regular rate   Lungs: Unlabored breathing  Skin: Warm, Dry, No erythema, No rash.   Neurologic: Alert, Grossly non-focal. No slurred speech. Moving extremities normally.   Psychiatric: Affect normal, Judgment normal, Mood normal, Appears appropriate and not intoxicated.   Physical Exam      COURSE & MEDICAL DECISION MAKING  Pertinent Labs & Imaging studies reviewed. (See chart for details)    4:14 PM This is an emergent evaluation of a 71 y.o., female who presents with nosebleeding intermittently for 2 days.  At time my evaluation there was no bleeding.  But it did appear that bleeding had been coming from the left nasal septum.  We discussed care of nosebleeds at home and also prevention.  She was given Afrin and a nasal clip here in the emergency department.  Afrin was applied prior to discharge.  Instructed to come back to the emergency room if she is unable to treat nosebleed at home.     The patient will return for worsening symptoms and is stable at the time of discharge. The patient verbalizes understanding. Guidance was provided on " appropriate use of medications including driving under the influence, overdose, and side effects.     FINAL IMPRESSION  1. Epistaxis           This dictation was created using voice recognition software. The accuracy of the dictation is limited to the abilities of the software. I expect there may be some errors of grammar and possibly content. The nursing notes were reviewed and certain aspects of this information were incorporated into this note.    Electronically signed by: Jorge Givens II, M.D., 10/27/2021 4:14 PM

## 2021-10-27 NOTE — TELEPHONE ENCOUNTER
Serenity called the office and is requesting for her medication to be refilled. She informed that she ran out. She would like a call to be informed if she will need to make an appointment.

## 2021-10-27 NOTE — ED TRIAGE NOTES
"Chief Complaint   Patient presents with   • Epistaxis     70 yo female ambulatory to triage with above. Patient reports a nose bleed yesterday that lasted about 5 minutes and a second nose bleed this morning that lasted about 10 minutes. Patient takes daily aspirin.     Patient educated on triage process and apologized for wait times. Educated to inform RN of any changes.    /69   Pulse 90   Temp 36 °C (96.8 °F) (Temporal)   Resp 19   Ht 1.575 m (5' 2\")   Wt 71.5 kg (157 lb 10.1 oz)   SpO2 92%   BMI 28.83 kg/m²     "

## 2021-10-28 NOTE — TELEPHONE ENCOUNTER
Requested Prescriptions     Signed Prescriptions Disp Refills   • valsartan-hydrochlorothiazide (DIOVAN-HCT) 80-12.5 MG per tablet 90 Tablet 2     Sig: Take 1 Tablet by mouth every day.     Authorizing Provider: PARKER ROJAS A.P.R.N.

## 2021-11-05 ENCOUNTER — OFFICE VISIT (OUTPATIENT)
Dept: URGENT CARE | Facility: PHYSICIAN GROUP | Age: 71
End: 2021-11-05
Payer: COMMERCIAL

## 2021-11-05 ENCOUNTER — HOSPITAL ENCOUNTER (OUTPATIENT)
Dept: RADIOLOGY | Facility: MEDICAL CENTER | Age: 71
End: 2021-11-05
Attending: NURSE PRACTITIONER
Payer: COMMERCIAL

## 2021-11-05 VITALS
HEART RATE: 78 BPM | HEIGHT: 62 IN | SYSTOLIC BLOOD PRESSURE: 138 MMHG | RESPIRATION RATE: 18 BRPM | OXYGEN SATURATION: 97 % | WEIGHT: 169 LBS | TEMPERATURE: 98.5 F | DIASTOLIC BLOOD PRESSURE: 90 MMHG | BODY MASS INDEX: 31.1 KG/M2

## 2021-11-05 DIAGNOSIS — M25.571 ACUTE RIGHT ANKLE PAIN: ICD-10-CM

## 2021-11-05 DIAGNOSIS — M25.473 SOFT TISSUE SWELLING OF ANKLE JOINT: ICD-10-CM

## 2021-11-05 PROCEDURE — 73610 X-RAY EXAM OF ANKLE: CPT | Mod: RT

## 2021-11-05 PROCEDURE — 99214 OFFICE O/P EST MOD 30 MIN: CPT | Performed by: NURSE PRACTITIONER

## 2021-11-05 RX ORDER — PREDNISONE 10 MG/1
10 TABLET ORAL DAILY
Qty: 5 TABLET | Refills: 0 | Status: SHIPPED | OUTPATIENT
Start: 2021-11-05 | End: 2021-11-10

## 2021-11-05 ASSESSMENT — FIBROSIS 4 INDEX: FIB4 SCORE: 1.71

## 2021-11-05 ASSESSMENT — ENCOUNTER SYMPTOMS
PALPITATIONS: 0
SHORTNESS OF BREATH: 0
FALLS: 0
SENSORY CHANGE: 0
BRUISES/BLEEDS EASILY: 0
WEAKNESS: 0
CHILLS: 0
TINGLING: 0
FEVER: 0
GASTROINTESTINAL NEGATIVE: 1
MYALGIAS: 1

## 2021-11-05 ASSESSMENT — PAIN SCALES - GENERAL: PAINLEVEL: 8=MODERATE-SEVERE PAIN

## 2021-11-06 NOTE — PROGRESS NOTES
Subjective     Serenity Howe is a 71 y.o. female who presents with Leg Pain (right side, started 5 days ago)            HPI  States acute right ankle pain that radiates up right leg. Will stand and sit at her job. Denies new shoes but wears flat soled shoes. History of polyarthritis. Naproxen helps in AM but wears off by evening. No compression stockings or leg elevation. No history of DVT, hyperlipidemia or sedentary lifestyle. No calf pain or swelling, no erythema in right foot or lower extremity. History of muscle cramping in hands, ankle/foot pain with arthritis and gout in foot as seen in UC for these.    PMH:  has a past medical history of ASTHMA, History of breast cancer (7/29/2015), Hypertension, Shoulder pain, right (7/29/2015), and Tremor (7/29/2015). She also has no past medical history of COPD.  MEDS:   Current Outpatient Medications:   •  valsartan-hydrochlorothiazide (DIOVAN-HCT) 80-12.5 MG per tablet, Take 1 Tablet by mouth every day., Disp: 90 Tablet, Rfl: 2  •  albuterol 108 (90 Base) MCG/ACT Aero Soln inhalation aerosol, Inhale 2 Puffs every 6 hours as needed for Shortness of Breath., Disp: 8.5 g, Rfl: 0  •  verapamil ER (CALAN SR) 120 MG CAPSULE SR 24 HR, Take 1 capsule by mouth every day., Disp: 90 capsule, Rfl: 3  •  aspirin (ASA) 81 MG CHEW, Take 81 mg by mouth every day., Disp: , Rfl:   •  cyclobenzaprine (FLEXERIL) 5 mg tablet, Take 1 Tablet by mouth 3 times a day as needed for Muscle Spasms., Disp: 20 Tablet, Rfl: 0  •  acetaminophen (TYLENOL) 325 MG Tab, Take 650 mg by mouth every four hours as needed., Disp: , Rfl:   •  WIXELA INHUB 250-50 MCG/DOSE AEROSOL POWDER, BREATH ACTIVATED, INHALE 1 PUFF BY MOUTH TWICE DAILY RINSE MOUTH AFTER USE, Disp: 3 Inhaler, Rfl: 3  •  fluticasone (FLONASE) 50 MCG/ACT nasal spray, , Disp: , Rfl:   •  Calcium Carbonate-Vit D-Min (CALTRATE PLUS PO), Take 1 Tab by mouth every day., Disp: , Rfl:   ALLERGIES:   Allergies   Allergen Reactions   • Shellfish  "Allergy Hives     SURGHX:   Past Surgical History:   Procedure Laterality Date   • GYN SURGERY       x1   • MASTECTOMY      right      SOCHX:  reports that she has never smoked. She has never used smokeless tobacco. She reports that she does not drink alcohol and does not use drugs.  FH: Family history was reviewed, no pertinent findings to report    Review of Systems   Constitutional: Negative for chills, fever and malaise/fatigue.   Respiratory: Negative for shortness of breath.    Cardiovascular: Negative for chest pain, palpitations and leg swelling.   Gastrointestinal: Negative.    Musculoskeletal: Positive for joint pain and myalgias. Negative for falls.   Skin: Negative for itching and rash.   Neurological: Negative for tingling, sensory change and weakness.   Endo/Heme/Allergies: Does not bruise/bleed easily.   All other systems reviewed and are negative.             Objective     /90   Pulse 78   Temp 36.9 °C (98.5 °F)   Resp 18   Ht 1.575 m (5' 2\")   Wt 76.7 kg (169 lb)   SpO2 97%   BMI 30.91 kg/m²      Physical Exam  Vitals reviewed.   Constitutional:       General: She is awake. She is not in acute distress.     Appearance: Normal appearance. She is well-developed. She is not ill-appearing, toxic-appearing or diaphoretic.   HENT:      Head: Normocephalic.   Eyes:      Pupils: Pupils are equal, round, and reactive to light.   Cardiovascular:      Rate and Rhythm: Normal rate.   Pulmonary:      Effort: Pulmonary effort is normal. No tachypnea, accessory muscle usage or respiratory distress.   Musculoskeletal:      Right lower leg: Normal.      Right ankle: Swelling present. No deformity, ecchymosis or lacerations. Tenderness present over the medial malleolus. Normal range of motion.      Right Achilles Tendon: Normal.      Right foot: Normal range of motion and normal capillary refill. No swelling, deformity, bunion, Charcot foot, foot drop, prominent metatarsal heads, laceration, " tenderness, bony tenderness or crepitus. Normal pulse.   Feet:      Right foot:      Protective Sensation: 10 sites tested. 10 sites sensed.      Skin integrity: Skin integrity normal.   Skin:     General: Skin is warm and dry.      Coloration: Skin is not ashen, cyanotic, jaundiced, mottled, pale or sallow.      Findings: No abrasion, bruising, burn, ecchymosis, erythema, signs of injury, laceration, petechiae, rash or wound.   Neurological:      Mental Status: She is alert and oriented to person, place, and time.      GCS: GCS eye subscore is 4. GCS verbal subscore is 5. GCS motor subscore is 6.      Motor: Motor function is intact.      Coordination: Coordination is intact.      Gait: Gait is intact.   Psychiatric:         Attention and Perception: Attention normal.         Mood and Affect: Mood normal.         Speech: Speech normal.         Behavior: Behavior normal. Behavior is cooperative.                             Assessment & Plan        1. Acute right ankle pain    - DX-ANKLE 3+ VIEWS RIGHT; Future  - predniSONE (DELTASONE) 10 MG Tab; Take 1 Tablet by mouth every day for 5 days.  Dispense: 5 Tablet; Refill: 0    2. Soft tissue swelling of ankle joint    - predniSONE (DELTASONE) 10 MG Tab; Take 1 Tablet by mouth every day for 5 days.  Dispense: 5 Tablet; Refill: 0       -May use compression stockings for swelling and inflammation of superficial veins improve  -May use NSAIDs as needed for redness, swelling, pan relief, avoid Ibuprofen products with oral steroid use  -May need to elevate legs as needed to prevent increased swelling/pain  -May use cool  compress 10-15 min, 2-3x/day as needed for swelling  -Monitor for increased redness, swelling, pain, any shortness of breath, chest pain- must be rechecked in UC  -Follow up with PCP as needed            -Education provided: see above    -Return to urgent care as needed if new or worsening symptoms  -Patient expresses understanding to treatment and plan of  care  -Questions were encouraged and answered  -Recommended over the counter meds were written down when requested   -Advised to follow-up with the primary care provider for recheck, reevaluation, and consideration of further management as needed     -Time spent evaluating this patient was at least 30 minutes. This time comprises of the following: preparing for visit/chart review, patient history taken into account pertinent to symptoms, patient counseling/education for needed treatment outpatient, exam/evaluation/treatment plan provided, ordering any appropriate lab/test/procedures/meds, independent interpretation of any clinic testing, medication management with any other listed medications and chart documentation.

## 2021-11-08 ENCOUNTER — OFFICE VISIT (OUTPATIENT)
Dept: MEDICAL GROUP | Facility: PHYSICIAN GROUP | Age: 71
End: 2021-11-08
Payer: COMMERCIAL

## 2021-11-08 VITALS
HEART RATE: 99 BPM | DIASTOLIC BLOOD PRESSURE: 68 MMHG | SYSTOLIC BLOOD PRESSURE: 132 MMHG | WEIGHT: 158 LBS | TEMPERATURE: 97.6 F | OXYGEN SATURATION: 97 % | HEIGHT: 62 IN | BODY MASS INDEX: 29.08 KG/M2

## 2021-11-08 DIAGNOSIS — L30.4 INTERTRIGO: ICD-10-CM

## 2021-11-08 DIAGNOSIS — L98.8 SKIN LESION OF BREAST: ICD-10-CM

## 2021-11-08 PROBLEM — R21 RASH IN ADULT: Status: ACTIVE | Noted: 2021-11-08

## 2021-11-08 PROCEDURE — 99213 OFFICE O/P EST LOW 20 MIN: CPT | Performed by: NURSE PRACTITIONER

## 2021-11-08 RX ORDER — CLOTRIMAZOLE 1 %
CREAM (GRAM) TOPICAL
Qty: 45 G | Refills: 0 | Status: SHIPPED | OUTPATIENT
Start: 2021-11-08 | End: 2022-07-25

## 2021-11-08 ASSESSMENT — FIBROSIS 4 INDEX: FIB4 SCORE: 1.71

## 2021-11-08 NOTE — PATIENT INSTRUCTIONS
[facility] - [address] [address]  Phone: [phone]      Breast ultrasound call [phone] for January 2022, 6 month mammogram follow-up

## 2021-11-08 NOTE — ASSESSMENT & PLAN NOTE
Acute medical problem.  Lesion has been present for months..  She has not noticed that it has increased in size.  Does not cause any pain, itching or any discharge.

## 2021-11-08 NOTE — ASSESSMENT & PLAN NOTE
Acute medical problem.  The rash started 2 weeks ago. The rash is red, itchy, increasing in size and her skin has split.  She has not noticed any discharge or odor.  Denies any fever, chills, chest pain, shortness of breath, or new skin products.

## 2021-11-08 NOTE — PROGRESS NOTES
Subjective:     CC: Rash    HPI:   Serenity presents today with the following:    Rash in adult  Acute medical problem.  The rash started 2 weeks ago. The rash is red, itchy, increasing in size and her skin has split.  She has not noticed any discharge or odor.  Denies any fever, chills, chest pain, shortness of breath, or new skin products.    Skin lesion of breast  Acute medical problem.  Lesion has been present for months..  She has not noticed that it has increased in size.  Does not cause any pain, itching or any discharge.      Past Medical History:   Diagnosis Date   • ASTHMA    • History of breast cancer 7/29/2015   • Hypertension    • Shoulder pain, right 7/29/2015   • Tremor 7/29/2015       Social History     Tobacco Use   • Smoking status: Never Smoker   • Smokeless tobacco: Never Used   Vaping Use   • Vaping Use: Never used   Substance Use Topics   • Alcohol use: No   • Drug use: No       Current Outpatient Medications Ordered in Epic   Medication Sig Dispense Refill   • clotrimazole (LOTRIMIN) 1 % Cream Apply to affected area twice daily. Clean area with soap and water, pat dry, before each application. 45 g 0   • predniSONE (DELTASONE) 10 MG Tab Take 1 Tablet by mouth every day for 5 days. 5 Tablet 0   • valsartan-hydrochlorothiazide (DIOVAN-HCT) 80-12.5 MG per tablet Take 1 Tablet by mouth every day. 90 Tablet 2   • albuterol 108 (90 Base) MCG/ACT Aero Soln inhalation aerosol Inhale 2 Puffs every 6 hours as needed for Shortness of Breath. 8.5 g 0   • cyclobenzaprine (FLEXERIL) 5 mg tablet Take 1 Tablet by mouth 3 times a day as needed for Muscle Spasms. 20 Tablet 0   • verapamil ER (CALAN SR) 120 MG CAPSULE SR 24 HR Take 1 capsule by mouth every day. 90 capsule 3   • acetaminophen (TYLENOL) 325 MG Tab Take 650 mg by mouth every four hours as needed.     • WIXELA INHUB 250-50 MCG/DOSE AEROSOL POWDER, BREATH ACTIVATED INHALE 1 PUFF BY MOUTH TWICE DAILY RINSE MOUTH AFTER USE 3 Inhaler 3   • fluticasone  "(FLONASE) 50 MCG/ACT nasal spray      • Calcium Carbonate-Vit D-Min (CALTRATE PLUS PO) Take 1 Tab by mouth every day.     • aspirin (ASA) 81 MG CHEW Take 81 mg by mouth every day.       No current [facility]-ordered facility-administered medications on file.       Allergies:  Shellfish allergy    Health Maintenance: She states that she believes she had her influenza vaccine this season and will clarify with [facility] clinic to see if she has had.  She is due for pulmonary function testing, colonoscopy, she will need to schedule with GI consultants, and Covid booster.    ROS:  Per HPI    Objective:     Vital signs reviewed  Exam:  /68 (BP Location: Left arm, Patient Position: Sitting, BP Cuff Size: Adult)   Pulse 99   Temp 36.4 °C (97.6 °F) (Temporal)   Ht 1.575 m (5' 2\")   Wt 71.7 kg (158 lb)   SpO2 97%   BMI 28.90 kg/m²  Body mass index is 28.9 kg/m².    Gen: Alert and oriented, No apparent distress.  Left breast: At 1 o'clock position there is a raised, rough, hyperkeratotic skin lesion without any redness or discharge approximately 5 mm in size.  Lungs: Normal effort, CTA bilaterally, no wheezes, rhonchi, or rales  CV: Regular rate and rhythm. No murmurs, rubs, or gallops.  Ext: No clubbing, cyanosis, edema. right pannus with excoriated, reddened skin without any odor or discharge.  Left pannus clean dry and intact.      Assessment & Plan:     71 y.o. female with the following -     1. Intertrigo  Acute uncomplicated problem.  We will start with clotrimazole twice daily for the next 2 weeks.  Encouraged to keep the area clean and dry.  Return if symptoms worsen.  - clotrimazole (LOTRIMIN) 1 % Cream; Apply to affected area twice daily. Clean area with soap and water, pat dry, before each application.  Dispense: 45 g; Refill: 0    2. Skin lesion of breast  Chronic stable problem.  Will place referral to dermatology for possible biopsy.    - Referral to Dermatology        Return in about 3 months (around " 2/8/2022) for Hypertension.    Please note that this dictation was created using voice recognition software. I have made every reasonable attempt to correct obvious errors, but I expect that there are errors of grammar and possibly content that I did not discover before finalizing the note.

## 2021-11-12 ENCOUNTER — OFFICE VISIT (OUTPATIENT)
Dept: URGENT CARE | Facility: PHYSICIAN GROUP | Age: 71
End: 2021-11-12
Payer: COMMERCIAL

## 2021-11-12 VITALS
HEART RATE: 82 BPM | RESPIRATION RATE: 16 BRPM | OXYGEN SATURATION: 97 % | DIASTOLIC BLOOD PRESSURE: 80 MMHG | TEMPERATURE: 97.3 F | WEIGHT: 169 LBS | SYSTOLIC BLOOD PRESSURE: 140 MMHG | BODY MASS INDEX: 31.1 KG/M2 | HEIGHT: 62 IN

## 2021-11-12 DIAGNOSIS — M54.31 SCIATICA OF RIGHT SIDE: ICD-10-CM

## 2021-11-12 PROCEDURE — 99213 OFFICE O/P EST LOW 20 MIN: CPT | Performed by: PHYSICIAN ASSISTANT

## 2021-11-12 RX ORDER — CYCLOBENZAPRINE HCL 5 MG
5-10 TABLET ORAL 3 TIMES DAILY PRN
Qty: 30 TABLET | Refills: 0 | Status: SHIPPED | OUTPATIENT
Start: 2021-11-12 | End: 2022-03-02

## 2021-11-12 RX ORDER — NAPROXEN 500 MG/1
500 TABLET ORAL 2 TIMES DAILY WITH MEALS
Qty: 30 TABLET | Refills: 0 | Status: SHIPPED | OUTPATIENT
Start: 2021-11-12 | End: 2022-01-07

## 2021-11-12 ASSESSMENT — ENCOUNTER SYMPTOMS
HEADACHES: 0
ABDOMINAL PAIN: 0
DIZZINESS: 0
FALLS: 0
FLANK PAIN: 0
BACK PAIN: 1
NAUSEA: 0
VOMITING: 0
CHILLS: 0
FEVER: 0
MYALGIAS: 1

## 2021-11-12 ASSESSMENT — FIBROSIS 4 INDEX: FIB4 SCORE: 1.71

## 2021-11-12 NOTE — PROGRESS NOTES
Subjective:   Serenity Howe is a 71 y.o. female who presents for Back Pain (xToday, Pt states lower back pain (R), discomfort to move )      HPI:  This is a very pleasant 71-year-old female presenting to the clinic with acute onset right-sided lower back pain starting this morning.  Pain is predominantly located in the right gluteal region.  Pain is aggravated with movement.  Pain radiates down the proximal right lower leg.  Denies any numbness or tingling distally.  Denies any saddle anesthesia or change in bowel or bladder function.  Patient denies any injury.  Believes she may have slept wrong.  Tried taking Tylenol this morning which provided no relief.    Review of Systems   Constitutional: Negative for chills, fever and malaise/fatigue.   Gastrointestinal: Negative for abdominal pain, nausea and vomiting.   Genitourinary: Negative for dysuria, flank pain, frequency, hematuria and urgency.   Musculoskeletal: Positive for back pain and myalgias. Negative for falls and joint pain.   Skin: Negative for rash.   Neurological: Negative for dizziness and headaches.       Medications:    • acetaminophen Tabs  • albuterol Aers  • aspirin Chew  • CALTRATE PLUS PO  • clotrimazole Crea  • cyclobenzaprine  • fluticasone  • naproxen Tabs  • valsartan-hydrochlorothiazide  • verapamil ER Cp24  • Wixela Inhub Aepb    Allergies: Shellfish allergy    Problem List: Serenity Howe does not have any pertinent problems on file.    Surgical History:  Past Surgical History:   Procedure Laterality Date   • GYN SURGERY       x1   • MASTECTOMY      right        Past Social Hx: Serenity Howe  reports that she has never smoked. She has never used smokeless tobacco. She reports that she does not drink alcohol and does not use drugs.     Past Family Hx:  Serenity Howe family history includes Hyperlipidemia in her mother; No Known Problems in her brother, daughter, daughter, father, sister, and son.  "    Problem list, medications, and allergies reviewed by myself today in Epic.     Objective:     /80   Pulse 82   Temp 36.3 °C (97.3 °F) (Temporal)   Resp 16   Ht 1.575 m (5' 2\")   Wt 76.7 kg (169 lb)   SpO2 97%   BMI 30.91 kg/m²     Physical Exam  Constitutional:       General: She is not in acute distress.     Appearance: Normal appearance. She is not ill-appearing, toxic-appearing or diaphoretic.   HENT:      Head: Normocephalic and atraumatic.   Eyes:      Conjunctiva/sclera: Conjunctivae normal.   Cardiovascular:      Rate and Rhythm: Normal rate and regular rhythm.      Pulses: Normal pulses.      Heart sounds: Normal heart sounds.   Pulmonary:      Effort: Pulmonary effort is normal.      Breath sounds: Normal breath sounds. No wheezing.   Musculoskeletal:      Cervical back: Normal range of motion. No rigidity or tenderness.      Comments: Lumbar exam: No midline tenderness to palpation.  No obvious deformities or step-offs.  No paraspinal muscle tenderness.  Patient has tenderness to palpation over the right SI joint and piriformis.  Patient has full lumbar extension and rotation.  Lumbar flexion seems to aggravate symptoms.  Negative SLRs bilaterally.  Normal but slowed gait.  Lower extremity strength and sensation full and intact bilaterally.   Skin:     Capillary Refill: Capillary refill takes less than 2 seconds.      Findings: No bruising, erythema or rash.   Neurological:      General: No focal deficit present.      Mental Status: She is alert and oriented to person, place, and time. Mental status is at baseline.   Psychiatric:         Mood and Affect: Mood normal.         Thought Content: Thought content normal.           Assessment/Plan:     Comments/MDM:     Take medication as prescribed. Discussed sedative effects of muscle relaxers.  Can take OTC Tylenol as directed for pain.   Only take naproxen if needed for pain, CKD stage III  Temperature Therapy: Heat or ice, whichever feels " better.  Gentle ROM back stretches and exercises. Resume activity as tolerated.  Follow up with your primary care doctor.  • Follow up for persistent, new, or worsening pain. Emergently for weakness, loss of bowel or bladder, groin pain or numbness, fevers.     Diagnosis and associated orders:     1. Sciatica of right side  naproxen (NAPROSYN) 500 MG Tab    cyclobenzaprine (FLEXERIL) 5 mg tablet              Differential diagnosis, natural history, supportive care, and indications for immediate follow-up discussed.    Advised the patient to follow-up with the primary care physician for recheck, reevaluation, and consideration of further management.    Please note that this dictation was created using voice recognition software. I have made reasonable attempt to correct obvious errors, but I expect that there are errors of grammar and possibly content that I did not discover before finalizing the note.    This note was electronically signed by GONZÁLEZ Briones PA-C

## 2021-11-12 NOTE — PATIENT INSTRUCTIONS
Sciatica Rehab  Ask your health care provider which exercises are safe for you. Do exercises exactly as told by your health care provider and adjust them as directed. It is normal to feel mild stretching, pulling, tightness, or discomfort as you do these exercises. Stop right away if you feel sudden pain or your pain gets worse. Do not begin these exercises until told by your health care provider.  Stretching and range-of-motion exercises  These exercises warm up your muscles and joints and improve the movement and flexibility of your hips and back. These exercises also help to relieve pain, numbness, and tingling.  Sciatic nerve glide  1. Sit in a chair with your head facing down toward your chest. Place your hands behind your back. Let your shoulders slump forward.  2. Slowly straighten one of your legs while you tilt your head back as if you are looking toward the ceiling. Only straighten your leg as far as you can without making your symptoms worse.  3. Hold this position for __________ seconds.  4. Slowly return to the starting position.  5. Repeat with your other leg.  Repeat __________ times. Complete this exercise __________ times a day.  Knee to chest with hip adduction and internal rotation    1. Lie on your back on a firm surface with both legs straight.  2. Bend one of your knees and move it up toward your chest until you feel a gentle stretch in your lower back and buttock. Then, move your knee toward the shoulder that is on the opposite side from your leg. This is hip adduction and internal rotation.  ? Hold your leg in this position by holding on to the front of your knee.  3. Hold this position for __________ seconds.  4. Slowly return to the starting position.  5. Repeat with your other leg.  Repeat __________ times. Complete this exercise __________ times a day.  Prone extension on elbows    1. Lie on your abdomen on a firm surface. A bed may be too soft for this exercise.  2. Prop yourself up on  your elbows.  3. Use your arms to help lift your chest up until you feel a gentle stretch in your abdomen and your lower back.  ? This will place some of your body weight on your elbows. If this is uncomfortable, try stacking pillows under your chest.  ? Your hips should stay down, against the surface that you are lying on. Keep your hip and back muscles relaxed.  4. Hold this position for __________ seconds.  5. Slowly relax your upper body and return to the starting position.  Repeat __________ times. Complete this exercise __________ times a day.  Strengthening exercises  These exercises build strength and endurance in your back. Endurance is the ability to use your muscles for a long time, even after they get tired.  Pelvic tilt  This exercise strengthens the muscles that lie deep in the abdomen.  1. Lie on your back on a firm surface. Bend your knees and keep your feet flat on the floor.  2. Tense your abdominal muscles. Tip your pelvis up toward the ceiling and flatten your lower back into the floor.  ? To help with this exercise, you may place a small towel under your lower back and try to push your back into the towel.  3. Hold this position for __________ seconds.  4. Let your muscles relax completely before you repeat this exercise.  Repeat __________ times. Complete this exercise __________ times a day.  Alternating arm and leg raises    1. Get on your hands and knees on a firm surface. If you are on a hard floor, you may want to use padding, such as an exercise mat, to cushion your knees.  2. Line up your arms and legs. Your hands should be directly below your shoulders, and your knees should be directly below your hips.  3. Lift your left leg behind you. At the same time, raise your right arm and straighten it in front of you.  ? Do not lift your leg higher than your hip.  ? Do not lift your arm higher than your shoulder.  ? Keep your abdominal and back muscles tight.  ? Keep your hips facing the  ground.  ? Do not arch your back.  ? Keep your balance carefully, and do not hold your breath.  4. Hold this position for __________ seconds.  5. Slowly return to the starting position.  6. Repeat with your right leg and your left arm.  Repeat __________ times. Complete this exercise __________ times a day.  Posture and body mechanics  Good posture and healthy body mechanics can help to relieve stress in your body's tissues and joints. Body mechanics refers to the movements and positions of your body while you do your daily activities. Posture is part of body mechanics. Good posture means:  · Your spine is in its natural S-curve position (neutral).  · Your shoulders are pulled back slightly.  · Your head is not tipped forward.  Follow these guidelines to improve your posture and body mechanics in your everyday activities.  Standing    · When standing, keep your spine neutral and your feet about hip width apart. Keep a slight bend in your knees. Your ears, shoulders, and hips should line up.  · When you do a task in which you  one place for a long time, place one foot up on a stable object that is 2-4 inches (5-10 cm) high, such as a footstool. This helps keep your spine neutral.  Sitting    · When sitting, keep your spine neutral and keep your feet flat on the floor. Use a footrest, if necessary, and keep your thighs parallel to the floor. Avoid rounding your shoulders, and avoid tilting your head forward.  · When working at a desk or a computer, keep your desk at a height where your hands are slightly lower than your elbows. Slide your chair under your desk so you are close enough to maintain good posture.  · When working at a computer, place your monitor at a height where you are looking straight ahead and you do not have to tilt your head forward or downward to look at the screen.  Resting  · When lying down and resting, avoid positions that are most painful for you.  · If you have pain with activities  such as sitting, bending, stooping, or squatting, lie in a position in which your body does not bend very much. For example, avoid curling up on your side with your arms and knees near your chest (fetal position).  · If you have pain with activities such as standing for a long time or reaching with your arms, lie with your spine in a neutral position and bend your knees slightly. Try the following positions:  ? Lying on your side with a pillow between your knees.  ? Lying on your back with a pillow under your knees.  Lifting    · When lifting objects, keep your feet at least shoulder width apart and tighten your abdominal muscles.  · Bend your knees and hips and keep your spine neutral. It is important to lift using the strength of your legs, not your back. Do not lock your knees straight out.  · Always ask for help to lift heavy or awkward objects.  This information is not intended to replace advice given to you by your health care provider. Make sure you discuss any questions you have with your health care provider.  Document Released: 12/18/2006 Document Revised: 04/10/2020 Document Reviewed: 01/09/2020  Elsevier Patient Education © 2020 [employer]  Sciatica    Sciatica is pain, numbness, weakness, or tingling along the path of the sciatic nerve. The sciatic nerve starts in the lower back and runs down the back of each leg. The nerve controls the muscles in the lower leg and in the back of the knee. It also provides feeling (sensation) to the back of the thigh, the lower leg, and the sole of the foot. Sciatica is a symptom of another medical condition that pinches or puts pressure on the sciatic nerve.  Sciatica most often only affects one side of the body. Sciatica usually goes away on its own or with treatment. In some cases, sciatica may come back (recur).  What are the causes?  This condition is caused by pressure on the sciatic nerve or pinching of the nerve. This may be the result of:  · A disk in  between the bones of the spine bulging out too far (herniated disk).  · Age-related changes in the spinal disks.  · A pain disorder that affects a muscle in the buttock.  · Extra bone growth near the sciatic nerve.  · A break (fracture) of the pelvis.  · Pregnancy.  · Tumor. This is rare.  What increases the risk?  The following factors may make you more likely to develop this condition:  · Playing sports that place pressure or stress on the spine.  · Having poor strength and flexibility.  · A history of back injury or surgery.  · Sitting for long periods of time.  · Doing activities that involve repetitive bending or lifting.  · Obesity.  What are the signs or symptoms?  Symptoms can vary from mild to very severe, and they may include:  · Any of these problems in the lower back, leg, hip, or buttock:  ? Mild tingling, numbness, or dull aches.  ? Burning sensations.  ? Sharp pains.  · Numbness in the back of the calf or the sole of the foot.  · Leg weakness.  · Severe back pain that makes movement difficult.  Symptoms may get worse when you cough, sneeze, or laugh, or when you sit or stand for long periods of time.  How is this diagnosed?  This condition may be diagnosed based on:  · Your symptoms and medical history.  · A physical exam.  · Blood tests.  · Imaging tests, such as:  ? X-rays.  ? MRI.  ? CT scan.  How is this treated?  In many cases, this condition improves on its own without treatment. However, treatment may include:  · Reducing or modifying physical activity.  · Exercising and stretching.  · Icing and applying heat to the affected area.  · Medicines that help to:  ? Relieve pain and swelling.  ? Relax your muscles.  · Injections of medicines that help to relieve pain, irritation, and inflammation around the sciatic nerve (steroids).  · Surgery.  Follow these instructions at home:  Medicines  · Take over-the-counter and prescription medicines only as told by your health care provider.  · Ask your  health care provider if the medicine prescribed to you:  ? Requires you to avoid driving or using heavy machinery.  ? Can cause constipation. You may need to take these actions to prevent or treat constipation:  § Drink enough fluid to keep your urine pale yellow.  § Take over-the-counter or prescription medicines.  § Eat foods that are high in fiber, such as beans, whole grains, and fresh fruits and vegetables.  § Limit foods that are high in fat and processed sugars, such as fried or sweet foods.  Managing pain         · If directed, put ice on the affected area.  ? Put ice in a plastic bag.  ? Place a towel between your skin and the bag.  ? Leave the ice on for 20 minutes, 2-3 times a day.  · If directed, apply heat to the affected area. Use the heat source that your health care provider recommends, such as a moist heat pack or a heating pad.  ? Place a towel between your skin and the heat source.  ? Leave the heat on for 20-30 minutes.  ? Remove the heat if your skin turns bright red. This is especially important if you are unable to feel pain, heat, or cold. You may have a greater risk of getting burned.  Activity    · Return to your normal activities as told by your health care provider. Ask your health care provider what activities are safe for you.  · Avoid activities that make your symptoms worse.  · Take brief periods of rest throughout the day.  ? When you rest for longer periods, mix in some mild activity or stretching between periods of rest. This will help to prevent stiffness and pain.  ? Avoid sitting for long periods of time without moving. Get up and move around at least one time each hour.  · Exercise and stretch regularly, as told by your health care provider.  · Do not lift anything that is heavier than 10 lb (4.5 kg) while you have symptoms of sciatica. When you do not have symptoms, you should still avoid heavy lifting, especially repetitive heavy lifting.  · When you lift objects, always use  proper lifting technique, which includes:  ? Bending your knees.  ? Keeping the load close to your body.  ? Avoiding twisting.  General instructions  · Maintain a healthy weight. Excess weight puts extra stress on your back.  · Wear supportive, comfortable shoes. Avoid wearing high heels.  · Avoid sleeping on a mattress that is too soft or too hard. A mattress that is firm enough to support your back when you sleep may help to reduce your pain.  · Keep all follow-up visits as told by your health care provider. This is important.  Contact a health care provider if:  · You have pain that:  ? Wakes you up when you are sleeping.  ? Gets worse when you lie down.  ? Is worse than you have experienced in the past.  ? Lasts longer than 4 weeks.  · You have an unexplained weight loss.  Get help right away if:  · You are not able to control when you urinate or have bowel movements (incontinence).  · You have:  ? Weakness in your lower back, pelvis, buttocks, or legs that gets worse.  ? Redness or swelling of your back.  ? A burning sensation when you urinate.  Summary  · Sciatica is pain, numbness, weakness, or tingling along the path of the sciatic nerve.  · This condition is caused by pressure on the sciatic nerve or pinching of the nerve.  · Sciatica can cause pain, numbness, or tingling in the lower back, legs, hips, and buttocks.  · Treatment often includes rest, exercise, medicines, and applying ice or heat.  This information is not intended to replace advice given to you by your health care provider. Make sure you discuss any questions you have with your health care provider.  Document Released: 12/12/2002 Document Revised: 01/06/2020 Document Reviewed: 01/06/2020  Elsevier Patient Education © 2020 [employer]

## 2021-11-14 ENCOUNTER — HOSPITAL ENCOUNTER (EMERGENCY)
Facility: MEDICAL CENTER | Age: 71
End: 2021-11-14
Attending: EMERGENCY MEDICINE
Payer: COMMERCIAL

## 2021-11-14 VITALS
BODY MASS INDEX: 28.76 KG/M2 | TEMPERATURE: 97 F | HEIGHT: 62 IN | HEART RATE: 86 BPM | OXYGEN SATURATION: 98 % | WEIGHT: 156.31 LBS | RESPIRATION RATE: 18 BRPM | SYSTOLIC BLOOD PRESSURE: 122 MMHG | DIASTOLIC BLOOD PRESSURE: 70 MMHG

## 2021-11-14 DIAGNOSIS — M54.31 SCIATICA OF RIGHT SIDE: ICD-10-CM

## 2021-11-14 PROCEDURE — 700111 HCHG RX REV CODE 636 W/ 250 OVERRIDE (IP): Performed by: EMERGENCY MEDICINE

## 2021-11-14 PROCEDURE — 99284 EMERGENCY DEPT VISIT MOD MDM: CPT

## 2021-11-14 PROCEDURE — 96372 THER/PROPH/DIAG INJ SC/IM: CPT

## 2021-11-14 RX ORDER — METHYLPREDNISOLONE 4 MG/1
TABLET ORAL
Qty: 1 EACH | Refills: 0 | Status: SHIPPED | OUTPATIENT
Start: 2021-11-14 | End: 2022-03-02

## 2021-11-14 RX ORDER — HYDROCODONE BITARTRATE AND ACETAMINOPHEN 5; 325 MG/1; MG/1
1 TABLET ORAL EVERY 4 HOURS PRN
Qty: 12 TABLET | Refills: 0 | Status: SHIPPED | OUTPATIENT
Start: 2021-11-14 | End: 2021-11-17

## 2021-11-14 RX ORDER — MORPHINE SULFATE 4 MG/ML
4 INJECTION, SOLUTION INTRAMUSCULAR; INTRAVENOUS ONCE
Status: COMPLETED | OUTPATIENT
Start: 2021-11-14 | End: 2021-11-14

## 2021-11-14 RX ADMIN — MORPHINE SULFATE 4 MG: 4 INJECTION INTRAVENOUS at 17:00

## 2021-11-14 ASSESSMENT — LIFESTYLE VARIABLES: DO YOU DRINK ALCOHOL: NO

## 2021-11-14 ASSESSMENT — FIBROSIS 4 INDEX: FIB4 SCORE: 1.71

## 2021-11-14 NOTE — ED TRIAGE NOTES
Chief Complaint   Patient presents with   • Buttocks Pain     right side x2days, radiates down to right leg     Pt ambulated to triage with above complaint, pt went to urgent care 2days ago and prescribed Naproxen but no relief.

## 2021-11-15 NOTE — ED PROVIDER NOTES
"ED Provider Note    CHIEF COMPLAINT  Chief Complaint   Patient presents with   • Buttocks Pain     right side x2days, radiates down to right leg       HPI  Serenity Howe is a 71 y.o. female who presents for evaluation of pain in the right buttocks that radiates on the right leg.  Acute onset 2 days ago, denies falling or other injuries.  No weakness numbness or tingling.  She has no midline back or neck pain.  No abdominal pain, vomiting, chest pain.  She went to urgent care where she was prescribed naproxen and states it does not seem to be helping.  Her past medical history significant for breast cancer and hypertension.  No other acute complaints offered by the patient at this time    REVIEW OF SYSTEMS  Negative for fever, rash, chest pain, dyspnea, abdominal pain. All other systems are negative.     PAST MEDICAL HISTORY   has a past medical history of ASTHMA, History of breast cancer (7/29/2015), Hypertension, Shoulder pain, right (7/29/2015), and Tremor (7/29/2015).    SOCIAL HISTORY  Social History     Tobacco Use   • Smoking status: Never Smoker   • Smokeless tobacco: Never Used   Vaping Use   • Vaping Use: Never used   Substance and Sexual Activity   • Alcohol use: No   • Drug use: No   • Sexual activity: Never     Partners: Male       SURGICAL HISTORY   has a past surgical history that includes mastectomy and gyn surgery.    CURRENT MEDICATIONS  I personally reviewed the medication list in the charting documentation.     ALLERGIES  Allergies   Allergen Reactions   • Shellfish Allergy Hives       PHYSICAL EXAM  VITAL SIGNS: /76   Pulse 84   Temp 36.1 °C (97 °F) (Temporal)   Resp 18   Ht 1.575 m (5' 2\")   Wt 70.9 kg (156 lb 4.9 oz)   SpO2 99%   BMI 28.59 kg/m²   Constitutional: Well appearing patient in no acute distress.  Awake and alert, not toxic nor ill in appearance.  HENT: Normocephalic, no obvious evidence of acute trauma.   Neck: Comfortable movement without any obvious " restriction in the range of motion.  Eyes: Conjunctiva normal, Non-icteric.   Chest: Normal nonlabored respirations.  Skin: The exposed portions of skin reveal no obvious rash or other abnormalities.  Musculoskeletal: Tenderness involving the right buttocks  Neurologic: Alert, No obvious focal deficits noted.  Normal and symmetric lower extremity motor and sensory function bilaterally.  Psychiatric: Affect normal for clinical presentation    COURSE & MEDICAL DECISION MAKING  Pertinent Labs & Imaging studies reviewed. (See chart for details)    Encounter Summary: This is a very pleasant 71 y.o. female who unfortunately required evaluation in the emergency department today with history compatible with right-sided sciatica, no injuries, no focal neurologic complaints or findings on exam.  She has been treated with naproxen by the urgent care and states that does not seem to be helping.  Instead of the naproxen she will start a Medrol Dosepak.  Will also prescribe her some Norco.  Will provide her with an intramuscular injection of morphine for some analgesia here in the emergency department.  Strict return instructions provided, discharged home in stable condition.    In prescribing controlled substances to this patient, I certify that I have obtained and reviewed the medical history of Serenity Howe. I have also made a good rosario effort to obtain applicable records from other providers who have treated the patient.    I have conducted a physical exam and documented it. I have reviewed Ms. Howe’s prescription history as maintained by the [place] Prescription Monitoring Program.     I have assessed the patient’s risk for abuse, dependency, and addiction using the validated Opioid Risk Tool    Given the above, I believe the benefits of controlled substance therapy outweigh the risks. The reasons for prescribing controlled substances include my professional opinion that controlled substances are a reasonable  choice for this patient in the event other methods are ineffective inadequately controlling pain. Accordingly, I have discussed the risk and benefits, treatment plan, and alternative therapies with the patient.             DISPOSITION: Discharge Home      FINAL IMPRESSION  1. Sciatica of right side        This dictation was created using voice recognition software. The accuracy of the dictation is limited to the abilities of the software. I expect there may be some errors of grammar and possibly content. The nursing notes were reviewed and certain aspects of this information were incorporated into this note.    Electronically signed by: Navneet Munguia M.D., 11/14/2021 4:39 PM

## 2021-11-15 NOTE — ED NOTES
Pt verbalized feeling better  Pt discharge home. Pt given discharge instructions and prescription. Pt verbalized understanding, all questions answered ,vss upon d/c. Pt steady on feet upon discharge  Pt will be calling taxi for ride home

## 2022-01-07 ENCOUNTER — APPOINTMENT (OUTPATIENT)
Dept: RADIOLOGY | Facility: MEDICAL CENTER | Age: 72
End: 2022-01-07
Attending: EMERGENCY MEDICINE
Payer: COMMERCIAL

## 2022-01-07 ENCOUNTER — HOSPITAL ENCOUNTER (EMERGENCY)
Facility: MEDICAL CENTER | Age: 72
End: 2022-01-07
Attending: EMERGENCY MEDICINE
Payer: COMMERCIAL

## 2022-01-07 VITALS
TEMPERATURE: 97.3 F | RESPIRATION RATE: 14 BRPM | BODY MASS INDEX: 29.05 KG/M2 | HEART RATE: 86 BPM | WEIGHT: 157.85 LBS | HEIGHT: 62 IN | SYSTOLIC BLOOD PRESSURE: 136 MMHG | OXYGEN SATURATION: 98 % | DIASTOLIC BLOOD PRESSURE: 86 MMHG

## 2022-01-07 DIAGNOSIS — M72.2 PLANTAR FASCIITIS OF RIGHT FOOT: ICD-10-CM

## 2022-01-07 DIAGNOSIS — M79.671 FOOT PAIN, RIGHT: ICD-10-CM

## 2022-01-07 PROCEDURE — 99283 EMERGENCY DEPT VISIT LOW MDM: CPT

## 2022-01-07 PROCEDURE — 73630 X-RAY EXAM OF FOOT: CPT | Mod: RT

## 2022-01-07 RX ORDER — IBUPROFEN 600 MG/1
600 TABLET ORAL EVERY 6 HOURS PRN
Qty: 30 TABLET | Refills: 0 | Status: SHIPPED | OUTPATIENT
Start: 2022-01-07 | End: 2022-04-04

## 2022-01-07 ASSESSMENT — FIBROSIS 4 INDEX: FIB4 SCORE: 1.71

## 2022-01-07 NOTE — ED TRIAGE NOTES
"Chief Complaint   Patient presents with   • Foot Pain     c/o right heel pain, states it's been hurting for a week. denies injury or trauma. no wounds present     Pt ambulated into the triage room. Denies numbness or tingling in right foot.     Hx of breast cancer, asthma and HTN    /84   Pulse 98   Temp 36.7 °C (98 °F) (Temporal)   Resp 16   Ht 1.575 m (5' 2\")   Wt 71.6 kg (157 lb 13.6 oz)   SpO2 100%   BMI 28.87 kg/m²     "

## 2022-01-07 NOTE — ED PROVIDER NOTES
ED Provider Note    CHIEF COMPLAINT  Chief Complaint   Patient presents with   • Foot Pain     c/o right heel pain, states it's been hurting for a week. denies injury or trauma. no wounds present       HPI  Serenity Howe is a 71 y.o. female who presents for evaluation of heel pain. The patient's presents with 1 week of pain to her right heel area. The patient does not recall any specific trauma. The patient denies any ankle pain, knee pain, hip pain. She denies any recent illness including: Fever, URI symptoms, cardiorespiratory symptoms, gastrointestinal symptoms. No other acute symptomatology or complaints.    REVIEW OF SYSTEMS  See HPI for further details. All other systems negative.    PAST MEDICAL HISTORY  Past Medical History:   Diagnosis Date   • ASTHMA    • Cancer (HCC)    • History of breast cancer 2015   • Hypertension    • Shoulder pain, right 2015   • Tremor 2015       FAMILY HISTORY  Family History   Problem Relation Age of Onset   • Hyperlipidemia Mother    • No Known Problems Father    • No Known Problems Sister    • No Known Problems Brother    • No Known Problems Daughter    • No Known Problems Daughter    • No Known Problems Son        SOCIAL HISTORY  Resides locally;    SURGICAL HISTORY  Past Surgical History:   Procedure Laterality Date   • GYN SURGERY       x1   • MASTECTOMY      right        CURRENT MEDICATIONS  Home Medications     Reviewed by Cathie Love R.N. (Registered Nurse) on 22 at 1422  Med List Status: Not Addressed   Medication Last Dose Status   acetaminophen (TYLENOL) 325 MG Tab  Active   albuterol 108 (90 Base) MCG/ACT Aero Soln inhalation aerosol  Active   aspirin (ASA) 81 MG CHEW  Active   Calcium Carbonate-Vit D-Min (CALTRATE PLUS PO)  Active   clotrimazole (LOTRIMIN) 1 % Cream  Active   cyclobenzaprine (FLEXERIL) 5 mg tablet  Active   cyclobenzaprine (FLEXERIL) 5 mg tablet  Active   fluticasone (FLONASE) 50 MCG/ACT nasal spray  Active  "  methylPREDNISolone (MEDROL DOSEPAK) 4 MG Tablet Therapy Pack  Active   naproxen (NAPROSYN) 500 MG Tab  Active   valsartan-hydrochlorothiazide (DIOVAN-HCT) 80-12.5 MG per tablet  Active   verapamil ER (CALAN SR) 120 MG CAPSULE SR 24 HR  Active   WIXELA INHUB 250-50 MCG/DOSE AEROSOL POWDER, BREATH ACTIVATED  Active                ALLERGIES  Allergies   Allergen Reactions   • Shellfish Allergy Hives       PHYSICAL EXAM  VITAL SIGNS: /84   Pulse 98   Temp 36.7 °C (98 °F) (Temporal)   Resp 16   Ht 1.575 m (5' 2\")   Wt 71.6 kg (157 lb 13.6 oz)   SpO2 100%   BMI 28.87 kg/m²    Constitutional: 71-year-old female, awake, oriented x3  HENT: Normocephalic, Atraumatic, Nares:Clear, Oropharynx: moist, well hydrated, posterior pharynx:clear   Eyes: PERRL, EOMI, Conjunctiva normal, No discharge.   Neck: Normal range of motion, No tenderness, Supple, No stridor.   Lymphatic: No lymphadenopathy noted.   Cardiovascular: Regular rate and rhythm without mumurs, gallups, rubs   Thorax & Lungs: Normal Equal breath sounds, No respiratory distress, No wheezing, no stridor, no rales. No chest tenderness.   Abdomen: Soft, nontender, nondistended, no organomegaly, positive bowel sounds normal in quality. No guarding or rebound.  Skin: Good skin turgor, pink, warm, dry. No rashes, petechiae, purpura. Normal capillary refill.   Back: No tenderness, No CVA tenderness.   Extremities: Intact distal pulses, No edema, No tenderness, No cyanosis,  Vascular: Pulses are 2+, symmetric in the upper and lower extremities.  Musculoskeletal: No tenderness to the right hip, thigh, knee, leg, ankle; right foot reveals tenderness over the plantar surface of the foot on the proximal area but no surrounding erythema edema identified; the left lower extremity show some diffuse arthritic changes but no acute abnormality;  Neurologic: Alert & oriented x 3,  No gross focal deficits noted.   Psychiatric: Affect normal, Judgment normal, Mood normal. "       RADIOLOGY/PROCEDURES  DX-FOOT-COMPLETE 3+ RIGHT   Final Result      No radiographic evidence of acute traumatic injury or bony erosion.            COURSE & MEDICAL DECISION MAKING  Pertinent Labs & Imaging studies reviewed. (See chart for details)  Discussion: At this time, the patient presents with over 1 week history of pain in her foot.  The pain is localized over the plantar surface of the heel at the insertion of the plantar fascia into the calcaneus.  This is highly suggestive of plantar fasciitis.  I do not see any evidence of infection or other acute conditions at this time.  Based on the findings, I do not see indication for further laboratory studies or imaging studies.  I have discussed the findings and treatment plan with the patient.  She indicates that she is comfortable with this explanation disposition.    FINAL IMPRESSION  1. Foot pain, right    2. Plantar fasciitis of right foot         PLAN  1.  Appropriate discharge instructions given  2.  Motrin 600 mg #30  3.  Follow-up with primary care    Electronically signed by: Guy G Gansert, M.D., 1/7/2022 2:58 PM

## 2022-01-08 NOTE — ED NOTES
Reviewed discharge instructions, pt verbalized understanding of instructions and medication. States she will schedule follow-up appointment if needed. Denies further questions at this time. Pt ambulatory out of ER with steady gait.

## 2022-02-22 ENCOUNTER — APPOINTMENT (OUTPATIENT)
Dept: RADIOLOGY | Facility: MEDICAL CENTER | Age: 72
End: 2022-02-22
Attending: EMERGENCY MEDICINE
Payer: MEDICARE

## 2022-02-22 ENCOUNTER — HOSPITAL ENCOUNTER (EMERGENCY)
Facility: MEDICAL CENTER | Age: 72
End: 2022-02-22
Attending: EMERGENCY MEDICINE
Payer: MEDICARE

## 2022-02-22 VITALS
HEART RATE: 86 BPM | DIASTOLIC BLOOD PRESSURE: 92 MMHG | HEIGHT: 62 IN | RESPIRATION RATE: 16 BRPM | OXYGEN SATURATION: 96 % | TEMPERATURE: 97.6 F | WEIGHT: 160.94 LBS | BODY MASS INDEX: 29.62 KG/M2 | SYSTOLIC BLOOD PRESSURE: 180 MMHG

## 2022-02-22 DIAGNOSIS — M19.072 PRIMARY OSTEOARTHRITIS OF LEFT FOOT: ICD-10-CM

## 2022-02-22 PROCEDURE — 99283 EMERGENCY DEPT VISIT LOW MDM: CPT

## 2022-02-22 PROCEDURE — 73630 X-RAY EXAM OF FOOT: CPT | Mod: LT

## 2022-02-22 RX ORDER — LIDOCAINE 50 MG/G
1 PATCH TOPICAL EVERY 24 HOURS
Qty: 10 PATCH | Refills: 0 | Status: SHIPPED | OUTPATIENT
Start: 2022-02-22 | End: 2022-04-04

## 2022-02-22 RX ORDER — MELOXICAM 7.5 MG/1
7.5 TABLET ORAL DAILY
Qty: 30 TABLET | Refills: 0 | Status: SHIPPED | OUTPATIENT
Start: 2022-02-22 | End: 2022-04-04

## 2022-02-22 ASSESSMENT — FIBROSIS 4 INDEX: FIB4 SCORE: 1.71

## 2022-02-23 NOTE — ED NOTES
DC home with written and verbal instructions regarding f/u, activity and RX for lidocaine and mobic to  at her pharmacy.  Verbalized understanding of all instructions, assisted out in WC to ride home with all belongings.

## 2022-02-23 NOTE — ED TRIAGE NOTES
"Chief Complaint   Patient presents with   • Leg Pain     X 1 week. Pt denies injury to leg. No swelling or redness noted to leg. CMS intact. No signs of injury or infection noted to leg.        Pt assisted to triage with above complaint.     BP (!) 183/91   Pulse 89   Temp 36.1 °C (97 °F) (Temporal)   Resp 16   Ht 1.575 m (5' 2\")   Wt 73 kg (160 lb 15 oz)   SpO2 96%   BMI 29.44 kg/m²     "

## 2022-02-23 NOTE — ED PROVIDER NOTES
ED Provider Note    CHIEF COMPLAINT  Chief Complaint   Patient presents with   • Leg Pain     X 1 week. Pt denies injury to leg. No swelling or redness noted to leg. CMS intact. No signs of injury or infection noted to leg.        HPI  Serenity Howe is a 71 y.o. female who presents with chief complaint of leg pain for 1 week.  Patient reports that she does not recall any trauma.  She denies any knee or hip pain.  She denies any ankle pain.  She reports that the pain is worse when she tries to walk on her foot.  Pain resolves if she is not standing on her foot.  Patient reports pain is most pronounced on the top of her foot.  She denies any associated weakness or numbness.  She denies any fevers or chills or constitutional symptoms.    REVIEW OF SYSTEMS  ROS  See HPI for further details. All other systems are negative.     PAST MEDICAL HISTORY   has a past medical history of ASTHMA, Cancer (HCC), History of breast cancer (7/29/2015), Hypertension, Shoulder pain, right (7/29/2015), and Tremor (7/29/2015).    SOCIAL HISTORY  Social History     Tobacco Use   • Smoking status: Never Smoker   • Smokeless tobacco: Never Used   Vaping Use   • Vaping Use: Never used   Substance and Sexual Activity   • Alcohol use: No   • Drug use: No   • Sexual activity: Never     Partners: Male       SURGICAL HISTORY   has a past surgical history that includes mastectomy and gyn surgery.    CURRENT MEDICATIONS  Home Medications     Reviewed by Yaz Brenner R.N. (Registered Nurse) on 02/22/22 at 1639  Med List Status: <None>   Medication Last Dose Status   acetaminophen (TYLENOL) 325 MG Tab  Active   albuterol 108 (90 Base) MCG/ACT Aero Soln inhalation aerosol  Active   aspirin (ASA) 81 MG CHEW  Active   Calcium Carbonate-Vit D-Min (CALTRATE PLUS PO)  Active   clotrimazole (LOTRIMIN) 1 % Cream  Active   cyclobenzaprine (FLEXERIL) 5 mg tablet  Active   cyclobenzaprine (FLEXERIL) 5 mg tablet  Active   fluticasone (FLONASE) 50  MCG/ACT nasal spray  Active   ibuprofen (MOTRIN) 600 MG Tab  Active   methylPREDNISolone (MEDROL DOSEPAK) 4 MG Tablet Therapy Pack  Active   valsartan-hydrochlorothiazide (DIOVAN-HCT) 80-12.5 MG per tablet  Active   verapamil ER (CALAN SR) 120 MG CAPSULE SR 24 HR  Active   WIXELA INHUB 250-50 MCG/DOSE AEROSOL POWDER, BREATH ACTIVATED  Active                ALLERGIES  Allergies   Allergen Reactions   • Shellfish Allergy Hives       PHYSICAL EXAM  Vitals:    02/22/22 1632   BP: (!) 183/91   Pulse: 89   Resp: 16   Temp: 36.1 °C (97 °F)   SpO2: 96%       Physical Exam  Constitutional:       Appearance: She is well-developed.   HENT:      Head: Normocephalic and atraumatic.   Eyes:      Conjunctiva/sclera: Conjunctivae normal.   Pulmonary:      Effort: Pulmonary effort is normal.   Musculoskeletal:      Cervical back: Normal range of motion and neck supple.      Comments: Tenderness palpation overlying the base of the first metatarsal on the anterior aspect, there is no associated overlying skin changes.  Distal pulses are 2+.  There is no associated edema.  Bilateral malleoli are unremarkable.  Compartments are soft.  Sensation is intact throughout.  There are no associated ulcerations.   Skin:     General: Skin is warm.   Neurological:      Mental Status: She is alert and oriented to person, place, and time.   Psychiatric:         Behavior: Behavior normal.           COURSE & MEDICAL DECISION MAKING  Pertinent Labs & Imaging studies reviewed. (See chart for details)    Patient here with possible stress fracture versus osteoarthritis.  Patient without any associated fevers or chills.  She does not have any overlying skin changes.  There is no associated ulcerations.  Have a low suspicion of osteomyelitis.  Patient with full range of motion of her ankle joint and toes.  I believe septic arthritis is highly unlikely.  Patient x-ray reveals findings most consistent with osteoarthritis.  I given her meloxicam, I have  educated her to use either over-the-counter lidocaine patches or icy hot as well.  I discussed return precautions.       The patient will return for worsening symptoms and is stable at the time of discharge. The patient verbalizes understanding and will comply.    FINAL IMPRESSION    1. Primary osteoarthritis of left foot               Electronically signed by: Everett Das M.D., 2/22/2022 5:41 PM

## 2022-02-23 NOTE — DISCHARGE INSTRUCTIONS
Your x-ray did not show any evidence of broken bone but we did have findings consistent with osteoarthritis, you can wear the Ortho boot for comfort.  I have given you meloxicam which is an anti-inflammatory that you can take daily for your pain, you can also put some Lidoderm patches on the area of pain which may help.  You can also try icy hot.  Please follow-up closely with your primary care physician.

## 2022-02-23 NOTE — ED NOTES
Applied a walking boot to left leg as ordered.    Tissue Cultured Epidermal Autograft Text: The defect edges were debeveled with a #15 scalpel blade.  Given the location of the defect, shape of the defect and the proximity to free margins a tissue cultured epidermal autograft was deemed most appropriate.  The graft was then trimmed to fit the size of the defect.  The graft was then placed in the primary defect and oriented appropriately.

## 2022-02-24 ENCOUNTER — TELEPHONE (OUTPATIENT)
Dept: MEDICAL GROUP | Facility: PHYSICIAN GROUP | Age: 72
End: 2022-02-24
Payer: MEDICAID

## 2022-02-25 NOTE — TELEPHONE ENCOUNTER
I called and left a message for the patient with my phone number to schedule a follow-up appointment with [facility] ER visit for osteoarthritis.

## 2022-02-28 ENCOUNTER — HOSPITAL ENCOUNTER (EMERGENCY)
Facility: MEDICAL CENTER | Age: 72
End: 2022-02-28
Attending: EMERGENCY MEDICINE
Payer: MEDICARE

## 2022-02-28 ENCOUNTER — APPOINTMENT (OUTPATIENT)
Dept: RADIOLOGY | Facility: MEDICAL CENTER | Age: 72
End: 2022-02-28
Attending: EMERGENCY MEDICINE
Payer: MEDICARE

## 2022-02-28 VITALS
TEMPERATURE: 97.8 F | SYSTOLIC BLOOD PRESSURE: 153 MMHG | HEART RATE: 81 BPM | RESPIRATION RATE: 17 BRPM | OXYGEN SATURATION: 94 % | HEIGHT: 62 IN | WEIGHT: 159.83 LBS | DIASTOLIC BLOOD PRESSURE: 83 MMHG | BODY MASS INDEX: 29.41 KG/M2

## 2022-02-28 DIAGNOSIS — M25.561 RIGHT KNEE PAIN, UNSPECIFIED CHRONICITY: ICD-10-CM

## 2022-02-28 PROCEDURE — 73564 X-RAY EXAM KNEE 4 OR MORE: CPT | Mod: RT

## 2022-02-28 PROCEDURE — 99283 EMERGENCY DEPT VISIT LOW MDM: CPT

## 2022-02-28 PROCEDURE — 93971 EXTREMITY STUDY: CPT | Mod: RT

## 2022-02-28 PROCEDURE — 700102 HCHG RX REV CODE 250 W/ 637 OVERRIDE(OP): Performed by: EMERGENCY MEDICINE

## 2022-02-28 PROCEDURE — A9270 NON-COVERED ITEM OR SERVICE: HCPCS | Performed by: EMERGENCY MEDICINE

## 2022-02-28 RX ORDER — ACETAMINOPHEN 325 MG/1
650 TABLET ORAL ONCE
Status: COMPLETED | OUTPATIENT
Start: 2022-02-28 | End: 2022-02-28

## 2022-02-28 RX ADMIN — ACETAMINOPHEN 650 MG: 325 TABLET, FILM COATED ORAL at 21:09

## 2022-02-28 ASSESSMENT — FIBROSIS 4 INDEX: FIB4 SCORE: 1.71

## 2022-03-01 ENCOUNTER — APPOINTMENT (OUTPATIENT)
Dept: MEDICAL GROUP | Facility: PHYSICIAN GROUP | Age: 72
End: 2022-03-01
Payer: MEDICARE

## 2022-03-01 NOTE — ED NOTES
Pt ambulated 25ft with slow, steady gait. Pt refused assistance from a cane and verbalized wanting to go home.

## 2022-03-01 NOTE — ED TRIAGE NOTES
"Chief Complaint   Patient presents with   • Leg Pain     right     /70   Pulse 96   Temp 36.4 °C (97.5 °F) (Temporal)   Resp 16   Ht 1.575 m (5' 2\")   Wt 72.5 kg (159 lb 13.3 oz)   SpO2 95%   BMI 29.23 kg/m²     Presents to the ER with Right leg pain stating at 1200.    Pain is located on back of knee. No swelling, redness or temperature differences noted between right and left leg.      Pt back out to lobby, asked to notify staff of any changes.     "

## 2022-03-01 NOTE — ED PROVIDER NOTES
ED Provider Note    Scribed for Derrick Nam M.D. by Les Preciado. 2/28/2022, 7:29 PM.    Primary care provider: BRITNEY Davis  Means of arrival: Walk in  History obtained from: Patient  History limited by: None    CHIEF COMPLAINT  Chief Complaint   Patient presents with   • Leg Pain     right       HPI  Serenity Howe is a 71 y.o. female who presents to the Emergency Department for evaluation of right knee pain onset 7 hours ago. Patient endorses associated difficulty ambulating, but denies any loss of sensation. Patient denies any recent trauma or falls. Patient states she has a history of hypertension and asthma, but denies any history of diabetes or blood clots. Patient states she takes aspirin for her hypertension.  She has pain in her right knee that radiates down her right leg.  Mostly in the right calf.  She is worried about blood clot.  No chest pain cough shortness of breath fevers or chills.  No trauma numbness or tingling.    REVIEW OF SYSTEMS  Pertinent positives include right knee pain, difficulty ambulating. Pertinent negatives include no loss of sensation.  E    PAST MEDICAL HISTORY   has a past medical history of ASTHMA, Cancer (HCC), History of breast cancer (7/29/2015), Hypertension, Shoulder pain, right (7/29/2015), and Tremor (7/29/2015).    SURGICAL HISTORY   has a past surgical history that includes mastectomy and gyn surgery.    SOCIAL HISTORY  Social History     Tobacco Use   • Smoking status: Never Smoker   • Smokeless tobacco: Never Used   Vaping Use   • Vaping Use: Never used   Substance Use Topics   • Alcohol use: No   • Drug use: No      Social History     Substance and Sexual Activity   Drug Use No       FAMILY HISTORY  Family History   Problem Relation Age of Onset   • Hyperlipidemia Mother    • No Known Problems Father    • No Known Problems Sister    • No Known Problems Brother    • No Known Problems Daughter    • No Known Problems Daughter    • No  "Known Problems Son        CURRENT MEDICATIONS  Home Medications    Current medications can be seen on the nurse's note.           ALLERGIES  Allergies   Allergen Reactions   • Shellfish Allergy Hives       PHYSICAL EXAM  VITAL SIGNS: /70   Pulse 96   Temp 36.4 °C (97.5 °F) (Temporal)   Resp 16   Ht 1.575 m (5' 2\")   Wt 72.5 kg (159 lb 13.3 oz)   SpO2 95%   BMI 29.23 kg/m²   Vitals reviewed.  Constitutional: Well developed, Well nourished, No acute distress, Non-toxic appearance.   HENT: Normocephalic, Atraumatic,  Eyes: PERRL, EOMI, Conjunctiva cris  Musculoskeletal: Good range of motion in all major joints.  Right knee is diffusely tender mostly tender popliteal fossa is no swelling warmth or redness.  There is good range of motion.  Ligaments are stable.  She is mild tenderness in the proximal impression the calf.  The calf is not firm or swollen or red.  She has good range of motion.  Negative Homans' sign.  Good pulses.  Normal perfusion.  Neurologic: Alert, No focal deficits noted.   Psychiatric: Affect normal    RADIOLOGY  DX-KNEE COMPLETE 4+ RIGHT   Final Result      1.  There is been slight interval progression of degenerative change in the right knee.   2.  There is no acute bony process.      US-EXTREMITY VENOUS LOWER UNILAT RIGHT   Final Result        The radiologist's interpretation of all radiological studies have been reviewed by me.    COURSE & MEDICAL DECISION MAKING  Pertinent Labs & Imaging studies reviewed. (See chart for details)    7:29 PM - Patient seen and examined at bedside. The patient presents with right knee pain, and the differential diagnosis includes but is not limited to Arthritis, muscle strain, DVT, knee strain. Ordered for Dx-Knee right and US-extremity venous lower unilateral right to evaluate.    9:06 PM - Patient will be medicated with Tylenol 650 mg. Discussed lab and radiology results with the patient and informed them ultrasound shows no evidence of DVT.  X-ray " is reassuring.  Discussed plans for discharge and return precautions. Instructed the patient to follow up with their PCP. Patient verbalizes understanding and agreement to this plan of care.      I think we can treat this conservatively with some Tylenol, and rest.  The patient did not have much difficulty ambulating although she was a bit slow.  To return for pain swelling or other concerns.  Questions are answered.  She is agreeable to plan she is discharged in good condition.    The patient will return for new or worsening symptoms and is stable at the time of discharge.    The patient is referred to a primary physician for blood pressure management, diabetic screening, and for all other preventative health concerns.    DISPOSITION:  Patient will be discharged home in stable condition.    FOLLOW UP:  Yun Allred A.P.R.NKarin  [address] [address]  [phone]            OUTPATIENT MEDICATIONS:  Discharge Medication List as of 2/28/2022  9:14 PM           FINAL IMPRESSION  1. Right knee pain, unspecified chronicity          Les ZIMMERMAN ([place]), am scribing for, and in the presence of, Derrick Nam M.D..    Electronically signed by: Les Preciado ([place]), 2/28/2022    Derrick ZIMMERMAN M.D. personally performed the services described in this documentation, as scribed by Les Preciado in my presence, and it is both accurate and complete.    The note accurately reflects work and decisions made by me.  Derrick Nam M.D.  2/28/2022  9:52 PM

## 2022-03-02 ENCOUNTER — OFFICE VISIT (OUTPATIENT)
Dept: MEDICAL GROUP | Facility: PHYSICIAN GROUP | Age: 72
End: 2022-03-02
Payer: MEDICARE

## 2022-03-02 VITALS
TEMPERATURE: 97.6 F | HEART RATE: 78 BPM | OXYGEN SATURATION: 100 % | DIASTOLIC BLOOD PRESSURE: 68 MMHG | SYSTOLIC BLOOD PRESSURE: 112 MMHG | BODY MASS INDEX: 29.63 KG/M2 | WEIGHT: 161 LBS | HEIGHT: 62 IN

## 2022-03-02 DIAGNOSIS — R73.03 PRE-DIABETES: ICD-10-CM

## 2022-03-02 DIAGNOSIS — Z09 HOSPITAL DISCHARGE FOLLOW-UP: ICD-10-CM

## 2022-03-02 DIAGNOSIS — Z12.11 SCREENING FOR COLORECTAL CANCER: ICD-10-CM

## 2022-03-02 DIAGNOSIS — Z12.12 SCREENING FOR COLORECTAL CANCER: ICD-10-CM

## 2022-03-02 DIAGNOSIS — N18.31 STAGE 3A CHRONIC KIDNEY DISEASE: ICD-10-CM

## 2022-03-02 DIAGNOSIS — I10 ESSENTIAL HYPERTENSION: ICD-10-CM

## 2022-03-02 DIAGNOSIS — J45.40 MODERATE PERSISTENT ASTHMA WITHOUT COMPLICATION: ICD-10-CM

## 2022-03-02 DIAGNOSIS — Z23 NEED FOR VACCINATION: ICD-10-CM

## 2022-03-02 PROCEDURE — 90662 IIV NO PRSV INCREASED AG IM: CPT | Performed by: NURSE PRACTITIONER

## 2022-03-02 PROCEDURE — G0008 ADMIN INFLUENZA VIRUS VAC: HCPCS | Performed by: NURSE PRACTITIONER

## 2022-03-02 PROCEDURE — 99214 OFFICE O/P EST MOD 30 MIN: CPT | Mod: 25 | Performed by: NURSE PRACTITIONER

## 2022-03-02 ASSESSMENT — FIBROSIS 4 INDEX: FIB4 SCORE: 1.71

## 2022-03-02 ASSESSMENT — PATIENT HEALTH QUESTIONNAIRE - PHQ9: CLINICAL INTERPRETATION OF PHQ2 SCORE: 0

## 2022-03-02 NOTE — ASSESSMENT & PLAN NOTE
She has had 3 ER visits since 2022.  She was seen on 1/7/2022 for right foot pain and diagnosed with plantar fasciitis.  She was prescribed Motrin.  On 2/22/2022 she complained of left leg pain and foot pain.  She had imaging that did not show any fracture or osteomyelitis.  Imaging was consistent with osteoarthritis.  She was prescribed meloxicam.  On 2/20/2022 she was seen in the ER for right leg pain and she was concerned for blood clot in her calf.  Imaging of her right knee showed degenerative changes without any acute bony processes.  Ultrasound of the right leg was negative for any DVT.  She was treated conservatively with Tylenol and rest.

## 2022-03-02 NOTE — ASSESSMENT & PLAN NOTE
Patient discharged to [facility]. Provided discharge instructions and prescriptions. IV removed, patient tolerated well.  Tele box removed.  Left unit via wheel chair without incident.  Report called to Seema OVIEDO.      She continues with her Wixela 250-50 mcg twice daily.  She is interested in pulmonary function testing.

## 2022-03-02 NOTE — PROGRESS NOTES
Subjective:     CC: hospital follow-up    HPI:   Serenity presents today with the following:     Hospital discharge follow-up  She has had 3 ER visits since 2022.  She was seen on 1/7/2022 for right foot pain and diagnosed with plantar fasciitis.  She was prescribed Motrin.  On 2/22/2022 she complained of left leg pain and foot pain.  She had imaging that did not show any fracture or osteomyelitis.  Imaging was consistent with osteoarthritis.  She was prescribed meloxicam.  On 2/20/2022 she was seen in the ER for right leg pain and she was concerned for blood clot in her calf.  Imaging of her right knee showed degenerative changes without any acute bony processes.  Ultrasound of the right leg was negative for any DVT.  She was treated conservatively with Tylenol and rest.    Stage 3a chronic kidney disease (HCC)  Her blood pressure is controlled today.  She has taken ibuprofen in the past as needed.  She had recent hospitalizations that showed arthritis to her knee and feet.  She is due for updated labs would like to discuss alternative for her arthritis.    Moderate persistent asthma without complication  She continues with her Wixela 250-50 mcg twice daily.  She is interested in pulmonary function testing.      Past Medical History:   Diagnosis Date   • ASTHMA    • Cancer (HCC)    • History of breast cancer 7/29/2015   • Hypertension    • Shoulder pain, right 7/29/2015   • Tremor 7/29/2015       Social History     Tobacco Use   • Smoking status: Never Smoker   • Smokeless tobacco: Never Used   Vaping Use   • Vaping Use: Never used   Substance Use Topics   • Alcohol use: No   • Drug use: No       Current Outpatient Medications Ordered in Epic   Medication Sig Dispense Refill   • ibuprofen (MOTRIN) 600 MG Tab Take 1 Tablet by mouth every 6 hours as needed (pain). 30 Tablet 0   • valsartan-hydrochlorothiazide (DIOVAN-HCT) 80-12.5 MG per tablet Take 1 Tablet by mouth every day. 90 Tablet 2   • albuterol 108 (90 Base)  "MCG/ACT Aero Soln inhalation aerosol Inhale 2 Puffs every 6 hours as needed for Shortness of Breath. 8.5 g 0   • verapamil ER (CALAN SR) 120 MG CAPSULE SR 24 HR Take 1 capsule by mouth every day. 90 capsule 3   • acetaminophen (TYLENOL) 325 MG Tab Take 650 mg by mouth every four hours as needed.     • WIXELA INHUB 250-50 MCG/DOSE AEROSOL POWDER, BREATH ACTIVATED INHALE 1 PUFF BY MOUTH TWICE DAILY RINSE MOUTH AFTER USE 3 Inhaler 3   • meloxicam (MOBIC) 7.5 MG Tab Take 1 Tablet by mouth every day. 30 Tablet 0   • lidocaine (LIDODERM) 5 % Patch Place 1 Patch on the skin every 24 hours. (Patient not taking: Reported on 3/2/2022) 10 Patch 0   • clotrimazole (LOTRIMIN) 1 % Cream Apply to affected area twice daily. Clean area with soap and water, pat dry, before each application. 45 g 0   • Calcium Carbonate-Vit D-Min (CALTRATE PLUS PO) Take 1 Tab by mouth every day.     • aspirin (ASA) 81 MG CHEW Take 81 mg by mouth every day.       No current [facility]-ordered facility-administered medications on file.       Allergies:  Shellfish allergy    Health Maintenance: Due for annual PFT, colon cancer screening, influenza vaccine and COVID booster. Patient informed that booster can be obtained at the pharmacy.  She informs us that she has misplaced her COVID vaccination card.      Objective:     Vital signs reviewed  Exam:  /68 (BP Location: Left arm, Patient Position: Sitting, BP Cuff Size: Adult)   Pulse 78   Temp 36.4 °C (97.6 °F) (Temporal)   Ht 1.575 m (5' 2\")   Wt 73 kg (161 lb)   SpO2 100%   BMI 29.45 kg/m²  Body mass index is 29.45 kg/m².    Gen: Alert and oriented, No apparent distress.  Lungs: Normal effort, CTA bilaterally, no wheezes, rhonchi, or rales  CV: Regular rate and rhythm. No murmurs, rubs, or gallops.  Ext: No clubbing, cyanosis, edema.  No pain on palpation to right posterior knee or right calf.      Assessment & Plan:     71 y.o. female with the following -     1. Hospital discharge follow-up  Acute " uncomplicated problem.  Reviewed her 3 hospitalizations for 2022.  We discussed her imaging showed arthritis.  Discussed staying active, maintaining healthy weight, using topical Voltaren instead of oral NSAIDs given her kidney function.  We also discussed she may take Tylenol for days of severe pain.  She verbalized understanding.    2. Moderate persistent asthma without complication  Chronic stable problem.  She will continue with her Wixela twice daily.  Pulmonary function testing ordered.  She will continue with her albuterol as needed.  - PULMONARY FUNCTION TESTS -Test requested: Complete Pulmonary Function Test; Future    3. Essential hypertension  Chronic stable problem.  Her blood pressure is at goal today.  She will continue with her valsartan-hydrochlorothiazide 80-12.5 mg daily and verapamil  mg daily.  She is due for updated labs.  She will return to see me in 3 weeks for her lab results.  - CBC WITH DIFFERENTIAL; Future  - Comp Metabolic Panel; Future  - Lipid Profile; Future    4. Stage 3a chronic kidney disease (HCC)  Chronic stable problem.  Continue to avoid NSAIDs.  We discussed taking Tylenol for pain or using Voltaren topical.  She is due for updated labs.  - Comp Metabolic Panel; Future    5. Pre-diabetes  Chronic stable problem.  Due for updated labs.  She will return to see me in 3 weeks for follow-up on her lab results.  - HEMOGLOBIN A1C; Future    6. Screening for colorectal cancer  Chronic stable problem.  She has not completed her colonoscopy.  New referral placed today.  - Referral to GI for Colonoscopy    7. Need for vaccination  Acute uncomplicated problem.  She would like her influenza vaccine today. I have placed the below orders and discussed them with an approved delegating provider. The MA is performing the below orders under the direction of Dr. Beckford.  - INFLUENZA VACCINE, HIGH DOSE (65+ ONLY)        Return in about 4 weeks (around 3/30/2022) for Labs.    Please note  that this dictation was created using voice recognition software. I have made every reasonable attempt to correct obvious errors, but I expect that there are errors of grammar and possibly content that I did not discover before finalizing the note.

## 2022-03-02 NOTE — PATIENT INSTRUCTIONS
[phone] to schedule ultrasound of left breast    Voltaren topical cream you can buy over the counter for the arthritis pain.     You can also take over the counter Tylenol Arthritis for the arthritis pain.

## 2022-03-02 NOTE — ASSESSMENT & PLAN NOTE
Her blood pressure is controlled today.  She has taken ibuprofen in the past as needed.  She had recent hospitalizations that showed arthritis to her knee and feet.  She is due for updated labs would like to discuss alternative for her arthritis.

## 2022-03-05 ENCOUNTER — HOSPITAL ENCOUNTER (OUTPATIENT)
Dept: LAB | Facility: MEDICAL CENTER | Age: 72
End: 2022-03-05
Attending: NURSE PRACTITIONER
Payer: MEDICARE

## 2022-03-05 DIAGNOSIS — N18.31 STAGE 3A CHRONIC KIDNEY DISEASE: ICD-10-CM

## 2022-03-05 DIAGNOSIS — I10 ESSENTIAL HYPERTENSION: ICD-10-CM

## 2022-03-05 DIAGNOSIS — R73.03 PRE-DIABETES: ICD-10-CM

## 2022-03-05 LAB
ALBUMIN SERPL BCP-MCNC: 4 G/DL (ref 3.2–4.9)
ALBUMIN/GLOB SERPL: 0.9 G/DL
ALP SERPL-CCNC: 79 U/L (ref 30–99)
ALT SERPL-CCNC: 16 U/L (ref 2–50)
ANION GAP SERPL CALC-SCNC: 14 MMOL/L (ref 7–16)
AST SERPL-CCNC: 26 U/L (ref 12–45)
BASOPHILS # BLD AUTO: 0.9 % (ref 0–1.8)
BASOPHILS # BLD: 0.06 K/UL (ref 0–0.12)
BILIRUB SERPL-MCNC: 0.5 MG/DL (ref 0.1–1.5)
BUN SERPL-MCNC: 19 MG/DL (ref 8–22)
CALCIUM SERPL-MCNC: 10.5 MG/DL (ref 8.5–10.5)
CHLORIDE SERPL-SCNC: 105 MMOL/L (ref 96–112)
CHOLEST SERPL-MCNC: 156 MG/DL (ref 100–199)
CO2 SERPL-SCNC: 22 MMOL/L (ref 20–33)
CREAT SERPL-MCNC: 1.05 MG/DL (ref 0.5–1.4)
EOSINOPHIL # BLD AUTO: 0.28 K/UL (ref 0–0.51)
EOSINOPHIL NFR BLD: 4.2 % (ref 0–6.9)
ERYTHROCYTE [DISTWIDTH] IN BLOOD BY AUTOMATED COUNT: 45.6 FL (ref 35.9–50)
EST. AVERAGE GLUCOSE BLD GHB EST-MCNC: 123 MG/DL
FASTING STATUS PATIENT QL REPORTED: NORMAL
GLOBULIN SER CALC-MCNC: 4.4 G/DL (ref 1.9–3.5)
GLUCOSE SERPL-MCNC: 97 MG/DL (ref 65–99)
HBA1C MFR BLD: 5.9 % (ref 4–5.6)
HCT VFR BLD AUTO: 43.5 % (ref 37–47)
HDLC SERPL-MCNC: 53 MG/DL
HGB BLD-MCNC: 15.2 G/DL (ref 12–16)
IMM GRANULOCYTES # BLD AUTO: 0.02 K/UL (ref 0–0.11)
IMM GRANULOCYTES NFR BLD AUTO: 0.3 % (ref 0–0.9)
LDLC SERPL CALC-MCNC: 86 MG/DL
LYMPHOCYTES # BLD AUTO: 2.13 K/UL (ref 1–4.8)
LYMPHOCYTES NFR BLD: 32.1 % (ref 22–41)
MCH RBC QN AUTO: 33.5 PG (ref 27–33)
MCHC RBC AUTO-ENTMCNC: 34.9 G/DL (ref 33.6–35)
MCV RBC AUTO: 95.8 FL (ref 81.4–97.8)
MONOCYTES # BLD AUTO: 0.57 K/UL (ref 0–0.85)
MONOCYTES NFR BLD AUTO: 8.6 % (ref 0–13.4)
NEUTROPHILS # BLD AUTO: 3.57 K/UL (ref 2–7.15)
NEUTROPHILS NFR BLD: 53.9 % (ref 44–72)
NRBC # BLD AUTO: 0 K/UL
NRBC BLD-RTO: 0 /100 WBC
PLATELET # BLD AUTO: 296 K/UL (ref 164–446)
PMV BLD AUTO: 9.7 FL (ref 9–12.9)
POTASSIUM SERPL-SCNC: 3.6 MMOL/L (ref 3.6–5.5)
PROT SERPL-MCNC: 8.4 G/DL (ref 6–8.2)
RBC # BLD AUTO: 4.54 M/UL (ref 4.2–5.4)
SODIUM SERPL-SCNC: 141 MMOL/L (ref 135–145)
TRIGL SERPL-MCNC: 83 MG/DL (ref 0–149)
WBC # BLD AUTO: 6.6 K/UL (ref 4.8–10.8)

## 2022-03-05 PROCEDURE — 85025 COMPLETE CBC W/AUTO DIFF WBC: CPT

## 2022-03-05 PROCEDURE — 80053 COMPREHEN METABOLIC PANEL: CPT

## 2022-03-05 PROCEDURE — 83036 HEMOGLOBIN GLYCOSYLATED A1C: CPT | Mod: GA

## 2022-03-05 PROCEDURE — 36415 COLL VENOUS BLD VENIPUNCTURE: CPT | Mod: GA

## 2022-03-05 PROCEDURE — 80061 LIPID PANEL: CPT

## 2022-03-10 ENCOUNTER — OFFICE VISIT (OUTPATIENT)
Dept: URGENT CARE | Facility: PHYSICIAN GROUP | Age: 72
End: 2022-03-10
Payer: MEDICARE

## 2022-03-10 VITALS
OXYGEN SATURATION: 99 % | HEART RATE: 89 BPM | HEIGHT: 62 IN | BODY MASS INDEX: 31.1 KG/M2 | WEIGHT: 169 LBS | SYSTOLIC BLOOD PRESSURE: 134 MMHG | DIASTOLIC BLOOD PRESSURE: 78 MMHG | RESPIRATION RATE: 15 BRPM | TEMPERATURE: 98.6 F

## 2022-03-10 DIAGNOSIS — M25.561 ACUTE PAIN OF RIGHT KNEE: ICD-10-CM

## 2022-03-10 DIAGNOSIS — M17.11 OSTEOARTHRITIS OF RIGHT KNEE, UNSPECIFIED OSTEOARTHRITIS TYPE: ICD-10-CM

## 2022-03-10 PROCEDURE — 99214 OFFICE O/P EST MOD 30 MIN: CPT | Performed by: PHYSICIAN ASSISTANT

## 2022-03-10 RX ORDER — METHYLPREDNISOLONE 4 MG/1
TABLET ORAL
Qty: 21 TABLET | Refills: 0 | Status: SHIPPED | OUTPATIENT
Start: 2022-03-10 | End: 2022-04-04

## 2022-03-10 ASSESSMENT — FIBROSIS 4 INDEX: FIB4 SCORE: 1.58

## 2022-03-17 ASSESSMENT — ENCOUNTER SYMPTOMS
SHORTNESS OF BREATH: 0
VOMITING: 0
SENSORY CHANGE: 0
FEVER: 0
NAUSEA: 0
TINGLING: 0
CHILLS: 0
PALPITATIONS: 0
LEG PAIN: 1
SORE THROAT: 0
BLURRED VISION: 0

## 2022-03-17 NOTE — PROGRESS NOTES
Subjective     Serenity Howe is a 71 y.o. female who presents with Leg Pain ( Leg pain been going on for about 1x week.)    HPI:  Serenity Howe is a 71 y.o. female who presents today for evaluation of right knee pain. Patient was seen in the emergency department on 2/28/2022 for evaluation of right knee pain.  At that time symptoms have been present for less than 24 hours.  She was concerned about blood clot so she went to the ER.  She had an ultrasound of her right lower extremity which did not reveal any evidence of DVT.  She also had a x-ray of her right knee which showed some evidence of degenerative changes but no acute process.  Patient was given acetaminophen in the ER was recommended to continue taking acetaminophen and rest the area.  She reports that she has been doing that but has not had any relief in symptoms.  She is also tried using topical Voltaren gel without relief.  She denies any numbness/tingling, chest pain, or shortness of breath.        Review of Systems   Constitutional: Negative for chills and fever.   HENT: Negative for sore throat.    Eyes: Negative for blurred vision.   Respiratory: Negative for shortness of breath.    Cardiovascular: Negative for chest pain and palpitations.   Gastrointestinal: Negative for nausea and vomiting.   Musculoskeletal: Positive for joint pain (right knee).   Neurological: Negative for tingling and sensory change.             PMH:  has a past medical history of ASTHMA, Cancer (HCC), History of breast cancer (7/29/2015), Hypertension, Shoulder pain, right (7/29/2015), and Tremor (7/29/2015).    She has no past medical history of COPD.  MEDS:   Current Outpatient Medications:   •  methylPREDNISolone (MEDROL DOSEPAK) 4 MG Tablet Therapy Pack, Follow schedule on package instructions., Disp: 21 Tablet, Rfl: 0  •  meloxicam (MOBIC) 7.5 MG Tab, Take 1 Tablet by mouth every day., Disp: 30 Tablet, Rfl: 0  •  valsartan-hydrochlorothiazide (DIOVAN-HCT)  "80-12.5 MG per tablet, Take 1 Tablet by mouth every day., Disp: 90 Tablet, Rfl: 2  •  albuterol 108 (90 Base) MCG/ACT Aero Soln inhalation aerosol, Inhale 2 Puffs every 6 hours as needed for Shortness of Breath., Disp: 8.5 g, Rfl: 0  •  verapamil ER (CALAN SR) 120 MG CAPSULE SR 24 HR, Take 1 capsule by mouth every day., Disp: 90 capsule, Rfl: 3  •  acetaminophen (TYLENOL) 325 MG Tab, Take 650 mg by mouth every four hours as needed., Disp: , Rfl:   •  WIXELA INHUB 250-50 MCG/DOSE AEROSOL POWDER, BREATH ACTIVATED, INHALE 1 PUFF BY MOUTH TWICE DAILY RINSE MOUTH AFTER USE, Disp: 3 Inhaler, Rfl: 3  •  Calcium Carbonate-Vit D-Min (CALTRATE PLUS PO), Take 1 Tab by mouth every day., Disp: , Rfl:   •  aspirin (ASA) 81 MG CHEW, Take 81 mg by mouth every day., Disp: , Rfl:   •  lidocaine (LIDODERM) 5 % Patch, Place 1 Patch on the skin every 24 hours. (Patient not taking: No sig reported), Disp: 10 Patch, Rfl: 0  •  ibuprofen (MOTRIN) 600 MG Tab, Take 1 Tablet by mouth every 6 hours as needed (pain). (Patient not taking: Reported on 3/10/2022), Disp: 30 Tablet, Rfl: 0  •  clotrimazole (LOTRIMIN) 1 % Cream, Apply to affected area twice daily. Clean area with soap and water, pat dry, before each application. (Patient not taking: Reported on 3/10/2022), Disp: 45 g, Rfl: 0  ALLERGIES:   Allergies   Allergen Reactions   • Shellfish Allergy Hives     SURGHX:   Past Surgical History:   Procedure Laterality Date   • GYN SURGERY       x1   • MASTECTOMY      right      SOCHX:  reports that she has never smoked. She has never used smokeless tobacco. She reports that she does not drink alcohol and does not use drugs.  FH: Family history was reviewed, no pertinent findings to report      Objective     /78   Pulse 89   Temp 37 °C (98.6 °F)   Resp 15   Ht 1.575 m (5' 2\")   Wt 76.7 kg (169 lb)   SpO2 99%   BMI 30.91 kg/m²      Physical Exam  Constitutional:       Appearance: She is well-developed.   HENT:      Head: " Normocephalic and atraumatic.      Right Ear: External ear normal.      Left Ear: External ear normal.   Eyes:      Conjunctiva/sclera: Conjunctivae normal.      Pupils: Pupils are equal, round, and reactive to light.   Cardiovascular:      Rate and Rhythm: Normal rate and regular rhythm.      Heart sounds: Normal heart sounds. No murmur heard.  Pulmonary:      Effort: Pulmonary effort is normal.      Breath sounds: Normal breath sounds. No wheezing.   Musculoskeletal:      Right knee: No swelling, effusion, erythema or ecchymosis. Normal range of motion. Tenderness (Right knee is diffusely tender to palpation without overlying erythema or ecchymosis.) present. No LCL laxity or MCL laxity. Normal meniscus.      Instability Tests: Anterior drawer test negative. Medial Manuel test negative and lateral Manuel test negative.   Skin:     General: Skin is warm and dry.      Capillary Refill: Capillary refill takes less than 2 seconds.   Neurological:      Mental Status: She is alert and oriented to person, place, and time.   Psychiatric:         Behavior: Behavior normal.         Judgment: Judgment normal.           Assessment & Plan     1. Acute pain of right knee  - methylPREDNISolone (MEDROL DOSEPAK) 4 MG Tablet Therapy Pack; Follow schedule on package instructions.  Dispense: 21 Tablet; Refill: 0    2. Osteoarthritis of right knee, unspecified osteoarthritis type  - methylPREDNISolone (MEDROL DOSEPAK) 4 MG Tablet Therapy Pack; Follow schedule on package instructions.  Dispense: 21 Tablet; Refill: 0      Drains reviewed from previous ER visits and last visit with her primary care provider.  Seems that she has some osteoarthritis in her right knee.  Will trial a course of oral steroids.  She should continue to apply ice/heat to the affected area.  Recommend that she follow-up with her PCP in the next 1 to 2 weeks for reevaluation.  If still no improvement in symptoms she will likely need a referral to an orthopedic  specialist.              Differential Diagnosis, natural history, and supportive care discussed. Return to the Urgent Care or follow up with your PCP if symptoms fail to resolve, or for any new or worsening symptoms. Emergency room precautions discussed. Patient and/or family appears understanding of information.

## 2022-04-04 ENCOUNTER — OFFICE VISIT (OUTPATIENT)
Dept: MEDICAL GROUP | Facility: PHYSICIAN GROUP | Age: 72
End: 2022-04-04
Payer: MEDICARE

## 2022-04-04 VITALS
HEIGHT: 62 IN | TEMPERATURE: 98.2 F | WEIGHT: 165 LBS | OXYGEN SATURATION: 94 % | DIASTOLIC BLOOD PRESSURE: 66 MMHG | SYSTOLIC BLOOD PRESSURE: 120 MMHG | BODY MASS INDEX: 30.36 KG/M2 | HEART RATE: 98 BPM

## 2022-04-04 DIAGNOSIS — G89.29 CHRONIC PAIN OF RIGHT KNEE: ICD-10-CM

## 2022-04-04 DIAGNOSIS — I10 ESSENTIAL HYPERTENSION: ICD-10-CM

## 2022-04-04 DIAGNOSIS — M25.561 CHRONIC PAIN OF RIGHT KNEE: ICD-10-CM

## 2022-04-04 DIAGNOSIS — R73.03 PRE-DIABETES: ICD-10-CM

## 2022-04-04 DIAGNOSIS — N18.31 STAGE 3A CHRONIC KIDNEY DISEASE: ICD-10-CM

## 2022-04-04 DIAGNOSIS — J45.40 MODERATE PERSISTENT ASTHMA WITHOUT COMPLICATION: ICD-10-CM

## 2022-04-04 PROBLEM — Z09 HOSPITAL DISCHARGE FOLLOW-UP: Status: RESOLVED | Noted: 2022-03-02 | Resolved: 2022-04-04

## 2022-04-04 PROBLEM — L98.8 SKIN LESION OF BREAST: Status: RESOLVED | Noted: 2021-11-08 | Resolved: 2022-04-04

## 2022-04-04 PROCEDURE — 99214 OFFICE O/P EST MOD 30 MIN: CPT | Performed by: NURSE PRACTITIONER

## 2022-04-04 ASSESSMENT — FIBROSIS 4 INDEX: FIB4 SCORE: 1.58

## 2022-04-04 NOTE — PROGRESS NOTES
Subjective:     CC: lab results, referral needed, medication refill    HPI:   Serenity presents today with the following:     Moderate persistent asthma without complication  She continues with Advair 250-50 mcg/dose 1 puff BID. She states her insurance is now covering Advair not Wixela. She would like a medication refill today. She continues with 1-2 weekly use of albuterol.     Chronic pain of right knee  She has been to ER and Urgent Care. She has completed recent medrol pack. Imaging showing progressing degenerative change.  She states the pain is slowly improving.  Pain is worse with ambulating and standing.  She would like a referral to orthopedics.    Essential hypertension  Her blood pressure is at goal today.  She continues with her verapamil 120 mg daily and valsartan-hydrochlorothiazide 80-12.5 mg daily.  She had recent labs completed that she would like to review today.    Pre-diabetes  She continues to watch her sugar intake and portion size.  She had recent labs completed that she would like to review today.    Stage 3a chronic kidney disease (HCC)  She is not taking NSAIDs.  She has completed meloxicam and Medrol Dosepak.  Blood pressure is controlled today.  She is on ARB.      Past Medical History:   Diagnosis Date   • ASTHMA    • Cancer (HCC)    • History of breast cancer 7/29/2015   • Hypertension    • Shoulder pain, right 7/29/2015   • Tremor 7/29/2015       Social History     Tobacco Use   • Smoking status: Never Smoker   • Smokeless tobacco: Never Used   Vaping Use   • Vaping Use: Never used   Substance Use Topics   • Alcohol use: No   • Drug use: No       Current Outpatient Medications Ordered in Epic   Medication Sig Dispense Refill   • diclofenac sodium (VOLTAREN) 1 % Gel 1 JOSSELIN TOPICAL 4 TIMES A DAY FOR 7 DAYS AS NEEDED FOR PAIN     • fluticasone-salmeterol (WIXELA INHUB) 250-50 MCG/DOSE AEROSOL POWDER, BREATH ACTIVATED Inhale 1 Puff 2 times a day. Rince mouth after use 3 Each 3   •  "valsartan-hydrochlorothiazide (DIOVAN-HCT) 80-12.5 MG per tablet Take 1 Tablet by mouth every day. 90 Tablet 2   • albuterol 108 (90 Base) MCG/ACT Aero Soln inhalation aerosol Inhale 2 Puffs every 6 hours as needed for Shortness of Breath. 8.5 g 0   • verapamil ER (CALAN SR) 120 MG CAPSULE SR 24 HR Take 1 capsule by mouth every day. 90 capsule 3   • acetaminophen (TYLENOL) 325 MG Tab Take 650 mg by mouth every four hours as needed.     • Calcium Carbonate-Vit D-Min (CALTRATE PLUS PO) Take 1 Tab by mouth every day.     • aspirin (ASA) 81 MG CHEW Take 81 mg by mouth every day.     • clotrimazole (LOTRIMIN) 1 % Cream Apply to affected area twice daily. Clean area with soap and water, pat dry, before each application. (Patient not taking: Reported on 3/10/2022) 45 g 0     No current Epic-ordered facility-administered medications on file.       Allergies:  Shellfish allergy    Health Maintenance: Due for COVID booster and annual wellness visit    Objective:     Vital signs reviewed  Exam:  /66 (BP Location: Left arm, Patient Position: Sitting, BP Cuff Size: Adult)   Pulse 98   Temp 36.8 °C (98.2 °F) (Temporal)   Ht 1.575 m (5' 2\")   Wt 74.8 kg (165 lb)   SpO2 94%   BMI 30.18 kg/m²  Body mass index is 30.18 kg/m².    Gen: Alert and oriented, No apparent distress.  Lungs: Normal effort, CTA bilaterally, no wheezes, rhonchi, or rales  CV: Regular rate and rhythm. No murmurs, rubs, or gallops.  Ext: No clubbing, cyanosis, edema.  Right knee no pain or tenderness on palpation today with slight swelling to right lateral line.    Assessment & Plan:     71 y.o. female with the following -     1. Moderate persistent asthma without complication  Chronic stable problem.  She will continue with her Advair 250-50 MCG/dose 1 puff twice daily.  Medication refilled today.  She will continue with her albuterol as needed.  Continue to monitor.  .  - fluticasone-salmeterol (ADVAIR) 250-50 MCG/DOSE AEROSOL POWDER, BREATH " ACTIVATED; Inhale 1 Puff every 12 hours.  Dispense: 3 Each; Refill: 3    2. Chronic pain of right knee  Chronic exacerbated problem.  New referral placed to physical therapy and orthopedics.  Continue with weightbearing exercises.  May take over-the-counter Tylenol.  Discussed avoiding NSAIDs due to CKD.  - Referral to Physical Therapy  - Referral to Orthopedics    3. Pre-diabetes  Chronic stable problem.  Discussed and reviewed her recent lab results.  Continue to watch diet and continue with exercising.  Plan to recheck labs in 6 months around October 2022.  - HEMOGLOBIN A1C; Future    4. Stage 3a chronic kidney disease (HCC)  Chronic stable problem.  GFR is stable.  Discussed continue to avoid NSAIDs.  Continuing with valsartan.  Discussed using over-the-counter Tylenol as needed for pain.  Recheck labs in 6 months around October 2022.  Questions answered.  - Comp Metabolic Panel; Future    5. Essential hypertension  Chronic stable problem.  Her blood pressure is at goal today.  She will continue with verapamil 120 mg daily and valsartan-hydrochlorothiazide 80-12.5 mg daily.  Discussed and reviewed her recent lab results today.  Plan to recheck labs in 6 months around October 2022.  Questions answered.      Return in about 6 months (around 10/4/2022).    Please note that this dictation was created using voice recognition software. I have made every reasonable attempt to correct obvious errors, but I expect that there are errors of grammar and possibly content that I did not discover before finalizing the note.

## 2022-04-04 NOTE — ASSESSMENT & PLAN NOTE
She has been to ER and Urgent Care. She has completed recent medrol pack. Imaging showing progressing degenerative change.  She states the pain is slowly improving.  Pain is worse with ambulating and standing.  She would like a referral to orthopedics.

## 2022-04-04 NOTE — PATIENT INSTRUCTIONS
Call [phone] and ask for referral department if you have not heard back from the referral in next 2 weeks.

## 2022-04-04 NOTE — ASSESSMENT & PLAN NOTE
She continues with Advair 250-50 mcg/dose 1 puff BID. She states her insurance is now covering Advair not Wixela. She would like a medication refill today. She continues with 1-2 weekly use of albuterol.

## 2022-04-04 NOTE — ASSESSMENT & PLAN NOTE
She is not taking NSAIDs.  She has completed meloxicam and Medrol Dosepak.  Blood pressure is controlled today.  She is on ARB.

## 2022-04-04 NOTE — ASSESSMENT & PLAN NOTE
She continues to watch her sugar intake and portion size.  She had recent labs completed that she would like to review today.

## 2022-04-04 NOTE — ASSESSMENT & PLAN NOTE
Her blood pressure is at goal today.  She continues with her verapamil 120 mg daily and valsartan-hydrochlorothiazide 80-12.5 mg daily.  She had recent labs completed that she would like to review today.

## 2022-05-25 ENCOUNTER — APPOINTMENT (OUTPATIENT)
Dept: PHYSICAL THERAPY | Facility: REHABILITATION | Age: 72
End: 2022-05-25
Payer: MEDICARE

## 2022-06-01 ENCOUNTER — OFFICE VISIT (OUTPATIENT)
Dept: URGENT CARE | Facility: PHYSICIAN GROUP | Age: 72
End: 2022-06-01
Payer: MEDICARE

## 2022-06-01 VITALS
BODY MASS INDEX: 29.88 KG/M2 | HEIGHT: 62 IN | DIASTOLIC BLOOD PRESSURE: 84 MMHG | WEIGHT: 162.4 LBS | SYSTOLIC BLOOD PRESSURE: 132 MMHG | HEART RATE: 81 BPM | TEMPERATURE: 97.3 F | OXYGEN SATURATION: 97 % | RESPIRATION RATE: 14 BRPM

## 2022-06-01 DIAGNOSIS — Z87.19 HISTORY OF HEMORRHOIDS: ICD-10-CM

## 2022-06-01 DIAGNOSIS — K64.9 HEMORRHOIDS, UNSPECIFIED HEMORRHOID TYPE: ICD-10-CM

## 2022-06-01 DIAGNOSIS — K59.00 CONSTIPATION, UNSPECIFIED CONSTIPATION TYPE: ICD-10-CM

## 2022-06-01 PROCEDURE — 99214 OFFICE O/P EST MOD 30 MIN: CPT | Performed by: NURSE PRACTITIONER

## 2022-06-01 RX ORDER — DOCUSATE SODIUM 100 MG/1
100 CAPSULE, LIQUID FILLED ORAL 2 TIMES DAILY PRN
Qty: 30 CAPSULE | Refills: 0 | Status: SHIPPED | OUTPATIENT
Start: 2022-06-01 | End: 2022-07-25

## 2022-06-01 RX ORDER — HYDROCORTISONE ACETATE 25 MG/1
25 SUPPOSITORY RECTAL EVERY 12 HOURS
Qty: 20 SUPPOSITORY | Refills: 0 | Status: SHIPPED | OUTPATIENT
Start: 2022-06-01 | End: 2022-06-11

## 2022-06-01 ASSESSMENT — FIBROSIS 4 INDEX: FIB4 SCORE: 1.6

## 2022-06-03 ENCOUNTER — HOSPITAL ENCOUNTER (EMERGENCY)
Facility: MEDICAL CENTER | Age: 72
End: 2022-06-03
Attending: EMERGENCY MEDICINE
Payer: MEDICARE

## 2022-06-03 VITALS
TEMPERATURE: 97 F | HEIGHT: 62 IN | DIASTOLIC BLOOD PRESSURE: 69 MMHG | SYSTOLIC BLOOD PRESSURE: 110 MMHG | RESPIRATION RATE: 16 BRPM | BODY MASS INDEX: 30.22 KG/M2 | HEART RATE: 70 BPM | OXYGEN SATURATION: 96 % | WEIGHT: 164.24 LBS

## 2022-06-03 DIAGNOSIS — K64.1 GRADE II HEMORRHOIDS: ICD-10-CM

## 2022-06-03 DIAGNOSIS — K62.5 RECTAL BLEEDING: ICD-10-CM

## 2022-06-03 PROCEDURE — 99284 EMERGENCY DEPT VISIT MOD MDM: CPT

## 2022-06-03 RX ORDER — HYDROCORTISONE ACETATE 25 MG/1
25 SUPPOSITORY RECTAL EVERY 12 HOURS
Qty: 24 SUPPOSITORY | Refills: 0 | Status: SHIPPED | OUTPATIENT
Start: 2022-06-03 | End: 2022-07-25

## 2022-06-03 RX ORDER — POLYETHYLENE GLYCOL 3350 17 G/17G
17 POWDER, FOR SOLUTION ORAL DAILY
Qty: 255 G | Refills: 0 | Status: SHIPPED | OUTPATIENT
Start: 2022-06-03 | End: 2022-07-25

## 2022-06-03 RX ORDER — DOCUSATE SODIUM 100 MG/1
100 CAPSULE, LIQUID FILLED ORAL 2 TIMES DAILY
Qty: 60 CAPSULE | Refills: 0 | Status: SHIPPED | OUTPATIENT
Start: 2022-06-03 | End: 2022-07-25

## 2022-06-03 RX ORDER — PRAMOXINE HYDROCHLORIDE 10 MG/G
1 AEROSOL, FOAM TOPICAL 4 TIMES DAILY PRN
Qty: 15 G | Refills: 0 | Status: SHIPPED | OUTPATIENT
Start: 2022-06-03 | End: 2022-07-25

## 2022-06-03 ASSESSMENT — FIBROSIS 4 INDEX: FIB4 SCORE: 1.6

## 2022-06-03 NOTE — ED TRIAGE NOTES
"Chief Complaint   Patient presents with   • Rectal Bleeding     Patient reports hx of hemorrhoids. Patient states bleeding started this morning       73 yo female to triage for above complaint. Patient reports bright red blood when wiping.    Pt is alert and oriented, speaking in full sentences, follows commands and responds appropriately to questions.     Patient placed back in lobby and educated on triage process. Asked to inform RN of any changes.    /56   Pulse 71   Temp 35.9 °C (96.6 °F) (Temporal)   Resp 16   Ht 1.575 m (5' 2\")   Wt 74.5 kg (164 lb 3.9 oz)   SpO2 97%   BMI 30.04 kg/m²     "

## 2022-06-03 NOTE — ED NOTES
Digital rectal exam performed at bedside by ERP Dr. Garcia chaperoned this RN. Patient tolerated well with minimal discomfort.

## 2022-06-03 NOTE — DISCHARGE INSTRUCTIONS
Return if you have heavy bleeding, lightheaded or pass out. You want to have soft stool, but not diarrhea.

## 2022-06-03 NOTE — ED PROVIDER NOTES
ED Provider Note    Scribed for Xander Garcia M.D. by Les Preciado. 6/3/2022, 3:33 PM.    Primary care provider: BRITNEY Davis  Means of arrival: Walk in  History obtained from: Patient  History limited by: None    CHIEF COMPLAINT  Chief Complaint   Patient presents with    Rectal Bleeding     Patient reports hx of hemorrhoids. Patient states bleeding started this morning       HPI  Serenity Howe is a 72 y.o. female who presents to the Emergency Department for evaluation of rectal bleeding onset this morning. She states she was pooping when she noted the bleeding. She notes the blood was bright red and is no longer actively bleeding. Patient adds she has a history of hemorrhoids. She endorses associated constipation and rectal pain, but denies any lightheadedness or dizziness. Patient notes she was constipated until her bowel movement this morning that came with symptoms.    REVIEW OF SYSTEMS  Pertinent positives include constipation, rectal bleeding, and rectal pain. Pertinent negatives include lightheadedness or dizziness.   E    PAST MEDICAL HISTORY   has a past medical history of ASTHMA, Cancer (HCC), History of breast cancer (7/29/2015), Hypertension, Shoulder pain, right (7/29/2015), and Tremor (7/29/2015).    SURGICAL HISTORY   has a past surgical history that includes mastectomy and gyn surgery.    SOCIAL HISTORY  Social History     Tobacco Use    Smoking status: Never Smoker    Smokeless tobacco: Never Used   Vaping Use    Vaping Use: Never used   Substance Use Topics    Alcohol use: No    Drug use: No      Social History     Substance and Sexual Activity   Drug Use No       FAMILY HISTORY  Family History   Problem Relation Age of Onset    Hyperlipidemia Mother     No Known Problems Father     No Known Problems Sister     No Known Problems Brother     No Known Problems Daughter     No Known Problems Daughter     No Known Problems Son        CURRENT MEDICATIONS  Home  "Medications       Reviewed by Cecily Grimes R.N. (Registered Nurse) on 06/03/22 at 1354  Med List Status: Partial     Medication Last Dose Status   acetaminophen (TYLENOL) 325 MG Tab  Active   albuterol 108 (90 Base) MCG/ACT Aero Soln inhalation aerosol  Active   aspirin (ASA) 81 MG CHEW  Active   Calcium Carbonate-Vit D-Min (CALTRATE PLUS PO)  Active   clotrimazole (LOTRIMIN) 1 % Cream  Active   diclofenac sodium (VOLTAREN) 1 % Gel  Active   docusate sodium (COLACE) 100 MG Cap  Active   fluticasone-salmeterol (ADVAIR) 250-50 MCG/DOSE AEROSOL POWDER, BREATH ACTIVATED  Active   hydrocortisone (ANUSOL-HC) 25 MG Suppos  Active   valsartan-hydrochlorothiazide (DIOVAN-HCT) 80-12.5 MG per tablet  Active   verapamil ER (CALAN SR) 120 MG CAPSULE SR 24 HR  Active                    ALLERGIES  Allergies   Allergen Reactions    Shellfish Allergy Hives       PHYSICAL EXAM  VITAL SIGNS: /56   Pulse 71   Temp 35.9 °C (96.6 °F) (Temporal)   Resp 16   Ht 1.575 m (5' 2\")   Wt 74.5 kg (164 lb 3.9 oz)   SpO2 97%   BMI 30.04 kg/m²     Pulse ox interpretation: I interpret this pulse ox as normal.  Constitutional: Alert in no apparent distress.  HENT: Normocephalic, Atraumatic, Bilateral external ears normal. Nose normal.   Eyes: Pupils are equal and reactive. Conjunctiva normal, non-icteric.   Heart: Regular rate and rythm, no murmurs.    Lungs: Clear to auscultation bilaterally.  Skin: Warm, Dry, No erythema, No rash.   Neurologic: Alert, Grossly non-focal.   Psychiatric: Affect normal, Judgment normal, Mood normal, Appears appropriate and not intoxicated.   Rectal: Bleedingexternal hemmorhoid to 6 o' clock position, non thrombosed, No anal fissures, She may have internal hemorrhoids as well.    COURSE & MEDICAL DECISION MAKING  Nursing notes, SAVANNA GROVEHx reviewed in chart.    3:33 PM - Patient seen and examined at bedside. Rectal exam performed by me with a female chaperone in the room. Recommended the patient try " stool softener and to stop straining during bowel movements. Instructed her to follow up with surgery. Patient verbalizes understanding and agreement to this plan of care.      Medical Decision Making: At this point time patient has what appears to be a small external hemorrhoid that has been bleeding.  There is no thrombosis of this.  We will start her on Proctofoam and Anusol for pain.  I will have her follow-up with her primary.  Discussed with her using fiber supplements and stool softeners so that she is having soft bowel movements and not sitting or straining on the toilet for long periods of time.  She is only had bleeding for 1 episode and this seems to have stopped I do not think she needs blood work done at this point.    The patient will return for new or worsening symptoms and is stable at the time of discharge.    The patient is referred to a primary physician for blood pressure management, diabetic screening, and for all other preventative health concerns.    DISPOSITION:  Patient will be discharged home in stable condition.    FOLLOW UP:  MILTON DavisRKarinNKarin  [address] [address] [phone]    Schedule an appointment as soon as possible for a visit in 1 week      Vel Granda M.D.  [address] [facility] [address]    Schedule an appointment as soon as possible for a visit   To discuss removal of your hemorrhoids    Jordy Fairbanks M.D.  [address] [facility]  [place] [address] [phone]    Schedule an appointment as soon as possible for a visit   or your can call the surgeon      OUTPATIENT MEDICATIONS:  Discharge Medication List as of 6/3/2022  3:50 PM        START taking these medications    Details   !! hydrocortisone (ANUSOL-HC) 25 MG Suppos Insert 1 Suppository into the rectum every 12 hours., Disp-24 Suppository, R-0, Normal      pramoxine (PROCTOFOAM) 1 % foam Insert 1 Application into the rectum 4 times a day as needed (for hemorroids).,  Disp-15 g, R-0, Normal      !! docusate sodium (COLACE) 100 MG Cap Take 1 Capsule by mouth 2 times a day., Disp-60 Capsule, R-0, Normal      polyethylene glycol 3350 (MIRALAX) 17 GM/SCOOP Powder Take 17 g by mouth every day., Disp-255 g, R-0, Normal       !! - Potential duplicate medications found. Please discuss with provider.            FINAL IMPRESSION  1. Grade II hemorrhoids    2. Rectal bleeding          Les ZIMMERMAN ([place]), am scribing for, and in the presence of, Xander Garcia M.D.    Electronically signed by: Les Navarro), 6/3/2022    IXander M.D. personally performed the services described in this documentation, as scribed by Les Preciado in my presence, and it is both accurate and complete.    The note accurately reflects work and decisions made by me.  Xander Garcia M.D.  6/3/2022  9:21 PM

## 2022-06-04 NOTE — PROGRESS NOTES
Serenity Howe is a 72 y.o. female who presents for Hemorrhoids (Hemorrhoids since this AM)    HPI Serenity is a 73 y/o with c/o constipation and hemorrhoids. Pain with sitting after trying to have a BM. Had blood on toilet tissue when she wiped. No blood in stool. Denies abd pain, nausea/ vomiting. Constipation has been a long standing intermittent problem. Hx of previous hemorrhoids that feel this way.  Went to pharmacy to buy medications but was told that she should come in and get a prescription. She is not on anticoagulant therapy. Treatment tried; none.  No other aggravating or alleviating factors.       ROS    Allergies:       Allergies   Allergen Reactions   • Shellfish Allergy Hives       PMSFS Hx:  Past Medical History:   Diagnosis Date   • ASTHMA    • Cancer (HCC)    • History of breast cancer 2015   • Hypertension    • Shoulder pain, right 2015   • Tremor 2015     Past Surgical History:   Procedure Laterality Date   • GYN SURGERY       x1   • MASTECTOMY      right      Family History   Problem Relation Age of Onset   • Hyperlipidemia Mother    • No Known Problems Father    • No Known Problems Sister    • No Known Problems Brother    • No Known Problems Daughter    • No Known Problems Daughter    • No Known Problems Son      Social History     Tobacco Use   • Smoking status: Never Smoker   • Smokeless tobacco: Never Used   Substance Use Topics   • Alcohol use: No       Problems:   Patient Active Problem List   Diagnosis   • Essential hypertension   • Insomnia   • Personal history of breast cancer   • Allergic rhinitis   • Moderate persistent asthma without complication   • Pulmonary nodule   • Gout, arthritis   • Pre-diabetes   • Stage 3a chronic kidney disease (HCC)   • Abnormal mammogram of left breast   • Rash in adult   • Chronic pain of right knee       Medications:   Current Outpatient Medications on File Prior to Visit   Medication Sig Dispense Refill   • diclofenac  "sodium (VOLTAREN) 1 % Gel 1 JOSSELIN TOPICAL 4 TIMES A DAY FOR 7 DAYS AS NEEDED FOR PAIN     • fluticasone-salmeterol (ADVAIR) 250-50 MCG/DOSE AEROSOL POWDER, BREATH ACTIVATED Inhale 1 Puff every 12 hours. 3 Each 3   • clotrimazole (LOTRIMIN) 1 % Cream Apply to affected area twice daily. Clean area with soap and water, pat dry, before each application. 45 g 0   • valsartan-hydrochlorothiazide (DIOVAN-HCT) 80-12.5 MG per tablet Take 1 Tablet by mouth every day. 90 Tablet 2   • albuterol 108 (90 Base) MCG/ACT Aero Soln inhalation aerosol Inhale 2 Puffs every 6 hours as needed for Shortness of Breath. 8.5 g 0   • verapamil ER (CALAN SR) 120 MG CAPSULE SR 24 HR Take 1 capsule by mouth every day. 90 capsule 3   • acetaminophen (TYLENOL) 325 MG Tab Take 650 mg by mouth every four hours as needed.     • Calcium Carbonate-Vit D-Min (CALTRATE PLUS PO) Take 1 Tab by mouth every day.     • aspirin (ASA) 81 MG CHEW Take 81 mg by mouth every day.       No current facility-administered medications on file prior to visit.          Objective:     /84 (BP Location: Left arm, Patient Position: Sitting)   Pulse 81   Temp 36.3 °C (97.3 °F)   Resp 14   Ht 1.575 m (5' 2\")   Wt 73.7 kg (162 lb 6.4 oz)   SpO2 97%   BMI 29.70 kg/m²     Physical Exam  Vitals reviewed.   Constitutional:       Appearance: Normal appearance. She is normal weight.   Cardiovascular:      Rate and Rhythm: Normal rate and regular rhythm.      Pulses: Normal pulses.      Heart sounds: Normal heart sounds.   Pulmonary:      Effort: Pulmonary effort is normal.      Breath sounds: Normal breath sounds.   Abdominal:      General: There is no distension.      Palpations: Abdomen is soft.      Tenderness: There is no abdominal tenderness.   Genitourinary:     Comments: Declines rectal exam   Skin:     General: Skin is warm.      Capillary Refill: Capillary refill takes less than 2 seconds.   Neurological:      Mental Status: She is alert and oriented to person, " place, and time.   Psychiatric:         Mood and Affect: Mood normal.         Behavior: Behavior normal.         Thought Content: Thought content normal.         Assessment /Associated Orders:      1. History of hemorrhoids  hydrocortisone (ANUSOL-HC) 25 MG Suppos   2. Hemorrhoids, unspecified hemorrhoid type  hydrocortisone (ANUSOL-HC) 25 MG Suppos   3. Constipation, unspecified constipation type  docusate sodium (COLACE) 100 MG Cap       Medical Decision Making:    Pt is clinically stable at today's acute urgent care visit.  No acute distress noted. Appropriate for outpatient management at this time.   Acute problem today with uncertain prognosis.   Educated in proper administration of medication(s) ordered today including safety, possible SE, risks, benefits, rationale and alternatives to therapy.   Avoid constipation   Increase fiber and fluids in diet.      Educated in Red flags and indications to immediately call 911 or present to the Emergency Department.       Time spent  In evaluation of this patient was at least 31 minutes and included preparing for visit, counseling/education, exam and evaluation, obtaining history, ordering medication,  management and documentation.

## 2022-06-21 ENCOUNTER — APPOINTMENT (OUTPATIENT)
Dept: PHYSICAL THERAPY | Facility: REHABILITATION | Age: 72
End: 2022-06-21
Attending: NURSE PRACTITIONER
Payer: MEDICARE

## 2022-06-24 DIAGNOSIS — I10 ESSENTIAL HYPERTENSION: ICD-10-CM

## 2022-06-24 RX ORDER — VALSARTAN AND HYDROCHLOROTHIAZIDE 80; 12.5 MG/1; MG/1
1 TABLET, FILM COATED ORAL
Qty: 90 TABLET | Refills: 0 | Status: SHIPPED | OUTPATIENT
Start: 2022-06-24 | End: 2022-09-19

## 2022-06-24 NOTE — TELEPHONE ENCOUNTER
Pt is going out of town today and needs refill asap         Received request via: Pharmacy    Was the patient seen in the last year in this department? Yes    Does the patient have an active prescription (recently filled or refills available) for medication(s) requested? No

## 2022-06-26 DIAGNOSIS — I10 ESSENTIAL HYPERTENSION: ICD-10-CM

## 2022-06-27 RX ORDER — VERAPAMIL HYDROCHLORIDE 120 MG/1
120 CAPSULE, EXTENDED RELEASE ORAL
Qty: 90 CAPSULE | Refills: 3 | Status: SHIPPED | OUTPATIENT
Start: 2022-06-27 | End: 2023-06-26

## 2022-06-27 NOTE — TELEPHONE ENCOUNTER
Requested Prescriptions     Signed Prescriptions Disp Refills   • verapamil ER (CALAN SR) 120 MG CAPSULE SR 24 HR 90 Capsule 3     Sig: TAKE 1 CAPSULE BY MOUTH EVERY DAY     Authorizing Provider: PARKER ROJAS A.P.R.N.

## 2022-06-28 ENCOUNTER — APPOINTMENT (OUTPATIENT)
Dept: PHYSICAL THERAPY | Facility: REHABILITATION | Age: 72
End: 2022-06-28
Attending: NURSE PRACTITIONER
Payer: MEDICARE

## 2022-07-06 ENCOUNTER — APPOINTMENT (OUTPATIENT)
Dept: PHYSICAL THERAPY | Facility: REHABILITATION | Age: 72
End: 2022-07-06
Attending: NURSE PRACTITIONER
Payer: MEDICARE

## 2022-07-13 ENCOUNTER — APPOINTMENT (OUTPATIENT)
Dept: PHYSICAL THERAPY | Facility: REHABILITATION | Age: 72
End: 2022-07-13
Attending: NURSE PRACTITIONER
Payer: MEDICARE

## 2022-07-13 ENCOUNTER — OFFICE VISIT (OUTPATIENT)
Dept: URGENT CARE | Facility: PHYSICIAN GROUP | Age: 72
End: 2022-07-13
Payer: MEDICARE

## 2022-07-13 ENCOUNTER — HOSPITAL ENCOUNTER (OUTPATIENT)
Dept: RADIOLOGY | Facility: MEDICAL CENTER | Age: 72
End: 2022-07-13
Attending: NURSE PRACTITIONER
Payer: MEDICARE

## 2022-07-13 VITALS
HEIGHT: 62 IN | TEMPERATURE: 97.7 F | HEART RATE: 100 BPM | OXYGEN SATURATION: 95 % | RESPIRATION RATE: 18 BRPM | SYSTOLIC BLOOD PRESSURE: 116 MMHG | WEIGHT: 167 LBS | DIASTOLIC BLOOD PRESSURE: 84 MMHG | BODY MASS INDEX: 30.73 KG/M2

## 2022-07-13 DIAGNOSIS — G89.29 CHRONIC PAIN OF BOTH KNEES: ICD-10-CM

## 2022-07-13 DIAGNOSIS — Z87.09 HISTORY OF ASTHMA: ICD-10-CM

## 2022-07-13 DIAGNOSIS — R07.9 CHEST PAIN, UNSPECIFIED TYPE: ICD-10-CM

## 2022-07-13 DIAGNOSIS — M25.561 CHRONIC PAIN OF BOTH KNEES: ICD-10-CM

## 2022-07-13 DIAGNOSIS — M25.562 CHRONIC PAIN OF BOTH KNEES: ICD-10-CM

## 2022-07-13 DIAGNOSIS — J45.21 MILD INTERMITTENT ASTHMA WITH ACUTE EXACERBATION: ICD-10-CM

## 2022-07-13 PROCEDURE — 71046 X-RAY EXAM CHEST 2 VIEWS: CPT

## 2022-07-13 PROCEDURE — 93000 ELECTROCARDIOGRAM COMPLETE: CPT | Performed by: NURSE PRACTITIONER

## 2022-07-13 PROCEDURE — 99214 OFFICE O/P EST MOD 30 MIN: CPT | Mod: 25 | Performed by: NURSE PRACTITIONER

## 2022-07-13 PROCEDURE — 94640 AIRWAY INHALATION TREATMENT: CPT | Performed by: NURSE PRACTITIONER

## 2022-07-13 RX ORDER — ALBUTEROL SULFATE 2.5 MG/3ML
2.5 SOLUTION RESPIRATORY (INHALATION) EVERY 4 HOURS PRN
Qty: 30 ML | Refills: 0 | Status: SHIPPED | OUTPATIENT
Start: 2022-07-13

## 2022-07-13 RX ORDER — ALBUTEROL SULFATE 90 UG/1
2 AEROSOL, METERED RESPIRATORY (INHALATION) EVERY 6 HOURS PRN
Qty: 8.5 G | Refills: 0 | Status: SHIPPED | OUTPATIENT
Start: 2022-07-13 | End: 2022-07-25

## 2022-07-13 RX ORDER — ALBUTEROL SULFATE 2.5 MG/3ML
2.5 SOLUTION RESPIRATORY (INHALATION) ONCE
Status: COMPLETED | OUTPATIENT
Start: 2022-07-13 | End: 2022-07-13

## 2022-07-13 RX ADMIN — ALBUTEROL SULFATE 2.5 MG: 2.5 SOLUTION RESPIRATORY (INHALATION) at 17:52

## 2022-07-13 ASSESSMENT — ENCOUNTER SYMPTOMS
VOMITING: 0
SHORTNESS OF BREATH: 0
CONSTIPATION: 0
NAUSEA: 0
EYE DISCHARGE: 0
DIARRHEA: 0
WEAKNESS: 0
PALPITATIONS: 0
NECK PAIN: 0
MYALGIAS: 0
SORE THROAT: 0
DIZZINESS: 0
EYE REDNESS: 0
ORTHOPNEA: 0
ABDOMINAL PAIN: 0
CHILLS: 0
FEVER: 0
HEADACHES: 0
WHEEZING: 0
COUGH: 0

## 2022-07-13 ASSESSMENT — FIBROSIS 4 INDEX: FIB4 SCORE: 1.6

## 2022-07-14 NOTE — PROGRESS NOTES
Subjective     Serenity Howe is a 72 y.o. female who presents with Chest Pain (Right side of chest and upper back pain x 2 days )            HPI  States has been experiencing right-sided chest pain that radiates to upper back x2 days.  Denies cardiac chest pain, shortness of breath, arm or jaw pain.  Denies headache, dizziness, weakness in extremities.  Does experience chronic bilateral knee pain with severe osteoarthritis and is seen by [facility] for this with Kenalog injections as needed.  Does not use walker or knee braces for ambulation.  Denies recent fever, illness, cough, nasal congestion postnasal drainage or sore throat.  Denies any cardiac problems but does have history of asthma.  Does use Advair daily and albuterol as needed.  States needs refill of albuterol inhaler as well as nebulizer medication.  History of controlled hypertension.    PMH:  has a past medical history of ASTHMA, Cancer (HCC), History of breast cancer (7/29/2015), Hypertension, Shoulder pain, right (7/29/2015), and Tremor (7/29/2015).    She has no past medical history of COPD.  MEDS:   Current Outpatient Medications:   •  verapamil ER (CALAN SR) 120 MG CAPSULE SR 24 HR, TAKE 1 CAPSULE BY MOUTH EVERY DAY, Disp: 90 Capsule, Rfl: 3  •  valsartan-hydrochlorothiazide (DIOVAN-HCT) 80-12.5 MG per tablet, TAKE 1 TABLET BY MOUTH EVERY DAY, Disp: 90 Tablet, Rfl: 0  •  hydrocortisone (ANUSOL-HC) 25 MG Suppos, Insert 1 Suppository into the rectum every 12 hours., Disp: 24 Suppository, Rfl: 0  •  pramoxine (PROCTOFOAM) 1 % foam, Insert 1 Application into the rectum 4 times a day as needed (for hemorroids)., Disp: 15 g, Rfl: 0  •  docusate sodium (COLACE) 100 MG Cap, Take 1 Capsule by mouth 2 times a day., Disp: 60 Capsule, Rfl: 0  •  polyethylene glycol 3350 (MIRALAX) 17 GM/SCOOP Powder, Take 17 g by mouth every day., Disp: 255 g, Rfl: 0  •  docusate sodium (COLACE) 100 MG Cap, Take 1 Capsule by mouth 2 times a day as needed  for Constipation., Disp: 30 Capsule, Rfl: 0  •  diclofenac sodium (VOLTAREN) 1 % Gel, 1 JOSSELIN TOPICAL 4 TIMES A DAY FOR 7 DAYS AS NEEDED FOR PAIN, Disp: , Rfl:   •  fluticasone-salmeterol (ADVAIR) 250-50 MCG/DOSE AEROSOL POWDER, BREATH ACTIVATED, Inhale 1 Puff every 12 hours., Disp: 3 Each, Rfl: 3  •  clotrimazole (LOTRIMIN) 1 % Cream, Apply to affected area twice daily. Clean area with soap and water, pat dry, before each application., Disp: 45 g, Rfl: 0  •  albuterol 108 (90 Base) MCG/ACT Aero Soln inhalation aerosol, Inhale 2 Puffs every 6 hours as needed for Shortness of Breath., Disp: 8.5 g, Rfl: 0  •  acetaminophen (TYLENOL) 325 MG Tab, Take 650 mg by mouth every four hours as needed., Disp: , Rfl:   •  Calcium Carbonate-Vit D-Min (CALTRATE PLUS PO), Take 1 Tab by mouth every day., Disp: , Rfl:   •  aspirin (ASA) 81 MG CHEW, Take 81 mg by mouth every day., Disp: , Rfl:   ALLERGIES:   Allergies   Allergen Reactions   • Shellfish Allergy Hives     SURGHX:   Past Surgical History:   Procedure Laterality Date   • GYN SURGERY       x1   • MASTECTOMY      right      SOCHX:  reports that she has never smoked. She has never used smokeless tobacco. She reports that she does not drink alcohol and does not use drugs.  FH: Family history was reviewed, no pertinent findings to report    Review of Systems   Constitutional: Negative for chills, fever and malaise/fatigue.   HENT: Negative for congestion, ear pain and sore throat.    Eyes: Negative for discharge and redness.   Respiratory: Negative for cough, shortness of breath and wheezing.    Cardiovascular: Positive for chest pain. Negative for palpitations, orthopnea and leg swelling.        Right sided chest pain x 2 days.   Gastrointestinal: Negative for abdominal pain, constipation, diarrhea, nausea and vomiting.   Musculoskeletal: Negative for myalgias and neck pain.   Skin: Negative for itching and rash.   Neurological: Negative for dizziness, weakness and  "headaches.   Endo/Heme/Allergies: Negative for environmental allergies.   All other systems reviewed and are negative.             Objective     /84 (BP Location: Left arm, Patient Position: Sitting)   Pulse 100   Temp 36.5 °C (97.7 °F) (Temporal)   Resp 18   Ht 1.575 m (5' 2\")   Wt 75.8 kg (167 lb)   SpO2 95%   BMI 30.54 kg/m²      Physical Exam  Vitals reviewed.   Constitutional:       General: She is not in acute distress.     Appearance: Normal appearance. She is well-developed. She is not ill-appearing, toxic-appearing or diaphoretic.   HENT:      Head: Normocephalic.   Eyes:      Conjunctiva/sclera: Conjunctivae normal.      Pupils: Pupils are equal, round, and reactive to light.   Musculoskeletal:         General: Normal range of motion.      Cervical back: Normal range of motion and neck supple.      Right knee: Normal.      Left knee: Normal.      Comments: No antalgic gait. Med assist applied bilat knee braces.    Skin:     General: Skin is warm and dry.   Neurological:      Mental Status: She is alert and oriented to person, place, and time.   Psychiatric:         Behavior: Behavior is cooperative.                             Assessment & Plan        1. Chest pain, unspecified type    - EKG - Clinic Performed (interpreted by myself, HR 82, NSR)  - DX-CHEST-2 VIEWS; Future: WNL  - albuterol (PROVENTIL) 2.5mg/3ml nebulizer solution 2.5 mg      2. Chronic pain of both knees  -follow up back in [facility] regarding pain management of chronic knee pain    3. History of asthma    - albuterol 108 (90 Base) MCG/ACT Aero Soln inhalation aerosol; Inhale 2 Puffs every 6 hours as needed for Shortness of Breath.  Dispense: 8.5 g; Refill: 0  - albuterol (PROVENTIL) 2.5mg/3ml Nebu Soln solution for nebulization; Take 3 mL by nebulization every four hours as needed for Shortness of Breath.  Dispense: 30 mL; Refill: 0    4. Mild intermittent asthma with acute exacerbation    - albuterol (PROVENTIL) 2.5mg/3ml " nebulizer solution 2.5 mg  - albuterol 108 (90 Base) MCG/ACT Aero Soln inhalation aerosol; Inhale 2 Puffs every 6 hours as needed for Shortness of Breath.  Dispense: 8.5 g; Refill: 0  - albuterol (PROVENTIL) 2.5mg/3ml Nebu Soln solution for nebulization; Take 3 mL by nebulization every four hours as needed for Shortness of Breath.  Dispense: 30 mL; Refill: 0  -Maintain hydration/water intake  -Get rest  -Use inhalers/nebulizer as needed for any shortness of breath, chest tightness or difficulty breathing  -Monitor for worsening symptoms like increased and change in chest pain/tightness, shortness of breath, lethargy, dizziness- need re-evaluation immediately, must call 911, patient understands this      Knees:  -May use over the counter NSAID as needed for pain/swelling  -May use cool compresses for any swelling as needed  -May use warm application as needed for knee stiffness  -May utilize RICE method as needed, recommend knee brace use with ambulation  -Avoid excessive weight bearing to avoid further pain, recommend walker use as needed with ambulation  -May apply topical analgesics as needed   -Perform proper body mechanics with lifting, twisting, bending and walking  -Monitor for deformity, numbness/tingling in toes, decreased range of motion with weight bearing- need re-evaluation

## 2022-07-20 ENCOUNTER — APPOINTMENT (OUTPATIENT)
Dept: PHYSICAL THERAPY | Facility: REHABILITATION | Age: 72
End: 2022-07-20
Attending: NURSE PRACTITIONER
Payer: MEDICARE

## 2022-07-21 ENCOUNTER — HOSPITAL ENCOUNTER (EMERGENCY)
Facility: MEDICAL CENTER | Age: 72
End: 2022-07-21
Attending: EMERGENCY MEDICINE
Payer: MEDICARE

## 2022-07-21 VITALS
RESPIRATION RATE: 16 BRPM | BODY MASS INDEX: 30.51 KG/M2 | WEIGHT: 165.79 LBS | SYSTOLIC BLOOD PRESSURE: 160 MMHG | HEIGHT: 62 IN | DIASTOLIC BLOOD PRESSURE: 94 MMHG | TEMPERATURE: 99.1 F | HEART RATE: 95 BPM | OXYGEN SATURATION: 96 %

## 2022-07-21 DIAGNOSIS — L03.115 CELLULITIS OF RIGHT LEG: ICD-10-CM

## 2022-07-21 PROCEDURE — 700102 HCHG RX REV CODE 250 W/ 637 OVERRIDE(OP): Performed by: EMERGENCY MEDICINE

## 2022-07-21 PROCEDURE — A9270 NON-COVERED ITEM OR SERVICE: HCPCS | Performed by: EMERGENCY MEDICINE

## 2022-07-21 PROCEDURE — 99284 EMERGENCY DEPT VISIT MOD MDM: CPT

## 2022-07-21 RX ORDER — ACETAMINOPHEN 325 MG/1
650 TABLET ORAL ONCE
Status: COMPLETED | OUTPATIENT
Start: 2022-07-21 | End: 2022-07-21

## 2022-07-21 RX ORDER — CEPHALEXIN 500 MG/1
500 CAPSULE ORAL ONCE
Status: COMPLETED | OUTPATIENT
Start: 2022-07-21 | End: 2022-07-21

## 2022-07-21 RX ORDER — CEPHALEXIN 500 MG/1
500 CAPSULE ORAL 4 TIMES DAILY
Qty: 20 CAPSULE | Refills: 0 | Status: SHIPPED | OUTPATIENT
Start: 2022-07-21 | End: 2022-07-26

## 2022-07-21 RX ADMIN — CEPHALEXIN 500 MG: 500 CAPSULE ORAL at 15:19

## 2022-07-21 RX ADMIN — ACETAMINOPHEN 650 MG: 325 TABLET, FILM COATED ORAL at 15:19

## 2022-07-21 ASSESSMENT — LIFESTYLE VARIABLES: DO YOU DRINK ALCOHOL: NO

## 2022-07-21 ASSESSMENT — FIBROSIS 4 INDEX: FIB4 SCORE: 1.6

## 2022-07-21 NOTE — ED PROVIDER NOTES
ED Provider Note    Scribed for Dayanna Liu M.D. by Andrés Gan. 7/21/2022  3:04 PM    Primary care provider: BRITNEY Davis  Means of arrival: Walk-In  History obtained from: Patient   History limited by: None     CHIEF COMPLAINT  Chief Complaint   Patient presents with   • Foot Pain     Right foot pain x2 weeks. Denies trauma/injury. Taking tylenol at home without relief.     HPI  Serenity Howe is a 72 y.o. female who presents to the Emergency Department for worsening right foot pain onset 2 weeks ago. She denies having trauma or any injury. Patient denies a history of diabetes. She reports no other associated symptoms. There are no exacerbating factors. She attempted to alleviate with Tylenol. Patient denies associated fever or chills.     REVIEW OF SYSTEMS  Musculoskeletal: Positive for right foot pain  Endocrine: no fevers, or chills no history of diabetes or poor wound healing    See history of present illness. E.     PAST MEDICAL HISTORY   has a past medical history of ASTHMA, Cancer (HCC), History of breast cancer (7/29/2015), Hypertension, Shoulder pain, right (7/29/2015), and Tremor (7/29/2015).    SURGICAL HISTORY   has a past surgical history that includes mastectomy and gyn surgery.    SOCIAL HISTORY  Social History     Tobacco Use   • Smoking status: Never Smoker   • Smokeless tobacco: Never Used   Vaping Use   • Vaping Use: Never used   Substance Use Topics   • Alcohol use: No   • Drug use: No      Social History     Substance and Sexual Activity   Drug Use No       FAMILY HISTORY  Family History   Problem Relation Age of Onset   • Hyperlipidemia Mother    • No Known Problems Father    • No Known Problems Sister    • No Known Problems Brother    • No Known Problems Daughter    • No Known Problems Daughter    • No Known Problems Son        CURRENT MEDICATIONS  Home Medications     Reviewed by Danielle Reyes R.N. (Registered Nurse) on 07/21/22 at 1435  Med List Status: Partial  "  Medication Last Dose Status   acetaminophen (TYLENOL) 325 MG Tab  Active   albuterol (PROVENTIL) 2.5mg/3ml Nebu Soln solution for nebulization  Active   albuterol 108 (90 Base) MCG/ACT Aero Soln inhalation aerosol  Active   albuterol 108 (90 Base) MCG/ACT Aero Soln inhalation aerosol  Active   aspirin (ASA) 81 MG CHEW  Active   Calcium Carbonate-Vit D-Min (CALTRATE PLUS PO)  Active   clotrimazole (LOTRIMIN) 1 % Cream  Active   diclofenac sodium (VOLTAREN) 1 % Gel  Active   docusate sodium (COLACE) 100 MG Cap  Active   docusate sodium (COLACE) 100 MG Cap  Active   fluticasone-salmeterol (ADVAIR) 250-50 MCG/DOSE AEROSOL POWDER, BREATH ACTIVATED  Active   hydrocortisone (ANUSOL-HC) 25 MG Suppos  Active   polyethylene glycol 3350 (MIRALAX) 17 GM/SCOOP Powder  Active   pramoxine (PROCTOFOAM) 1 % foam  Active   valsartan-hydrochlorothiazide (DIOVAN-HCT) 80-12.5 MG per tablet  Active   verapamil ER (CALAN SR) 120 MG CAPSULE SR 24 HR  Active                ALLERGIES  Allergies   Allergen Reactions   • Shellfish Allergy Hives       PHYSICAL EXAM  VITAL SIGNS: BP (!) 152/78   Pulse 97   Temp 36.9 °C (98.4 °F) (Temporal)   Resp 14   Ht 1.575 m (5' 2\")   Wt 75.2 kg (165 lb 12.6 oz)   SpO2 95%   BMI 30.32 kg/m²     Constitutional: Mild distress  Skin: Erythema noted on the dorsal aspect of the right foot tracking up the right shin, area is soft.   Musculoskeletal: Erythema noted on the dorsal aspect of the right foot tracking up the right shin, area is soft. There is no right leg edema, there is warmth and tenderness to palpation, no crepitus. No venous cording, no de la fuente sign.   Vascular: warm to touch good capillary refill   Neurologic: distally neurovascularly intact  Psychiatric: Affect normal    COURSE & MEDICAL DECISION MAKING  Nursing notes, VS, PMSFHx reviewed in chart.    3:04 PM Patient seen and examined at bedside. Patient will be treated with Keflex 500 mg and Tylenol 650 mg. The differential diagnoses " include but are not limited to: cellulitis vs less likely DVT. Informed patient this is more than likely cellulitis and is not consistent with DVT due to clinical presentation and exam findings which rule out DVT. Advised patient to follow up with PCP, will assist with referral. Informed patient she will placed on antibiotics to treat. Advised patient to stay off of her foot and to rest, ice the foot, and elevate. Informed patient Tylenol can be used for pain management. I then informed the patient of my plan for discharge, which includes strict return precautions for any new or worsening symptoms including spreading of her erythema past the marking. She understands and verbalizes agreement to plan of care. She is comfortable going home at this time.     The patient will return for new or worsening symptoms and is stable at the time of discharge.    The patient is referred to a primary physician for blood pressure management, diabetic screening, and for all other preventative health concerns.    DISPOSITION:  Patient will be discharged home in stable condition.    FOLLOW UP:  Yun Allred A.P.R.NKarin  [address] [address] [phone]    Call in 1 day  For wound re-check    [facility], Emergency Dept  [address] [facility] [address] [phone]    As needed, If symptoms worsen      OUTPATIENT MEDICATIONS:  New Prescriptions    CEPHALEXIN (KEFLEX) 500 MG CAP    Take 1 Capsule by mouth 4 times a day for 5 days.        FINAL IMPRESSION  1. Cellulitis of right leg          Andrés ZIMMERMAN ([place]), am scribing for, and in the presence of, Dayanna Liu M.D..    Electronically signed by: Andrés Gan ([place]), 7/21/2022    Dayanna ZIMMERMAN M.D. personally performed the services described in this documentation, as scribed by Andrés Gan in my presence, and it is both accurate and complete.    The note accurately reflects work and decisions made by me.  Dayanna EPPERSON  ROMARIO Liu  7/21/2022  9:09 PM

## 2022-07-21 NOTE — ED NOTES
Discharge instructions given.  All questions answered.  Prescriptions given x1.  Pt to follow-up with PCP, return to ER if symptoms worsen as discussed.  Pt verbalized understanding.  All belongings with pt.  Pt escorted to lobby.

## 2022-07-21 NOTE — ED TRIAGE NOTES
Serenity Rhoades Winfield  72 y.o. female  Chief Complaint   Patient presents with   • Foot Pain     Right foot pain x2 weeks. Denies trauma/injury. Taking tylenol at home without relief.     Pt ambulatory to triage for above. Ambulates with slight limp. Pain worse with walking. CMS intact.     Triage process explained to patient, returned to lobby. Pt encouraged to notify staff of any change in condition.

## 2022-07-25 ENCOUNTER — HOSPITAL ENCOUNTER (INPATIENT)
Facility: MEDICAL CENTER | Age: 72
LOS: 2 days | DRG: 178 | End: 2022-07-27
Attending: EMERGENCY MEDICINE | Admitting: STUDENT IN AN ORGANIZED HEALTH CARE EDUCATION/TRAINING PROGRAM
Payer: MEDICARE

## 2022-07-25 ENCOUNTER — APPOINTMENT (OUTPATIENT)
Dept: RADIOLOGY | Facility: MEDICAL CENTER | Age: 72
DRG: 178 | End: 2022-07-25
Attending: EMERGENCY MEDICINE
Payer: MEDICARE

## 2022-07-25 DIAGNOSIS — U07.1 COVID-19: ICD-10-CM

## 2022-07-25 DIAGNOSIS — J45.41 MODERATE PERSISTENT ASTHMA WITH ACUTE EXACERBATION: ICD-10-CM

## 2022-07-25 DIAGNOSIS — N17.9 AKI (ACUTE KIDNEY INJURY) (HCC): ICD-10-CM

## 2022-07-25 DIAGNOSIS — U07.1 COVID: ICD-10-CM

## 2022-07-25 DIAGNOSIS — J44.1 COPD WITH ACUTE EXACERBATION (HCC): ICD-10-CM

## 2022-07-25 DIAGNOSIS — R06.02 SHORTNESS OF BREATH: ICD-10-CM

## 2022-07-25 DIAGNOSIS — E87.20 LACTIC ACIDOSIS: ICD-10-CM

## 2022-07-25 PROBLEM — A41.9 SEPSIS (HCC): Status: ACTIVE | Noted: 2022-07-25

## 2022-07-25 LAB
ALBUMIN SERPL BCP-MCNC: 4.1 G/DL (ref 3.2–4.9)
ALBUMIN/GLOB SERPL: 1 G/DL
ALP SERPL-CCNC: 73 U/L (ref 30–99)
ALT SERPL-CCNC: 29 U/L (ref 2–50)
ANION GAP SERPL CALC-SCNC: 15 MMOL/L (ref 7–16)
AST SERPL-CCNC: 38 U/L (ref 12–45)
BASOPHILS # BLD AUTO: 0.6 % (ref 0–1.8)
BASOPHILS # BLD: 0.05 K/UL (ref 0–0.12)
BILIRUB SERPL-MCNC: 0.3 MG/DL (ref 0.1–1.5)
BUN SERPL-MCNC: 28 MG/DL (ref 8–22)
CALCIUM SERPL-MCNC: 10.5 MG/DL (ref 8.5–10.5)
CHLORIDE SERPL-SCNC: 103 MMOL/L (ref 96–112)
CO2 SERPL-SCNC: 21 MMOL/L (ref 20–33)
CREAT SERPL-MCNC: 1.48 MG/DL (ref 0.5–1.4)
D DIMER PPP IA.FEU-MCNC: 1.74 UG/ML (FEU) (ref 0–0.5)
EOSINOPHIL # BLD AUTO: 0.12 K/UL (ref 0–0.51)
EOSINOPHIL NFR BLD: 1.3 % (ref 0–6.9)
ERYTHROCYTE [DISTWIDTH] IN BLOOD BY AUTOMATED COUNT: 44.7 FL (ref 35.9–50)
FLUAV RNA SPEC QL NAA+PROBE: NEGATIVE
FLUBV RNA SPEC QL NAA+PROBE: NEGATIVE
GFR SERPLBLD CREATININE-BSD FMLA CKD-EPI: 37 ML/MIN/1.73 M 2
GLOBULIN SER CALC-MCNC: 4.3 G/DL (ref 1.9–3.5)
GLUCOSE SERPL-MCNC: 122 MG/DL (ref 65–99)
HCT VFR BLD AUTO: 44.7 % (ref 37–47)
HGB BLD-MCNC: 15.4 G/DL (ref 12–16)
IMM GRANULOCYTES # BLD AUTO: 0.04 K/UL (ref 0–0.11)
IMM GRANULOCYTES NFR BLD AUTO: 0.4 % (ref 0–0.9)
LACTATE SERPL-SCNC: 2.1 MMOL/L (ref 0.5–2)
LACTATE SERPL-SCNC: 2.3 MMOL/L (ref 0.5–2)
LACTATE SERPL-SCNC: 2.7 MMOL/L (ref 0.5–2)
LYMPHOCYTES # BLD AUTO: 1.4 K/UL (ref 1–4.8)
LYMPHOCYTES NFR BLD: 15.7 % (ref 22–41)
MCH RBC QN AUTO: 33.6 PG (ref 27–33)
MCHC RBC AUTO-ENTMCNC: 34.5 G/DL (ref 33.6–35)
MCV RBC AUTO: 97.4 FL (ref 81.4–97.8)
MONOCYTES # BLD AUTO: 0.71 K/UL (ref 0–0.85)
MONOCYTES NFR BLD AUTO: 8 % (ref 0–13.4)
NEUTROPHILS # BLD AUTO: 6.57 K/UL (ref 2–7.15)
NEUTROPHILS NFR BLD: 74 % (ref 44–72)
NRBC # BLD AUTO: 0 K/UL
NRBC BLD-RTO: 0 /100 WBC
PLATELET # BLD AUTO: 285 K/UL (ref 164–446)
PMV BLD AUTO: 9.2 FL (ref 9–12.9)
POTASSIUM SERPL-SCNC: 3.8 MMOL/L (ref 3.6–5.5)
PROT SERPL-MCNC: 8.4 G/DL (ref 6–8.2)
RBC # BLD AUTO: 4.59 M/UL (ref 4.2–5.4)
RSV RNA SPEC QL NAA+PROBE: NEGATIVE
SARS-COV-2 RNA RESP QL NAA+PROBE: DETECTED
SODIUM SERPL-SCNC: 139 MMOL/L (ref 135–145)
SPECIMEN SOURCE: ABNORMAL
WBC # BLD AUTO: 8.9 K/UL (ref 4.8–10.8)

## 2022-07-25 PROCEDURE — 99285 EMERGENCY DEPT VISIT HI MDM: CPT

## 2022-07-25 PROCEDURE — 99223 1ST HOSP IP/OBS HIGH 75: CPT | Performed by: STUDENT IN AN ORGANIZED HEALTH CARE EDUCATION/TRAINING PROGRAM

## 2022-07-25 PROCEDURE — 80053 COMPREHEN METABOLIC PANEL: CPT

## 2022-07-25 PROCEDURE — 700105 HCHG RX REV CODE 258: Performed by: EMERGENCY MEDICINE

## 2022-07-25 PROCEDURE — 700117 HCHG RX CONTRAST REV CODE 255: Performed by: EMERGENCY MEDICINE

## 2022-07-25 PROCEDURE — 94640 AIRWAY INHALATION TREATMENT: CPT

## 2022-07-25 PROCEDURE — 85379 FIBRIN DEGRADATION QUANT: CPT

## 2022-07-25 PROCEDURE — 83605 ASSAY OF LACTIC ACID: CPT | Mod: 91

## 2022-07-25 PROCEDURE — 700111 HCHG RX REV CODE 636 W/ 250 OVERRIDE (IP): Performed by: STUDENT IN AN ORGANIZED HEALTH CARE EDUCATION/TRAINING PROGRAM

## 2022-07-25 PROCEDURE — 700101 HCHG RX REV CODE 250: Performed by: STUDENT IN AN ORGANIZED HEALTH CARE EDUCATION/TRAINING PROGRAM

## 2022-07-25 PROCEDURE — 71275 CT ANGIOGRAPHY CHEST: CPT | Mod: ME

## 2022-07-25 PROCEDURE — C9803 HOPD COVID-19 SPEC COLLECT: HCPCS | Performed by: EMERGENCY MEDICINE

## 2022-07-25 PROCEDURE — 71045 X-RAY EXAM CHEST 1 VIEW: CPT

## 2022-07-25 PROCEDURE — 87040 BLOOD CULTURE FOR BACTERIA: CPT | Mod: 91

## 2022-07-25 PROCEDURE — A9270 NON-COVERED ITEM OR SERVICE: HCPCS | Performed by: STUDENT IN AN ORGANIZED HEALTH CARE EDUCATION/TRAINING PROGRAM

## 2022-07-25 PROCEDURE — 700101 HCHG RX REV CODE 250: Performed by: EMERGENCY MEDICINE

## 2022-07-25 PROCEDURE — 770001 HCHG ROOM/CARE - MED/SURG/GYN PRIV*

## 2022-07-25 PROCEDURE — 0241U HCHG SARS-COV-2 COVID-19 NFCT DS RESP RNA 4 TRGT MIC: CPT

## 2022-07-25 PROCEDURE — 700105 HCHG RX REV CODE 258: Performed by: STUDENT IN AN ORGANIZED HEALTH CARE EDUCATION/TRAINING PROGRAM

## 2022-07-25 PROCEDURE — 700102 HCHG RX REV CODE 250 W/ 637 OVERRIDE(OP): Performed by: STUDENT IN AN ORGANIZED HEALTH CARE EDUCATION/TRAINING PROGRAM

## 2022-07-25 PROCEDURE — 94644 CONT INHLJ TX 1ST HOUR: CPT

## 2022-07-25 PROCEDURE — 36415 COLL VENOUS BLD VENIPUNCTURE: CPT

## 2022-07-25 PROCEDURE — 96372 THER/PROPH/DIAG INJ SC/IM: CPT

## 2022-07-25 PROCEDURE — 85025 COMPLETE CBC W/AUTO DIFF WBC: CPT

## 2022-07-25 RX ORDER — SODIUM CHLORIDE, SODIUM LACTATE, POTASSIUM CHLORIDE, CALCIUM CHLORIDE 600; 310; 30; 20 MG/100ML; MG/100ML; MG/100ML; MG/100ML
1000 INJECTION, SOLUTION INTRAVENOUS ONCE
Status: COMPLETED | OUTPATIENT
Start: 2022-07-25 | End: 2022-07-25

## 2022-07-25 RX ORDER — HYDROCHLOROTHIAZIDE 25 MG/1
12.5 TABLET ORAL
Status: DISCONTINUED | OUTPATIENT
Start: 2022-07-26 | End: 2022-07-27 | Stop reason: HOSPADM

## 2022-07-25 RX ORDER — SODIUM CHLORIDE 9 MG/ML
INJECTION, SOLUTION INTRAVENOUS CONTINUOUS
Status: DISCONTINUED | OUTPATIENT
Start: 2022-07-25 | End: 2022-07-26

## 2022-07-25 RX ORDER — ACETAMINOPHEN 325 MG/1
650 TABLET ORAL EVERY 6 HOURS PRN
Status: DISCONTINUED | OUTPATIENT
Start: 2022-07-25 | End: 2022-07-27 | Stop reason: HOSPADM

## 2022-07-25 RX ORDER — IPRATROPIUM BROMIDE AND ALBUTEROL SULFATE 2.5; .5 MG/3ML; MG/3ML
3 SOLUTION RESPIRATORY (INHALATION)
Status: DISCONTINUED | OUTPATIENT
Start: 2022-07-25 | End: 2022-07-27 | Stop reason: HOSPADM

## 2022-07-25 RX ORDER — ENOXAPARIN SODIUM 100 MG/ML
40 INJECTION SUBCUTANEOUS DAILY
Status: DISCONTINUED | OUTPATIENT
Start: 2022-07-25 | End: 2022-07-27 | Stop reason: HOSPADM

## 2022-07-25 RX ORDER — PREDNISONE 20 MG/1
40 TABLET ORAL DAILY
Status: DISCONTINUED | OUTPATIENT
Start: 2022-07-25 | End: 2022-07-27 | Stop reason: HOSPADM

## 2022-07-25 RX ORDER — POLYETHYLENE GLYCOL 3350 17 G/17G
1 POWDER, FOR SOLUTION ORAL
Status: DISCONTINUED | OUTPATIENT
Start: 2022-07-25 | End: 2022-07-27 | Stop reason: HOSPADM

## 2022-07-25 RX ORDER — AMOXICILLIN 250 MG
2 CAPSULE ORAL 2 TIMES DAILY
Status: DISCONTINUED | OUTPATIENT
Start: 2022-07-26 | End: 2022-07-27 | Stop reason: HOSPADM

## 2022-07-25 RX ORDER — VALSARTAN AND HYDROCHLOROTHIAZIDE 80; 12.5 MG/1; MG/1
1 TABLET, FILM COATED ORAL
Status: DISCONTINUED | OUTPATIENT
Start: 2022-07-25 | End: 2022-07-25

## 2022-07-25 RX ORDER — BISACODYL 10 MG
10 SUPPOSITORY, RECTAL RECTAL
Status: DISCONTINUED | OUTPATIENT
Start: 2022-07-25 | End: 2022-07-27 | Stop reason: HOSPADM

## 2022-07-25 RX ORDER — VALSARTAN 80 MG/1
80 TABLET ORAL
Status: DISCONTINUED | OUTPATIENT
Start: 2022-07-26 | End: 2022-07-27 | Stop reason: HOSPADM

## 2022-07-25 RX ORDER — IPRATROPIUM BROMIDE AND ALBUTEROL SULFATE 2.5; .5 MG/3ML; MG/3ML
3 SOLUTION RESPIRATORY (INHALATION)
Status: DISCONTINUED | OUTPATIENT
Start: 2022-07-25 | End: 2022-07-26 | Stop reason: ALTCHOICE

## 2022-07-25 RX ORDER — VERAPAMIL HYDROCHLORIDE 120 MG/1
120 CAPSULE, EXTENDED RELEASE ORAL DAILY
Status: DISCONTINUED | OUTPATIENT
Start: 2022-07-26 | End: 2022-07-27 | Stop reason: HOSPADM

## 2022-07-25 RX ORDER — ASPIRIN 81 MG/1
81 TABLET, CHEWABLE ORAL DAILY
Status: DISCONTINUED | OUTPATIENT
Start: 2022-07-25 | End: 2022-07-27 | Stop reason: HOSPADM

## 2022-07-25 RX ADMIN — PREDNISONE 40 MG: 20 TABLET ORAL at 22:17

## 2022-07-25 RX ADMIN — IOHEXOL 60 ML: 350 INJECTION, SOLUTION INTRAVENOUS at 18:30

## 2022-07-25 RX ADMIN — SODIUM CHLORIDE: 9 INJECTION, SOLUTION INTRAVENOUS at 23:56

## 2022-07-25 RX ADMIN — ENOXAPARIN SODIUM 40 MG: 40 INJECTION SUBCUTANEOUS at 22:25

## 2022-07-25 RX ADMIN — ASPIRIN 81 MG: 81 TABLET, CHEWABLE ORAL at 18:45

## 2022-07-25 RX ADMIN — ALBUTEROL SULFATE 15 MG/HR: 2.5 SOLUTION RESPIRATORY (INHALATION) at 16:31

## 2022-07-25 RX ADMIN — IPRATROPIUM BROMIDE AND ALBUTEROL SULFATE 3 ML: 2.5; .5 SOLUTION RESPIRATORY (INHALATION) at 21:48

## 2022-07-25 RX ADMIN — SODIUM CHLORIDE, POTASSIUM CHLORIDE, SODIUM LACTATE AND CALCIUM CHLORIDE 1000 ML: 600; 310; 30; 20 INJECTION, SOLUTION INTRAVENOUS at 15:33

## 2022-07-25 ASSESSMENT — ENCOUNTER SYMPTOMS
INSOMNIA: 0
FALLS: 0
HEMOPTYSIS: 0
CHILLS: 0
DIZZINESS: 0
BACK PAIN: 0
EYE PAIN: 0
COUGH: 1
VOMITING: 0
HEADACHES: 0
SPUTUM PRODUCTION: 1
SHORTNESS OF BREATH: 1
SENSORY CHANGE: 0
NAUSEA: 0
WHEEZING: 1
PALPITATIONS: 0
FOCAL WEAKNESS: 0
BLURRED VISION: 0
ABDOMINAL PAIN: 0
FEVER: 0

## 2022-07-25 ASSESSMENT — LIFESTYLE VARIABLES
DO YOU DRINK ALCOHOL: NO
SUBSTANCE_ABUSE: 0

## 2022-07-25 ASSESSMENT — FIBROSIS 4 INDEX: FIB4 SCORE: 1.6

## 2022-07-25 NOTE — ED TRIAGE NOTES
Pt arrives via EMS with c/o sob and sore throat since this am. States that she took two albuterol inhalers prior to calling EMS, which did not help. EMS found pt 91% with wheezing throughout. Gave pt albuterol neb x 1 and pt's sats increased to 97% on RA and decreased wheezing. Pt states she has been coughing up yellow sputum x 1 week. Denies any fever or known ill contacts.

## 2022-07-25 NOTE — ED NOTES
Pt resting quietly per cart in nad at this time. Bed locked, in lowest position and side rails up x 2. Call light in reach. Pt requesting to eat. Informed pt that she must wait until after testing is completed. Will continue to monitor.

## 2022-07-25 NOTE — ED PROVIDER NOTES
ED Provider  Scribed for Franklyn Bartlett D.O. by Tiffanie Zhu. 7/25/2022  2:04 PM    Means of arrival:Ambulance  History obtained from:Patient  History limited by: None    CHIEF COMPLAINT  Chief Complaint   Patient presents with   • Sore Throat   • Shortness of Breath     Pt arrives via EMS with c/o sob and sore throat since this am. States that she took two albuterol inhalers prior to calling EMS, which did not help. EMS found pt 91% with wheezing throughout. Gave pt albuterol neb x 1 and pt's sats increased to 97% on RA and decreased wheezing. Pt states she has been coughing up yellow sputum x 1 week. Denies any fever or known ill contacts.        HPI  Serenity Howe is a 72 y.o. female who presents for evaluation of worsening cough onset one week. Patient reports that she was feeling very shortness of breath today and called EMS. EMS found patient at 91% with wheezing through out. She was administered albuterol and her saturation increased to 97%. She admits to associated symptoms of shortness of breath, but denies fever, nausea, vomiting, diarrhea, or chest pain. Mild alleviation with albutoerol. Patient has a history of asthma; she takes Advir. However, patient has no history of heart problems or diabetes. She denies COVID exposure. Patient is vaccinated for COVID.     REVIEW OF SYSTEMS  See HPI for further details. All other systems are negative.   Pertinent positives include worsening cough and shortness of breath. Pertinent negatives include no fever, nausea, vomiting, diarrhea, or chest pain.     PAST MEDICAL HISTORY   has a past medical history of ASTHMA, Cancer (HCC), History of breast cancer (7/29/2015), Hypertension, Shoulder pain, right (7/29/2015), and Tremor (7/29/2015).    SOCIAL HISTORY  Social History     Tobacco Use   • Smoking status: Never Smoker   • Smokeless tobacco: Never Used   Vaping Use   • Vaping Use: Never used   Substance and Sexual Activity   • Alcohol use: No   • Drug use: No    • Sexual activity: Never     Partners: Male       SURGICAL HISTORY   has a past surgical history that includes mastectomy and gyn surgery.    CURRENT MEDICATIONS  Current Outpatient Medications:   •  cephALEXin (KEFLEX) 500 MG Cap, Take 1 Capsule by mouth 4 times a day for 5 days., Disp: 20 Capsule, Rfl: 0  •  albuterol 108 (90 Base) MCG/ACT Aero Soln inhalation aerosol, Inhale 2 Puffs every 6 hours as needed for Shortness of Breath., Disp: 8.5 g, Rfl: 0  •  albuterol (PROVENTIL) 2.5mg/3ml Nebu Soln solution for nebulization, Take 3 mL by nebulization every four hours as needed for Shortness of Breath., Disp: 30 mL, Rfl: 0  •  verapamil ER (CALAN SR) 120 MG CAPSULE SR 24 HR, TAKE 1 CAPSULE BY MOUTH EVERY DAY, Disp: 90 Capsule, Rfl: 3  •  valsartan-hydrochlorothiazide (DIOVAN-HCT) 80-12.5 MG per tablet, TAKE 1 TABLET BY MOUTH EVERY DAY, Disp: 90 Tablet, Rfl: 0  •  hydrocortisone (ANUSOL-HC) 25 MG Suppos, Insert 1 Suppository into the rectum every 12 hours., Disp: 24 Suppository, Rfl: 0  •  pramoxine (PROCTOFOAM) 1 % foam, Insert 1 Application into the rectum 4 times a day as needed (for hemorroids)., Disp: 15 g, Rfl: 0  •  docusate sodium (COLACE) 100 MG Cap, Take 1 Capsule by mouth 2 times a day., Disp: 60 Capsule, Rfl: 0  •  polyethylene glycol 3350 (MIRALAX) 17 GM/SCOOP Powder, Take 17 g by mouth every day., Disp: 255 g, Rfl: 0  •  docusate sodium (COLACE) 100 MG Cap, Take 1 Capsule by mouth 2 times a day as needed for Constipation., Disp: 30 Capsule, Rfl: 0  •  diclofenac sodium (VOLTAREN) 1 % Gel, 1 JOSSELIN TOPICAL 4 TIMES A DAY FOR 7 DAYS AS NEEDED FOR PAIN, Disp: , Rfl:   •  fluticasone-salmeterol (ADVAIR) 250-50 MCG/DOSE AEROSOL POWDER, BREATH ACTIVATED, Inhale 1 Puff every 12 hours., Disp: 3 Each, Rfl: 3  •  clotrimazole (LOTRIMIN) 1 % Cream, Apply to affected area twice daily. Clean area with soap and water, pat dry, before each application., Disp: 45 g, Rfl: 0  •  albuterol 108 (90 Base) MCG/ACT Aero Soln  "inhalation aerosol, Inhale 2 Puffs every 6 hours as needed for Shortness of Breath., Disp: 8.5 g, Rfl: 0  •  acetaminophen (TYLENOL) 325 MG Tab, Take 650 mg by mouth every four hours as needed., Disp: , Rfl:   •  Calcium Carbonate-Vit D-Min (CALTRATE PLUS PO), Take 1 Tab by mouth every day., Disp: , Rfl:   •  aspirin (ASA) 81 MG CHEW, Take 81 mg by mouth every day., Disp: , Rfl:       ALLERGIES  Allergies   Allergen Reactions   • Shellfish Allergy Hives       PHYSICAL EXAM  VITAL SIGNS: /61   Pulse (!) 108   Temp 36.6 °C (97.8 °F) (Temporal)   Resp (!) 24   Ht 1.575 m (5' 2\")   Wt 75.8 kg (167 lb)   SpO2 92%   BMI 30.54 kg/m²   Constitutional: Alert in no apparent distress.  HENT: No signs of trauma, mucous membranes are moist  Eyes: Conjunctiva normal, Non-icteric.   Neck: Normal range of motion, No tenderness, Supple.  Lymphatic: No lymphadenopathy noted.   Cardiovascular: Regular rate and rhythm, no murmurs.   Thorax & Lungs: Normal breath sounds, No respiratory distress, Mild wheezing, No chest tenderness.   Abdomen: Bowel sounds normal, Soft, No tenderness, No masses, No pulsatile masses. No peritoneal signs.  Skin: Warm, Dry, normal color.   Back: No bony tenderness, No CVA tenderness.   Extremities: No edema, No tenderness, No cyanosis  Musculoskeletal: Good range of motion in all major joints. No tenderness to palpation or major deformities noted.   Neurologic: Alert and oriented x4, Normal motor function, Normal sensory function, No focal deficits noted.   Psychiatric: Affect normal, Judgment normal, Mood normal.       DIAGNOSTIC STUDIES / PROCEDURES    LABS  Results for orders placed or performed during the hospital encounter of 07/25/22   LACTIC ACID   Result Value Ref Range    Lactic Acid 2.7 (H) 0.5 - 2.0 mmol/L   LACTIC ACID   Result Value Ref Range    Lactic Acid 2.1 (H) 0.5 - 2.0 mmol/L   LACTIC ACID   Result Value Ref Range    Lactic Acid 2.3 (H) 0.5 - 2.0 mmol/L   CBC WITH DIFFERENTIAL "   Result Value Ref Range    WBC 8.9 4.8 - 10.8 K/uL    RBC 4.59 4.20 - 5.40 M/uL    Hemoglobin 15.4 12.0 - 16.0 g/dL    Hematocrit 44.7 37.0 - 47.0 %    MCV 97.4 81.4 - 97.8 fL    MCH 33.6 (H) 27.0 - 33.0 pg    MCHC 34.5 33.6 - 35.0 g/dL    RDW 44.7 35.9 - 50.0 fL    Platelet Count 285 164 - 446 K/uL    MPV 9.2 9.0 - 12.9 fL    Neutrophils-Polys 74.00 (H) 44.00 - 72.00 %    Lymphocytes 15.70 (L) 22.00 - 41.00 %    Monocytes 8.00 0.00 - 13.40 %    Eosinophils 1.30 0.00 - 6.90 %    Basophils 0.60 0.00 - 1.80 %    Immature Granulocytes 0.40 0.00 - 0.90 %    Nucleated RBC 0.00 /100 WBC    Neutrophils (Absolute) 6.57 2.00 - 7.15 K/uL    Lymphs (Absolute) 1.40 1.00 - 4.80 K/uL    Monos (Absolute) 0.71 0.00 - 0.85 K/uL    Eos (Absolute) 0.12 0.00 - 0.51 K/uL    Baso (Absolute) 0.05 0.00 - 0.12 K/uL    Immature Granulocytes (abs) 0.04 0.00 - 0.11 K/uL    NRBC (Absolute) 0.00 K/uL   COMP METABOLIC PANEL   Result Value Ref Range    Sodium 139 135 - 145 mmol/L    Potassium 3.8 3.6 - 5.5 mmol/L    Chloride 103 96 - 112 mmol/L    Co2 21 20 - 33 mmol/L    Anion Gap 15.0 7.0 - 16.0    Glucose 122 (H) 65 - 99 mg/dL    Bun 28 (H) 8 - 22 mg/dL    Creatinine 1.48 (H) 0.50 - 1.40 mg/dL    Calcium 10.5 8.5 - 10.5 mg/dL    AST(SGOT) 38 12 - 45 U/L    ALT(SGPT) 29 2 - 50 U/L    Alkaline Phosphatase 73 30 - 99 U/L    Total Bilirubin 0.3 0.1 - 1.5 mg/dL    Albumin 4.1 3.2 - 4.9 g/dL    Total Protein 8.4 (H) 6.0 - 8.2 g/dL    Globulin 4.3 (H) 1.9 - 3.5 g/dL    A-G Ratio 1.0 g/dL   CoV-2, FLU A/B, and RSV by PCR (2-4 Hours CEPHEID) : Collect NP swab in VTM    Specimen: Respirate   Result Value Ref Range    Influenza virus A RNA Negative Negative    Influenza virus B, PCR Negative Negative    RSV, PCR Negative Negative    SARS-CoV-2 by PCR DETECTED (AA)     SARS-CoV-2 Source NP Swab    ESTIMATED GFR   Result Value Ref Range    GFR (CKD-EPI) 37 (A) >60 mL/min/1.73 m 2   D-DIMER   Result Value Ref Range    D-Dimer Screen 1.74 (H) 0.00 - 0.50  ug/mL (FEU)     All labs reviewed by me.    RADIOLOGY  CT-CTA CHEST PULMONARY ARTERY W/ RECONS   Final Result      1.  No pulmonary embolus is identified      2.  Atherosclerosis            DX-CHEST-PORTABLE (1 VIEW)   Final Result      No acute cardiac or pulmonary abnormalities are identified.        The radiologist's interpretations of all radiological studies have been reviewed by me.    Films have been independently by me      COURSE  Pertinent Labs & Imaging studies reviewed. (See chart for details)    2:04 PM - Patient seen and examined at bedside. Discussed plan of care. The patient will be  medicated with Proventil 2.5mg/0.5 mL. Ordered for DX-chest, Lactic acid, CBC with diff, CMP, Blood culture, and COVID swab to evaluate her symptoms.     3:14 PM - Patient's lactic acid levels are elevated. Patient will be treated with LR infusion.     4:43 PM - Paged Hospitalist.     4:50 PM I discussed the patient's case and the above findings with Dr. Abdi (Hospitalist) who kindly agrees to evaluate the patient.      4:51 PM - Patient was reevaluated at bedside. Discussed lab and radiology results with the patient. Informed patient on plans for admission. Patient verbalizes understanding and agreement to this plan of care.     HYDRATION: Based on the patient's presentation of Dehydration the patient was given IV fluids. IV Hydration was used because oral hydration was not adequate alone. Upon recheck following hydration, the patient was mildly improved.    The patient remains critically ill.  Critical care time = 30 minutes in directly providing and coordinating critical care and extensive data review.  No time overlap and excludes procedures.      MEDICAL DECISION MAKING  This is a 72 y.o. female who presents with complaints of shortness of breath, cough, the cough is been there for approximately 1 week, shortness of breath also and exacerbated today.  She has no exposure to COVID.    She does have a history of COPD.   She had wheezing and she received nebulized treatment which made her feel better.  Her lactic acid level is dated 2.7 so IV fluids were initiated.    For creatinine is 1.48 with an elevated BUN concerning for dehydration and again fluids are being given.  Her records were reviewed and shows that 4 weeks ago her creatinine was 1.04 this is consistent with acute kidney injury.  Her COVID test is positive.    Her D-dimer is also elevated so CT of the chest was ordered.  The patient will be admitted for further evaluation and treatment.    Critical care time 30 minutes    DISPOSITION:  Patient will be hospitalized by Dr. Abdi in critical condition.    FINAL IMPRESSION  1. COPD with acute exacerbation (HCC)    2. Shortness of breath    3. COVID    4. Lactic acidosis    5. IVAN (acute kidney injury) (HCC)         Tiffanie ZIMMERMAN (Scribe), am scribing for, and in the presence of, Franklyn Bartlett D.O..    Electronically signed by: Tiffanie Zhu (Scribe), 7/25/2022    Franklyn ZIMMERMAN D.O. personally performed the services described in this documentation, as scribed by Tiffanie Zhu in my presence, and it is both accurate and complete.    The note accurately reflects work and decisions made by me.  Franklyn Bartlett D.O.  7/25/2022  9:20 PM

## 2022-07-26 PROBLEM — J45.41 MODERATE PERSISTENT ASTHMA WITH ACUTE EXACERBATION: Status: ACTIVE | Noted: 2022-07-25

## 2022-07-26 LAB
ANION GAP SERPL CALC-SCNC: 13 MMOL/L (ref 7–16)
BUN SERPL-MCNC: 28 MG/DL (ref 8–22)
CALCIUM SERPL-MCNC: 9.6 MG/DL (ref 8.5–10.5)
CHLORIDE SERPL-SCNC: 106 MMOL/L (ref 96–112)
CO2 SERPL-SCNC: 21 MMOL/L (ref 20–33)
CREAT SERPL-MCNC: 1.22 MG/DL (ref 0.5–1.4)
GFR SERPLBLD CREATININE-BSD FMLA CKD-EPI: 47 ML/MIN/1.73 M 2
GLUCOSE SERPL-MCNC: 142 MG/DL (ref 65–99)
LACTATE SERPL-SCNC: 5.1 MMOL/L (ref 0.5–2)
POTASSIUM SERPL-SCNC: 4.1 MMOL/L (ref 3.6–5.5)
SODIUM SERPL-SCNC: 140 MMOL/L (ref 135–145)

## 2022-07-26 PROCEDURE — 80048 BASIC METABOLIC PNL TOTAL CA: CPT

## 2022-07-26 PROCEDURE — 700111 HCHG RX REV CODE 636 W/ 250 OVERRIDE (IP): Performed by: STUDENT IN AN ORGANIZED HEALTH CARE EDUCATION/TRAINING PROGRAM

## 2022-07-26 PROCEDURE — 94640 AIRWAY INHALATION TREATMENT: CPT

## 2022-07-26 PROCEDURE — 94760 N-INVAS EAR/PLS OXIMETRY 1: CPT

## 2022-07-26 PROCEDURE — 83605 ASSAY OF LACTIC ACID: CPT

## 2022-07-26 PROCEDURE — 94669 MECHANICAL CHEST WALL OSCILL: CPT

## 2022-07-26 PROCEDURE — 94664 DEMO&/EVAL PT USE INHALER: CPT

## 2022-07-26 PROCEDURE — 700105 HCHG RX REV CODE 258: Performed by: INTERNAL MEDICINE

## 2022-07-26 PROCEDURE — 770001 HCHG ROOM/CARE - MED/SURG/GYN PRIV*

## 2022-07-26 PROCEDURE — A9270 NON-COVERED ITEM OR SERVICE: HCPCS | Performed by: STUDENT IN AN ORGANIZED HEALTH CARE EDUCATION/TRAINING PROGRAM

## 2022-07-26 PROCEDURE — 99222 1ST HOSP IP/OBS MODERATE 55: CPT | Performed by: INTERNAL MEDICINE

## 2022-07-26 PROCEDURE — 700102 HCHG RX REV CODE 250 W/ 637 OVERRIDE(OP): Performed by: STUDENT IN AN ORGANIZED HEALTH CARE EDUCATION/TRAINING PROGRAM

## 2022-07-26 PROCEDURE — 99233 SBSQ HOSP IP/OBS HIGH 50: CPT | Performed by: INTERNAL MEDICINE

## 2022-07-26 PROCEDURE — 700101 HCHG RX REV CODE 250: Performed by: STUDENT IN AN ORGANIZED HEALTH CARE EDUCATION/TRAINING PROGRAM

## 2022-07-26 RX ORDER — SODIUM CHLORIDE 9 MG/ML
INJECTION, SOLUTION INTRAVENOUS CONTINUOUS
Status: DISCONTINUED | OUTPATIENT
Start: 2022-07-26 | End: 2022-07-27 | Stop reason: HOSPADM

## 2022-07-26 RX ORDER — ALBUTEROL SULFATE 90 UG/1
2 AEROSOL, METERED RESPIRATORY (INHALATION)
Status: DISCONTINUED | OUTPATIENT
Start: 2022-07-26 | End: 2022-07-27

## 2022-07-26 RX ORDER — ALBUTEROL SULFATE 90 UG/1
2 AEROSOL, METERED RESPIRATORY (INHALATION)
Status: DISCONTINUED | OUTPATIENT
Start: 2022-07-26 | End: 2022-07-26

## 2022-07-26 RX ADMIN — SENNOSIDES AND DOCUSATE SODIUM 2 TABLET: 50; 8.6 TABLET ORAL at 17:58

## 2022-07-26 RX ADMIN — ASPIRIN 81 MG: 81 TABLET, CHEWABLE ORAL at 06:13

## 2022-07-26 RX ADMIN — ENOXAPARIN SODIUM 40 MG: 40 INJECTION SUBCUTANEOUS at 17:57

## 2022-07-26 RX ADMIN — HYDROCHLOROTHIAZIDE 12.5 MG: 12.5 TABLET ORAL at 06:13

## 2022-07-26 RX ADMIN — PREDNISONE 40 MG: 20 TABLET ORAL at 06:13

## 2022-07-26 RX ADMIN — SODIUM CHLORIDE: 9 INJECTION, SOLUTION INTRAVENOUS at 17:57

## 2022-07-26 RX ADMIN — IPRATROPIUM BROMIDE AND ALBUTEROL SULFATE 3 ML: 2.5; .5 SOLUTION RESPIRATORY (INHALATION) at 01:51

## 2022-07-26 RX ADMIN — VALSARTAN 80 MG: 80 TABLET, FILM COATED ORAL at 06:14

## 2022-07-26 RX ADMIN — ALBUTEROL SULFATE 2 PUFF: 90 AEROSOL, METERED RESPIRATORY (INHALATION) at 19:41

## 2022-07-26 RX ADMIN — IPRATROPIUM BROMIDE AND ALBUTEROL SULFATE 3 ML: 2.5; .5 SOLUTION RESPIRATORY (INHALATION) at 10:54

## 2022-07-26 ASSESSMENT — LIFESTYLE VARIABLES
HAVE PEOPLE ANNOYED YOU BY CRITICIZING YOUR DRINKING: NO
HAVE YOU EVER FELT YOU SHOULD CUT DOWN ON YOUR DRINKING: NO
TOTAL SCORE: 0
EVER HAD A DRINK FIRST THING IN THE MORNING TO STEADY YOUR NERVES TO GET RID OF A HANGOVER: NO
TOTAL SCORE: 0
ON A TYPICAL DAY WHEN YOU DRINK ALCOHOL HOW MANY DRINKS DO YOU HAVE: 0
HOW MANY TIMES IN THE PAST YEAR HAVE YOU HAD 5 OR MORE DRINKS IN A DAY: 0
AVERAGE NUMBER OF DAYS PER WEEK YOU HAVE A DRINK CONTAINING ALCOHOL: 0
CONSUMPTION TOTAL: NEGATIVE
DOES PATIENT WANT TO STOP DRINKING: NO
EVER FELT BAD OR GUILTY ABOUT YOUR DRINKING: NO
TOTAL SCORE: 0
ALCOHOL_USE: NO

## 2022-07-26 ASSESSMENT — COGNITIVE AND FUNCTIONAL STATUS - GENERAL
MOBILITY SCORE: 22
SUGGESTED CMS G CODE MODIFIER DAILY ACTIVITY: CH
CLIMB 3 TO 5 STEPS WITH RAILING: A LITTLE
WALKING IN HOSPITAL ROOM: A LITTLE
SUGGESTED CMS G CODE MODIFIER MOBILITY: CJ
SUGGESTED CMS G CODE MODIFIER DAILY ACTIVITY: CH
WALKING IN HOSPITAL ROOM: A LITTLE
DAILY ACTIVITIY SCORE: 24
DAILY ACTIVITIY SCORE: 24
CLIMB 3 TO 5 STEPS WITH RAILING: A LITTLE

## 2022-07-26 ASSESSMENT — ENCOUNTER SYMPTOMS
FLANK PAIN: 0
SPEECH CHANGE: 0
SHORTNESS OF BREATH: 1
DIZZINESS: 0
NERVOUS/ANXIOUS: 0
SPUTUM PRODUCTION: 0
MEMORY LOSS: 0
COUGH: 1
SENSORY CHANGE: 0
HEADACHES: 0
DEPRESSION: 0
BACK PAIN: 0
FEVER: 0
WEAKNESS: 0
PALPITATIONS: 0
CHILLS: 0
FOCAL WEAKNESS: 0
WHEEZING: 0
MYALGIAS: 0
VOMITING: 0
NAUSEA: 0
ABDOMINAL PAIN: 0

## 2022-07-26 ASSESSMENT — PATIENT HEALTH QUESTIONNAIRE - PHQ9
2. FEELING DOWN, DEPRESSED, IRRITABLE, OR HOPELESS: NOT AT ALL
1. LITTLE INTEREST OR PLEASURE IN DOING THINGS: NOT AT ALL
SUM OF ALL RESPONSES TO PHQ9 QUESTIONS 1 AND 2: 0

## 2022-07-26 NOTE — ASSESSMENT & PLAN NOTE
Agree with primary hospitalist that current issues are NOT suggestive of COPD exacerbation. Chart audited and COPD removed from problem list.  -- Asthma exacerbation triggered by COVID-19  -- No supplemental O2 requirements at rest-- check walk test to ensure no O2 requirements with ambulation  -- Defer option of paxlovid to primary team; no indication for steroids currently given no O2 requirements  -- Cont Advair, albuterol PRN  -- recommend enrolling in COVID remote monitoring program

## 2022-07-26 NOTE — PROGRESS NOTES
[facility] Medicine Daily Progress Note    Date of Service  7/26/2022    Chief Complaint  Serenity Howe is a 72 y.o. female admitted 7/25/2022 with acute renal failure and COVID infection with syncopal episode.    Hospital Course  Serenity Howe is a 72 y.o. female who presented 7/25/2022 with cough and dyspnea. Patient with history of asthma, hypertension, who presents with cough and dyspnea. Patient reports 1 week of cough with slight yellow sputum production, worse today with a coughing fit associated with acute dyspnea prompting patient to come to ER. Patient tried taking her albuterol inhaler without improvement.  In the ED, , RR 24, on room air. Labs show Cr 1.48, baseline near 1, LA 2.7 with repeat 2.1 after IV fluids. D dimer 1.74. COVID positive. CXR clear. CTA PE study ordered with results pending. Patient given breathing treatment with improvement in symptoms. Patient will be admitted for COPD exacerbation and possible sepsis due to COVID infection.     Interval Problem Update  Hypoxia resolved, on room air  Reports minimal cough  Generalized weakness with ambulation and shortness of breath  Patient reports feeling better, however not at her baseline  Ongoing lactic acidosis, renal failure improved    I have discussed this patient's plan of care and discharge plan at IDT rounds today with Case Management, Nursing, Nursing leadership, and other members of the IDT team.    Consultants/Specialty  pulmonary    Code Status  Full Code    Disposition  Patient is not medically cleared for discharge.   Anticipate discharge to to home with close outpatient follow-up.  I have placed the appropriate orders for post-discharge needs.    Review of Systems  Review of Systems   Constitutional: Positive for malaise/fatigue. Negative for chills and fever.   HENT: Negative for congestion and hearing loss.    Respiratory: Positive for cough and shortness of breath. Negative for sputum production and  wheezing.    Cardiovascular: Negative for chest pain, palpitations and leg swelling.   Gastrointestinal: Negative for abdominal pain, nausea and vomiting.   Genitourinary: Negative for dysuria and flank pain.   Musculoskeletal: Negative for back pain, joint pain and myalgias.   Neurological: Negative for dizziness, sensory change, speech change, focal weakness, weakness and headaches.   Psychiatric/Behavioral: Negative for depression and memory loss. The patient is not nervous/anxious.         Physical Exam  Pulse:  [] 95  Resp:  [13-62] 23  BP: (111-177)/(54-84) 162/76  SpO2:  [91 %-99 %] 94 %    Physical Exam  Vitals and nursing note reviewed.   Constitutional:       Appearance: She is not diaphoretic.   HENT:      Head: Normocephalic and atraumatic.      Nose: Nose normal.   Eyes:      General:         Right eye: No discharge.         Left eye: No discharge.      Pupils: Pupils are equal, round, and reactive to light.   Neck:      Thyroid: No thyromegaly.      Vascular: No JVD.   Cardiovascular:      Rate and Rhythm: Normal rate.      Heart sounds: No murmur heard.  Pulmonary:      Effort: No respiratory distress.      Breath sounds: Normal breath sounds. No wheezing.   Abdominal:      General: Bowel sounds are normal. There is no distension.      Palpations: Abdomen is soft.      Tenderness: There is no abdominal tenderness.   Musculoskeletal:         General: No swelling or tenderness.      Cervical back: Neck supple.   Skin:     General: Skin is warm and dry.      Findings: No erythema or rash.   Neurological:      Mental Status: She is alert and oriented to person, place, and time.      Cranial Nerves: No cranial nerve deficit.      Sensory: No sensory deficit.      Motor: Weakness present.      Coordination: Coordination normal.   Psychiatric:         Behavior: Behavior normal.         Thought Content: Thought content normal.         Fluids  No intake or output data in the 24 hours ending 07/26/22  1610    Laboratory  Recent Labs     07/25/22  1430   WBC 8.9   RBC 4.59   HEMOGLOBIN 15.4   HEMATOCRIT 44.7   MCV 97.4   MCH 33.6*   MCHC 34.5   RDW 44.7   PLATELETCT 285   MPV 9.2     Recent Labs     07/25/22  1430 07/26/22  0301   SODIUM 139 140   POTASSIUM 3.8 4.1   CHLORIDE 103 106   CO2 21 21   GLUCOSE 122* 142*   BUN 28* 28*   CREATININE 1.48* 1.22   CALCIUM 10.5 9.6                   Imaging  CT-CTA CHEST PULMONARY ARTERY W/ RECONS   Final Result      1.  No pulmonary embolus is identified      2.  Atherosclerosis            DX-CHEST-PORTABLE (1 VIEW)   Final Result      No acute cardiac or pulmonary abnormalities are identified.           Assessment/Plan  * Moderate persistent asthma with acute exacerbation- (present on admission)  Assessment & Plan  Presents with 1 week of cough with yellow sputum production, acutely worse today prompting evaluation  COVID positive, wheezing on exam  Continue duonebs, prednisone  On room air thankfully, monitor closely    7/26 no history of COPD, seen by pulmonary    IVAN (acute kidney injury) ([facility])  Assessment & Plan  Cr elevated on presentation  Will continue gentle fluid hydration in setting of COVID infection  Repeat BMP in AM    7/26 improving, monitor    COVID-19  Assessment & Plan  Presents for acutely worsening dyspnea, cough, sputum production  COVID positive  Likely triggered COPD exacerbation  On room air, continue supportive treatment    7/26 on room air, improving  Continue isolation    Sepsis ([facility])  Assessment & Plan  This is Sepsis Present on admission  SIRS criteria identified on my evaluation include: Tachycardia, with heart rate greater than 90 BPM  Source is pulmonary, COVID  Sepsis protocol initiated  Reassessment: I have reassessed the patient's hemodynamic status    7/26 ongoing lactic acidosis, will continue fluid hydration  Secondary to COVID infection  Currently not on antibiotics  Follow-up cultures         VTE prophylaxis: SCDs/TEDs    I have  performed a physical exam and reviewed and updated ROS and Plan today (7/26/2022). In review of yesterday's note (7/25/2022), there are no changes except as documented above.

## 2022-07-26 NOTE — ED NOTES
Report received from RN. Assumed care of pt. Pt resting comfortably on gurney. Aware of POC. Hospital bed ordered.

## 2022-07-26 NOTE — ASSESSMENT & PLAN NOTE
Cr elevated on presentation  Will continue gentle fluid hydration in setting of COVID infection  Repeat BMP in AM    7/26 improving, monitor

## 2022-07-26 NOTE — ED NOTES
Med rec updated and complete. Allergies reviewed. Confirmed name and date of birth.  Pt is currently on a 5 day course of CEPHALEXIN . Start date 07/21/22.      Home pharmacy [facility] [phone]

## 2022-07-26 NOTE — ASSESSMENT & PLAN NOTE
Presents with 1 week of cough with yellow sputum production, acutely worse today prompting evaluation  COVID positive, wheezing on exam  Continue duonebs, prednisone  On room air thankfully, monitor closely    7/26 no history of COPD, seen by pulmonary

## 2022-07-26 NOTE — ED NOTES
"Performed walking o2 on pt. Pt stayed between 92%-93% throughout walk. Pt appears fatigue and reports SOB with exertion. Pt states, \" I feel like I should stay for one night.\"   "

## 2022-07-26 NOTE — CONSULTS
"Pulmonary Consultation    Date of consult: 7/26/2022    Referring Physician  Danielle Medina D.O.    Reason for Consultation  Suspected COPD exacerbation    History of Presenting Illness  72 y.o. female who presented 7/25/2022 with complaints of wheezing, cough and shortness of breath for the past week.  Cough productive of's pale yellow sputum she has a past medical history of asthma for which she takes Advair 250.  She is a never smoker.  She has not been seen in the pulmonary clinic before.  No prior PFTs.    SARS-CoV-2 swab positive. Labs on admission reviewed notable for IVAN.  CT angiogram of the chest personally reviewed, unremarkable.  Stable 4 mm nodule in the right lower lobe essentially unchanged since 2016.  No significant emphysematous changes.    She was admitted to the hospital service.  Requiring no supplemental oxygen.  Started on duo nebs and prednisone.    Code Status  Full Code    Review of Systems  Review of Systems   Constitutional:        \"Feeling better\"   All other systems reviewed and are negative.      Past Medical History   has a past medical history of ASTHMA, Cancer (HCC), History of breast cancer (7/29/2015), Hypertension, Shoulder pain, right (7/29/2015), and Tremor (7/29/2015).    She has no past medical history of COPD.    Surgical History   has a past surgical history that includes mastectomy and gyn surgery.    Family History  family history includes Hyperlipidemia in her mother; No Known Problems in her brother, daughter, daughter, father, sister, and son.    Social History   reports that she has never smoked. She has never used smokeless tobacco. She reports that she does not drink alcohol and does not use drugs.    Medications  Home Medications     Reviewed by Trista Kline (Pharmacy Tech) on 07/25/22 at 1718  Med List Status: Complete   Medication Last Dose Status   acetaminophen (TYLENOL) 325 MG Tab 7/24/2022 Active   albuterol (PROVENTIL) 2.5mg/3ml Nebu Soln solution for " nebulization 7/25/2022 Active   albuterol 108 (90 Base) MCG/ACT Aero Soln inhalation aerosol 7/25/2022 Active   aspirin (ASA) 81 MG CHEW 7/25/2022 Active   Calcium Carb-Cholecalciferol (CALTRATE 600+D3) 600-800 MG-UNIT Tab 7/25/2022 Active   cephALEXin (KEFLEX) 500 MG Cap 7/25/2022 Active   fluticasone-salmeterol (ADVAIR) 250-50 MCG/DOSE AEROSOL POWDER, BREATH ACTIVATED 7/25/2022 Active   valsartan-hydrochlorothiazide (DIOVAN-HCT) 80-12.5 MG per tablet 7/25/2022 Active   verapamil ER (CALAN SR) 120 MG CAPSULE SR 24 HR 7/24/2022 Active              Current Facility-Administered Medications   Medication Dose Route Frequency Provider Last Rate Last Admin   • senna-docusate (PERICOLACE or SENOKOT S) 8.6-50 MG per tablet 2 Tablet  2 Tablet Oral BID Teto Abdi M.D.        And   • polyethylene glycol/lytes (MIRALAX) PACKET 1 Packet  1 Packet Oral QDAY PRN Teto Abdi M.D.        And   • magnesium hydroxide (MILK OF MAGNESIA) suspension 30 mL  30 mL Oral QDAY PRN Teto Abdi M.D.        And   • bisacodyl (DULCOLAX) suppository 10 mg  10 mg Rectal QDAY PRN Teto Abdi M.D.       • Respiratory Therapy Consult   Nebulization Continuous RT Teto Abdi M.D.       • NS infusion   Intravenous Continuous Teto Abdi M.D. 50 mL/hr at 07/26/22 0735 Rate Verify at 07/26/22 0735   • enoxaparin (Lovenox) inj 40 mg  40 mg Subcutaneous DAILY AT 1800 Teto Abdi M.D.   40 mg at 07/25/22 2225   • acetaminophen (Tylenol) tablet 650 mg  650 mg Oral Q6HRS PRN Teto Abdi M.D.       • aspirin (ASA) chewable tab 81 mg  81 mg Oral DAILY Teto Abdi M.D.   81 mg at 07/26/22 0613   • verapamil ER (CALAN SR) capsule 120 mg  120 mg Oral DAILY Teto Abdi M.D.       • ipratropium-albuterol (DUONEB) nebulizer solution  3 mL Nebulization Q4HRS (RT) Teto Abdi M.D.   3 mL at 07/26/22 0151   • ipratropium-albuterol (DUONEB) nebulizer solution  3 mL Nebulization Q2HRS PRN (RT) Teto Abdi M.D.       • predniSONE (DELTASONE) tablet  40 mg  40 mg Oral DAILY Teto Abdi M.D.   40 mg at 07/26/22 0613   • valsartan (DIOVAN) tablet 80 mg  80 mg Oral Q DAY Teto Abdi M.D.   80 mg at 07/26/22 0614    And   • hydroCHLOROthiazide (HYDRODIURIL) tablet 12.5 mg  12.5 mg Oral Q DAY Teto Abdi M.D.   12.5 mg at 07/26/22 0613     Current Outpatient Medications   Medication Sig Dispense Refill   • Calcium Carb-Cholecalciferol (CALTRATE 600+D3) 600-800 MG-UNIT Tab Take 1 Tablet by mouth every day.     • cephALEXin (KEFLEX) 500 MG Cap Take 1 Capsule by mouth 4 times a day for 5 days. 20 Capsule 0   • albuterol (PROVENTIL) 2.5mg/3ml Nebu Soln solution for nebulization Take 3 mL by nebulization every four hours as needed for Shortness of Breath. 30 mL 0   • verapamil ER (CALAN SR) 120 MG CAPSULE SR 24 HR TAKE 1 CAPSULE BY MOUTH EVERY DAY 90 Capsule 3   • valsartan-hydrochlorothiazide (DIOVAN-HCT) 80-12.5 MG per tablet TAKE 1 TABLET BY MOUTH EVERY DAY 90 Tablet 0   • fluticasone-salmeterol (ADVAIR) 250-50 MCG/DOSE AEROSOL POWDER, BREATH ACTIVATED Inhale 1 Puff every 12 hours. 3 Each 3   • albuterol 108 (90 Base) MCG/ACT Aero Soln inhalation aerosol Inhale 2 Puffs every 6 hours as needed for Shortness of Breath. 8.5 g 0   • acetaminophen (TYLENOL) 325 MG Tab Take 650 mg by mouth every 6 hours as needed for Mild Pain.     • aspirin (ASA) 81 MG CHEW Take 81 mg by mouth every day.         Allergies  Allergies   Allergen Reactions   • Shellfish Allergy Hives       Vital Signs last 24 hours  Temp:  [36.6 °C (97.8 °F)] 36.6 °C (97.8 °F)  Pulse:  [] 86  Resp:  [13-35] 18  BP: (111-163)/(54-77) 145/69  SpO2:  [92 %-99 %] 95 %    Physical Exam  Physical Exam  Vitals and nursing note reviewed.   Constitutional:       General: She is not in acute distress.     Appearance: Normal appearance. She is well-developed. She is obese. She is not ill-appearing, toxic-appearing or diaphoretic.   Eyes:      General: No scleral icterus.        Right eye: No discharge.          Left eye: No discharge.      Conjunctiva/sclera: Conjunctivae normal.      Pupils: Pupils are equal, round, and reactive to light.   Neck:      Thyroid: No thyromegaly.      Vascular: No JVD.   Cardiovascular:      Rate and Rhythm: Normal rate and regular rhythm.      Heart sounds: Normal heart sounds. No murmur heard.    No gallop.   Pulmonary:      Effort: Pulmonary effort is normal. No respiratory distress.      Breath sounds: Normal breath sounds. No wheezing or rales.   Abdominal:      General: There is no distension.      Palpations: Abdomen is soft.      Tenderness: There is no abdominal tenderness. There is no guarding.   Musculoskeletal:         General: No tenderness.   Lymphadenopathy:      Cervical: No cervical adenopathy.   Skin:     General: Skin is warm.      Capillary Refill: Capillary refill takes less than 2 seconds.      Findings: No erythema or rash.   Neurological:      Mental Status: She is alert and oriented to person, place, and time.      Cranial Nerves: No cranial nerve deficit.      Sensory: No sensory deficit.      Motor: No abnormal muscle tone.   Psychiatric:         Behavior: Behavior normal.       Fluids  No intake or output data in the 24 hours ending 07/26/22 0829    Laboratory  Recent Results (from the past 48 hour(s))   CoV-2, FLU A/B, and RSV by PCR (2-4 Hours CEPHEID) : Collect NP swab in [place]    Collection Time: 07/25/22  2:15 PM    Specimen: Respirate   Result Value Ref Range    Influenza virus A RNA Negative Negative    Influenza virus B, PCR Negative Negative    RSV, PCR Negative Negative    SARS-CoV-2 by PCR DETECTED (AA)     SARS-CoV-2 Source NP Swab    LACTIC ACID    Collection Time: 07/25/22  2:30 PM   Result Value Ref Range    Lactic Acid 2.7 (H) 0.5 - 2.0 mmol/L   CBC WITH DIFFERENTIAL    Collection Time: 07/25/22  2:30 PM   Result Value Ref Range    WBC 8.9 4.8 - 10.8 K/uL    RBC 4.59 4.20 - 5.40 M/uL    Hemoglobin 15.4 12.0 - 16.0 g/dL    Hematocrit 44.7 37.0 - 47.0  %    MCV 97.4 81.4 - 97.8 fL    MCH 33.6 (H) 27.0 - 33.0 pg    MCHC 34.5 33.6 - 35.0 g/dL    RDW 44.7 35.9 - 50.0 fL    Platelet Count 285 164 - 446 K/uL    MPV 9.2 9.0 - 12.9 fL    Neutrophils-Polys 74.00 (H) 44.00 - 72.00 %    Lymphocytes 15.70 (L) 22.00 - 41.00 %    Monocytes 8.00 0.00 - 13.40 %    Eosinophils 1.30 0.00 - 6.90 %    Basophils 0.60 0.00 - 1.80 %    Immature Granulocytes 0.40 0.00 - 0.90 %    Nucleated RBC 0.00 /100 WBC    Neutrophils (Absolute) 6.57 2.00 - 7.15 K/uL    Lymphs (Absolute) 1.40 1.00 - 4.80 K/uL    Monos (Absolute) 0.71 0.00 - 0.85 K/uL    Eos (Absolute) 0.12 0.00 - 0.51 K/uL    Baso (Absolute) 0.05 0.00 - 0.12 K/uL    Immature Granulocytes (abs) 0.04 0.00 - 0.11 K/uL    NRBC (Absolute) 0.00 K/uL   COMP METABOLIC PANEL    Collection Time: 07/25/22  2:30 PM   Result Value Ref Range    Sodium 139 135 - 145 mmol/L    Potassium 3.8 3.6 - 5.5 mmol/L    Chloride 103 96 - 112 mmol/L    Co2 21 20 - 33 mmol/L    Anion Gap 15.0 7.0 - 16.0    Glucose 122 (H) 65 - 99 mg/dL    Bun 28 (H) 8 - 22 mg/dL    Creatinine 1.48 (H) 0.50 - 1.40 mg/dL    Calcium 10.5 8.5 - 10.5 mg/dL    AST(SGOT) 38 12 - 45 U/L    ALT(SGPT) 29 2 - 50 U/L    Alkaline Phosphatase 73 30 - 99 U/L    Total Bilirubin 0.3 0.1 - 1.5 mg/dL    Albumin 4.1 3.2 - 4.9 g/dL    Total Protein 8.4 (H) 6.0 - 8.2 g/dL    Globulin 4.3 (H) 1.9 - 3.5 g/dL    A-G Ratio 1.0 g/dL   BLOOD CULTURE    Collection Time: 07/25/22  2:30 PM    Specimen: Peripheral; Blood   Result Value Ref Range    Significant Indicator NEG     Source BLD     Site PERIPHERAL     Culture Result       No Growth  Note: Blood cultures are incubated for 5 days and  are monitored continuously.Positive blood cultures  are called to the RN and reported as soon as  they are identified.     ESTIMATED GFR    Collection Time: 07/25/22  2:30 PM   Result Value Ref Range    GFR (CKD-EPI) 37 (A) >60 mL/min/1.73 m 2   LACTIC ACID    Collection Time: 07/25/22  3:49 PM   Result Value Ref Range     Lactic Acid 2.1 (H) 0.5 - 2.0 mmol/L   BLOOD CULTURE    Collection Time: 07/25/22  3:49 PM    Specimen: Peripheral; Blood   Result Value Ref Range    Significant Indicator NEG     Source BLD     Site PERIPHERAL     Culture Result       No Growth  Note: Blood cultures are incubated for 5 days and  are monitored continuously.Positive blood cultures  are called to the RN and reported as soon as  they are identified.     D-DIMER    Collection Time: 07/25/22  3:49 PM   Result Value Ref Range    D-Dimer Screen 1.74 (H) 0.00 - 0.50 ug/mL (FEU)   LACTIC ACID    Collection Time: 07/25/22  6:45 PM   Result Value Ref Range    Lactic Acid 2.3 (H) 0.5 - 2.0 mmol/L   Basic Metabolic Panel (BMP)    Collection Time: 07/26/22  3:01 AM   Result Value Ref Range    Sodium 140 135 - 145 mmol/L    Potassium 4.1 3.6 - 5.5 mmol/L    Chloride 106 96 - 112 mmol/L    Co2 21 20 - 33 mmol/L    Glucose 142 (H) 65 - 99 mg/dL    Bun 28 (H) 8 - 22 mg/dL    Creatinine 1.22 0.50 - 1.40 mg/dL    Calcium 9.6 8.5 - 10.5 mg/dL    Anion Gap 13.0 7.0 - 16.0   ESTIMATED GFR    Collection Time: 07/26/22  3:01 AM   Result Value Ref Range    GFR (CKD-EPI) 47 (A) >60 mL/min/1.73 m 2     Imaging  CT-CTA CHEST PULMONARY ARTERY W/ RECONS   Final Result      1.  No pulmonary embolus is identified      2.  Atherosclerosis            DX-CHEST-PORTABLE (1 VIEW)   Final Result      No acute cardiac or pulmonary abnormalities are identified.        Assessment/Plan    * Moderate persistent asthma with acute exacerbation- (present on admission)  Assessment & Plan  Agree with primary hospitalist that current issues are NOT suggestive of COPD exacerbation. Chart audited and COPD removed from problem list.  -- Asthma exacerbation triggered by COVID-19  -- No supplemental O2 requirements at rest-- check walk test to ensure no O2 requirements with ambulation  -- Defer option of paxlovid to primary team; no indication for steroids currently given no O2 requirements  -- Cont  Advair, albuterol PRN  -- recommend enrolling in COVID remote monitoring program    COVID-19  Assessment & Plan  Plan of care as outlined above.    Discussed patient condition and risk of morbidity and/or mortality with Hospitalist, RN and Patient.      We will sign off. Please do not hesitate to contact us if we can be of any further assistance. I am most easily reached via [employer] secure messaging application.    This note was generated using voice recognition software which has a chance of producing errors of grammar and content.  I have made every reasonable attempt to find and correct any errors, but it should be expected that some may not be found prior to finalization of this note.  __________  Jason iKm MD  Pulmonary and Critical Care Medicine  [facility]

## 2022-07-26 NOTE — RESPIRATORY CARE
COPD EDUCATION by COPD CLINICAL EDUCATOR  7/26/2022 at 3:25 PM by Louann Churchill RRT     Patient reviewed by COPD education team. Patient does not have a diagnosis or history of COPD, has a history of Asthma, and has never smoked. Therefore the patient does not qualify for the COPD program.  However, patient was interviewed by the COPD team for the COPD Coordinator consult that was placed. Patient given a spacer with instruction, and information about making an appointment for the PFT her PCP ordered after her office visit on 3/2/2022. Patient declined our assistance in making this appointment for her, states she will need to have her son take her to the appointment. Patient recently has O2 eval walk test done and there is no indication for home oxygen, therefore the patient is not eligible for RPM.

## 2022-07-26 NOTE — H&P
Hospital Medicine History & Physical Note    Date of Service  7/25/2022    Primary Care Physician  EM Davis.      Code Status  Full Code    Chief Complaint  Chief Complaint   Patient presents with   • Sore Throat   • Shortness of Breath     Pt arrives via EMS with c/o sob and sore throat since this am. States that she took two albuterol inhalers prior to calling EMS, which did not help. EMS found pt 91% with wheezing throughout. Gave pt albuterol neb x 1 and pt's sats increased to 97% on RA and decreased wheezing. Pt states she has been coughing up yellow sputum x 1 week. Denies any fever or known ill contacts.        History of Presenting Illness  Serenity Howe is a 72 y.o. female who presented 7/25/2022 with cough and dyspnea. Patient with history of asthma, hypertension, who presents with cough and dyspnea. Patient reports 1 week of cough with slight yellow sputum production, worse today with a coughing fit associated with acute dyspnea prompting patient to come to ER. Patient tried taking her albuterol inhaler without improvement.  In the ED, , RR 24, on room air. Labs show Cr 1.48, baseline near 1, LA 2.7 with repeat 2.1 after IV fluids. D dimer 1.74. COVID positive. CXR clear. CTA PE study ordered with results pending. Patient given breathing treatment with improvement in symptoms. Patient will be admitted for COPD exacerbation and possible sepsis due to COVID infection.    I discussed the plan of care with patient.    Review of Systems  Review of Systems   Constitutional: Negative for chills and fever.   Eyes: Negative for blurred vision and pain.   Respiratory: Positive for cough, sputum production, shortness of breath and wheezing. Negative for hemoptysis.    Cardiovascular: Negative for chest pain, palpitations and leg swelling.   Gastrointestinal: Negative for abdominal pain, nausea and vomiting.   Genitourinary: Negative for dysuria and urgency.   Musculoskeletal:  Negative for back pain and falls.   Skin: Negative for itching and rash.   Neurological: Negative for dizziness, sensory change, focal weakness and headaches.   Psychiatric/Behavioral: Negative for substance abuse. The patient does not have insomnia.        Past Medical History   has a past medical history of ASTHMA, Cancer (HCC), History of breast cancer (7/29/2015), Hypertension, Shoulder pain, right (7/29/2015), and Tremor (7/29/2015).    Surgical History   has a past surgical history that includes mastectomy and gyn surgery.     Family History  family history includes Hyperlipidemia in her mother; No Known Problems in her brother, daughter, daughter, father, sister, and son.   Family history reviewed with patient. There is no family history that is pertinent to the chief complaint.     Social History   reports that she has never smoked. She has never used smokeless tobacco. She reports that she does not drink alcohol and does not use drugs.    Allergies  Allergies   Allergen Reactions   • Shellfish Allergy Hives       Medications  Prior to Admission Medications   Prescriptions Last Dose Informant Patient Reported? Taking?   Calcium Carbonate-Vit D-Min (CALTRATE PLUS PO)  Patient Yes No   Sig: Take 1 Tab by mouth every day.   acetaminophen (TYLENOL) 325 MG Tab   Yes No   Sig: Take 650 mg by mouth every four hours as needed.   albuterol (PROVENTIL) 2.5mg/3ml Nebu Soln solution for nebulization   No No   Sig: Take 3 mL by nebulization every four hours as needed for Shortness of Breath.   albuterol 108 (90 Base) MCG/ACT Aero Soln inhalation aerosol   No No   Sig: Inhale 2 Puffs every 6 hours as needed for Shortness of Breath.   albuterol 108 (90 Base) MCG/ACT Aero Soln inhalation aerosol   No No   Sig: Inhale 2 Puffs every 6 hours as needed for Shortness of Breath.   aspirin (ASA) 81 MG CHEW  Patient Yes No   Sig: Take 81 mg by mouth every day.   cephALEXin (KEFLEX) 500 MG Cap   No No   Sig: Take 1 Capsule by mouth  4 times a day for 5 days.   clotrimazole (LOTRIMIN) 1 % Cream   No No   Sig: Apply to affected area twice daily. Clean area with soap and water, pat dry, before each application.   diclofenac sodium (VOLTAREN) 1 % Gel   Yes No   Si JOSSELIN TOPICAL 4 TIMES A DAY FOR 7 DAYS AS NEEDED FOR PAIN   docusate sodium (COLACE) 100 MG Cap   No No   Sig: Take 1 Capsule by mouth 2 times a day as needed for Constipation.   docusate sodium (COLACE) 100 MG Cap   No No   Sig: Take 1 Capsule by mouth 2 times a day.   fluticasone-salmeterol (ADVAIR) 250-50 MCG/DOSE AEROSOL POWDER, BREATH ACTIVATED   No No   Sig: Inhale 1 Puff every 12 hours.   hydrocortisone (ANUSOL-HC) 25 MG Suppos   No No   Sig: Insert 1 Suppository into the rectum every 12 hours.   polyethylene glycol 3350 (MIRALAX) 17 GM/SCOOP Powder   No No   Sig: Take 17 g by mouth every day.   pramoxine (PROCTOFOAM) 1 % foam   No No   Sig: Insert 1 Application into the rectum 4 times a day as needed (for hemorroids).   valsartan-hydrochlorothiazide (DIOVAN-HCT) 80-12.5 MG per tablet   No No   Sig: TAKE 1 TABLET BY MOUTH EVERY DAY   verapamil ER (CALAN SR) 120 MG CAPSULE SR 24 HR   No No   Sig: TAKE 1 CAPSULE BY MOUTH EVERY DAY      Facility-Administered Medications: None       Physical Exam  Temp:  [36.6 °C (97.8 °F)] 36.6 °C (97.8 °F)  Pulse:  [] 98  Resp:  [19-35] 35  BP: (130-147)/(61-72) 147/72  SpO2:  [92 %-94 %] 94 %  Blood Pressure : (!) 147/72   Temperature: 36.6 °C (97.8 °F)   Pulse: 98   Respiration: (!) 35   Pulse Oximetry: 94 %       Physical Exam  Vitals reviewed.   Constitutional:       General: She is not in acute distress.     Appearance: She is not diaphoretic.   HENT:      Head: Normocephalic and atraumatic.      Right Ear: External ear normal.      Left Ear: External ear normal.      Nose: Nose normal. No congestion.      Mouth/Throat:      Pharynx: No oropharyngeal exudate or posterior oropharyngeal erythema.   Eyes:      Extraocular Movements:  Extraocular movements intact.      Pupils: Pupils are equal, round, and reactive to light.   Cardiovascular:      Rate and Rhythm: Normal rate and regular rhythm.   Pulmonary:      Effort: Pulmonary effort is normal. No respiratory distress.      Breath sounds: Wheezing present. No rhonchi or rales.   Abdominal:      General: Bowel sounds are normal. There is no distension.      Palpations: Abdomen is soft.      Tenderness: There is no abdominal tenderness. There is no guarding.   Musculoskeletal:         General: No swelling. Normal range of motion.      Cervical back: Normal range of motion and neck supple.   Skin:     General: Skin is warm and dry.   Neurological:      General: No focal deficit present.      Mental Status: She is alert and oriented to person, place, and time.      Sensory: No sensory deficit.      Motor: No weakness.   Psychiatric:         Mood and Affect: Mood normal.         Behavior: Behavior normal.         Laboratory:  Recent Labs     07/25/22  1430   WBC 8.9   RBC 4.59   HEMOGLOBIN 15.4   HEMATOCRIT 44.7   MCV 97.4   MCH 33.6*   MCHC 34.5   RDW 44.7   PLATELETCT 285   MPV 9.2     Recent Labs     07/25/22  1430   SODIUM 139   POTASSIUM 3.8   CHLORIDE 103   CO2 21   GLUCOSE 122*   BUN 28*   CREATININE 1.48*   CALCIUM 10.5     Recent Labs     07/25/22  1430   ALTSGPT 29   ASTSGOT 38   ALKPHOSPHAT 73   TBILIRUBIN 0.3   GLUCOSE 122*         No results for input(s): NTPROBNP in the last 72 hours.      No results for input(s): TROPONINT in the last 72 hours.    Imaging:  DX-CHEST-PORTABLE (1 VIEW)   Final Result      No acute cardiac or pulmonary abnormalities are identified.      CT-CTA CHEST PULMONARY ARTERY W/ RECONS    (Results Pending)       X-Ray:  I have personally reviewed the images and compared with prior images.    Assessment/Plan:  Justification for Admission Status  I anticipate this patient will require at least two midnights for appropriate medical management, necessitating  inpatient admission because treatment of COPD exacerbation and COVID infection is expected to take >2 midnights.    Patient will need a Med/Surg bed on MEDICAL service .  The need is secondary to COPD exacerbation treatment and respiratory therapy needs.    * COPD with acute exacerbation (HCC)- (present on admission)  Assessment & Plan  Presents with 1 week of cough with yellow sputum production, acutely worse today prompting evaluation  COVID positive, wheezing on exam  Continue duonebs, prednisone  On room air thankfully, monitor closely    IVAN (acute kidney injury) (Formerly [facility])  Assessment & Plan  Cr elevated on presentation  Will continue gentle fluid hydration in setting of COVID infection  Repeat BMP in AM    COVID-19  Assessment & Plan  Presents for acutely worsening dyspnea, cough, sputum production  COVID positive  Likely triggered COPD exacerbation  On room air, continue supportive treatment    Sepsis ([facility])  Assessment & Plan  This is Sepsis Present on admission  SIRS criteria identified on my evaluation include: Tachycardia, with heart rate greater than 90 BPM  Source is pulmonary, COVID  Sepsis protocol initiated  Reassessment: I have reassessed the patient's hemodynamic status        VTE prophylaxis: enoxaparin ppx

## 2022-07-26 NOTE — ASSESSMENT & PLAN NOTE
This is Sepsis Present on admission  SIRS criteria identified on my evaluation include: Tachycardia, with heart rate greater than 90 BPM  Source is pulmonary, COVID  Sepsis protocol initiated  Reassessment: I have reassessed the patient's hemodynamic status    7/26 ongoing lactic acidosis, will continue fluid hydration  Secondary to COVID infection  Currently not on antibiotics  Follow-up cultures

## 2022-07-26 NOTE — ED NOTES
Rounded on pt. Pt asleep with unlabored breathing. No signs of distress at this time. Call light within reach.

## 2022-07-26 NOTE — ASSESSMENT & PLAN NOTE
Presents for acutely worsening dyspnea, cough, sputum production  COVID positive  Likely triggered COPD exacerbation  On room air, continue supportive treatment    7/26 on room air, improving  Continue isolation

## 2022-07-27 ENCOUNTER — PHARMACY VISIT (OUTPATIENT)
Dept: PHARMACY | Facility: MEDICAL CENTER | Age: 72
End: 2022-07-27
Payer: COMMERCIAL

## 2022-07-27 ENCOUNTER — APPOINTMENT (OUTPATIENT)
Dept: PHYSICAL THERAPY | Facility: REHABILITATION | Age: 72
End: 2022-07-27
Attending: NURSE PRACTITIONER
Payer: MEDICARE

## 2022-07-27 VITALS
RESPIRATION RATE: 18 BRPM | TEMPERATURE: 97.5 F | OXYGEN SATURATION: 93 % | WEIGHT: 167 LBS | BODY MASS INDEX: 30.73 KG/M2 | SYSTOLIC BLOOD PRESSURE: 156 MMHG | HEART RATE: 84 BPM | DIASTOLIC BLOOD PRESSURE: 65 MMHG | HEIGHT: 62 IN

## 2022-07-27 PROBLEM — A41.9 SEPSIS (HCC): Status: RESOLVED | Noted: 2022-07-25 | Resolved: 2022-07-27

## 2022-07-27 PROBLEM — N17.9 AKI (ACUTE KIDNEY INJURY) (HCC): Status: RESOLVED | Noted: 2022-07-25 | Resolved: 2022-07-27

## 2022-07-27 LAB
ANION GAP SERPL CALC-SCNC: 16 MMOL/L (ref 7–16)
BUN SERPL-MCNC: 26 MG/DL (ref 8–22)
CALCIUM SERPL-MCNC: 9.9 MG/DL (ref 8.5–10.5)
CHLORIDE SERPL-SCNC: 103 MMOL/L (ref 96–112)
CO2 SERPL-SCNC: 19 MMOL/L (ref 20–33)
CREAT SERPL-MCNC: 1.23 MG/DL (ref 0.5–1.4)
GFR SERPLBLD CREATININE-BSD FMLA CKD-EPI: 46 ML/MIN/1.73 M 2
GLUCOSE SERPL-MCNC: 116 MG/DL (ref 65–99)
LACTATE SERPL-SCNC: 2.4 MMOL/L (ref 0.5–2)
LACTATE SERPL-SCNC: 4 MMOL/L (ref 0.5–2)
POTASSIUM SERPL-SCNC: 3.5 MMOL/L (ref 3.6–5.5)
SODIUM SERPL-SCNC: 138 MMOL/L (ref 135–145)

## 2022-07-27 PROCEDURE — 700105 HCHG RX REV CODE 258: Performed by: INTERNAL MEDICINE

## 2022-07-27 PROCEDURE — RXMED WILLOW AMBULATORY MEDICATION CHARGE: Performed by: INTERNAL MEDICINE

## 2022-07-27 PROCEDURE — 99239 HOSP IP/OBS DSCHRG MGMT >30: CPT | Performed by: INTERNAL MEDICINE

## 2022-07-27 PROCEDURE — 83605 ASSAY OF LACTIC ACID: CPT

## 2022-07-27 PROCEDURE — 700102 HCHG RX REV CODE 250 W/ 637 OVERRIDE(OP): Performed by: STUDENT IN AN ORGANIZED HEALTH CARE EDUCATION/TRAINING PROGRAM

## 2022-07-27 PROCEDURE — 80048 BASIC METABOLIC PNL TOTAL CA: CPT

## 2022-07-27 PROCEDURE — A9270 NON-COVERED ITEM OR SERVICE: HCPCS | Performed by: STUDENT IN AN ORGANIZED HEALTH CARE EDUCATION/TRAINING PROGRAM

## 2022-07-27 PROCEDURE — 700111 HCHG RX REV CODE 636 W/ 250 OVERRIDE (IP): Performed by: STUDENT IN AN ORGANIZED HEALTH CARE EDUCATION/TRAINING PROGRAM

## 2022-07-27 RX ORDER — ALBUTEROL SULFATE 90 UG/1
2 AEROSOL, METERED RESPIRATORY (INHALATION) 3 TIMES DAILY
Status: DISCONTINUED | OUTPATIENT
Start: 2022-07-27 | End: 2022-07-27

## 2022-07-27 RX ORDER — PREDNISONE 20 MG/1
40 TABLET ORAL DAILY
Qty: 6 TABLET | Refills: 0 | Status: SHIPPED | OUTPATIENT
Start: 2022-07-28 | End: 2022-07-31

## 2022-07-27 RX ORDER — ALBUTEROL SULFATE 90 UG/1
2 AEROSOL, METERED RESPIRATORY (INHALATION) EVERY 6 HOURS PRN
Qty: 8.5 G | Refills: 0 | Status: SHIPPED | OUTPATIENT
Start: 2022-07-27 | End: 2023-06-27 | Stop reason: SDUPTHER

## 2022-07-27 RX ORDER — ALBUTEROL SULFATE 90 UG/1
2 AEROSOL, METERED RESPIRATORY (INHALATION)
Status: DISCONTINUED | OUTPATIENT
Start: 2022-07-27 | End: 2022-07-27 | Stop reason: HOSPADM

## 2022-07-27 RX ADMIN — SODIUM CHLORIDE: 9 INJECTION, SOLUTION INTRAVENOUS at 04:28

## 2022-07-27 RX ADMIN — HYDROCHLOROTHIAZIDE 12.5 MG: 12.5 TABLET ORAL at 04:28

## 2022-07-27 RX ADMIN — PREDNISONE 40 MG: 20 TABLET ORAL at 04:28

## 2022-07-27 RX ADMIN — SENNOSIDES AND DOCUSATE SODIUM 2 TABLET: 50; 8.6 TABLET ORAL at 04:28

## 2022-07-27 RX ADMIN — VALSARTAN 80 MG: 80 TABLET, FILM COATED ORAL at 04:28

## 2022-07-27 RX ADMIN — ASPIRIN 81 MG: 81 TABLET, CHEWABLE ORAL at 04:28

## 2022-07-27 NOTE — DISCHARGE INSTRUCTIONS
Discharge Instructions per Geri Garzon M.D.    Ms. Howe,    You were admitted to [facility] with a COPD exacerbation in the setting of COVID.  You were started on steroids and breathing treatments.  You will continue prednisone 40mg daily x 3 more days (starting tomorrow).  Please continue your inhalers and breathing treatments.  Please follow up with your primary care provider within 7 days.  You had a kidney injury on admission which has improved but you need your PCP to check your electrolytes and renal function at your follow up.         Return to ER if you develop worsening shortness of breath, chest pain, syncope, fevers or any other concerning symptoms.

## 2022-07-27 NOTE — PROGRESS NOTES
1015- Spoke with pt about discharge location and any resources she may need. Pt denied needing any transport or other resources. Pt stable and heading for discharge lounge.

## 2022-07-27 NOTE — CARE PLAN
The patient is Watcher - Medium risk of patient condition declining or worsening    Shift Goals  Clinical Goals: trend lactate  Patient Goals: improve and DC    Progress made toward(s) clinical / shift goals:  Pt continues to tolerate RA    Problem: Pain - Standard  Goal: Alleviation of pain or a reduction in pain to the patient’s comfort goal  Outcome: Progressing     Problem: Knowledge Deficit - Standard  Goal: Patient and family/care givers will demonstrate understanding of plan of care, disease process/condition, diagnostic tests and medications  Outcome: Progressing     Problem: Knowledge Deficit - COPD  Goal: Patient/significant other demonstrates understanding of disease process, utilization of the Action Plan, medications and discharge instruction  Outcome: Progressing     Problem: Risk for Infection - COPD  Goal: Patient will remain free from signs and symptoms of infection  Outcome: Progressing     Problem: Nutrition - Advanced  Goal: Patient will display progressive weight gain toward goal have adequate food and fluid intake  Outcome: Progressing     Problem: Ineffective Airway Clearance  Goal: Patient will maintain patent airway with clear/clearing breath sounds  Outcome: Progressing     Problem: Impaired Gas Exchange  Goal: Patient will demonstrate improved ventilation and adequate oxygenation and participate in treatment regimen within the level of ability/situation.  Outcome: Progressing     Problem: Risk for Aspiration  Goal: Patient's risk for aspiration will be absent or decrease  Outcome: Progressing     Problem: Self Care  Goal: Patient will have the ability to perform ADLs independently or with assistance (bathe, groom, dress, toilet and feed)  Outcome: Progressing

## 2022-07-27 NOTE — DISCHARGE PLANNING
Meds-to-Beds: Discharge prescription orders listed below delivered to patient in discharge lounge. RNs Drea and Beto notified. Patient counseled. Albuterol inhaler too soon to fill per insurance.     Current Outpatient Medications   Medication Sig Dispense Refill   • [START ON 7/28/2022] predniSONE (DELTASONE) 20 MG Tab Take 2 Tablets by mouth every day for 3 days. 6 Tablet 0      Ramila Ureña, PharmD

## 2022-07-27 NOTE — PROGRESS NOTES
Patient discharged home in stable condition.  Awaiting Meds to beds delivery in [facility]

## 2022-07-27 NOTE — PROGRESS NOTES
4 Eyes Skin Assessment Completed by RANDY Rivera and Yumiko RN.    Head WDL  Ears WDL  Nose WDL  Mouth WDL  Neck WDL  Breast/Chest WDL  Shoulder Blades WDL  Spine WDL  (R) Arm/Elbow/Hand WDL  (L) Arm/Elbow/Hand WDL  Abdomen excessive dryness to pannus  Groin WDL  Scrotum/Coccyx/Buttocks WDL  (R) Leg WDL  (L) Leg WDL  (R) Heel/Foot/Toe WDL  (L) Heel/Foot/Toe WDL          Devices In Places Blood Pressure Cuff      Interventions In Place N/A    Possible Skin Injury No    Pictures Uploaded Into Epic Yes  Wound Consult Placed N/A  RN Wound Prevention Protocol Ordered No

## 2022-07-28 NOTE — DISCHARGE SUMMARY
Discharge Summary    CHIEF COMPLAINT ON ADMISSION  Chief Complaint   Patient presents with   • Sore Throat   • Shortness of Breath     Pt arrives via EMS with c/o sob and sore throat since this am. States that she took two albuterol inhalers prior to calling EMS, which did not help. EMS found pt 91% with wheezing throughout. Gave pt albuterol neb x 1 and pt's sats increased to 97% on RA and decreased wheezing. Pt states she has been coughing up yellow sputum x 1 week. Denies any fever or known ill contacts.        Reason for Admission  EMS     Admission Date  7/25/2022    CODE STATUS  FULL    HPI & HOSPITAL COURSE    73 yo F with h/o asthma/COPD and HTN admitted with sore throat, cough and SOB.  She had been using her albuterol inhaler without improvement.      In the ED, , RR 24, on room air. Labs show Cr 1.48, baseline near 1, LA 2.7 with repeat 2.1 after IV fluids. D dimer 1.74. COVID positive. CXR clear. CTA PE study negative for PE. Patient given breathing treatment with improvement in symptoms. Patient will be admitted for COPD exacerbation and possible sepsis due to COVID infection.    She was started on prednisone 40mg daily.  She was not hypoxic.  She was started on breathing treatments.  By HD#3 she had improvement in her breathing and was back to baseline.  She was discharged with prednisone to complete a 5 day course and refilled her albuterol.  SHe initially had an IVAN which improved by HD#2.      Therefore, she is discharged in good and stable condition to home with close outpatient follow-up.    The patient met 2-midnight criteria for an inpatient stay at the time of discharge.    Discharge Date  7/27/2022    FOLLOW UP ITEMS POST DISCHARGE  PCP follow up      DISCHARGE DIAGNOSES  Principal Problem:    Moderate persistent asthma with acute exacerbation POA: Yes  Active Problems:    COVID-19 POA: Unknown  Resolved Problems:    Sepsis (HCC) POA: Unknown    IVAN (acute kidney injury) (HCC) POA:  Unknown      FOLLOW UP  No future appointments.  Yun Allred A.P.R.N.  [address] [id number] [phone]    Schedule an appointment as soon as possible for a visit in 1 week(s)  Follow up      MEDICATIONS ON DISCHARGE     Medication List      START taking these medications      Instructions   predniSONE 20 MG Tabs  Start taking on: July 28, 2022  Commonly known as: DELTASONE   Take 2 Tablets by mouth every day for 3 days.  Dose: 40 mg        CONTINUE taking these medications      Instructions   acetaminophen 325 MG Tabs  Commonly known as: Tylenol   Take 650 mg by mouth every 6 hours as needed for Mild Pain.  Dose: 650 mg     * albuterol 2.5mg/3ml Nebu solution for nebulization  Commonly known as: PROVENTIL   Take 3 mL by nebulization every four hours as needed for Shortness of Breath.  Dose: 2.5 mg     * albuterol 108 (90 Base) MCG/ACT Aers inhalation aerosol   Doctor's comments: Prefers ventolin  Inhale 2 Puffs every 6 hours as needed for Shortness of Breath.  Dose: 2 Puff     aspirin 81 MG Chew chewable tablet  Commonly known as: ASA   Take 81 mg by mouth every day.  Dose: 81 mg     Caltrate 600+D3 600-800 MG-UNIT Tabs  Generic drug: Calcium Carb-Cholecalciferol   Take 1 Tablet by mouth every day.  Dose: 1 Tablet     fluticasone-salmeterol 250-50 MCG/DOSE Aepb  Commonly known as: ADVAIR   Inhale 1 Puff every 12 hours.  Dose: 1 Puff     valsartan-hydrochlorothiazide 80-12.5 MG per tablet  Commonly known as: DIOVAN-HCT   TAKE 1 TABLET BY MOUTH EVERY DAY  Dose: 1 Tablet     verapamil  MG Cp24  Commonly known as: CALAN SR   TAKE 1 CAPSULE BY MOUTH EVERY DAY  Dose: 120 mg         * This list has 2 medication(s) that are the same as other medications prescribed for you. Read the directions carefully, and ask your doctor or other care provider to review them with you.            STOP taking these medications    cephALEXin 500 MG Caps  Commonly known as: KEFLEX             Allergies  Allergies   Allergen Reactions   • Shellfish Allergy Hives       DIET  No orders of the defined types were placed in this encounter.      ACTIVITY  As tolerated.  Weight bearing as tolerated    CONSULTATIONS  None    PROCEDURES  None    Discharge exam:  Physical Exam  Constitutional:       General: She is not in acute distress.     Appearance: She is obese. She is not ill-appearing, toxic-appearing or diaphoretic.   HENT:      Head: Normocephalic and atraumatic.      Nose: Nose normal.      Mouth/Throat:      Mouth: Mucous membranes are moist.   Eyes:      General: No scleral icterus.     Conjunctiva/sclera: Conjunctivae normal.   Cardiovascular:      Rate and Rhythm: Normal rate and regular rhythm.      Heart sounds: No murmur heard.    No friction rub. No gallop.   Pulmonary:      Effort: Pulmonary effort is normal.      Breath sounds: Normal breath sounds. No wheezing, rhonchi or rales.   Abdominal:      General: Bowel sounds are normal. There is no distension.      Palpations: Abdomen is soft.      Tenderness: There is no abdominal tenderness.   Musculoskeletal:      Cervical back: Normal range of motion.      Right lower leg: No edema.      Left lower leg: No edema.   Skin:     Coloration: Skin is not jaundiced.      Findings: No rash.   Neurological:      Mental Status: She is alert and oriented to person, place, and time. Mental status is at baseline.   Psychiatric:         Mood and Affect: Mood normal.         Behavior: Behavior normal.           LABORATORY  Lab Results   Component Value Date    SODIUM 138 07/27/2022    POTASSIUM 3.5 (L) 07/27/2022    CHLORIDE 103 07/27/2022    CO2 19 (L) 07/27/2022    GLUCOSE 116 (H) 07/27/2022    BUN 26 (H) 07/27/2022    CREATININE 1.23 07/27/2022    CREATININE 1.0 03/19/2008        Lab Results   Component Value Date    WBC 8.9 07/25/2022    HEMOGLOBIN 15.4 07/25/2022    HEMATOCRIT 44.7 07/25/2022    PLATELETCT 285 07/25/2022      CT-CTA CHEST PULMONARY  ARTERY W/ RECONS  Narrative: 7/25/2022 6:15 PM    HISTORY/REASON FOR EXAM:  Shortness of breath and sore throat, wheezing    TECHNIQUE/EXAM DESCRIPTION:  CT angiogram scan for pulmonary embolism with contrast, with reconstructions.    1.25 mm helical sections were obtained from the lung apices through the lung bases following the rapid bolus administration of 60 mL of Omnipaque 350 nonionic contrast. Thin-section overlapping reconstruction interval was utilized. Coronal   reconstructions were generated from the axial data. MIP post processing was performed and utilized for the interpretation.    Low dose optimization technique was utilized for this CT exam including automated exposure control and adjustment of the mA and/or kV according to patient size.    COMPARISON: 4/3/2018 and chest x-ray from the same day    FINDINGS:    Pulmonary Embolism: No pulmonary embolus is identified.    Lungs: Mild bilateral lower lobe atelectasis. 4 mm nodule in the right lower lobe on image 97 is unchanged and presumably benign.    Pleura: No pleural effusion.    Nodes: No pathologically enlarged lymph nodes.    Additional findings: Scattered arterial calcifications. There are coronary artery calcifications. Cardiac size is normal without pericardial effusion.    Limited visualization of the upper abdomen demonstrates hepatic steatosis.    Degenerative changes are in the spine.  Impression: 1.  No pulmonary embolus is identified    2.  Atherosclerosis  DX-CHEST-PORTABLE (1 VIEW)  Narrative: 7/25/2022 2:30 PM    HISTORY/REASON FOR EXAM:  Sepsis; sepsis  Dyspnea    TECHNIQUE/EXAM DESCRIPTION AND NUMBER OF VIEWS:  Single portable view of the chest.    COMPARISON: 7/13/2022    FINDINGS:    RIGHT axillary surgical clips.    HEART: Stable size. There is atherosclerotic calcification in the aortic arch.  LUNGS: No areas of air space disease are demonstrated.  PLEURA: No effusion or pneumothorax.  Impression: No acute cardiac or pulmonary  abnormalities are identified.        Total time of the discharge process exceeds 31 minutes.

## 2022-08-03 ENCOUNTER — APPOINTMENT (OUTPATIENT)
Dept: PHYSICAL THERAPY | Facility: REHABILITATION | Age: 72
End: 2022-08-03
Attending: NURSE PRACTITIONER
Payer: MEDICARE

## 2022-08-04 NOTE — DOCUMENTATION QUERY
"                                                                         [facility]                                                                       Query Response Note      PATIENT:               STEPHEN BETANCUR  ACCT #:                  [id number]  MRN:                     [id number]  :                      1950  ADMIT DATE:       2022 1:49 PM  DISCH DATE:        2022 10:53 AM  RESPONDING  PROVIDER #:        [id number]           QUERY TEXT:    The diagnosis of sepsis has been documented in the H&P and Discharge Summary. Please provide additional clinical indicators/SIRS criteria supportive of the documented diagnosis of sepsis.     Note: If an appropriate response is not listed below, please respond with a new note.      The patient's Clinical Indicators include:  2/4 SIRS Criteria POA. HR: 108, RR: 24, D-Dimer: 1.74, and Lactic Acid: 2.7 on admission. Lactic acid increased to 5.1 on . No procalcitonin drawn. Blood cultures negative. SARS-CoV-2 detected by PCR on admission. \"Patient will be admitted for COPD exacerbation and possible sepsis due to COVID infection\" documented in the H&P.    Treatments include: Duoneb, Albuterol, Prednisone, and RT/O2.    Risk factors include: dx COVID-19, dx Acidosis, dx IVAN, and dx Acute Exacerbation of Asthma.    Thank you,  Zay Lima RN, BSN  Clinical   Connect via [employer]  Options provided:   -- Sepsis is ruled out and SIRS d/t general medical condition is ruled in   -- Sepsis exists, Please document additional SIRS criteria and clinical indicators to support this diagnosis   -- Other explanation, Please specify   -- Unable to determine      Query created by: Zay Lima on 8/3/2022 11:28 AM    RESPONSE TEXT:    Sepsis is ruled out and SIRS d/t general medical condition is ruled in          Electronically signed by:  JAMILAH ERNO MD 2022 6:46 AM              "

## 2022-08-10 ENCOUNTER — APPOINTMENT (OUTPATIENT)
Dept: PHYSICAL THERAPY | Facility: REHABILITATION | Age: 72
End: 2022-08-10
Attending: NURSE PRACTITIONER
Payer: MEDICARE

## 2022-09-19 DIAGNOSIS — I10 ESSENTIAL HYPERTENSION: ICD-10-CM

## 2022-09-19 RX ORDER — VALSARTAN AND HYDROCHLOROTHIAZIDE 80; 12.5 MG/1; MG/1
1 TABLET, FILM COATED ORAL
Qty: 90 TABLET | Refills: 3 | Status: SHIPPED | OUTPATIENT
Start: 2022-09-19

## 2022-09-19 NOTE — TELEPHONE ENCOUNTER
Requested Prescriptions     Signed Prescriptions Disp Refills    valsartan-hydrochlorothiazide (DIOVAN-HCT) 80-12.5 MG per tablet 90 Tablet 3     Sig: TAKE 1 TABLET BY MOUTH EVERY DAY     Authorizing Provider: PARKER ROJAS A.P.R.N.

## 2022-10-08 ENCOUNTER — HOSPITAL ENCOUNTER (EMERGENCY)
Facility: MEDICAL CENTER | Age: 72
End: 2022-10-08
Attending: EMERGENCY MEDICINE
Payer: MEDICARE

## 2022-10-08 ENCOUNTER — APPOINTMENT (OUTPATIENT)
Dept: RADIOLOGY | Facility: MEDICAL CENTER | Age: 72
End: 2022-10-08
Attending: EMERGENCY MEDICINE
Payer: MEDICARE

## 2022-10-08 VITALS
HEART RATE: 84 BPM | HEIGHT: 62 IN | BODY MASS INDEX: 30.71 KG/M2 | WEIGHT: 166.89 LBS | RESPIRATION RATE: 18 BRPM | OXYGEN SATURATION: 94 % | DIASTOLIC BLOOD PRESSURE: 81 MMHG | TEMPERATURE: 97.4 F | SYSTOLIC BLOOD PRESSURE: 140 MMHG

## 2022-10-08 DIAGNOSIS — M19.072 OSTEOARTHRITIS OF LEFT ANKLE, UNSPECIFIED OSTEOARTHRITIS TYPE: ICD-10-CM

## 2022-10-08 DIAGNOSIS — M10.9 GOUT, ARTHRITIS: ICD-10-CM

## 2022-10-08 DIAGNOSIS — M10.9 ACUTE GOUT OF LEFT ANKLE, UNSPECIFIED CAUSE: ICD-10-CM

## 2022-10-08 LAB
BASOPHILS # BLD AUTO: 0.3 % (ref 0–1.8)
BASOPHILS # BLD: 0.03 K/UL (ref 0–0.12)
CRP SERPL HS-MCNC: 1.62 MG/DL (ref 0–0.75)
EOSINOPHIL # BLD AUTO: 0.12 K/UL (ref 0–0.51)
EOSINOPHIL NFR BLD: 1.3 % (ref 0–6.9)
ERYTHROCYTE [DISTWIDTH] IN BLOOD BY AUTOMATED COUNT: 44.5 FL (ref 35.9–50)
ERYTHROCYTE [SEDIMENTATION RATE] IN BLOOD BY WESTERGREN METHOD: 48 MM/HOUR (ref 0–25)
HCT VFR BLD AUTO: 40.9 % (ref 37–47)
HGB BLD-MCNC: 14.3 G/DL (ref 12–16)
IMM GRANULOCYTES # BLD AUTO: 0.03 K/UL (ref 0–0.11)
IMM GRANULOCYTES NFR BLD AUTO: 0.3 % (ref 0–0.9)
LYMPHOCYTES # BLD AUTO: 1.55 K/UL (ref 1–4.8)
LYMPHOCYTES NFR BLD: 16.6 % (ref 22–41)
MCH RBC QN AUTO: 33.6 PG (ref 27–33)
MCHC RBC AUTO-ENTMCNC: 35 G/DL (ref 33.6–35)
MCV RBC AUTO: 96 FL (ref 81.4–97.8)
MONOCYTES # BLD AUTO: 0.76 K/UL (ref 0–0.85)
MONOCYTES NFR BLD AUTO: 8.1 % (ref 0–13.4)
NEUTROPHILS # BLD AUTO: 6.85 K/UL (ref 2–7.15)
NEUTROPHILS NFR BLD: 73.4 % (ref 44–72)
NRBC # BLD AUTO: 0 K/UL
NRBC BLD-RTO: 0 /100 WBC
PLATELET # BLD AUTO: 262 K/UL (ref 164–446)
PMV BLD AUTO: 8.9 FL (ref 9–12.9)
RBC # BLD AUTO: 4.26 M/UL (ref 4.2–5.4)
URATE SERPL-MCNC: 9.3 MG/DL (ref 1.9–8.2)
WBC # BLD AUTO: 9.3 K/UL (ref 4.8–10.8)

## 2022-10-08 PROCEDURE — 85652 RBC SED RATE AUTOMATED: CPT

## 2022-10-08 PROCEDURE — 36415 COLL VENOUS BLD VENIPUNCTURE: CPT

## 2022-10-08 PROCEDURE — A9270 NON-COVERED ITEM OR SERVICE: HCPCS | Performed by: EMERGENCY MEDICINE

## 2022-10-08 PROCEDURE — 73610 X-RAY EXAM OF ANKLE: CPT | Mod: LT

## 2022-10-08 PROCEDURE — 85025 COMPLETE CBC W/AUTO DIFF WBC: CPT

## 2022-10-08 PROCEDURE — 700102 HCHG RX REV CODE 250 W/ 637 OVERRIDE(OP): Performed by: EMERGENCY MEDICINE

## 2022-10-08 PROCEDURE — 84550 ASSAY OF BLOOD/URIC ACID: CPT

## 2022-10-08 PROCEDURE — 99285 EMERGENCY DEPT VISIT HI MDM: CPT

## 2022-10-08 PROCEDURE — 96374 THER/PROPH/DIAG INJ IV PUSH: CPT

## 2022-10-08 PROCEDURE — 700111 HCHG RX REV CODE 636 W/ 250 OVERRIDE (IP): Performed by: EMERGENCY MEDICINE

## 2022-10-08 PROCEDURE — 86140 C-REACTIVE PROTEIN: CPT

## 2022-10-08 RX ORDER — COLCHICINE 0.6 MG/1
0.6 TABLET ORAL ONCE
Status: COMPLETED | OUTPATIENT
Start: 2022-10-08 | End: 2022-10-08

## 2022-10-08 RX ORDER — COLCHICINE 0.6 MG/1
1.2 TABLET ORAL DAILY
Qty: 30 TABLET | Refills: 0 | Status: SHIPPED | OUTPATIENT
Start: 2022-10-08

## 2022-10-08 RX ORDER — INDOMETHACIN 50 MG/1
50 CAPSULE ORAL 3 TIMES DAILY
Qty: 30 CAPSULE | Refills: 0 | Status: SHIPPED | OUTPATIENT
Start: 2022-10-08 | End: 2022-10-18

## 2022-10-08 RX ORDER — PREDNISONE 20 MG/1
40 TABLET ORAL ONCE
Status: COMPLETED | OUTPATIENT
Start: 2022-10-08 | End: 2022-10-08

## 2022-10-08 RX ORDER — PREDNISONE 20 MG/1
40 TABLET ORAL DAILY
Qty: 8 TABLET | Refills: 0 | Status: SHIPPED | OUTPATIENT
Start: 2022-10-08 | End: 2022-10-12

## 2022-10-08 RX ORDER — MORPHINE SULFATE 4 MG/ML
4 INJECTION INTRAVENOUS ONCE
Status: COMPLETED | OUTPATIENT
Start: 2022-10-08 | End: 2022-10-08

## 2022-10-08 RX ADMIN — MORPHINE SULFATE 4 MG: 4 INJECTION INTRAVENOUS at 08:25

## 2022-10-08 RX ADMIN — COLCHICINE 0.6 MG: 0.6 TABLET, FILM COATED ORAL at 12:45

## 2022-10-08 RX ADMIN — PREDNISONE 40 MG: 20 TABLET ORAL at 08:25

## 2022-10-08 ASSESSMENT — ENCOUNTER SYMPTOMS
FALLS: 0
VOMITING: 0
HEADACHES: 0
FEVER: 0
COUGH: 0

## 2022-10-08 ASSESSMENT — FIBROSIS 4 INDEX: FIB4 SCORE: 1.81

## 2022-10-08 NOTE — DISCHARGE PLANNING
Note:  Received call from ERP about DME walker order. RN CM spoke to pt. Received DME choice. Choice form faxed to [facility], scanned to chart. ED RN to call traction for walker delivery.

## 2022-10-08 NOTE — DISCHARGE PLANNING
Received Choice form at [phone]  Agency/Facility Name: [facility]  Referral sent per Choice form @ [phone]

## 2022-10-08 NOTE — ED TRIAGE NOTES
"Chief Complaint   Patient presents with    Foot Pain     Pt reports L ankle/foot pain x 3 days. Pt has hx of gout and arthritis. Pt denies trauma       Pt BIB EMS to triage via w/c for above complaint.     Pt presents in triage with L foot and ankle pain x 3 days. Pt denies any trauma to area, CMS intact. Pt has hx of gout and arthritis    Pt back to lobby, educated on triage process and encourage to alert staff of any changes.     BP (!) 154/91   Pulse 99   Temp 36.3 °C (97.3 °F) (Temporal)   Resp 18   Ht 1.575 m (5' 2\")   Wt 75.7 kg (166 lb 14.2 oz)   SpO2 95%   BMI 30.52 kg/m²      "

## 2022-10-08 NOTE — ED NOTES
"Pt brought to [facility] from the lobby by wheelchair. Pt reports pain to the L foot that is worse with movement x \"several days\". AAOx4, chart up for ERP.    "

## 2022-10-08 NOTE — FACE TO FACE
Face to Face Note  -  Durable Medical Equipment    Michaela Roche D.O. - NPI: [id number]  I certify that this patient is under my care and that they had a durable medical equipment(DME)face to face encounter by myself that meets the physician DME face-to-face encounter requirements with this patient on:    Date of encounter:   Patient:                    MRN:                       YOB: 2023  Serenity Howe  [id number]  1950     The encounter with the patient was in whole, or in part, for the following medical condition, which is the primary reason for durable medical equipment:  Other - ankle pain, gout    I certify that, based on my findings, the following durable medical equipment is medically necessary:    Walkers.    My Clinical findings support the need for the above equipment due to:  Other - pain with ambulation

## 2022-10-08 NOTE — Clinical Note
Pt discharged home. Pt in possession of belongings. Pt provided discharge education and information pertaining to medications and follow up appointments. Pt received copy of discharge instructions and verbalized understanding. @VS@

## 2022-10-08 NOTE — ED PROVIDER NOTES
ED Provider Note    ED Provider Note    Primary care provider: BRITNEY Davis  Means of arrival: EMS  History obtained from: Patient  History limited by: None    CHIEF COMPLAINT  Chief Complaint   Patient presents with    Foot Pain     Pt reports L ankle/foot pain x 3 days. Pt has hx of gout and arthritis. Pt denies trauma       HPI  Serenity Howe is a 72 y.o. female who presents to the Emergency Department with a chief complaint of left foot and ankle pain.  Pain started 2 days ago but worsened last night.  She was unable to sleep.  Patient denies any recent trauma or falls.  She has had a history of gout in her feet in the remote past.  She states it was not this painful.  She has not had a fever.  She has been taking Tylenol at home without resolution.  She states it is difficult to walk which ultimately prompted her visit.  She is otherwise been in her normal state of health.    REVIEW OF SYSTEMS  Review of Systems   Constitutional:  Negative for fever.   Respiratory:  Negative for cough.    Cardiovascular:  Negative for chest pain.   Gastrointestinal:  Negative for vomiting.   Musculoskeletal:  Positive for joint pain. Negative for falls.   Skin:  Negative for rash.   Neurological:  Negative for headaches.     PAST MEDICAL HISTORY   has a past medical history of ASTHMA, Cancer (HCC), History of breast cancer (7/29/2015), Hypertension, Shoulder pain, right (7/29/2015), and Tremor (7/29/2015).    SURGICAL HISTORY   has a past surgical history that includes mastectomy and gyn surgery.    SOCIAL HISTORY  Social History     Tobacco Use    Smoking status: Never    Smokeless tobacco: Never   Vaping Use    Vaping Use: Never used   Substance Use Topics    Alcohol use: No    Drug use: No      Social History     Substance and Sexual Activity   Drug Use No       FAMILY HISTORY  Family History   Problem Relation Age of Onset    Hyperlipidemia Mother     No Known Problems Father     No Known Problems  "Sister     No Known Problems Brother     No Known Problems Daughter     No Known Problems Daughter     No Known Problems Son        CURRENT MEDICATIONS  Home Medications       Reviewed by Makenzie Davenport R.N. (Registered Nurse) on 10/08/22 at 0608  Med List Status: Not Addressed     Medication Last Dose Status   acetaminophen (TYLENOL) 325 MG Tab  Active   albuterol (PROVENTIL) 2.5mg/3ml Nebu Soln solution for nebulization  Active   albuterol 108 (90 Base) MCG/ACT Aero Soln inhalation aerosol  Active   aspirin (ASA) 81 MG CHEW  Active   Calcium Carb-Cholecalciferol (CALTRATE 600+D3) 600-800 MG-UNIT Tab  Active   fluticasone-salmeterol (ADVAIR) 250-50 MCG/DOSE AEROSOL POWDER, BREATH ACTIVATED  Active   valsartan-hydrochlorothiazide (DIOVAN-HCT) 80-12.5 MG per tablet  Active   verapamil ER (CALAN SR) 120 MG CAPSULE SR 24 HR  Active                    ALLERGIES  Allergies   Allergen Reactions    Shellfish Allergy Hives       PHYSICAL EXAM  VITAL SIGNS: BP (!) 140/81   Pulse 84   Temp 36.3 °C (97.4 °F) (Temporal)   Resp 18   Ht 1.575 m (5' 2\")   Wt 75.7 kg (166 lb 14.2 oz)   SpO2 94%   BMI 30.52 kg/m²   Vitals reviewed.  Constitutional: Patient is oriented to person, place, and time. Appears well-developed and well-nourished. No distress.    Head: Normocephalic and atraumatic.   Mouth/Throat: Oropharynx is clear and moist  Eyes: Conjunctivae are normal. Pupils are equal and round.  Neck: Normal range of motion.   Cardiovascular: Normal rate, regular rhythm and normal heart sounds. Normal peripheral pulses bilateral lower extremities.  Pulmonary/Chest: Effort normal and breath sounds normal. No respiratory distress, no wheezes, rhonchi, or rales.  Right mastectomy  Abdominal: Soft. Bowel sounds are normal. There is no tenderness.   Musculoskeletal: No edema.  There is no asymmetry, increased warmth, swelling, erythema when the lower extremities are compared bilaterally.  Normal distal pulses.  There is no pain " with movement of the toes.  Significant pain with flexion extension at the ankle, left.  No pain at the fibular head.  No calf pain.  No limitation in flexion extension at the knee or hip.  Neurological: No focal deficits.   Skin: Skin is warm and dry. No erythema. No pallor.   Psychiatric: Patient has a normal mood and affect.     LABS  Results for orders placed or performed during the hospital encounter of 10/08/22   CBC WITH DIFFERENTIAL   Result Value Ref Range    WBC 9.3 4.8 - 10.8 K/uL    RBC 4.26 4.20 - 5.40 M/uL    Hemoglobin 14.3 12.0 - 16.0 g/dL    Hematocrit 40.9 37.0 - 47.0 %    MCV 96.0 81.4 - 97.8 fL    MCH 33.6 (H) 27.0 - 33.0 pg    MCHC 35.0 33.6 - 35.0 g/dL    RDW 44.5 35.9 - 50.0 fL    Platelet Count 262 164 - 446 K/uL    MPV 8.9 (L) 9.0 - 12.9 fL    Neutrophils-Polys 73.40 (H) 44.00 - 72.00 %    Lymphocytes 16.60 (L) 22.00 - 41.00 %    Monocytes 8.10 0.00 - 13.40 %    Eosinophils 1.30 0.00 - 6.90 %    Basophils 0.30 0.00 - 1.80 %    Immature Granulocytes 0.30 0.00 - 0.90 %    Nucleated RBC 0.00 /100 WBC    Neutrophils (Absolute) 6.85 2.00 - 7.15 K/uL    Lymphs (Absolute) 1.55 1.00 - 4.80 K/uL    Monos (Absolute) 0.76 0.00 - 0.85 K/uL    Eos (Absolute) 0.12 0.00 - 0.51 K/uL    Baso (Absolute) 0.03 0.00 - 0.12 K/uL    Immature Granulocytes (abs) 0.03 0.00 - 0.11 K/uL    NRBC (Absolute) 0.00 K/uL   Sed Rate   Result Value Ref Range    Sed Rate Westergren 48 (H) 0 - 25 mm/hour   CRP QUANTITIVE (NON-CARDIAC)   Result Value Ref Range    Stat C-Reactive Protein 1.62 (H) 0.00 - 0.75 mg/dL   URIC ACID   Result Value Ref Range    Uric Acid 9.3 (H) 1.9 - 8.2 mg/dL       All labs reviewed by me.    RADIOLOGY  DX-ANKLE 3+ VIEWS LEFT   Final Result      1.  Negative for fracture or malalignment      2.  Midfoot osteoarthritis      3.  Calcaneal spurring        The radiologist's interpretation of all radiological studies have been reviewed by me.    COURSE & MEDICAL DECISION MAKING  Pertinent Labs & Imaging  studies reviewed. (See chart for details)    Obtained and reviewed past medical records.  Patient's last encounter was a telephone encounter for refill of her Diovan.  She was seen in the orthopedic surgical office for osteoarthritis of the knees.  Patient was admitted to the hospital in July of this year for shortness of breath and a sore throat.  Diagnosed with sepsis, IVAN, asthma exacerbation and COVID-19.    7:49 AM - Patient seen and examined at bedside.  This is a previously healthy 72-year-old female.  She is presenting with left foot and ankle pain.  No inciting incidents.  No recent trauma or falls.  On visual inspection, there is no evidence of infection or cellulitis.  No increased warmth, redness or erythema.  However, she does have exquisite pain with flexion extension at the ankle.  She does have a remote history of gout which is certainly a possibility.  Other etiologies include joint effusion or occult fracture and possibly joint infection, though I have less suspicion for this.  For this reason, I have ordered IV start, inflammatory markers and x-ray.    Patient is reevaluated bedside.  We discussed x-ray findings which show osteoarthritis but no fracture or malalignment.  She had minor elevations in her C-reactive protein, uric acid and sed rate.  White blood cell count was normal.  She is feeling better after treatment with prednisone as well as pain medication.  At this point, I suspect most likely that this is gout and she will be treated as such.  Discharged home with a pulse dose of steroids.  As well as pain medication.  She is encouraged to follow-up with her PCP.  He is given strict return precautions.  If she is unable to take her medications, has swelling fever or worsening symptoms, she needs to return for reevaluation.  She is well-appearing and nontoxic.  She is discharged home in stable condition.      FINAL IMPRESSION  1. Acute gout of left ankle, unspecified cause    2.  Osteoarthritis of left ankle, unspecified osteoarthritis type    3. Gout, arthritis

## 2022-10-08 NOTE — ED NOTES
PT NOT provided crutches. PT provided walker from [employer]. PT educated on proper use, ambulated independently with walker.

## 2022-10-12 ENCOUNTER — HOSPITAL ENCOUNTER (EMERGENCY)
Facility: MEDICAL CENTER | Age: 72
End: 2022-10-12
Attending: EMERGENCY MEDICINE
Payer: MEDICARE

## 2022-10-12 VITALS
DIASTOLIC BLOOD PRESSURE: 94 MMHG | HEIGHT: 62 IN | TEMPERATURE: 97.6 F | RESPIRATION RATE: 16 BRPM | SYSTOLIC BLOOD PRESSURE: 171 MMHG | HEART RATE: 85 BPM | WEIGHT: 164.24 LBS | OXYGEN SATURATION: 93 % | BODY MASS INDEX: 30.22 KG/M2

## 2022-10-12 DIAGNOSIS — E87.6 HYPOKALEMIA: ICD-10-CM

## 2022-10-12 DIAGNOSIS — R19.7 DIARRHEA OF PRESUMED INFECTIOUS ORIGIN: ICD-10-CM

## 2022-10-12 DIAGNOSIS — E86.0 DEHYDRATION: ICD-10-CM

## 2022-10-12 LAB
ALBUMIN SERPL BCP-MCNC: 3.5 G/DL (ref 3.2–4.9)
ALBUMIN/GLOB SERPL: 0.9 G/DL
ALP SERPL-CCNC: 87 U/L (ref 30–99)
ALT SERPL-CCNC: 28 U/L (ref 2–50)
ANION GAP SERPL CALC-SCNC: 15 MMOL/L (ref 7–16)
AST SERPL-CCNC: 24 U/L (ref 12–45)
BASOPHILS # BLD AUTO: 0.3 % (ref 0–1.8)
BASOPHILS # BLD: 0.03 K/UL (ref 0–0.12)
BILIRUB SERPL-MCNC: 0.2 MG/DL (ref 0.1–1.5)
BUN SERPL-MCNC: 21 MG/DL (ref 8–22)
CALCIUM SERPL-MCNC: 9.4 MG/DL (ref 8.5–10.5)
CHLORIDE SERPL-SCNC: 103 MMOL/L (ref 96–112)
CO2 SERPL-SCNC: 19 MMOL/L (ref 20–33)
CREAT SERPL-MCNC: 1.11 MG/DL (ref 0.5–1.4)
EOSINOPHIL # BLD AUTO: 0.05 K/UL (ref 0–0.51)
EOSINOPHIL NFR BLD: 0.5 % (ref 0–6.9)
ERYTHROCYTE [DISTWIDTH] IN BLOOD BY AUTOMATED COUNT: 44.8 FL (ref 35.9–50)
GFR SERPLBLD CREATININE-BSD FMLA CKD-EPI: 52 ML/MIN/1.73 M 2
GLOBULIN SER CALC-MCNC: 3.7 G/DL (ref 1.9–3.5)
GLUCOSE SERPL-MCNC: 93 MG/DL (ref 65–99)
HCT VFR BLD AUTO: 42.2 % (ref 37–47)
HGB BLD-MCNC: 14.9 G/DL (ref 12–16)
IMM GRANULOCYTES # BLD AUTO: 0.14 K/UL (ref 0–0.11)
IMM GRANULOCYTES NFR BLD AUTO: 1.3 % (ref 0–0.9)
LYMPHOCYTES # BLD AUTO: 3.19 K/UL (ref 1–4.8)
LYMPHOCYTES NFR BLD: 29.7 % (ref 22–41)
MCH RBC QN AUTO: 33.8 PG (ref 27–33)
MCHC RBC AUTO-ENTMCNC: 35.3 G/DL (ref 33.6–35)
MCV RBC AUTO: 95.7 FL (ref 81.4–97.8)
MONOCYTES # BLD AUTO: 1.79 K/UL (ref 0–0.85)
MONOCYTES NFR BLD AUTO: 16.7 % (ref 0–13.4)
NEUTROPHILS # BLD AUTO: 5.54 K/UL (ref 2–7.15)
NEUTROPHILS NFR BLD: 51.5 % (ref 44–72)
NRBC # BLD AUTO: 0 K/UL
NRBC BLD-RTO: 0 /100 WBC
PLATELET # BLD AUTO: 305 K/UL (ref 164–446)
PMV BLD AUTO: 9.7 FL (ref 9–12.9)
POTASSIUM SERPL-SCNC: 2.9 MMOL/L (ref 3.6–5.5)
PROT SERPL-MCNC: 7.2 G/DL (ref 6–8.2)
RBC # BLD AUTO: 4.41 M/UL (ref 4.2–5.4)
SODIUM SERPL-SCNC: 137 MMOL/L (ref 135–145)
WBC # BLD AUTO: 10.7 K/UL (ref 4.8–10.8)

## 2022-10-12 PROCEDURE — 85025 COMPLETE CBC W/AUTO DIFF WBC: CPT

## 2022-10-12 PROCEDURE — 99284 EMERGENCY DEPT VISIT MOD MDM: CPT

## 2022-10-12 PROCEDURE — A9270 NON-COVERED ITEM OR SERVICE: HCPCS | Performed by: EMERGENCY MEDICINE

## 2022-10-12 PROCEDURE — 36415 COLL VENOUS BLD VENIPUNCTURE: CPT

## 2022-10-12 PROCEDURE — 700102 HCHG RX REV CODE 250 W/ 637 OVERRIDE(OP): Performed by: EMERGENCY MEDICINE

## 2022-10-12 PROCEDURE — 80053 COMPREHEN METABOLIC PANEL: CPT

## 2022-10-12 RX ORDER — POTASSIUM CHLORIDE 20 MEQ/1
40 TABLET, EXTENDED RELEASE ORAL ONCE
Status: COMPLETED | OUTPATIENT
Start: 2022-10-12 | End: 2022-10-12

## 2022-10-12 RX ORDER — POTASSIUM CHLORIDE 750 MG/1
10 TABLET, FILM COATED, EXTENDED RELEASE ORAL 2 TIMES DAILY
Qty: 20 TABLET | Refills: 0 | Status: SHIPPED | OUTPATIENT
Start: 2022-10-12

## 2022-10-12 RX ORDER — SODIUM CHLORIDE 9 MG/ML
1000 INJECTION, SOLUTION INTRAVENOUS ONCE
Status: DISCONTINUED | OUTPATIENT
Start: 2022-10-12 | End: 2022-10-12

## 2022-10-12 RX ORDER — LOPERAMIDE HYDROCHLORIDE 2 MG/1
4 CAPSULE ORAL ONCE
Status: DISCONTINUED | OUTPATIENT
Start: 2022-10-12 | End: 2022-10-12 | Stop reason: HOSPADM

## 2022-10-12 RX ADMIN — POTASSIUM CHLORIDE 40 MEQ: 1500 TABLET, EXTENDED RELEASE ORAL at 21:07

## 2022-10-12 ASSESSMENT — FIBROSIS 4 INDEX: FIB4 SCORE: 1.97

## 2022-10-13 NOTE — ED NOTES
"Pt discharged home. Pt in possession of belongings. Pt provided discharge education and information pertaining to medications and follow up appointments. Pt received copy of discharge instructions and verbalized understanding. BP (!) 171/94   Pulse 85   Temp 36.4 °C (97.6 °F) (Temporal)   Resp 16   Ht 1.575 m (5' 2\")   Wt 74.5 kg (164 lb 3.9 oz)   SpO2 93%   BMI 30.04 kg/m²     "

## 2022-10-13 NOTE — ED PROVIDER NOTES
ED Provider Note    CHIEF COMPLAINT  Chief Complaint   Patient presents with    Diarrhea     Started this morning. Pt denies any N/V or ABD pain.        HPI  Serenity Howe is a 72 y.o. female who presents for copious diarrhea onset today and occurring perhaps 10-20 times.  It is now dark watery.  She has no abdominal pain.  She has had some dizziness which is improving but no nausea or vomiting.  No fever.  No ill contacts food poisoning travel or camping.  She denies recent antibiotics but per chart review had Keflex in July.    REVIEW OF SYSTEMS  Pertinent positives include: Diarrhea, dizziness.  Pertinent negatives include: Abdominal pain fever, vomiting.  10+ systems reviewed and negative.      PAST MEDICAL HISTORY  Past Medical History:   Diagnosis Date    ASTHMA     Cancer (HCC)     History of breast cancer 2015    Hypertension     Shoulder pain, right 2015    Tremor 2015       FAMILY HISTORY  Family History   Problem Relation Age of Onset    Hyperlipidemia Mother     No Known Problems Father     No Known Problems Sister     No Known Problems Brother     No Known Problems Daughter     No Known Problems Daughter     No Known Problems Son        SOCIAL HISTORY  Social History     Tobacco Use    Smoking status: Never    Smokeless tobacco: Never   Vaping Use    Vaping Use: Never used   Substance Use Topics    Alcohol use: No    Drug use: No     Social History     Substance and Sexual Activity   Drug Use No       SURGICAL HISTORY  Past Surgical History:   Procedure Laterality Date    GYN SURGERY       x1    MASTECTOMY      right        CURRENT MEDICATIONS  Current Facility-Administered Medications   Medication Dose Route Frequency Provider Last Rate Last Admin     Current Outpatient Medications   Medication Sig Dispense Refill    colchicine (COLCRYS) 0.6 MG Tab Take 2 Tablets by mouth every day. 30 Tablet 0    predniSONE (DELTASONE) 20 MG Tab Take 2 Tablets by mouth every day for 4  "days. 8 Tablet 0    indomethacin (INDOCIN) 50 MG Cap Take 1 Capsule by mouth 3 times a day for 10 days. 30 Capsule 0    valsartan-hydrochlorothiazide (DIOVAN-HCT) 80-12.5 MG per tablet TAKE 1 TABLET BY MOUTH EVERY DAY 90 Tablet 3    albuterol 108 (90 Base) MCG/ACT Aero Soln inhalation aerosol Inhale 2 Puffs every 6 hours as needed for Shortness of Breath. 8.5 g 0    Calcium Carb-Cholecalciferol (CALTRATE 600+D3) 600-800 MG-UNIT Tab Take 1 Tablet by mouth every day.      albuterol (PROVENTIL) 2.5mg/3ml Nebu Soln solution for nebulization Take 3 mL by nebulization every four hours as needed for Shortness of Breath. 30 mL 0    verapamil ER (CALAN SR) 120 MG CAPSULE SR 24 HR TAKE 1 CAPSULE BY MOUTH EVERY DAY 90 Capsule 3    fluticasone-salmeterol (ADVAIR) 250-50 MCG/DOSE AEROSOL POWDER, BREATH ACTIVATED Inhale 1 Puff every 12 hours. 3 Each 3    acetaminophen (TYLENOL) 325 MG Tab Take 650 mg by mouth every 6 hours as needed for Mild Pain.      aspirin (ASA) 81 MG CHEW Take 81 mg by mouth every day.         ALLERGIES  Allergies   Allergen Reactions    Shellfish Allergy Hives       PHYSICAL EXAM  VITAL SIGNS: BP (!) 149/87   Pulse 97   Temp 36.4 °C (97.5 °F) (Temporal)   Resp 14   Ht 1.575 m (5' 2\")   Wt 74.5 kg (164 lb 3.9 oz)   SpO2 95%   BMI 30.04 kg/m²   Reviewed and hypertensive, afebrile  Constitutional: Well developed, Well nourished, well-appearing.  HENT: Normocephalic, atraumatic, bilateral external ears normal, Wearing mask.   Eyes: PERRLA, conjunctiva pink, no scleral icterus.   Cardiovascular: Regular S1-S2 without murmur, rub, gallop.  No dependent edema or calf tenderness.  Respiratory: No rales, rhonchi, wheeze or cough.  Gastrointestinal: Soft, nontender, nondistended, no organomegaly.  Skin: No erythema, no rash.   Genitourinary:  No costovertebral angle tenderness.   Neurologic: Alert & oriented x 3, cranial nerves 2-12 intact by passive exam.  No focal deficit noted.  Psychiatric: Affect normal, " Judgment normal, Mood normal.     DIFFERENTIAL DIAGNOSIS:  Viral gastroenteritis, bacterial gastroenteritis, colitis, doubt diverticulitis, C. difficile with food poisoning.      LABORATORY:  Results for orders placed or performed during the hospital encounter of 10/12/22   CBC WITH DIFFERENTIAL   Result Value Ref Range    WBC 10.7 4.8 - 10.8 K/uL    RBC 4.41 4.20 - 5.40 M/uL    Hemoglobin 14.9 12.0 - 16.0 g/dL    Hematocrit 42.2 37.0 - 47.0 %    MCV 95.7 81.4 - 97.8 fL    MCH 33.8 (H) 27.0 - 33.0 pg    MCHC 35.3 (H) 33.6 - 35.0 g/dL    RDW 44.8 35.9 - 50.0 fL    Platelet Count 305 164 - 446 K/uL    MPV 9.7 9.0 - 12.9 fL    Neutrophils-Polys 51.50 44.00 - 72.00 %    Lymphocytes 29.70 22.00 - 41.00 %    Monocytes 16.70 (H) 0.00 - 13.40 %    Eosinophils 0.50 0.00 - 6.90 %    Basophils 0.30 0.00 - 1.80 %    Immature Granulocytes 1.30 (H) 0.00 - 0.90 %    Nucleated RBC 0.00 /100 WBC    Neutrophils (Absolute) 5.54 2.00 - 7.15 K/uL    Lymphs (Absolute) 3.19 1.00 - 4.80 K/uL    Monos (Absolute) 1.79 (H) 0.00 - 0.85 K/uL    Eos (Absolute) 0.05 0.00 - 0.51 K/uL    Baso (Absolute) 0.03 0.00 - 0.12 K/uL    Immature Granulocytes (abs) 0.14 (H) 0.00 - 0.11 K/uL    NRBC (Absolute) 0.00 K/uL   Comp Metabolic Panel   Result Value Ref Range    Sodium 137 135 - 145 mmol/L    Potassium 2.9 (L) 3.6 - 5.5 mmol/L    Chloride 103 96 - 112 mmol/L    Co2 19 (L) 20 - 33 mmol/L    Anion Gap 15.0 7.0 - 16.0    Glucose 93 65 - 99 mg/dL    Bun 21 8 - 22 mg/dL    Creatinine 1.11 0.50 - 1.40 mg/dL    Calcium 9.4 8.5 - 10.5 mg/dL    AST(SGOT) 24 12 - 45 U/L    ALT(SGPT) 28 2 - 50 U/L    Alkaline Phosphatase 87 30 - 99 U/L    Total Bilirubin 0.2 0.1 - 1.5 mg/dL    Albumin 3.5 3.2 - 4.9 g/dL    Total Protein 7.2 6.0 - 8.2 g/dL    Globulin 3.7 (H) 1.9 - 3.5 g/dL    A-G Ratio 0.9 g/dL   ESTIMATED GFR   Result Value Ref Range    GFR (CKD-EPI) 52 (A) >60 mL/min/1.73 m 2       INTERVENTIONS:  Medications   loperamide (IMODIUM) capsule 4 mg (4 mg Oral  Refused 10/12/22 1830)   potassium chloride SA (Kdur) tablet 40 mEq (has no administration in time range)       COURSE & MEDICAL DECISION MAKING  This patient presents with copious diarrhea and has hypokalemia and mild dehydration.  She could not provide a stool sample.  With no abdominal pain or tenderness diverticulitis and acute abdominal process such as appendicitis are unlikely.    PLAN:  New Prescriptions    POTASSIUM CHLORIDE ER (KLOR-CON) 10 MEQ TABLET    Take 1 Tablet by mouth 2 times a day.   Over-the-counter Imodium    Diarrhea handout given  Return for uncontrolled vomiting, bloody diarrhea, abdominal pain, ill appearance    Yun Allred, AKarinP.R.N.  [address] [id number] [phone]    Schedule an appointment as soon as possible for a visit in 3 days  As needed if not better    CONDITION: Stable, improved.    FINAL IMPRESSION  1. Diarrhea of presumed infectious origin    2. Hypokalemia    3. Dehydration          Electronically signed by: George Steven M.D., 10/12/2022 6:04 PM

## 2022-10-13 NOTE — ED TRIAGE NOTES
"Chief Complaint   Patient presents with    Diarrhea     Started this morning. Pt denies any N/V or ABD pain.      BP (!) 149/87   Pulse 97   Temp 36.4 °C (97.5 °F) (Temporal)   Resp 14   Ht 1.575 m (5' 2\")   Wt 74.5 kg (164 lb 3.9 oz)   SpO2 95%   BMI 30.04 kg/m²     Pt ambulatory to triage for above.   "

## 2022-10-13 NOTE — DISCHARGE INSTRUCTIONS
Take Imodium 4 mg after each episode of diarrhea up to a max of 10 tablets a day.  Take potassium for the next 10 days.  Increase your fluid intake.  See your doctor if not better in 3 days.  Return for abdominal pain, bloody diarrhea, uncontrolled vomiting or ill appearance.  This is not expected.

## 2022-10-20 DIAGNOSIS — J45.40 MODERATE PERSISTENT ASTHMA WITHOUT COMPLICATION: ICD-10-CM

## 2022-10-20 RX ORDER — FLUTICASONE PROPIONATE AND SALMETEROL 250; 50 UG/1; UG/1
1 POWDER RESPIRATORY (INHALATION) EVERY 12 HOURS
Qty: 3 EACH | Refills: 3 | Status: SHIPPED | OUTPATIENT
Start: 2022-10-20

## 2022-10-20 RX ORDER — BUDESONIDE AND FORMOTEROL FUMARATE DIHYDRATE 160; 4.5 UG/1; UG/1
AEROSOL RESPIRATORY (INHALATION)
Refills: 0 | OUTPATIENT
Start: 2022-10-20

## 2022-10-20 NOTE — TELEPHONE ENCOUNTER
Received request via: Pharmacy    Was the patient seen in the last year in this department? Yes    Does the patient have an active prescription (recently filled or refills available) for medication(s) requested? No      Ins will only cover Brand Advair, was unable to pend Advair

## 2022-10-21 NOTE — TELEPHONE ENCOUNTER
Requested Prescriptions     Signed Prescriptions Disp Refills    fluticasone-salmeterol (ADVAIR) 250-50 MCG/ACT AEROSOL POWDER, BREATH ACTIVATED 3 Each 3     Sig: Inhale 1 Puff every 12 hours.     Authorizing Provider: PARKER ROJAS     Refused Prescriptions Disp Refills    budesonide-formoterol (SYMBICORT) 160-4.5 MCG/ACT Aerosol  0     Refused By: MIKHAIL BOURGEOIS     Reason for Refusal: Refill not appropriate.     BRITNEY Davis

## 2022-11-07 ENCOUNTER — OFFICE VISIT (OUTPATIENT)
Dept: URGENT CARE | Facility: PHYSICIAN GROUP | Age: 72
End: 2022-11-07
Payer: MEDICARE

## 2022-11-07 ENCOUNTER — HOSPITAL ENCOUNTER (OUTPATIENT)
Dept: RADIOLOGY | Facility: MEDICAL CENTER | Age: 72
End: 2022-11-07
Attending: PHYSICIAN ASSISTANT
Payer: MEDICARE

## 2022-11-07 VITALS
WEIGHT: 167 LBS | HEART RATE: 94 BPM | RESPIRATION RATE: 18 BRPM | BODY MASS INDEX: 30.73 KG/M2 | OXYGEN SATURATION: 92 % | HEIGHT: 62 IN | TEMPERATURE: 97.3 F | DIASTOLIC BLOOD PRESSURE: 26 MMHG | SYSTOLIC BLOOD PRESSURE: 130 MMHG

## 2022-11-07 DIAGNOSIS — R05.1 ACUTE COUGH: ICD-10-CM

## 2022-11-07 DIAGNOSIS — J45.909 REACTIVE AIRWAY DISEASE WITHOUT COMPLICATION, UNSPECIFIED ASTHMA SEVERITY, UNSPECIFIED WHETHER PERSISTENT: ICD-10-CM

## 2022-11-07 DIAGNOSIS — J06.9 VIRAL URI WITH COUGH: ICD-10-CM

## 2022-11-07 PROCEDURE — 99213 OFFICE O/P EST LOW 20 MIN: CPT | Performed by: PHYSICIAN ASSISTANT

## 2022-11-07 PROCEDURE — 71046 X-RAY EXAM CHEST 2 VIEWS: CPT

## 2022-11-07 RX ORDER — METHYLPREDNISOLONE 4 MG/1
TABLET ORAL
Qty: 21 TABLET | Refills: 0 | Status: SHIPPED | OUTPATIENT
Start: 2022-11-07 | End: 2023-01-11

## 2022-11-07 RX ORDER — DEXTROMETHORPHAN HYDROBROMIDE, GUAIFENESIN 20; 400 MG/20ML; MG/20ML
SOLUTION ORAL
Qty: 177 ML | Refills: 0 | Status: ON HOLD | OUTPATIENT
Start: 2022-11-07 | End: 2023-12-17

## 2022-11-07 RX ORDER — BENZONATATE 100 MG/1
100 CAPSULE ORAL 3 TIMES DAILY PRN
Qty: 21 CAPSULE | Refills: 0 | Status: SHIPPED | OUTPATIENT
Start: 2022-11-07

## 2022-11-07 ASSESSMENT — FIBROSIS 4 INDEX: FIB4 SCORE: 1.09

## 2022-11-08 NOTE — PROGRESS NOTES
"Subjective:   Serenity Howe is a 72 y.o. female who presents for Cough (X 2 days, phlegm, fever yesterday, )      HPI  The patient presents to the Urgent Care with complaints of a cough onset 2 days ago.  Sometimes the cough has productive yellow sputum.  She reports of subjective fever yesterday which resolved.  Sometimes she has wheezing.  She takes Advair twice daily for history of asthma.  She has not needed her albuterol inhaler or nebulizer inhaler. Denies any chest pain, shortness of breath, vomiting, diarrhea.         Medications:    acetaminophen Tabs  albuterol Aers  albuterol Nebu  aspirin Chew  Caltrate 600+D3 Tabs  colchicine Tabs  fluticasone-salmeterol Aepb  potassium chloride ER  valsartan-hydrochlorothiazide  verapamil ER Cp24    Allergies: Shellfish allergy    Problem List: Serenity Howe does not have any pertinent problems on file.    Surgical History:  Past Surgical History:   Procedure Laterality Date    GYN SURGERY       x1    MASTECTOMY      right        Past Social Hx: Serenity Howe  reports that she has never smoked. She has never used smokeless tobacco. She reports that she does not drink alcohol and does not use drugs.     Past Family Hx:  Serenity Howe family history includes Hyperlipidemia in her mother; No Known Problems in her brother, daughter, daughter, father, sister, and son.     Problem list, medications, and allergies reviewed by myself today in Epic.     Objective:     BP (!) 130/26 (BP Location: Left arm, Patient Position: Sitting, BP Cuff Size: Adult)   Pulse 94   Temp 36.3 °C (97.3 °F) (Temporal)   Resp 18   Ht 1.575 m (5' 2\")   Wt 75.8 kg (167 lb)   SpO2 92%   BMI 30.54 kg/m²     Physical Exam  Vitals reviewed.   Constitutional:       General: She is not in acute distress.     Appearance: Normal appearance. She is not ill-appearing or toxic-appearing.   HENT:      Nose: Congestion present.      Mouth/Throat:      Mouth: Mucous " membranes are moist.      Pharynx: Oropharynx is clear. No oropharyngeal exudate or posterior oropharyngeal erythema.   Eyes:      Conjunctiva/sclera: Conjunctivae normal.      Pupils: Pupils are equal, round, and reactive to light.   Cardiovascular:      Rate and Rhythm: Normal rate and regular rhythm.      Heart sounds: Normal heart sounds.   Pulmonary:      Effort: Pulmonary effort is normal. No respiratory distress.      Breath sounds: Normal breath sounds. No wheezing, rhonchi or rales.   Musculoskeletal:      Cervical back: Neck supple.   Lymphadenopathy:      Cervical: No cervical adenopathy.   Skin:     General: Skin is warm and dry.   Neurological:      General: No focal deficit present.      Mental Status: She is alert and oriented to person, place, and time.   Psychiatric:         Mood and Affect: Mood normal.         Behavior: Behavior normal.         RADIOLOGY RESULTS   DX-CHEST-2 VIEWS    Result Date: 11/7/2022 11/7/2022 6:11 PM HISTORY/REASON FOR EXAM:  Cough for 3 days. History of asthma. History of right mastectomy. TECHNIQUE/EXAM DESCRIPTION AND NUMBER OF VIEWS: Two views of the chest. COMPARISON:  7/25/2022 FINDINGS: There are surgical changes consistent with right mastectomy and axillary lymph node dissection. The mediastinal and cardiac silhouette is unremarkable. There is atherosclerosis of the aorta. The lung parenchyma is clear. There is no significant pleural effusion. There is no visible pneumothorax. There is mild degenerative change in the spine.     1.  No acute cardiopulmonary process.         Diagnosis and associated orders:     1. Viral URI with cough  - DX-CHEST-2 VIEWS; Future  - Dextromethorphan-guaiFENesin (ROBITUSSIN COUGH+CHEST PAULO DM) 5-100 MG/5ML Liquid; Take 20 mL by mouth every 4 hours as needed for cough  Dispense: 177 mL; Refill: 0  - benzonatate (TESSALON) 100 MG Cap; Take 1 Capsule by mouth 3 times a day as needed for Cough.  Dispense: 21 Capsule; Refill: 0    2.  Reactive airway disease without complication, unspecified asthma severity, unspecified whether persistent  - methylPREDNISolone (MEDROL DOSEPAK) 4 MG Tablet Therapy Pack; Follow schedule on package instructions.  Dispense: 21 Tablet; Refill: 0     Comments/MDM:     X-ray results per radiologist interpretation above. I personally reviewed images and radiologist report which showed no acute process.   Treatment as above.  Symptomatic and supportive care:   Plenty of oral hydration and rest   Over the counter cough suppressant as directed.  Tylenol or ibuprofen for pain and fever as directed.   Warm salt water gargles for sore throat, soft foods, cool liquids.   Saline nasal spray and Flonase  Infection control measures at home. Stay away from people, Hand washing, covering sneeze/cough, disinfect surfaces.   Remain home from work, school, and other populated environments. Work note provided with information of quarantine measures per CDC guidelines.   Overall, the patient is well-appearing. They are not hypoxic, afebrile, and a normal pulmonary exam.       I personally reviewed prior external notes and test results pertinent to today's visit. Pathogenesis of diagnosis discussed including typical length and natural progression. Supportive care, natural history, differential diagnoses, and indications for immediate follow-up discussed. Patient expresses understanding and agrees to plan. Patient denies any other questions or concerns.     Follow-up with the primary care physician for recheck, reevaluation, and consideration of further management.    Please note that this dictation was created using voice recognition software. I have made a reasonable attempt to correct obvious errors, but I expect that there are errors of grammar and possibly content that I did not discover before finalizing the note.    This note was electronically signed by Shar Cortez PA-C

## 2022-12-03 ENCOUNTER — HOSPITAL ENCOUNTER (EMERGENCY)
Facility: MEDICAL CENTER | Age: 72
End: 2022-12-03
Attending: EMERGENCY MEDICINE
Payer: MEDICARE

## 2022-12-03 ENCOUNTER — APPOINTMENT (OUTPATIENT)
Dept: RADIOLOGY | Facility: MEDICAL CENTER | Age: 72
End: 2022-12-03
Attending: EMERGENCY MEDICINE
Payer: MEDICARE

## 2022-12-03 VITALS
TEMPERATURE: 98 F | BODY MASS INDEX: 30.31 KG/M2 | DIASTOLIC BLOOD PRESSURE: 61 MMHG | SYSTOLIC BLOOD PRESSURE: 130 MMHG | HEART RATE: 80 BPM | HEIGHT: 62 IN | RESPIRATION RATE: 17 BRPM | OXYGEN SATURATION: 92 % | WEIGHT: 164.68 LBS

## 2022-12-03 DIAGNOSIS — R00.0 TACHYCARDIA: ICD-10-CM

## 2022-12-03 DIAGNOSIS — M25.551 RIGHT HIP PAIN: ICD-10-CM

## 2022-12-03 DIAGNOSIS — N39.0 ACUTE UTI: ICD-10-CM

## 2022-12-03 DIAGNOSIS — R10.9 FLANK PAIN: ICD-10-CM

## 2022-12-03 LAB
ALBUMIN SERPL BCP-MCNC: 3.9 G/DL (ref 3.2–4.9)
ALBUMIN/GLOB SERPL: 1.1 G/DL
ALP SERPL-CCNC: 76 U/L (ref 30–99)
ALT SERPL-CCNC: 25 U/L (ref 2–50)
AMORPH CRY #/AREA URNS HPF: PRESENT /HPF
ANION GAP SERPL CALC-SCNC: 13 MMOL/L (ref 7–16)
APPEARANCE UR: ABNORMAL
AST SERPL-CCNC: 25 U/L (ref 12–45)
BACTERIA #/AREA URNS HPF: ABNORMAL /HPF
BASOPHILS # BLD AUTO: 0.4 % (ref 0–1.8)
BASOPHILS # BLD: 0.04 K/UL (ref 0–0.12)
BILIRUB SERPL-MCNC: 0.5 MG/DL (ref 0.1–1.5)
BILIRUB UR QL STRIP.AUTO: NEGATIVE
BUN SERPL-MCNC: 29 MG/DL (ref 8–22)
CALCIUM SERPL-MCNC: 10.3 MG/DL (ref 8.5–10.5)
CHLORIDE SERPL-SCNC: 104 MMOL/L (ref 96–112)
CO2 SERPL-SCNC: 21 MMOL/L (ref 20–33)
COLOR UR: YELLOW
CREAT SERPL-MCNC: 1.29 MG/DL (ref 0.5–1.4)
EOSINOPHIL # BLD AUTO: 0.09 K/UL (ref 0–0.51)
EOSINOPHIL NFR BLD: 0.9 % (ref 0–6.9)
EPI CELLS #/AREA URNS HPF: ABNORMAL /HPF
ERYTHROCYTE [DISTWIDTH] IN BLOOD BY AUTOMATED COUNT: 47.8 FL (ref 35.9–50)
GFR SERPLBLD CREATININE-BSD FMLA CKD-EPI: 44 ML/MIN/1.73 M 2
GLOBULIN SER CALC-MCNC: 3.6 G/DL (ref 1.9–3.5)
GLUCOSE SERPL-MCNC: 185 MG/DL (ref 65–99)
GLUCOSE UR STRIP.AUTO-MCNC: NEGATIVE MG/DL
HCT VFR BLD AUTO: 43.9 % (ref 37–47)
HGB BLD-MCNC: 15.1 G/DL (ref 12–16)
HYALINE CASTS #/AREA URNS LPF: ABNORMAL /LPF
IMM GRANULOCYTES # BLD AUTO: 0.04 K/UL (ref 0–0.11)
IMM GRANULOCYTES NFR BLD AUTO: 0.4 % (ref 0–0.9)
KETONES UR STRIP.AUTO-MCNC: NEGATIVE MG/DL
LACTATE SERPL-SCNC: 2.4 MMOL/L (ref 0.5–2)
LACTATE SERPL-SCNC: 3.1 MMOL/L (ref 0.5–2)
LEUKOCYTE ESTERASE UR QL STRIP.AUTO: ABNORMAL
LYMPHOCYTES # BLD AUTO: 2.17 K/UL (ref 1–4.8)
LYMPHOCYTES NFR BLD: 21.6 % (ref 22–41)
MCH RBC QN AUTO: 33.3 PG (ref 27–33)
MCHC RBC AUTO-ENTMCNC: 34.4 G/DL (ref 33.6–35)
MCV RBC AUTO: 96.9 FL (ref 81.4–97.8)
MICRO URNS: ABNORMAL
MONOCYTES # BLD AUTO: 1.26 K/UL (ref 0–0.85)
MONOCYTES NFR BLD AUTO: 12.5 % (ref 0–13.4)
NEUTROPHILS # BLD AUTO: 6.44 K/UL (ref 2–7.15)
NEUTROPHILS NFR BLD: 64.2 % (ref 44–72)
NITRITE UR QL STRIP.AUTO: NEGATIVE
NRBC # BLD AUTO: 0 K/UL
NRBC BLD-RTO: 0 /100 WBC
PH UR STRIP.AUTO: 6 [PH] (ref 5–8)
PLATELET # BLD AUTO: 256 K/UL (ref 164–446)
PMV BLD AUTO: 9.7 FL (ref 9–12.9)
POTASSIUM SERPL-SCNC: 3.1 MMOL/L (ref 3.6–5.5)
PROT SERPL-MCNC: 7.5 G/DL (ref 6–8.2)
PROT UR QL STRIP: NEGATIVE MG/DL
RBC # BLD AUTO: 4.53 M/UL (ref 4.2–5.4)
RBC # URNS HPF: ABNORMAL /HPF
RBC UR QL AUTO: ABNORMAL
SODIUM SERPL-SCNC: 138 MMOL/L (ref 135–145)
SP GR UR STRIP.AUTO: 1.02
UROBILINOGEN UR STRIP.AUTO-MCNC: 0.2 MG/DL
WBC # BLD AUTO: 10 K/UL (ref 4.8–10.8)
WBC #/AREA URNS HPF: ABNORMAL /HPF

## 2022-12-03 PROCEDURE — 71045 X-RAY EXAM CHEST 1 VIEW: CPT

## 2022-12-03 PROCEDURE — 83605 ASSAY OF LACTIC ACID: CPT | Mod: 91

## 2022-12-03 PROCEDURE — 74176 CT ABD & PELVIS W/O CONTRAST: CPT

## 2022-12-03 PROCEDURE — 81001 URINALYSIS AUTO W/SCOPE: CPT

## 2022-12-03 PROCEDURE — A9270 NON-COVERED ITEM OR SERVICE: HCPCS | Performed by: EMERGENCY MEDICINE

## 2022-12-03 PROCEDURE — 99285 EMERGENCY DEPT VISIT HI MDM: CPT

## 2022-12-03 PROCEDURE — 87086 URINE CULTURE/COLONY COUNT: CPT

## 2022-12-03 PROCEDURE — 87040 BLOOD CULTURE FOR BACTERIA: CPT | Mod: 91

## 2022-12-03 PROCEDURE — 85025 COMPLETE CBC W/AUTO DIFF WBC: CPT

## 2022-12-03 PROCEDURE — 80053 COMPREHEN METABOLIC PANEL: CPT

## 2022-12-03 PROCEDURE — 700111 HCHG RX REV CODE 636 W/ 250 OVERRIDE (IP): Performed by: EMERGENCY MEDICINE

## 2022-12-03 PROCEDURE — 96375 TX/PRO/DX INJ NEW DRUG ADDON: CPT

## 2022-12-03 PROCEDURE — 36415 COLL VENOUS BLD VENIPUNCTURE: CPT

## 2022-12-03 PROCEDURE — 73502 X-RAY EXAM HIP UNI 2-3 VIEWS: CPT | Mod: RT

## 2022-12-03 PROCEDURE — 96374 THER/PROPH/DIAG INJ IV PUSH: CPT

## 2022-12-03 PROCEDURE — 700105 HCHG RX REV CODE 258: Performed by: EMERGENCY MEDICINE

## 2022-12-03 PROCEDURE — 700102 HCHG RX REV CODE 250 W/ 637 OVERRIDE(OP): Performed by: EMERGENCY MEDICINE

## 2022-12-03 RX ORDER — ONDANSETRON 2 MG/ML
4 INJECTION INTRAMUSCULAR; INTRAVENOUS ONCE
Status: COMPLETED | OUTPATIENT
Start: 2022-12-03 | End: 2022-12-03

## 2022-12-03 RX ORDER — SODIUM CHLORIDE 9 MG/ML
1000 INJECTION, SOLUTION INTRAVENOUS ONCE
Status: COMPLETED | OUTPATIENT
Start: 2022-12-03 | End: 2022-12-03

## 2022-12-03 RX ORDER — CEFDINIR 300 MG/1
300 CAPSULE ORAL ONCE
Status: COMPLETED | OUTPATIENT
Start: 2022-12-03 | End: 2022-12-03

## 2022-12-03 RX ORDER — ACETAMINOPHEN 325 MG/1
650 TABLET ORAL ONCE
Status: COMPLETED | OUTPATIENT
Start: 2022-12-03 | End: 2022-12-03

## 2022-12-03 RX ORDER — MORPHINE SULFATE 4 MG/ML
4 INJECTION INTRAVENOUS ONCE
Status: COMPLETED | OUTPATIENT
Start: 2022-12-03 | End: 2022-12-03

## 2022-12-03 RX ORDER — CEFDINIR 300 MG/1
300 CAPSULE ORAL 2 TIMES DAILY
Qty: 20 CAPSULE | Refills: 0 | Status: SHIPPED | OUTPATIENT
Start: 2022-12-03 | End: 2022-12-13

## 2022-12-03 RX ADMIN — CEFDINIR 300 MG: 300 CAPSULE ORAL at 14:33

## 2022-12-03 RX ADMIN — MORPHINE SULFATE 4 MG: 4 INJECTION, SOLUTION INTRAMUSCULAR; INTRAVENOUS at 13:40

## 2022-12-03 RX ADMIN — ONDANSETRON 4 MG: 2 INJECTION INTRAMUSCULAR; INTRAVENOUS at 13:40

## 2022-12-03 RX ADMIN — POTASSIUM BICARBONATE 25 MEQ: 978 TABLET, EFFERVESCENT ORAL at 14:33

## 2022-12-03 RX ADMIN — SODIUM CHLORIDE 1000 ML: 9 INJECTION, SOLUTION INTRAVENOUS at 15:24

## 2022-12-03 RX ADMIN — SODIUM CHLORIDE 1000 ML: 9 INJECTION, SOLUTION INTRAVENOUS at 13:39

## 2022-12-03 RX ADMIN — ACETAMINOPHEN 650 MG: 325 TABLET, FILM COATED ORAL at 13:41

## 2022-12-03 ASSESSMENT — FIBROSIS 4 INDEX: FIB4 SCORE: 1.09

## 2022-12-03 NOTE — ED NOTES
Chief Complaint   Patient presents with   • Flank Pain     Pt states she developed R sided flank pain last night. Pt denies any history of UTI or kidney stones, denies trauma.      Agree with triage RN. Pt up to BR self with instructions for clean catch.

## 2022-12-03 NOTE — ED TRIAGE NOTES
"Chief Complaint   Patient presents with    Flank Pain     Pt states she developed R sided flank pain last night. Pt denies any history of UTI or kidney stones, denies trauma.        Ambulatory to triage for above complaint.   Educated on triage process, encourage to inform staff of any changes.     BP (!) 151/77   Pulse (!) 105   Temp 37.5 °C (99.5 °F) (Temporal)   Resp 18   Ht 1.575 m (5' 2\")   Wt 74.7 kg (164 lb 10.9 oz)   SpO2 94%   BMI 30.12 kg/m²     "

## 2022-12-03 NOTE — ED NOTES
Pt was brought to [facility] via a w/c. C/C is flank pain. Pt is in a gown, on the monitor and call light within reach. Chart up for ERP.

## 2022-12-03 NOTE — ED PROVIDER NOTES
ED Provider Note    CHIEF COMPLAINT  Chief Complaint   Patient presents with    Flank Pain     Pt states she developed R sided flank pain last night. Pt denies any history of UTI or kidney stones, denies trauma.      HPI  Patient is a 72-year-old female with a past medical history of asthma remote history of breast cancer, hypertension and some intermittent osteoarthritis who presents emergency room for complaints of right-sided discomfort.  She points to the right flank and the right buttock and hip saying that started yesterday.  She has not had any recent falls, she has been on her feet excessively but did not necessarily associate pain starting with this.  She denies any recent fevers, no abdominal distention, no nausea, vomiting or diarrheal illness, she has not had any exertional discomfort or shortness of breath.  She is unsure about any true dysuria but does report a small amount of increased urinary frequency.  She does not believe that she has a history of recurrent UTIs or kidney stones.  Not recall what gynecological surgery she had.    PPE Note: I personally donned full PPE for all patient encounters during this visit, including being clean-shaven with an N95 respirator mask, gloves, and goggles.     REVIEW OF SYSTEMS  See [facility] for further details. All other systems are negative.     PAST MEDICAL HISTORY   has a past medical history of ASTHMA, Cancer (HCC), History of breast cancer (7/29/2015), Hypertension, Shoulder pain, right (7/29/2015), and Tremor (7/29/2015).    SOCIAL HISTORY  Social History     Tobacco Use    Smoking status: Never    Smokeless tobacco: Never   Vaping Use    Vaping Use: Never used   Substance and Sexual Activity    Alcohol use: No    Drug use: No    Sexual activity: Never     Partners: Male     SURGICAL HISTORY   has a past surgical history that includes mastectomy and gyn surgery.    CURRENT MEDICATIONS  Home Medications       Reviewed by Airam Mcduffie R.N. (Registered  "Nurse) on 12/03/22 at 1218  Med List Status: Not Addressed     Medication Last Dose Status   acetaminophen (TYLENOL) 325 MG Tab  Active   albuterol (PROVENTIL) 2.5mg/3ml Nebu Soln solution for nebulization  Active   albuterol 108 (90 Base) MCG/ACT Aero Soln inhalation aerosol  Active   aspirin (ASA) 81 MG CHEW  Active   benzonatate (TESSALON) 100 MG Cap  Active   Calcium Carb-Cholecalciferol (CALTRATE 600+D3) 600-800 MG-UNIT Tab  Active   colchicine (COLCRYS) 0.6 MG Tab  Active   Dextromethorphan-guaiFENesin (ROBITUSSIN COUGH+CHEST PAULO DM) 5-100 MG/5ML Liquid  Active   fluticasone-salmeterol (ADVAIR) 250-50 MCG/ACT AEROSOL POWDER, BREATH ACTIVATED  Active   methylPREDNISolone (MEDROL DOSEPAK) 4 MG Tablet Therapy Pack  Active   potassium chloride ER (KLOR-CON) 10 MEQ tablet  Active   valsartan-hydrochlorothiazide (DIOVAN-HCT) 80-12.5 MG per tablet  Active   verapamil ER (CALAN SR) 120 MG CAPSULE SR 24 HR  Active                  ALLERGIES  Allergies   Allergen Reactions    Shellfish Allergy Hives     PHYSICAL EXAM  VITAL SIGNS: /61   Pulse 80   Temp 36.7 °C (98 °F) (Temporal)   Resp 17   Ht 1.575 m (5' 2\")   Wt 74.7 kg (164 lb 10.9 oz)   SpO2 92%   BMI 30.12 kg/m²   Pulse ox interpretation: I interpret this pulse ox as normal.  Genl: F sitting in uncomfortably, speaking clearly, appears in mild distress   Head: NC/AT   ENT: Mucous membranes moist, posterior pharynx clear, uvula midline, nares patent bilaterally   Eyes: Normal sclera, pupils equal round reactive to light  Neck: Supple, FROM, no LAD appreciated   Pulmonary: Lungs are clear to auscultation bilaterally  Chest: No TTP  CV:  tachycardia, no murmur appreciated, pulses 2+ in both upper and lower extremities,  Abdomen: soft, NT/ND; no rebound/guarding, no masses palpated, no HSM   : no CVA tenderness.  Localizing tenderness to the right ASIS and global tenderness with palpation circumferentially of the right hip joint.  Into the gluteus " vicky.  Musculoskeletal: Pain free ROM of the neck. Moving upper and lower extremities and spontaneous in coordinated fashion.  No neurovascular compromise or unilateral swelling of the right lower extremity.  No pain with manipulation of the ankle or knee, minor recreation of this pain in the right hip with internal and external flexion.  Neuro: A&Ox4 (person, place, time, situation), speech fluent, gait not initially assessed, no focal deficits appreciated, No cerebellar signs  Skin: No rash or lesions.  No pallor or jaundice.  No cyanosis.  Warm and dry.     DIAGNOSTIC STUDIES / PROCEDURES    LABS  Labs Reviewed   LACTIC ACID - Abnormal; Notable for the following components:       Result Value    Lactic Acid 3.1 (*)     All other components within normal limits   LACTIC ACID - Abnormal; Notable for the following components:    Lactic Acid 2.4 (*)     All other components within normal limits    Narrative:     Repeat if initial lactic acid result is greater than 2   CBC WITH DIFFERENTIAL - Abnormal; Notable for the following components:    MCH 33.3 (*)     Lymphocytes 21.60 (*)     Monos (Absolute) 1.26 (*)     All other components within normal limits   COMP METABOLIC PANEL - Abnormal; Notable for the following components:    Potassium 3.1 (*)     Glucose 185 (*)     Bun 29 (*)     Globulin 3.6 (*)     All other components within normal limits   URINALYSIS - Abnormal; Notable for the following components:    Character Hazy (*)     Leukocyte Esterase Moderate (*)     Occult Blood Trace (*)     All other components within normal limits    Narrative:     Indication for culture:->Evaluation for sepsis without a  clear source of infection   ESTIMATED GFR - Abnormal; Notable for the following components:    GFR (CKD-EPI) 44 (*)     All other components within normal limits   URINE MICROSCOPIC (W/UA) - Abnormal; Notable for the following components:    WBC 20-50 (*)     Bacteria Few (*)     Epithelial Cells Many (*)   "   All other components within normal limits    Narrative:     Indication for culture:->Evaluation for sepsis without a  clear source of infection   URINE CULTURE(NEW)    Narrative:     Indication for culture:->Evaluation for sepsis without a  clear source of infection   BLOOD CULTURE    Narrative:     Per Hospital Policy: Only change Specimen Src: to \"Line\" if  specified by physician order.   BLOOD CULTURE    Narrative:     Per Hospital Policy: Only change Specimen Src: to \"Line\" if  specified by physician order.     RADIOLOGY  CT-RENAL COLIC EVALUATION(A/P W/O)   Final Result      1.  Negative for hydronephrosis or urolithiasis.      2.  Nonobstructive right 7 mm and 3 mm calculi      3.  Hepatic steatosis      4.  Incidental hepatic cyst      5.  Small umbilical hernia      6.  Calcified uterine fibroid      DX-HIP-COMPLETE - UNILATERAL 2+ RIGHT   Final Result      No evidence of fracture or arthropathy.      DX-CHEST-PORTABLE (1 VIEW)   Final Result      1.  No acute cardiopulmonary abnormality identified.      2.  Chronic obstructive pulmonary disease        COURSE & MEDICAL DECISION MAKING  Pertinent Labs & Imaging studies reviewed. (See chart for details)    DDX: UTI, pyelitis/pyelonephritis, nephrolithiasis, colitis, osteoarthritis, hip pain, hip fracture unlikely, SI dysfunction, musculoskeletal injury, piriformis syndrome, sciatica,    MDM  Initial evaluation at 1252:  Patient is seen and evaluated for symptoms as described above.  The patient has nontoxic, has new onset right-sided flank and lower hip discomfort with no new traumatic injuries.  Patient was noted to be afebrile borderline, with some slight tachycardia noted in triage and initial order set for presumed infectious etiology or possible sepsis was started.  The patient does remain afebrile.  On my initial assessment, she does not have a history of intra-abdominal pathology and I would believe that urogenital infection, early " pyelonephritis/pyelitis would be likely but cannot fully exclude the possibility of concurrent stone.  Patient had haziness and several reported episodes of dark urination which she attributed to blood though this is not initially available on the urinalysis.  Blood is present with moderate leukocyte esterase, some element of possible contamination with 20-50 WBCs.  With the distribution of pain discomfort initial dose of cefdinir was given while further additional work-up including a CT renal colic study is ordered.  Additionally an x-ray of the hip to rule out a possible fracture or arthropathy is also obtained.  It is reassuring that the patient does not have infiltrative process on chest x-ray that was ordered in triage unlikely to be a cardiopulmonary source and the hip x-ray shows no evidence of concerning intra-articular fracture or significant osteoarthritis.  Lab work was remarkable for borderline hypokalemia, lactate that slightly elevated at 3.1 and slight decrease in the GFR though creatinine is normal.  She is fluid resuscitated for tachycardia and possible septic etiology and repeat lactate is downtrending.  Additional fluids given and pt transitioned to PO fluids therafter without difficulty.      Additionally, the patient receives pain medication, antiemetics and Tylenol and feels improved upon reassessment.  CT abdomen pelvis for renal colic rule out shows renal pole stones however no evidence of obstructive ureteral pathology..  Current plan is for treating the diagnosis of right-sided flank pain due to UTI and early pyelitis.  No progressive signs of progression to pyelonephritis and her vital signs remained stable.  Additionally there is an incidental finding of a calcified anterior uterine lesion that could represent a fibroid versus mass and will need outpatient follow-up.  She is counseled regarding this finding and feels comfortable going to her GYN for further assessments.    HYDRATION:  Based on the patient's presentation of Dehydration and Tachycardia the patient was given IV fluids. IV Hydration was used because oral hydration was not adequate alone. Upon recheck following hydration, the patient was improved.    FINAL IMPRESSION  Visit Diagnoses     ICD-10-CM   1. Flank pain  R10.9   2. Right hip pain  M25.551   3. Tachycardia  R00.0   4. Acute UTI  N39.0     Electronically signed by: Con Sheridan M.D., 12/3/2022 12:52 PM

## 2022-12-04 NOTE — ED NOTES
All discharge instructions given to pt.  Pt advised not to drink or drive.  Pt verbalized understanding of all discharge instructions. All lines removed prior to discharge. All questions answered.

## 2022-12-05 LAB
BACTERIA UR CULT: NORMAL
SIGNIFICANT IND 70042: NORMAL
SITE SITE: NORMAL
SOURCE SOURCE: NORMAL

## 2023-01-10 ENCOUNTER — APPOINTMENT (OUTPATIENT)
Dept: URGENT CARE | Facility: PHYSICIAN GROUP | Age: 73
End: 2023-01-10
Payer: COMMERCIAL

## 2023-01-11 ENCOUNTER — APPOINTMENT (OUTPATIENT)
Dept: RADIOLOGY | Facility: MEDICAL CENTER | Age: 73
End: 2023-01-11
Attending: EMERGENCY MEDICINE
Payer: COMMERCIAL

## 2023-01-11 ENCOUNTER — HOSPITAL ENCOUNTER (EMERGENCY)
Facility: MEDICAL CENTER | Age: 73
End: 2023-01-11
Attending: EMERGENCY MEDICINE
Payer: COMMERCIAL

## 2023-01-11 VITALS
DIASTOLIC BLOOD PRESSURE: 102 MMHG | WEIGHT: 165.79 LBS | RESPIRATION RATE: 16 BRPM | BODY MASS INDEX: 30.32 KG/M2 | SYSTOLIC BLOOD PRESSURE: 182 MMHG | TEMPERATURE: 98.3 F | HEART RATE: 89 BPM | OXYGEN SATURATION: 95 %

## 2023-01-11 DIAGNOSIS — M10.9 ACUTE GOUT OF RIGHT ANKLE, UNSPECIFIED CAUSE: ICD-10-CM

## 2023-01-11 LAB
ALBUMIN SERPL BCP-MCNC: 3.7 G/DL (ref 3.2–4.9)
ALBUMIN/GLOB SERPL: 0.8 G/DL
ALP SERPL-CCNC: 81 U/L (ref 30–99)
ALT SERPL-CCNC: 16 U/L (ref 2–50)
ANION GAP SERPL CALC-SCNC: 10 MMOL/L (ref 7–16)
AST SERPL-CCNC: 21 U/L (ref 12–45)
BASOPHILS # BLD AUTO: 0.6 % (ref 0–1.8)
BASOPHILS # BLD: 0.05 K/UL (ref 0–0.12)
BILIRUB SERPL-MCNC: 0.3 MG/DL (ref 0.1–1.5)
BUN SERPL-MCNC: 21 MG/DL (ref 8–22)
CALCIUM ALBUM COR SERPL-MCNC: 10.5 MG/DL (ref 8.5–10.5)
CALCIUM SERPL-MCNC: 10.3 MG/DL (ref 8.5–10.5)
CHLORIDE SERPL-SCNC: 106 MMOL/L (ref 96–112)
CO2 SERPL-SCNC: 23 MMOL/L (ref 20–33)
CREAT SERPL-MCNC: 1.22 MG/DL (ref 0.5–1.4)
CRP SERPL HS-MCNC: 2.47 MG/DL (ref 0–0.75)
EOSINOPHIL # BLD AUTO: 0.15 K/UL (ref 0–0.51)
EOSINOPHIL NFR BLD: 1.7 % (ref 0–6.9)
ERYTHROCYTE [DISTWIDTH] IN BLOOD BY AUTOMATED COUNT: 45.1 FL (ref 35.9–50)
GFR SERPLBLD CREATININE-BSD FMLA CKD-EPI: 47 ML/MIN/1.73 M 2
GLOBULIN SER CALC-MCNC: 4.5 G/DL (ref 1.9–3.5)
GLUCOSE SERPL-MCNC: 123 MG/DL (ref 65–99)
HCT VFR BLD AUTO: 41.5 % (ref 37–47)
HGB BLD-MCNC: 14.5 G/DL (ref 12–16)
IMM GRANULOCYTES # BLD AUTO: 0.03 K/UL (ref 0–0.11)
IMM GRANULOCYTES NFR BLD AUTO: 0.3 % (ref 0–0.9)
LYMPHOCYTES # BLD AUTO: 2.1 K/UL (ref 1–4.8)
LYMPHOCYTES NFR BLD: 23.1 % (ref 22–41)
MCH RBC QN AUTO: 33.8 PG (ref 27–33)
MCHC RBC AUTO-ENTMCNC: 34.9 G/DL (ref 33.6–35)
MCV RBC AUTO: 96.7 FL (ref 81.4–97.8)
MONOCYTES # BLD AUTO: 1.07 K/UL (ref 0–0.85)
MONOCYTES NFR BLD AUTO: 11.8 % (ref 0–13.4)
NEUTROPHILS # BLD AUTO: 5.68 K/UL (ref 2–7.15)
NEUTROPHILS NFR BLD: 62.5 % (ref 44–72)
NRBC # BLD AUTO: 0 K/UL
NRBC BLD-RTO: 0 /100 WBC
PLATELET # BLD AUTO: 263 K/UL (ref 164–446)
PMV BLD AUTO: 9.5 FL (ref 9–12.9)
POTASSIUM SERPL-SCNC: 3.8 MMOL/L (ref 3.6–5.5)
PROT SERPL-MCNC: 8.2 G/DL (ref 6–8.2)
RBC # BLD AUTO: 4.29 M/UL (ref 4.2–5.4)
SODIUM SERPL-SCNC: 139 MMOL/L (ref 135–145)
URATE SERPL-MCNC: 12 MG/DL (ref 1.9–8.2)
WBC # BLD AUTO: 9.1 K/UL (ref 4.8–10.8)

## 2023-01-11 PROCEDURE — 86140 C-REACTIVE PROTEIN: CPT

## 2023-01-11 PROCEDURE — 36415 COLL VENOUS BLD VENIPUNCTURE: CPT

## 2023-01-11 PROCEDURE — 80053 COMPREHEN METABOLIC PANEL: CPT

## 2023-01-11 PROCEDURE — 84550 ASSAY OF BLOOD/URIC ACID: CPT

## 2023-01-11 PROCEDURE — 99284 EMERGENCY DEPT VISIT MOD MDM: CPT

## 2023-01-11 PROCEDURE — 73610 X-RAY EXAM OF ANKLE: CPT | Mod: RT

## 2023-01-11 PROCEDURE — 85025 COMPLETE CBC W/AUTO DIFF WBC: CPT

## 2023-01-11 RX ORDER — INDOMETHACIN 75 MG/1
75 CAPSULE, EXTENDED RELEASE ORAL DAILY
Qty: 7 CAPSULE | Refills: 0 | Status: SHIPPED | OUTPATIENT
Start: 2023-01-11 | End: 2023-01-18

## 2023-01-11 RX ORDER — METHYLPREDNISOLONE 4 MG/1
TABLET ORAL
Qty: 1 EACH | Refills: 0 | Status: SHIPPED | OUTPATIENT
Start: 2023-01-11 | End: 2023-06-01

## 2023-01-11 RX ORDER — HYDROCODONE BITARTRATE AND ACETAMINOPHEN 5; 325 MG/1; MG/1
1 TABLET ORAL EVERY 6 HOURS PRN
Qty: 12 TABLET | Refills: 0 | Status: SHIPPED | OUTPATIENT
Start: 2023-01-11 | End: 2023-01-15

## 2023-01-11 ASSESSMENT — FIBROSIS 4 INDEX: FIB4 SCORE: 1.43

## 2023-01-12 NOTE — DISCHARGE INSTRUCTIONS
Take the medications as directed.  Do not drive or operate heavy machinery after taking the Norco which is a pain medication.  If you develop a fever or have worsening pain return for reevaluation.  I hope you feel better soon.

## 2023-01-12 NOTE — ED NOTES
Pt sitting upright in chair in no obvious distress at this time. Discharge information and instructions given/discussed with Pt. Pt acknowledged information. Pt self ambulated to  [place] without difficulty for her ride.

## 2023-01-12 NOTE — ED TRIAGE NOTES
Ambulates to triage  Chief Complaint   Patient presents with    Ankle Pain     R ankle x1 week, denies any trauma, last took tylenol this morning     Is able to walk but it hurts, no swelling or redness noted.

## 2023-01-28 ENCOUNTER — HOSPITAL ENCOUNTER (EMERGENCY)
Facility: MEDICAL CENTER | Age: 73
End: 2023-01-28
Attending: EMERGENCY MEDICINE
Payer: COMMERCIAL

## 2023-01-28 VITALS
BODY MASS INDEX: 31.28 KG/M2 | HEART RATE: 99 BPM | TEMPERATURE: 98.1 F | RESPIRATION RATE: 16 BRPM | HEIGHT: 62 IN | DIASTOLIC BLOOD PRESSURE: 70 MMHG | SYSTOLIC BLOOD PRESSURE: 124 MMHG | OXYGEN SATURATION: 95 % | WEIGHT: 170 LBS

## 2023-01-28 DIAGNOSIS — M75.52 ACUTE SHOULDER BURSITIS, LEFT: ICD-10-CM

## 2023-01-28 LAB — EKG IMPRESSION: NORMAL

## 2023-01-28 PROCEDURE — 99284 EMERGENCY DEPT VISIT MOD MDM: CPT

## 2023-01-28 PROCEDURE — 93005 ELECTROCARDIOGRAM TRACING: CPT | Performed by: EMERGENCY MEDICINE

## 2023-01-28 RX ORDER — METHYLPREDNISOLONE 4 MG/1
TABLET ORAL
Qty: 21 TABLET | Refills: 0 | Status: SHIPPED | OUTPATIENT
Start: 2023-01-28 | End: 2023-06-01

## 2023-01-28 RX ORDER — HYDROCODONE BITARTRATE AND ACETAMINOPHEN 5; 325 MG/1; MG/1
1 TABLET ORAL EVERY 4 HOURS PRN
Qty: 12 TABLET | Refills: 0 | Status: SHIPPED | OUTPATIENT
Start: 2023-01-28 | End: 2023-02-02

## 2023-01-28 ASSESSMENT — FIBROSIS 4 INDEX: FIB4 SCORE: 1.46

## 2023-01-28 NOTE — ED NOTES
Pt AOx4 and ready for education. All discharge instructions given to pt. Pt verbalized understanding of all discharge instructions. All lines removed prior to discharge. All questions answered. Pt to lobby via ambulation.

## 2023-01-28 NOTE — ED PROVIDER NOTES
ER Provider Note    Scribed for Sravan Bernstein M.D. by Sonia Ramsay. 2023  6:00 AM    Primary Care Provider: BRITNEY Davis    CHIEF COMPLAINT  Chief Complaint   Patient presents with    Shoulder Pain     Pt complains of L shoulder pain x3 days, -trauma, Hx arthritis, pain on palpation, CMS intact, pt took tylenol at work without relief, has been given steroids in the past for similar symptoms in other shoulder     EXTERNAL RECORDS REVIEWED  Inpatient Notes reviewed and Outpatient Notes reviewed.    HPI/ROS  LIMITATION TO HISTORY   Select: : None  OUTSIDE HISTORIAN(S):  None    Serenity Howe is a 72 y.o. female who presents to the ED complaining of moderate left shoulder pain onset three days ago. The patient denies trauma or any strenuous activity. She endorses associated left neck and arm pain, and states that it feels like her pain radiates out from her shoulder. She notes that she took two Tylenol, with no alleviation to her symptoms. The patient has no pertinent medical history.     PAST MEDICAL HISTORY  Past Medical History:   Diagnosis Date    ASTHMA     Cancer (HCC)     History of breast cancer 2015    Hypertension     Shoulder pain, right 2015    Tremor 2015     SURGICAL HISTORY  Past Surgical History:   Procedure Laterality Date    GYN SURGERY       x1    MASTECTOMY      right      FAMILY HISTORY  Family History   Problem Relation Age of Onset    Hyperlipidemia Mother     No Known Problems Father     No Known Problems Sister     No Known Problems Brother     No Known Problems Daughter     No Known Problems Daughter     No Known Problems Son      SOCIAL HISTORY   reports that she has never smoked. She has never used smokeless tobacco. She reports that she does not drink alcohol and does not use drugs.    CURRENT MEDICATIONS  Discharge Medication List as of 2023  6:09 AM        CONTINUE these medications which have NOT CHANGED    Details  "  methylPREDNISolone (MEDROL DOSEPAK) 4 MG Tablet Therapy Pack Use as directed, Disp-1 Each, R-0, Normal      Dextromethorphan-guaiFENesin (ROBITUSSIN COUGH+CHEST PAULO DM) 5-100 MG/5ML Liquid Take 20 mL by mouth every 4 hours as needed for cough, Disp-177 mL, R-0, Normal      benzonatate (TESSALON) 100 MG Cap Take 1 Capsule by mouth 3 times a day as needed for Cough., Disp-21 Capsule, R-0, Normal      fluticasone-salmeterol (ADVAIR) 250-50 MCG/ACT AEROSOL POWDER, BREATH ACTIVATED Inhale 1 Puff every 12 hours., Disp-3 Each, R-3, Normal      potassium chloride ER (KLOR-CON) 10 MEQ tablet Take 1 Tablet by mouth 2 times a day., Disp-20 Tablet, R-0, Normal      colchicine (COLCRYS) 0.6 MG Tab Take 2 Tablets by mouth every day., Disp-30 Tablet, R-0, Normal      valsartan-hydrochlorothiazide (DIOVAN-HCT) 80-12.5 MG per tablet TAKE 1 TABLET BY MOUTH EVERY DAY, Disp-90 Tablet, R-3, Normal      albuterol 108 (90 Base) MCG/ACT Aero Soln inhalation aerosol Inhale 2 Puffs every 6 hours as needed for Shortness of Breath.Prefers ventolinDisp-8.5 g, R-0, Normal      Calcium Carb-Cholecalciferol (CALTRATE 600+D3) 600-800 MG-UNIT Tab Take 1 Tablet by mouth every day., Historical Med      albuterol (PROVENTIL) 2.5mg/3ml Nebu Soln solution for nebulization Take 3 mL by nebulization every four hours as needed for Shortness of Breath., Disp-30 mL, R-0, Normal      verapamil ER (CALAN SR) 120 MG CAPSULE SR 24 HR TAKE 1 CAPSULE BY MOUTH EVERY DAY, Disp-90 Capsule, R-3, Normal      acetaminophen (TYLENOL) 325 MG Tab Take 2 Tablets by mouth every 6 hours as needed for Mild Pain., Historical Med      aspirin (ASA) 81 MG CHEW Chew 1 Tablet every day., Historical Med           ALLERGIES  Shellfish allergy    PHYSICAL EXAM  /70   Pulse 99   Temp 36.9 °C (98.4 °F) (Temporal)   Resp 16   Ht 1.575 m (5' 2\")   Wt 77.1 kg (170 lb)   SpO2 95%   BMI 31.09 kg/m²     Nursing note and vitals reviewed.  Constitutional: Well-developed and " well-nourished. No distress.   HENT: Head is normocephalic and atraumatic. Oropharynx is clear and moist without exudate or erythema.   Eyes: Pupils are equal, round, and reactive to light. Conjunctiva are normal.   Cardiovascular: Normal rate and regular rhythm. No murmur heard. Normal radial pulses.  Pulmonary/Chest: Breath sounds normal. No wheezes or rales.   Abdominal: Soft and non-tender. No distention    Musculoskeletal: Pain at extremes of abduction of left shoulder, no redness or increased warmth. Neurovascularly intact in left upper extremity.   Neurological: Awake, alert and oriented to person, place, and time. No focal deficits noted.  Skin: Skin is warm and dry. No rash.   Psychiatric: Normal mood and affect. Appropriate for clinical situation    DIAGNOSTIC STUDIES    EKG:   I have independently interpreted this EKG    Results for orders placed or performed during the hospital encounter of 23   EKG   Result Value Ref Range    Report       [facility] Emergency Dept.    Test Date:  2023  Pt Name:    STEPHEN BETANCUR                Department: ER  MRN:        [id number]                      Room:       [facility]  Gender:     Female                       Technician: [id number]  :        1950                   Requested By:ER TRIAGE PROTOCOL  Order #:    [id number]                    Reading MD: MARCIO VARGAS MD    Measurements  Intervals                                Axis  Rate:       90                           P:          28  VA:         147                          QRS:        23  QRSD:       93                           T:          51  QT:         356  QTc:        436    Interpretive Statements  Sinus rhythm  Compared to ECG 2019 11:19:38  No significant changes  Electronically Signed On 2023 7:09:24 PST by MARCIO VARGAS MD       COURSE & MEDICAL DECISION MAKING     ED Observation Status? No; Patient does not meet criteria for ED Observation.     INITIAL  ASSESSMENT, COURSE AND PLAN    Care Narrative:     6:02 AM - Patient evaluated at bedside. I discussed the plan for care, including discharge with outpatient medications. I further discussed the likelihood of bursitis.  No clinical evidence of infection.  No deformity may be concern for dislocation.  No bony point tenderness to palpation.  Patient verbalizes understanding and agreement to this plan of care. Ordered EKG for her symptoms.    Differentials: Bursitis, no evidence of infection.    DISPOSITION AND DISCUSSIONS  I have discussed management of the patient with the following physicians and JOSSELIN's:  None    Discussion of management with other QHP or appropriate source(s): None     Escalation of care considered, and ultimately not performed: IV fluids, blood analysis, diagnostic imaging, and acute inpatient care management, however at this time, the patient is most appropriate for outpatient management.    Barriers to care at this time, including but not limited to:  None .     Decision tools and prescription drugs considered including, but not limited to: Antibiotics   and Pain Medications   .    The patient will return for new or worsening symptoms and is stable at the time of discharge.    The patient is referred to a primary physician for blood pressure management, diabetic screening, and for all other preventative health concerns.    DISPOSITION:  Patient will be discharged home in stable condition.    FOLLOW UP:  MILTON DavisRKarinNKarin  [address] [address] [phone]    Schedule an appointment as soon as possible for a visit       [facility], Emergency Dept  [address] [facility] [address] [phone]    If symptoms worsen    OUTPATIENT MEDICATIONS:  Discharge Medication List as of 1/28/2023  6:09 AM        START taking these medications    Details   methylPREDNISolone (MEDROL) 4 MG Tab Take as per the package instructions., Disp-21 Tablet, R-0,  Normal      HYDROcodone-acetaminophen (NORCO) 5-325 MG Tab per tablet Take 1 Tablet by mouth every four hours as needed (pain) for up to 5 days., Disp-12 Tablet, R-0, Normal           FINAL DIANGOSIS  1. Acute shoulder bursitis, left         Sonia ZIMMERMAN ([place]), am scribing for, and in the presence of, Sravan Bernstein M.D..    Electronically signed by: Sonia Ramsay ([place]), 1/28/2023    ISravan M.D. personally performed the services described in this documentation, as scribed by Sonia Ramsay in my presence, and it is both accurate and complete.     The note accurately reflects work and decisions made by me.  Sravan Bernstein M.D.  1/28/2023  11:10 AM

## 2023-01-28 NOTE — ED TRIAGE NOTES
Chief Complaint   Patient presents with    Shoulder Pain     Pt complains of L shoulder pain x3 days, -trauma, Hx arthritis, pain on palpation, CMS intact, pt took tylenol at work without relief, has been given steroids in the past for similar symptoms in other shoulder     Pt ambulatory to triage for above complaint.      Pt is alert/oriented and follows commands. Pt speaking in full sentences and responds appropriately to questions. No acute distress noted in triage and respirations are even and unlabored.     Pt placed in lobby and educated on triage process. Pt encouraged to alert staff for any changes in condition.

## 2023-02-08 ENCOUNTER — APPOINTMENT (OUTPATIENT)
Dept: MEDICAL GROUP | Facility: PHYSICIAN GROUP | Age: 73
End: 2023-02-08
Payer: MEDICARE

## 2023-02-10 ENCOUNTER — APPOINTMENT (OUTPATIENT)
Dept: RADIOLOGY | Facility: MEDICAL CENTER | Age: 73
End: 2023-02-10
Attending: EMERGENCY MEDICINE
Payer: MEDICARE

## 2023-02-10 ENCOUNTER — HOSPITAL ENCOUNTER (EMERGENCY)
Facility: MEDICAL CENTER | Age: 73
End: 2023-02-10
Attending: EMERGENCY MEDICINE
Payer: MEDICARE

## 2023-02-10 VITALS
OXYGEN SATURATION: 93 % | HEIGHT: 62 IN | TEMPERATURE: 97.4 F | DIASTOLIC BLOOD PRESSURE: 72 MMHG | HEART RATE: 110 BPM | RESPIRATION RATE: 22 BRPM | BODY MASS INDEX: 30.18 KG/M2 | WEIGHT: 164.02 LBS | SYSTOLIC BLOOD PRESSURE: 155 MMHG

## 2023-02-10 DIAGNOSIS — M54.16 LUMBAR BACK PAIN WITH RADICULOPATHY AFFECTING LEFT LOWER EXTREMITY: ICD-10-CM

## 2023-02-10 DIAGNOSIS — U07.1 COVID-19: ICD-10-CM

## 2023-02-10 LAB
FLUAV RNA SPEC QL NAA+PROBE: NEGATIVE
FLUBV RNA SPEC QL NAA+PROBE: NEGATIVE
RSV RNA SPEC QL NAA+PROBE: NEGATIVE
SARS-COV-2 RNA RESP QL NAA+PROBE: DETECTED
SPECIMEN SOURCE: ABNORMAL

## 2023-02-10 PROCEDURE — 99284 EMERGENCY DEPT VISIT MOD MDM: CPT

## 2023-02-10 PROCEDURE — 72100 X-RAY EXAM L-S SPINE 2/3 VWS: CPT

## 2023-02-10 PROCEDURE — 0241U HCHG SARS-COV-2 COVID-19 NFCT DS RESP RNA 4 TRGT MIC: CPT

## 2023-02-10 PROCEDURE — 71045 X-RAY EXAM CHEST 1 VIEW: CPT

## 2023-02-10 PROCEDURE — C9803 HOPD COVID-19 SPEC COLLECT: HCPCS | Performed by: EMERGENCY MEDICINE

## 2023-02-10 PROCEDURE — 700101 HCHG RX REV CODE 250: Performed by: EMERGENCY MEDICINE

## 2023-02-10 RX ORDER — LIDOCAINE 50 MG/G
1 PATCH TOPICAL ONCE
Status: DISCONTINUED | OUTPATIENT
Start: 2023-02-10 | End: 2023-02-10 | Stop reason: HOSPADM

## 2023-02-10 RX ORDER — LIDOCAINE 50 MG/G
1 PATCH TOPICAL EVERY 24 HOURS
Qty: 15 PATCH | Refills: 0 | Status: SHIPPED | OUTPATIENT
Start: 2023-02-10

## 2023-02-10 RX ADMIN — LIDOCAINE 1 PATCH: 50 PATCH TOPICAL at 20:49

## 2023-02-10 ASSESSMENT — PAIN DESCRIPTION - PAIN TYPE: TYPE: ACUTE PAIN

## 2023-02-10 ASSESSMENT — FIBROSIS 4 INDEX: FIB4 SCORE: 1.46

## 2023-02-10 NOTE — ED NOTES
Patient from [facility] to [facility] 70 ambulatory with slow steady gait. Chart up for ERP.

## 2023-02-10 NOTE — ED TRIAGE NOTES
"Chief Complaint   Patient presents with    Hip Pain     Started 1 week ago after waking up. Denies trauma or falling. Pt has been taking ibuprofen w/ little relief. Denies numbness or tingling. Pt ambulatory to triage.    Cough     Productive cough started 2 days ago. Sputum is yellow. Denies SOB or CP.     /69   Pulse (!) 113   Temp 36.2 °C (97.1 °F) (Temporal)   Resp 16   Ht 1.575 m (5' 2\")   Wt 74.4 kg (164 lb 0.4 oz)   SpO2 92%   BMI 30.00 kg/m²     "

## 2023-02-11 NOTE — ED PROVIDER NOTES
"ED Provider Note    CHIEF COMPLAINT  Chief Complaint   Patient presents with    Hip Pain     Started 1 week ago after waking up. Denies trauma or falling. Pt has been taking ibuprofen w/ little relief. Denies numbness or tingling. Pt ambulatory to triage.    Cough     Productive cough started 2 days ago. Sputum is yellow. Denies SOB or CP.       HPI  Serenity Howe is a 72 y.o. female who presents for evaluation of pain for the last 10 days or so in her left low back over the pelvic crest extending into the buttock and left lower extremity toward the posterior.  She notes no numbness or tingling to the extremity and has had no recent trauma.  She notes she has been told she has sciatic nerve pain in the past.  She notes no chills but feels she was \"hot\" last night and thinks she may have had a fever related to a cough which started 2 days ago.  She notes some nasal congestion and mild sore throat.  She has been eating and drinking well and has had no shortness of breath or chest pain.  External records reviewed-care everywhere, 2 recent ED visits this year to the emergency department, multiple visits to ED and orthopedics for various joint issues including left lower extremity pain  Limitation to history-none-none  Outside historians-none    REVIEW OF SYSTEMS  Constitutional: Subjective fevers and chills  Skin: No rashes  HEENT: Throat with mild runny nose noted.  Nasal congestion noted.  Neck: No neck pain, stiffness, or masses.  Chest: No pain or rashes  Pulm: Cough noted.  No shortness of breath, wheezing, stridor, or pain with inspiration/expiration  Gastrointestinal: No nausea, vomiting, diarrhea, or abdominal pain.  Genitourinary: No dysuria or hematuria  Musculoskeletal: Patient notes pain to the left hip however she points to her left low back.  Neurologic: No sensory or motor changes to either lower extremity. No confusion or disorientation.  Heme: No bleeding or bruising problems.   Immuno: No hx of " recurrent infections    PAST FAM HISTORY  Family History   Problem Relation Age of Onset    Hyperlipidemia Mother     No Known Problems Father     No Known Problems Sister     No Known Problems Brother     No Known Problems Daughter     No Known Problems Daughter     No Known Problems Son        PAST MEDICAL HISTORY   has a past medical history of ASTHMA, Cancer (HCC), History of breast cancer (7/29/2015), Hypertension, Shoulder pain, right (7/29/2015), and Tremor (7/29/2015).    SOCIAL HISTORY  Social History     Tobacco Use    Smoking status: Never    Smokeless tobacco: Never   Vaping Use    Vaping Use: Never used   Substance and Sexual Activity    Alcohol use: No    Drug use: No    Sexual activity: Never     Partners: Male       SURGICAL HISTORY   has a past surgical history that includes mastectomy and gyn surgery.    CURRENT MEDICATIONS  Home Medications       Reviewed by Everett Abdullahi R.N. (Registered Nurse) on 02/10/23 at 1516  Med List Status: Partial     Medication Last Dose Status   acetaminophen (TYLENOL) 325 MG Tab  Active   albuterol (PROVENTIL) 2.5mg/3ml Nebu Soln solution for nebulization  Active   albuterol 108 (90 Base) MCG/ACT Aero Soln inhalation aerosol  Active   aspirin (ASA) 81 MG CHEW  Active   benzonatate (TESSALON) 100 MG Cap  Active   Calcium Carb-Cholecalciferol (CALTRATE 600+D3) 600-800 MG-UNIT Tab  Active   colchicine (COLCRYS) 0.6 MG Tab  Active   Dextromethorphan-guaiFENesin (ROBITUSSIN COUGH+CHEST PAULO DM) 5-100 MG/5ML Liquid  Active   fluticasone-salmeterol (ADVAIR) 250-50 MCG/ACT AEROSOL POWDER, BREATH ACTIVATED  Active   methylPREDNISolone (MEDROL DOSEPAK) 4 MG Tablet Therapy Pack  Active   methylPREDNISolone (MEDROL) 4 MG Tab  Active   potassium chloride ER (KLOR-CON) 10 MEQ tablet  Active   valsartan-hydrochlorothiazide (DIOVAN-HCT) 80-12.5 MG per tablet  Active   verapamil ER (CALAN SR) 120 MG CAPSULE SR 24 HR  Active                     ALLERGIES  Allergies   Allergen  "Reactions    Shellfish Allergy Hives       PHYSICAL EXAM  VITAL SIGNS: BP (!) 147/88   Pulse (!) 118   Temp 36.3 °C (97.4 °F) (Temporal)   Resp (!) 22   Ht 1.575 m (5' 2\")   Wt 74.4 kg (164 lb 0.4 oz)   SpO2 94%   BMI 30.00 kg/m²    Gen: Appears tired, otherwise no apparent distress  HEENT: No signs of trauma, Bilateral external ears normal, Nose normal. Conjunctiva normal, Non-icteric.  Mucous membranes moist  Neck:  No tenderness, Supple, No masses  Lymphatic: No cervical lymphadenopathy noted.   Cardiovascular: Tachycardia with regular rhythm, no murmurs.  Capillary refill less than 3 seconds to all extremities, 2+ distal pulses.  Thorax & Lungs: Normal breath sounds, No respiratory distress, No wheezing bilateral chest rise  Abdomen: Bowel sounds normal, Soft, No tenderness, No masses, No pulsatile masses. No Guarding or rebound  Skin: Warm, Dry, No erythema, No rash noted to exposed areas.   Back: No bony tenderness, no spinous process tenderness from base of occiput to sacrum.  Patient has mild tenderness to palpation to the left posterior pelvic brim extending into the left buttock and the left superior hamstring region.  Extremities: Intact distal pulses, No edema.  Patient has increased pain in the left low back with range of motion of the hip especially in flexion.  She is able to lift the entire extremity off the bed and appears to have strength equal to the right side.  Sensation is intact to light touch to both lower extremities and is equal.  There is no tenderness to the anterior thigh, knee, calf, or ankle on the left.  Neurologic: Alert , no facial droop, grossly normal coordination and strength to both lower extremities  Psychiatric: Affect pleasant      LABS  Results for orders placed or performed during the hospital encounter of 02/10/23   COV-2, FLU A/B, AND RSV BY PCR (2-4 HOURS CEPHEID): Collect NP swab in VTM    Specimen: Mid-Turbinate; Respirate   Result Value Ref Range    Influenza " virus A RNA Negative Negative    Influenza virus B, PCR Negative Negative    RSV, PCR Negative Negative    SARS-CoV-2 by PCR DETECTED (AA)     SARS-CoV-2 Source NP Swab      I have independently interpreted this EKG    RADIOLOGY  DX-CHEST-PORTABLE (1 VIEW)   Final Result      1.  There is no acute cardiopulmonary process.   2.  Lungs are again hyperinflated.      DX-LUMBAR SPINE-2 OR 3 VIEWS   Final Result      1.  No acute fracture or malalignment of the lumbar spine.   2.  Stable grade 1 degenerative anterolisthesis at the L4-5 level.        I have independently interpreted the diagnostic imaging associated with this visit and am waiting the final reading from the radiologist.   My preliminary interpretation is a follows: No current fractures or dislocations noted.      COURSE & MEDICAL DECISION MAKING  Pertinent Labs & Imaging studies reviewed. (See chart for details)  7:06 PM  Patient reevaluated bedside and noted to be nontoxic and nondistressed appearing.  She states understanding that she has tested positive for COVID.  She is not in any respiratory distress and I feel she should be able to treat these symptoms conservatively at home with over-the-counter medications.  She stated understanding that she needs to keep hydrated and return if her symptoms worsen or change in any way in terms of her cough and upper respiratory symptoms.  At this point I do not suspect antibiotics will benefit the patient or .  It is notable that the patient is mildly tachycardic throughout her stay but I feel this is very likely related to the COVID and not to impending severe sepsis.  She is not requiring supplemental oxygen and remained stable throughout her ED stay.  I do not find any evidence to suggest a joint infection as the cause of her left low back pain and I do not suspect epidural abscess, discitis, or osteomyelitis.  I do not feel there is any role for advanced imaging at this point and I feel she can  safely treat what I suspect is sciatica symptomatically at home.  With this, again, she will need to follow-up with her primary care physician as this is likely to be an ongoing problem.  She stated understanding of this as well as the return instructions.    ED observation? No      I have discussed management of the patient with the following physicians and JOSSELIN's: None    Escalation of care considered, and ultimately not performed:IV fluids, blood analysis, and acute inpatient care management, however at this time, the patient is most appropriate for outpatient management    Barriers to care at this time, including but not limited to: None.     Decision tools and Rx drugs considered including, but not limited to none    Discussion of management with other QHP or appropriate source(s): None   The patient will return for worsening symptoms and is stable at the time of discharge. The patient verbalizes understanding and will comply.    FINAL IMPRESSION  1. COVID-19    2. Lumbar back pain with radiculopathy affecting left lower extremity        Electronically signed by: Mario Portillo M.D., 2/10/2023 4:37 PM

## 2023-03-20 ENCOUNTER — APPOINTMENT (OUTPATIENT)
Dept: URGENT CARE | Facility: PHYSICIAN GROUP | Age: 73
End: 2023-03-20
Payer: MEDICARE

## 2023-05-17 ENCOUNTER — HOSPITAL ENCOUNTER (EMERGENCY)
Facility: MEDICAL CENTER | Age: 73
End: 2023-05-17
Attending: EMERGENCY MEDICINE
Payer: COMMERCIAL

## 2023-05-17 VITALS
WEIGHT: 156.75 LBS | SYSTOLIC BLOOD PRESSURE: 136 MMHG | RESPIRATION RATE: 16 BRPM | HEART RATE: 93 BPM | OXYGEN SATURATION: 96 % | TEMPERATURE: 98.6 F | BODY MASS INDEX: 28.67 KG/M2 | DIASTOLIC BLOOD PRESSURE: 84 MMHG

## 2023-05-17 DIAGNOSIS — M62.830 MUSCLE SPASM OF BACK: ICD-10-CM

## 2023-05-17 DIAGNOSIS — M54.6 ACUTE LEFT-SIDED THORACIC BACK PAIN: ICD-10-CM

## 2023-05-17 PROCEDURE — 700111 HCHG RX REV CODE 636 W/ 250 OVERRIDE (IP): Mod: UD | Performed by: EMERGENCY MEDICINE

## 2023-05-17 PROCEDURE — A9270 NON-COVERED ITEM OR SERVICE: HCPCS | Mod: UD | Performed by: EMERGENCY MEDICINE

## 2023-05-17 PROCEDURE — 700102 HCHG RX REV CODE 250 W/ 637 OVERRIDE(OP): Mod: UD | Performed by: EMERGENCY MEDICINE

## 2023-05-17 PROCEDURE — 99284 EMERGENCY DEPT VISIT MOD MDM: CPT

## 2023-05-17 RX ORDER — CYCLOBENZAPRINE HCL 5 MG
5 TABLET ORAL 3 TIMES DAILY PRN
Qty: 30 TABLET | Refills: 0 | Status: SHIPPED | OUTPATIENT
Start: 2023-05-17

## 2023-05-17 RX ORDER — HYDROCODONE BITARTRATE AND ACETAMINOPHEN 5; 325 MG/1; MG/1
1 TABLET ORAL EVERY 6 HOURS PRN
Qty: 12 TABLET | Refills: 0 | Status: SHIPPED | OUTPATIENT
Start: 2023-05-17 | End: 2023-05-22

## 2023-05-17 RX ORDER — ONDANSETRON 4 MG/1
4 TABLET, ORALLY DISINTEGRATING ORAL ONCE
Status: COMPLETED | OUTPATIENT
Start: 2023-05-17 | End: 2023-05-17

## 2023-05-17 RX ORDER — HYDROCODONE BITARTRATE AND ACETAMINOPHEN 5; 325 MG/1; MG/1
1 TABLET ORAL ONCE
Status: COMPLETED | OUTPATIENT
Start: 2023-05-17 | End: 2023-05-17

## 2023-05-17 RX ADMIN — HYDROCODONE BITARTRATE AND ACETAMINOPHEN 1 TABLET: 5; 325 TABLET ORAL at 15:13

## 2023-05-17 RX ADMIN — ONDANSETRON 4 MG: 4 TABLET, ORALLY DISINTEGRATING ORAL at 15:13

## 2023-05-17 ASSESSMENT — FIBROSIS 4 INDEX: FIB4 SCORE: 1.46

## 2023-05-17 NOTE — DISCHARGE INSTRUCTIONS
Ice to your back 20 minutes on, 20 minutes off as often as possible.  You can alternate this with heat.  Massage may also help.  No drinking or driving with the Norco or Flexeril.  Get up slowly while taking these medications as they can make you feel off balance at times.  Use them when you are having severe pain and cannot sleep.  Return the emergency department if you have fever, weakness in your legs, loss of bowel or bladder function or increasing pain.

## 2023-05-17 NOTE — ED PROVIDER NOTES
ER Provider Note    Scribed for Xander Garcia M.d. by Daphne Saldana. 2023  2:35 PM    Primary Care Provider: BRITNEY Davis    CHIEF COMPLAINT  Chief Complaint   Patient presents with    Back Pain     Sx x 3 days.  Denies injury or trauma.  States pain is worse going from siting to standing positions.  ambulatory     EXTERNAL RECORDS REVIEWED  Outpatient Notes   Seen .. X-Rays showed L5 S1 retrolisthesis, no fractures, thought it was sciatica. At end of February she saw orthopedics. Hip pain from lower back was treated with  meloxicam.     HPI/ROS  LIMITATION TO HISTORY   Select: : None  OUTSIDE HISTORIAN(S):  None.     Serenity Howe is a 72 y.o. female who presents to the ED complaining of back pain onset three days ago. The pain is localized to her middle back but radiates upward but not down her leg. She denies falling, heaving lifting object or twisting that may have caused the back pain. She denies abdominal pain, fever or chills or pain with urination. She says she has taken tylenol and ibuprofen but they did little to alleviate her pain. She states she has a hard time getting out of bed as the pain is exacerbated by movement. She denies using a walker at home. No known allergies.     PAST MEDICAL HISTORY  Past Medical History:   Diagnosis Date    ASTHMA     Cancer (HCC)     History of breast cancer 2015    Hypertension     Shoulder pain, right 2015    Tremor 2015       SURGICAL HISTORY  Past Surgical History:   Procedure Laterality Date    GYN SURGERY       x1    MASTECTOMY      right        FAMILY HISTORY  Family History   Problem Relation Age of Onset    Hyperlipidemia Mother     No Known Problems Father     No Known Problems Sister     No Known Problems Brother     No Known Problems Daughter     No Known Problems Daughter     No Known Problems Son        SOCIAL HISTORY   reports that she has never smoked. She has never used  smokeless tobacco. She reports that she does not drink alcohol and does not use drugs.    CURRENT MEDICATIONS  Previous Medications    ACETAMINOPHEN (TYLENOL) 325 MG TAB    Take 2 Tablets by mouth every 6 hours as needed for Mild Pain.    ALBUTEROL (PROVENTIL) 2.5MG/3ML NEBU SOLN SOLUTION FOR NEBULIZATION    Take 3 mL by nebulization every four hours as needed for Shortness of Breath.    ALBUTEROL 108 (90 BASE) MCG/ACT AERO SOLN INHALATION AEROSOL    Inhale 2 Puffs every 6 hours as needed for Shortness of Breath.    ASPIRIN (ASA) 81 MG CHEW    Chew 1 Tablet every day.    BENZONATATE (TESSALON) 100 MG CAP    Take 1 Capsule by mouth 3 times a day as needed for Cough.    CALCIUM CARB-CHOLECALCIFEROL (CALTRATE 600+D3) 600-800 MG-UNIT TAB    Take 1 Tablet by mouth every day.    COLCHICINE (COLCRYS) 0.6 MG TAB    Take 2 Tablets by mouth every day.    DEXTROMETHORPHAN-GUAIFENESIN (ROBITUSSIN COUGH+CHEST PAULO DM) 5-100 MG/5ML LIQUID    Take 20 mL by mouth every 4 hours as needed for cough    FLUTICASONE-SALMETEROL (ADVAIR) 250-50 MCG/ACT AEROSOL POWDER, BREATH ACTIVATED    Inhale 1 Puff every 12 hours.    LIDOCAINE (LIDODERM) 5 % PATCH    Place 1 Patch on the skin every 24 hours.    MELOXICAM (MOBIC) 15 MG TABLET    Take 1 Tablet by mouth every day.    METHYLPREDNISOLONE (MEDROL DOSEPAK) 4 MG TABLET THERAPY PACK    Use as directed    METHYLPREDNISOLONE (MEDROL) 4 MG TAB    Take as per the package instructions.    POTASSIUM CHLORIDE ER (KLOR-CON) 10 MEQ TABLET    Take 1 Tablet by mouth 2 times a day.    VALSARTAN-HYDROCHLOROTHIAZIDE (DIOVAN-HCT) 80-12.5 MG PER TABLET    TAKE 1 TABLET BY MOUTH EVERY DAY    VERAPAMIL ER (CALAN SR) 120 MG CAPSULE SR 24 HR    TAKE 1 CAPSULE BY MOUTH EVERY DAY       ALLERGIES  Shellfish allergy    PHYSICAL EXAM  /69   Pulse (!) 108   Temp 36.3 °C (97.3 °F) (Temporal)   Resp 18   Wt 71.1 kg (156 lb 12 oz)   SpO2 92%   BMI 28.67 kg/m²     Constitutional: Well developed, Well nourished,  mild distress.  HENT: Normocephalic, Atraumatic.  Eyes: PERRL, EOMI, Conjunctiva normal, No discharge.  Neck: Normal range of motion, No vertebral point tenderness  Cardiovascular: Normal heart rate, Normal rhythm, No murmurs, No rubs, No gallops.   Thorax & Lungs: Normal breath sounds, No respiratory distress, No wheezing, rales or rhonchi, No chest tenderness.   Abdomen: Soft, No tenderness, No masses.  Back: no vertebral point tenderness, paraspinal muscle tenderness and spasm on the left mid thoracic around t8-t10, no CVA tenderness  Musculoskeletal: Good range of motion in all major joints. No tenderness to palpation or major deformities noted. 5/5 strength in extremities.  Skin: Warm, Dry, No erythema, No rash.    Neurologic: Alert & oriented, Normal motor function,  No focal deficits noted.     COURSE & MEDICAL DECISION MAKING     ED Observation Status? No; Patient does not meet criteria for ED Observation.     INITIAL ASSESSMENT, COURSE AND PLAN  Care Narrative:     2:35 PM - Patient seen and examined at bedside. Discussed plan of care, including pain medication. I confirmed that the patient was not driving home. I advised the patient also use ice or heat for the pain. Patient agrees to the plan of care. Differentials include but not limited to muscle spasm, thoracic back pain. I discussed plan for discharge and follow up as outlined below. The patient is stable for discharge at this time and will return for any new or worsening symptoms. Patient verbalizes understanding and support with my plan for discharge.       PROBLEM LIST  Problem #1 mid thoracic back pain.  At this point time I think this is secondary to muscle spasm.  Patient has no vertebral point tenderness, no fever, I do not think the the patient has a discitis or epidural abscess.  She has equal strength in her legs, no loss of bowel or bladder function and no lower back pain I do not think this is cauda equina.  At this point time patient  denies any urinary symptoms and has no CVA tenderness I do not think this is a urinary tract infection.  Her pain seems to be very reproducible with palpation over this muscle spasm.  We will go ahead and treat her with Flexeril.  For extreme pain I will give her Norco to help her sleep at night since over-the-counter medications have not been beneficial.    DISPOSITION AND DISCUSSIONS  I have discussed management of the patient with the following physicians and JOSSELIN's:  None     Discussion of management with other QHP or appropriate source(s): None     Escalation of care considered, and ultimately not performed: diagnostic imaging.  Pain is only been going on for 3 days and she has no trauma I do not think imaging is warranted.        Decision tools and prescription drugs considered including, but not limited to: Pain Medications we will start the patient on Norco.  We will give her Flexeril to help with muscle spasm .    Patient will be discharged home.    In prescribing controlled substances to this patient, I certify that I have obtained and reviewed the medical history of Serenity Howe. I have also made a good rosario effort to obtain applicable records from other providers who have treated the patient and records did not demonstrate any increased risk of substance abuse that would prevent me from prescribing controlled substances.     I have conducted a physical exam and documented it. I have reviewed Ms. Howe’s prescription history as maintained by the [place] Prescription Monitoring Program.     I have assessed the patient’s risk for abuse, dependency, and addiction using the validated Opioid Risk Tool available at https://www.mdcalc.com/xbemwy-blya-cqct-ort-narcotic-abuse.     Given the above, I believe the benefits of controlled substance therapy outweigh the risks. The reasons for prescribing controlled substances include non-narcotic, oral analgesic alternatives have been inadequate for pain  control. Accordingly, I have discussed the risk and benefits, treatment plan, and alternative therapies with the patient.      FOLLOW UP:  MILTON DavisRKarinN.  [address] [id number] [phone]    Schedule an appointment as soon as possible for a visit in 1 week        OUTPATIENT MEDICATIONS:  New Prescriptions    CYCLOBENZAPRINE (FLEXERIL) 5 MG TABLET    Take 1 Tablet by mouth 3 times a day as needed for Muscle Spasms or Moderate Pain.    HYDROCODONE-ACETAMINOPHEN (NORCO) 5-325 MG TAB PER TABLET    Take 1 Tablet by mouth every 6 hours as needed (severe pain) for up to 5 days.        FINAL DIANGOSIS  1. Muscle spasm of back    2. Acute left-sided thoracic back pain          Daphne ZIMMERMAN ([place]), am scribing for, and in the presence of, MICHELLE Merritt*.    Electronically signed by: Daphne Saldana ([place]), 5/17/2023    IXander M.* personally performed the services described in this documentation, as scribed by Daphne Saldana in my presence, and it is both accurate and complete.     The note accurately reflects work and decisions made by me.  Xander Garcia M.D.  5/17/2023  4:54 PM

## 2023-05-17 NOTE — ED TRIAGE NOTES
Chief Complaint   Patient presents with    Back Pain     Sx x 3 days.  Denies injury or trauma.  States pain is worse going from siting to standing positions.  ambulatory

## 2023-06-01 ENCOUNTER — APPOINTMENT (OUTPATIENT)
Dept: RADIOLOGY | Facility: MEDICAL CENTER | Age: 73
End: 2023-06-01
Attending: EMERGENCY MEDICINE
Payer: MEDICARE

## 2023-06-01 ENCOUNTER — HOSPITAL ENCOUNTER (EMERGENCY)
Facility: MEDICAL CENTER | Age: 73
End: 2023-06-01
Attending: EMERGENCY MEDICINE
Payer: MEDICARE

## 2023-06-01 VITALS
OXYGEN SATURATION: 96 % | RESPIRATION RATE: 16 BRPM | HEART RATE: 92 BPM | SYSTOLIC BLOOD PRESSURE: 152 MMHG | TEMPERATURE: 96.7 F | WEIGHT: 156.75 LBS | HEIGHT: 62 IN | DIASTOLIC BLOOD PRESSURE: 76 MMHG | BODY MASS INDEX: 28.84 KG/M2

## 2023-06-01 DIAGNOSIS — M17.12 PRIMARY OSTEOARTHRITIS OF LEFT KNEE: ICD-10-CM

## 2023-06-01 DIAGNOSIS — M19.072 PRIMARY OSTEOARTHRITIS OF LEFT ANKLE: ICD-10-CM

## 2023-06-01 PROCEDURE — 99284 EMERGENCY DEPT VISIT MOD MDM: CPT

## 2023-06-01 PROCEDURE — 700111 HCHG RX REV CODE 636 W/ 250 OVERRIDE (IP): Performed by: EMERGENCY MEDICINE

## 2023-06-01 PROCEDURE — 73562 X-RAY EXAM OF KNEE 3: CPT | Mod: LT

## 2023-06-01 PROCEDURE — 96372 THER/PROPH/DIAG INJ SC/IM: CPT

## 2023-06-01 PROCEDURE — 73610 X-RAY EXAM OF ANKLE: CPT | Mod: LT

## 2023-06-01 RX ORDER — NAPROXEN 500 MG/1
500 TABLET ORAL 2 TIMES DAILY WITH MEALS
Qty: 14 TABLET | Refills: 0 | Status: SHIPPED | OUTPATIENT
Start: 2023-06-01 | End: 2023-06-08

## 2023-06-01 RX ORDER — KETOROLAC TROMETHAMINE 30 MG/ML
30 INJECTION, SOLUTION INTRAMUSCULAR; INTRAVENOUS ONCE
Status: COMPLETED | OUTPATIENT
Start: 2023-06-01 | End: 2023-06-01

## 2023-06-01 RX ADMIN — KETOROLAC TROMETHAMINE 30 MG: 30 INJECTION, SOLUTION INTRAMUSCULAR; INTRAVENOUS at 06:20

## 2023-06-01 ASSESSMENT — FIBROSIS 4 INDEX: FIB4 SCORE: 1.48

## 2023-06-01 ASSESSMENT — PAIN DESCRIPTION - DESCRIPTORS: DESCRIPTORS: ACHING;THROBBING

## 2023-06-01 NOTE — ED NOTES
Patient brought in from lobby to assigned room via wheelchair. Patient put on monitor (blood pressure and pulse ox) and placed in position of comfort. Call light placed within reach of patient.

## 2023-06-01 NOTE — ED TRIAGE NOTES
"Chief Complaint   Patient presents with    Foot Pain     Primarily L foot pain started x3 days ago. Pt denies any injury/trauma. Pt called EMS bc she cant sleep d/t pain, took ibuprofen last night w/out relief. Pt does have hx of gout      BP (!) 174/100 Comment: left arm  Pulse 100   Temp 36.8 °C (98.2 °F) (Temporal)   Resp 18   Ht 1.575 m (5' 2\")   Wt 71.1 kg (156 lb 12 oz)   SpO2 96%   BMI 28.67 kg/m²     Pt BIBA to triage from home for above cc  Pt lives w/ son and daughter in law at home who supposedly reported to EMS she has gout w/ flare ups as she does not change her diet to help it  Pt denies any injury/trauma. Tried OTC w/out relief, refused pain meds from EMS  Mild swelling noted to LLE/ankle area    Pt to lobby by WC, educate on rooming process  "

## 2023-06-01 NOTE — ED PROVIDER NOTES
ED Provider Note    CHIEF COMPLAINT  Chief Complaint   Patient presents with    Foot Pain     Primarily L foot pain started x3 days ago. Pt denies any injury/trauma. Pt called EMS bc she cant sleep d/t pain, took ibuprofen last night w/out relief. Pt does have hx of gout        EXTERNAL RECORDS REVIEWED  Outpatient Notes      HPI/ROS  LIMITATION TO HISTORY   Select: : None  OUTSIDE HISTORIAN(S):      Serenity Howe is a 73 y.o. female who presents complaint of left knee and left ankle pain.  Patient reports that she has had worsening pain over the last few days and had difficulty ambulating.  She reports no trauma.  She reports history of gouty arthritis.  She states that she was supposed to be on a medication recently and that she has put request into orthopedics without being able to get a refill.  No fevers no chills no redness warmth swelling no other acute symptom change or concern applying is moderate worse with ambulation no alleviating factors.    PAST MEDICAL HISTORY   has a past medical history of ASTHMA, Cancer (HCC), Gout, History of breast cancer (07/29/2015), Hypertension, Shoulder pain, right (07/29/2015), and Tremor (07/29/2015).    SURGICAL HISTORY   has a past surgical history that includes mastectomy and gyn surgery.    FAMILY HISTORY  Family History   Problem Relation Age of Onset    Hyperlipidemia Mother     No Known Problems Father     No Known Problems Sister     No Known Problems Brother     No Known Problems Daughter     No Known Problems Daughter     No Known Problems Son        SOCIAL HISTORY  Social History     Tobacco Use    Smoking status: Never    Smokeless tobacco: Never   Vaping Use    Vaping Use: Never used   Substance and Sexual Activity    Alcohol use: No    Drug use: No    Sexual activity: Never     Partners: Male       CURRENT MEDICATIONS  Home Medications       Reviewed by Danay Mathis R.N. (Registered Nurse) on 06/01/23 at 0451  Med List Status: Partial  "    Medication Last Dose Status   acetaminophen (TYLENOL) 325 MG Tab  Active   albuterol (PROVENTIL) 2.5mg/3ml Nebu Soln solution for nebulization  Active   albuterol 108 (90 Base) MCG/ACT Aero Soln inhalation aerosol  Active   aspirin (ASA) 81 MG CHEW  Active   benzonatate (TESSALON) 100 MG Cap  Active   Calcium Carb-Cholecalciferol (CALTRATE 600+D3) 600-800 MG-UNIT Tab  Active   colchicine (COLCRYS) 0.6 MG Tab  Active   cyclobenzaprine (FLEXERIL) 5 mg tablet  Active   Dextromethorphan-guaiFENesin (ROBITUSSIN COUGH+CHEST PAULO DM) 5-100 MG/5ML Liquid  Active   fluticasone-salmeterol (ADVAIR) 250-50 MCG/ACT AEROSOL POWDER, BREATH ACTIVATED  Active   lidocaine (LIDODERM) 5 % Patch  Active   meloxicam (MOBIC) 15 MG tablet  Active   methylPREDNISolone (MEDROL DOSEPAK) 4 MG Tablet Therapy Pack  Active   methylPREDNISolone (MEDROL) 4 MG Tab  Active   potassium chloride ER (KLOR-CON) 10 MEQ tablet  Active   valsartan-hydrochlorothiazide (DIOVAN-HCT) 80-12.5 MG per tablet  Active   verapamil ER (CALAN SR) 120 MG CAPSULE SR 24 HR  Active                    ALLERGIES  Allergies   Allergen Reactions    Shellfish Allergy Hives       PHYSICAL EXAM  VITAL SIGNS: BP (!) 174/100 Comment: left arm  Pulse 100   Temp 36.8 °C (98.2 °F) (Temporal)   Resp 18   Ht 1.575 m (5' 2\")   Wt 71.1 kg (156 lb 12 oz)   SpO2 96%   BMI 28.67 kg/m²    Pulse ox interpretation: I interpret this pulse ox as normal.  Constitutional: Alert and oriented x 3, minimal Distress  HEENT: Atraumatic normocephalic, pupils are equal round reactive to light extraocular movements are intact. The nares is clear, external ears are normal, mouth shows moist mucous membranes normal dentition for age  Neck: Supple, no JVD no tracheal deviation  Cardiovascular: Regular rate and rhythm   Thorax & Lungs: No respiratory distress  GI: Soft nontender nondistended positive bowel sounds, no peritoneal signs  Skin: Warm dry no acute rash or lesion  Musculoskeletal: " Bilateral upper and right lower extremity unremarkable in the left lower extremity no pain about the hip patient has full range and strength of the knee however has pain with range no tenderness to palpation in the ankle she has normal dorsiflexion normal plantarflexion tenderness to the medial malleolus she does have pain with range of motion she has normal cap refill and sensation in all toes.  Both the knee and the ankle demonstrate no overlying erythema warmth or induration.  Neurologic: Cranial nerves III through XII are grossly intact no sensory deficit no cerebellar dysfunction   Psychiatric: Appropriate affect for situation at this time           DIAGNOSTIC STUDIES / PROCEDURES      RADIOLOGY  I have independently interpreted the diagnostic imaging associated with this visit and am waiting the final reading from the radiologist.   My preliminary interpretation is as follows: 6:36 AM  Both the left knee and ankle films demonstrate severe osteoarthrosis without acute fracture subluxation or dislocation.      Radiologist interpretation: Pending at time of dictation    COURSE & MEDICAL DECISION MAKING    ED Observation Status? No; Patient does not meet criteria for ED Observation.     INITIAL ASSESSMENT, COURSE AND PLAN  Care Narrative: Patient with severe osteoarthritis in her left knee and left ankle.  Patient's been previously referred to orthopedic surgery for possible knee replacement but she has deferred this treatment.  Patient's had several rounds of methylprednisolone Dosepaks recently she was also taking Mobic at 1 point.  She has been taking ibuprofen over the last couple days.  Patient request hydrocodone by name.  I discussed with patient that I do not think this is an appropriate use of narcotics at this time with this recurrent chronic problem.  I have also discussed with her the importance of adhering to 1 nonsteroidal anti-inflammatory.  Patient be placed on naproxen for the next week she  "understands not to take any Mobic ibuprofen Aleve Advil aspirin.  Patient also has history of possible gout.  She is given instructions on gout diet to adhere to.  Patient is given instructions to return here for worsening pain numbness tingling weakness redness warmth drainage any other acute symptom change or concern she is otherwise discharged in stable and improved condition.     History of chronic hypertension followed by primary care    ADDITIONAL PROBLEM LIST    DISPOSITION AND DISCUSSIONS    I have discussed management of the patient with the following physicians and JOSSELIN's:      Discussion of management with other QHP or appropriate source(s): None     Escalation of care considered, and ultimately not performed:    Barriers to care at this time, including but not limited to: .     Decision tools and prescription drugs considered including, but not limited to: .  BP (!) 174/100 Comment: left arm  Pulse 100   Temp 36.8 °C (98.2 °F) (Temporal)   Resp 18   Ht 1.575 m (5' 2\")   Wt 71.1 kg (156 lb 12 oz)   SpO2 96%   BMI 28.67 kg/m²     Augie Contrears M.D.  [address] [facility] [address]  [phone]    Schedule an appointment as soon as possible for a visit       [facility], Emergency Dept  [address] [facility] [address] [phone]    in 12-24 hours if symptoms persist, immediately If symptoms worsen, or if you develop any other symptoms or concerns        FINAL DIAGNOSIS  1. Primary osteoarthritis of left ankle Active   2. Primary osteoarthritis of left knee Active              "

## 2023-06-26 DIAGNOSIS — I10 ESSENTIAL HYPERTENSION: ICD-10-CM

## 2023-06-26 RX ORDER — VERAPAMIL HYDROCHLORIDE 120 MG/1
120 CAPSULE, EXTENDED RELEASE ORAL
Qty: 90 CAPSULE | Refills: 0 | Status: SHIPPED | OUTPATIENT
Start: 2023-06-26

## 2023-06-26 NOTE — TELEPHONE ENCOUNTER
Received request via: Pharmacy    Was the patient seen in the last year in this department? No left voicemail for appt today.    Does the patient have an active prescription (recently filled or refills available) for medication(s) requested? No    Does the patient have prison Plus and need 100 day supply (blood pressure, diabetes and cholesterol meds only)? Patient does not have SCP

## 2023-06-27 ENCOUNTER — HOSPITAL ENCOUNTER (EMERGENCY)
Facility: MEDICAL CENTER | Age: 73
End: 2023-06-27
Attending: EMERGENCY MEDICINE
Payer: COMMERCIAL

## 2023-06-27 ENCOUNTER — APPOINTMENT (OUTPATIENT)
Dept: RADIOLOGY | Facility: MEDICAL CENTER | Age: 73
End: 2023-06-27
Attending: EMERGENCY MEDICINE
Payer: COMMERCIAL

## 2023-06-27 VITALS
DIASTOLIC BLOOD PRESSURE: 82 MMHG | TEMPERATURE: 97.1 F | SYSTOLIC BLOOD PRESSURE: 175 MMHG | BODY MASS INDEX: 29.13 KG/M2 | RESPIRATION RATE: 18 BRPM | HEART RATE: 98 BPM | WEIGHT: 158.29 LBS | HEIGHT: 62 IN | OXYGEN SATURATION: 94 %

## 2023-06-27 DIAGNOSIS — J45.21 MILD INTERMITTENT ASTHMA WITH ACUTE EXACERBATION: ICD-10-CM

## 2023-06-27 DIAGNOSIS — J45.41 MODERATE PERSISTENT ASTHMA WITH ACUTE EXACERBATION: ICD-10-CM

## 2023-06-27 PROCEDURE — 99284 EMERGENCY DEPT VISIT MOD MDM: CPT

## 2023-06-27 PROCEDURE — 700111 HCHG RX REV CODE 636 W/ 250 OVERRIDE (IP): Performed by: EMERGENCY MEDICINE

## 2023-06-27 PROCEDURE — 94640 AIRWAY INHALATION TREATMENT: CPT

## 2023-06-27 PROCEDURE — 700101 HCHG RX REV CODE 250: Performed by: EMERGENCY MEDICINE

## 2023-06-27 PROCEDURE — 71045 X-RAY EXAM CHEST 1 VIEW: CPT

## 2023-06-27 RX ORDER — PREDNISONE 20 MG/1
40 TABLET ORAL DAILY
Qty: 8 TABLET | Refills: 0 | Status: SHIPPED | OUTPATIENT
Start: 2023-06-27 | End: 2023-07-01

## 2023-06-27 RX ORDER — PREDNISONE 20 MG/1
40 TABLET ORAL ONCE
Status: COMPLETED | OUTPATIENT
Start: 2023-06-27 | End: 2023-06-27

## 2023-06-27 RX ORDER — IPRATROPIUM BROMIDE AND ALBUTEROL SULFATE 2.5; .5 MG/3ML; MG/3ML
3 SOLUTION RESPIRATORY (INHALATION) ONCE
Status: COMPLETED | OUTPATIENT
Start: 2023-06-27 | End: 2023-06-27

## 2023-06-27 RX ORDER — ALBUTEROL SULFATE 90 UG/1
2 AEROSOL, METERED RESPIRATORY (INHALATION) EVERY 6 HOURS PRN
Qty: 8.5 G | Refills: 0 | Status: SHIPPED | OUTPATIENT
Start: 2023-06-27

## 2023-06-27 RX ADMIN — PREDNISONE 40 MG: 20 TABLET ORAL at 18:02

## 2023-06-27 RX ADMIN — IPRATROPIUM BROMIDE AND ALBUTEROL SULFATE 3 ML: 2.5; .5 SOLUTION RESPIRATORY (INHALATION) at 17:35

## 2023-06-27 ASSESSMENT — FIBROSIS 4 INDEX: FIB4 SCORE: 1.48

## 2023-06-27 NOTE — ED TRIAGE NOTES
Chief Complaint   Patient presents with    Cough     X 3 days. Productive, yellowish sputum. States has felt feverish but has not taken temperature.      Pt ambulates to triage for above.     Reports hx of asthma. Has been out of her albuterol inhaler. Takes long term asthma medications. Denies any recent sick exposures.

## 2023-06-27 NOTE — TELEPHONE ENCOUNTER
Requested Prescriptions     Signed Prescriptions Disp Refills    verapamil ER (CALAN SR) 120 MG CAPSULE SR 24 HR 90 Capsule 0     Sig: TAKE 1 CAPSULE BY MOUTH EVERY DAY     Authorizing Provider: PARKER ROJAS A.P.R.N.

## 2023-06-28 NOTE — ED PROVIDER NOTES
ED Provider Note    CHIEF COMPLAINT  Chief Complaint   Patient presents with    Cough     X 3 days. Productive, yellowish sputum. States has felt feverish but has not taken temperature.        EXTERNAL RECORDS REVIEWED  Outpatient Notes prior history of ED visits for asthma    HPI/ROS  LIMITATION TO HISTORY   Select: : None  OUTSIDE HISTORIAN(S):       Serenity Howe is a 73 y.o. female who presents with cough for about 3 days.  Occasionally productive with yellow sputum.  Subjective fever at home.  Took acetaminophen this morning.  No fever upon arrival.  Has a history of asthma and has run out of her albuterol inhaler.  No lower extremity swelling.  No chest pain.  No recent sick contacts.  No abdominal pain, nausea, vomiting.    PAST MEDICAL HISTORY   has a past medical history of ASTHMA, Cancer (HCC), Gout, History of breast cancer (07/29/2015), Hypertension, Shoulder pain, right (07/29/2015), and Tremor (07/29/2015).    SURGICAL HISTORY   has a past surgical history that includes mastectomy and gyn surgery.    FAMILY HISTORY  Family History   Problem Relation Age of Onset    Hyperlipidemia Mother     No Known Problems Father     No Known Problems Sister     No Known Problems Brother     No Known Problems Daughter     No Known Problems Daughter     No Known Problems Son        SOCIAL HISTORY  Social History     Tobacco Use    Smoking status: Never    Smokeless tobacco: Never   Vaping Use    Vaping Use: Never used   Substance and Sexual Activity    Alcohol use: No    Drug use: No    Sexual activity: Never     Partners: Male       CURRENT MEDICATIONS  Home Medications       Reviewed by Danielle Reyes R.N. (Registered Nurse) on 06/27/23 at 1621  Med List Status: Partial     Medication Last Dose Status   acetaminophen (TYLENOL) 325 MG Tab  Active   albuterol (PROVENTIL) 2.5mg/3ml Nebu Soln solution for nebulization  Active   albuterol 108 (90 Base) MCG/ACT Aero Soln inhalation aerosol  Active   aspirin (ASA)  "81 MG CHEW  Active   benzonatate (TESSALON) 100 MG Cap  Active   Calcium Carb-Cholecalciferol (CALTRATE 600+D3) 600-800 MG-UNIT Tab  Active   colchicine (COLCRYS) 0.6 MG Tab  Active   cyclobenzaprine (FLEXERIL) 5 mg tablet  Active   Dextromethorphan-guaiFENesin (ROBITUSSIN COUGH+CHEST PAULO DM) 5-100 MG/5ML Liquid  Active   fluticasone-salmeterol (ADVAIR) 250-50 MCG/ACT AEROSOL POWDER, BREATH ACTIVATED  Active   lidocaine (LIDODERM) 5 % Patch  Active   potassium chloride ER (KLOR-CON) 10 MEQ tablet  Active   valsartan-hydrochlorothiazide (DIOVAN-HCT) 80-12.5 MG per tablet  Active   verapamil ER (CALAN SR) 120 MG CAPSULE SR 24 HR  Active                    ALLERGIES  Allergies   Allergen Reactions    Shellfish Allergy Hives       PHYSICAL EXAM  VITAL SIGNS: BP (!) 157/80   Pulse 96   Temp 36.2 °C (97.1 °F) (Temporal)   Resp 18   Ht 1.575 m (5' 2\")   Wt 71.8 kg (158 lb 4.6 oz)   SpO2 92%   BMI 28.95 kg/m²    Constitutional: Alert in no apparent distress.  HENT: No signs of trauma, Bilateral external ears normal, Nose normal.   Neck: Normal range of motion, Supple, No stridor.   Cardiovascular: Regular rate and rhythm.   Thorax & Lungs: Normal breath sounds, No respiratory distress, end expiratory bilaterally wheezing  Abdomen: Soft, No tenderness, No peritoneal signs, No masses.   Skin: Warm, Dry, No erythema, No rash.   Back: No bony tenderness, No CVA tenderness.   Extremities: Intact distal pulses, No edema, No cyanosis  Musculoskeletal: Good range of motion in all major joints. No major deformities noted.   Neurologic: Alert, No focal deficits noted.       DIAGNOSTIC STUDIES / PROCEDURES  \  RADIOLOGY  I have independently interpreted the diagnostic imaging associated with this visit and am waiting the final reading from the radiologist.   My preliminary interpretation is as follows: No pulmonary infiltrates  Radiologist interpretation:   DX-CHEST-PORTABLE (1 VIEW)   Final Result         No acute cardiac or " pulmonary abnormality is identified.          COURSE & MEDICAL DECISION MAKING    ED Observation Status? No; Patient does not meet criteria for ED Observation.     INITIAL ASSESSMENT, COURSE AND PLAN  Care Narrative: 73 y.o. F presenting with cough x 3 days.  No known fevers and normal vitals upon arrival.  Ran out of albuterol and has hx of asthma.  Wheezing upon arrival.  CXR performed.  No signs of pneumonia or pulmonary edema.      Treated with breathing treatment with improvement in wheezing and breathing and cough.  Likely presenting with exacerbation of chronic asthma.  Given refill of albuterol and prednisone prescrition for further treatment.  Reports feeling much improved prior to DC>      HTN/IDDM FOLLOW UP:  The patient is referred to a primary physician for blood pressure management, diabetic screening, and for all other preventive health concerns      ADDITIONAL PROBLEM LIST      DISPOSITION AND DISCUSSIONS  I have discussed management of the patient with the following physicians and JOSSELIN's:      Discussion of management with other QHP or appropriate source(s): None     Escalation of care considered, and ultimately not performed:acute inpatient care management, however at this time, the patient is most appropriate for outpatient management    Barriers to care at this time, including but not limited to:    .     Decision tools and prescription drugs considered including, but not limited to:    .    FINAL DIAGNOSIS  1. Mild intermittent asthma with acute exacerbation    2. Moderate persistent asthma with acute exacerbation           Electronically signed by: Darrel Abdi M.D., 6/27/2023 5:05 PM

## 2023-06-28 NOTE — ED NOTES
Pt d/c from ED a/o x 4 GCS 15 ambulatory without assistance with steady gait but taken out in wheel chair per request. Pt given d/c instructions and verbalized understanding.

## 2023-07-08 ENCOUNTER — APPOINTMENT (OUTPATIENT)
Dept: RADIOLOGY | Facility: MEDICAL CENTER | Age: 73
End: 2023-07-08
Attending: EMERGENCY MEDICINE
Payer: COMMERCIAL

## 2023-07-08 ENCOUNTER — HOSPITAL ENCOUNTER (EMERGENCY)
Facility: MEDICAL CENTER | Age: 73
End: 2023-07-08
Attending: EMERGENCY MEDICINE
Payer: COMMERCIAL

## 2023-07-08 VITALS
OXYGEN SATURATION: 96 % | WEIGHT: 158 LBS | HEIGHT: 62 IN | HEART RATE: 89 BPM | SYSTOLIC BLOOD PRESSURE: 160 MMHG | RESPIRATION RATE: 16 BRPM | BODY MASS INDEX: 29.08 KG/M2 | DIASTOLIC BLOOD PRESSURE: 75 MMHG | TEMPERATURE: 98.1 F

## 2023-07-08 DIAGNOSIS — M43.16 ANTEROLISTHESIS OF LUMBAR SPINE: ICD-10-CM

## 2023-07-08 DIAGNOSIS — M47.816 FACET ARTHRITIS OF LUMBAR REGION: ICD-10-CM

## 2023-07-08 DIAGNOSIS — M54.31 SCIATICA OF RIGHT SIDE: ICD-10-CM

## 2023-07-08 PROCEDURE — 700111 HCHG RX REV CODE 636 W/ 250 OVERRIDE (IP): Mod: JZ | Performed by: EMERGENCY MEDICINE

## 2023-07-08 PROCEDURE — 72170 X-RAY EXAM OF PELVIS: CPT

## 2023-07-08 PROCEDURE — 72100 X-RAY EXAM L-S SPINE 2/3 VWS: CPT

## 2023-07-08 PROCEDURE — 96372 THER/PROPH/DIAG INJ SC/IM: CPT

## 2023-07-08 PROCEDURE — 99284 EMERGENCY DEPT VISIT MOD MDM: CPT

## 2023-07-08 RX ORDER — HYDROCODONE BITARTRATE AND ACETAMINOPHEN 5; 325 MG/1; MG/1
1 TABLET ORAL EVERY 4 HOURS PRN
Qty: 10 TABLET | Refills: 0 | Status: SHIPPED | OUTPATIENT
Start: 2023-07-08 | End: 2023-07-11

## 2023-07-08 RX ORDER — KETOROLAC TROMETHAMINE 30 MG/ML
30 INJECTION, SOLUTION INTRAMUSCULAR; INTRAVENOUS ONCE
Status: COMPLETED | OUTPATIENT
Start: 2023-07-08 | End: 2023-07-08

## 2023-07-08 RX ORDER — METHYLPREDNISOLONE 4 MG/1
TABLET ORAL
Qty: 21 TABLET | Refills: 0 | Status: ON HOLD | OUTPATIENT
Start: 2023-07-08 | End: 2023-12-17

## 2023-07-08 RX ADMIN — KETOROLAC TROMETHAMINE 30 MG: 30 INJECTION, SOLUTION INTRAMUSCULAR; INTRAVENOUS at 08:52

## 2023-07-08 ASSESSMENT — FIBROSIS 4 INDEX: FIB4 SCORE: 1.48

## 2023-07-08 NOTE — ED NOTES
Pt given d/c instructions, f/u info and aware of RX x 2 for  with verbal understanding.  VSS at discharge.  Pt taken from the ED via w/c.  Pt was able to transfer independently w/ sba.  Pt escorted to the lobby by RN.

## 2023-07-08 NOTE — ED PROVIDER NOTES
ED Provider Note    CHIEF COMPLAINT  Chief Complaint   Patient presents with    Hip Pain     C/o right hip / buttock pain x 2 days. Denies trauma. Non radiating. Took tylenol 600 mg last night without relief. States unable sleep due to pain.       LIMITATION TO HISTORY   None    HPI    Serenity Howe is a 73 y.o. female who presents to the Emergency Department 2 days of right buttock and hip pain that is quite severe especially when she moves.  She denies associated weakness numbness fever or injection medication or drug use.  There is no history of trauma.  She has had sciatica once in the past.    OUTSIDE HISTORIAN(S):  None    EXTERNAL RECORDS REVIEWED  Patient clinic note reviewed from February of this year.  On this visit she was evaluated for left hip pain.  He was referred for physical therapy and treated with meloxicam.    REVIEW OF SYSTEMS  Pertinent positives include: Right buttock and hip pain.  Pertinent negatives include: Back pain, trauma, weakness, bowel or bladder issue.      PAST MEDICAL HISTORY  Past Medical History:   Diagnosis Date    ASTHMA     Cancer (HCC)     Gout     History of breast cancer 2015    Hypertension     Shoulder pain, right 2015    Tremor 2015       FAMILY HISTORY  Family History   Problem Relation Age of Onset    Hyperlipidemia Mother     No Known Problems Father     No Known Problems Sister     No Known Problems Brother     No Known Problems Daughter     No Known Problems Daughter     No Known Problems Son        SOCIAL HISTORY  Social History     Tobacco Use    Smoking status: Never    Smokeless tobacco: Never   Vaping Use    Vaping Use: Never used   Substance Use Topics    Alcohol use: No    Drug use: No     Social History     Substance and Sexual Activity   Drug Use No       SURGICAL HISTORY  Past Surgical History:   Procedure Laterality Date    GYN SURGERY       x1    MASTECTOMY      right        CURRENT MEDICATIONS    Current  Facility-Administered Medications:     ketorolac (Toradol) injection 30 mg, 30 mg, Intramuscular, Once, George Steven M.D.    Current Outpatient Medications:     albuterol 108 (90 Base) MCG/ACT Aero Soln inhalation aerosol, Inhale 2 Puffs every 6 hours as needed for Shortness of Breath., Disp: 8.5 g, Rfl: 0    verapamil ER (CALAN SR) 120 MG CAPSULE SR 24 HR, TAKE 1 CAPSULE BY MOUTH EVERY DAY, Disp: 90 Capsule, Rfl: 0    cyclobenzaprine (FLEXERIL) 5 mg tablet, Take 1 Tablet by mouth 3 times a day as needed for Muscle Spasms or Moderate Pain., Disp: 30 Tablet, Rfl: 0    lidocaine (LIDODERM) 5 % Patch, Place 1 Patch on the skin every 24 hours., Disp: 15 Patch, Rfl: 0    Dextromethorphan-guaiFENesin (ROBITUSSIN COUGH+CHEST PAULO DM) 5-100 MG/5ML Liquid, Take 20 mL by mouth every 4 hours as needed for cough, Disp: 177 mL, Rfl: 0    benzonatate (TESSALON) 100 MG Cap, Take 1 Capsule by mouth 3 times a day as needed for Cough., Disp: 21 Capsule, Rfl: 0    fluticasone-salmeterol (ADVAIR) 250-50 MCG/ACT AEROSOL POWDER, BREATH ACTIVATED, Inhale 1 Puff every 12 hours., Disp: 3 Each, Rfl: 3    potassium chloride ER (KLOR-CON) 10 MEQ tablet, Take 1 Tablet by mouth 2 times a day., Disp: 20 Tablet, Rfl: 0    colchicine (COLCRYS) 0.6 MG Tab, Take 2 Tablets by mouth every day., Disp: 30 Tablet, Rfl: 0    valsartan-hydrochlorothiazide (DIOVAN-HCT) 80-12.5 MG per tablet, TAKE 1 TABLET BY MOUTH EVERY DAY, Disp: 90 Tablet, Rfl: 3    Calcium Carb-Cholecalciferol (CALTRATE 600+D3) 600-800 MG-UNIT Tab, Take 1 Tablet by mouth every day., Disp: , Rfl:     albuterol (PROVENTIL) 2.5mg/3ml Nebu Soln solution for nebulization, Take 3 mL by nebulization every four hours as needed for Shortness of Breath., Disp: 30 mL, Rfl: 0    acetaminophen (TYLENOL) 325 MG Tab, Take 2 Tablets by mouth every 6 hours as needed for Mild Pain., Disp: , Rfl:     aspirin (ASA) 81 MG CHEW, Chew 1 Tablet every day., Disp: , Rfl:     ALLERGIES  Allergies   Allergen  "Reactions    Shellfish Allergy Hives       PHYSICAL EXAM  VITAL SIGNS: BP (!) 160/88   Pulse 99   Temp 36.2 °C (97.1 °F) (Temporal)   Resp 18   Ht 1.575 m (5' 2\")   Wt 71.7 kg (158 lb)   SpO2 92%   BMI 28.90 kg/m²   Reviewed and hypertensive, afebrile  Constitutional: Well developed, Well nourished, well-appearing.  HENT: Normocephalic, atraumatic, bilateral external ears normal  Respiratory: Lungs clear to auscultation bilaterally. No wheezes, rales, or rhonchi.  Abdominal:  Abdomen soft, non-tender, non distended. No rebound, or guarding.  No pulsatile mass  Skin: No erythema, no rash. No wounds or bruising.  Genitourinary: No costovertebral angle tenderness.   Musculoskeletal: no deformities.  Pelvis stable, hip rotation normal.  No tenderness of the lumbar spine.  Neurologic: Alert & oriented x 3, cranial nerves 2-12 intact by passive exam.  Toe flexion extension and sensation preserved.  Deep tendon reflexes 1+ patellar and 0 Achilles bilaterally.  Straight leg raise positive on the right   psychiatric: Affect normal, Judgment normal, Mood normal.   RADIOLOGY  I have independently interpreted the hip and lumbar spine x-rays associated with this visit demonstrating unremarkable hip and anterior listhesis of L5 and S1.  I am awaiting the final reading from the radiologist.     Final Radiology Report  DX-PELVIS-1 OR 2 VIEWS   Final Result         1. No acute process seen.   2. Calcified uterine fibroid again noted.      DX-LUMBAR SPINE-2 OR 3 VIEWS   Final Result         1. Moderate diffuse spondylosis with 9 mm anterolisthesis and facet arthropathy at L5-S1.   2. 7 mm nonobstructive renal stone and calcified uterine fibroid again noted.        Radiologist interpretation have been reviewed by me.     ED COURSE:        INTERVENTIONS BY ME:  Medications   ketorolac (Toradol) injection 30 mg (has no administration in time range)   Per chart review creatinine was 1.22 earlier this year.    9:41 A< - Patient " reassessed and response to intervention partial improvement in pain      MEDICAL DECISION MAKING:  PROBLEMS EVALUATED THIS VISIT:  This patient presents with acute new right buttock and hip pain and appears to have a radicular pain from either facet disease or anterolisthesis of the lumbar spine.  This is a form of sciatica.  There is no neurologic deficit.  There is no evidence of epidural abscess or risk factors thereof.  There is no evidence of abdominal aortic aneurysm.  There is no history of peripheral vascular disease.    RISK:  Moderate given need for opiate analgesic       PLAN:  Discharge Medication List as of 7/8/2023 10:06 AM        START taking these medications    Details   methylPREDNISolone (MEDROL DOSEPAK) 4 MG Tablet Therapy Pack Sig: Per instructions on pack, Disp-21 Tablet, R-0, Normal      HYDROcodone-acetaminophen (NORCO) 5-325 MG Tab per tablet Take 1 Tablet by mouth every four hours as needed (mild pain) for up to 3 days., Disp-10 Tablet, R-0, Normal             Prescription monitoring queried and score 0  Opiate risk tool utilized and patient low risk  Informed consent obtained for opiate analgesic  Indication opiate analgesic severe sciatica pain      Sciatica handout given    Return for neurologic weakness, pain and fever    Followup:  Follow-up with your doctor if not better in a week    CONDITION: Stable.     FINAL IMPRESSION  1. Sciatica of right side    2. Anterolisthesis of lumbar spine    3. Facet arthritis of lumbar region         Electronically signed by: George Steven M.D., 7/8/2023 8:11 AM

## 2023-07-08 NOTE — ED TRIAGE NOTES
Serenity Rhoades Pablo  73 y.o.  female  Chief Complaint   Patient presents with    Hip Pain     C/o right hip / buttock pain x 2 days. Denies trauma. Non radiating. Took tylenol 600 mg last night without relief. States unable sleep due to pain.

## 2023-07-08 NOTE — DISCHARGE INSTRUCTIONS
You have sciatica from degeneration of the lumbar spine called anterior listhesis and from arthritis of the facet joints of the spine.  Take Medrol as prescribed.  Add Tylenol for mild pain.  Take hydrocodone instead of Tylenol for severe pain.  See your doctor if not better in 7 to 10 days.  Rebecca for severe uncontrolled pain, pain and fever pain and neurologic weakness.

## 2023-07-14 ENCOUNTER — HOSPITAL ENCOUNTER (EMERGENCY)
Facility: MEDICAL CENTER | Age: 73
End: 2023-07-14
Attending: EMERGENCY MEDICINE
Payer: COMMERCIAL

## 2023-07-14 VITALS
TEMPERATURE: 98.1 F | BODY MASS INDEX: 29.08 KG/M2 | RESPIRATION RATE: 16 BRPM | WEIGHT: 158 LBS | SYSTOLIC BLOOD PRESSURE: 173 MMHG | HEIGHT: 62 IN | OXYGEN SATURATION: 95 % | HEART RATE: 78 BPM | DIASTOLIC BLOOD PRESSURE: 81 MMHG

## 2023-07-14 DIAGNOSIS — M54.31 SCIATICA OF RIGHT SIDE: ICD-10-CM

## 2023-07-14 PROCEDURE — 99284 EMERGENCY DEPT VISIT MOD MDM: CPT

## 2023-07-14 RX ORDER — TRAMADOL HYDROCHLORIDE 50 MG/1
50 TABLET ORAL EVERY 4 HOURS PRN
Qty: 20 TABLET | Refills: 0 | Status: SHIPPED | OUTPATIENT
Start: 2023-07-14 | End: 2023-07-19

## 2023-07-14 ASSESSMENT — FIBROSIS 4 INDEX: FIB4 SCORE: 1.48

## 2023-07-14 ASSESSMENT — PAIN DESCRIPTION - PAIN TYPE: TYPE: ACUTE PAIN

## 2023-07-14 NOTE — ED PROVIDER NOTES
ED Provider Note    CHIEF COMPLAINT  Chief Complaint   Patient presents with    Hip Pain     Patient was seen here last week for sciatica pain and has not had relief of pain with hydrocodone.        EXTERNAL RECORDS REVIEWED  Outpatient Notes the patient was seen by Dr. George Simpson in our emergency department on July 8, 2023 and had a negative x-ray of her pelvis.    HPI/ROS  LIMITATION TO HISTORY   Select: : None  OUTSIDE HISTORIAN(S):  None    Serenity Howe is a 73 y.o. female who presents with past medical history significant for gout, hypertension, she was here 2 weeks ago with right-sided pain that radiates down her buttock and down her leg.  She was given hydrocodone and prednisone with only helped a little bit.  The patient denies any numbness or weakness of the lower extremities, no incontinence.  She is out of this medication.    PAST MEDICAL HISTORY   has a past medical history of ASTHMA, Cancer (HCC), Gout, History of breast cancer (07/29/2015), Hypertension, Shoulder pain, right (07/29/2015), and Tremor (07/29/2015).    SURGICAL HISTORY   has a past surgical history that includes mastectomy and gyn surgery.    FAMILY HISTORY  Family History   Problem Relation Age of Onset    Hyperlipidemia Mother     No Known Problems Father     No Known Problems Sister     No Known Problems Brother     No Known Problems Daughter     No Known Problems Daughter     No Known Problems Son        SOCIAL HISTORY  Social History     Tobacco Use    Smoking status: Never    Smokeless tobacco: Never   Vaping Use    Vaping Use: Never used   Substance and Sexual Activity    Alcohol use: No    Drug use: No    Sexual activity: Never     Partners: Male       CURRENT MEDICATIONS  Home Medications       Reviewed by Chloe Mansfield R.N. (Registered Nurse) on 07/14/23 at 1018  Med List Status: Partial     Medication Last Dose Status   acetaminophen (TYLENOL) 325 MG Tab  Active   albuterol (PROVENTIL) 2.5mg/3ml Nebu Soln solution for  "nebulization  Active   albuterol 108 (90 Base) MCG/ACT Aero Soln inhalation aerosol  Active   aspirin (ASA) 81 MG CHEW  Active   benzonatate (TESSALON) 100 MG Cap  Active   Calcium Carb-Cholecalciferol (CALTRATE 600+D3) 600-800 MG-UNIT Tab  Active   colchicine (COLCRYS) 0.6 MG Tab  Active   cyclobenzaprine (FLEXERIL) 5 mg tablet  Active   Dextromethorphan-guaiFENesin (ROBITUSSIN COUGH+CHEST PAULO DM) 5-100 MG/5ML Liquid  Active   fluticasone-salmeterol (ADVAIR) 250-50 MCG/ACT AEROSOL POWDER, BREATH ACTIVATED  Active   lidocaine (LIDODERM) 5 % Patch  Active   methylPREDNISolone (MEDROL DOSEPAK) 4 MG Tablet Therapy Pack  Active   potassium chloride ER (KLOR-CON) 10 MEQ tablet  Active   valsartan-hydrochlorothiazide (DIOVAN-HCT) 80-12.5 MG per tablet  Active   verapamil ER (CALAN SR) 120 MG CAPSULE SR 24 HR  Active                    ALLERGIES  Allergies   Allergen Reactions    Shellfish Allergy Hives       PHYSICAL EXAM  VITAL SIGNS: BP (!) 173/81   Pulse 78   Temp 36.7 °C (98.1 °F) (Temporal)   Resp 16   Ht 1.575 m (5' 2\")   Wt 71.7 kg (158 lb)   SpO2 95%   BMI 28.90 kg/m²    Constitutional: Alert.  HENT: No signs of trauma, Bilateral external ears normal, Nose normal.   Eyes: Pupils are equal and reactive, Conjunctiva normal, Non-icteric.   Neck: Normal range of motion, No tenderness, Supple, No stridor.   Abdomen: Bowel sounds normal, Soft, No tenderness, No peritoneal signs, No masses, No pulsatile masses.   Skin: Warm, Dry, No erythema, No rash.   Back: No bony tenderness, No CVA tenderness.   Extremities: Intact distal pulses, she has tenderness over the distribution of the sciatic nerve on the right side.  Musculoskeletal: Good range of motion in all major joints. No tenderness to palpation or major deformities noted.   Neurologic: Alert , Normal motor function, Normal sensory function, No focal deficits noted.  The patient has no evidence of spinal cord compression.  She has normal motor and sensory in " the extremities.  Psychiatric: Affect normal, Judgment normal, Mood normal.           COURSE & MEDICAL DECISION MAKING    ED Observation Status? No; Patient does not meet criteria for ED Observation.     INITIAL ASSESSMENT, COURSE AND PLAN  Care Narrative: The patient presents with continuing clinical sciatica, she had a negative x-ray last at this time I will prescribe her Ultram and I will refer her to the pain specialist who has a walk-in clinic Monday through Friday 8-5.  She may require physical therapy.  She will return for any signs or symptoms of spinal cord compression.        ADDITIONAL PROBLEM LIST  , Hypertension  DISPOSITION AND DISCUSSIONS  I have discussed management of the patient with the following physicians and JOSSELIN's:  none    Discussion of management with other QHP or appropriate source(s): None     Escalation of care considered, and ultimately not performed:diagnostic imaging    Barriers to care at this time, including but not limited to:  None .     Decision tools and prescription drugs considered including, but not limited to:  I have prescribed the patient Ultram .    I reviewed prescription monitoring program for patient's narcotic use before prescribing a scheduled drug.The patient will not drink alcohol nor drive with prescribed medications. The patient will return for new or worsening symptoms and is stable at the time of discharge.    The patient is referred to a primary physician for blood pressure management, diabetic screening, and for all other preventative health concerns.    In prescribing controlled substances to this patient, I certify that I have obtained and reviewed the medical history of Serenity Howe. I have also made a good rosario effort to obtain applicable records from other providers who have treated the patient and records did not demonstrate any increased risk of substance abuse that would prevent me from prescribing controlled substances.     I have conducted a  physical exam and documented it. I have reviewed Ms. Howe’s prescription history as maintained by the [place] Prescription Monitoring Program.     I have assessed the patient’s risk for abuse, dependency, and addiction using the validated Opioid Risk Tool available at https://www.mdcalc.com/wgkekd-tdbm-surx-ort-narcotic-abuse.     Given the above, I believe the benefits of controlled substance therapy outweigh the risks. The reasons for prescribing controlled substances include non-narcotic, oral analgesic alternatives have been inadequate for pain control. Accordingly, I have discussed the risk and benefits, treatment plan, and alternative therapies with the patient.       DISPOSITION:  Patient will be discharged home in stable condition.    FOLLOW UP:  [facility], Emergency Dept  [address]  [place] [address]  [phone]  Follow up  If symptoms worsen    Dimitris Story M.D.  [address] [place] [address]  [phone]    Follow up  Go to walk in pain clinic Monday Thru Friday 8a-5p      OUTPATIENT MEDICATIONS:  New Prescriptions    TRAMADOL (ULTRAM) 50 MG TAB    Take 1 Tablet by mouth every four hours as needed (PRN Pain) for up to 5 days.         FINAL DIAGNOSIS  1. Sciatica of right side           Electronically signed by: Paul Werner M.D., 7/14/2023 10:26 AM

## 2023-07-14 NOTE — ED TRIAGE NOTES
"Chief Complaint   Patient presents with    Hip Pain     Patient was seen here last week for sciatica pain and has not had relief of pain with hydrocodone.        Patient to triage via wheelchair, AAOx4, Appropriate precautions in place.     Explained wait time and triage process. Placed back in lobby. Told to notify ED tech or RN of any changes, verbalized understanding.    BP (!) 160/81   Pulse 80   Temp 36.4 °C (97.5 °F) (Temporal)   Resp 18   Ht 1.575 m (5' 2\")   Wt 71.7 kg (158 lb)   SpO2 97%   BMI 28.90 kg/m²     "

## 2023-07-14 NOTE — ED NOTES
Discharge instructions reviewed with patient and signed. Controlled substance form reviewed and signed. They were instructed on how to  prescriptions. They verbalized understanding of follow up instructions. All belongings with patient. They were assisted to lobby via wheelchair.

## 2023-08-23 ENCOUNTER — HOSPITAL ENCOUNTER (EMERGENCY)
Facility: MEDICAL CENTER | Age: 73
End: 2023-08-23
Attending: EMERGENCY MEDICINE
Payer: COMMERCIAL

## 2023-08-23 VITALS
RESPIRATION RATE: 16 BRPM | TEMPERATURE: 98.2 F | BODY MASS INDEX: 29.74 KG/M2 | WEIGHT: 161.6 LBS | HEART RATE: 88 BPM | DIASTOLIC BLOOD PRESSURE: 66 MMHG | OXYGEN SATURATION: 93 % | SYSTOLIC BLOOD PRESSURE: 142 MMHG | HEIGHT: 62 IN

## 2023-08-23 DIAGNOSIS — L30.9 DERMATITIS: Primary | ICD-10-CM

## 2023-08-23 PROCEDURE — 99284 EMERGENCY DEPT VISIT MOD MDM: CPT

## 2023-08-23 RX ORDER — TRIAMCINOLONE ACETONIDE 1 MG/G
1 CREAM TOPICAL 2 TIMES DAILY
Qty: 15 G | Refills: 0 | Status: SHIPPED | OUTPATIENT
Start: 2023-08-23 | End: 2023-09-02

## 2023-08-23 RX ORDER — NYSTATIN 100000 U/G
1 CREAM TOPICAL 2 TIMES DAILY
Qty: 30 G | Refills: 0 | Status: SHIPPED | OUTPATIENT
Start: 2023-08-23

## 2023-08-23 ASSESSMENT — FIBROSIS 4 INDEX: FIB4 SCORE: 1.48

## 2023-08-23 NOTE — ED PROVIDER NOTES
ER Provider Note    Scribed for Jorge Givens Ii, M.d. by Wai Huerta. 2023  3:56 PM    Primary Care Provider: Pcp Pt States None    CHIEF COMPLAINT  Chief Complaint   Patient presents with    Ear Drainage     Patient has dry skin behind her ears x one week. Patient reports the dry skin breaks open and sometimes bleeds.      EXTERNAL RECORDS REVIEWED      HPI/ROS  LIMITATION TO HISTORY   Select: : None  OUTSIDE HISTORIAN(S):  None    Serenity Howe is a 73 y.o. female who presents to the ED complaining of bilateral ear drainage onset a week ago. The patient denies any itching or pain. The patient states she has dry skin behind her ears that opens and bleeds. She reports she applied petroleum jelly 3 times since the onset of her symptoms with no alleviation. The patient adds she has a history of cancer, but denies any history of diabetes. She states she does not currently have a primary care physician.    PAST MEDICAL HISTORY  Past Medical History:   Diagnosis Date    ASTHMA     Cancer (HCC)     Gout     History of breast cancer 2015    Hypertension     Shoulder pain, right 2015    Tremor 2015       SURGICAL HISTORY  Past Surgical History:   Procedure Laterality Date    GYN SURGERY       x1    MASTECTOMY      right        FAMILY HISTORY  Family History   Problem Relation Age of Onset    Hyperlipidemia Mother     No Known Problems Father     No Known Problems Sister     No Known Problems Brother     No Known Problems Daughter     No Known Problems Daughter     No Known Problems Son        SOCIAL HISTORY   reports that she has never smoked. She has never used smokeless tobacco. She reports that she does not drink alcohol and does not use drugs.    CURRENT MEDICATIONS  Previous Medications    ACETAMINOPHEN (TYLENOL) 325 MG TAB    Take 2 Tablets by mouth every 6 hours as needed for Mild Pain.    ALBUTEROL (PROVENTIL) 2.5MG/3ML NEBU SOLN SOLUTION FOR NEBULIZATION    Take 3  "mL by nebulization every four hours as needed for Shortness of Breath.    ALBUTEROL 108 (90 BASE) MCG/ACT AERO SOLN INHALATION AEROSOL    Inhale 2 Puffs every 6 hours as needed for Shortness of Breath.    ASPIRIN (ASA) 81 MG CHEW    Chew 1 Tablet every day.    BENZONATATE (TESSALON) 100 MG CAP    Take 1 Capsule by mouth 3 times a day as needed for Cough.    CALCIUM CARB-CHOLECALCIFEROL (CALTRATE 600+D3) 600-800 MG-UNIT TAB    Take 1 Tablet by mouth every day.    COLCHICINE (COLCRYS) 0.6 MG TAB    Take 2 Tablets by mouth every day.    CYCLOBENZAPRINE (FLEXERIL) 5 MG TABLET    Take 1 Tablet by mouth 3 times a day as needed for Muscle Spasms or Moderate Pain.    DEXTROMETHORPHAN-GUAIFENESIN (ROBITUSSIN COUGH+CHEST PAULO DM) 5-100 MG/5ML LIQUID    Take 20 mL by mouth every 4 hours as needed for cough    FLUTICASONE-SALMETEROL (ADVAIR) 250-50 MCG/ACT AEROSOL POWDER, BREATH ACTIVATED    Inhale 1 Puff every 12 hours.    LIDOCAINE (LIDODERM) 5 % PATCH    Place 1 Patch on the skin every 24 hours.    METHYLPREDNISOLONE (MEDROL DOSEPAK) 4 MG TABLET THERAPY PACK    Sig: Per instructions on pack    POTASSIUM CHLORIDE ER (KLOR-CON) 10 MEQ TABLET    Take 1 Tablet by mouth 2 times a day.    VALSARTAN-HYDROCHLOROTHIAZIDE (DIOVAN-HCT) 80-12.5 MG PER TABLET    TAKE 1 TABLET BY MOUTH EVERY DAY    VERAPAMIL ER (CALAN SR) 120 MG CAPSULE SR 24 HR    TAKE 1 CAPSULE BY MOUTH EVERY DAY       ALLERGIES  Shellfish allergy    PHYSICAL EXAM  BP (!) 147/68   Pulse (!) 108   Temp 36.7 °C (98.1 °F) (Temporal)   Resp 16   Ht 1.575 m (5' 2\")   Wt 73.3 kg (161 lb 9.6 oz)   SpO2 92%   BMI 29.56 kg/m²   Physical Exam  Vitals and nursing note reviewed.   Constitutional:       Appearance: Normal appearance.      Comments: Pleasant 73 year old woman   HENT:      Head: Normocephalic and atraumatic.      Ears:      Comments: No drainage from ears.  Behind auricles bilaterally there is flaking skin and irritated skin. No erythema. No exudate.      " Mouth/Throat:      Mouth: Mucous membranes are moist.   Neurological:      Mental Status: She is alert.          DIAGNOSTIC STUDIES    COURSE & MEDICAL DECISION MAKING     ED Observation Status? No; Patient does not meet criteria for ED Observation.     INITIAL ASSESSMENT, COURSE AND PLAN  Care Narrative: This is an emergent evaluation of a 73 year old woman who has concerns about flaking skin behind both ears. There is no erythema. No crusting exudate. This looks like a mild dermatitis. Could be yeast related. Plan for nystatin and triamcinolone to be applied twice daily for next 10 days. Asked her to make sure skin there is very dry after showering. I discussed plan for discharge and follow up as outlined below. The patient is stable for discharge at this time and will return for any new or worsening symptoms. Patient verbalizes understanding and support with my plan for discharge.         PROBLEM LIST  #Dermatitis   -trial treatment with anti-fungal and steroids    DISPOSITION AND DISCUSSIONS  I have discussed management of the patient with the following physicians and JOSSELIN's:  None    Discussion of management with other QHP or appropriate source(s): None     Barriers to care at this time, including but not limited to: Patient does not have established PCP.     The patient will return for new or worsening symptoms and is stable at the time of discharge.    DISPOSITION:  Patient will be discharged home in stable condition.    FOLLOW UP:  No follow-up provider specified.    OUTPATIENT MEDICATIONS:  New Prescriptions    NYSTATIN (MYCOSTATIN) 066643 UNIT/GM CREAM TOPICAL CREAM    Apply 1 g topically 2 times a day.    TRIAMCINOLONE ACETONIDE (KENALOG) 0.1 % CREAM    Apply 1 Application topically 2 times a day for 10 days.        FINAL DIAGNOSIS  1. Dermatitis Active      I, Wai Navarro), am scribing for, and in the presence of, JOSE RAMON Nielsen II.    Electronically signed by: Wai Navarro),  8/23/2023    IJorge II, M* personally performed the services described in this documentation, as scribed by Wai Huerta in my presence, and it is both accurate and complete.      The note accurately reflects work and decisions made by me.  Jorge Givens II, M.D.  8/23/2023  10:42 PM

## 2023-08-23 NOTE — ED TRIAGE NOTES
Chief Complaint   Patient presents with    Ear Drainage     Patient has dry skin behind her ears x one week. Patient reports the dry skin breaks open and sometimes bleeds.

## 2023-08-23 NOTE — DISCHARGE INSTRUCTIONS
Apply creams behind both ears for next 10 days. Make an appointment with a primary provider for recheck.  If worsening you can come back for re-check.

## 2023-08-23 NOTE — ED NOTES
Pt ambulated back to [facility] with steady gait. Pt states she has dry skin behind her ears that sometimes gets drainage

## 2023-09-13 ENCOUNTER — APPOINTMENT (OUTPATIENT)
Dept: RADIOLOGY | Facility: MEDICAL CENTER | Age: 73
End: 2023-09-13
Attending: EMERGENCY MEDICINE
Payer: COMMERCIAL

## 2023-09-13 ENCOUNTER — HOSPITAL ENCOUNTER (EMERGENCY)
Facility: MEDICAL CENTER | Age: 73
End: 2023-09-13
Attending: EMERGENCY MEDICINE
Payer: COMMERCIAL

## 2023-09-13 VITALS
BODY MASS INDEX: 29.33 KG/M2 | OXYGEN SATURATION: 96 % | HEART RATE: 81 BPM | DIASTOLIC BLOOD PRESSURE: 87 MMHG | TEMPERATURE: 97.5 F | HEIGHT: 62 IN | RESPIRATION RATE: 18 BRPM | SYSTOLIC BLOOD PRESSURE: 192 MMHG | WEIGHT: 159.39 LBS

## 2023-09-13 DIAGNOSIS — M17.12 PRIMARY OSTEOARTHRITIS OF LEFT KNEE: ICD-10-CM

## 2023-09-13 PROCEDURE — A9270 NON-COVERED ITEM OR SERVICE: HCPCS | Performed by: EMERGENCY MEDICINE

## 2023-09-13 PROCEDURE — 99284 EMERGENCY DEPT VISIT MOD MDM: CPT

## 2023-09-13 PROCEDURE — 73564 X-RAY EXAM KNEE 4 OR MORE: CPT | Mod: LT

## 2023-09-13 PROCEDURE — 700102 HCHG RX REV CODE 250 W/ 637 OVERRIDE(OP): Performed by: EMERGENCY MEDICINE

## 2023-09-13 PROCEDURE — 93971 EXTREMITY STUDY: CPT | Mod: LT

## 2023-09-13 RX ORDER — HYDROCODONE BITARTRATE AND ACETAMINOPHEN 5; 325 MG/1; MG/1
1 TABLET ORAL ONCE
Status: COMPLETED | OUTPATIENT
Start: 2023-09-13 | End: 2023-09-13

## 2023-09-13 RX ORDER — MELOXICAM 15 MG/1
15 TABLET ORAL DAILY
Qty: 30 TABLET | Refills: 0 | Status: SHIPPED | OUTPATIENT
Start: 2023-09-13

## 2023-09-13 RX ADMIN — HYDROCODONE BITARTRATE AND ACETAMINOPHEN 1 TABLET: 5; 325 TABLET ORAL at 19:06

## 2023-09-13 ASSESSMENT — FIBROSIS 4 INDEX: FIB4 SCORE: 1.48

## 2023-09-14 NOTE — ED PROVIDER NOTES
ED Provider Note    Primary care provider: Pcp Pt States None    CHIEF COMPLAINT  Chief Complaint   Patient presents with    Leg Pain     Left behind knee going down x4days     HPI  Serenity Howe is a 73 y.o. female who presents to the Emergency Department for atraumatic left knee pain.  The patient for the past several days has had some fullness and pain in the posterior aspect of the left knee, it does cause her some difficulty with walking.  Denies any prior history of DVT, has had some arthritis and knee and hip pain previously.      External Record Review: This is the patient's eighth emergency department visit in 2023.  She has been here several times for hip pain, knee pain, ear drainage.  Has 5 prescriptions for scheduled medications this year, small amounts of hydrocodone and 1 prescription for tramadol.  Prior history of gout.    REVIEW OF SYSTEMS  See HPI.     PAST MEDICAL HISTORY   has a past medical history of ASTHMA, Cancer (HCC), Gout, History of breast cancer (07/29/2015), Hypertension, Shoulder pain, right (07/29/2015), and Tremor (07/29/2015).    SURGICAL HISTORY   has a past surgical history that includes mastectomy and gyn surgery.    SOCIAL HISTORY  Social History     Tobacco Use    Smoking status: Never    Smokeless tobacco: Never   Vaping Use    Vaping Use: Never used   Substance Use Topics    Alcohol use: No    Drug use: No      Social History     Substance and Sexual Activity   Drug Use No       FAMILY HISTORY  Family History   Problem Relation Age of Onset    Hyperlipidemia Mother     No Known Problems Father     No Known Problems Sister     No Known Problems Brother     No Known Problems Daughter     No Known Problems Daughter     No Known Problems Son        CURRENT MEDICATIONS  Reviewed.  See Encounter Summary.     ALLERGIES  Allergies   Allergen Reactions    Shellfish Allergy Hives       PHYSICAL EXAM  VITAL SIGNS: BP (!) 139/92   Pulse 91   Temp 36.7 °C (98 °F) (Temporal)    "Resp 16   Ht 1.575 m (5' 2\")   Wt 72.3 kg (159 lb 6.3 oz)   SpO2 93%   BMI 29.15 kg/m²   Constitutional: Awake, alert in no apparent distress.  HENT: Normocephalic, Bilateral external ears normal. Nose normal.   Eyes: Conjunctiva normal, non-icteric, EOMI.    Thorax & Lungs: Easy unlabored respirations, Clear to ascultation bilaterally.  Cardiovascular: Regular rate, Regular rhythm, No murmurs, rubs or gallops. Bilateral pulses symmetrical.   Abdomen:  Soft, nontender, nondistended, normal active bowel sounds.   :    Skin: Visualized skin is  Dry, No erythema, No rash.   Musculoskeletal: Left lower extremity: No edema, no focal tenderness on the left lower leg, full range of motion of the left knee without effusion or instability.  There is some tenderness in the popliteal fossa.  Neurologic: Alert, Grossly non-focal.   Psychiatric: Normal affect, Normal mood  Lymphatic:        RADIOLOGY  I have independently interpreted the diagnostic imaging associated with this visit and am waiting the final reading from the radiologist.   My preliminary interpretation is as follows: No acute fracture    Radiologist interpretation:   US-EXTREMITY VENOUS LOWER UNILAT LEFT   Final Result      DX-KNEE COMPLETE 4+ LEFT   Final Result      Mild to moderate tricompartmental degenerative changes of the knee with joint space narrowing and osteophytic spurring. No acute fracture or malalignment.          COURSE & MEDICAL DECISION MAKING  Pertinent Labs & Imaging studies reviewed. (See chart for details)    COURSE & MEDICAL DECISION MAKING  Pertinent Labs & Imaging studies reviewed. (See chart for details)    Differential diagnoses include but are not limited to: Osteoarthritis, DVT    5:21 PM - Nursing notes reviewed, patient seen and examined at bedside.    Escalation of care considered, and ultimately not performed: blood analysis.    Barriers to care at this time, including but not limited to: Patient does not have established " PCP.     Decision Making:  This is a pleasant 73 y.o. year old female who presents with atraumatic left knee pain, ultrasound negative for DVT, x-ray does show mild to moderate osteoarthritis.  The patient has good range of motion, no concern of a septic joint.  She can be discharged at this time, will place her on meloxicam for pain/inflammation, recommend follow-up with orthopedics.    ADDITIONAL PROBLEM LIST       The patient was discharged home (see d/c instructions) was told to return immediately for any signs or symptoms listed, or any worsening at all.  The patient verbally agreed to the discharge precautions and follow-up plan which is documented in EPIC.    Discharge Medications:  New Prescriptions    MELOXICAM (MOBIC) 15 MG TABLET    Take 1 Tablet by mouth every day.       FINAL IMPRESSION  1. Primary osteoarthritis of left knee

## 2023-09-14 NOTE — ED TRIAGE NOTES
Chief Complaint   Patient presents with    Leg Pain     Left behind knee going down x4days     Pt ambulated to triage c/o left behind knee pain going down leg x4days, denies swelling or trauma.

## 2023-09-14 NOTE — ED NOTES
"Pt discharged to home via wheelchair.  Pt alert and oriented times 4 on room air.  Pt in possession of belongings.  Pt provided discharge education and information pertaining to medications and follow up appointments.  Pt received copy of discharge instructions and verbalized understanding. Encouraged to follow up with PCP. BP (!) 192/87   Pulse 81   Temp 36.4 °C (97.5 °F) (Temporal)   Resp 18   Ht 1.575 m (5' 2\")   Wt 72.3 kg (159 lb 6.3 oz)   SpO2 96%   BMI 29.15 kg/m²      "

## 2023-12-14 ENCOUNTER — HOSPITAL ENCOUNTER (EMERGENCY)
Facility: MEDICAL CENTER | Age: 73
End: 2023-12-14
Attending: EMERGENCY MEDICINE
Payer: COMMERCIAL

## 2023-12-14 ENCOUNTER — APPOINTMENT (OUTPATIENT)
Dept: RADIOLOGY | Facility: MEDICAL CENTER | Age: 73
End: 2023-12-14
Attending: EMERGENCY MEDICINE
Payer: COMMERCIAL

## 2023-12-14 VITALS
DIASTOLIC BLOOD PRESSURE: 92 MMHG | TEMPERATURE: 96.7 F | SYSTOLIC BLOOD PRESSURE: 196 MMHG | RESPIRATION RATE: 16 BRPM | OXYGEN SATURATION: 94 % | HEIGHT: 62 IN | BODY MASS INDEX: 29.13 KG/M2 | HEART RATE: 90 BPM | WEIGHT: 158.29 LBS

## 2023-12-14 DIAGNOSIS — J10.1 INFLUENZA A: ICD-10-CM

## 2023-12-14 LAB
ALBUMIN SERPL BCP-MCNC: 3.7 G/DL (ref 3.2–4.9)
ALBUMIN/GLOB SERPL: 1 G/DL
ALP SERPL-CCNC: 80 U/L (ref 30–99)
ALT SERPL-CCNC: 16 U/L (ref 2–50)
ANION GAP SERPL CALC-SCNC: 11 MMOL/L (ref 7–16)
AST SERPL-CCNC: 17 U/L (ref 12–45)
BASOPHILS # BLD AUTO: 0.4 % (ref 0–1.8)
BASOPHILS # BLD: 0.04 K/UL (ref 0–0.12)
BILIRUB SERPL-MCNC: 0.4 MG/DL (ref 0.1–1.5)
BUN SERPL-MCNC: 23 MG/DL (ref 8–22)
CALCIUM ALBUM COR SERPL-MCNC: 10 MG/DL (ref 8.5–10.5)
CALCIUM SERPL-MCNC: 9.8 MG/DL (ref 8.5–10.5)
CHLORIDE SERPL-SCNC: 101 MMOL/L (ref 96–112)
CO2 SERPL-SCNC: 24 MMOL/L (ref 20–33)
CREAT SERPL-MCNC: 1.18 MG/DL (ref 0.5–1.4)
EKG IMPRESSION: NORMAL
EOSINOPHIL # BLD AUTO: 0.16 K/UL (ref 0–0.51)
EOSINOPHIL NFR BLD: 1.6 % (ref 0–6.9)
ERYTHROCYTE [DISTWIDTH] IN BLOOD BY AUTOMATED COUNT: 46.5 FL (ref 35.9–50)
FLUAV RNA SPEC QL NAA+PROBE: POSITIVE
FLUBV RNA SPEC QL NAA+PROBE: NEGATIVE
GFR SERPLBLD CREATININE-BSD FMLA CKD-EPI: 48 ML/MIN/1.73 M 2
GLOBULIN SER CALC-MCNC: 3.8 G/DL (ref 1.9–3.5)
GLUCOSE SERPL-MCNC: 101 MG/DL (ref 65–99)
HCT VFR BLD AUTO: 41.6 % (ref 37–47)
HGB BLD-MCNC: 14.5 G/DL (ref 12–16)
IMM GRANULOCYTES # BLD AUTO: 0.03 K/UL (ref 0–0.11)
IMM GRANULOCYTES NFR BLD AUTO: 0.3 % (ref 0–0.9)
LYMPHOCYTES # BLD AUTO: 2 K/UL (ref 1–4.8)
LYMPHOCYTES NFR BLD: 20.1 % (ref 22–41)
MCH RBC QN AUTO: 34 PG (ref 27–33)
MCHC RBC AUTO-ENTMCNC: 34.9 G/DL (ref 32.2–35.5)
MCV RBC AUTO: 97.7 FL (ref 81.4–97.8)
MONOCYTES # BLD AUTO: 1.07 K/UL (ref 0–0.85)
MONOCYTES NFR BLD AUTO: 10.7 % (ref 0–13.4)
NEUTROPHILS # BLD AUTO: 6.66 K/UL (ref 1.82–7.42)
NEUTROPHILS NFR BLD: 66.9 % (ref 44–72)
NRBC # BLD AUTO: 0 K/UL
NRBC BLD-RTO: 0 /100 WBC (ref 0–0.2)
PLATELET # BLD AUTO: 276 K/UL (ref 164–446)
PMV BLD AUTO: 9 FL (ref 9–12.9)
POTASSIUM SERPL-SCNC: 3.7 MMOL/L (ref 3.6–5.5)
PROT SERPL-MCNC: 7.5 G/DL (ref 6–8.2)
RBC # BLD AUTO: 4.26 M/UL (ref 4.2–5.4)
RSV RNA SPEC QL NAA+PROBE: NEGATIVE
SARS-COV-2 RNA RESP QL NAA+PROBE: NOTDETECTED
SODIUM SERPL-SCNC: 136 MMOL/L (ref 135–145)
WBC # BLD AUTO: 10 K/UL (ref 4.8–10.8)

## 2023-12-14 PROCEDURE — 80053 COMPREHEN METABOLIC PANEL: CPT

## 2023-12-14 PROCEDURE — 85025 COMPLETE CBC W/AUTO DIFF WBC: CPT

## 2023-12-14 PROCEDURE — 71045 X-RAY EXAM CHEST 1 VIEW: CPT

## 2023-12-14 PROCEDURE — 700102 HCHG RX REV CODE 250 W/ 637 OVERRIDE(OP): Performed by: EMERGENCY MEDICINE

## 2023-12-14 PROCEDURE — 99285 EMERGENCY DEPT VISIT HI MDM: CPT

## 2023-12-14 PROCEDURE — A9270 NON-COVERED ITEM OR SERVICE: HCPCS | Performed by: EMERGENCY MEDICINE

## 2023-12-14 PROCEDURE — C9803 HOPD COVID-19 SPEC COLLECT: HCPCS

## 2023-12-14 PROCEDURE — 0241U HCHG SARS-COV-2 COVID-19 NFCT DS RESP RNA 4 TRGT ED POC: CPT

## 2023-12-14 PROCEDURE — 93005 ELECTROCARDIOGRAM TRACING: CPT | Performed by: EMERGENCY MEDICINE

## 2023-12-14 PROCEDURE — 36415 COLL VENOUS BLD VENIPUNCTURE: CPT

## 2023-12-14 PROCEDURE — 93005 ELECTROCARDIOGRAM TRACING: CPT

## 2023-12-14 RX ORDER — IBUPROFEN 600 MG/1
600 TABLET ORAL ONCE
Status: COMPLETED | OUTPATIENT
Start: 2023-12-14 | End: 2023-12-14

## 2023-12-14 RX ORDER — IBUPROFEN 600 MG/1
600 TABLET ORAL EVERY 6 HOURS PRN
Qty: 30 TABLET | Refills: 0 | Status: SHIPPED | OUTPATIENT
Start: 2023-12-14

## 2023-12-14 RX ADMIN — IBUPROFEN 600 MG: 600 TABLET, FILM COATED ORAL at 16:08

## 2023-12-14 ASSESSMENT — FIBROSIS 4 INDEX: FIB4 SCORE: 1.48

## 2023-12-14 NOTE — ED TRIAGE NOTES
"Chief Complaint   Patient presents with    Cough     Ongoing x 3 days with yellow sputum. Pt reports subjective fever last night. Pt reports cough is causing her to be short of breath.      BP (!) 180/91   Pulse 91   Temp 35.9 °C (96.7 °F) (Temporal)   Resp 16   Ht 1.575 m (5' 2\")   Wt 71.8 kg (158 lb 4.6 oz)   SpO2 95%   BMI 28.95 kg/m²     Pt ambulatory to triage for above, protocol ordered.   "

## 2023-12-14 NOTE — ED NOTES
Patient from [facility] to [facility] ambulatory with steady gait accompanied by ED RN. + dyspnea noted with ambulation, improving when seated on gurney. Occasional dry cough noted. Chart up for ERP.

## 2023-12-15 ENCOUNTER — HOSPITAL ENCOUNTER (OUTPATIENT)
Facility: MEDICAL CENTER | Age: 73
End: 2023-12-17
Attending: EMERGENCY MEDICINE | Admitting: STUDENT IN AN ORGANIZED HEALTH CARE EDUCATION/TRAINING PROGRAM
Payer: COMMERCIAL

## 2023-12-15 ENCOUNTER — APPOINTMENT (OUTPATIENT)
Dept: RADIOLOGY | Facility: MEDICAL CENTER | Age: 73
End: 2023-12-15
Attending: EMERGENCY MEDICINE
Payer: COMMERCIAL

## 2023-12-15 DIAGNOSIS — J06.9 UPPER RESPIRATORY TRACT INFECTION, UNSPECIFIED TYPE: ICD-10-CM

## 2023-12-15 DIAGNOSIS — R50.9 FEVER, UNSPECIFIED FEVER CAUSE: ICD-10-CM

## 2023-12-15 DIAGNOSIS — J18.9 PNEUMONIA DUE TO INFECTIOUS ORGANISM, UNSPECIFIED LATERALITY, UNSPECIFIED PART OF LUNG: ICD-10-CM

## 2023-12-15 DIAGNOSIS — J10.1 INFLUENZA A: Primary | ICD-10-CM

## 2023-12-15 DIAGNOSIS — R00.0 SINUS TACHYCARDIA: ICD-10-CM

## 2023-12-15 DIAGNOSIS — J45.41 MODERATE PERSISTENT ASTHMA WITH ACUTE EXACERBATION: ICD-10-CM

## 2023-12-15 DIAGNOSIS — E86.0 DEHYDRATION: ICD-10-CM

## 2023-12-15 LAB
ALBUMIN SERPL BCP-MCNC: 3.7 G/DL (ref 3.2–4.9)
ALBUMIN/GLOB SERPL: 1 G/DL
ALP SERPL-CCNC: 77 U/L (ref 30–99)
ALT SERPL-CCNC: 18 U/L (ref 2–50)
ANION GAP SERPL CALC-SCNC: 14 MMOL/L (ref 7–16)
AST SERPL-CCNC: 20 U/L (ref 12–45)
BASOPHILS # BLD AUTO: 0.2 % (ref 0–1.8)
BASOPHILS # BLD: 0.04 K/UL (ref 0–0.12)
BILIRUB SERPL-MCNC: 0.8 MG/DL (ref 0.1–1.5)
BUN SERPL-MCNC: 20 MG/DL (ref 8–22)
CALCIUM ALBUM COR SERPL-MCNC: 9.8 MG/DL (ref 8.5–10.5)
CALCIUM SERPL-MCNC: 9.6 MG/DL (ref 8.5–10.5)
CHLORIDE SERPL-SCNC: 100 MMOL/L (ref 96–112)
CO2 SERPL-SCNC: 22 MMOL/L (ref 20–33)
CREAT SERPL-MCNC: 1.23 MG/DL (ref 0.5–1.4)
D DIMER PPP IA.FEU-MCNC: 1.28 UG/ML (FEU) (ref 0–0.5)
EKG IMPRESSION: NORMAL
EOSINOPHIL # BLD AUTO: 0.09 K/UL (ref 0–0.51)
EOSINOPHIL NFR BLD: 0.5 % (ref 0–6.9)
ERYTHROCYTE [DISTWIDTH] IN BLOOD BY AUTOMATED COUNT: 45.6 FL (ref 35.9–50)
FLUAV RNA SPEC QL NAA+PROBE: POSITIVE
FLUBV RNA SPEC QL NAA+PROBE: NEGATIVE
GFR SERPLBLD CREATININE-BSD FMLA CKD-EPI: 46 ML/MIN/1.73 M 2
GLOBULIN SER CALC-MCNC: 3.6 G/DL (ref 1.9–3.5)
GLUCOSE SERPL-MCNC: 152 MG/DL (ref 65–99)
HCT VFR BLD AUTO: 41.5 % (ref 37–47)
HGB BLD-MCNC: 14.7 G/DL (ref 12–16)
IMM GRANULOCYTES # BLD AUTO: 0.14 K/UL (ref 0–0.11)
IMM GRANULOCYTES NFR BLD AUTO: 0.8 % (ref 0–0.9)
LACTATE SERPL-SCNC: 2.2 MMOL/L (ref 0.5–2)
LACTATE SERPL-SCNC: 2.2 MMOL/L (ref 0.5–2)
LYMPHOCYTES # BLD AUTO: 1.16 K/UL (ref 1–4.8)
LYMPHOCYTES NFR BLD: 6.4 % (ref 22–41)
MCH RBC QN AUTO: 34.6 PG (ref 27–33)
MCHC RBC AUTO-ENTMCNC: 35.4 G/DL (ref 32.2–35.5)
MCV RBC AUTO: 97.6 FL (ref 81.4–97.8)
MONOCYTES # BLD AUTO: 0.85 K/UL (ref 0–0.85)
MONOCYTES NFR BLD AUTO: 4.7 % (ref 0–13.4)
NEUTROPHILS # BLD AUTO: 15.85 K/UL (ref 1.82–7.42)
NEUTROPHILS NFR BLD: 87.4 % (ref 44–72)
NRBC # BLD AUTO: 0 K/UL
NRBC BLD-RTO: 0 /100 WBC (ref 0–0.2)
NT-PROBNP SERPL IA-MCNC: 323 PG/ML (ref 0–125)
PLATELET # BLD AUTO: 258 K/UL (ref 164–446)
PMV BLD AUTO: 8.9 FL (ref 9–12.9)
POTASSIUM SERPL-SCNC: 3.5 MMOL/L (ref 3.6–5.5)
PROT SERPL-MCNC: 7.3 G/DL (ref 6–8.2)
RBC # BLD AUTO: 4.25 M/UL (ref 4.2–5.4)
RSV RNA SPEC QL NAA+PROBE: NEGATIVE
SARS-COV-2 RNA RESP QL NAA+PROBE: NOTDETECTED
SODIUM SERPL-SCNC: 136 MMOL/L (ref 135–145)
TROPONIN T SERPL-MCNC: 10 NG/L (ref 6–19)
TROPONIN T SERPL-MCNC: 13 NG/L (ref 6–19)
WBC # BLD AUTO: 18.1 K/UL (ref 4.8–10.8)

## 2023-12-15 PROCEDURE — 770006 HCHG ROOM/CARE - MED/SURG/GYN SEMI*

## 2023-12-15 PROCEDURE — 700117 HCHG RX CONTRAST REV CODE 255: Performed by: EMERGENCY MEDICINE

## 2023-12-15 PROCEDURE — 80053 COMPREHEN METABOLIC PANEL: CPT

## 2023-12-15 PROCEDURE — 85025 COMPLETE CBC W/AUTO DIFF WBC: CPT

## 2023-12-15 PROCEDURE — 84484 ASSAY OF TROPONIN QUANT: CPT | Mod: 91

## 2023-12-15 PROCEDURE — 83880 ASSAY OF NATRIURETIC PEPTIDE: CPT

## 2023-12-15 PROCEDURE — 87040 BLOOD CULTURE FOR BACTERIA: CPT | Mod: 91

## 2023-12-15 PROCEDURE — 71045 X-RAY EXAM CHEST 1 VIEW: CPT

## 2023-12-15 PROCEDURE — 94640 AIRWAY INHALATION TREATMENT: CPT

## 2023-12-15 PROCEDURE — 94760 N-INVAS EAR/PLS OXIMETRY 1: CPT

## 2023-12-15 PROCEDURE — 96374 THER/PROPH/DIAG INJ IV PUSH: CPT

## 2023-12-15 PROCEDURE — 93005 ELECTROCARDIOGRAM TRACING: CPT | Performed by: EMERGENCY MEDICINE

## 2023-12-15 PROCEDURE — 700111 HCHG RX REV CODE 636 W/ 250 OVERRIDE (IP): Mod: UD | Performed by: STUDENT IN AN ORGANIZED HEALTH CARE EDUCATION/TRAINING PROGRAM

## 2023-12-15 PROCEDURE — A9270 NON-COVERED ITEM OR SERVICE: HCPCS | Performed by: EMERGENCY MEDICINE

## 2023-12-15 PROCEDURE — 700101 HCHG RX REV CODE 250

## 2023-12-15 PROCEDURE — 36415 COLL VENOUS BLD VENIPUNCTURE: CPT

## 2023-12-15 PROCEDURE — 99223 1ST HOSP IP/OBS HIGH 75: CPT | Performed by: STUDENT IN AN ORGANIZED HEALTH CARE EDUCATION/TRAINING PROGRAM

## 2023-12-15 PROCEDURE — 0241U HCHG SARS-COV-2 COVID-19 NFCT DS RESP RNA 4 TRGT ED POC: CPT

## 2023-12-15 PROCEDURE — 700102 HCHG RX REV CODE 250 W/ 637 OVERRIDE(OP): Performed by: STUDENT IN AN ORGANIZED HEALTH CARE EDUCATION/TRAINING PROGRAM

## 2023-12-15 PROCEDURE — 93005 ELECTROCARDIOGRAM TRACING: CPT

## 2023-12-15 PROCEDURE — C9803 HOPD COVID-19 SPEC COLLECT: HCPCS

## 2023-12-15 PROCEDURE — 700111 HCHG RX REV CODE 636 W/ 250 OVERRIDE (IP): Performed by: EMERGENCY MEDICINE

## 2023-12-15 PROCEDURE — 71275 CT ANGIOGRAPHY CHEST: CPT

## 2023-12-15 PROCEDURE — 99285 EMERGENCY DEPT VISIT HI MDM: CPT

## 2023-12-15 PROCEDURE — A9270 NON-COVERED ITEM OR SERVICE: HCPCS

## 2023-12-15 PROCEDURE — 85379 FIBRIN DEGRADATION QUANT: CPT

## 2023-12-15 PROCEDURE — A9270 NON-COVERED ITEM OR SERVICE: HCPCS | Performed by: STUDENT IN AN ORGANIZED HEALTH CARE EDUCATION/TRAINING PROGRAM

## 2023-12-15 PROCEDURE — 700105 HCHG RX REV CODE 258: Performed by: EMERGENCY MEDICINE

## 2023-12-15 PROCEDURE — 700102 HCHG RX REV CODE 250 W/ 637 OVERRIDE(OP)

## 2023-12-15 PROCEDURE — 83605 ASSAY OF LACTIC ACID: CPT

## 2023-12-15 PROCEDURE — 700102 HCHG RX REV CODE 250 W/ 637 OVERRIDE(OP): Performed by: EMERGENCY MEDICINE

## 2023-12-15 RX ORDER — ACETAMINOPHEN 500 MG
1000 TABLET ORAL ONCE
Status: COMPLETED | OUTPATIENT
Start: 2023-12-15 | End: 2023-12-15

## 2023-12-15 RX ORDER — BENZONATATE 100 MG/1
100 CAPSULE ORAL 3 TIMES DAILY PRN
Status: DISCONTINUED | OUTPATIENT
Start: 2023-12-15 | End: 2023-12-17 | Stop reason: HOSPADM

## 2023-12-15 RX ORDER — OSELTAMIVIR PHOSPHATE 30 MG/1
30 CAPSULE ORAL 2 TIMES DAILY
Status: DISCONTINUED | OUTPATIENT
Start: 2023-12-16 | End: 2023-12-17 | Stop reason: HOSPADM

## 2023-12-15 RX ORDER — ALBUTEROL SULFATE 90 UG/1
2 AEROSOL, METERED RESPIRATORY (INHALATION) EVERY 6 HOURS PRN
Status: DISCONTINUED | OUTPATIENT
Start: 2023-12-15 | End: 2023-12-15

## 2023-12-15 RX ORDER — FLUTICASONE PROPIONATE AND SALMETEROL 250; 50 UG/1; UG/1
1 POWDER RESPIRATORY (INHALATION) EVERY 12 HOURS
Status: DISCONTINUED | OUTPATIENT
Start: 2023-12-15 | End: 2023-12-15

## 2023-12-15 RX ORDER — OSELTAMIVIR PHOSPHATE 75 MG/1
75 CAPSULE ORAL 2 TIMES DAILY
Status: DISCONTINUED | OUTPATIENT
Start: 2023-12-15 | End: 2023-12-15

## 2023-12-15 RX ORDER — IPRATROPIUM BROMIDE AND ALBUTEROL SULFATE 2.5; .5 MG/3ML; MG/3ML
3 SOLUTION RESPIRATORY (INHALATION)
Status: DISCONTINUED | OUTPATIENT
Start: 2023-12-15 | End: 2023-12-17 | Stop reason: HOSPADM

## 2023-12-15 RX ORDER — SODIUM CHLORIDE, SODIUM LACTATE, POTASSIUM CHLORIDE, AND CALCIUM CHLORIDE .6; .31; .03; .02 G/100ML; G/100ML; G/100ML; G/100ML
30 INJECTION, SOLUTION INTRAVENOUS ONCE
Status: COMPLETED | OUTPATIENT
Start: 2023-12-15 | End: 2023-12-15

## 2023-12-15 RX ORDER — VERAPAMIL HYDROCHLORIDE 120 MG/1
120 CAPSULE, EXTENDED RELEASE ORAL EVERY EVENING
Status: DISCONTINUED | OUTPATIENT
Start: 2023-12-15 | End: 2023-12-17 | Stop reason: HOSPADM

## 2023-12-15 RX ORDER — VALSARTAN AND HYDROCHLOROTHIAZIDE 80; 12.5 MG/1; MG/1
1 TABLET, FILM COATED ORAL
Status: DISCONTINUED | OUTPATIENT
Start: 2023-12-15 | End: 2023-12-15

## 2023-12-15 RX ORDER — VALSARTAN 80 MG/1
80 TABLET ORAL
Status: DISCONTINUED | OUTPATIENT
Start: 2023-12-16 | End: 2023-12-17

## 2023-12-15 RX ORDER — HYDROCHLOROTHIAZIDE 25 MG/1
12.5 TABLET ORAL
Status: DISCONTINUED | OUTPATIENT
Start: 2023-12-16 | End: 2023-12-17

## 2023-12-15 RX ORDER — PREDNISONE 20 MG/1
40 TABLET ORAL DAILY
Status: DISCONTINUED | OUTPATIENT
Start: 2023-12-15 | End: 2023-12-17 | Stop reason: HOSPADM

## 2023-12-15 RX ORDER — ASPIRIN 81 MG/1
81 TABLET, CHEWABLE ORAL DAILY
Status: DISCONTINUED | OUTPATIENT
Start: 2023-12-16 | End: 2023-12-17 | Stop reason: HOSPADM

## 2023-12-15 RX ORDER — OSELTAMIVIR PHOSPHATE 75 MG/1
75 CAPSULE ORAL ONCE
Status: COMPLETED | OUTPATIENT
Start: 2023-12-15 | End: 2023-12-15

## 2023-12-15 RX ORDER — ACETAMINOPHEN 325 MG/1
650 TABLET ORAL EVERY 6 HOURS PRN
Status: DISCONTINUED | OUTPATIENT
Start: 2023-12-15 | End: 2023-12-17 | Stop reason: HOSPADM

## 2023-12-15 RX ORDER — ENOXAPARIN SODIUM 100 MG/ML
40 INJECTION SUBCUTANEOUS DAILY
Status: DISCONTINUED | OUTPATIENT
Start: 2023-12-16 | End: 2023-12-17 | Stop reason: HOSPADM

## 2023-12-15 RX ORDER — KETOROLAC TROMETHAMINE 30 MG/ML
15 INJECTION, SOLUTION INTRAMUSCULAR; INTRAVENOUS ONCE
Status: COMPLETED | OUTPATIENT
Start: 2023-12-15 | End: 2023-12-15

## 2023-12-15 RX ADMIN — SODIUM CHLORIDE, POTASSIUM CHLORIDE, SODIUM LACTATE AND CALCIUM CHLORIDE 2136 ML: 600; 310; 30; 20 INJECTION, SOLUTION INTRAVENOUS at 05:06

## 2023-12-15 RX ADMIN — ACETAMINOPHEN 1000 MG: 500 TABLET, FILM COATED ORAL at 05:06

## 2023-12-15 RX ADMIN — PREDNISONE 40 MG: 20 TABLET ORAL at 08:14

## 2023-12-15 RX ADMIN — IOHEXOL 40 ML: 350 INJECTION, SOLUTION INTRAVENOUS at 07:30

## 2023-12-15 RX ADMIN — IPRATROPIUM BROMIDE AND ALBUTEROL SULFATE 3 ML: 2.5; .5 SOLUTION RESPIRATORY (INHALATION) at 16:29

## 2023-12-15 RX ADMIN — VERAPAMIL HYDROCHLORIDE 120 MG: 120 CAPSULE, DELAYED RELEASE PELLETS ORAL at 17:22

## 2023-12-15 RX ADMIN — KETOROLAC TROMETHAMINE 15 MG: 30 INJECTION, SOLUTION INTRAMUSCULAR; INTRAVENOUS at 05:05

## 2023-12-15 RX ADMIN — MOMETASONE FUROATE AND FORMOTEROL FUMARATE DIHYDRATE 2 PUFF: 200; 5 AEROSOL RESPIRATORY (INHALATION) at 20:15

## 2023-12-15 RX ADMIN — ACETAMINOPHEN 650 MG: 325 TABLET, FILM COATED ORAL at 17:21

## 2023-12-15 RX ADMIN — OSELTAMIVIR PHOSPHATE 75 MG: 75 CAPSULE ORAL at 08:14

## 2023-12-15 ASSESSMENT — LIFESTYLE VARIABLES
EVER HAD A DRINK FIRST THING IN THE MORNING TO STEADY YOUR NERVES TO GET RID OF A HANGOVER: NO
TOTAL SCORE: 0
HAVE YOU EVER FELT YOU SHOULD CUT DOWN ON YOUR DRINKING: NO
AVERAGE NUMBER OF DAYS PER WEEK YOU HAVE A DRINK CONTAINING ALCOHOL: 0
TOTAL SCORE: 0
CONSUMPTION TOTAL: NEGATIVE
TOTAL SCORE: 0
EVER FELT BAD OR GUILTY ABOUT YOUR DRINKING: NO
ON A TYPICAL DAY WHEN YOU DRINK ALCOHOL HOW MANY DRINKS DO YOU HAVE: 0
HAVE PEOPLE ANNOYED YOU BY CRITICIZING YOUR DRINKING: NO
ALCOHOL_USE: NO
HOW MANY TIMES IN THE PAST YEAR HAVE YOU HAD 5 OR MORE DRINKS IN A DAY: 0

## 2023-12-15 ASSESSMENT — PATIENT HEALTH QUESTIONNAIRE - PHQ9
2. FEELING DOWN, DEPRESSED, IRRITABLE, OR HOPELESS: NOT AT ALL
1. LITTLE INTEREST OR PLEASURE IN DOING THINGS: NOT AT ALL
2. FEELING DOWN, DEPRESSED, IRRITABLE, OR HOPELESS: NOT AT ALL
SUM OF ALL RESPONSES TO PHQ9 QUESTIONS 1 AND 2: 0
1. LITTLE INTEREST OR PLEASURE IN DOING THINGS: NOT AT ALL
SUM OF ALL RESPONSES TO PHQ9 QUESTIONS 1 AND 2: 0

## 2023-12-15 ASSESSMENT — ENCOUNTER SYMPTOMS
BLURRED VISION: 0
HEADACHES: 0
HEARTBURN: 0
NAUSEA: 0
WHEEZING: 1
CHILLS: 0
SHORTNESS OF BREATH: 0
COUGH: 1
DIZZINESS: 0
MYALGIAS: 1
SORE THROAT: 0
DOUBLE VISION: 0
CHILLS: 1
SHORTNESS OF BREATH: 1
PALPITATIONS: 0
SPUTUM PRODUCTION: 0
NECK PAIN: 0

## 2023-12-15 ASSESSMENT — COPD QUESTIONNAIRES
DURING THE PAST 4 WEEKS HOW MUCH DID YOU FEEL SHORT OF BREATH: SOME OF THE TIME
DO YOU EVER COUGH UP ANY MUCUS OR PHLEGM?: NO/ONLY WITH OCCASIONAL COLDS OR INFECTIONS
COPD SCREENING SCORE: 3
HAVE YOU SMOKED AT LEAST 100 CIGARETTES IN YOUR ENTIRE LIFE: NO/DON'T KNOW

## 2023-12-15 ASSESSMENT — FIBROSIS 4 INDEX: FIB4 SCORE: 1.14

## 2023-12-15 ASSESSMENT — PAIN DESCRIPTION - PAIN TYPE
TYPE: ACUTE PAIN
TYPE: ACUTE PAIN

## 2023-12-15 NOTE — ASSESSMENT & PLAN NOTE
Tamiflu 30mg BID x 10 doses  RT protocol, Sravanthi  Patient reports feeling much improved after nebulizers  CTA chest ordered in ED negative for PE, negative for pneumonia/consolidation.

## 2023-12-15 NOTE — HOSPITAL COURSE
Serenity Howe is a 73 y.o. female who presented 12/15/2023 with fever, shortness of breath and cough.  Patient has a history of hypertension and asthma for COPD.      The last 3 days she has been feeling ill with cough, shortness of breath, general malaise and myalgias.  She was seen in the ED yesterday and discharged however she returns due to feeling worse.  She received some nebulizer treatments and reports that this improved her symptoms.  She was persistently tachycardic in the ED raising concern for possible PE, D-dimer was drawn and was elevated so CTA was ordered.

## 2023-12-15 NOTE — ED NOTES
Assist RN placed US guided IV, repeat blood specimen for troponin, lactic acid and second set of blood culture sent to lab.

## 2023-12-15 NOTE — ED PROVIDER NOTES
ED Provider Note    Scribed for Alex Alanis by Wai Huerta. 12/15/2023  4:28 AM    Primary care provider: Pcp Pt States None  Means of arrival: EMS  History obtained from: Patient  History limited by: None    CHIEF COMPLAINT  Chief Complaint   Patient presents with    Fever    Shortness of Breath    Cough     EXTERNAL RECORDS REVIEWED  Outpatient Notes patient was seen by her primary care office for history of hypertension, on the seventh of this month approximately a week ago    HPI/ROS  LIMITATION TO HISTORY   Select: : None    HPI  Serenity Howe is a 73 y.o. female who presents to the Emergency Department via EMS for evaluation of flu like symptoms onset a couple of days ago. The patient reports associated fever, cough, and shortness of breath, but denies any vomiting, diarrhea, leg swelling, or chest pain. The patient states she was here yesterday where an x-ray was done and she was given Motrin before being discharged, but her symptoms have been getting worse. She reports a history of COPD and hypertension. The patient denies smoking tobacco, using drugs, or drinking alcohol.     REVIEW OF SYSTEMS  As above, all other systems reviewed and are negative.   See HPI for further details.     PAST MEDICAL HISTORY   has a past medical history of ASTHMA, Cancer (HCC), Gout, History of breast cancer (07/29/2015), Hypertension, Shoulder pain, right (07/29/2015), and Tremor (07/29/2015).  SURGICAL HISTORY   has a past surgical history that includes mastectomy and gyn surgery.  SOCIAL HISTORY  Social History     Tobacco Use    Smoking status: Never    Smokeless tobacco: Never   Vaping Use    Vaping Use: Never used   Substance Use Topics    Alcohol use: No    Drug use: No      Social History     Substance and Sexual Activity   Drug Use No     FAMILY HISTORY  Family History   Problem Relation Age of Onset    Hyperlipidemia Mother     No Known Problems Father     No Known Problems Sister     No Known  "Problems Brother     No Known Problems Daughter     No Known Problems Daughter     No Known Problems Son      CURRENT MEDICATIONS  Home Medications    **Home medications have not yet been reviewed for this encounter**       ALLERGIES  Allergies   Allergen Reactions    Shellfish Allergy Hives       PHYSICAL EXAM    VITAL SIGNS:   Vitals:    12/15/23 0420 12/15/23 0450 12/15/23 0500 12/15/23 0514   BP: (!) 150/70  (!) 166/69 (!) 179/84   Pulse: (!) 123 (!) 140 (!) 116 (!) 114   Resp: (!) 26 (!) 26 (!) 22 (!) 22   Temp: 38 °C (100.4 °F)      TempSrc: Oral      SpO2: 91% 89% 91% 92%   Weight: 71.2 kg (157 lb)      Height: 1.585 m (5' 2.4\")        Vitals: My interpretation: hypertensive, tachycardic, afebrile, not hypoxic    Reinterpretation of vitals: Improving    Cardiac Monitor Interpretation: The cardiac monitor revealed normal Sinus Rhythm with tachycardia as interpreted by me. The cardiac monitor was ordered secondary to the patient's history of tachycardia and to monitor for dysrhythmia and/or tachycardia.    PE:   Gen: Appears acutely uncomfortable   ENT: Mucous membranes moist, posterior pharynx clear, uvula midline, nares patent bilaterally   Neck: Supple, FROM  Pulmonary: Lungs are clear to auscultation bilaterally. Tachypnic  CV:  Tachycardic with regular rhythm, no murmur appreciated, pulses 2+ in both upper and lower extremities  Abdomen: soft, NT/ND; no rebound/guarding  : no CVA or suprapubic tenderness   Neuro: A&Ox4 (person, place, time, situation), speech fluent, gait steady, no focal deficits appreciated  Skin: No rash or lesions.  No pallor or jaundice.  No cyanosis.  Warm and dry.    DIAGNOSTIC STUDIES / PROCEDURES    LABS  Results for orders placed or performed during the hospital encounter of 12/15/23   Lactic acid (lactate)   Result Value Ref Range    Lactic Acid 2.2 (H) 0.5 - 2.0 mmol/L   CBC With Differential   Result Value Ref Range    WBC 18.1 (H) 4.8 - 10.8 K/uL    RBC 4.25 4.20 - 5.40 " M/uL    Hemoglobin 14.7 12.0 - 16.0 g/dL    Hematocrit 41.5 37.0 - 47.0 %    MCV 97.6 81.4 - 97.8 fL    MCH 34.6 (H) 27.0 - 33.0 pg    MCHC 35.4 32.2 - 35.5 g/dL    RDW 45.6 35.9 - 50.0 fL    Platelet Count 258 164 - 446 K/uL    MPV 8.9 (L) 9.0 - 12.9 fL    Neutrophils-Polys 87.40 (H) 44.00 - 72.00 %    Lymphocytes 6.40 (L) 22.00 - 41.00 %    Monocytes 4.70 0.00 - 13.40 %    Eosinophils 0.50 0.00 - 6.90 %    Basophils 0.20 0.00 - 1.80 %    Immature Granulocytes 0.80 0.00 - 0.90 %    Nucleated RBC 0.00 0.00 - 0.20 /100 WBC    Neutrophils (Absolute) 15.85 (H) 1.82 - 7.42 K/uL    Lymphs (Absolute) 1.16 1.00 - 4.80 K/uL    Monos (Absolute) 0.85 0.00 - 0.85 K/uL    Eos (Absolute) 0.09 0.00 - 0.51 K/uL    Baso (Absolute) 0.04 0.00 - 0.12 K/uL    Immature Granulocytes (abs) 0.14 (H) 0.00 - 0.11 K/uL    NRBC (Absolute) 0.00 K/uL   Comp Metabolic Panel   Result Value Ref Range    Sodium 136 135 - 145 mmol/L    Potassium 3.5 (L) 3.6 - 5.5 mmol/L    Chloride 100 96 - 112 mmol/L    Co2 22 20 - 33 mmol/L    Anion Gap 14.0 7.0 - 16.0    Glucose 152 (H) 65 - 99 mg/dL    Bun 20 8 - 22 mg/dL    Creatinine 1.23 0.50 - 1.40 mg/dL    Calcium 9.6 8.5 - 10.5 mg/dL    Correct Calcium 9.8 8.5 - 10.5 mg/dL    AST(SGOT) 20 12 - 45 U/L    ALT(SGPT) 18 2 - 50 U/L    Alkaline Phosphatase 77 30 - 99 U/L    Total Bilirubin 0.8 0.1 - 1.5 mg/dL    Albumin 3.7 3.2 - 4.9 g/dL    Total Protein 7.3 6.0 - 8.2 g/dL    Globulin 3.6 (H) 1.9 - 3.5 g/dL    A-G Ratio 1.0 g/dL   D-DIMER   Result Value Ref Range    D-Dimer 1.28 (H) 0.00 - 0.50 ug/mL (FEU)   proBrain Natriuretic Peptide, NT   Result Value Ref Range    NT-proBNP 323 (H) 0 - 125 pg/mL   TROPONIN   Result Value Ref Range    Troponin T 10 6 - 19 ng/L   ESTIMATED GFR   Result Value Ref Range    GFR (CKD-EPI) 46 (A) >60 mL/min/1.73 m 2   EKG   Result Value Ref Range    Report       [facility] Emergency Dept.    Test Date:  2023-12-15  Pt Name:    STEPHEN BETANCUR                 Department: ER  MRN:        [id number]                      Room:        13  Gender:     Female                       Technician: [id number]  :        [id number]                   Requested By:ER TRIAGE PROTOCOL  Order #:    [id number]                    Reading MD: Alex Alanis    Measurements  Intervals                                Axis  Rate:       122                          P:          43  HI:         158                          QRS:        31  QRSD:       80                           T:          55  QT:         305  QTc:        435    Interpretive Statements  Sinus tachycardia  Minimal ST depression, lateral leads  Compared to ECG 2023 13:50:23  Sinus rhythm no longer present  Possible ischemia no longer present  ST (T wave) deviation still present  Electronically Signed On 12- 04:53:41 PST by Alex Alanis     POC CoV-2, FLU A/B, RSV by PCR   Result Value Ref Range    POC Influenza A RNA, PCR POSITIVE (A) Negative    POC Influenza B RNA, PCR Negative Negative    POC RSV, by PCR Negative Negative    POC SARS-CoV-2, PCR NotDetected       All labs reviewed by me. Labs were compared to prior labs if they were available. Significant for lactic acidosis of 2.2, leukocytosis of 18, no anemia, normal electrolytes, mild hyperglycemia, normal liver enzymes, normal bilirubin, D-dimer unfortunately elevated at 1.28, BNP and troponin negative.  Positive influenza, negative COVID and RSV.    RADIOLOGY  I have independently interpreted the diagnostic imaging associated with this visit and am waiting the final reading from the radiologist.   My preliminary interpretation is a follows: No significant cardiomegaly, no focal infiltrates  Radiologist interpretation is as follows:  DX-CHEST-PORTABLE (1 VIEW)   Final Result         1.  Left basilar atelectasis, no focal infiltrate   2.  Atherosclerosis      CT-CTA CHEST PULMONARY ARTERY W/ RECONS    (Results Pending)     COURSE & MEDICAL DECISION  MAKING  Nursing notes, VS, PMSFHx, labs, imaging, EKG reviewed in chart.    Heart Score: Low     ED Observation Status? No; Patient does not meet criteria for ED Observation.     Ddx: Flu, COVID, RSV, PE, MI, pneumonia    MDM: 4:28 AM Serenity Howe is a 73 y.o. female who presented with acute worsening flulike symptoms for the past few days.  She was seen here yesterday and diagnosed with influenza A but appropriate for discharge at that time.  Despite appropriate outpatient management patient returns with persistent symptoms of bodyaches, fevers, chills, nonproductive cough, shortness of breath.  She is extremely miserable at home and has uncontrolled pain and wishes for admission.  Vital signs here are concerning for hypertension, tachypnea, borderline hypoxia, fever with 100.4 Fahrenheit.  She was started on 30 cc/kg bolus of IV fluids.  Toradol, Zofran administered.  EKG ordered as patient was tachycardic to 140 with ambulation and thankfully shows sinus tachycardia but no ischemic changes.  Chest x-ray ordered on my independent interpretation shows no signs of concomitant pneumonia, radiologist agrees.  Initiated laboratory workup as well.  All labs reviewed by me. Labs were compared to prior labs if they were available. Significant for lactic acidosis of 2.2, leukocytosis of 18, no anemia, normal electrolytes, mild hyperglycemia, normal liver enzymes, normal bilirubin, D-dimer unfortunately elevated at 1.28, BNP and troponin negative.  Positive influenza, negative COVID and RSV.  Patient will be sent for CTA PE study due to borderline hypoxia, tachycardia, elevated D-dimer and subjective shortness of breath.  CTA PE study shows pending at time of admission to the hospitalist team.  Hospitalist will follow-up on results and discussed with them the pending CT PE study they verbalized understanding plan for follow-up.  At this time I do have fairly low suspicion considering patient's presentation,  positive flu swab, and low D-dimer, but they will follow-up on results.  Ultimately I think patient would benefit from admission considering her abnormal vital signs, lab derangements with leukocytosis, lactic acidosis.  She is amenable to admission.    ADDITIONAL PROBLEM LIST AND DISPOSITION    I have discussed management of the patient with the following physicians and JOSSELIN's:  Hospitalist     Discussion of management with other QHP or appropriate source(s): None     Barriers to care at this time, including but not limited to: Patient does not have established PCP.     Decision tools and prescription drugs considered including, but not limited to: D-dimer elevated  .    FINAL IMPRESSION  1. Influenza A Acute   2. Upper respiratory tract infection, unspecified type Acute   3. Fever, unspecified fever cause Acute   4. Sinus tachycardia Acute   5. Dehydration Acute      Wai ZIMMERMAN ([place]), am scribing for, and in the presence of, Alex Alanis.    Electronically signed by: Wai Huerta ([place]), 12/15/2023    IAlex personally performed the services described in this documentation, as scribed by Wai Huerta in my presence, and it is both accurate and complete.    The note accurately reflects work and decisions made by me.  Alex Alanis  12/15/2023  5:20 AM

## 2023-12-15 NOTE — PROGRESS NOTES
[facility] Medicine Daily Progress Note    Date of Service  12/15/2023    Chief Complaint  Serenity Howe is a 73 y.o. female admitted 12/15/2023 with cough and shortness of breath    Hospital Course  Serenity Howe is a 73 y.o. female who presented 12/15/2023 with fever, shortness of breath and cough.  Patient has a history of hypertension and asthma for COPD.      The last 3 days she has been feeling ill with cough, shortness of breath, general malaise and myalgias.  She was seen in the ED yesterday and discharged however she returns due to feeling worse.  She received some nebulizer treatments and reports that this improved her symptoms.  She was persistently tachycardic in the ED raising concern for possible PE, D-dimer was drawn and was elevated so CTA was ordered.    Interval Problem Update  12/15: Feeling improved after her respiratory treatment. Wheezing minimal on exam, good air movement, no increased work of breathing. Has albuterol PRN available. Resume home inhalers.     CTA reassuring, no PE or consolidation/infiltrate reported. Does have some atelectasis. Pulmonary nodule and left hepatic lobe lesion noted, may need follow up outpatient.       I have discussed this patient's plan of care and discharge plan at IDT rounds today with Case Management, Nursing, Nursing leadership, and other members of the IDT team.    Consultants/Specialty  none    Code Status  Prior    Disposition  The patient is not medically cleared for discharge to home or a post-acute facility.  Anticipate discharge to: home with close outpatient follow-up    I have placed the appropriate orders for post-discharge needs.    Review of Systems  Review of Systems   Constitutional:  Positive for malaise/fatigue. Negative for chills.   HENT:  Negative for congestion and sore throat.    Eyes:  Negative for blurred vision and double vision.   Respiratory:  Positive for cough and wheezing. Negative for sputum production and  shortness of breath.    Cardiovascular:  Negative for chest pain and palpitations.   Gastrointestinal:  Negative for heartburn and nausea.   Genitourinary:  Negative for dysuria and urgency.   Musculoskeletal:  Positive for myalgias. Negative for neck pain.   Neurological:  Negative for dizziness and headaches.        Physical Exam  Temp:  [37.2 °C (99 °F)-38 °C (100.4 °F)] 37.7 °C (99.8 °F)  Pulse:  [102-140] 102  Resp:  [16-26] 18  BP: (128-179)/(57-84) 136/66  SpO2:  [89 %-95 %] 95 %    Physical Exam  Constitutional:       General: She is not in acute distress.     Appearance: She is obese. She is not toxic-appearing.   HENT:      Head: Normocephalic and atraumatic.      Nose: Nose normal.      Mouth/Throat:      Mouth: Mucous membranes are moist.      Pharynx: Oropharynx is clear.   Eyes:      Extraocular Movements: Extraocular movements intact.      Conjunctiva/sclera: Conjunctivae normal.   Cardiovascular:      Rate and Rhythm: Normal rate and regular rhythm.      Pulses: Normal pulses.      Heart sounds: Normal heart sounds.   Pulmonary:      Effort: Pulmonary effort is normal. No accessory muscle usage.      Breath sounds: No decreased air movement. Wheezing (mild) present.   Abdominal:      General: Abdomen is flat.      Palpations: Abdomen is soft.   Musculoskeletal:         General: Normal range of motion.      Cervical back: Neck supple. No rigidity.   Skin:     General: Skin is warm and dry.      Capillary Refill: Capillary refill takes less than 2 seconds.   Neurological:      General: No focal deficit present.      Mental Status: She is alert and oriented to person, place, and time.       Fluids    Intake/Output Summary (Last 24 hours) at 12/15/2023 1242  Last data filed at 12/15/2023 0900  Gross per 24 hour   Intake 300 ml   Output --   Net 300 ml       Laboratory  Recent Labs     12/14/23  1417 12/15/23  0435   WBC 10.0 18.1*   RBC 4.26 4.25   HEMOGLOBIN 14.5 14.7   HEMATOCRIT 41.6 41.5   MCV 97.7  97.6   MCH 34.0* 34.6*   MCHC 34.9 35.4   RDW 46.5 45.6   PLATELETCT 276 258   MPV 9.0 8.9*     Recent Labs     12/14/23  1417 12/15/23  0435   SODIUM 136 136   POTASSIUM 3.7 3.5*   CHLORIDE 101 100   CO2 24 22   GLUCOSE 101* 152*   BUN 23* 20   CREATININE 1.18 1.23   CALCIUM 9.8 9.6       Imaging  CT-CTA CHEST PULMONARY ARTERY W/ RECONS   Final Result         1.  No pulmonary embolus appreciated.   2.  Irregular hepatic contour favoring changes of cirrhosis   3.  Low-density left hepatic lobe lesion, indeterminate, recommend follow-up 3 phase CT liver for further characterization.   4.  Enlargement main pulmonary artery, consider pulmonary arterial hypertension.   5.  Atherosclerosis and atherosclerotic coronary artery disease.   6.  Pulmonary nodule, see nodule follow-up recommendations below.      Fleischner Society pulmonary nodule recommendations:      Low Risk: No routine follow-up      High Risk: Optional CT at 12 months      Comments: Nodules less than 6 mm do not require routine follow-up, but certain patients at high risk with suspicious nodule morphology, upper lobe location, or both may warrant 12-month follow-up.      Low Risk - Minimal or absent history of smoking and of other known risk factors.      High Risk - History of smoking or of other known risk factors.      Note: These recommendations do not apply to lung cancer screening, patients with immunosuppression, or patients with known primary cancer.      Fleischner Society 2017 Guidelines for Management of Incidentally Detected Pulmonary Nodules in Adults         DX-CHEST-PORTABLE (1 VIEW)   Final Result         1.  Left basilar atelectasis, no focal infiltrate   2.  Atherosclerosis           Assessment/Plan  * Influenza A- (present on admission)  Assessment & Plan  Tamiflu 30mg BID x 10 doses  RT protocol, Sravanthi  Patient reports feeling much improved after nebulizers  CTA chest ordered in ED negative for PE, negative for  pneumonia/consolidation.     Moderate persistent asthma with acute exacerbation- (present on admission)  Assessment & Plan  Wheezing on exam, in setting of influenza A  RT protocol, nebulizers, continue home inhalers  Will treat with prednisone as well    Stage 3a chronic kidney disease (HCC)- (present on admission)  Assessment & Plan  Avoid nephrotoxins, renally dose meds    Essential hypertension- (present on admission)  Assessment & Plan  Continue home antihypertensives       VTE prophylaxis:    enoxaparin ppx    I have performed a physical exam and reviewed and updated ROS and Plan today (12/15/2023). In review of yesterday's note (12/14/2023), there are no changes except as documented above.

## 2023-12-15 NOTE — ED NOTES
Patient report to RANDY Jennings, patient AAO X4, respirations even and unlabored on room air, standard fall interventions in place, patient on droplet precautions for influenza. Awaiting bed assignment

## 2023-12-15 NOTE — ED NOTES
Patient ambulated on room air , maintaining ambulatory spo2 between 89% to 90%, , dyspneic on few steps, assisted back to room. ERP notified.

## 2023-12-15 NOTE — PROGRESS NOTES
Patient arrived to unit via transport. Patient A&O x4 and walked to her bed. Patient used the restroom and all needs were met at this time. Reached out to provider for a diet.

## 2023-12-15 NOTE — ED PROVIDER NOTES
ED Provider Note    CHIEF COMPLAINT  Chief Complaint   Patient presents with    Cough     Ongoing x 3 days with yellow sputum. Pt reports subjective fever last night. Pt reports cough is causing her to be short of breath.        HPI/ROS    Serenity Howe is a 73 y.o. female who presents with a cough.  The patient states has been sick over the last 3 days.  She says she has a productive cough.  She states she is also subjective fevers as well as some malaise and myalgias.  She has not had any vomiting.  She does have some slight shortness of breath.  She has not had any diarrhea.  She is unaware of any sick contacts.    PAST MEDICAL HISTORY   has a past medical history of ASTHMA, Cancer (HCC), Gout, History of breast cancer (07/29/2015), Hypertension, Shoulder pain, right (07/29/2015), and Tremor (07/29/2015).    SURGICAL HISTORY   has a past surgical history that includes mastectomy and gyn surgery.    FAMILY HISTORY  Family History   Problem Relation Age of Onset    Hyperlipidemia Mother     No Known Problems Father     No Known Problems Sister     No Known Problems Brother     No Known Problems Daughter     No Known Problems Daughter     No Known Problems Son        SOCIAL HISTORY  Social History     Tobacco Use    Smoking status: Never    Smokeless tobacco: Never   Vaping Use    Vaping Use: Never used   Substance and Sexual Activity    Alcohol use: No    Drug use: No    Sexual activity: Never     Partners: Male       CURRENT MEDICATIONS  Home Medications       Reviewed by Heather Mcdowell R.N. (Registered Nurse) on 12/14/23 at 1342  Med List Status: Partial     Medication Last Dose Status   acetaminophen (TYLENOL) 325 MG Tab  Active   albuterol (PROVENTIL) 2.5mg/3ml Nebu Soln solution for nebulization  Active   albuterol 108 (90 Base) MCG/ACT Aero Soln inhalation aerosol  Active   aspirin (ASA) 81 MG CHEW  Active   benzonatate (TESSALON) 100 MG Cap  Active   Calcium Carb-Cholecalciferol (CALTRATE 600+D3)  "600-800 MG-UNIT Tab  Active   colchicine (COLCRYS) 0.6 MG Tab  Active   cyclobenzaprine (FLEXERIL) 5 mg tablet  Active   Dextromethorphan-guaiFENesin (ROBITUSSIN COUGH+CHEST PAULO DM) 5-100 MG/5ML Liquid  Active   fluticasone-salmeterol (ADVAIR) 250-50 MCG/ACT AEROSOL POWDER, BREATH ACTIVATED  Active   lidocaine (LIDODERM) 5 % Patch  Active   meloxicam (MOBIC) 15 MG tablet  Active   methylPREDNISolone (MEDROL DOSEPAK) 4 MG Tablet Therapy Pack  Active   nystatin (MYCOSTATIN) 457660 UNIT/GM Cream topical cream  Active   potassium chloride ER (KLOR-CON) 10 MEQ tablet  Active   valsartan-hydrochlorothiazide (DIOVAN-HCT) 80-12.5 MG per tablet  Active   verapamil ER (CALAN SR) 120 MG CAPSULE SR 24 HR  Active                    ALLERGIES  Allergies   Allergen Reactions    Shellfish Allergy Hives       PHYSICAL EXAM  VITAL SIGNS: BP (!) 190/86   Pulse 85   Temp 35.9 °C (96.7 °F) (Temporal)   Resp 19   Ht 1.575 m (5' 2\")   Wt 71.8 kg (158 lb 4.6 oz)   SpO2 94%   BMI 28.95 kg/m²    In general the patient appears ill but nontoxic    Ears tympanic membranes retracted bilaterally, nares have swollen turbinates, oropharynx nonerythematous    Pulmonary the patient's lungs are clear to auscultation bilaterally with no wheezing, rhonchi, no rales    Cardiovascular S1-S2 with a regular rate and rhythm    GI abdomen is soft    Skin no rashes, pallor, no jaundice    Extremities no edema    Neurologic examination is grossly intact    DIAGNOSTIC STUDIES   Results for orders placed or performed during the hospital encounter of 12/14/23   CBC with Differential   Result Value Ref Range    WBC 10.0 4.8 - 10.8 K/uL    RBC 4.26 4.20 - 5.40 M/uL    Hemoglobin 14.5 12.0 - 16.0 g/dL    Hematocrit 41.6 37.0 - 47.0 %    MCV 97.7 81.4 - 97.8 fL    MCH 34.0 (H) 27.0 - 33.0 pg    MCHC 34.9 32.2 - 35.5 g/dL    RDW 46.5 35.9 - 50.0 fL    Platelet Count 276 164 - 446 K/uL    MPV 9.0 9.0 - 12.9 fL    Neutrophils-Polys 66.90 44.00 - 72.00 %    " Lymphocytes 20.10 (L) 22.00 - 41.00 %    Monocytes 10.70 0.00 - 13.40 %    Eosinophils 1.60 0.00 - 6.90 %    Basophils 0.40 0.00 - 1.80 %    Immature Granulocytes 0.30 0.00 - 0.90 %    Nucleated RBC 0.00 0.00 - 0.20 /100 WBC    Neutrophils (Absolute) 6.66 1.82 - 7.42 K/uL    Lymphs (Absolute) 2.00 1.00 - 4.80 K/uL    Monos (Absolute) 1.07 (H) 0.00 - 0.85 K/uL    Eos (Absolute) 0.16 0.00 - 0.51 K/uL    Baso (Absolute) 0.04 0.00 - 0.12 K/uL    Immature Granulocytes (abs) 0.03 0.00 - 0.11 K/uL    NRBC (Absolute) 0.00 K/uL   Comp Metabolic Panel   Result Value Ref Range    Sodium 136 135 - 145 mmol/L    Potassium 3.7 3.6 - 5.5 mmol/L    Chloride 101 96 - 112 mmol/L    Co2 24 20 - 33 mmol/L    Anion Gap 11.0 7.0 - 16.0    Glucose 101 (H) 65 - 99 mg/dL    Bun 23 (H) 8 - 22 mg/dL    Creatinine 1.18 0.50 - 1.40 mg/dL    Calcium 9.8 8.5 - 10.5 mg/dL    Correct Calcium 10.0 8.5 - 10.5 mg/dL    AST(SGOT) 17 12 - 45 U/L    ALT(SGPT) 16 2 - 50 U/L    Alkaline Phosphatase 80 30 - 99 U/L    Total Bilirubin 0.4 0.1 - 1.5 mg/dL    Albumin 3.7 3.2 - 4.9 g/dL    Total Protein 7.5 6.0 - 8.2 g/dL    Globulin 3.8 (H) 1.9 - 3.5 g/dL    A-G Ratio 1.0 g/dL   ESTIMATED GFR   Result Value Ref Range    GFR (CKD-EPI) 48 (A) >60 mL/min/1.73 m 2   EKG   Result Value Ref Range    Report       [facility] Emergency Dept.    Test Date:  2023  Pt Name:    STEPHEN BETANCUR                Department: ER  MRN:        [id number]                      Room:  Gender:     Female                       Technician: [id number]  :        [id number]                   Requested By:ER TRIAGE PROTOCOL  Order #:    [id number]                    Reading MD: JONA SUMNER MD    Measurements  Intervals                                Axis  Rate:       87                           P:          59  KS:         139                          QRS:        43  QRSD:       86                           T:          62  QT:         352  QTc:         424    Interpretive Statements  twelve-lead EKG shows a normal sinus rhythm with a ventricular rate of 87,  normal intervals, no ST segment elevation or depression, normal T waves.  Overall no acute ischemic change  Electronically Signed On 12- 16:02:26 PST by JONA SUMNER MD     POC CoV-2, FLU A/B, RSV by PCR   Result Value Ref Range    POC Influenza A RNA, PCR POSITIVE (A) Negative    POC Influenza B RNA, PCR Negative Negative    POC RSV, by PCR Negative Negative    POC SARS-CoV-2, PCR NotDetected        EKG  I have independently interpreted this EKG  Please see my interpretation above    RADIOLOGY  I have independently interpreted the diagnostic imaging associated with this visit and am waiting the final reading from the radiologist.   My preliminary interpretation is as follows: Chest x-ray was reviewed and shows no evidence of a focal process such as pneumonia  Radiologist interpretation:   DX-CHEST-PORTABLE (1 VIEW)    (Results Pending)         COURSE & MEDICAL DECISION MAKING    This is a 73-year-old female who presents to the emergency department with signs and symptoms consistent with a viral process.  Chest x-ray does not show any evidence of pneumonia.  Laboratory analysis is reassuring.  The patient did test positive for influenza A however she is 72 hours into the duration of the illness and therefore Tamiflu would be unlikely to have a positive effect and due to the side effect profile we agreed to hold off on treatment with Tamiflu.  The patient will be treated supportively.  She will stay well-hydrated.  I will write a prescription for Motrin and she will take Tylenol as well as needed.  If the patient does not have significant improvement in 5 to 7 days or if she is acutely worse she will return for repeat examination.    The patient is aware her blood pressure is elevated and she will have this followed up on an outpatient basis.    FINAL DIAGNOSIS  1.  Influenza  2.   Hypertension    Disposition  The patient will be discharged in stable condition       Electronically signed by: Lupillo Simpson M.D., 12/14/2023 4:00 PM

## 2023-12-15 NOTE — ED NOTES
Bedside report received from off going RN/tech: Zehra, assumed care of patient.  POC discussed with patient. Call light within reach, all needs addressed at this time.       Fall risk interventions in place: Patient's personal possessions are with in their safe reach and Place fall risk sign on patient's door (all applicable per [facility] Fall risk assessment)   Continuous monitoring: Cardiac Leads, Pulse Ox, or Blood Pressure  IVF/IV medications: Infusion per MAR (List Med(s)) LR   Oxygen: Room Air  Bedside sitter: Not Applicable   Isolation: Isolation precautions for Droplet (Isolation order)

## 2023-12-15 NOTE — ED TRIAGE NOTES
"Chief Complaint   Patient presents with    Fever    Shortness of Breath    Cough   BIB EMS to [facility] with complain of fever, sob and cough for 1 day, on arrival patient was febrile with oral temp of 100.4F , patient endorses she has asthma and uses albuterol puff but it didn't helped her. Patient AAO X4, on room air spo2 91%, respirations even.     Patient sepsis score 04, ED RN Sepsis protocol initiated. Pt on monitor and in gown, labs drawn and sent.     Medications given en route:  Duo neb x 1    BP (!) 150/70   Pulse (!) 123   Temp 38 °C (100.4 °F) (Oral)   Resp (!) 26   Ht 1.585 m (5' 2.4\")   Wt 71.2 kg (157 lb)   SpO2 91%   BMI 28.35 kg/m²     Past Medical History:   Diagnosis Date    ASTHMA     Cancer (HCC)     Gout     History of breast cancer 2015    Hypertension     Shoulder pain, right 2015    Tremor 2015     Past Surgical History:   Procedure Laterality Date    GYN SURGERY       x1    MASTECTOMY      right       "

## 2023-12-15 NOTE — CARE PLAN
The patient is Stable - Low risk of patient condition declining or worsening    Shift Goals  Clinical Goals: Flu treatment  Patient Goals: resolve SOB  Family Goals: ARMEN    Progress made toward(s) clinical / shift goals:       Problem: Pain - Standard  Goal: Alleviation of pain or a reduction in pain to the patient’s comfort goal  Outcome: Progressing  Note: Patient maintains 0 pain level with interventions in place.     Problem: Knowledge Deficit - Standard  Goal: Patient and family/care givers will demonstrate understanding of plan of care, disease process/condition, diagnostic tests and medications  Outcome: Progressing  Note: Patient updated on plan of care by team members.Pt expresses understanding of care plan and interventions.       Patient is not progressing towards the following goals:

## 2023-12-15 NOTE — H&P
Hospital Medicine History & Physical Note    Date of Service  12/15/2023    Primary Care Physician  Pcp Pt States None      Code Status  Prior    Chief Complaint  Chief Complaint   Patient presents with    Fever    Shortness of Breath    Cough       History of Presenting Illness  Serenity Howe is a 73 y.o. female who presented 12/15/2023 with fever, shortness of breath and cough.  Patient has a history of hypertension and asthma for COPD.  The last 3 days she has been feeling ill with cough, shortness of breath, general malaise and myalgias.  She was seen in the ED yesterday and discharged however she returns due to feeling worse.  She received some nebulizer treatments and reports that this improved her symptoms.  She was persistently tachycardic in the ED raising concern for possible PE, D-dimer was drawn and was elevated so CTA was ordered and is currently pending at time of admission.    I discussed the plan of care with patient.    Review of Systems  Review of Systems   Constitutional:  Positive for chills and malaise/fatigue.   HENT:  Positive for congestion. Negative for sore throat.    Eyes:  Negative for blurred vision and double vision.   Respiratory:  Positive for cough and shortness of breath.    Cardiovascular:  Negative for chest pain and palpitations.   Gastrointestinal:  Negative for heartburn and nausea.   Genitourinary:  Negative for dysuria and urgency.   Musculoskeletal:  Positive for myalgias. Negative for neck pain.   Neurological:  Negative for dizziness and headaches.       Past Medical History   has a past medical history of ASTHMA, Cancer (HCC), Gout, History of breast cancer (07/29/2015), Hypertension, Shoulder pain, right (07/29/2015), and Tremor (07/29/2015).    Surgical History   has a past surgical history that includes mastectomy and gyn surgery.     Family History  family history includes Hyperlipidemia in her mother; No Known Problems in her brother, daughter, daughter,  father, sister, and son.   Family history reviewed with patient. There is no family history that is pertinent to the chief complaint.     Social History   reports that she has never smoked. She has never used smokeless tobacco. She reports that she does not drink alcohol and does not use drugs.    Allergies  Allergies   Allergen Reactions    Shellfish Allergy Hives       Medications  Prior to Admission Medications   Prescriptions Last Dose Informant Patient Reported? Taking?   Calcium Carb-Cholecalciferol (CALTRATE 600+D3) 600-800 MG-UNIT Tab  Patient Yes No   Sig: Take 1 Tablet by mouth every day.   Dextromethorphan-guaiFENesin (ROBITUSSIN COUGH+CHEST PAULO DM) 5-100 MG/5ML Liquid   No No   Sig: Take 20 mL by mouth every 4 hours as needed for cough   acetaminophen (TYLENOL) 325 MG Tab  Patient Yes No   Sig: Take 2 Tablets by mouth every 6 hours as needed for Mild Pain.   albuterol (PROVENTIL) 2.5mg/3ml Nebu Soln solution for nebulization  Patient No No   Sig: Take 3 mL by nebulization every four hours as needed for Shortness of Breath.   albuterol 108 (90 Base) MCG/ACT Aero Soln inhalation aerosol   No No   Sig: Inhale 2 Puffs every 6 hours as needed for Shortness of Breath.   aspirin (ASA) 81 MG CHEW  Patient Yes No   Sig: Chew 1 Tablet every day.   benzonatate (TESSALON) 100 MG Cap   No No   Sig: Take 1 Capsule by mouth 3 times a day as needed for Cough.   colchicine (COLCRYS) 0.6 MG Tab   No No   Sig: Take 2 Tablets by mouth every day.   cyclobenzaprine (FLEXERIL) 5 mg tablet   No No   Sig: Take 1 Tablet by mouth 3 times a day as needed for Muscle Spasms or Moderate Pain.   fluticasone-salmeterol (ADVAIR) 250-50 MCG/ACT AEROSOL POWDER, BREATH ACTIVATED   No No   Sig: Inhale 1 Puff every 12 hours.   ibuprofen (MOTRIN) 600 MG Tab   No No   Sig: Take 1 Tablet by mouth every 6 hours as needed for Fever or Headache.   lidocaine (LIDODERM) 5 % Patch   No No   Sig: Place 1 Patch on the skin every 24 hours.   meloxicam  (MOBIC) 15 MG tablet   No No   Sig: Take 1 Tablet by mouth every day.   methylPREDNISolone (MEDROL DOSEPAK) 4 MG Tablet Therapy Pack   No No   Sig: Sig: Per instructions on pack   nystatin (MYCOSTATIN) 612366 UNIT/GM Cream topical cream   No No   Sig: Apply 1 g topically 2 times a day.   potassium chloride ER (KLOR-CON) 10 MEQ tablet   No No   Sig: Take 1 Tablet by mouth 2 times a day.   valsartan-hydrochlorothiazide (DIOVAN-HCT) 80-12.5 MG per tablet   No No   Sig: TAKE 1 TABLET BY MOUTH EVERY DAY   verapamil ER (CALAN SR) 120 MG CAPSULE SR 24 HR   No No   Sig: TAKE 1 CAPSULE BY MOUTH EVERY DAY      Facility-Administered Medications: None       Physical Exam  Temp:  [35.9 °C (96.7 °F)-38 °C (100.4 °F)] 38 °C (100.4 °F)  Pulse:  [] 114  Resp:  [15-26] 22  BP: (150-212)/(69-97) 179/84  SpO2:  [89 %-95 %] 92 %  Blood Pressure : (!) 179/84   Temperature: 38 °C (100.4 °F)   Pulse: (!) 114   Respiration: (!) 22   Pulse Oximetry: 92 %       Physical Exam  Constitutional:       General: She is not in acute distress.     Appearance: She is not toxic-appearing.   HENT:      Head: Normocephalic and atraumatic.      Nose: Nose normal.      Mouth/Throat:      Mouth: Mucous membranes are moist.      Pharynx: Oropharynx is clear.   Eyes:      Extraocular Movements: Extraocular movements intact.      Conjunctiva/sclera: Conjunctivae normal.   Cardiovascular:      Rate and Rhythm: Normal rate and regular rhythm.      Pulses: Normal pulses.      Heart sounds: Normal heart sounds.   Pulmonary:      Effort: Pulmonary effort is normal.      Breath sounds: Wheezing present.   Abdominal:      General: Abdomen is flat.      Palpations: Abdomen is soft.   Musculoskeletal:         General: No swelling or deformity.      Cervical back: Neck supple. No rigidity.   Skin:     General: Skin is warm and dry.   Neurological:      General: No focal deficit present.      Mental Status: She is oriented to person, place, and time.          Laboratory:  Recent Labs     12/14/23  1417 12/15/23  0435   WBC 10.0 18.1*   RBC 4.26 4.25   HEMOGLOBIN 14.5 14.7   HEMATOCRIT 41.6 41.5   MCV 97.7 97.6   MCH 34.0* 34.6*   MCHC 34.9 35.4   RDW 46.5 45.6   PLATELETCT 276 258   MPV 9.0 8.9*     Recent Labs     12/14/23  1417 12/15/23  0435   SODIUM 136 136   POTASSIUM 3.7 3.5*   CHLORIDE 101 100   CO2 24 22   GLUCOSE 101* 152*   BUN 23* 20   CREATININE 1.18 1.23   CALCIUM 9.8 9.6     Recent Labs     12/14/23  1417 12/15/23  0435   ALTSGPT 16 18   ASTSGOT 17 20   ALKPHOSPHAT 80 77   TBILIRUBIN 0.4 0.8   GLUCOSE 101* 152*         Recent Labs     12/15/23  0435   NTPROBNP 323*         Recent Labs     12/15/23  0435   TROPONINT 10       Imaging:  DX-CHEST-PORTABLE (1 VIEW)   Final Result         1.  Left basilar atelectasis, no focal infiltrate   2.  Atherosclerosis      CT-CTA CHEST PULMONARY ARTERY W/ RECONS    (Results Pending)       Assessment/Plan:  Justification for Admission Status  I anticipate this patient will require at least two midnights for appropriate medical management, necessitating inpatient admission because influenza infection and exacerbation    Patient will need a Med/Surg bed on MEDICAL service .  The need is secondary to above.    * Influenza A- (present on admission)  Assessment & Plan  Starting Tamiflu  RT protocol, [place]  Patient reports feeling much improved after nebulizers, requesting continued breathing treatments  CTA chest ordered in ED to evaluate for possible PE in setting of tachycardia, shortness of breath and elevated D-dimer.  Pending at time of admission.  Follow-up CT, if PE positive start anticoagulation.  If CT shows pneumonia then consider superimposed bacterial pneumonia and start antibiotics.    Moderate persistent asthma with acute exacerbation- (present on admission)  Assessment & Plan  Wheezing on exam, in setting of influenza A  RT protocol, nebulizers, continue inhalers  Will treat with prednisone as  well    Stage 3a chronic kidney disease (HCC)- (present on admission)  Assessment & Plan  Avoid nephrotoxins, renally dose meds    Essential hypertension- (present on admission)  Assessment & Plan  Continue home antihypertensives        VTE prophylaxis: SCDs/TEDs and enoxaparin ppx

## 2023-12-15 NOTE — ASSESSMENT & PLAN NOTE
Wheezing on exam, in setting of influenza A    12/16 continue steroids  Continue scheduled DuoNeb and as needed  Continue home inhaler  Continue steroids, monitor BP and BG while on steroids

## 2023-12-15 NOTE — DISCHARGE INSTRUCTIONS
Stay well-hydrated and take Tylenol as discussed.  Follow-up with your primary care provider in 10 to 14 days for repeat blood pressure check.

## 2023-12-16 ENCOUNTER — APPOINTMENT (OUTPATIENT)
Dept: RADIOLOGY | Facility: MEDICAL CENTER | Age: 73
End: 2023-12-16
Attending: STUDENT IN AN ORGANIZED HEALTH CARE EDUCATION/TRAINING PROGRAM
Payer: COMMERCIAL

## 2023-12-16 PROBLEM — K76.9 LIVER LESION, LEFT LOBE: Status: ACTIVE | Noted: 2023-12-16

## 2023-12-16 PROBLEM — J18.9 PNEUMONIA: Status: ACTIVE | Noted: 2023-12-16

## 2023-12-16 PROBLEM — Z71.89 ACP (ADVANCE CARE PLANNING): Status: ACTIVE | Noted: 2023-12-16

## 2023-12-16 LAB
ANION GAP SERPL CALC-SCNC: 13 MMOL/L (ref 7–16)
APPEARANCE UR: ABNORMAL
BACTERIA #/AREA URNS HPF: ABNORMAL /HPF
BASOPHILS # BLD AUTO: 0.2 % (ref 0–1.8)
BASOPHILS # BLD: 0.03 K/UL (ref 0–0.12)
BILIRUB UR QL STRIP.AUTO: NEGATIVE
BUN SERPL-MCNC: 24 MG/DL (ref 8–22)
CALCIUM SERPL-MCNC: 9.4 MG/DL (ref 8.5–10.5)
CHLORIDE SERPL-SCNC: 101 MMOL/L (ref 96–112)
CO2 SERPL-SCNC: 21 MMOL/L (ref 20–33)
COLOR UR: YELLOW
CREAT SERPL-MCNC: 1.12 MG/DL (ref 0.5–1.4)
EOSINOPHIL # BLD AUTO: 0 K/UL (ref 0–0.51)
EOSINOPHIL NFR BLD: 0 % (ref 0–6.9)
EPI CELLS #/AREA URNS HPF: ABNORMAL /HPF
ERYTHROCYTE [DISTWIDTH] IN BLOOD BY AUTOMATED COUNT: 46.8 FL (ref 35.9–50)
GFR SERPLBLD CREATININE-BSD FMLA CKD-EPI: 51 ML/MIN/1.73 M 2
GLUCOSE SERPL-MCNC: 96 MG/DL (ref 65–99)
GLUCOSE UR STRIP.AUTO-MCNC: NEGATIVE MG/DL
HCT VFR BLD AUTO: 39.5 % (ref 37–47)
HGB BLD-MCNC: 13.7 G/DL (ref 12–16)
HYALINE CASTS #/AREA URNS LPF: ABNORMAL /LPF
IMM GRANULOCYTES # BLD AUTO: 0.09 K/UL (ref 0–0.11)
IMM GRANULOCYTES NFR BLD AUTO: 0.6 % (ref 0–0.9)
KETONES UR STRIP.AUTO-MCNC: NEGATIVE MG/DL
LEUKOCYTE ESTERASE UR QL STRIP.AUTO: ABNORMAL
LYMPHOCYTES # BLD AUTO: 0.77 K/UL (ref 1–4.8)
LYMPHOCYTES NFR BLD: 4.8 % (ref 22–41)
MCH RBC QN AUTO: 33.9 PG (ref 27–33)
MCHC RBC AUTO-ENTMCNC: 34.7 G/DL (ref 32.2–35.5)
MCV RBC AUTO: 97.8 FL (ref 81.4–97.8)
MICRO URNS: ABNORMAL
MONOCYTES # BLD AUTO: 1.58 K/UL (ref 0–0.85)
MONOCYTES NFR BLD AUTO: 9.9 % (ref 0–13.4)
NEUTROPHILS # BLD AUTO: 13.54 K/UL (ref 1.82–7.42)
NEUTROPHILS NFR BLD: 84.5 % (ref 44–72)
NITRITE UR QL STRIP.AUTO: NEGATIVE
NRBC # BLD AUTO: 0 K/UL
NRBC BLD-RTO: 0 /100 WBC (ref 0–0.2)
PH UR STRIP.AUTO: 5 [PH] (ref 5–8)
PLATELET # BLD AUTO: 220 K/UL (ref 164–446)
PMV BLD AUTO: 9.2 FL (ref 9–12.9)
POTASSIUM SERPL-SCNC: 3.8 MMOL/L (ref 3.6–5.5)
PROCALCITONIN SERPL-MCNC: 0.52 NG/ML
PROT UR QL STRIP: NEGATIVE MG/DL
RBC # BLD AUTO: 4.04 M/UL (ref 4.2–5.4)
RBC # URNS HPF: ABNORMAL /HPF
RBC UR QL AUTO: ABNORMAL
SODIUM SERPL-SCNC: 135 MMOL/L (ref 135–145)
SP GR UR STRIP.AUTO: 1.03
UROBILINOGEN UR STRIP.AUTO-MCNC: 0.2 MG/DL
WBC # BLD AUTO: 16 K/UL (ref 4.8–10.8)
WBC #/AREA URNS HPF: ABNORMAL /HPF

## 2023-12-16 PROCEDURE — 94640 AIRWAY INHALATION TREATMENT: CPT

## 2023-12-16 PROCEDURE — 700111 HCHG RX REV CODE 636 W/ 250 OVERRIDE (IP): Mod: JZ

## 2023-12-16 PROCEDURE — 84145 PROCALCITONIN (PCT): CPT

## 2023-12-16 PROCEDURE — 85025 COMPLETE CBC W/AUTO DIFF WBC: CPT

## 2023-12-16 PROCEDURE — A9270 NON-COVERED ITEM OR SERVICE: HCPCS | Performed by: STUDENT IN AN ORGANIZED HEALTH CARE EDUCATION/TRAINING PROGRAM

## 2023-12-16 PROCEDURE — 700102 HCHG RX REV CODE 250 W/ 637 OVERRIDE(OP): Performed by: STUDENT IN AN ORGANIZED HEALTH CARE EDUCATION/TRAINING PROGRAM

## 2023-12-16 PROCEDURE — 94664 DEMO&/EVAL PT USE INHALER: CPT

## 2023-12-16 PROCEDURE — 700102 HCHG RX REV CODE 250 W/ 637 OVERRIDE(OP): Mod: UD | Performed by: STUDENT IN AN ORGANIZED HEALTH CARE EDUCATION/TRAINING PROGRAM

## 2023-12-16 PROCEDURE — 90471 IMMUNIZATION ADMIN: CPT

## 2023-12-16 PROCEDURE — 700117 HCHG RX CONTRAST REV CODE 255: Performed by: STUDENT IN AN ORGANIZED HEALTH CARE EDUCATION/TRAINING PROGRAM

## 2023-12-16 PROCEDURE — 87086 URINE CULTURE/COLONY COUNT: CPT

## 2023-12-16 PROCEDURE — 700101 HCHG RX REV CODE 250

## 2023-12-16 PROCEDURE — 80048 BASIC METABOLIC PNL TOTAL CA: CPT

## 2023-12-16 PROCEDURE — 74160 CT ABDOMEN W/CONTRAST: CPT

## 2023-12-16 PROCEDURE — 700111 HCHG RX REV CODE 636 W/ 250 OVERRIDE (IP): Performed by: STUDENT IN AN ORGANIZED HEALTH CARE EDUCATION/TRAINING PROGRAM

## 2023-12-16 PROCEDURE — 90677 PCV20 VACCINE IM: CPT | Performed by: STUDENT IN AN ORGANIZED HEALTH CARE EDUCATION/TRAINING PROGRAM

## 2023-12-16 PROCEDURE — 99497 ADVNCD CARE PLAN 30 MIN: CPT | Performed by: STUDENT IN AN ORGANIZED HEALTH CARE EDUCATION/TRAINING PROGRAM

## 2023-12-16 PROCEDURE — 81001 URINALYSIS AUTO W/SCOPE: CPT

## 2023-12-16 PROCEDURE — 36415 COLL VENOUS BLD VENIPUNCTURE: CPT

## 2023-12-16 PROCEDURE — 700101 HCHG RX REV CODE 250: Performed by: STUDENT IN AN ORGANIZED HEALTH CARE EDUCATION/TRAINING PROGRAM

## 2023-12-16 PROCEDURE — A9270 NON-COVERED ITEM OR SERVICE: HCPCS

## 2023-12-16 PROCEDURE — A9270 NON-COVERED ITEM OR SERVICE: HCPCS | Mod: UD | Performed by: STUDENT IN AN ORGANIZED HEALTH CARE EDUCATION/TRAINING PROGRAM

## 2023-12-16 PROCEDURE — 700102 HCHG RX REV CODE 250 W/ 637 OVERRIDE(OP)

## 2023-12-16 PROCEDURE — 99233 SBSQ HOSP IP/OBS HIGH 50: CPT | Mod: 25 | Performed by: STUDENT IN AN ORGANIZED HEALTH CARE EDUCATION/TRAINING PROGRAM

## 2023-12-16 PROCEDURE — 770006 HCHG ROOM/CARE - MED/SURG/GYN SEMI*

## 2023-12-16 PROCEDURE — 700111 HCHG RX REV CODE 636 W/ 250 OVERRIDE (IP): Mod: UD | Performed by: STUDENT IN AN ORGANIZED HEALTH CARE EDUCATION/TRAINING PROGRAM

## 2023-12-16 RX ORDER — GUAIFENESIN 200 MG/10ML
10 LIQUID ORAL EVERY 4 HOURS PRN
Status: DISCONTINUED | OUTPATIENT
Start: 2023-12-16 | End: 2023-12-17 | Stop reason: HOSPADM

## 2023-12-16 RX ORDER — LABETALOL HYDROCHLORIDE 5 MG/ML
10 INJECTION, SOLUTION INTRAVENOUS EVERY 6 HOURS PRN
Status: DISCONTINUED | OUTPATIENT
Start: 2023-12-16 | End: 2023-12-17 | Stop reason: HOSPADM

## 2023-12-16 RX ADMIN — MOMETASONE FUROATE AND FORMOTEROL FUMARATE DIHYDRATE 2 PUFF: 200; 5 AEROSOL RESPIRATORY (INHALATION) at 20:36

## 2023-12-16 RX ADMIN — ACETAMINOPHEN 650 MG: 325 TABLET, FILM COATED ORAL at 01:52

## 2023-12-16 RX ADMIN — OSELTAMIVIR 30 MG: 30 CAPSULE ORAL at 18:46

## 2023-12-16 RX ADMIN — BENZONATATE 100 MG: 100 CAPSULE ORAL at 09:27

## 2023-12-16 RX ADMIN — CEFTRIAXONE SODIUM 1000 MG: 10 INJECTION, POWDER, FOR SOLUTION INTRAVENOUS at 16:10

## 2023-12-16 RX ADMIN — IOHEXOL 100 ML: 350 INJECTION, SOLUTION INTRAVENOUS at 21:00

## 2023-12-16 RX ADMIN — PNEUMOCOCCAL 20-VALENT CONJUGATE VACCINE 0.5 ML
2.2; 2.2; 2.2; 2.2; 2.2; 2.2; 2.2; 2.2; 2.2; 2.2; 2.2; 2.2; 2.2; 2.2; 2.2; 2.2; 4.4; 2.2; 2.2; 2.2 INJECTION, SUSPENSION INTRAMUSCULAR at 16:12

## 2023-12-16 RX ADMIN — MOMETASONE FUROATE AND FORMOTEROL FUMARATE DIHYDRATE 2 PUFF: 200; 5 AEROSOL RESPIRATORY (INHALATION) at 09:23

## 2023-12-16 RX ADMIN — OSELTAMIVIR 30 MG: 30 CAPSULE ORAL at 05:06

## 2023-12-16 RX ADMIN — HYDROCHLOROTHIAZIDE 12.5 MG: 25 TABLET ORAL at 05:05

## 2023-12-16 RX ADMIN — ACETAMINOPHEN 650 MG: 325 TABLET, FILM COATED ORAL at 09:27

## 2023-12-16 RX ADMIN — ENOXAPARIN SODIUM 40 MG: 100 INJECTION SUBCUTANEOUS at 18:46

## 2023-12-16 RX ADMIN — GUAIFENESIN 200 MG: 100 SOLUTION ORAL at 16:25

## 2023-12-16 RX ADMIN — ASPIRIN 81 MG: 81 TABLET, CHEWABLE ORAL at 05:06

## 2023-12-16 RX ADMIN — IPRATROPIUM BROMIDE AND ALBUTEROL SULFATE 3 ML: 2.5; .5 SOLUTION RESPIRATORY (INHALATION) at 01:54

## 2023-12-16 RX ADMIN — VALSARTAN 80 MG: 80 TABLET ORAL at 05:05

## 2023-12-16 RX ADMIN — PREDNISONE 40 MG: 20 TABLET ORAL at 05:06

## 2023-12-16 RX ADMIN — VERAPAMIL HYDROCHLORIDE 120 MG: 120 CAPSULE, DELAYED RELEASE PELLETS ORAL at 18:46

## 2023-12-16 ASSESSMENT — PAIN DESCRIPTION - PAIN TYPE
TYPE: ACUTE PAIN

## 2023-12-16 NOTE — PROGRESS NOTES
Bedside report received from Catherine PADILLA. Patient A+Ox4, on room air, droplet precautions for flu. C/O headache, medicated with tylenol. Using call bell approp, bed locked, fall precautions in place

## 2023-12-16 NOTE — PROGRESS NOTES
4 Eyes Skin Assessment Completed by Yandel RN and Katelynn RN.    Head WDL  Ears WDL  Nose WDL  Mouth WDL  Neck WDL  Breast/Chest WDL  Shoulder Blades WDL  Spine WDL  (R) Arm/Elbow/Hand WDL  (L) Arm/Elbow/Hand WDL  Abdomen Redness  Groin WDL  Scrotum/Coccyx/Buttocks WDL  (R) Leg Dryness, flaky  (L) Leg Dryness, flaky  (R) Heel/Foot/Toe WDL  (L) Heel/Foot/Toe WDL          Devices In Places None      Interventions In Place Pillows and Pressure Redistribution Mattress    Possible Skin Injury No    Pictures Uploaded Into Epic N/A  Wound Consult Placed N/A  RN Wound Prevention Protocol Ordered No

## 2023-12-16 NOTE — ASSESSMENT & PLAN NOTE
CT abdomen showed cirrhosis Low-density left hepatic lobe lesion, indeterminate    I ordered a CT liver protocol

## 2023-12-16 NOTE — PROGRESS NOTES
Patient left the floor at this time for CT with transport via wheelchair. Signed consent for IV contrast

## 2023-12-16 NOTE — PROGRESS NOTES
[facility] Medicine Daily Progress Note    Date of Service  12/16/2023    Chief Complaint  Serenity Howe is a 73 y.o. female admitted 12/15/2023 with fever and shortness of breath    Hospital Course  Serenity Howe is a 73 y.o. female who presented 12/15/2023 with fever, shortness of breath and cough.  Patient has a history of hypertension and asthma for COPD.      The last 3 days she has been feeling ill with cough, shortness of breath, general malaise and myalgias.  She was seen in the ED yesterday and discharged however she returns due to feeling worse.  She received some nebulizer treatments and reports that this improved her symptoms.  She was persistently tachycardic in the ED raising concern for possible PE, D-dimer was drawn and was elevated so CTA was ordered.    CTA No PE, cirrhosis Low-density left hepatic lobe lesion, indeterminate, recommend follow-up 3 phase CT liver for further characterization. pulmonary arterial hypertension. 5.5 mm right lower lobe pulmonary nodule    Interval Problem Update  Patient seen examined at bedside  Reported cough and congested    Influenza-continue Tamiflu  Leukocytosis, Procalcitonin 0.52 -concerned superimposed bacteria infection, I started the Rocephin  Asthma exacerbation-continue steroids    I ordered Robitussin as needed    UA positive for UTI-patient is asymptomatic, no need for treatment    CT abdomen concerned of low-density liver lesion, I ordered CT liver with contrast    cre 1.18>1.23>1.12  LA 2.3    I have discussed this patient's plan of care and discharge plan at IDT rounds today with Case Management, Nursing, Nursing leadership, and other members of the IDT team.    Consultants/Specialty      Code Status  Full Code    Disposition  The patient is not medically cleared for discharge to home or a post-acute facility.      I have placed the appropriate orders for post-discharge needs.    Review of Systems  All 12 systems were reviewed and  negative except as mentioned above       Physical Exam  Temp:  [36.2 °C (97.1 °F)-37.1 °C (98.8 °F)] 37.1 °C (98.8 °F)  Pulse:  [88-97] 92  Resp:  [16-20] 20  BP: (127-171)/(62-91) 150/77  SpO2:  [91 %-96 %] 91 %    Physical Exam  Constitutional:       Appearance: She is obese. She is ill-appearing.   HENT:      Head: Normocephalic.      Mouth/Throat:      Mouth: Mucous membranes are moist.   Eyes:      Extraocular Movements: Extraocular movements intact.      Conjunctiva/sclera: Conjunctivae normal.   Cardiovascular:      Rate and Rhythm: Normal rate and regular rhythm.      Pulses: Normal pulses.      Heart sounds: Normal heart sounds.   Pulmonary:      Effort: Respiratory distress present.      Breath sounds: No wheezing or rales.   Musculoskeletal:         General: No swelling or tenderness. Normal range of motion.      Cervical back: Normal range of motion and neck supple.   Skin:     General: Skin is warm.   Neurological:      Mental Status: She is alert and oriented to person, place, and time. Mental status is at baseline.   Psychiatric:         Mood and Affect: Mood normal.         Fluids    Intake/Output Summary (Last 24 hours) at 12/16/2023 1448  Last data filed at 12/16/2023 1000  Gross per 24 hour   Intake 360 ml   Output --   Net 360 ml       Laboratory  Recent Labs     12/14/23  1417 12/15/23  0435 12/16/23  0118   WBC 10.0 18.1* 16.0*   RBC 4.26 4.25 4.04*   HEMOGLOBIN 14.5 14.7 13.7   HEMATOCRIT 41.6 41.5 39.5   MCV 97.7 97.6 97.8   MCH 34.0* 34.6* 33.9*   MCHC 34.9 35.4 34.7   RDW 46.5 45.6 46.8   PLATELETCT 276 258 220   MPV 9.0 8.9* 9.2     Recent Labs     12/14/23  1417 12/15/23  0435 12/16/23  0118   SODIUM 136 136 135   POTASSIUM 3.7 3.5* 3.8   CHLORIDE 101 100 101   CO2 24 22 21   GLUCOSE 101* 152* 96   BUN 23* 20 24*   CREATININE 1.18 1.23 1.12   CALCIUM 9.8 9.6 9.4                   Imaging  CT-CTA CHEST PULMONARY ARTERY W/ RECONS   Final Result         1.  No pulmonary embolus appreciated.    2.  Irregular hepatic contour favoring changes of cirrhosis   3.  Low-density left hepatic lobe lesion, indeterminate, recommend follow-up 3 phase CT liver for further characterization.   4.  Enlargement main pulmonary artery, consider pulmonary arterial hypertension.   5.  Atherosclerosis and atherosclerotic coronary artery disease.   6.  Pulmonary nodule, see nodule follow-up recommendations below.      Fleischner Society pulmonary nodule recommendations:      Low Risk: No routine follow-up      High Risk: Optional CT at 12 months      Comments: Nodules less than 6 mm do not require routine follow-up, but certain patients at high risk with suspicious nodule morphology, upper lobe location, or both may warrant 12-month follow-up.      Low Risk - Minimal or absent history of smoking and of other known risk factors.      High Risk - History of smoking or of other known risk factors.      Note: These recommendations do not apply to lung cancer screening, patients with immunosuppression, or patients with known primary cancer.      Fleischner Society 2017 Guidelines for Management of Incidentally Detected Pulmonary Nodules in Adults         DX-CHEST-PORTABLE (1 VIEW)   Final Result         1.  Left basilar atelectasis, no focal infiltrate   2.  Atherosclerosis      CT-ABDOMEN LIVER FOR HEPATIC MASS (CIRRHOSIS)    (Results Pending)        Assessment/Plan  * Influenza A- (present on admission)  Assessment & Plan  Tamiflu 30mg BID x 10 doses  RT protocol, Sravanthi  Patient reports feeling much improved after nebulizers  CTA chest ordered in ED negative for PE, negative for pneumonia/consolidation.     Liver lesion, left lobe  Assessment & Plan  CT abdomen showed cirrhosis Low-density left hepatic lobe lesion, indeterminate    I ordered a CT liver protocol    Pneumonia  Assessment & Plan  Reported the congestion and shortness of breath  Positive influenza  Leukocytosis with elevated procalcitonin 0.52  Likely superimposed  pneumonia secondary to viral infection    I started IV Rocephin    Moderate persistent asthma with acute exacerbation- (present on admission)  Assessment & Plan  Wheezing on exam, in setting of influenza A    12/16 continue steroids  Continue scheduled DuoNeb and as needed  Continue home inhaler  Continue steroids, monitor BP and BG while on steroids    Stage 3a chronic kidney disease (HCC)- (present on admission)  Assessment & Plan  12/16 1.23>1.12  I ordered a.m. BMP to monitor  Avoid nephrotoxic agent  Renal dose medications    Essential hypertension- (present on admission)  Assessment & Plan  Continue home antihypertensives    ACP (advance care planning)  Assessment & Plan  Admitted for influenza, superimposed pneumonia, asthma exacerbation, history of CKD and HTN. I discussed advance care planning with the patient at bedside, including diagnosis, prognosis, plan of care, risks and benefits of any therapies that could be offered.   We discussed regarding CODE STATUS, CPR and intubation with mechanical ventilation, discussed regarding risk and benefits. The patient is willing to  have all life sustaining efforts. Continue full code.  ACP: 17min         VTE prophylaxis:     I have performed a physical exam and reviewed and updated ROS and Plan today (12/16/2023). In review of yesterday's note (12/15/2023), there are no changes except as documented above.    I spent greater than 53 minutes for chart review, obtaining history independently, performing medically appropriate examination,  documenting , ordering medications, tests, or procedures, referring and communicating with other health care professionals, Independently interpreting results and communicating results with patient/family/caregiver. More than 50% of time was spent in face-to-face clinical encounter.

## 2023-12-16 NOTE — PROGRESS NOTES
Received report and assumed care of patient at change of shift. Patient is A&Ox4, on RA, and denies pain at this time. Admit profile completed. Patient assessment completed, bed in lowest position, and call light and personal belongings are within reach. Patient expressed no further needs at this time.

## 2023-12-16 NOTE — ASSESSMENT & PLAN NOTE
Reported the congestion and shortness of breath  Positive influenza  Leukocytosis with elevated procalcitonin 0.52  Likely superimposed pneumonia secondary to viral infection    I started IV Rocephin

## 2023-12-16 NOTE — CARE PLAN
The patient is Stable - Low risk of patient condition declining or worsening    Shift Goals  Clinical Goals: Patient will maintain adequate oxygen saturation above 92% on room air. Patient will receive flu medication as scheduled to treat influenza.  Patient Goals: Patient will remain comfortable throughout the night and be able to sleep comfortably.    Progress made toward(s) clinical / shift goals:      Patient is not progressing towards the following goals:

## 2023-12-16 NOTE — ASSESSMENT & PLAN NOTE
Admitted for influenza, superimposed pneumonia, asthma exacerbation, history of CKD and HTN. I discussed advance care planning with the patient at bedside, including diagnosis, prognosis, plan of care, risks and benefits of any therapies that could be offered.   We discussed regarding CODE STATUS, CPR and intubation with mechanical ventilation, discussed regarding risk and benefits. The patient is willing to  have all life sustaining efforts. Continue full code.  ACP: 17min

## 2023-12-17 ENCOUNTER — PHARMACY VISIT (OUTPATIENT)
Dept: PHARMACY | Facility: MEDICAL CENTER | Age: 73
End: 2023-12-17
Payer: COMMERCIAL

## 2023-12-17 VITALS
HEIGHT: 62 IN | DIASTOLIC BLOOD PRESSURE: 98 MMHG | BODY MASS INDEX: 28.89 KG/M2 | WEIGHT: 157 LBS | RESPIRATION RATE: 18 BRPM | HEART RATE: 71 BPM | TEMPERATURE: 96.9 F | SYSTOLIC BLOOD PRESSURE: 160 MMHG | OXYGEN SATURATION: 89 %

## 2023-12-17 LAB
ALBUMIN SERPL BCP-MCNC: 3.2 G/DL (ref 3.2–4.9)
ALBUMIN/GLOB SERPL: 0.8 G/DL
ALP SERPL-CCNC: 70 U/L (ref 30–99)
ALT SERPL-CCNC: 25 U/L (ref 2–50)
ANION GAP SERPL CALC-SCNC: 13 MMOL/L (ref 7–16)
AST SERPL-CCNC: 27 U/L (ref 12–45)
BILIRUB SERPL-MCNC: <0.2 MG/DL (ref 0.1–1.5)
BUN SERPL-MCNC: 28 MG/DL (ref 8–22)
CALCIUM ALBUM COR SERPL-MCNC: 10.3 MG/DL (ref 8.5–10.5)
CALCIUM SERPL-MCNC: 9.7 MG/DL (ref 8.5–10.5)
CHLORIDE SERPL-SCNC: 100 MMOL/L (ref 96–112)
CO2 SERPL-SCNC: 21 MMOL/L (ref 20–33)
CREAT SERPL-MCNC: 1.11 MG/DL (ref 0.5–1.4)
GFR SERPLBLD CREATININE-BSD FMLA CKD-EPI: 52 ML/MIN/1.73 M 2
GLOBULIN SER CALC-MCNC: 3.8 G/DL (ref 1.9–3.5)
GLUCOSE SERPL-MCNC: 104 MG/DL (ref 65–99)
LACTATE SERPL-SCNC: 3.7 MMOL/L (ref 0.5–2)
MAGNESIUM SERPL-MCNC: 1.9 MG/DL (ref 1.5–2.5)
PHOSPHATE SERPL-MCNC: 3 MG/DL (ref 2.5–4.5)
POTASSIUM SERPL-SCNC: 3.9 MMOL/L (ref 3.6–5.5)
PROT SERPL-MCNC: 7 G/DL (ref 6–8.2)
SODIUM SERPL-SCNC: 134 MMOL/L (ref 135–145)

## 2023-12-17 PROCEDURE — 83735 ASSAY OF MAGNESIUM: CPT

## 2023-12-17 PROCEDURE — G0378 HOSPITAL OBSERVATION PER HR: HCPCS

## 2023-12-17 PROCEDURE — 700111 HCHG RX REV CODE 636 W/ 250 OVERRIDE (IP): Performed by: STUDENT IN AN ORGANIZED HEALTH CARE EDUCATION/TRAINING PROGRAM

## 2023-12-17 PROCEDURE — A9270 NON-COVERED ITEM OR SERVICE: HCPCS | Performed by: STUDENT IN AN ORGANIZED HEALTH CARE EDUCATION/TRAINING PROGRAM

## 2023-12-17 PROCEDURE — 96376 TX/PRO/DX INJ SAME DRUG ADON: CPT

## 2023-12-17 PROCEDURE — 85027 COMPLETE CBC AUTOMATED: CPT

## 2023-12-17 PROCEDURE — 80053 COMPREHEN METABOLIC PANEL: CPT

## 2023-12-17 PROCEDURE — 700102 HCHG RX REV CODE 250 W/ 637 OVERRIDE(OP): Performed by: STUDENT IN AN ORGANIZED HEALTH CARE EDUCATION/TRAINING PROGRAM

## 2023-12-17 PROCEDURE — RXMED WILLOW AMBULATORY MEDICATION CHARGE: Performed by: STUDENT IN AN ORGANIZED HEALTH CARE EDUCATION/TRAINING PROGRAM

## 2023-12-17 PROCEDURE — 36415 COLL VENOUS BLD VENIPUNCTURE: CPT

## 2023-12-17 PROCEDURE — 99239 HOSP IP/OBS DSCHRG MGMT >30: CPT | Performed by: STUDENT IN AN ORGANIZED HEALTH CARE EDUCATION/TRAINING PROGRAM

## 2023-12-17 PROCEDURE — 84100 ASSAY OF PHOSPHORUS: CPT

## 2023-12-17 PROCEDURE — 700111 HCHG RX REV CODE 636 W/ 250 OVERRIDE (IP): Mod: UD | Performed by: STUDENT IN AN ORGANIZED HEALTH CARE EDUCATION/TRAINING PROGRAM

## 2023-12-17 PROCEDURE — 83605 ASSAY OF LACTIC ACID: CPT

## 2023-12-17 RX ORDER — PREDNISONE 20 MG/1
40 TABLET ORAL DAILY
Qty: 4 TABLET | Refills: 0 | Status: SHIPPED | OUTPATIENT
Start: 2023-12-18 | End: 2023-12-20

## 2023-12-17 RX ORDER — VALSARTAN 80 MG/1
40 TABLET ORAL ONCE
Status: COMPLETED | OUTPATIENT
Start: 2023-12-17 | End: 2023-12-17

## 2023-12-17 RX ORDER — GUAIFENESIN 200 MG/10ML
10 LIQUID ORAL EVERY 4 HOURS PRN
Qty: 200 ML | Refills: 0 | Status: SHIPPED | OUTPATIENT
Start: 2023-12-17 | End: 2023-12-27

## 2023-12-17 RX ORDER — HYDROCHLOROTHIAZIDE 25 MG/1
12.5 TABLET ORAL
Status: DISCONTINUED | OUTPATIENT
Start: 2023-12-18 | End: 2023-12-17 | Stop reason: HOSPADM

## 2023-12-17 RX ORDER — VALSARTAN 80 MG/1
120 TABLET ORAL
Status: DISCONTINUED | OUTPATIENT
Start: 2023-12-18 | End: 2023-12-17 | Stop reason: HOSPADM

## 2023-12-17 RX ORDER — OSELTAMIVIR PHOSPHATE 30 MG/1
30 CAPSULE ORAL 2 TIMES DAILY
Qty: 8 CAPSULE | Refills: 0 | Status: ACTIVE | OUTPATIENT
Start: 2023-12-17 | End: 2023-12-21

## 2023-12-17 RX ORDER — AMOXICILLIN AND CLAVULANATE POTASSIUM 875; 125 MG/1; MG/1
1 TABLET, FILM COATED ORAL 2 TIMES DAILY
Qty: 8 TABLET | Refills: 0 | Status: ACTIVE | OUTPATIENT
Start: 2023-12-17 | End: 2023-12-21

## 2023-12-17 RX ADMIN — VALSARTAN 80 MG: 80 TABLET ORAL at 05:39

## 2023-12-17 RX ADMIN — MOMETASONE FUROATE AND FORMOTEROL FUMARATE DIHYDRATE 2 PUFF: 200; 5 AEROSOL RESPIRATORY (INHALATION) at 09:16

## 2023-12-17 RX ADMIN — ASPIRIN 81 MG: 81 TABLET, CHEWABLE ORAL at 05:39

## 2023-12-17 RX ADMIN — PREDNISONE 40 MG: 20 TABLET ORAL at 05:39

## 2023-12-17 RX ADMIN — CEFTRIAXONE SODIUM 1000 MG: 10 INJECTION, POWDER, FOR SOLUTION INTRAVENOUS at 14:34

## 2023-12-17 RX ADMIN — OSELTAMIVIR 30 MG: 30 CAPSULE ORAL at 05:39

## 2023-12-17 RX ADMIN — VALSARTAN 40 MG: 80 TABLET ORAL at 09:16

## 2023-12-17 RX ADMIN — HYDROCHLOROTHIAZIDE 12.5 MG: 25 TABLET ORAL at 05:39

## 2023-12-17 ASSESSMENT — COGNITIVE AND FUNCTIONAL STATUS - GENERAL
SUGGESTED CMS G CODE MODIFIER MOBILITY: CH
SUGGESTED CMS G CODE MODIFIER DAILY ACTIVITY: CI
MOBILITY SCORE: 22
CLIMB 3 TO 5 STEPS WITH RAILING: A LITTLE
DAILY ACTIVITIY SCORE: 23
MOBILITY SCORE: 24
SUGGESTED CMS G CODE MODIFIER MOBILITY: CJ
DRESSING REGULAR LOWER BODY CLOTHING: A LITTLE
SUGGESTED CMS G CODE MODIFIER DAILY ACTIVITY: CH
MOVING FROM LYING ON BACK TO SITTING ON SIDE OF FLAT BED: A LITTLE
DAILY ACTIVITIY SCORE: 24

## 2023-12-17 ASSESSMENT — PAIN DESCRIPTION - PAIN TYPE
TYPE: ACUTE PAIN

## 2023-12-17 NOTE — RESPIRATORY CARE
"   EDUCATION by COPD CLINICAL EDUCATOR  12/16/2023 at 4:46 PM by Sheila Biggs RRT     Patient reviewed by Respiratory education team. Patient does not have a history or diagnosis of COPD and is a non-smoker.  Therefore, patient does not qualify for the COPD program.Did review respiratory medications , general usage and spacer techniques.     COPD Screen  COPD Risk Screening N/A  Do you have a history of COPD?: No  Do you have a Pulmonologist?: No  COPD Population Screener  During the past 4 weeks, how much did you feel short of breath?: Some of the time  Do you ever cough up any mucus or phlegm?: No/only with occasional colds or infections  In the past 12 months, you do less than you used to because of your breathing problems: Disagree/unsure  Have you smoked at least 100 cigarettes in your entire life?: No/don't know  How old are you?: 60+  COPD Screening Score: 3  COPD Coordinator Not Recommended: Yes    COPD Assessment  COPD Clinical Specialists ONLY  COPD Education Initiated: Yes--Short Intervention  Is this a COPD exacerbation patient?: No (Per H&P no history of COPD / + DX of Asthma - Pt is non-smoker - Current admisson for + Influenza)  Physician Name: None at this time - wanting for insurance to update  Pulmonologist Name: [facility]  $ Demo/Eval of SVN's, MDI's and Aerosols: Yes (Reviewed respiratoy medications and use)  (OP) Pulmonary Function Testing:  (None found on file at this time)  Interdisciplinary Rounds: Attendance at Rounds (30 Min)    PFT Results    No results found for: \"PFT\"    Meds to [facility] provides bedside medication delivery for all eligible patients at discharge.  Would you like to opt out of this program for any reason?: No - Stay Opted In     My ACTION PLAN     It is recommended that patients and physicians /healthcare providers complete this action plan together. This plan should be discussed at each physician visit and updated as needed.    The green, yellow and " "red zones show groups of symptoms. This list of symptoms is not comprehensive, and you may experience other symptoms. In the \"Actions\" column, your healthcare provider has recommended actions for you to take based on your symptoms.    Patient Name: Serenity Howe   YOB: 1950   Last Updated on: 12/16/2023  4:42 PM   Green Zone:  I am doing well today Actions     Usual activitiy and exercise level   Take daily medications     Usual amounts of cough and phlegm/mucus   Use oxygen as prescribed     Sleep well at night   Continue regular exercise/diet plan     Appetite is good   At all times avoid cigarette smoke, inhaled irritants     Daily Medications (these medications are taken every day):   Fluticosone/Salmeterol (Advair) 2 Puffs Twice daily     Additional Information:  Use as directed and rinse mouth after use.     Yellow Zone:  I am having a bad day  Actions     More breathless than usual   Continue daily medications     I have less energy for my daily activities   Use quick relief inhaler as ordered     Increased or thicker phlegm/mucus   Use oxygen as prescribed     Using quick relief inhaler/nebulizer more often   Get plenty of rest     Swelling of ankles more than usual   Use pursed lip breathing     More coughing than usual   At all times avoid cigarette smoke, inhaled irritants     I feel like I have a \"chest cold\"     Poor sleep and my symptoms woke me up     My appetite is not good     My medicine is not helping      Call provider immediately if symptoms don’t improve     Continue daily medications, add rescue medications:   Albuterol 2 Puffs Every 6 hours PRN       Medications to be used during a flare up, (as Discussed with Provider):           Additional Information:  Use as directed and with spacer.     Red Zone:  I need urgent medical care Actions     Severe shortness of breath even at rest   Call 911 or seek medical care immediately     Not able to do any activity because of " breathing      Fever or shaking chills      Feeling confused or very drowsy       Chest pains      Coughing up blood

## 2023-12-17 NOTE — PROGRESS NOTES
Bedside report received from Catherine PADILLA at this time. Patient A+Ox4, sleeping, awakens easily to voice. Denies pain at this time. Reports her breathing feels better than yesterday. Using call bell approp, fall precautions in place

## 2023-12-17 NOTE — DISCHARGE PLANNING
Case Management Discharge Planning    Admission Date: 12/15/2023  GMLOS: 2.7  ALOS: 2    6-Clicks ADL Score: 24  6-Clicks Mobility Score: 24      Anticipated Discharge Dispo:      DME Needed: No    Action(s) Taken: LMSW provided Cab Voucher for pt to DC.     Escalations Completed: None    Medically Clear: No    Next Steps: Pt to DC.    Barriers to Discharge: None    Is the patient up for discharge tomorrow: No

## 2023-12-17 NOTE — DISCHARGE PLANNING
Patient rolled back to observation/outpatient status per attending MD determination () and UR committee MD secondary review (Dr. Araujo).  Condition code 44 completed.

## 2023-12-17 NOTE — CARE PLAN
The patient is Stable - Low risk of patient condition declining or worsening    Shift Goals  Clinical Goals: Patient will have CT scan completed. Patient will receive Tamiflu to treat influenza.  Patient Goals: Patient will sleep comfortably throughout the night.    Progress made toward(s) clinical / shift goals:      Patient is not progressing towards the following goals:

## 2023-12-17 NOTE — PROGRESS NOTES
DISCHARGE NOTE    Patient cleared for discharge home no services. Home medications delivered to patient. She signed her discharge packet and understands the need to follow up with her PCP to schedule abd CT and Chest CT in the future. She was brought downstairs for discharge via wheelchair with discharge escort. Left with all personal belongings, IVs removed

## 2023-12-17 NOTE — PROGRESS NOTES
Received report and assumed care of patient at change of shift. Patient is A&Ox4, on RA, and denies pain at this time. IV dressing change completed. Patient assessment completed, bed in lowest position. Transport at the bedside to take patient to CT. Patient transported with CT contrast consent form.

## 2023-12-17 NOTE — DISCHARGE INSTRUCTIONS
- Follow up with primary care physician in 1 week.     -Please follow-up with your primary care doctor to have pancreatic protocol CT in 3 months for left hepatic lobe lesion; and chest CT in 6 months right lower lobe pulmonary nodule    - Please take the medications as instructed    - Go to the local Emergency Department if you have any worsening condition.

## 2023-12-17 NOTE — PROGRESS NOTES
Bedside report received from Catherine PADILLA at this time. Patient sleeping, awakens to voice. Denies pain. Ramírez patent with clear yellow urine. Last BM 12/16 after enema. No blood noted in stool. Bed alarm on, call bell in hand, fall precautions in place

## 2023-12-17 NOTE — DISCHARGE SUMMARY
Discharge Summary    CHIEF COMPLAINT ON ADMISSION  Chief Complaint   Patient presents with    Fever    Shortness of Breath    Cough       Reason for Admission  EMS     Admission Date  12/15/2023    CODE STATUS  Full Code    HPI & HOSPITAL COURSE  Serenity Howe is a 73 y.o. female history of hypertension and asthma who admitted 12/15/2023 with influenza infection, asthma exacerbation and pneumonia.  CTA showed pulmonary arterial hypertension, negative for PE.  Patient has been treated with Tamiflu, steroids, breathing treatment.  Antibiotics started given leukocytosis and elevated procalcitonin for possible superimposed bacterial pneumonia.  Patient has been on room air, harshness of breath improved.  She does not require oxygen.     There is an incidental finding of cirrhosis Low-density left hepatic lobe lesion, indeterminate, follow-up CT liver showed 1.6 cm small pseudocyst in the head of the pancreas or IPMN (Intraductal Papillary Mucinous Neoplasm).  Patient is advised to follow-up with primary care physician and have pancreatic protocol CT in 3 months.    There is also incidental finding of 5.5 mm right lower lobe pulmonary nodule on CT. recommended to follow-up with primary care doctor to monitor.  May repeat CT in 6-12 mon    Therefore, she is discharged in good and stable condition to home with close outpatient follow-up.    The patient met 2-midnight criteria for an inpatient stay at the time of discharge.    Discharge Date  12/17/2023    FOLLOW UP ITEMS POST DISCHARGE  - Follow up with primary care physician in 1 week.     -Please follow-up with your primary care doctor to have pancreatic protocol CT in 3 months for left hepatic lobe lesion; and chest CT in 6 months right lower lobe pulmonary nodule    - Please take the medications as instructed    - Go to the local Emergency Department if you have any worsening condition.       DISCHARGE DIAGNOSES  Principal Problem:    Influenza A (POA:  Yes)  Active Problems:    Essential hypertension (POA: Yes)    Stage 3a chronic kidney disease (HCC) (POA: Yes)    Moderate persistent asthma with acute exacerbation (POA: Yes)    Pneumonia (POA: Unknown)    Liver lesion, left lobe (POA: Unknown)    ACP (advance care planning) (POA: Unknown)  Resolved Problems:    * No resolved hospital problems. *      FOLLOW UP  No future appointments.  No follow-up provider specified.    MEDICATIONS ON DISCHARGE     Medication List        START taking these medications        Instructions   amoxicillin-clavulanate 875-125 MG Tabs  Commonly known as: Augmentin   Take 1 Tablet by mouth 2 times a day for 4 days.  Dose: 1 Tablet     guaiFENesin 100 MG/5ML liquid  Commonly known as: Robitussin   Take 10 mL by mouth every four hours as needed for Cough for up to 10 days.  Dose: 10 mL     oseltamivir 30 MG Caps  Commonly known as: Tamiflu   Take 1 Capsule by mouth 2 times a day for 4 days.  Dose: 30 mg     predniSONE 20 MG Tabs  Start taking on: December 18, 2023  Commonly known as: Deltasone   Take 2 Tablets by mouth every day for 2 days.  Dose: 40 mg            CONTINUE taking these medications        Instructions   acetaminophen 325 MG Tabs  Commonly known as: Tylenol   Take 2 Tablets by mouth every 6 hours as needed for Mild Pain.  Dose: 650 mg     * albuterol 2.5mg/3ml Nebu solution for nebulization  Commonly known as: Proventil   Take 3 mL by nebulization every four hours as needed for Shortness of Breath.  Dose: 2.5 mg     * albuterol 108 (90 Base) MCG/ACT Aers inhalation aerosol   Doctor's comments: Prefers ventolin  Inhale 2 Puffs every 6 hours as needed for Shortness of Breath.  Dose: 2 Puff     aspirin 81 MG Chew chewable tablet  Commonly known as: Asa   Chew 1 Tablet every day.  Dose: 81 mg     benzonatate 100 MG Caps  Commonly known as: Tessalon   Take 1 Capsule by mouth 3 times a day as needed for Cough.  Dose: 100 mg     Caltrate 600+D3 600-20 MG-MCG Tabs  Generic drug:  Calcium Carb-Cholecalciferol   Take 1 Tablet by mouth every day.  Dose: 1 Tablet     colchicine 0.6 MG Tabs  Commonly known as: Colcrys   Take 2 Tablets by mouth every day.  Dose: 1.2 mg     cyclobenzaprine 5 mg tablet  Commonly known as: Flexeril   Take 1 Tablet by mouth 3 times a day as needed for Muscle Spasms or Moderate Pain.  Dose: 5 mg     fluticasone-salmeterol 250-50 MCG/ACT Aepb  Commonly known as: Advair   Inhale 1 Puff every 12 hours.  Dose: 1 Puff     ibuprofen 600 MG Tabs  Commonly known as: Motrin   Take 1 Tablet by mouth every 6 hours as needed for Fever or Headache.  Dose: 600 mg     lidocaine 5 % Ptch  Commonly known as: Lidoderm   Place 1 Patch on the skin every 24 hours.  Dose: 1 Patch     meloxicam 15 MG tablet  Commonly known as: Mobic   Take 1 Tablet by mouth every day.  Dose: 15 mg     nystatin 006194 UNIT/GM Crea topical cream  Commonly known as: Mycostatin   Apply 1 g topically 2 times a day.  Dose: 1 Application     potassium chloride ER 10 MEQ tablet  Commonly known as: Klor-Con   Take 1 Tablet by mouth 2 times a day.  Dose: 10 mEq     valsartan-hydrochlorothiazide 80-12.5 MG per tablet  Commonly known as: Diovan-HCT   TAKE 1 TABLET BY MOUTH EVERY DAY  Dose: 1 Tablet     verapamil  MG Cp24  Commonly known as: Calan SR   Doctor's comments: Due for appointment with PCP for further refills  TAKE 1 CAPSULE BY MOUTH EVERY DAY  Dose: 120 mg           * This list has 2 medication(s) that are the same as other medications prescribed for you. Read the directions carefully, and ask your doctor or other care provider to review them with you.                STOP taking these medications      methylPREDNISolone 4 MG Tbpk  Commonly known as: Medrol Dosepak     Robitussin Cough+Chest Oz DM 5-100 MG/5ML Liqd  Generic drug: Dextromethorphan-guaiFENesin              Allergies  Allergies   Allergen Reactions    Shellfish Allergy Hives       DIET  Orders Placed This Encounter   Procedures    Diet  Order Diet: Regular (shellfish allergy)     Standing Status:   Standing     Number of Occurrences:   1     Order Specific Question:   Diet:     Answer:   Regular [1]     Comments:   shellfish allergy       ACTIVITY  As tolerated.  Weight bearing as tolerated    CONSULTATIONS      PROCEDURES      LABORATORY  Lab Results   Component Value Date    SODIUM 134 (L) 12/17/2023    POTASSIUM 3.9 12/17/2023    CHLORIDE 100 12/17/2023    CO2 21 12/17/2023    GLUCOSE 104 (H) 12/17/2023    BUN 28 (H) 12/17/2023    CREATININE 1.11 12/17/2023    CREATININE 1.0 03/19/2008        Lab Results   Component Value Date    WBC 16.0 (H) 12/16/2023    HEMOGLOBIN 13.7 12/16/2023    HEMATOCRIT 39.5 12/16/2023    PLATELETCT 220 12/16/2023        Total time of the discharge process exceeds 34 minutes.

## 2023-12-18 ENCOUNTER — TELEPHONE (OUTPATIENT)
Dept: HEALTH INFORMATION MANAGEMENT | Facility: OTHER | Age: 73
End: 2023-12-18

## 2023-12-18 LAB
BACTERIA UR CULT: NORMAL
ERYTHROCYTE [DISTWIDTH] IN BLOOD BY AUTOMATED COUNT: 46.9 FL (ref 35.9–50)
HCT VFR BLD AUTO: 40.9 % (ref 37–47)
HGB BLD-MCNC: 14.4 G/DL (ref 12–16)
MCH RBC QN AUTO: 34 PG (ref 27–33)
MCHC RBC AUTO-ENTMCNC: 35.2 G/DL (ref 32.2–35.5)
MCV RBC AUTO: 96.5 FL (ref 81.4–97.8)
PLATELET # BLD AUTO: 231 K/UL (ref 164–446)
PMV BLD AUTO: 9.5 FL (ref 9–12.9)
RBC # BLD AUTO: 4.24 M/UL (ref 4.2–5.4)
SIGNIFICANT IND 70042: NORMAL
SITE SITE: NORMAL
SOURCE SOURCE: NORMAL
WBC # BLD AUTO: 10.2 K/UL (ref 4.8–10.8)

## 2024-01-07 ENCOUNTER — APPOINTMENT (OUTPATIENT)
Dept: RADIOLOGY | Facility: MEDICAL CENTER | Age: 74
End: 2024-01-07
Attending: EMERGENCY MEDICINE
Payer: COMMERCIAL

## 2024-01-07 ENCOUNTER — HOSPITAL ENCOUNTER (EMERGENCY)
Facility: MEDICAL CENTER | Age: 74
End: 2024-01-07
Attending: EMERGENCY MEDICINE
Payer: COMMERCIAL

## 2024-01-07 VITALS
DIASTOLIC BLOOD PRESSURE: 72 MMHG | SYSTOLIC BLOOD PRESSURE: 152 MMHG | HEIGHT: 62 IN | HEART RATE: 77 BPM | BODY MASS INDEX: 28.89 KG/M2 | OXYGEN SATURATION: 96 % | RESPIRATION RATE: 18 BRPM | WEIGHT: 157 LBS | TEMPERATURE: 98.7 F

## 2024-01-07 DIAGNOSIS — M10.00 ACUTE IDIOPATHIC GOUT, UNSPECIFIED SITE: ICD-10-CM

## 2024-01-07 DIAGNOSIS — M43.6 TORTICOLLIS: ICD-10-CM

## 2024-01-07 LAB
ALBUMIN SERPL BCP-MCNC: 3.6 G/DL (ref 3.2–4.9)
ALBUMIN/GLOB SERPL: 1 G/DL
ALP SERPL-CCNC: 77 U/L (ref 30–99)
ALT SERPL-CCNC: 16 U/L (ref 2–50)
ANION GAP SERPL CALC-SCNC: 12 MMOL/L (ref 7–16)
AST SERPL-CCNC: 17 U/L (ref 12–45)
BASOPHILS # BLD AUTO: 0.2 % (ref 0–1.8)
BASOPHILS # BLD: 0.02 K/UL (ref 0–0.12)
BILIRUB SERPL-MCNC: 0.8 MG/DL (ref 0.1–1.5)
BUN SERPL-MCNC: 14 MG/DL (ref 8–22)
CALCIUM ALBUM COR SERPL-MCNC: 9.9 MG/DL (ref 8.5–10.5)
CALCIUM SERPL-MCNC: 9.6 MG/DL (ref 8.5–10.5)
CHLORIDE SERPL-SCNC: 105 MMOL/L (ref 96–112)
CO2 SERPL-SCNC: 24 MMOL/L (ref 20–33)
CREAT SERPL-MCNC: 0.93 MG/DL (ref 0.5–1.4)
EKG IMPRESSION: NORMAL
EOSINOPHIL # BLD AUTO: 0.11 K/UL (ref 0–0.51)
EOSINOPHIL NFR BLD: 1.2 % (ref 0–6.9)
ERYTHROCYTE [DISTWIDTH] IN BLOOD BY AUTOMATED COUNT: 47.2 FL (ref 35.9–50)
GFR SERPLBLD CREATININE-BSD FMLA CKD-EPI: 64 ML/MIN/1.73 M 2
GLOBULIN SER CALC-MCNC: 3.7 G/DL (ref 1.9–3.5)
GLUCOSE SERPL-MCNC: 99 MG/DL (ref 65–99)
HCT VFR BLD AUTO: 37.6 % (ref 37–47)
HGB BLD-MCNC: 12.9 G/DL (ref 12–16)
IMM GRANULOCYTES # BLD AUTO: 0.03 K/UL (ref 0–0.11)
IMM GRANULOCYTES NFR BLD AUTO: 0.3 % (ref 0–0.9)
LYMPHOCYTES # BLD AUTO: 1.66 K/UL (ref 1–4.8)
LYMPHOCYTES NFR BLD: 18.4 % (ref 22–41)
MCH RBC QN AUTO: 33.9 PG (ref 27–33)
MCHC RBC AUTO-ENTMCNC: 34.3 G/DL (ref 32.2–35.5)
MCV RBC AUTO: 98.7 FL (ref 81.4–97.8)
MONOCYTES # BLD AUTO: 1.06 K/UL (ref 0–0.85)
MONOCYTES NFR BLD AUTO: 11.7 % (ref 0–13.4)
NEUTROPHILS # BLD AUTO: 6.15 K/UL (ref 1.82–7.42)
NEUTROPHILS NFR BLD: 68.2 % (ref 44–72)
NRBC # BLD AUTO: 0 K/UL
NRBC BLD-RTO: 0 /100 WBC (ref 0–0.2)
PLATELET # BLD AUTO: 256 K/UL (ref 164–446)
PMV BLD AUTO: 9.2 FL (ref 9–12.9)
POTASSIUM SERPL-SCNC: 3.2 MMOL/L (ref 3.6–5.5)
PROT SERPL-MCNC: 7.3 G/DL (ref 6–8.2)
RBC # BLD AUTO: 3.81 M/UL (ref 4.2–5.4)
SODIUM SERPL-SCNC: 141 MMOL/L (ref 135–145)
TROPONIN T SERPL-MCNC: 11 NG/L (ref 6–19)
URATE SERPL-MCNC: 7.5 MG/DL (ref 1.9–8.2)
WBC # BLD AUTO: 9 K/UL (ref 4.8–10.8)

## 2024-01-07 PROCEDURE — A9270 NON-COVERED ITEM OR SERVICE: HCPCS | Mod: JZ,UD | Performed by: EMERGENCY MEDICINE

## 2024-01-07 PROCEDURE — 73610 X-RAY EXAM OF ANKLE: CPT | Mod: RT

## 2024-01-07 PROCEDURE — 70498 CT ANGIOGRAPHY NECK: CPT

## 2024-01-07 PROCEDURE — 85025 COMPLETE CBC W/AUTO DIFF WBC: CPT

## 2024-01-07 PROCEDURE — 70496 CT ANGIOGRAPHY HEAD: CPT

## 2024-01-07 PROCEDURE — 96374 THER/PROPH/DIAG INJ IV PUSH: CPT

## 2024-01-07 PROCEDURE — 84550 ASSAY OF BLOOD/URIC ACID: CPT

## 2024-01-07 PROCEDURE — 700111 HCHG RX REV CODE 636 W/ 250 OVERRIDE (IP): Mod: UD | Performed by: EMERGENCY MEDICINE

## 2024-01-07 PROCEDURE — 700102 HCHG RX REV CODE 250 W/ 637 OVERRIDE(OP): Mod: JZ,UD | Performed by: EMERGENCY MEDICINE

## 2024-01-07 PROCEDURE — 84484 ASSAY OF TROPONIN QUANT: CPT

## 2024-01-07 PROCEDURE — 36415 COLL VENOUS BLD VENIPUNCTURE: CPT

## 2024-01-07 PROCEDURE — 700117 HCHG RX CONTRAST REV CODE 255: Mod: UD | Performed by: EMERGENCY MEDICINE

## 2024-01-07 PROCEDURE — 70481 CT ORBIT/EAR/FOSSA W/DYE: CPT

## 2024-01-07 PROCEDURE — 93005 ELECTROCARDIOGRAM TRACING: CPT | Performed by: EMERGENCY MEDICINE

## 2024-01-07 PROCEDURE — 71045 X-RAY EXAM CHEST 1 VIEW: CPT

## 2024-01-07 PROCEDURE — 99285 EMERGENCY DEPT VISIT HI MDM: CPT

## 2024-01-07 PROCEDURE — 80053 COMPREHEN METABOLIC PANEL: CPT

## 2024-01-07 RX ORDER — METHOCARBAMOL 750 MG/1
750 TABLET, FILM COATED ORAL ONCE
Status: COMPLETED | OUTPATIENT
Start: 2024-01-07 | End: 2024-01-07

## 2024-01-07 RX ORDER — KETOROLAC TROMETHAMINE 30 MG/ML
15 INJECTION, SOLUTION INTRAMUSCULAR; INTRAVENOUS ONCE
Status: COMPLETED | OUTPATIENT
Start: 2024-01-07 | End: 2024-01-07

## 2024-01-07 RX ORDER — LIDOCAINE 50 MG/G
1 PATCH TOPICAL EVERY 24 HOURS
Qty: 3 PATCH | Refills: 0 | Status: SHIPPED | OUTPATIENT
Start: 2024-01-07 | End: 2024-01-10

## 2024-01-07 RX ORDER — POTASSIUM CHLORIDE 20 MEQ/1
40 TABLET, EXTENDED RELEASE ORAL ONCE
Status: COMPLETED | OUTPATIENT
Start: 2024-01-07 | End: 2024-01-07

## 2024-01-07 RX ORDER — METHOCARBAMOL 750 MG/1
750 TABLET, FILM COATED ORAL 4 TIMES DAILY
Qty: 12 TABLET | Refills: 0 | Status: SHIPPED | OUTPATIENT
Start: 2024-01-07 | End: 2024-01-10

## 2024-01-07 RX ORDER — IBUPROFEN 600 MG/1
600 TABLET ORAL EVERY 8 HOURS PRN
Qty: 15 TABLET | Refills: 0 | Status: SHIPPED | OUTPATIENT
Start: 2024-01-07 | End: 2024-01-12

## 2024-01-07 RX ADMIN — KETOROLAC TROMETHAMINE 15 MG: 30 INJECTION, SOLUTION INTRAMUSCULAR; INTRAVENOUS at 09:49

## 2024-01-07 RX ADMIN — METHOCARBAMOL 750 MG: 750 TABLET ORAL at 09:46

## 2024-01-07 RX ADMIN — IOHEXOL 100 ML: 350 INJECTION, SOLUTION INTRAVENOUS at 10:02

## 2024-01-07 RX ADMIN — POTASSIUM CHLORIDE 40 MEQ: 1500 TABLET, EXTENDED RELEASE ORAL at 09:46

## 2024-01-07 ASSESSMENT — FIBROSIS 4 INDEX: FIB4 SCORE: 1.73

## 2024-01-07 ASSESSMENT — PAIN DESCRIPTION - PAIN TYPE: TYPE: ACUTE PAIN

## 2024-01-07 NOTE — ED NOTES
Bedside report received from off going RN/tech: Melissa PADILLA, assumed care of patient.  POC discussed with patient. Call light within reach, all needs addressed at this time.       Fall risk interventions in place: Patient's personal possessions are with in their safe reach, Place socks on patient, and Keep floor surfaces clean and dry (all applicable per [place] Fall risk assessment)   Continuous monitoring: Cardiac Leads, Pulse Ox, or Blood Pressure  IVF/IV medications: Not Applicable   Oxygen: Room Air  Bedside sitter: Not Applicable   Isolation: Not Applicable

## 2024-01-07 NOTE — ED NOTES
Pt given d/c instructions, f/u info and aware of RX x 3 for  with verbal understanding.  VSS at discharge.  PIV d/c'd with tip intact.  Pt dressed independently.  Pt taken from the ED via w/c by EDT.  Pt's son here to drive her home.

## 2024-01-07 NOTE — ED PROVIDER NOTES
ER Provider Note    Scribed for Sylvia Miller M.d. by Vicky Schmitt. 1/7/2024  6:43 AM    Primary Care Provider: Jessie Flores M.D.    CHIEF COMPLAINT  Chief Complaint   Patient presents with    Headache     Patient with headache that radiates to neck and shoulders. Patient has been taking ibuprofen at home but it has not been relieving pain.    Ankle Pain     Patient with pain to right ankle.     EXTERNAL RECORDS REVIEWED  The patient was recently admitted on December 15th through the 17th for influenza, asthma exacerbation, and pneumonia. She has history of gout.     HPI/ROS  LIMITATION TO HISTORY   Select: : None  OUTSIDE HISTORIAN(S):  None.    Serenity Howe is a 73 y.o. female who has history of gout and hypertension presents to the ED for evaluation of pain to the right side of her neck and her posterior head onset 3 days ago.  The patient reports that the pain is exacerbated when she turns her head to the right. Moving her head down slightly exacerbates the pain, but going to the left or up does not. When the patient is not moving, she reports that she still feels the pain although it is much worse when she looks to the right. She states that she has tried taking ibuprofen for the pain with no alleviation. No trauma or injury. The patient states she also has cough since this morning.  She says that when the pain is so bad sometimes she will feel short of breath.  No shortness of breath at this time.  She denies any earache, trouble swallowing, abdominal pain, chest pain, pain shooting down her arms or legs, unilateral weakness, unilateral numbness, vision changes, eye pain, eye redness, or light sensitivity.  The patient also mentions that she felt a vertigo sensation yesterday that lasted a few minutes and then resolved.  No vertigo today..  The patient has history of asthma, but denies any cardiac history.     The patient also reports that she has right ankle pain onset 2 days  ago. The patient denies any recent fall or trauma. The patient has swelling to the ankle, but is still able to ambulate. She states that this does not feel like gout, but she is unsure.  She states she is really not worried about the ankle pain.  She is more worried about the neck pain and posterior right-sided head pain.  She is supposed to fly to the [facility] this afternoon.    PAST MEDICAL HISTORY  Past Medical History:   Diagnosis Date    ASTHMA     Cancer (HCC)     Gout     History of breast cancer 2015    Hypertension     Shoulder pain, right 2015    Tremor 2015     SURGICAL HISTORY  Past Surgical History:   Procedure Laterality Date    GYN SURGERY       x1    MASTECTOMY      right      FAMILY HISTORY  Family History   Problem Relation Age of Onset    Hyperlipidemia Mother     No Known Problems Father     No Known Problems Sister     No Known Problems Brother     No Known Problems Daughter     No Known Problems Daughter     No Known Problems Son      SOCIAL HISTORY   reports that she has never smoked. She has never used smokeless tobacco. She reports that she does not drink alcohol and does not use drugs.    CURRENT MEDICATIONS  Previous Medications    ACETAMINOPHEN (TYLENOL) 325 MG TAB    Take 2 Tablets by mouth every 6 hours as needed for Mild Pain.    ALBUTEROL (PROVENTIL) 2.5MG/3ML NEBU SOLN SOLUTION FOR NEBULIZATION    Take 3 mL by nebulization every four hours as needed for Shortness of Breath.    ALBUTEROL 108 (90 BASE) MCG/ACT AERO SOLN INHALATION AEROSOL    Inhale 2 Puffs every 6 hours as needed for Shortness of Breath.    ASPIRIN (ASA) 81 MG CHEW    Chew 1 Tablet every day.    BENZONATATE (TESSALON) 100 MG CAP    Take 1 Capsule by mouth 3 times a day as needed for Cough.    CALCIUM CARB-CHOLECALCIFEROL (CALTRATE 600+D3) 600-800 MG-UNIT TAB    Take 1 Tablet by mouth every day.    COLCHICINE (COLCRYS) 0.6 MG TAB    Take 2 Tablets by mouth every day.    CYCLOBENZAPRINE  "(FLEXERIL) 5 MG TABLET    Take 1 Tablet by mouth 3 times a day as needed for Muscle Spasms or Moderate Pain.    FLUTICASONE-SALMETEROL (ADVAIR) 250-50 MCG/ACT AEROSOL POWDER, BREATH ACTIVATED    Inhale 1 Puff every 12 hours.    IBUPROFEN (MOTRIN) 600 MG TAB    Take 1 Tablet by mouth every 6 hours as needed for Fever or Headache.    LIDOCAINE (LIDODERM) 5 % PATCH    Place 1 Patch on the skin every 24 hours.    MELOXICAM (MOBIC) 15 MG TABLET    Take 1 Tablet by mouth every day.    NYSTATIN (MYCOSTATIN) 770388 UNIT/GM CREAM TOPICAL CREAM    Apply 1 g topically 2 times a day.    POTASSIUM CHLORIDE ER (KLOR-CON) 10 MEQ TABLET    Take 1 Tablet by mouth 2 times a day.    VALSARTAN-HYDROCHLOROTHIAZIDE (DIOVAN-HCT) 80-12.5 MG PER TABLET    TAKE 1 TABLET BY MOUTH EVERY DAY    VERAPAMIL ER (CALAN SR) 120 MG CAPSULE SR 24 HR    TAKE 1 CAPSULE BY MOUTH EVERY DAY     ALLERGIES  Shellfish allergy      PHYSICAL EXAM  BP (!) 171/83   Pulse 75   Temp 36.7 °C (98 °F) (Temporal)   Resp 18   Ht 1.575 m (5' 2\")   Wt 71.2 kg (157 lb)   SpO2 94%   BMI 28.72 kg/m²     Constitutional: Well developed, well nourished; No acute distress; Non-toxic appearance.   HENT: Normocephalic, atraumatic; Bilateral external ears normal; Oropharynx with moist mucous membranes; No erythema or exudates in the posterior oropharynx. TM clear bilaterally. No conjunctival injection. No mastoid tenderness.  No pain with movement of the pinna.  There is no rash, erythema, warmth, induration or swelling to the right ear, mastoid area or neck.  Eyes: PERRL, EOMI, Conjunctiva normal. No discharge. No nystagmus.  Neck:  Supple, No stridor; Tender along the right paracervical muscles and sternocostal muscles. Tenderness also to the right trapezius muscle.  Neck pain is distinctly worse when she turns her head/looks to the right.  Minimal pain when she looks/turns her head to the left and when she looks up.  No pain when she looks down.  No nuchal " rigidity.  Lymphatic: No cervical lymphadenopathy noted.   Cardiovascular: Regular rate and rhythm without murmurs, rubs, or gallop.   Thorax & Lungs: No respiratory distress, Slightly decreased breath sounds throughout. Nontender chest wall. No crepitus or subcutaneous air  Abdomen: Soft, nontender, bowel sounds normal. No obvious masses; No pulsatile masses; no rebound, guarding, or peritoneal signs.   Skin: Good color; warm and dry without rash or petechia.  Back: Nontender, No CVA tenderness.   Extremities:  Distal radial, dorsalis pedis, posterior tibial pulses are equal bilaterally; No edema; Nontender calves or saphenous, No cyanosis, No clubbing.   Musculoskeletal: Right lower extremity: The patient has some mild swelling to the right ankle when compared to the left.  She has full range of motion to the ankle.  There is very subtle warmth over the lateral aspect of the ankle without any erythema.  Nontender calcaneus.  Nontender Achilles tendon.  No pretibial edema.  Calves are nontender.  Nontender foot.  2+ pedal pulses equal bilaterally.  Neurologic: Alert & oriented x 4, clear speech, cranial nerves II through XII are intact.  No nystagmus.  No ataxia with finger-to-nose or heel-to-shin.   are 5 out of 5 and equal bilaterally.  Lower extremity strength are 5 out of 5 and equal bilaterally with testing of dorsiflexors and plantar flexors.  Sensation is intact to light touch.      DIAGNOSTIC STUDIES    Labs:   Results for orders placed or performed during the hospital encounter of 01/07/24   CBC WITH DIFFERENTIAL   Result Value Ref Range    WBC 9.0 4.8 - 10.8 K/uL    RBC 3.81 (L) 4.20 - 5.40 M/uL    Hemoglobin 12.9 12.0 - 16.0 g/dL    Hematocrit 37.6 37.0 - 47.0 %    MCV 98.7 (H) 81.4 - 97.8 fL    MCH 33.9 (H) 27.0 - 33.0 pg    MCHC 34.3 32.2 - 35.5 g/dL    RDW 47.2 35.9 - 50.0 fL    Platelet Count 256 164 - 446 K/uL    MPV 9.2 9.0 - 12.9 fL    Neutrophils-Polys 68.20 44.00 - 72.00 %    Lymphocytes  18.40 (L) 22.00 - 41.00 %    Monocytes 11.70 0.00 - 13.40 %    Eosinophils 1.20 0.00 - 6.90 %    Basophils 0.20 0.00 - 1.80 %    Immature Granulocytes 0.30 0.00 - 0.90 %    Nucleated RBC 0.00 0.00 - 0.20 /100 WBC    Neutrophils (Absolute) 6.15 1.82 - 7.42 K/uL    Lymphs (Absolute) 1.66 1.00 - 4.80 K/uL    Monos (Absolute) 1.06 (H) 0.00 - 0.85 K/uL    Eos (Absolute) 0.11 0.00 - 0.51 K/uL    Baso (Absolute) 0.02 0.00 - 0.12 K/uL    Immature Granulocytes (abs) 0.03 0.00 - 0.11 K/uL    NRBC (Absolute) 0.00 K/uL   COMP METABOLIC PANEL   Result Value Ref Range    Sodium 141 135 - 145 mmol/L    Potassium 3.2 (L) 3.6 - 5.5 mmol/L    Chloride 105 96 - 112 mmol/L    Co2 24 20 - 33 mmol/L    Anion Gap 12.0 7.0 - 16.0    Glucose 99 65 - 99 mg/dL    Bun 14 8 - 22 mg/dL    Creatinine 0.93 0.50 - 1.40 mg/dL    Calcium 9.6 8.5 - 10.5 mg/dL    Correct Calcium 9.9 8.5 - 10.5 mg/dL    AST(SGOT) 17 12 - 45 U/L    ALT(SGPT) 16 2 - 50 U/L    Alkaline Phosphatase 77 30 - 99 U/L    Total Bilirubin 0.8 0.1 - 1.5 mg/dL    Albumin 3.6 3.2 - 4.9 g/dL    Total Protein 7.3 6.0 - 8.2 g/dL    Globulin 3.7 (H) 1.9 - 3.5 g/dL    A-G Ratio 1.0 g/dL   TROPONIN   Result Value Ref Range    Troponin T 11 6 - 19 ng/L   URIC ACID   Result Value Ref Range    Uric Acid 7.5 1.9 - 8.2 mg/dL   ESTIMATED GFR   Result Value Ref Range    GFR (CKD-EPI) 64 >60 mL/min/1.73 m 2   EKG (NOW)   Result Value Ref Range    Report       [facility] Emergency Dept.    Test Date:  2024  Pt Name:    STEPHEN BETANCUR                Department: ER  MRN:        [id number]                      Room:        22  Gender:     Female                       Technician: [id number]  :        [id number]                   Requested By:LORIN MILLER  Order #:    [id number]                    Reading MD: Lorin Miller    Measurements  Intervals                                Axis  Rate:       78                           P:          47  MT:         211                           QRS:        52  QRSD:       90                           T:          67  QT:         363  QTc:        414    Interpretive Statements  Sinus rhythm rate 78  Normal axis  Normal intervals  Increased baseline wander  T waves inverted  No ST elevation or depression  Compared to ECG 12/15/2023 04:22:30  Sinus tachycardia no longer present  ST (T wave) deviation no longer present  Electronically Signed On 01- 08:39:58 PST by Sylvia Miller          EKG:   I have independently interpreted this EKG       Radiology:   The attending emergency physician has independently interpreted the diagnostic imaging associated with this visit and am waiting the final reading from the radiologist.     Preliminary interpretation is a follows: ER MD is reviewed the patient's ankle x-ray.  No obvious bony lesions or fractures.     Radiologist interpretation:   CT-POST-FOSSA-EAR WITH   Final Result      CT of the temporal bones with contrast within normal limits.      CT-CTA NECK WITH & W/O-POST PROCESSING   Final Result      No significant carotid or vertebral stenosis.      CT-CTA HEAD WITH & W/O-POST PROCESS   Final Result      No significant stenosis, acute arterial occlusion, or aneurysm identified.      DX-ANKLE 3+ VIEWS RIGHT   Final Result      No acute processes.      DX-CHEST-PORTABLE (1 VIEW)   Final Result      No acute process.           COURSE & MEDICAL DECISION MAKING     ED Observation Status? No; Patient does not meet criteria for ED Observation.     INITIAL ASSESSMENT, COURSE AND PLAN  Care Narrative: Patient presents to the ER with complaint of right posterior head pain and right neck pain which began 3 days ago.  The pain has been constant.  She has been taking ibuprofen with minimal improvement.  Pain in the posterior right head and right neck gets worse when she looks to the right.  He gets slightly worse when she looks to the left and when she looks up, but no pain when she looks down.  She has no nuchal rigidity.   She has reproducible tenderness to palpation over the right paraspinous muscles of the cervical spine as well as the right sternocleidomastoid muscle and right trapezius.  There is no rash, induration, warmth or erythema.  No concern for shingles or deep tissue infection or abscess.  She has no trouble swallowing.  No trouble breathing.  No pain in the anterior part of her neck.  No stridor.  She denies ester headache.  She says the pain in her head is located in the right posterior part of her head down low by her mastoid and extends down into her neck.  She has no mastoid tenderness.  There is no pain with movement of the pinna.  She has not had fevers or chills.  She denies ear pain.  Her TMs are clear.  She has no pain around her eye.  She has no complaints of blurry vision or eye redness.  No photophobia.  Low suspicion for glaucoma or temporal arteritis at this time.  No trauma or injury.  CT brain is negative.  I did a CTA of the head and neck as I was concerned about the possibility of vertebral artery dissection as patient did describe a few minutes of some vertigo yesterday.  No vertigo today.  No evidence of dissection or occlusion of the vertebral or carotid arteries.  No head bleed.  No tumors.  I scan through the posterior fossa to make sure there was not an acoustic neuroma or any other tumor that might be contributing to her discomfort.  Patient was given Toradol and Robaxin here in the ER and she is feeling significantly improved.  I suspect the patient's neck pain and right posterior head pain is likely musculoskeletal in etiology, especially since he gets most worse when she looks to the right.  This is likely a torticollis of sorts.  In addition to her complaint of right head and neck pain, she also complains of some right ankle pain and swelling.  This has been going on for last 2 days.  She has a history of gout.  She says gout usually attacks her feet and not her ankles.  She says she is  really not worried about her ankle.  She just mentioned it because she was here.  She does have some mild swelling to the ankle.  There is some mild warmth over the lateral aspect.  She has full range of motion to the joint.  She has not had a fever.  I do not think this is septic arthritis.  I suspect she might be developing a bit of gout.  Patient is anxious to be discharged.  She has a flight to the [facility] this afternoon.  She says it is very expensive flight and she does not want to miss it.  I will send her home with prescription for Robaxin, Motrin, lidocaine patches.  She is to use ice for discomfort.  She is feeling reassured that there is nothing dangerous going on and is anxious to be discharged.  If she has been given strict return precautions and discharge instructions and she understands treatment plan and follow-up.    6:44 AM - Patient seen and examined at bedside. Discussed plan of care, including performing an EKG, lab work and imaging. Patient agrees to the plan of care.     9:23 AM - The patient will be medicated with Robaxin 750 mg, Toradol 15 mg, and Kdur 40 mEq.    11:36 AM - The patient was reevaluated at bedside. She reports that she is feeling significantly improved and is ready for her trip to the [facility] today. Discussed lab and scan results with the patient. I recommended that the patient use anti-inflammatories, muscle spasm medicine, ice and lidocaine patches for at home pain management of her pain. Discussed discharge instructions and return precautions with the patient and they were cleared for discharge. Patient was given the opportunity to ask any further questions. She is comfortable with discharge at this time.  Patient  is anxious to discharge as she needs to catch her flight.    ADDITIONAL PROBLEM LIST  Problem #1: Constant pain in the right posterior head and right neck x 3 days  Problem #2: Right ankle pain and swelling x 2 days    DISPOSITION AND DISCUSSIONS  I have  discussed management of the patient with the following physicians and JOSSELIN's:  None.    Discussion of management with other Q or appropriate source(s): None     Escalation of care considered, and ultimately not performed: diagnostic imaging.  Patient has no  history of trauma or injury.  No need for CT scan of the cervical spine.  No complaints of pain rating down arms or legs.  No complaints of numbness, tingling weakness of extremities.    Barriers to care at this time, including but not limited to:  None known .     Decision tools and prescription drugs considered including, but not limited to: NIH Stroke Scale 0 .    The patient will return for new or worsening symptoms and is stable at the time of discharge.    DISPOSITION:  Patient will be discharged home in stable condition.    FOLLOW UP:  Jessie Flores M.D.  [address] [id number] [phone]    Schedule an appointment as soon as possible for a visit in 1 week  If symptoms worsen return to ER    OUTPATIENT MEDICATIONS:  New Prescriptions    IBUPROFEN (MOTRIN) 600 MG TAB    Take 1 Tablet by mouth every 8 hours as needed for Moderate Pain for up to 5 days.    LIDOCAINE (LIDODERM) 5 % PATCH    Place 1 Patch on the skin every 24 hours for 3 days.    METHOCARBAMOL (ROBAXIN) 750 MG TAB    Take 1 Tablet by mouth 4 times a day for 3 days.      FINAL DIAGNOSIS  1. Torticollis Acute   2. Acute idiopathic gout, unspecified site Acute      Vicky ZIMMERMAN (Scribruma), am scribing for, and in the presence of, Sylvia Miller M.D..    Electronically signed by: Vicky Navarro), 1/7/2024    ISylvia M.D. personally performed the services described in this documentation, as scribed by Vicky Schmitt in my presence, and it is both accurate and complete.     This dictation has been created using voice recognition software. The accuracy of the dictation is limited by the abilities of the  software. I expect there may be some errors of grammar and possibly content. I made every attempt to manually correct the errors within my dictation. However, errors related to voice recognition software may still exist and should be interpreted within the appropriate context.      The note accurately reflects work and decisions made by me.  Sylvia Miller M.D.  1/7/2024  3:41 PM

## 2024-01-07 NOTE — ED TRIAGE NOTES
"Chief Complaint   Patient presents with    Headache     Patient with headache that radiates to neck and shoulders. Patient has been taking ibuprofen at home but it has not been relieving pain.    Ankle Pain     Patient with pain to right ankle.       Pt is alert and oriented, speaking in full sentences, follows commands and responds appropriately to questions. Resperations are even and unlabored.      Pt placed in lobby. Pt educated on triage process. Pt encouraged to alert staff for any changes.     Patient and staff wearing appropriate PPE.    BP (!) 180/93   Pulse 90   Temp 36.9 °C (98.4 °F) (Temporal)   Resp 18   Ht 1.575 m (5' 2\")   Wt 71.2 kg (157 lb)   SpO2 94%    "

## 2024-01-07 NOTE — DISCHARGE INSTRUCTIONS
Please follow-up with a doctor in the [facility] this coming week for recheck.    Return to the ER for any worsening neck pain, changing neck pain, dizziness or lightheadedness, visual changes, fevers over 100.4, shaking chills, pain radiating down your arms or legs, numbness/tingling/weakness of arms or legs, difficulty swallowing or breathing, chest pain, rash to the skin of your neck or head, or for any concerns/worsening.    Follow-up with your primary care physician as soon as you get back from the [facility]

## 2024-01-07 NOTE — ED NOTES
Ambulatory to room [facility] with same report as triage. Alert and oriented.  Chart up for ERP to see.

## 2024-01-07 NOTE — ED NOTES
Pt needed to use the restroom. Pt was offered bed pan , pure wic , and assisted ambulation. Pt chose pure wic. Pure wic was placed and working.

## 2024-07-05 ENCOUNTER — APPOINTMENT (OUTPATIENT)
Dept: RADIOLOGY | Facility: MEDICAL CENTER | Age: 74
DRG: 202 | End: 2024-07-05
Attending: EMERGENCY MEDICINE
Payer: COMMERCIAL

## 2024-07-05 ENCOUNTER — HOSPITAL ENCOUNTER (INPATIENT)
Facility: MEDICAL CENTER | Age: 74
LOS: 1 days | DRG: 202 | End: 2024-07-07
Attending: EMERGENCY MEDICINE | Admitting: INTERNAL MEDICINE
Payer: COMMERCIAL

## 2024-07-05 DIAGNOSIS — I16.0 HYPERTENSIVE URGENCY: ICD-10-CM

## 2024-07-05 DIAGNOSIS — J20.9 ACUTE BRONCHITIS, UNSPECIFIED ORGANISM: ICD-10-CM

## 2024-07-05 DIAGNOSIS — R06.02 SOB (SHORTNESS OF BREATH): ICD-10-CM

## 2024-07-05 DIAGNOSIS — J45.901 EXACERBATION OF ASTHMA, UNSPECIFIED ASTHMA SEVERITY, UNSPECIFIED WHETHER PERSISTENT: ICD-10-CM

## 2024-07-05 PROBLEM — E87.6 HYPOKALEMIA: Status: ACTIVE | Noted: 2024-07-05

## 2024-07-05 PROBLEM — J44.1 ACUTE EXACERBATION OF CHRONIC OBSTRUCTIVE PULMONARY DISEASE (COPD) (HCC): Status: ACTIVE | Noted: 2024-07-05

## 2024-07-05 PROBLEM — J44.1 ACUTE EXACERBATION OF CHRONIC OBSTRUCTIVE PULMONARY DISEASE (COPD) (HCC): Status: RESOLVED | Noted: 2024-07-05 | Resolved: 2024-07-05

## 2024-07-05 PROBLEM — J44.1 COPD WITH ACUTE EXACERBATION (HCC): Status: ACTIVE | Noted: 2024-07-05

## 2024-07-05 LAB
ALBUMIN SERPL BCP-MCNC: 3.4 G/DL (ref 3.2–4.9)
ALBUMIN/GLOB SERPL: 0.9 G/DL
ALP SERPL-CCNC: 114 U/L (ref 30–99)
ALT SERPL-CCNC: 40 U/L (ref 2–50)
ANION GAP SERPL CALC-SCNC: 14 MMOL/L (ref 7–16)
AST SERPL-CCNC: 31 U/L (ref 12–45)
BASOPHILS # BLD AUTO: 0.6 % (ref 0–1.8)
BASOPHILS # BLD: 0.07 K/UL (ref 0–0.12)
BILIRUB SERPL-MCNC: 0.4 MG/DL (ref 0.1–1.5)
BUN SERPL-MCNC: 15 MG/DL (ref 8–22)
CALCIUM ALBUM COR SERPL-MCNC: 10.4 MG/DL (ref 8.5–10.5)
CALCIUM SERPL-MCNC: 9.9 MG/DL (ref 8.5–10.5)
CHLORIDE SERPL-SCNC: 107 MMOL/L (ref 96–112)
CO2 SERPL-SCNC: 20 MMOL/L (ref 20–33)
CREAT SERPL-MCNC: 1.11 MG/DL (ref 0.5–1.4)
EKG IMPRESSION: NORMAL
EOSINOPHIL # BLD AUTO: 0.12 K/UL (ref 0–0.51)
EOSINOPHIL NFR BLD: 1 % (ref 0–6.9)
ERYTHROCYTE [DISTWIDTH] IN BLOOD BY AUTOMATED COUNT: 52.2 FL (ref 35.9–50)
FLUAV RNA SPEC QL NAA+PROBE: NEGATIVE
FLUBV RNA SPEC QL NAA+PROBE: NEGATIVE
GFR SERPLBLD CREATININE-BSD FMLA CKD-EPI: 52 ML/MIN/1.73 M 2
GLOBULIN SER CALC-MCNC: 3.6 G/DL (ref 1.9–3.5)
GLUCOSE SERPL-MCNC: 137 MG/DL (ref 65–99)
HCT VFR BLD AUTO: 38.1 % (ref 37–47)
HGB BLD-MCNC: 13.3 G/DL (ref 12–16)
IMM GRANULOCYTES # BLD AUTO: 0.08 K/UL (ref 0–0.11)
IMM GRANULOCYTES NFR BLD AUTO: 0.7 % (ref 0–0.9)
LYMPHOCYTES # BLD AUTO: 2.11 K/UL (ref 1–4.8)
LYMPHOCYTES NFR BLD: 18.4 % (ref 22–41)
MCH RBC QN AUTO: 33.4 PG (ref 27–33)
MCHC RBC AUTO-ENTMCNC: 34.9 G/DL (ref 32.2–35.5)
MCV RBC AUTO: 95.7 FL (ref 81.4–97.8)
MONOCYTES # BLD AUTO: 1.21 K/UL (ref 0–0.85)
MONOCYTES NFR BLD AUTO: 10.5 % (ref 0–13.4)
NEUTROPHILS # BLD AUTO: 7.9 K/UL (ref 1.82–7.42)
NEUTROPHILS NFR BLD: 68.8 % (ref 44–72)
NRBC # BLD AUTO: 0 K/UL
NRBC BLD-RTO: 0 /100 WBC (ref 0–0.2)
NT-PROBNP SERPL IA-MCNC: 494 PG/ML (ref 0–125)
PLATELET # BLD AUTO: 271 K/UL (ref 164–446)
PMV BLD AUTO: 9.5 FL (ref 9–12.9)
POTASSIUM SERPL-SCNC: 3.4 MMOL/L (ref 3.6–5.5)
PROT SERPL-MCNC: 7 G/DL (ref 6–8.2)
RBC # BLD AUTO: 3.98 M/UL (ref 4.2–5.4)
RSV RNA SPEC QL NAA+PROBE: NEGATIVE
SARS-COV-2 RNA RESP QL NAA+PROBE: NOTDETECTED
SODIUM SERPL-SCNC: 141 MMOL/L (ref 135–145)
TROPONIN T SERPL-MCNC: 14 NG/L (ref 6–19)
WBC # BLD AUTO: 11.5 K/UL (ref 4.8–10.8)

## 2024-07-05 PROCEDURE — 71275 CT ANGIOGRAPHY CHEST: CPT

## 2024-07-05 PROCEDURE — 700111 HCHG RX REV CODE 636 W/ 250 OVERRIDE (IP): Mod: JG | Performed by: EMERGENCY MEDICINE

## 2024-07-05 PROCEDURE — 99285 EMERGENCY DEPT VISIT HI MDM: CPT

## 2024-07-05 PROCEDURE — G0378 HOSPITAL OBSERVATION PER HR: HCPCS

## 2024-07-05 PROCEDURE — 71045 X-RAY EXAM CHEST 1 VIEW: CPT

## 2024-07-05 PROCEDURE — 85025 COMPLETE CBC W/AUTO DIFF WBC: CPT

## 2024-07-05 PROCEDURE — 84484 ASSAY OF TROPONIN QUANT: CPT

## 2024-07-05 PROCEDURE — 700111 HCHG RX REV CODE 636 W/ 250 OVERRIDE (IP): Mod: JZ | Performed by: INTERNAL MEDICINE

## 2024-07-05 PROCEDURE — 96375 TX/PRO/DX INJ NEW DRUG ADDON: CPT

## 2024-07-05 PROCEDURE — 80053 COMPREHEN METABOLIC PANEL: CPT

## 2024-07-05 PROCEDURE — 700117 HCHG RX CONTRAST REV CODE 255: Performed by: EMERGENCY MEDICINE

## 2024-07-05 PROCEDURE — 0241U HCHG SARS-COV-2 COVID-19 NFCT DS RESP RNA 4 TRGT ED POC: CPT

## 2024-07-05 PROCEDURE — 99223 1ST HOSP IP/OBS HIGH 75: CPT | Performed by: INTERNAL MEDICINE

## 2024-07-05 PROCEDURE — 700102 HCHG RX REV CODE 250 W/ 637 OVERRIDE(OP): Performed by: INTERNAL MEDICINE

## 2024-07-05 PROCEDURE — 36415 COLL VENOUS BLD VENIPUNCTURE: CPT

## 2024-07-05 PROCEDURE — 96374 THER/PROPH/DIAG INJ IV PUSH: CPT

## 2024-07-05 PROCEDURE — 83880 ASSAY OF NATRIURETIC PEPTIDE: CPT

## 2024-07-05 PROCEDURE — 96376 TX/PRO/DX INJ SAME DRUG ADON: CPT

## 2024-07-05 PROCEDURE — 93005 ELECTROCARDIOGRAM TRACING: CPT

## 2024-07-05 PROCEDURE — 93005 ELECTROCARDIOGRAM TRACING: CPT | Performed by: EMERGENCY MEDICINE

## 2024-07-05 PROCEDURE — A9270 NON-COVERED ITEM OR SERVICE: HCPCS | Performed by: INTERNAL MEDICINE

## 2024-07-05 RX ORDER — ASPIRIN 81 MG/1
81 TABLET, CHEWABLE ORAL DAILY
Status: DISCONTINUED | OUTPATIENT
Start: 2024-07-06 | End: 2024-07-06

## 2024-07-05 RX ORDER — METOLAZONE 5 MG/1
5 TABLET ORAL ONCE
Status: COMPLETED | OUTPATIENT
Start: 2024-07-05 | End: 2024-07-05

## 2024-07-05 RX ORDER — LABETALOL HYDROCHLORIDE 5 MG/ML
10 INJECTION, SOLUTION INTRAVENOUS ONCE
Status: COMPLETED | OUTPATIENT
Start: 2024-07-05 | End: 2024-07-05

## 2024-07-05 RX ORDER — ONDANSETRON 4 MG/1
4 TABLET, ORALLY DISINTEGRATING ORAL EVERY 4 HOURS PRN
Status: DISCONTINUED | OUTPATIENT
Start: 2024-07-05 | End: 2024-07-07 | Stop reason: HOSPADM

## 2024-07-05 RX ORDER — POTASSIUM CHLORIDE 20 MEQ/1
40 TABLET, EXTENDED RELEASE ORAL ONCE
Status: COMPLETED | OUTPATIENT
Start: 2024-07-06 | End: 2024-07-06

## 2024-07-05 RX ORDER — VERAPAMIL HYDROCHLORIDE 240 MG/1
240 TABLET, FILM COATED, EXTENDED RELEASE ORAL EVERY EVENING
COMMUNITY

## 2024-07-05 RX ORDER — HYDRALAZINE HYDROCHLORIDE 20 MG/ML
20 INJECTION INTRAMUSCULAR; INTRAVENOUS EVERY 6 HOURS PRN
Status: DISCONTINUED | OUTPATIENT
Start: 2024-07-05 | End: 2024-07-07 | Stop reason: HOSPADM

## 2024-07-05 RX ORDER — ACETAMINOPHEN 325 MG/1
650 TABLET ORAL EVERY 6 HOURS PRN
COMMUNITY

## 2024-07-05 RX ORDER — GUAIFENESIN DEXTROMETHORPHAN HYDROBROMIDE ORAL SOLUTION 10; 100 MG/5ML; MG/5ML
10 SOLUTION ORAL EVERY 6 HOURS
Status: DISCONTINUED | OUTPATIENT
Start: 2024-07-06 | End: 2024-07-06

## 2024-07-05 RX ORDER — ONDANSETRON 2 MG/ML
4 INJECTION INTRAMUSCULAR; INTRAVENOUS EVERY 4 HOURS PRN
Status: DISCONTINUED | OUTPATIENT
Start: 2024-07-05 | End: 2024-07-07 | Stop reason: HOSPADM

## 2024-07-05 RX ORDER — MELOXICAM 15 MG/1
15 TABLET ORAL DAILY
Status: DISCONTINUED | OUTPATIENT
Start: 2024-07-06 | End: 2024-07-06

## 2024-07-05 RX ORDER — VALSARTAN 80 MG/1
80 TABLET ORAL DAILY
Status: DISCONTINUED | OUTPATIENT
Start: 2024-07-06 | End: 2024-07-06

## 2024-07-05 RX ORDER — ENOXAPARIN SODIUM 100 MG/ML
40 INJECTION SUBCUTANEOUS DAILY
Status: DISCONTINUED | OUTPATIENT
Start: 2024-07-06 | End: 2024-07-07 | Stop reason: HOSPADM

## 2024-07-05 RX ORDER — VERAPAMIL HYDROCHLORIDE 120 MG/1
240 CAPSULE, EXTENDED RELEASE ORAL EVERY EVENING
Status: DISCONTINUED | OUTPATIENT
Start: 2024-07-06 | End: 2024-07-07 | Stop reason: HOSPADM

## 2024-07-05 RX ORDER — IPRATROPIUM BROMIDE AND ALBUTEROL SULFATE 2.5; .5 MG/3ML; MG/3ML
3 SOLUTION RESPIRATORY (INHALATION)
Status: DISCONTINUED | OUTPATIENT
Start: 2024-07-06 | End: 2024-07-06

## 2024-07-05 RX ORDER — VALSARTAN 80 MG/1
80 TABLET ORAL DAILY
Status: ON HOLD | COMMUNITY
End: 2024-07-07

## 2024-07-05 RX ORDER — ACETAMINOPHEN 325 MG/1
650 TABLET ORAL EVERY 6 HOURS PRN
Status: DISCONTINUED | OUTPATIENT
Start: 2024-07-05 | End: 2024-07-07 | Stop reason: HOSPADM

## 2024-07-05 RX ORDER — METHYLPREDNISOLONE 4 MG/1
4 TABLET ORAL EVERY 6 HOURS
Status: DISCONTINUED | OUTPATIENT
Start: 2024-07-05 | End: 2024-07-06

## 2024-07-05 RX ORDER — FLUTICASONE PROPIONATE AND SALMETEROL 250; 50 UG/1; UG/1
1 POWDER RESPIRATORY (INHALATION) 2 TIMES DAILY
COMMUNITY

## 2024-07-05 RX ORDER — DOXYCYCLINE 100 MG/1
100 TABLET ORAL EVERY 12 HOURS
Status: DISCONTINUED | OUTPATIENT
Start: 2024-07-05 | End: 2024-07-07 | Stop reason: HOSPADM

## 2024-07-05 RX ORDER — CYCLOBENZAPRINE HCL 10 MG
10 TABLET ORAL 3 TIMES DAILY PRN
Status: DISCONTINUED | OUTPATIENT
Start: 2024-07-05 | End: 2024-07-06

## 2024-07-05 RX ORDER — FLUTICASONE PROPIONATE AND SALMETEROL 250; 50 UG/1; UG/1
1 POWDER RESPIRATORY (INHALATION) EVERY 12 HOURS
Status: DISCONTINUED | OUTPATIENT
Start: 2024-07-05 | End: 2024-07-06

## 2024-07-05 RX ADMIN — LABETALOL HYDROCHLORIDE 10 MG: 5 INJECTION, SOLUTION INTRAVENOUS at 18:11

## 2024-07-05 RX ADMIN — METHYLPREDNISOLONE 4 MG: 4 TABLET ORAL at 21:58

## 2024-07-05 RX ADMIN — HYDRALAZINE HYDROCHLORIDE 20 MG: 20 INJECTION, SOLUTION INTRAMUSCULAR; INTRAVENOUS at 22:31

## 2024-07-05 RX ADMIN — DOXYCYCLINE 100 MG: 100 TABLET, FILM COATED ORAL at 21:58

## 2024-07-05 RX ADMIN — IOHEXOL 63 ML: 350 INJECTION, SOLUTION INTRAVENOUS at 18:15

## 2024-07-05 RX ADMIN — METOLAZONE 5 MG: 5 TABLET ORAL at 21:58

## 2024-07-05 RX ADMIN — LABETALOL HYDROCHLORIDE 10 MG: 5 INJECTION, SOLUTION INTRAVENOUS at 15:43

## 2024-07-05 SDOH — ECONOMIC STABILITY: TRANSPORTATION INSECURITY
IN THE PAST 12 MONTHS, HAS THE LACK OF TRANSPORTATION KEPT YOU FROM MEDICAL APPOINTMENTS OR FROM GETTING MEDICATIONS?: NO

## 2024-07-05 SDOH — ECONOMIC STABILITY: TRANSPORTATION INSECURITY
IN THE PAST 12 MONTHS, HAS LACK OF RELIABLE TRANSPORTATION KEPT YOU FROM MEDICAL APPOINTMENTS, MEETINGS, WORK OR FROM GETTING THINGS NEEDED FOR DAILY LIVING?: NO

## 2024-07-05 ASSESSMENT — ENCOUNTER SYMPTOMS
FEVER: 0
SHORTNESS OF BREATH: 1
WHEEZING: 1
NAUSEA: 0
INSOMNIA: 1
DIARRHEA: 0
VOMITING: 0
CONSTIPATION: 0
CHILLS: 0
SPUTUM PRODUCTION: 1
DIZZINESS: 0
BLURRED VISION: 0
NERVOUS/ANXIOUS: 1
ABDOMINAL PAIN: 0
DIAPHORESIS: 0
HEADACHES: 1
COUGH: 1
SORE THROAT: 1

## 2024-07-05 ASSESSMENT — COPD QUESTIONNAIRES
COPD SCREENING SCORE: 4
HAVE YOU SMOKED AT LEAST 100 CIGARETTES IN YOUR ENTIRE LIFE: NO/DON'T KNOW
DO YOU EVER COUGH UP ANY MUCUS OR PHLEGM?: NO/ONLY WITH OCCASIONAL COLDS OR INFECTIONS
DURING THE PAST 4 WEEKS HOW MUCH DID YOU FEEL SHORT OF BREATH: SOME OF THE TIME

## 2024-07-05 ASSESSMENT — FIBROSIS 4 INDEX
FIB4 SCORE: 1.25
FIB4 SCORE: 1.36

## 2024-07-05 ASSESSMENT — SOCIAL DETERMINANTS OF HEALTH (SDOH)
WITHIN THE LAST YEAR, HAVE TO BEEN RAPED OR FORCED TO HAVE ANY KIND OF SEXUAL ACTIVITY BY YOUR PARTNER OR EX-PARTNER?: NO
WITHIN THE PAST 12 MONTHS, YOU WORRIED THAT YOUR FOOD WOULD RUN OUT BEFORE YOU GOT THE MONEY TO BUY MORE: NEVER TRUE
WITHIN THE LAST YEAR, HAVE YOU BEEN AFRAID OF YOUR PARTNER OR EX-PARTNER?: NO
WITHIN THE PAST 12 MONTHS, THE FOOD YOU BOUGHT JUST DIDN'T LAST AND YOU DIDN'T HAVE MONEY TO GET MORE: NEVER TRUE
IN THE PAST 12 MONTHS, HAS THE ELECTRIC, GAS, OIL, OR WATER COMPANY THREATENED TO SHUT OFF SERVICE IN YOUR HOME?: NO
WITHIN THE LAST YEAR, HAVE YOU BEEN HUMILIATED OR EMOTIONALLY ABUSED IN OTHER WAYS BY YOUR PARTNER OR EX-PARTNER?: NO
WITHIN THE LAST YEAR, HAVE YOU BEEN KICKED, HIT, SLAPPED, OR OTHERWISE PHYSICALLY HURT BY YOUR PARTNER OR EX-PARTNER?: NO

## 2024-07-05 ASSESSMENT — LIFESTYLE VARIABLES
HAVE PEOPLE ANNOYED YOU BY CRITICIZING YOUR DRINKING: NO
TOTAL SCORE: 0
TOTAL SCORE: 0
DOES PATIENT WANT TO STOP DRINKING: NO
HAVE YOU EVER FELT YOU SHOULD CUT DOWN ON YOUR DRINKING: NO
ON A TYPICAL DAY WHEN YOU DRINK ALCOHOL HOW MANY DRINKS DO YOU HAVE: 0
EVER FELT BAD OR GUILTY ABOUT YOUR DRINKING: NO
TOTAL SCORE: 0
HOW MANY TIMES IN THE PAST YEAR HAVE YOU HAD 5 OR MORE DRINKS IN A DAY: 0
ALCOHOL_USE: NO
AVERAGE NUMBER OF DAYS PER WEEK YOU HAVE A DRINK CONTAINING ALCOHOL: 0
EVER HAD A DRINK FIRST THING IN THE MORNING TO STEADY YOUR NERVES TO GET RID OF A HANGOVER: NO
CONSUMPTION TOTAL: NEGATIVE

## 2024-07-06 LAB
ANION GAP SERPL CALC-SCNC: 13 MMOL/L (ref 7–16)
B PARAP IS1001 DNA NPH QL NAA+NON-PROBE: NOT DETECTED
B PERT.PT PRMT NPH QL NAA+NON-PROBE: NOT DETECTED
BASOPHILS # BLD AUTO: 0.3 % (ref 0–1.8)
BASOPHILS # BLD: 0.03 K/UL (ref 0–0.12)
BUN SERPL-MCNC: 15 MG/DL (ref 8–22)
C PNEUM DNA NPH QL NAA+NON-PROBE: NOT DETECTED
CALCIUM SERPL-MCNC: 9.7 MG/DL (ref 8.5–10.5)
CHLORIDE SERPL-SCNC: 103 MMOL/L (ref 96–112)
CO2 SERPL-SCNC: 22 MMOL/L (ref 20–33)
CREAT SERPL-MCNC: 1.03 MG/DL (ref 0.5–1.4)
EOSINOPHIL # BLD AUTO: 0 K/UL (ref 0–0.51)
EOSINOPHIL NFR BLD: 0 % (ref 0–6.9)
ERYTHROCYTE [DISTWIDTH] IN BLOOD BY AUTOMATED COUNT: 52.2 FL (ref 35.9–50)
FLUAV RNA NPH QL NAA+NON-PROBE: NOT DETECTED
FLUBV RNA NPH QL NAA+NON-PROBE: NOT DETECTED
GFR SERPLBLD CREATININE-BSD FMLA CKD-EPI: 57 ML/MIN/1.73 M 2
GLUCOSE SERPL-MCNC: 195 MG/DL (ref 65–99)
HADV DNA NPH QL NAA+NON-PROBE: NOT DETECTED
HCOV 229E RNA NPH QL NAA+NON-PROBE: NOT DETECTED
HCOV HKU1 RNA NPH QL NAA+NON-PROBE: NOT DETECTED
HCOV NL63 RNA NPH QL NAA+NON-PROBE: NOT DETECTED
HCOV OC43 RNA NPH QL NAA+NON-PROBE: NOT DETECTED
HCT VFR BLD AUTO: 39.7 % (ref 37–47)
HGB BLD-MCNC: 13.5 G/DL (ref 12–16)
HMPV RNA NPH QL NAA+NON-PROBE: NOT DETECTED
HPIV1 RNA NPH QL NAA+NON-PROBE: NOT DETECTED
HPIV2 RNA NPH QL NAA+NON-PROBE: NOT DETECTED
HPIV3 RNA NPH QL NAA+NON-PROBE: NOT DETECTED
HPIV4 RNA NPH QL NAA+NON-PROBE: NOT DETECTED
IMM GRANULOCYTES # BLD AUTO: 0.05 K/UL (ref 0–0.11)
IMM GRANULOCYTES NFR BLD AUTO: 0.4 % (ref 0–0.9)
LYMPHOCYTES # BLD AUTO: 1.19 K/UL (ref 1–4.8)
LYMPHOCYTES NFR BLD: 10.3 % (ref 22–41)
M PNEUMO DNA NPH QL NAA+NON-PROBE: NOT DETECTED
MAGNESIUM SERPL-MCNC: 1.9 MG/DL (ref 1.5–2.5)
MCH RBC QN AUTO: 32.2 PG (ref 27–33)
MCHC RBC AUTO-ENTMCNC: 34 G/DL (ref 32.2–35.5)
MCV RBC AUTO: 94.7 FL (ref 81.4–97.8)
MONOCYTES # BLD AUTO: 0.2 K/UL (ref 0–0.85)
MONOCYTES NFR BLD AUTO: 1.7 % (ref 0–13.4)
NEUTROPHILS # BLD AUTO: 10.13 K/UL (ref 1.82–7.42)
NEUTROPHILS NFR BLD: 87.3 % (ref 44–72)
NRBC # BLD AUTO: 0 K/UL
NRBC BLD-RTO: 0 /100 WBC (ref 0–0.2)
PLATELET # BLD AUTO: 288 K/UL (ref 164–446)
PMV BLD AUTO: 9.2 FL (ref 9–12.9)
POTASSIUM SERPL-SCNC: 3.9 MMOL/L (ref 3.6–5.5)
RBC # BLD AUTO: 4.19 M/UL (ref 4.2–5.4)
RSV RNA NPH QL NAA+NON-PROBE: NOT DETECTED
RV+EV RNA NPH QL NAA+NON-PROBE: NOT DETECTED
SARS-COV-2 RNA NPH QL NAA+NON-PROBE: NOTDETECTED
SODIUM SERPL-SCNC: 138 MMOL/L (ref 135–145)
WBC # BLD AUTO: 11.6 K/UL (ref 4.8–10.8)

## 2024-07-06 PROCEDURE — 36415 COLL VENOUS BLD VENIPUNCTURE: CPT

## 2024-07-06 PROCEDURE — 94760 N-INVAS EAR/PLS OXIMETRY 1: CPT

## 2024-07-06 PROCEDURE — 700102 HCHG RX REV CODE 250 W/ 637 OVERRIDE(OP): Performed by: INTERNAL MEDICINE

## 2024-07-06 PROCEDURE — 700111 HCHG RX REV CODE 636 W/ 250 OVERRIDE (IP): Mod: JZ | Performed by: INTERNAL MEDICINE

## 2024-07-06 PROCEDURE — 700101 HCHG RX REV CODE 250: Performed by: INTERNAL MEDICINE

## 2024-07-06 PROCEDURE — 0202U NFCT DS 22 TRGT SARS-COV-2: CPT

## 2024-07-06 PROCEDURE — A9270 NON-COVERED ITEM OR SERVICE: HCPCS | Performed by: INTERNAL MEDICINE

## 2024-07-06 PROCEDURE — 99233 SBSQ HOSP IP/OBS HIGH 50: CPT | Performed by: INTERNAL MEDICINE

## 2024-07-06 PROCEDURE — 80048 BASIC METABOLIC PNL TOTAL CA: CPT

## 2024-07-06 PROCEDURE — 83735 ASSAY OF MAGNESIUM: CPT

## 2024-07-06 PROCEDURE — 85025 COMPLETE CBC W/AUTO DIFF WBC: CPT

## 2024-07-06 PROCEDURE — 94664 DEMO&/EVAL PT USE INHALER: CPT

## 2024-07-06 PROCEDURE — 94660 CPAP INITIATION&MGMT: CPT

## 2024-07-06 PROCEDURE — 770006 HCHG ROOM/CARE - MED/SURG/GYN SEMI*

## 2024-07-06 PROCEDURE — 94669 MECHANICAL CHEST WALL OSCILL: CPT

## 2024-07-06 PROCEDURE — 94640 AIRWAY INHALATION TREATMENT: CPT

## 2024-07-06 RX ORDER — IPRATROPIUM BROMIDE AND ALBUTEROL SULFATE 2.5; .5 MG/3ML; MG/3ML
3 SOLUTION RESPIRATORY (INHALATION)
Status: DISCONTINUED | OUTPATIENT
Start: 2024-07-06 | End: 2024-07-07

## 2024-07-06 RX ORDER — GUAIFENESIN 600 MG/1
1200 TABLET, EXTENDED RELEASE ORAL EVERY 12 HOURS
Status: DISCONTINUED | OUTPATIENT
Start: 2024-07-06 | End: 2024-07-07 | Stop reason: HOSPADM

## 2024-07-06 RX ORDER — METHYLPREDNISOLONE SODIUM SUCCINATE 125 MG/2ML
62.5 INJECTION, POWDER, LYOPHILIZED, FOR SOLUTION INTRAMUSCULAR; INTRAVENOUS EVERY 8 HOURS
Status: DISCONTINUED | OUTPATIENT
Start: 2024-07-06 | End: 2024-07-07 | Stop reason: HOSPADM

## 2024-07-06 RX ORDER — VALSARTAN 80 MG/1
160 TABLET ORAL DAILY
Status: DISCONTINUED | OUTPATIENT
Start: 2024-07-07 | End: 2024-07-07 | Stop reason: HOSPADM

## 2024-07-06 RX ORDER — VALSARTAN 80 MG/1
80 TABLET ORAL ONCE
Status: COMPLETED | OUTPATIENT
Start: 2024-07-06 | End: 2024-07-06

## 2024-07-06 RX ORDER — IPRATROPIUM BROMIDE AND ALBUTEROL SULFATE 2.5; .5 MG/3ML; MG/3ML
3 SOLUTION RESPIRATORY (INHALATION)
Status: DISCONTINUED | OUTPATIENT
Start: 2024-07-06 | End: 2024-07-06

## 2024-07-06 RX ADMIN — IPRATROPIUM BROMIDE AND ALBUTEROL SULFATE 3 ML: 2.5; .5 SOLUTION RESPIRATORY (INHALATION) at 18:52

## 2024-07-06 RX ADMIN — GUAIFENESIN 1200 MG: 600 TABLET, EXTENDED RELEASE ORAL at 17:51

## 2024-07-06 RX ADMIN — IPRATROPIUM BROMIDE AND ALBUTEROL SULFATE 3 ML: 2.5; .5 SOLUTION RESPIRATORY (INHALATION) at 00:46

## 2024-07-06 RX ADMIN — IPRATROPIUM BROMIDE AND ALBUTEROL SULFATE 3 ML: 2.5; .5 SOLUTION RESPIRATORY (INHALATION) at 08:37

## 2024-07-06 RX ADMIN — VALSARTAN 80 MG: 80 TABLET ORAL at 10:32

## 2024-07-06 RX ADMIN — GUAIFENESIN 1200 MG: 600 TABLET, EXTENDED RELEASE ORAL at 10:15

## 2024-07-06 RX ADMIN — DOXYCYCLINE 100 MG: 100 TABLET, FILM COATED ORAL at 17:51

## 2024-07-06 RX ADMIN — POTASSIUM CHLORIDE 40 MEQ: 1500 TABLET, EXTENDED RELEASE ORAL at 01:14

## 2024-07-06 RX ADMIN — MOMETASONE FUROATE AND FORMOTEROL FUMARATE DIHYDRATE 2 PUFF: 200; 5 AEROSOL RESPIRATORY (INHALATION) at 08:33

## 2024-07-06 RX ADMIN — DEXTROMETHORPHAN HYDROBROMIDE AND GUAIFENESIN 10 ML: 10; 100 LIQUID ORAL at 06:09

## 2024-07-06 RX ADMIN — VALSARTAN 80 MG: 80 TABLET ORAL at 05:55

## 2024-07-06 RX ADMIN — IPRATROPIUM BROMIDE AND ALBUTEROL SULFATE 3 ML: 2.5; .5 SOLUTION RESPIRATORY (INHALATION) at 02:27

## 2024-07-06 RX ADMIN — ENOXAPARIN SODIUM 40 MG: 100 INJECTION SUBCUTANEOUS at 17:51

## 2024-07-06 RX ADMIN — METHYLPREDNISOLONE SODIUM SUCCINATE 62.5 MG: 125 INJECTION, POWDER, FOR SOLUTION INTRAMUSCULAR; INTRAVENOUS at 08:33

## 2024-07-06 RX ADMIN — IPRATROPIUM BROMIDE AND ALBUTEROL SULFATE 3 ML: 2.5; .5 SOLUTION RESPIRATORY (INHALATION) at 08:55

## 2024-07-06 RX ADMIN — IPRATROPIUM BROMIDE AND ALBUTEROL SULFATE 3 ML: 2.5; .5 SOLUTION RESPIRATORY (INHALATION) at 11:53

## 2024-07-06 RX ADMIN — DOXYCYCLINE 100 MG: 100 TABLET, FILM COATED ORAL at 05:55

## 2024-07-06 RX ADMIN — VERAPAMIL HYDROCHLORIDE 240 MG: 120 CAPSULE, DELAYED RELEASE PELLETS ORAL at 17:51

## 2024-07-06 RX ADMIN — METHYLPREDNISOLONE SODIUM SUCCINATE 62.5 MG: 125 INJECTION, POWDER, FOR SOLUTION INTRAMUSCULAR; INTRAVENOUS at 14:39

## 2024-07-06 RX ADMIN — VERAPAMIL HYDROCHLORIDE 240 MG: 120 CAPSULE, DELAYED RELEASE PELLETS ORAL at 01:14

## 2024-07-06 RX ADMIN — METHYLPREDNISOLONE 4 MG: 4 TABLET ORAL at 02:20

## 2024-07-06 RX ADMIN — METHYLPREDNISOLONE SODIUM SUCCINATE 62.5 MG: 125 INJECTION, POWDER, FOR SOLUTION INTRAMUSCULAR; INTRAVENOUS at 20:02

## 2024-07-06 RX ADMIN — IPRATROPIUM BROMIDE AND ALBUTEROL SULFATE 3 ML: 2.5; .5 SOLUTION RESPIRATORY (INHALATION) at 15:42

## 2024-07-06 RX ADMIN — IPRATROPIUM BROMIDE AND ALBUTEROL SULFATE 3 ML: 2.5; .5 SOLUTION RESPIRATORY (INHALATION) at 22:47

## 2024-07-06 ASSESSMENT — COGNITIVE AND FUNCTIONAL STATUS - GENERAL
SUGGESTED CMS G CODE MODIFIER DAILY ACTIVITY: CH
MOBILITY SCORE: 24
SUGGESTED CMS G CODE MODIFIER MOBILITY: CH
DAILY ACTIVITIY SCORE: 24

## 2024-07-06 ASSESSMENT — PAIN DESCRIPTION - PAIN TYPE
TYPE: ACUTE PAIN

## 2024-07-06 ASSESSMENT — FIBROSIS 4 INDEX: FIB4 SCORE: 1.28

## 2024-07-07 VITALS
HEIGHT: 62 IN | BODY MASS INDEX: 27.71 KG/M2 | WEIGHT: 150.57 LBS | HEART RATE: 88 BPM | SYSTOLIC BLOOD PRESSURE: 143 MMHG | OXYGEN SATURATION: 98 % | DIASTOLIC BLOOD PRESSURE: 73 MMHG | TEMPERATURE: 97.9 F | RESPIRATION RATE: 16 BRPM

## 2024-07-07 PROBLEM — I16.0 HYPERTENSIVE URGENCY: Status: RESOLVED | Noted: 2024-07-05 | Resolved: 2024-07-07

## 2024-07-07 LAB
ANION GAP SERPL CALC-SCNC: 14 MMOL/L (ref 7–16)
BUN SERPL-MCNC: 25 MG/DL (ref 8–22)
CALCIUM SERPL-MCNC: 9.8 MG/DL (ref 8.5–10.5)
CHLORIDE SERPL-SCNC: 100 MMOL/L (ref 96–112)
CO2 SERPL-SCNC: 21 MMOL/L (ref 20–33)
CREAT SERPL-MCNC: 1.21 MG/DL (ref 0.5–1.4)
ERYTHROCYTE [DISTWIDTH] IN BLOOD BY AUTOMATED COUNT: 52 FL (ref 35.9–50)
GFR SERPLBLD CREATININE-BSD FMLA CKD-EPI: 47 ML/MIN/1.73 M 2
GLUCOSE SERPL-MCNC: 143 MG/DL (ref 65–99)
HCT VFR BLD AUTO: 37.7 % (ref 37–47)
HGB BLD-MCNC: 13.2 G/DL (ref 12–16)
MCH RBC QN AUTO: 32.8 PG (ref 27–33)
MCHC RBC AUTO-ENTMCNC: 35 G/DL (ref 32.2–35.5)
MCV RBC AUTO: 93.5 FL (ref 81.4–97.8)
PLATELET # BLD AUTO: 293 K/UL (ref 164–446)
PMV BLD AUTO: 9.5 FL (ref 9–12.9)
POTASSIUM SERPL-SCNC: 3.5 MMOL/L (ref 3.6–5.5)
RBC # BLD AUTO: 4.03 M/UL (ref 4.2–5.4)
SODIUM SERPL-SCNC: 135 MMOL/L (ref 135–145)
WBC # BLD AUTO: 15.1 K/UL (ref 4.8–10.8)

## 2024-07-07 PROCEDURE — 80048 BASIC METABOLIC PNL TOTAL CA: CPT

## 2024-07-07 PROCEDURE — 94640 AIRWAY INHALATION TREATMENT: CPT

## 2024-07-07 PROCEDURE — 99239 HOSP IP/OBS DSCHRG MGMT >30: CPT | Performed by: HOSPITALIST

## 2024-07-07 PROCEDURE — 700101 HCHG RX REV CODE 250: Performed by: HOSPITALIST

## 2024-07-07 PROCEDURE — 700101 HCHG RX REV CODE 250: Performed by: INTERNAL MEDICINE

## 2024-07-07 PROCEDURE — 94760 N-INVAS EAR/PLS OXIMETRY 1: CPT

## 2024-07-07 PROCEDURE — 700102 HCHG RX REV CODE 250 W/ 637 OVERRIDE(OP): Mod: JZ | Performed by: HOSPITALIST

## 2024-07-07 PROCEDURE — 85027 COMPLETE CBC AUTOMATED: CPT

## 2024-07-07 PROCEDURE — 700102 HCHG RX REV CODE 250 W/ 637 OVERRIDE(OP): Performed by: INTERNAL MEDICINE

## 2024-07-07 PROCEDURE — 700111 HCHG RX REV CODE 636 W/ 250 OVERRIDE (IP): Mod: JZ | Performed by: INTERNAL MEDICINE

## 2024-07-07 PROCEDURE — A9270 NON-COVERED ITEM OR SERVICE: HCPCS | Performed by: INTERNAL MEDICINE

## 2024-07-07 PROCEDURE — 36415 COLL VENOUS BLD VENIPUNCTURE: CPT

## 2024-07-07 PROCEDURE — A9270 NON-COVERED ITEM OR SERVICE: HCPCS | Mod: JZ | Performed by: HOSPITALIST

## 2024-07-07 RX ORDER — IPRATROPIUM BROMIDE AND ALBUTEROL SULFATE 2.5; .5 MG/3ML; MG/3ML
3 SOLUTION RESPIRATORY (INHALATION)
Status: DISCONTINUED | OUTPATIENT
Start: 2024-07-07 | End: 2024-07-07 | Stop reason: HOSPADM

## 2024-07-07 RX ORDER — VALSARTAN 160 MG/1
160 TABLET ORAL DAILY
Qty: 30 TABLET | Refills: 1 | Status: SHIPPED | OUTPATIENT
Start: 2024-07-07

## 2024-07-07 RX ORDER — PREDNISONE 20 MG/1
40 TABLET ORAL DAILY
Qty: 8 TABLET | Refills: 0 | Status: SHIPPED | OUTPATIENT
Start: 2024-07-08 | End: 2024-07-12

## 2024-07-07 RX ORDER — DOXYCYCLINE 100 MG/1
100 TABLET ORAL EVERY 12 HOURS
Qty: 8 TABLET | Refills: 0 | Status: ACTIVE | OUTPATIENT
Start: 2024-07-07 | End: 2024-07-11

## 2024-07-07 RX ORDER — POTASSIUM CHLORIDE 20 MEQ/1
40 TABLET, EXTENDED RELEASE ORAL ONCE
Status: COMPLETED | OUTPATIENT
Start: 2024-07-07 | End: 2024-07-07

## 2024-07-07 RX ADMIN — POTASSIUM CHLORIDE 40 MEQ: 1500 TABLET, EXTENDED RELEASE ORAL at 08:45

## 2024-07-07 RX ADMIN — VALSARTAN 160 MG: 80 TABLET ORAL at 05:46

## 2024-07-07 RX ADMIN — GUAIFENESIN 1200 MG: 600 TABLET, EXTENDED RELEASE ORAL at 05:46

## 2024-07-07 RX ADMIN — METHYLPREDNISOLONE SODIUM SUCCINATE 62.5 MG: 125 INJECTION, POWDER, FOR SOLUTION INTRAMUSCULAR; INTRAVENOUS at 05:46

## 2024-07-07 RX ADMIN — IPRATROPIUM BROMIDE AND ALBUTEROL SULFATE 3 ML: 2.5; .5 SOLUTION RESPIRATORY (INHALATION) at 12:04

## 2024-07-07 RX ADMIN — IPRATROPIUM BROMIDE AND ALBUTEROL SULFATE 3 ML: 2.5; .5 SOLUTION RESPIRATORY (INHALATION) at 07:54

## 2024-07-07 RX ADMIN — DOXYCYCLINE 100 MG: 100 TABLET, FILM COATED ORAL at 05:46

## 2024-07-07 ASSESSMENT — PAIN DESCRIPTION - PAIN TYPE
TYPE: ACUTE PAIN

## 2024-07-08 ENCOUNTER — TELEPHONE (OUTPATIENT)
Dept: HEALTH INFORMATION MANAGEMENT | Facility: OTHER | Age: 74
End: 2024-07-08
Payer: COMMERCIAL

## 2024-07-09 ENCOUNTER — APPOINTMENT (OUTPATIENT)
Dept: RADIOLOGY | Facility: MEDICAL CENTER | Age: 74
End: 2024-07-09
Attending: STUDENT IN AN ORGANIZED HEALTH CARE EDUCATION/TRAINING PROGRAM
Payer: COMMERCIAL

## 2024-07-09 ENCOUNTER — PHARMACY VISIT (OUTPATIENT)
Dept: PHARMACY | Facility: MEDICAL CENTER | Age: 74
End: 2024-07-09
Payer: COMMERCIAL

## 2024-07-09 ENCOUNTER — APPOINTMENT (OUTPATIENT)
Dept: RADIOLOGY | Facility: MEDICAL CENTER | Age: 74
End: 2024-07-09
Payer: COMMERCIAL

## 2024-07-09 ENCOUNTER — HOSPITAL ENCOUNTER (EMERGENCY)
Facility: MEDICAL CENTER | Age: 74
End: 2024-07-09
Attending: STUDENT IN AN ORGANIZED HEALTH CARE EDUCATION/TRAINING PROGRAM
Payer: COMMERCIAL

## 2024-07-09 VITALS
RESPIRATION RATE: 16 BRPM | BODY MASS INDEX: 27.43 KG/M2 | DIASTOLIC BLOOD PRESSURE: 79 MMHG | HEIGHT: 62 IN | TEMPERATURE: 98.3 F | SYSTOLIC BLOOD PRESSURE: 163 MMHG | OXYGEN SATURATION: 94 % | WEIGHT: 149.03 LBS | HEART RATE: 87 BPM

## 2024-07-09 DIAGNOSIS — J45.41 MODERATE PERSISTENT ASTHMA WITH ACUTE EXACERBATION: ICD-10-CM

## 2024-07-09 DIAGNOSIS — J06.9 UPPER RESPIRATORY TRACT INFECTION, UNSPECIFIED TYPE: ICD-10-CM

## 2024-07-09 DIAGNOSIS — N17.9 AKI (ACUTE KIDNEY INJURY) (HCC): ICD-10-CM

## 2024-07-09 DIAGNOSIS — J45.21 MILD INTERMITTENT ASTHMA WITH ACUTE EXACERBATION: ICD-10-CM

## 2024-07-09 DIAGNOSIS — J44.1 ACUTE EXACERBATION OF CHRONIC OBSTRUCTIVE PULMONARY DISEASE (COPD) (HCC): ICD-10-CM

## 2024-07-09 DIAGNOSIS — Z87.09 HISTORY OF ASTHMA: ICD-10-CM

## 2024-07-09 LAB
ALBUMIN SERPL BCP-MCNC: 3.8 G/DL (ref 3.2–4.9)
ALBUMIN/GLOB SERPL: 1.2 G/DL
ALP SERPL-CCNC: 108 U/L (ref 30–99)
ALT SERPL-CCNC: 60 U/L (ref 2–50)
ANION GAP SERPL CALC-SCNC: 15 MMOL/L (ref 7–16)
AST SERPL-CCNC: 69 U/L (ref 12–45)
BASOPHILS # BLD AUTO: 0.1 % (ref 0–1.8)
BASOPHILS # BLD: 0.02 K/UL (ref 0–0.12)
BILIRUB SERPL-MCNC: 0.3 MG/DL (ref 0.1–1.5)
BUN SERPL-MCNC: 42 MG/DL (ref 8–22)
CALCIUM ALBUM COR SERPL-MCNC: 10.5 MG/DL (ref 8.5–10.5)
CALCIUM SERPL-MCNC: 10.3 MG/DL (ref 8.5–10.5)
CHLORIDE SERPL-SCNC: 105 MMOL/L (ref 96–112)
CO2 SERPL-SCNC: 22 MMOL/L (ref 20–33)
CREAT SERPL-MCNC: 2.09 MG/DL (ref 0.5–1.4)
EKG IMPRESSION: NORMAL
EOSINOPHIL # BLD AUTO: 0.01 K/UL (ref 0–0.51)
EOSINOPHIL NFR BLD: 0.1 % (ref 0–6.9)
ERYTHROCYTE [DISTWIDTH] IN BLOOD BY AUTOMATED COUNT: 54.2 FL (ref 35.9–50)
FLUAV RNA SPEC QL NAA+PROBE: NEGATIVE
FLUBV RNA SPEC QL NAA+PROBE: NEGATIVE
GFR SERPLBLD CREATININE-BSD FMLA CKD-EPI: 24 ML/MIN/1.73 M 2
GLOBULIN SER CALC-MCNC: 3.3 G/DL (ref 1.9–3.5)
GLUCOSE SERPL-MCNC: 106 MG/DL (ref 65–99)
HCT VFR BLD AUTO: 44.1 % (ref 37–47)
HGB BLD-MCNC: 14.8 G/DL (ref 12–16)
IMM GRANULOCYTES # BLD AUTO: 0.16 K/UL (ref 0–0.11)
IMM GRANULOCYTES NFR BLD AUTO: 1 % (ref 0–0.9)
LYMPHOCYTES # BLD AUTO: 2.98 K/UL (ref 1–4.8)
LYMPHOCYTES NFR BLD: 18.9 % (ref 22–41)
MCH RBC QN AUTO: 32.2 PG (ref 27–33)
MCHC RBC AUTO-ENTMCNC: 33.6 G/DL (ref 32.2–35.5)
MCV RBC AUTO: 95.9 FL (ref 81.4–97.8)
MONOCYTES # BLD AUTO: 1.96 K/UL (ref 0–0.85)
MONOCYTES NFR BLD AUTO: 12.5 % (ref 0–13.4)
NEUTROPHILS # BLD AUTO: 10.61 K/UL (ref 1.82–7.42)
NEUTROPHILS NFR BLD: 67.4 % (ref 44–72)
NRBC # BLD AUTO: 0 K/UL
NRBC BLD-RTO: 0 /100 WBC (ref 0–0.2)
PLATELET # BLD AUTO: 363 K/UL (ref 164–446)
PMV BLD AUTO: 9.2 FL (ref 9–12.9)
POTASSIUM SERPL-SCNC: 3.7 MMOL/L (ref 3.6–5.5)
PROT SERPL-MCNC: 7.1 G/DL (ref 6–8.2)
RBC # BLD AUTO: 4.6 M/UL (ref 4.2–5.4)
RSV RNA SPEC QL NAA+PROBE: NEGATIVE
SARS-COV-2 RNA RESP QL NAA+PROBE: NOTDETECTED
SODIUM SERPL-SCNC: 142 MMOL/L (ref 135–145)
WBC # BLD AUTO: 15.7 K/UL (ref 4.8–10.8)

## 2024-07-09 PROCEDURE — RXMED WILLOW AMBULATORY MEDICATION CHARGE: Performed by: STUDENT IN AN ORGANIZED HEALTH CARE EDUCATION/TRAINING PROGRAM

## 2024-07-09 PROCEDURE — 36415 COLL VENOUS BLD VENIPUNCTURE: CPT

## 2024-07-09 PROCEDURE — 99285 EMERGENCY DEPT VISIT HI MDM: CPT

## 2024-07-09 PROCEDURE — 71045 X-RAY EXAM CHEST 1 VIEW: CPT

## 2024-07-09 PROCEDURE — 93005 ELECTROCARDIOGRAM TRACING: CPT

## 2024-07-09 PROCEDURE — 85025 COMPLETE CBC W/AUTO DIFF WBC: CPT

## 2024-07-09 PROCEDURE — 94640 AIRWAY INHALATION TREATMENT: CPT

## 2024-07-09 PROCEDURE — 700101 HCHG RX REV CODE 250: Performed by: STUDENT IN AN ORGANIZED HEALTH CARE EDUCATION/TRAINING PROGRAM

## 2024-07-09 PROCEDURE — 80053 COMPREHEN METABOLIC PANEL: CPT

## 2024-07-09 PROCEDURE — 0241U HCHG SARS-COV-2 COVID-19 NFCT DS RESP RNA 4 TRGT ED POC: CPT

## 2024-07-09 PROCEDURE — 700105 HCHG RX REV CODE 258: Performed by: STUDENT IN AN ORGANIZED HEALTH CARE EDUCATION/TRAINING PROGRAM

## 2024-07-09 PROCEDURE — 93005 ELECTROCARDIOGRAM TRACING: CPT | Performed by: STUDENT IN AN ORGANIZED HEALTH CARE EDUCATION/TRAINING PROGRAM

## 2024-07-09 RX ORDER — IPRATROPIUM BROMIDE AND ALBUTEROL SULFATE 2.5; .5 MG/3ML; MG/3ML
3 SOLUTION RESPIRATORY (INHALATION)
Status: DISCONTINUED | OUTPATIENT
Start: 2024-07-09 | End: 2024-07-10 | Stop reason: HOSPADM

## 2024-07-09 RX ORDER — SODIUM CHLORIDE 9 MG/ML
INJECTION, SOLUTION INTRAVENOUS ONCE
Status: COMPLETED | OUTPATIENT
Start: 2024-07-09 | End: 2024-07-09

## 2024-07-09 RX ORDER — ALBUTEROL SULFATE 2.5 MG/3ML
2.5 SOLUTION RESPIRATORY (INHALATION) EVERY 4 HOURS PRN
Qty: 75 ML | Refills: 0 | Status: SHIPPED | OUTPATIENT
Start: 2024-07-09

## 2024-07-09 RX ADMIN — SODIUM CHLORIDE: 9 INJECTION, SOLUTION INTRAVENOUS at 21:03

## 2024-07-09 RX ADMIN — IPRATROPIUM BROMIDE AND ALBUTEROL SULFATE 3 ML: 2.5; .5 SOLUTION RESPIRATORY (INHALATION) at 18:23

## 2024-07-09 ASSESSMENT — FIBROSIS 4 INDEX: FIB4 SCORE: 1.25

## 2024-07-11 ENCOUNTER — HOSPITAL ENCOUNTER (EMERGENCY)
Facility: MEDICAL CENTER | Age: 74
End: 2024-07-11
Attending: STUDENT IN AN ORGANIZED HEALTH CARE EDUCATION/TRAINING PROGRAM
Payer: COMMERCIAL

## 2024-07-11 ENCOUNTER — APPOINTMENT (OUTPATIENT)
Dept: RADIOLOGY | Facility: MEDICAL CENTER | Age: 74
End: 2024-07-11
Attending: STUDENT IN AN ORGANIZED HEALTH CARE EDUCATION/TRAINING PROGRAM
Payer: COMMERCIAL

## 2024-07-11 VITALS
WEIGHT: 149 LBS | RESPIRATION RATE: 20 BRPM | HEIGHT: 62 IN | DIASTOLIC BLOOD PRESSURE: 91 MMHG | TEMPERATURE: 98.4 F | HEART RATE: 91 BPM | SYSTOLIC BLOOD PRESSURE: 183 MMHG | BODY MASS INDEX: 27.42 KG/M2 | OXYGEN SATURATION: 90 %

## 2024-07-11 DIAGNOSIS — J44.1 ACUTE EXACERBATION OF CHRONIC OBSTRUCTIVE PULMONARY DISEASE (COPD) (HCC): ICD-10-CM

## 2024-07-11 DIAGNOSIS — J06.9 UPPER RESPIRATORY TRACT INFECTION, UNSPECIFIED TYPE: ICD-10-CM

## 2024-07-11 LAB
ALBUMIN SERPL BCP-MCNC: 3.5 G/DL (ref 3.2–4.9)
ALBUMIN/GLOB SERPL: 1.2 G/DL
ALP SERPL-CCNC: 106 U/L (ref 30–99)
ALT SERPL-CCNC: 56 U/L (ref 2–50)
ANION GAP SERPL CALC-SCNC: 15 MMOL/L (ref 7–16)
AST SERPL-CCNC: 43 U/L (ref 12–45)
BASOPHILS # BLD AUTO: 0.2 % (ref 0–1.8)
BASOPHILS # BLD: 0.02 K/UL (ref 0–0.12)
BILIRUB SERPL-MCNC: 0.2 MG/DL (ref 0.1–1.5)
BUN SERPL-MCNC: 28 MG/DL (ref 8–22)
CALCIUM ALBUM COR SERPL-MCNC: 10 MG/DL (ref 8.5–10.5)
CALCIUM SERPL-MCNC: 9.6 MG/DL (ref 8.5–10.5)
CHLORIDE SERPL-SCNC: 108 MMOL/L (ref 96–112)
CO2 SERPL-SCNC: 18 MMOL/L (ref 20–33)
CREAT SERPL-MCNC: 1.66 MG/DL (ref 0.5–1.4)
EKG IMPRESSION: NORMAL
EOSINOPHIL # BLD AUTO: 0.23 K/UL (ref 0–0.51)
EOSINOPHIL NFR BLD: 1.9 % (ref 0–6.9)
ERYTHROCYTE [DISTWIDTH] IN BLOOD BY AUTOMATED COUNT: 54.8 FL (ref 35.9–50)
GFR SERPLBLD CREATININE-BSD FMLA CKD-EPI: 32 ML/MIN/1.73 M 2
GLOBULIN SER CALC-MCNC: 2.9 G/DL (ref 1.9–3.5)
GLUCOSE SERPL-MCNC: 132 MG/DL (ref 65–99)
HCT VFR BLD AUTO: 42.4 % (ref 37–47)
HGB BLD-MCNC: 14.5 G/DL (ref 12–16)
IMM GRANULOCYTES # BLD AUTO: 0.2 K/UL (ref 0–0.11)
IMM GRANULOCYTES NFR BLD AUTO: 1.7 % (ref 0–0.9)
LYMPHOCYTES # BLD AUTO: 2.34 K/UL (ref 1–4.8)
LYMPHOCYTES NFR BLD: 19.3 % (ref 22–41)
MCH RBC QN AUTO: 33 PG (ref 27–33)
MCHC RBC AUTO-ENTMCNC: 34.2 G/DL (ref 32.2–35.5)
MCV RBC AUTO: 96.4 FL (ref 81.4–97.8)
MONOCYTES # BLD AUTO: 1.24 K/UL (ref 0–0.85)
MONOCYTES NFR BLD AUTO: 10.2 % (ref 0–13.4)
NEUTROPHILS # BLD AUTO: 8.07 K/UL (ref 1.82–7.42)
NEUTROPHILS NFR BLD: 66.7 % (ref 44–72)
NRBC # BLD AUTO: 0 K/UL
NRBC BLD-RTO: 0 /100 WBC (ref 0–0.2)
PLATELET # BLD AUTO: 318 K/UL (ref 164–446)
PMV BLD AUTO: 9.3 FL (ref 9–12.9)
POTASSIUM SERPL-SCNC: 3.7 MMOL/L (ref 3.6–5.5)
PROT SERPL-MCNC: 6.4 G/DL (ref 6–8.2)
RBC # BLD AUTO: 4.4 M/UL (ref 4.2–5.4)
SODIUM SERPL-SCNC: 141 MMOL/L (ref 135–145)
WBC # BLD AUTO: 12.1 K/UL (ref 4.8–10.8)

## 2024-07-11 PROCEDURE — 94640 AIRWAY INHALATION TREATMENT: CPT

## 2024-07-11 PROCEDURE — 93005 ELECTROCARDIOGRAM TRACING: CPT | Performed by: STUDENT IN AN ORGANIZED HEALTH CARE EDUCATION/TRAINING PROGRAM

## 2024-07-11 PROCEDURE — 700111 HCHG RX REV CODE 636 W/ 250 OVERRIDE (IP): Performed by: STUDENT IN AN ORGANIZED HEALTH CARE EDUCATION/TRAINING PROGRAM

## 2024-07-11 PROCEDURE — 93005 ELECTROCARDIOGRAM TRACING: CPT

## 2024-07-11 PROCEDURE — 85025 COMPLETE CBC W/AUTO DIFF WBC: CPT

## 2024-07-11 PROCEDURE — 700101 HCHG RX REV CODE 250: Performed by: STUDENT IN AN ORGANIZED HEALTH CARE EDUCATION/TRAINING PROGRAM

## 2024-07-11 PROCEDURE — 99284 EMERGENCY DEPT VISIT MOD MDM: CPT

## 2024-07-11 PROCEDURE — 700105 HCHG RX REV CODE 258: Performed by: STUDENT IN AN ORGANIZED HEALTH CARE EDUCATION/TRAINING PROGRAM

## 2024-07-11 PROCEDURE — 71045 X-RAY EXAM CHEST 1 VIEW: CPT

## 2024-07-11 PROCEDURE — 80053 COMPREHEN METABOLIC PANEL: CPT

## 2024-07-11 PROCEDURE — 36415 COLL VENOUS BLD VENIPUNCTURE: CPT

## 2024-07-11 RX ORDER — SODIUM CHLORIDE, SODIUM LACTATE, POTASSIUM CHLORIDE, CALCIUM CHLORIDE 600; 310; 30; 20 MG/100ML; MG/100ML; MG/100ML; MG/100ML
INJECTION, SOLUTION INTRAVENOUS ONCE
Status: COMPLETED | OUTPATIENT
Start: 2024-07-11 | End: 2024-07-11

## 2024-07-11 RX ORDER — IPRATROPIUM BROMIDE AND ALBUTEROL SULFATE 2.5; .5 MG/3ML; MG/3ML
3 SOLUTION RESPIRATORY (INHALATION)
Status: DISCONTINUED | OUTPATIENT
Start: 2024-07-11 | End: 2024-07-12 | Stop reason: HOSPADM

## 2024-07-11 RX ADMIN — SODIUM CHLORIDE, POTASSIUM CHLORIDE, SODIUM LACTATE AND CALCIUM CHLORIDE: 600; 310; 30; 20 INJECTION, SOLUTION INTRAVENOUS at 19:38

## 2024-07-11 RX ADMIN — PREDNISONE 50 MG: 20 TABLET ORAL at 19:32

## 2024-07-11 RX ADMIN — IPRATROPIUM BROMIDE AND ALBUTEROL SULFATE 3 ML: 2.5; .5 SOLUTION RESPIRATORY (INHALATION) at 22:00

## 2024-07-11 ASSESSMENT — FIBROSIS 4 INDEX: FIB4 SCORE: 1.84

## 2024-08-08 ENCOUNTER — APPOINTMENT (OUTPATIENT)
Dept: RADIOLOGY | Facility: MEDICAL CENTER | Age: 74
DRG: 558 | End: 2024-08-08
Attending: EMERGENCY MEDICINE
Payer: COMMERCIAL

## 2024-08-08 ENCOUNTER — HOSPITAL ENCOUNTER (INPATIENT)
Facility: MEDICAL CENTER | Age: 74
LOS: 1 days | DRG: 558 | End: 2024-08-09
Attending: EMERGENCY MEDICINE | Admitting: INTERNAL MEDICINE
Payer: COMMERCIAL

## 2024-08-08 DIAGNOSIS — N39.0 URINARY TRACT INFECTION WITH HEMATURIA, SITE UNSPECIFIED: ICD-10-CM

## 2024-08-08 DIAGNOSIS — E87.6 HYPOKALEMIA: ICD-10-CM

## 2024-08-08 DIAGNOSIS — R91.1 PULMONARY NODULE: ICD-10-CM

## 2024-08-08 DIAGNOSIS — R31.9 URINARY TRACT INFECTION WITH HEMATURIA, SITE UNSPECIFIED: ICD-10-CM

## 2024-08-08 DIAGNOSIS — M79.641 HAND PAIN, RIGHT: ICD-10-CM

## 2024-08-08 DIAGNOSIS — K76.89 HEPATIC CYST: ICD-10-CM

## 2024-08-08 DIAGNOSIS — M65.9 TENOSYNOVITIS OF THUMB: ICD-10-CM

## 2024-08-08 PROBLEM — M65.949 TENOSYNOVITIS OF THUMB: Status: ACTIVE | Noted: 2024-08-08

## 2024-08-08 LAB
ALBUMIN SERPL BCP-MCNC: 3.4 G/DL (ref 3.2–4.9)
ALBUMIN/GLOB SERPL: 0.9 G/DL
ALP SERPL-CCNC: 111 U/L (ref 30–99)
ALT SERPL-CCNC: 16 U/L (ref 2–50)
ANION GAP SERPL CALC-SCNC: 16 MMOL/L (ref 7–16)
APPEARANCE UR: ABNORMAL
AST SERPL-CCNC: 28 U/L (ref 12–45)
BACTERIA #/AREA URNS HPF: ABNORMAL /HPF
BASOPHILS # BLD AUTO: 0.8 % (ref 0–1.8)
BASOPHILS # BLD: 0.07 K/UL (ref 0–0.12)
BILIRUB SERPL-MCNC: 0.6 MG/DL (ref 0.1–1.5)
BILIRUB UR QL STRIP.AUTO: NEGATIVE
BUN SERPL-MCNC: 13 MG/DL (ref 8–22)
CALCIUM ALBUM COR SERPL-MCNC: 10 MG/DL (ref 8.5–10.5)
CALCIUM SERPL-MCNC: 9.5 MG/DL (ref 8.5–10.5)
CHLORIDE SERPL-SCNC: 103 MMOL/L (ref 96–112)
CO2 SERPL-SCNC: 23 MMOL/L (ref 20–33)
COLOR UR: YELLOW
CREAT SERPL-MCNC: 1.17 MG/DL (ref 0.5–1.4)
CRP SERPL HS-MCNC: 1.68 MG/DL (ref 0–0.75)
EOSINOPHIL # BLD AUTO: 0.4 K/UL (ref 0–0.51)
EOSINOPHIL NFR BLD: 4.3 % (ref 0–6.9)
EPI CELLS #/AREA URNS HPF: ABNORMAL /HPF
ERYTHROCYTE [DISTWIDTH] IN BLOOD BY AUTOMATED COUNT: 47.2 FL (ref 35.9–50)
ERYTHROCYTE [SEDIMENTATION RATE] IN BLOOD BY WESTERGREN METHOD: 34 MM/HOUR (ref 0–25)
GFR SERPLBLD CREATININE-BSD FMLA CKD-EPI: 49 ML/MIN/1.73 M 2
GLOBULIN SER CALC-MCNC: 4 G/DL (ref 1.9–3.5)
GLUCOSE SERPL-MCNC: 106 MG/DL (ref 65–99)
GLUCOSE UR STRIP.AUTO-MCNC: NEGATIVE MG/DL
HCT VFR BLD AUTO: 42.3 % (ref 37–47)
HGB BLD-MCNC: 14.5 G/DL (ref 12–16)
HYALINE CASTS #/AREA URNS LPF: ABNORMAL /LPF
IMM GRANULOCYTES # BLD AUTO: 0.03 K/UL (ref 0–0.11)
IMM GRANULOCYTES NFR BLD AUTO: 0.3 % (ref 0–0.9)
KETONES UR STRIP.AUTO-MCNC: NEGATIVE MG/DL
LACTATE SERPL-SCNC: 1.4 MMOL/L (ref 0.5–2)
LEUKOCYTE ESTERASE UR QL STRIP.AUTO: ABNORMAL
LYMPHOCYTES # BLD AUTO: 2.68 K/UL (ref 1–4.8)
LYMPHOCYTES NFR BLD: 29 % (ref 22–41)
MCH RBC QN AUTO: 32.4 PG (ref 27–33)
MCHC RBC AUTO-ENTMCNC: 34.3 G/DL (ref 32.2–35.5)
MCV RBC AUTO: 94.4 FL (ref 81.4–97.8)
MICRO URNS: ABNORMAL
MONOCYTES # BLD AUTO: 1.14 K/UL (ref 0–0.85)
MONOCYTES NFR BLD AUTO: 12.4 % (ref 0–13.4)
NEUTROPHILS # BLD AUTO: 4.91 K/UL (ref 1.82–7.42)
NEUTROPHILS NFR BLD: 53.2 % (ref 44–72)
NITRITE UR QL STRIP.AUTO: NEGATIVE
NRBC # BLD AUTO: 0 K/UL
NRBC BLD-RTO: 0 /100 WBC (ref 0–0.2)
PH UR STRIP.AUTO: 6.5 [PH] (ref 5–8)
PLATELET # BLD AUTO: 324 K/UL (ref 164–446)
PMV BLD AUTO: 9.2 FL (ref 9–12.9)
POTASSIUM SERPL-SCNC: 3.1 MMOL/L (ref 3.6–5.5)
PROCALCITONIN SERPL-MCNC: 0.13 NG/ML
PROT SERPL-MCNC: 7.4 G/DL (ref 6–8.2)
PROT UR QL STRIP: NEGATIVE MG/DL
RBC # BLD AUTO: 4.48 M/UL (ref 4.2–5.4)
RBC # URNS HPF: >150 /HPF
RBC UR QL AUTO: ABNORMAL
SCCMEC + MECA PNL NOSE NAA+PROBE: NEGATIVE
SODIUM SERPL-SCNC: 142 MMOL/L (ref 135–145)
SP GR UR STRIP.AUTO: 1.01
UROBILINOGEN UR STRIP.AUTO-MCNC: 0.2 MG/DL
WBC # BLD AUTO: 9.2 K/UL (ref 4.8–10.8)
WBC #/AREA URNS HPF: ABNORMAL /HPF

## 2024-08-08 PROCEDURE — 85652 RBC SED RATE AUTOMATED: CPT

## 2024-08-08 PROCEDURE — 80053 COMPREHEN METABOLIC PANEL: CPT

## 2024-08-08 PROCEDURE — 87086 URINE CULTURE/COLONY COUNT: CPT

## 2024-08-08 PROCEDURE — 73130 X-RAY EXAM OF HAND: CPT | Mod: RT

## 2024-08-08 PROCEDURE — A9270 NON-COVERED ITEM OR SERVICE: HCPCS | Mod: JZ | Performed by: EMERGENCY MEDICINE

## 2024-08-08 PROCEDURE — A9270 NON-COVERED ITEM OR SERVICE: HCPCS | Performed by: INTERNAL MEDICINE

## 2024-08-08 PROCEDURE — 87040 BLOOD CULTURE FOR BACTERIA: CPT

## 2024-08-08 PROCEDURE — 74176 CT ABD & PELVIS W/O CONTRAST: CPT

## 2024-08-08 PROCEDURE — 86140 C-REACTIVE PROTEIN: CPT

## 2024-08-08 PROCEDURE — 700111 HCHG RX REV CODE 636 W/ 250 OVERRIDE (IP): Mod: JZ | Performed by: EMERGENCY MEDICINE

## 2024-08-08 PROCEDURE — 700105 HCHG RX REV CODE 258: Performed by: EMERGENCY MEDICINE

## 2024-08-08 PROCEDURE — 700102 HCHG RX REV CODE 250 W/ 637 OVERRIDE(OP)

## 2024-08-08 PROCEDURE — 700102 HCHG RX REV CODE 250 W/ 637 OVERRIDE(OP): Mod: JZ | Performed by: EMERGENCY MEDICINE

## 2024-08-08 PROCEDURE — 84145 PROCALCITONIN (PCT): CPT

## 2024-08-08 PROCEDURE — 700102 HCHG RX REV CODE 250 W/ 637 OVERRIDE(OP): Performed by: INTERNAL MEDICINE

## 2024-08-08 PROCEDURE — 700105 HCHG RX REV CODE 258: Performed by: INTERNAL MEDICINE

## 2024-08-08 PROCEDURE — 85025 COMPLETE CBC W/AUTO DIFF WBC: CPT

## 2024-08-08 PROCEDURE — 36415 COLL VENOUS BLD VENIPUNCTURE: CPT

## 2024-08-08 PROCEDURE — 81001 URINALYSIS AUTO W/SCOPE: CPT

## 2024-08-08 PROCEDURE — 83605 ASSAY OF LACTIC ACID: CPT

## 2024-08-08 PROCEDURE — 96375 TX/PRO/DX INJ NEW DRUG ADDON: CPT

## 2024-08-08 PROCEDURE — 770006 HCHG ROOM/CARE - MED/SURG/GYN SEMI*

## 2024-08-08 PROCEDURE — 99223 1ST HOSP IP/OBS HIGH 75: CPT | Performed by: INTERNAL MEDICINE

## 2024-08-08 PROCEDURE — 99285 EMERGENCY DEPT VISIT HI MDM: CPT

## 2024-08-08 PROCEDURE — 700111 HCHG RX REV CODE 636 W/ 250 OVERRIDE (IP): Mod: JZ | Performed by: INTERNAL MEDICINE

## 2024-08-08 PROCEDURE — 87641 MR-STAPH DNA AMP PROBE: CPT

## 2024-08-08 PROCEDURE — A9270 NON-COVERED ITEM OR SERVICE: HCPCS

## 2024-08-08 PROCEDURE — 96365 THER/PROPH/DIAG IV INF INIT: CPT

## 2024-08-08 RX ORDER — FLUTICASONE PROPIONATE AND SALMETEROL 250; 50 UG/1; UG/1
1 POWDER RESPIRATORY (INHALATION) 2 TIMES DAILY
Status: DISCONTINUED | OUTPATIENT
Start: 2024-08-08 | End: 2024-08-08

## 2024-08-08 RX ORDER — AMOXICILLIN 250 MG
2 CAPSULE ORAL EVERY EVENING
Status: DISCONTINUED | OUTPATIENT
Start: 2024-08-08 | End: 2024-08-09 | Stop reason: HOSPADM

## 2024-08-08 RX ORDER — DOXYCYCLINE HYCLATE 100 MG
100 TABLET ORAL 2 TIMES DAILY
Status: ON HOLD | COMMUNITY
Start: 2024-07-07 | End: 2024-08-09

## 2024-08-08 RX ORDER — ALBUTEROL SULFATE 90 UG/1
1-2 AEROSOL, METERED RESPIRATORY (INHALATION) EVERY 4 HOURS PRN
COMMUNITY

## 2024-08-08 RX ORDER — IPRATROPIUM BROMIDE AND ALBUTEROL SULFATE 2.5; .5 MG/3ML; MG/3ML
3 SOLUTION RESPIRATORY (INHALATION)
Status: DISCONTINUED | OUTPATIENT
Start: 2024-08-08 | End: 2024-08-09 | Stop reason: HOSPADM

## 2024-08-08 RX ORDER — HYDROMORPHONE HYDROCHLORIDE 1 MG/ML
0.5 INJECTION, SOLUTION INTRAMUSCULAR; INTRAVENOUS; SUBCUTANEOUS ONCE
Status: COMPLETED | OUTPATIENT
Start: 2024-08-08 | End: 2024-08-08

## 2024-08-08 RX ORDER — LABETALOL HYDROCHLORIDE 5 MG/ML
10 INJECTION, SOLUTION INTRAVENOUS EVERY 4 HOURS PRN
Status: DISCONTINUED | OUTPATIENT
Start: 2024-08-08 | End: 2024-08-09 | Stop reason: HOSPADM

## 2024-08-08 RX ORDER — ALBUTEROL SULFATE 90 UG/1
1-2 AEROSOL, METERED RESPIRATORY (INHALATION) EVERY 4 HOURS PRN
Status: DISCONTINUED | OUTPATIENT
Start: 2024-08-08 | End: 2024-08-09 | Stop reason: HOSPADM

## 2024-08-08 RX ORDER — ONDANSETRON 4 MG/1
4 TABLET, ORALLY DISINTEGRATING ORAL EVERY 4 HOURS PRN
Status: DISCONTINUED | OUTPATIENT
Start: 2024-08-08 | End: 2024-08-09 | Stop reason: HOSPADM

## 2024-08-08 RX ORDER — SODIUM CHLORIDE, SODIUM LACTATE, POTASSIUM CHLORIDE, AND CALCIUM CHLORIDE .6; .31; .03; .02 G/100ML; G/100ML; G/100ML; G/100ML
500 INJECTION, SOLUTION INTRAVENOUS
Status: DISCONTINUED | OUTPATIENT
Start: 2024-08-08 | End: 2024-08-09 | Stop reason: HOSPADM

## 2024-08-08 RX ORDER — ALBUTEROL SULFATE 0.83 MG/ML
2.5 SOLUTION RESPIRATORY (INHALATION) EVERY 4 HOURS PRN
Status: DISCONTINUED | OUTPATIENT
Start: 2024-08-08 | End: 2024-08-08

## 2024-08-08 RX ORDER — VALSARTAN 80 MG/1
160 TABLET ORAL DAILY
Status: DISCONTINUED | OUTPATIENT
Start: 2024-08-09 | End: 2024-08-09 | Stop reason: HOSPADM

## 2024-08-08 RX ORDER — SODIUM CHLORIDE, SODIUM LACTATE, POTASSIUM CHLORIDE, CALCIUM CHLORIDE 600; 310; 30; 20 MG/100ML; MG/100ML; MG/100ML; MG/100ML
INJECTION, SOLUTION INTRAVENOUS CONTINUOUS
Status: DISCONTINUED | OUTPATIENT
Start: 2024-08-08 | End: 2024-08-09

## 2024-08-08 RX ORDER — VERAPAMIL HYDROCHLORIDE 240 MG/1
240 TABLET, FILM COATED, EXTENDED RELEASE ORAL EVERY EVENING
Status: DISCONTINUED | OUTPATIENT
Start: 2024-08-08 | End: 2024-08-09 | Stop reason: HOSPADM

## 2024-08-08 RX ORDER — POLYETHYLENE GLYCOL 3350 17 G/17G
1 POWDER, FOR SOLUTION ORAL
Status: DISCONTINUED | OUTPATIENT
Start: 2024-08-08 | End: 2024-08-09 | Stop reason: HOSPADM

## 2024-08-08 RX ORDER — ACETAMINOPHEN 325 MG/1
650 TABLET ORAL EVERY 6 HOURS PRN
Status: DISCONTINUED | OUTPATIENT
Start: 2024-08-08 | End: 2024-08-09 | Stop reason: HOSPADM

## 2024-08-08 RX ORDER — OXYCODONE HYDROCHLORIDE 5 MG/1
5 TABLET ORAL
Status: DISCONTINUED | OUTPATIENT
Start: 2024-08-08 | End: 2024-08-09 | Stop reason: HOSPADM

## 2024-08-08 RX ORDER — OXYCODONE HYDROCHLORIDE 10 MG/1
10 TABLET ORAL
Status: DISCONTINUED | OUTPATIENT
Start: 2024-08-08 | End: 2024-08-09 | Stop reason: HOSPADM

## 2024-08-08 RX ORDER — ENOXAPARIN SODIUM 100 MG/ML
40 INJECTION SUBCUTANEOUS DAILY
Status: DISCONTINUED | OUTPATIENT
Start: 2024-08-09 | End: 2024-08-09 | Stop reason: HOSPADM

## 2024-08-08 RX ORDER — HYDROMORPHONE HYDROCHLORIDE 1 MG/ML
0.5 INJECTION, SOLUTION INTRAMUSCULAR; INTRAVENOUS; SUBCUTANEOUS
Status: DISCONTINUED | OUTPATIENT
Start: 2024-08-08 | End: 2024-08-09 | Stop reason: HOSPADM

## 2024-08-08 RX ORDER — ONDANSETRON 2 MG/ML
4 INJECTION INTRAMUSCULAR; INTRAVENOUS EVERY 4 HOURS PRN
Status: DISCONTINUED | OUTPATIENT
Start: 2024-08-08 | End: 2024-08-09 | Stop reason: HOSPADM

## 2024-08-08 RX ORDER — POTASSIUM CHLORIDE 1500 MG/1
40 TABLET, EXTENDED RELEASE ORAL ONCE
Status: COMPLETED | OUTPATIENT
Start: 2024-08-08 | End: 2024-08-08

## 2024-08-08 RX ADMIN — POTASSIUM CHLORIDE 40 MEQ: 1500 TABLET, EXTENDED RELEASE ORAL at 09:22

## 2024-08-08 RX ADMIN — AMPICILLIN AND SULBACTAM 3 G: 1; 2 INJECTION, POWDER, FOR SOLUTION INTRAMUSCULAR; INTRAVENOUS at 09:22

## 2024-08-08 RX ADMIN — VERAPAMIL HYDROCHLORIDE 240 MG: 240 TABLET, FILM COATED, EXTENDED RELEASE ORAL at 16:39

## 2024-08-08 RX ADMIN — LABETALOL HYDROCHLORIDE 10 MG: 5 INJECTION, SOLUTION INTRAVENOUS at 11:47

## 2024-08-08 RX ADMIN — LABETALOL HYDROCHLORIDE 10 MG: 5 INJECTION, SOLUTION INTRAVENOUS at 20:02

## 2024-08-08 RX ADMIN — HYDROMORPHONE HYDROCHLORIDE 0.5 MG: 1 INJECTION, SOLUTION INTRAMUSCULAR; INTRAVENOUS; SUBCUTANEOUS at 08:53

## 2024-08-08 RX ADMIN — AMPICILLIN AND SULBACTAM 3 G: 1; 2 INJECTION, POWDER, FOR SOLUTION INTRAMUSCULAR; INTRAVENOUS at 20:10

## 2024-08-08 RX ADMIN — MOMETASONE FUROATE AND FORMOTEROL FUMARATE DIHYDRATE 2 PUFF: 200; 5 AEROSOL RESPIRATORY (INHALATION) at 16:40

## 2024-08-08 RX ADMIN — AMPICILLIN AND SULBACTAM 3 G: 1; 2 INJECTION, POWDER, FOR SOLUTION INTRAMUSCULAR; INTRAVENOUS at 15:11

## 2024-08-08 RX ADMIN — SODIUM CHLORIDE, POTASSIUM CHLORIDE, SODIUM LACTATE AND CALCIUM CHLORIDE: 600; 310; 30; 20 INJECTION, SOLUTION INTRAVENOUS at 10:30

## 2024-08-08 RX ADMIN — OXYCODONE HYDROCHLORIDE 10 MG: 10 TABLET ORAL at 18:01

## 2024-08-08 RX ADMIN — Medication 5 MG: at 23:32

## 2024-08-08 ASSESSMENT — ENCOUNTER SYMPTOMS
CONSTIPATION: 0
NAUSEA: 0
FEVER: 0
TREMORS: 0
INSOMNIA: 0
HEADACHES: 0
WEAKNESS: 0
EYE PAIN: 0
MYALGIAS: 1
FOCAL WEAKNESS: 0
BLOOD IN STOOL: 0
PALPITATIONS: 0
FALLS: 0
COUGH: 0
NERVOUS/ANXIOUS: 0
VOMITING: 0
CHILLS: 0
FLANK PAIN: 1
SEIZURES: 0
LOSS OF CONSCIOUSNESS: 0
DIZZINESS: 0
WHEEZING: 0
SHORTNESS OF BREATH: 0
DIARRHEA: 0
ABDOMINAL PAIN: 0
EYE REDNESS: 0
HEMOPTYSIS: 0

## 2024-08-08 ASSESSMENT — LIFESTYLE VARIABLES
EVER FELT BAD OR GUILTY ABOUT YOUR DRINKING: NO
CONSUMPTION TOTAL: INCOMPLETE
HAVE YOU EVER FELT YOU SHOULD CUT DOWN ON YOUR DRINKING: NO
HOW MANY TIMES IN THE PAST YEAR HAVE YOU HAD 5 OR MORE DRINKS IN A DAY: 0
AVERAGE NUMBER OF DAYS PER WEEK YOU HAVE A DRINK CONTAINING ALCOHOL: 0
DOES PATIENT WANT TO STOP DRINKING: NO
ALCOHOL_USE: NO
HAVE PEOPLE ANNOYED YOU BY CRITICIZING YOUR DRINKING: NO
ON A TYPICAL DAY WHEN YOU DRINK ALCOHOL HOW MANY DRINKS DO YOU HAVE: 0

## 2024-08-08 ASSESSMENT — FIBROSIS 4 INDEX
FIB4 SCORE: 1.36
FIB4 SCORE: 1.62037037037037037

## 2024-08-08 ASSESSMENT — COGNITIVE AND FUNCTIONAL STATUS - GENERAL
SUGGESTED CMS G CODE MODIFIER DAILY ACTIVITY: CH
MOBILITY SCORE: 24
DAILY ACTIVITIY SCORE: 24
SUGGESTED CMS G CODE MODIFIER MOBILITY: CH

## 2024-08-08 ASSESSMENT — SOCIAL DETERMINANTS OF HEALTH (SDOH)
WITHIN THE LAST YEAR, HAVE YOU BEEN HUMILIATED OR EMOTIONALLY ABUSED IN OTHER WAYS BY YOUR PARTNER OR EX-PARTNER?: NO
WITHIN THE LAST YEAR, HAVE YOU BEEN KICKED, HIT, SLAPPED, OR OTHERWISE PHYSICALLY HURT BY YOUR PARTNER OR EX-PARTNER?: NO
WITHIN THE LAST YEAR, HAVE YOU BEEN AFRAID OF YOUR PARTNER OR EX-PARTNER?: NO
WITHIN THE LAST YEAR, HAVE TO BEEN RAPED OR FORCED TO HAVE ANY KIND OF SEXUAL ACTIVITY BY YOUR PARTNER OR EX-PARTNER?: NO

## 2024-08-08 ASSESSMENT — PATIENT HEALTH QUESTIONNAIRE - PHQ9
SUM OF ALL RESPONSES TO PHQ9 QUESTIONS 1 AND 2: 0
2. FEELING DOWN, DEPRESSED, IRRITABLE, OR HOPELESS: NOT AT ALL
1. LITTLE INTEREST OR PLEASURE IN DOING THINGS: NOT AT ALL

## 2024-08-08 ASSESSMENT — PAIN DESCRIPTION - PAIN TYPE
TYPE: ACUTE PAIN
TYPE: ACUTE PAIN

## 2024-08-08 NOTE — ED TRIAGE NOTES
Chief Complaint   Patient presents with    Hand Pain     Right thumb area swelling and pain radiating to right arm since last night    Urinary Frequency     Since last night     Pain: 9/10    Pt came in to triage ambulatory with steady gait for the above complaints.     Pt is alert and oriented x 4, speaking in full sentences, follows commands and responds appropriately to questions.     Respirations are even and unlabored.    Pt placed in lobby. Pt educated on triage process.     Pt encouraged to inform staff for any changes in condition or if needs help while waiting to be room in.    Vitals:    08/08/24 0607   BP: (!) 157/86   Pulse: 88   Resp: 16   Temp: 36.6 °C (97.8 °F)   SpO2: 96%

## 2024-08-08 NOTE — PROGRESS NOTES
4 Eyes Skin Assessment Completed by RANDY Hahn and RANDY Joseph.    Head WDL  Ears WDL  Nose WDL  Mouth WDL  Neck WDL  Breast/Chest WDL  Shoulder Blades WDL  Spine WDL  (R) Arm/Elbow/Hand WDL  (L) Arm/Elbow/Hand WDL  Abdomen WDL  Groin WDL  Scrotum/Coccyx/Buttocks WDL  (R) Leg WDL  (L) Leg WDL  (R) Heel/Foot/Toe WDL  (L) Heel/Foot/Toe WDL          Devices In Places PIV      Interventions In Place N/A    Possible Skin Injury No    Pictures Uploaded Into Epic N/A  Wound Consult Placed N/A  RN Wound Prevention Protocol Ordered No

## 2024-08-08 NOTE — ED NOTES
Med Rec completed per patient   Allergies reviewed-yes  Antibiotics in the past 30 days:yes  Patient completed a four day course of doxycycline 100mg that was started on 7/7/2024  Anticoagulant in past 14 days:no  Pharmacy patient utilizes:  [facility]

## 2024-08-08 NOTE — H&P
Hospital Medicine History & Physical Note    Date of Service  8/8/2024    Primary Care Physician  Jessie Flores M.D.    Consultants  Orthopedics    Code Status  Full Code    Chief Complaint  Chief Complaint   Patient presents with    Hand Pain     Right thumb area swelling and pain radiating to right arm since last night    Urinary Frequency     Since last night       History of Presenting Illness  Serenity Howe is a 74 y.o. female with history of gout, hypertension, asthma, CKD, breast cancer presented 8/8/2024  with Hand Pain (Right thumb area swelling and pain radiating to right arm since last night) and Urinary Frequency (Since last night)  .   For 2 days she noticed her R thumb started to swell. She denied trauma and nails look healthy. She couldn't stand the pain so she came in. She denied dysuria but did complain of L flank pain and increased urinary frequency for 3-4 days. She also felt she did not drink enough water.   At the ED, she is afebrile, hemodynamically stable but became hypertensive.  XR reviewed by me, I do not see any joint effusion on the R thumb  CT renal no hydronephrosis, abscess or pyelonephritis.   No leukocytosis   When I saw her at the ED, no acute distress. R thumb swollen. There is cap refill and redness, patient can slightly move thumb joints.   Admitting for encephalopathy, R thumb tenosynovitis, osteoarthritis and UTI. The area is tight, there could be a concern for compartment syndrome but patient can move thumb joints slightly and no ischemic area or severe numbness noted. Johnathan Mendez, Ortho was also consulted and recommended IV Abx and pain control for now per EDP discussion. Started antibiotics; MRSA swab to see if vancomycin also needed  PT/OT     I discussed the plan of care with patient and bedside RN.    Review of Systems  Review of Systems   Constitutional:  Negative for chills and fever.   HENT:  Negative for congestion, hearing loss and nosebleeds.    Eyes:   Negative for pain and redness.   Respiratory:  Negative for cough, hemoptysis, shortness of breath and wheezing.    Cardiovascular:  Negative for chest pain and palpitations.   Gastrointestinal:  Negative for abdominal pain, blood in stool, constipation, diarrhea, nausea and vomiting.   Genitourinary:  Positive for flank pain and frequency. Negative for dysuria and hematuria.   Musculoskeletal:  Positive for joint pain and myalgias. Negative for falls.   Skin:  Negative for rash.   Neurological:  Negative for dizziness, tremors, focal weakness, seizures, loss of consciousness, weakness and headaches.   Psychiatric/Behavioral:  The patient is not nervous/anxious and does not have insomnia.    All other systems reviewed and are negative.      Past Medical History   has a past medical history of Acute exacerbation of chronic obstructive pulmonary disease (COPD) ([facility]) (7/5/2024), ASTHMA, Cancer ([facility]), Gout, History of breast cancer (07/29/2015), Hypertension, Shoulder pain, right (07/29/2015), and Tremor (07/29/2015).    Surgical History   has a past surgical history that includes mastectomy and gyn surgery.     Family History  family history includes Hyperlipidemia in her mother; No Known Problems in her brother, daughter, daughter, father, sister, and son.   Family history reviewed with patient. There is no family history that is pertinent to the chief complaint.     Social History   reports that she has never smoked. She has never used smokeless tobacco. She reports that she does not drink alcohol and does not use drugs.    Allergies  Allergies   Allergen Reactions    Shellfish Allergy Hives and Shortness of Breath       Medications  Prior to Admission Medications   Prescriptions Last Dose Informant Patient Reported? Taking?   acetaminophen (TYLENOL) 325 MG Tab 8/7/2024 at evening Patient Yes Yes   Sig: Take 650 mg by mouth every 6 hours as needed for Mild Pain.   albuterol (PROVENTIL) 2.5mg/3ml Nebu Soln solution  for nebulization 8/7/2024 at prn Patient No Yes   Sig: Take 3 mL by nebulization every four hours as needed for Shortness of Breath.   albuterol 108 (90 Base) MCG/ACT Aero Soln inhalation aerosol 8/8/2024 at 0500 Patient Yes Yes   Sig: Inhale 1-2 Puffs every four hours as needed for Shortness of Breath.   doxycycline (VIBRAMYCIN) 100 MG Tab complete at complete Patient Yes Yes   Sig: Take 100 mg by mouth 2 times a day. 4 days   fluticasone-salmeterol (ADVAIR) 250-50 MCG/ACT AEROSOL POWDER, BREATH ACTIVATED 8/8/2024 at 0530 Patient Yes Yes   Sig: Inhale 1 Puff 2 times a day.   valsartan (DIOVAN) 160 MG Tab 8/8/2024 at 0530 Patient No Yes   Sig: Take 1 Tablet by mouth every day.   verapamil ER (CALAN SR) 240 MG Tab CR 8/7/2024 at pm Patient Yes Yes   Sig: Take 240 mg by mouth every evening.      Facility-Administered Medications: None       Physical Exam  Temp:  [36.6 °C (97.8 °F)-36.7 °C (98 °F)] 36.7 °C (98 °F)  Pulse:  [73-88] 79  Resp:  [16] 16  BP: (157-225)/() 171/74  SpO2:  [92 %-96 %] 95 %  Blood Pressure : (!) 171/74   Temperature: 36.7 °C (98 °F)   Pulse: 79   Respiration: 16   Pulse Oximetry: 95 %       Physical Exam  Vitals and nursing note reviewed.   Constitutional:       General: She is not in acute distress.  HENT:      Head: Normocephalic and atraumatic.      Right Ear: External ear normal.      Left Ear: External ear normal.      Nose: Nose normal.      Mouth/Throat:      Mouth: Mucous membranes are moist.   Eyes:      General: No scleral icterus.     Conjunctiva/sclera: Conjunctivae normal.   Cardiovascular:      Rate and Rhythm: Normal rate and regular rhythm.      Pulses: Normal pulses.      Heart sounds: No murmur heard.     No friction rub. No gallop.   Pulmonary:      Effort: Pulmonary effort is normal. No respiratory distress.      Breath sounds: Normal breath sounds. No stridor. No wheezing, rhonchi or rales.   Chest:      Chest wall: No tenderness.   Abdominal:      General: Abdomen is  "flat. Bowel sounds are normal. There is no distension.      Palpations: Abdomen is soft.      Tenderness: There is no abdominal tenderness. There is no guarding or rebound.   Musculoskeletal:         General: Swelling and tenderness present. No deformity. Normal range of motion.      Cervical back: Normal range of motion and neck supple. No rigidity.      Comments: R thumb, minimal ROM   Skin:     General: Skin is warm.      Coloration: Skin is not jaundiced.      Findings: Erythema (mild R thumb) present.   Neurological:      General: No focal deficit present.      Mental Status: She is alert and oriented to person, place, and time. Mental status is at baseline.   Psychiatric:         Mood and Affect: Mood normal.         Behavior: Behavior normal.         Thought Content: Thought content normal.         Judgment: Judgment normal.         Laboratory:  Recent Labs     08/08/24  0718   WBC 9.2   RBC 4.48   HEMOGLOBIN 14.5   HEMATOCRIT 42.3   MCV 94.4   MCH 32.4   MCHC 34.3   RDW 47.2   PLATELETCT 324   MPV 9.2     Recent Labs     08/08/24  0718   SODIUM 142   POTASSIUM 3.1*   CHLORIDE 103   CO2 23   GLUCOSE 106*   BUN 13   CREATININE 1.17   CALCIUM 9.5     Recent Labs     08/08/24  0718   ALTSGPT 16   ASTSGOT 28   ALKPHOSPHAT 111*   TBILIRUBIN 0.6   GLUCOSE 106*         No results for input(s): \"NTPROBNP\" in the last 72 hours.      No results for input(s): \"TROPONINT\" in the last 72 hours.    Imaging:  CT-RENAL COLIC EVALUATION(A/P W/O)   Final Result      1.  6 mm right lower lobe subpleural nodule. No significant change. Follow-up per Fleischner criteria.   2.  7 mm hepatic cyst left lobe. No change from prior exam.   3.  7 mm nonobstructive calculus lower pole right kidney. No hydronephrosis or evidence of acute obstruction. No ureteral calcification.   4.  Minimal diverticula of the colon without acute diverticulitis.   5.  Calcified uterine fibroid   6.  Small fat-containing umbilical hernia.      Low Risk: CT " at 6-12 months, then consider CT at 18-24 months      High Risk: CT at 6-12 months, then CT at 18-24 months      Low Risk - Minimal or absent history of smoking and of other known risk factors.      High Risk - History of smoking or of other known risk factors.      Note: These recommendations do not apply to lung cancer screening, patients with immunosuppression, or patients with known primary cancer.      Fleischner Society 2017 Guidelines for Management of Incidentally Detected Pulmonary Nodules in Adults      DX-HAND 3+ RIGHT   Final Result         No acute osseous abnormality.          X-Ray:  I have personally reviewed the images and compared with prior images.    Assessment/Plan:  Justification for Admission Status  I anticipate this patient will require at least two midnights for appropriate medical management, necessitating inpatient admission because possible infected tenosynovitis may need procedure    Patient will need a Med/Surg bed on EMERGENCY service .  The need is secondary to as above.    * Tenosynovitis of R thumb  Assessment & Plan  Monitor for compartment syndrome  IV Abx, pain control   Monitor mentation/sedation score, respirations blood pressure and bowel regimen if on narcotics  Dr. Mann consulted per EDP    UTI (urinary tract infection)- (present on admission)  Assessment & Plan  On antibiotics  Follow cultures    Moderate persistent asthma without complication- (present on admission)  Assessment & Plan  Not exacerbating    Essential hypertension- (present on admission)  Assessment & Plan  Vitals:    08/08/24 1136   BP: (!) 194/91   Pulse: 83   Resp: 18   Temp: 36.4 °C (97.6 °F)   SpO2: 92%     PRN labetolol and hydralazine IV  Resume home home meds  Pain control        VTE prophylaxis: SCDs/TEDs holding chemoprophylaxis just in case procedure needed

## 2024-08-08 NOTE — ASSESSMENT & PLAN NOTE
Monitor for compartment syndrome  Patient denied any trauma.   X-ray with no evidence of fracture or bony erosions.    Orthopedic surgery was consulted, recommended IV antibiotics and pain control.    MRSA swab negative.  Patient on Ancef

## 2024-08-08 NOTE — ASSESSMENT & PLAN NOTE
Urinalysis positive for UTI.  CT with no evidence of obstruction or hydronephrosis.  Unasyn  Follow cultures

## 2024-08-08 NOTE — ED PROVIDER NOTES
ED Provider Note    CHIEF COMPLAINT  Chief Complaint   Patient presents with    Hand Pain     Right thumb area swelling and pain radiating to right arm since last night    Urinary Frequency     Since last night       EXTERNAL RECORDS REVIEWED  Inpatient Notes ED note 7/11/2024 when the patient was evaluated for shortness of breath.  She was found to have an exacerbation of her COPD and was able to be discharged home.    HPI/ROS  LIMITATION TO HISTORY   Select: : None  OUTSIDE HISTORIAN(S):  None    Serenity Howe is a 74 y.o. female who presents to the emergency department for evaluation of hand pain and urinary frequency.  The patient states that she developed right thumb pain last night.  She states that she has noticed redness and swelling of the right thumb extending up onto her hand and wrist.  She denies any injury to the area.  She is right-hand dominant.  She states that this is never happened.  She is also complaining of increased urinary frequency.  She denies dysuria or hematuria.  She states that she did have some pain in the left flank but this has since resolved.  She denies nausea, vomiting, or fevers.  She denies chest pain and states that she does have mild shortness of breath occasionally.  She states that this is her baseline given her COPD.  She denies runny nose or sore throat.  She denies any diarrhea.    PAST MEDICAL HISTORY   has a past medical history of Acute exacerbation of chronic obstructive pulmonary disease (COPD) ([facility]) (7/5/2024), ASTHMA, Cancer (HCC), Gout, History of breast cancer (07/29/2015), Hypertension, Shoulder pain, right (07/29/2015), and Tremor (07/29/2015).    SURGICAL HISTORY   has a past surgical history that includes mastectomy and gyn surgery.    FAMILY HISTORY  Family History   Problem Relation Age of Onset    Hyperlipidemia Mother     No Known Problems Father     No Known Problems Sister     No Known Problems Brother     No Known Problems Daughter     No Known  "Problems Daughter     No Known Problems Son        SOCIAL HISTORY  Social History     Tobacco Use    Smoking status: Never    Smokeless tobacco: Never   Vaping Use    Vaping status: Never Used   Substance and Sexual Activity    Alcohol use: No    Drug use: No    Sexual activity: Never     Partners: Male       CURRENT MEDICATIONS  Home Medications       Reviewed by Trista Marino (Pharmacy Tech) on 08/08/24 at 0954  Med List Status: Complete     Medication Last Dose Status   acetaminophen (TYLENOL) 325 MG Tab 8/7/2024 Active   albuterol (PROVENTIL) 2.5mg/3ml Nebu Soln solution for nebulization 8/7/2024 Active   albuterol 108 (90 Base) MCG/ACT Aero Soln inhalation aerosol 8/8/2024 Active   doxycycline (VIBRAMYCIN) 100 MG Tab complete Active   fluticasone-salmeterol (ADVAIR) 250-50 MCG/ACT AEROSOL POWDER, BREATH ACTIVATED 8/8/2024 Active   valsartan (DIOVAN) 160 MG Tab 8/8/2024 Active   verapamil ER (CALAN SR) 240 MG Tab CR 8/7/2024 Active                  Audit from Redirected Encounters    **Home medications have not yet been reviewed for this encounter**         ALLERGIES  Allergies   Allergen Reactions    Shellfish Allergy Hives and Shortness of Breath       PHYSICAL EXAM  VITAL SIGNS: BP (!) 171/74   Pulse 79   Temp 36.7 °C (98 °F) (Temporal)   Resp 16   Ht 1.575 m (5' 2\")   Wt 67.6 kg (149 lb)   SpO2 95%   BMI 27.25 kg/m²   Constitutional: Alert and in no apparent distress.  HENT: Normocephalic atraumatic. Bilateral external ears normal. Nose normal. Mucous membranes are moist.  Eyes: Pupils are equal and reactive. Conjunctiva normal. Non-icteric sclera.   Neck: Normal range of motion without tenderness. Supple.   Cardiovascular: Regular rate and rhythm. No murmurs, gallops or rubs.  Thorax & Lungs: No increased work of breathing. Breath sounds are diminished auscultation bilaterally.  A few scattered wheezes are noted.  No rhonchi or crackles are noted.  Abdomen: Soft, nontender and nondistended. No " hepatosplenomegaly.  Skin: Warm and dry. No rashes are noted.  Extremities: 2+ peripheral pulses. Cap refill is less than 2 seconds. No edema, cyanosis, or clubbing.  Musculoskeletal: Good range of motion in all major joints. No tenderness to palpation or major deformities noted.  Focused exam of the right upper extremity: There is swelling and tenderness to palpation from the tip of the thumb to the wrist.  The patient has significant pain when attempting to flex the thumb.  Neurologic: Alert and appropriate for age. The patient moves all 4 extremities without obvious deficits.    EKG/LABS  Results for orders placed or performed during the hospital encounter of 08/08/24   Lactic Acid   Result Value Ref Range    Lactic Acid 1.4 0.5 - 2.0 mmol/L   CBC with Differential   Result Value Ref Range    WBC 9.2 4.8 - 10.8 K/uL    RBC 4.48 4.20 - 5.40 M/uL    Hemoglobin 14.5 12.0 - 16.0 g/dL    Hematocrit 42.3 37.0 - 47.0 %    MCV 94.4 81.4 - 97.8 fL    MCH 32.4 27.0 - 33.0 pg    MCHC 34.3 32.2 - 35.5 g/dL    RDW 47.2 35.9 - 50.0 fL    Platelet Count 324 164 - 446 K/uL    MPV 9.2 9.0 - 12.9 fL    Neutrophils-Polys 53.20 44.00 - 72.00 %    Lymphocytes 29.00 22.00 - 41.00 %    Monocytes 12.40 0.00 - 13.40 %    Eosinophils 4.30 0.00 - 6.90 %    Basophils 0.80 0.00 - 1.80 %    Immature Granulocytes 0.30 0.00 - 0.90 %    Nucleated RBC 0.00 0.00 - 0.20 /100 WBC    Neutrophils (Absolute) 4.91 1.82 - 7.42 K/uL    Lymphs (Absolute) 2.68 1.00 - 4.80 K/uL    Monos (Absolute) 1.14 (H) 0.00 - 0.85 K/uL    Eos (Absolute) 0.40 0.00 - 0.51 K/uL    Baso (Absolute) 0.07 0.00 - 0.12 K/uL    Immature Granulocytes (abs) 0.03 0.00 - 0.11 K/uL    NRBC (Absolute) 0.00 K/uL   Complete Metabolic Panel   Result Value Ref Range    Sodium 142 135 - 145 mmol/L    Potassium 3.1 (L) 3.6 - 5.5 mmol/L    Chloride 103 96 - 112 mmol/L    Co2 23 20 - 33 mmol/L    Anion Gap 16.0 7.0 - 16.0    Glucose 106 (H) 65 - 99 mg/dL    Bun 13 8 - 22 mg/dL    Creatinine  1.17 0.50 - 1.40 mg/dL    Calcium 9.5 8.5 - 10.5 mg/dL    Correct Calcium 10.0 8.5 - 10.5 mg/dL    AST(SGOT) 28 12 - 45 U/L    ALT(SGPT) 16 2 - 50 U/L    Alkaline Phosphatase 111 (H) 30 - 99 U/L    Total Bilirubin 0.6 0.1 - 1.5 mg/dL    Albumin 3.4 3.2 - 4.9 g/dL    Total Protein 7.4 6.0 - 8.2 g/dL    Globulin 4.0 (H) 1.9 - 3.5 g/dL    A-G Ratio 0.9 g/dL   Urinalysis    Specimen: Urine   Result Value Ref Range    Color Yellow     Character Cloudy (A)     Specific Gravity 1.012 <1.035    Ph 6.5 5.0 - 8.0    Glucose Negative Negative mg/dL    Ketones Negative Negative mg/dL    Protein Negative Negative mg/dL    Bilirubin Negative Negative    Urobilinogen, Urine 0.2 Negative    Nitrite Negative Negative    Leukocyte Esterase Moderate (A) Negative    Occult Blood Large (A) Negative    Micro Urine Req Microscopic    Blood Culture - Draw one from central line and one from peripheral site    Specimen: Peripheral; Blood   Result Value Ref Range    Significant Indicator NEG     Source BLD     Site PERIPHERAL     Culture Result       No Growth  Note: Blood cultures are incubated for 5 days and  are monitored continuously.Positive blood cultures  are called to the RN and reported as soon as  they are identified.     Blood Culture - Draw one from central line and one from peripheral site    Specimen: Line; Blood   Result Value Ref Range    Significant Indicator NEG     Source BLD     Site Peripheral     Culture Result       No Growth  Note: Blood cultures are incubated for 5 days and  are monitored continuously.Positive blood cultures  are called to the RN and reported as soon as  they are identified.     Sed Rate   Result Value Ref Range    Sed Rate Westergren 34 (H) 0 - 25 mm/hour   C Reactive Protein Quantitative (Non-Cardiac)   Result Value Ref Range    Stat C-Reactive Protein 1.68 (H) 0.00 - 0.75 mg/dL   Procalcitonin   Result Value Ref Range    Procalcitonin 0.13 <0.25 ng/mL   URINE MICROSCOPIC (W/UA)   Result Value Ref  Range    WBC 20-50 (A) /hpf    RBC >150 (A) /hpf    Bacteria Few (A) None /hpf    Epithelial Cells Few /hpf    Hyaline Cast 3-5 (A) /lpf   ESTIMATED GFR   Result Value Ref Range    GFR (CKD-EPI) 49 (A) >60 mL/min/1.73 m 2     I have independently interpreted this EKG    RADIOLOGY/PROCEDURES   I have independently interpreted the diagnostic imaging associated with this visit and am waiting the final reading from the radiologist.   My preliminary interpretation is as follows: No ureterolithiasis or hydronephrosis noted.    Radiologist interpretation:  CT-RENAL COLIC EVALUATION(A/P W/O)   Final Result      1.  6 mm right lower lobe subpleural nodule. No significant change. Follow-up per Fleischner criteria.   2.  7 mm hepatic cyst left lobe. No change from prior exam.   3.  7 mm nonobstructive calculus lower pole right kidney. No hydronephrosis or evidence of acute obstruction. No ureteral calcification.   4.  Minimal diverticula of the colon without acute diverticulitis.   5.  Calcified uterine fibroid   6.  Small fat-containing umbilical hernia.      Low Risk: CT at 6-12 months, then consider CT at 18-24 months      High Risk: CT at 6-12 months, then CT at 18-24 months      Low Risk - Minimal or absent history of smoking and of other known risk factors.      High Risk - History of smoking or of other known risk factors.      Note: These recommendations do not apply to lung cancer screening, patients with immunosuppression, or patients with known primary cancer.      Fleischner Society 2017 Guidelines for Management of Incidentally Detected Pulmonary Nodules in Adults      DX-HAND 3+ RIGHT   Final Result         No acute osseous abnormality.          COURSE & MEDICAL DECISION MAKING    ASSESSMENT, COURSE AND PLAN  Care Narrative: This is a 74-year-old female presenting to the emergency department for evaluation of hand pain and urinary frequency.  On initial evaluation, the patient did not appear to be in any acute  distress.  Vital signs are reassuring.  Physical exam was notable for swelling, erythema, tenderness palpation of the right thumb extending onto the hand and wrist.  She is grossly neurovascular intact but did have significant tenderness to palpation with passive and active range of motion of the thumb.  No obvious crepitus or fluctuance noted.  Plain films were obtained and no evidence of subcutaneous emphysema, occult fracture, or dislocation was noted.    Patient's white blood cell count was normal and reassuring but her CRP and sed rate were elevated.  Lactic acid and procalcitonin were normal and less concern for sepsis.  Electrolytes were notable for a potassium of 3.1.  Oral repletion was ordered here in the ED.    Urinalysis was notable for 20-50 WBCs and greater than 150 RBCs.  Few bacteria were noted as well.  Given the elevated inflammatory markers, urinary symptoms, and physical exam findings of the right thumb, the plan was made to treat with a dose of Unasyn to cover for possible tenosynovitis and UTI.  The plan will be to admit to the hospitalist for IV antibiotics and Ortho consultation.    9:22 AM - I discussed the case with eddie Nassar.  He agreed with the plan for IV antibiotics and admission to the hospitalist.  He will follow the patient while in the hospital.    10:10 AM - I discussed the case with Dr Quiroga, hospitalist.  He agreed with the plan and accepted the patient.     ADDITIONAL PROBLEMS MANAGED  Right thumb pain, urinary tract infection, pulmonary nodule, hepatic cyst, hypokalemia, calcified fibroid    DISPOSITION AND DISCUSSIONS  I have discussed management of the patient with the following physicians and JOSSELIN's:  eddie Nassar    Discussion of management with other \[facility] [place] [facility]\"" or appropriate source(s): None     FINAL IMPRESSION  1. Hand pain, right    2. Urinary tract infection with hematuria, site unspecified    3. Pulmonary nodule    4. Hepatic cyst    5. Hypokalemia       -ADMIT-    Electronically signed by: Jaye Leonard D.O., 8/8/2024 6:48 AM

## 2024-08-08 NOTE — ED NOTES
Pt ambulated to the restroom with x1 standby assist d/t fall risk. Pt has steady gait and no reports of dizziness.

## 2024-08-09 ENCOUNTER — PATIENT OUTREACH (OUTPATIENT)
Dept: SCHEDULING | Facility: IMAGING CENTER | Age: 74
End: 2024-08-09
Payer: COMMERCIAL

## 2024-08-09 ENCOUNTER — PHARMACY VISIT (OUTPATIENT)
Dept: PHARMACY | Facility: MEDICAL CENTER | Age: 74
End: 2024-08-09
Payer: COMMERCIAL

## 2024-08-09 VITALS
RESPIRATION RATE: 19 BRPM | TEMPERATURE: 97.8 F | BODY MASS INDEX: 29.62 KG/M2 | OXYGEN SATURATION: 92 % | HEIGHT: 62 IN | DIASTOLIC BLOOD PRESSURE: 58 MMHG | HEART RATE: 62 BPM | SYSTOLIC BLOOD PRESSURE: 151 MMHG | WEIGHT: 160.94 LBS

## 2024-08-09 LAB
ALBUMIN SERPL BCP-MCNC: 3.1 G/DL (ref 3.2–4.9)
BUN SERPL-MCNC: 12 MG/DL (ref 8–22)
CALCIUM ALBUM COR SERPL-MCNC: 9.4 MG/DL (ref 8.5–10.5)
CALCIUM SERPL-MCNC: 8.7 MG/DL (ref 8.5–10.5)
CHLORIDE SERPL-SCNC: 107 MMOL/L (ref 96–112)
CO2 SERPL-SCNC: 24 MMOL/L (ref 20–33)
CREAT SERPL-MCNC: 1.2 MG/DL (ref 0.5–1.4)
ERYTHROCYTE [DISTWIDTH] IN BLOOD BY AUTOMATED COUNT: 48.5 FL (ref 35.9–50)
GFR SERPLBLD CREATININE-BSD FMLA CKD-EPI: 47 ML/MIN/1.73 M 2
GLUCOSE SERPL-MCNC: 96 MG/DL (ref 65–99)
HCT VFR BLD AUTO: 37.5 % (ref 37–47)
HGB BLD-MCNC: 12.5 G/DL (ref 12–16)
MCH RBC QN AUTO: 32 PG (ref 27–33)
MCHC RBC AUTO-ENTMCNC: 33.3 G/DL (ref 32.2–35.5)
MCV RBC AUTO: 95.9 FL (ref 81.4–97.8)
PHOSPHATE SERPL-MCNC: 2.8 MG/DL (ref 2.5–4.5)
PLATELET # BLD AUTO: 298 K/UL (ref 164–446)
PMV BLD AUTO: 9.3 FL (ref 9–12.9)
POTASSIUM SERPL-SCNC: 2.9 MMOL/L (ref 3.6–5.5)
RBC # BLD AUTO: 3.91 M/UL (ref 4.2–5.4)
SODIUM SERPL-SCNC: 142 MMOL/L (ref 135–145)
WBC # BLD AUTO: 9.7 K/UL (ref 4.8–10.8)

## 2024-08-09 PROCEDURE — A9270 NON-COVERED ITEM OR SERVICE: HCPCS | Performed by: GENERAL PRACTICE

## 2024-08-09 PROCEDURE — 700102 HCHG RX REV CODE 250 W/ 637 OVERRIDE(OP): Performed by: GENERAL PRACTICE

## 2024-08-09 PROCEDURE — RXMED WILLOW AMBULATORY MEDICATION CHARGE: Performed by: GENERAL PRACTICE

## 2024-08-09 PROCEDURE — 700111 HCHG RX REV CODE 636 W/ 250 OVERRIDE (IP): Mod: JZ | Performed by: INTERNAL MEDICINE

## 2024-08-09 PROCEDURE — 700102 HCHG RX REV CODE 250 W/ 637 OVERRIDE(OP): Performed by: INTERNAL MEDICINE

## 2024-08-09 PROCEDURE — 85027 COMPLETE CBC AUTOMATED: CPT

## 2024-08-09 PROCEDURE — 80069 RENAL FUNCTION PANEL: CPT

## 2024-08-09 PROCEDURE — A9270 NON-COVERED ITEM OR SERVICE: HCPCS | Performed by: INTERNAL MEDICINE

## 2024-08-09 PROCEDURE — 700111 HCHG RX REV CODE 636 W/ 250 OVERRIDE (IP): Performed by: GENERAL PRACTICE

## 2024-08-09 PROCEDURE — 700105 HCHG RX REV CODE 258: Performed by: GENERAL PRACTICE

## 2024-08-09 PROCEDURE — 99239 HOSP IP/OBS DSCHRG MGMT >30: CPT | Performed by: GENERAL PRACTICE

## 2024-08-09 PROCEDURE — 700105 HCHG RX REV CODE 258: Performed by: INTERNAL MEDICINE

## 2024-08-09 RX ORDER — OXYCODONE HYDROCHLORIDE 5 MG/1
5 TABLET ORAL EVERY 8 HOURS PRN
Qty: 9 TABLET | Refills: 0 | Status: SHIPPED | OUTPATIENT
Start: 2024-08-09 | End: 2024-08-12

## 2024-08-09 RX ORDER — POTASSIUM CHLORIDE 1500 MG/1
40 TABLET, EXTENDED RELEASE ORAL EVERY 4 HOURS
Status: COMPLETED | OUTPATIENT
Start: 2024-08-09 | End: 2024-08-09

## 2024-08-09 RX ORDER — MELOXICAM 7.5 MG/1
7.5 TABLET ORAL DAILY
Qty: 10 TABLET | Refills: 0 | Status: SHIPPED | OUTPATIENT
Start: 2024-08-10 | End: 2024-08-20

## 2024-08-09 RX ORDER — MELOXICAM 7.5 MG/1
7.5 TABLET ORAL DAILY
Status: DISCONTINUED | OUTPATIENT
Start: 2024-08-09 | End: 2024-08-09 | Stop reason: HOSPADM

## 2024-08-09 RX ADMIN — CEFAZOLIN 2 G: 2 INJECTION, POWDER, FOR SOLUTION INTRAMUSCULAR; INTRAVENOUS at 08:20

## 2024-08-09 RX ADMIN — POTASSIUM CHLORIDE 40 MEQ: 1500 TABLET, EXTENDED RELEASE ORAL at 08:16

## 2024-08-09 RX ADMIN — POTASSIUM CHLORIDE 40 MEQ: 1500 TABLET, EXTENDED RELEASE ORAL at 13:40

## 2024-08-09 RX ADMIN — VALSARTAN 160 MG: 80 TABLET ORAL at 05:04

## 2024-08-09 RX ADMIN — OMEPRAZOLE 20 MG: 20 CAPSULE, DELAYED RELEASE ORAL at 08:16

## 2024-08-09 RX ADMIN — CEFAZOLIN 2 G: 2 INJECTION, POWDER, FOR SOLUTION INTRAMUSCULAR; INTRAVENOUS at 13:40

## 2024-08-09 RX ADMIN — MOMETASONE FUROATE AND FORMOTEROL FUMARATE DIHYDRATE 2 PUFF: 200; 5 AEROSOL RESPIRATORY (INHALATION) at 05:04

## 2024-08-09 RX ADMIN — MELOXICAM 7.5 MG: 7.5 TABLET ORAL at 08:57

## 2024-08-09 RX ADMIN — AMPICILLIN AND SULBACTAM 3 G: 1; 2 INJECTION, POWDER, FOR SOLUTION INTRAMUSCULAR; INTRAVENOUS at 03:24

## 2024-08-09 NOTE — CARE PLAN
The patient is Stable - Low risk of patient condition declining or worsening    Shift Goals  Clinical Goals: OT/PT, IV abx, monitor BP      Progress made toward(s) clinical / shift goals:  pt. Seen by OT, tolerates iv abx. Bp controlled. Potassium supplements given. Needs attended.        Patient is not progressing towards the following goals:

## 2024-08-09 NOTE — HOSPITAL COURSE
This is a 74-year-old female with past medical history of hypertension, CKD stage III, and asthma who was admitted on 8/8/2024 with right thumb pain and swelling.    Patient denied any trauma.  Initially there was concern for possible compartment syndrome however patient can move some, pulse intact, no evidence of ischemia.  X-ray with no evidence of fracture or bony erosions.  Orthopedic surgery was consulted, recommended IV antibiotics and pain control, no surgical intervention.  MRSA swab negative.  Patient on Unasyn with improvement.    Urinalysis positive for UTI.  CT with no evidence of obstruction or hydronephrosis.

## 2024-08-09 NOTE — RESPIRATORY CARE
" COPD EDUCATION by COPD CLINICAL EDUCATOR  8/9/2024 at 2:22 PM by Lean Domínguez, RRT     Patient reviewed by COPD education team. Patient does not qualify for the COPD program because patient has Asthma. Takes Advair at home. Uses spacer with Albuterol MDI. She states she is breathing well today.      COPD Screen       COPD Assessment  COPD Clinical Specialists ONLY  COPD Education Initiated: Yes--Short Intervention (pt using spacer wit hAlb, Takes Advair daily, could be quick screen next time, hx ofAsthma not COPD)  Is this a COPD exacerbation patient?: No  Physician Name: Jessie Flores M.D.  Interdisciplinary Rounds: Attendance at Rounds (30 Min)    PFT Results    No results found for: \"PFT\"    Meds to Beds  [facility] provides bedside medication delivery for all eligible patients at discharge.  Would you like to opt out of this program for any reason?: No - Stay Opted In     MY COPD ACTION PLAN     It is recommended that patients and physicians /healthcare providers complete this action plan together. This plan should be discussed at each physician visit and updated as needed.    The green, yellow and red zones show groups of symptoms of COPD. This list of symptoms is not comprehensive, and you may experience other symptoms. In the \"Actions\" column, your healthcare provider has recommended actions for you to take based on your symptoms.    Patient Name: Serenity Howe   YOB: 1950   Last Updated on: 7/6/2024  2:26 PM   Green Zone:  I am doing well today Actions     Usual activitiy and exercise level   Take daily medications     Usual amounts of cough and phlegm/mucus   Use oxygen as prescribed     Sleep well at night   Continue regular exercise/diet plan     Appetite is good   At all times avoid cigarette smoke, inhaled irritants     Daily Medications (these medications are taken every day):                Yellow Zone:  I am having a bad day or a COPD flare Actions     More breathless than " "usual   Continue daily medications     I have less energy for my daily activities   Use quick relief inhaler as ordered     Increased or thicker phlegm/mucus   Use oxygen as prescribed     Using quick relief inhaler/nebulizer more often   Get plenty of rest     Swelling of ankles more than usual   Use pursed lip breathing     More coughing than usual   At all times avoid cigarette smoke, inhaled irritants     I feel like I have a \"chest cold\"     Poor sleep and my symptoms woke me up     My appetite is not good     My medicine is not helping      Call provider immediately if symptoms don’t improve     Continue daily medications, add rescue medications:               Medications to be used during a flare up, (as Discussed with Provider):              Red Zone:  I need urgent medical care Actions     Severe shortness of breath even at rest   Call 911 or seek medical care immediately     Not able to do any activity because of breathing      Fever or shaking chills      Feeling confused or very drowsy       Chest pains      Coughing up blood                  "

## 2024-08-09 NOTE — DISCHARGE SUMMARY
Discharge Summary    CHIEF COMPLAINT ON ADMISSION  Chief Complaint   Patient presents with    Hand Pain     Right thumb area swelling and pain radiating to right arm since last night    Urinary Frequency     Since last night       Reason for Admission  Arm pain     Admission Date  8/8/2024    CODE STATUS  Full Code    HPI & HOSPITAL COURSE  This is a 74-year-old female with past medical history of hypertension, CKD stage III, and asthma who was admitted on 8/8/2024 with right thumb pain and swelling.    Patient denied any trauma.  Initially there was concern for possible compartment syndrome however patient can move some, pulse intact, no evidence of ischemia.  X-ray with no evidence of fracture or bony erosions.  Orthopedic surgery was consulted, recommended IV antibiotics and pain control, no surgical intervention.  MRSA swab negative.  Patient on Unasyn with improvement.    Urinalysis positive for UTI.  CT with no evidence of obstruction or hydronephrosis.    Therefore, she is discharged in good and stable condition to home with close outpatient follow-up.    The patient recovered much more quickly than anticipated on admission.    Discharge Date  8/9/2024    FOLLOW UP ITEMS POST DISCHARGE  Primary care physician    DISCHARGE DIAGNOSES  Principal Problem:    Tenosynovitis of R thumb (POA: Unknown)  Active Problems:    UTI (urinary tract infection) (POA: Yes)    Essential hypertension (POA: Yes)    Moderate persistent asthma without complication (POA: Yes)    Stage 3a chronic kidney disease (POA: Yes)    Hypokalemia (POA: Yes)  Resolved Problems:    * No resolved hospital problems. *      FOLLOW UP  Jessie Flores M.D.  [address] [address]  [place] [id number]  [phone]    Go on 8/19/2024  Please go to your hospital follow up with Jessie Flores M.D. on Monday, August, 19th, 2024, check in at 5:00pm for a 5:15pm appointment.    Cade Mann M.D.  [address] [facility]  [place]  [id number]  [phone]    Follow up        MEDICATIONS ON DISCHARGE     Medication List        START taking these medications        Instructions   amoxicillin-clavulanate 875-125 MG Tabs  Commonly known as: Augmentin   Take 1 Tablet by mouth 2 times a day for 6 days.  Dose: 1 Tablet     meloxicam 7.5 MG Tabs  Start taking on: August 10, 2024  Commonly known as: Mobic   Take 1 Tablet by mouth every day for 10 days.  Dose: 7.5 mg     omeprazole 20 MG delayed-release capsule  Commonly known as: PriLOSEC   Take 1 Capsule by mouth 2 times a day for 10 days.  Dose: 20 mg     oxyCODONE immediate-release 5 MG Tabs  Commonly known as: Roxicodone   Take 1 Tablet by mouth every 8 hours as needed for Severe Pain for up to 3 days.  Dose: 5 mg            CONTINUE taking these medications        Instructions   acetaminophen 325 MG Tabs  Commonly known as: Tylenol   Take 650 mg by mouth every 6 hours as needed for Mild Pain.  Dose: 650 mg     * albuterol 108 (90 Base) MCG/ACT Aers inhalation aerosol   Inhale 1-2 Puffs every four hours as needed for Shortness of Breath.  Dose: 1-2 Puff     * albuterol 2.5mg/3ml Nebu solution for nebulization  Commonly known as: Proventil   Take 3 mL by nebulization every four hours as needed for Shortness of Breath.  Dose: 2.5 mg     fluticasone-salmeterol 250-50 MCG/ACT Aepb  Commonly known as: Advair   Inhale 1 Puff 2 times a day.  Dose: 1 Puff     valsartan 160 MG Tabs  Commonly known as: Diovan   Take 1 Tablet by mouth every day.  Dose: 160 mg     verapamil  MG Tbcr  Commonly known as: Calan SR   Take 240 mg by mouth every evening.  Dose: 240 mg           * This list has 2 medication(s) that are the same as other medications prescribed for you. Read the directions carefully, and ask your doctor or other care provider to review them with you.                STOP taking these medications      doxycycline 100 MG Tabs  Commonly known as: Vibramycin              Allergies  Allergies   Allergen  Reactions    Shellfish Allergy Hives and Shortness of Breath       DIET  Orders Placed This Encounter   Procedures    Diet Order Diet: Regular     Standing Status:   Standing     Number of Occurrences:   1     Order Specific Question:   Diet:     Answer:   Regular [1]       ACTIVITY  As tolerated.  Weight bearing as tolerated    CONSULTATIONS  Orthopedic surgery    PROCEDURES  None    LABORATORY  Lab Results   Component Value Date    SODIUM 142 08/09/2024    POTASSIUM 2.9 (L) 08/09/2024    CHLORIDE 107 08/09/2024    CO2 24 08/09/2024    GLUCOSE 96 08/09/2024    BUN 12 08/09/2024    CREATININE 1.20 08/09/2024    CREATININE 1.0 03/19/2008        Lab Results   Component Value Date    WBC 9.7 08/09/2024    HEMOGLOBIN 12.5 08/09/2024    HEMATOCRIT 37.5 08/09/2024    PLATELETCT 298 08/09/2024      CT-RENAL COLIC EVALUATION(A/P W/O)   Final Result      1.  6 mm right lower lobe subpleural nodule. No significant change. Follow-up per Fleischner criteria.   2.  7 mm hepatic cyst left lobe. No change from prior exam.   3.  7 mm nonobstructive calculus lower pole right kidney. No hydronephrosis or evidence of acute obstruction. No ureteral calcification.   4.  Minimal diverticula of the colon without acute diverticulitis.   5.  Calcified uterine fibroid   6.  Small fat-containing umbilical hernia.      Low Risk: CT at 6-12 months, then consider CT at 18-24 months      High Risk: CT at 6-12 months, then CT at 18-24 months      Low Risk - Minimal or absent history of smoking and of other known risk factors.      High Risk - History of smoking or of other known risk factors.      Note: These recommendations do not apply to lung cancer screening, patients with immunosuppression, or patients with known primary cancer.      Fleischner Society 2017 Guidelines for Management of Incidentally Detected Pulmonary Nodules in Adults      DX-HAND 3+ RIGHT   Final Result         No acute osseous abnormality.         Total time of the  discharge process exceeds 45 minutes.

## 2024-08-09 NOTE — CARE PLAN
The patient is Stable - Low risk of patient condition declining or worsening    Shift Goals  Clinical Goals: Pain management, Monitor blood pressure  Patient Goals: Redue Rt hand swelling, BP wnl  Family Goals: ARMEN    Progress made toward(s) clinical / shift goals:  Patient A&Ox4, reports pain on Rt hand and declines prn pain medication. Patient placed on IVF and IV abx, infusing well. Patient ambulated to BR with steady gait, SBA. Tolerated activity well. Patient uses call light appropriately when needing assistance. Bed locked to lowest position, hourly rounding in placed and call light within reach.     Problem: Knowledge Deficit - Standard  Goal: Patient and family/care givers will demonstrate understanding of plan of care, disease process/condition, diagnostic tests and medications  Outcome: Progressing     Problem: Respiratory  Goal: Patient will achieve/maintain optimum respiratory ventilation and gas exchange  Outcome: Progressing     Problem: Pain - Standard  Goal: Alleviation of pain or a reduction in pain to the patient’s comfort goal  Outcome: Progressing       Patient is not progressing towards the following goals:

## 2024-08-10 LAB
BACTERIA UR CULT: NORMAL
SIGNIFICANT IND 70042: NORMAL
SITE SITE: NORMAL
SOURCE SOURCE: NORMAL

## 2024-08-28 ENCOUNTER — APPOINTMENT (OUTPATIENT)
Dept: RADIOLOGY | Facility: MEDICAL CENTER | Age: 74
End: 2024-08-28
Attending: EMERGENCY MEDICINE
Payer: COMMERCIAL

## 2024-08-28 ENCOUNTER — HOSPITAL ENCOUNTER (EMERGENCY)
Facility: MEDICAL CENTER | Age: 74
End: 2024-08-28
Attending: EMERGENCY MEDICINE
Payer: COMMERCIAL

## 2024-08-28 VITALS
SYSTOLIC BLOOD PRESSURE: 206 MMHG | HEART RATE: 76 BPM | RESPIRATION RATE: 17 BRPM | HEIGHT: 62 IN | BODY MASS INDEX: 29.44 KG/M2 | TEMPERATURE: 97.6 F | OXYGEN SATURATION: 95 % | DIASTOLIC BLOOD PRESSURE: 85 MMHG | WEIGHT: 160 LBS

## 2024-08-28 DIAGNOSIS — T67.1XXA HEAT SYNCOPE, INITIAL ENCOUNTER: ICD-10-CM

## 2024-08-28 LAB
ALBUMIN SERPL BCP-MCNC: 3.7 G/DL (ref 3.2–4.9)
ALBUMIN/GLOB SERPL: 0.9 G/DL
ALP SERPL-CCNC: 117 U/L (ref 30–99)
ALT SERPL-CCNC: 18 U/L (ref 2–50)
ANION GAP SERPL CALC-SCNC: 14 MMOL/L (ref 7–16)
AST SERPL-CCNC: 36 U/L (ref 12–45)
BASOPHILS # BLD AUTO: 0.8 % (ref 0–1.8)
BASOPHILS # BLD: 0.07 K/UL (ref 0–0.12)
BILIRUB SERPL-MCNC: 0.6 MG/DL (ref 0.1–1.5)
BUN SERPL-MCNC: 17 MG/DL (ref 8–22)
CALCIUM ALBUM COR SERPL-MCNC: 10.3 MG/DL (ref 8.5–10.5)
CALCIUM SERPL-MCNC: 10.1 MG/DL (ref 8.5–10.5)
CHLORIDE SERPL-SCNC: 105 MMOL/L (ref 96–112)
CO2 SERPL-SCNC: 22 MMOL/L (ref 20–33)
CREAT SERPL-MCNC: 1.36 MG/DL (ref 0.5–1.4)
EOSINOPHIL # BLD AUTO: 0.31 K/UL (ref 0–0.51)
EOSINOPHIL NFR BLD: 3.4 % (ref 0–6.9)
ERYTHROCYTE [DISTWIDTH] IN BLOOD BY AUTOMATED COUNT: 47.8 FL (ref 35.9–50)
GFR SERPLBLD CREATININE-BSD FMLA CKD-EPI: 41 ML/MIN/1.73 M 2
GLOBULIN SER CALC-MCNC: 4.2 G/DL (ref 1.9–3.5)
GLUCOSE SERPL-MCNC: 96 MG/DL (ref 65–99)
HCT VFR BLD AUTO: 45 % (ref 37–47)
HGB BLD-MCNC: 15.6 G/DL (ref 12–16)
IMM GRANULOCYTES # BLD AUTO: 0.05 K/UL (ref 0–0.11)
IMM GRANULOCYTES NFR BLD AUTO: 0.5 % (ref 0–0.9)
LYMPHOCYTES # BLD AUTO: 1.75 K/UL (ref 1–4.8)
LYMPHOCYTES NFR BLD: 18.9 % (ref 22–41)
MCH RBC QN AUTO: 32.8 PG (ref 27–33)
MCHC RBC AUTO-ENTMCNC: 34.7 G/DL (ref 32.2–35.5)
MCV RBC AUTO: 94.7 FL (ref 81.4–97.8)
MONOCYTES # BLD AUTO: 0.9 K/UL (ref 0–0.85)
MONOCYTES NFR BLD AUTO: 9.7 % (ref 0–13.4)
NEUTROPHILS # BLD AUTO: 6.17 K/UL (ref 1.82–7.42)
NEUTROPHILS NFR BLD: 66.7 % (ref 44–72)
NRBC # BLD AUTO: 0 K/UL
NRBC BLD-RTO: 0 /100 WBC (ref 0–0.2)
NT-PROBNP SERPL IA-MCNC: 729 PG/ML (ref 0–125)
PLATELET # BLD AUTO: 273 K/UL (ref 164–446)
PMV BLD AUTO: 9.4 FL (ref 9–12.9)
POTASSIUM SERPL-SCNC: 3.2 MMOL/L (ref 3.6–5.5)
PROT SERPL-MCNC: 7.9 G/DL (ref 6–8.2)
RBC # BLD AUTO: 4.75 M/UL (ref 4.2–5.4)
SODIUM SERPL-SCNC: 141 MMOL/L (ref 135–145)
TROPONIN T SERPL-MCNC: 12 NG/L (ref 6–19)
TROPONIN T SERPL-MCNC: 14 NG/L (ref 6–19)
WBC # BLD AUTO: 9.3 K/UL (ref 4.8–10.8)

## 2024-08-28 PROCEDURE — 80053 COMPREHEN METABOLIC PANEL: CPT

## 2024-08-28 PROCEDURE — 36415 COLL VENOUS BLD VENIPUNCTURE: CPT

## 2024-08-28 PROCEDURE — 83880 ASSAY OF NATRIURETIC PEPTIDE: CPT

## 2024-08-28 PROCEDURE — 84484 ASSAY OF TROPONIN QUANT: CPT

## 2024-08-28 PROCEDURE — 85025 COMPLETE CBC W/AUTO DIFF WBC: CPT

## 2024-08-28 PROCEDURE — 99285 EMERGENCY DEPT VISIT HI MDM: CPT

## 2024-08-28 PROCEDURE — A9270 NON-COVERED ITEM OR SERVICE: HCPCS | Mod: JZ,UD | Performed by: EMERGENCY MEDICINE

## 2024-08-28 PROCEDURE — 71045 X-RAY EXAM CHEST 1 VIEW: CPT

## 2024-08-28 PROCEDURE — 700102 HCHG RX REV CODE 250 W/ 637 OVERRIDE(OP): Mod: JZ,UD | Performed by: EMERGENCY MEDICINE

## 2024-08-28 RX ORDER — POTASSIUM CHLORIDE 1500 MG/1
20 TABLET, EXTENDED RELEASE ORAL DAILY
Status: DISCONTINUED | OUTPATIENT
Start: 2024-08-28 | End: 2024-08-28 | Stop reason: HOSPADM

## 2024-08-28 RX ADMIN — POTASSIUM CHLORIDE 20 MEQ: 1500 TABLET, EXTENDED RELEASE ORAL at 15:02

## 2024-08-28 ASSESSMENT — FIBROSIS 4 INDEX: FIB4 SCORE: 1.76

## 2024-08-28 NOTE — ED TRIAGE NOTES
"Chief Complaint   Patient presents with    Syncope     Near syncope X 2 and full syncope X 1 this morning while standing outside in the sun, pt denies any symptoms prior to syncope besides heat, pt denies any symptoms on arrival. Pt was assisted to the ground during episode, denies injury / trauma / head strike.      Pt BIB [facility] for above complaints, VSS on RA, GCS 15, NAD, .    BP (!) 183/93   Pulse 73   Temp 36.7 °C (98 °F) (Temporal)   Resp 16   Ht 1.575 m (5' 2\")   Wt 72.6 kg (160 lb)   SpO2 95%   BMI 29.26 kg/m²     "

## 2024-08-28 NOTE — ED PROVIDER NOTES
ED Provider Note    CHIEF COMPLAINT  No chief complaint on file.  Syncope    EXTERNAL RECORDS REVIEWED  Reviewed recent ED visits based on labs for comparison.    HPI/ROS  LIMITATION TO HISTORY   Select: : None  OUTSIDE HISTORIAN(S):  None    Serenity Howe is a 74 y.o. female who presents to the emergency department for evaluation of syncope.  The patient was standing in line in the sun last about 20 minutes was not feeling great so she went to move to the shade.  She felt lightheaded and she was assisted to the ground.  She states she passed out she did not fall or hit her head she was assisted to the ground by her family and friends.  Now feels fine.  No chest pain cough shortness of breath fevers or chills.  No other acute concerns or complaints.    PAST MEDICAL HISTORY   has a past medical history of Acute exacerbation of chronic obstructive pulmonary disease (COPD) ([facility]) (7/5/2024), ASTHMA, Cancer ([facility]), Gout, History of breast cancer (07/29/2015), Hypertension, Shoulder pain, right (07/29/2015), and Tremor (07/29/2015).    SURGICAL HISTORY   has a past surgical history that includes mastectomy and gyn surgery.    FAMILY HISTORY  Family History   Problem Relation Age of Onset    Hyperlipidemia Mother     No Known Problems Father     No Known Problems Sister     No Known Problems Brother     No Known Problems Daughter     No Known Problems Daughter     No Known Problems Son        SOCIAL HISTORY  Social History     Tobacco Use    Smoking status: Never    Smokeless tobacco: Never   Vaping Use    Vaping status: Never Used   Substance and Sexual Activity    Alcohol use: No    Drug use: No    Sexual activity: Never     Partners: Male       CURRENT MEDICATIONS  Home Medications    **Home medications have not yet been reviewed for this encounter**       Audit from Redirected Encounters    **Home medications have not yet been reviewed for this encounter**         ALLERGIES  Allergies   Allergen Reactions     Shellfish Allergy Hives and Shortness of Breath       PHYSICAL EXAM  VITAL SIGNS: There were no vitals taken for this visit.   Vital signs were obtained and are charted in the paper chart due to downtime.  Constitutional: Well developed, Well nourished, No acute distress, Non-toxic appearance.   HENT: Normocephalic, Atraumatic, Bilateral external ears normal, Oropharynx moist, No oral exudates, Nose normal.   Eyes: PERRL, EOMI, Conjunctiva normal, No discharge.   Neck: Normal range of motion  Cardiovascular: Normal heart rate, Normal rhythm, No murmurs, No rubs, No gallops.   Thorax & Lungs: Normal breath sounds, No respiratory distress, No wheezing, No chest tenderness.   Abdomen:Soft, No tenderness  Skin: Warm, Dry, No erythema, No rash.   Musculoskeletal: Good range of motion in all major joints.  No edema or asymmetric swelling.  Neurologic: Alert, No focal deficits noted.   Psychiatric: Affect normal    EKG/LABS  Results for orders placed or performed during the hospital encounter of 08/28/24   CBC WITH DIFFERENTIAL   Result Value Ref Range    WBC 9.3 4.8 - 10.8 K/uL    RBC 4.75 4.20 - 5.40 M/uL    Hemoglobin 15.6 12.0 - 16.0 g/dL    Hematocrit 45.0 37.0 - 47.0 %    MCV 94.7 81.4 - 97.8 fL    MCH 32.8 27.0 - 33.0 pg    MCHC 34.7 32.2 - 35.5 g/dL    RDW 47.8 35.9 - 50.0 fL    Platelet Count 273 164 - 446 K/uL    MPV 9.4 9.0 - 12.9 fL    Neutrophils-Polys 66.70 44.00 - 72.00 %    Lymphocytes 18.90 (L) 22.00 - 41.00 %    Monocytes 9.70 0.00 - 13.40 %    Eosinophils 3.40 0.00 - 6.90 %    Basophils 0.80 0.00 - 1.80 %    Immature Granulocytes 0.50 0.00 - 0.90 %    Nucleated RBC 0.00 0.00 - 0.20 /100 WBC    Neutrophils (Absolute) 6.17 1.82 - 7.42 K/uL    Lymphs (Absolute) 1.75 1.00 - 4.80 K/uL    Monos (Absolute) 0.90 (H) 0.00 - 0.85 K/uL    Eos (Absolute) 0.31 0.00 - 0.51 K/uL    Baso (Absolute) 0.07 0.00 - 0.12 K/uL    Immature Granulocytes (abs) 0.05 0.00 - 0.11 K/uL    NRBC (Absolute) 0.00 K/uL   CMP   Result  Value Ref Range    Sodium 141 135 - 145 mmol/L    Potassium 3.2 (L) 3.6 - 5.5 mmol/L    Chloride 105 96 - 112 mmol/L    Co2 22 20 - 33 mmol/L    Anion Gap 14.0 7.0 - 16.0    Glucose 96 65 - 99 mg/dL    Bun 17 8 - 22 mg/dL    Creatinine 1.36 0.50 - 1.40 mg/dL    Calcium 10.1 8.5 - 10.5 mg/dL    Correct Calcium 10.3 8.5 - 10.5 mg/dL    AST(SGOT) 36 12 - 45 U/L    ALT(SGPT) 18 2 - 50 U/L    Alkaline Phosphatase 117 (H) 30 - 99 U/L    Total Bilirubin 0.6 0.1 - 1.5 mg/dL    Albumin 3.7 3.2 - 4.9 g/dL    Total Protein 7.9 6.0 - 8.2 g/dL    Globulin 4.2 (H) 1.9 - 3.5 g/dL    A-G Ratio 0.9 g/dL   TROPONIN   Result Value Ref Range    Troponin T 14 6 - 19 ng/L   proBrain Natriuretic Peptide, NT   Result Value Ref Range    NT-proBNP 729 (H) 0 - 125 pg/mL   ESTIMATED GFR   Result Value Ref Range    GFR (CKD-EPI) 41 (A) >60 mL/min/1.73 m 2   TROPONIN   Result Value Ref Range    Troponin T 12 6 - 19 ng/L      I have independently interpreted this EKG    RADIOLOGY/PROCEDURES   I have independently interpreted the diagnostic imaging associated with this visit and am waiting the final reading from the radiologist.       Radiologist interpretation:  DX-CHEST-PORTABLE (1 VIEW)   Final Result      1.  Slight right basilar atelectasis.          COURSE & MEDICAL DECISION MAKING    ASSESSMENT, COURSE AND PLAN  Care Narrative:   74-year-old female presents to the [facility] with a syncopal episode.  She was standing outside in the sun and felt hot before she syncopized.  Differential diagnosis considered includes but is not limited to heat syncope, cardiac arrhythmia, dehydration, anemia, electrolyte abnormality, polypharmacy, or infection.    Patient's workup with an EKG and labs and placed on a cardiac monitor.  EKG is unremarkable.  She did not have any chest pain or shortness of breath.  Her chest x-ray showed atelectasis but no other signs of infection.  She does not have a fever or leukocytosis doubt infection or  sepsis.    Electrolytes are unremarkable except for mildly low potassium which was replaced.  2 troponins are unremarkable.  He has a mildly elevated BNP but she has no orthopnea or edema or clinical signs of CHF.    Patient observed here in in the ED for a prolonged period of time and remains well-appearing without any recurrence of her syncope or any arrhythmia.  She is amatory and tolerating fluids.    Patient's blood pressure is high but has been high on this visit and previous visits.  She states she had a recent primary care visit where her blood pressure was around 190 and she was asked to come extra pressure medication.    She has absolutely no symptoms with this this is asymptomatic hypertension.  Will not treated at this time we will have her follow-up with her doctor.    This point I think she has a clear situational syncope or from standing out in the heat.  I do not think hospitalization or further cardiac workup would be beneficial for her therefore she will be discharged to follow-up with her doctor.  She is comfortable with this plan.  Her questions were answered she is discharged in good condition.          ADDITIONAL PROBLEMS MANAGED  Hypertension    DISPOSITION AND DISCUSSIONS      Escalation of care considered, and ultimately not performed:acute inpatient care management, however at this time, the patient is most appropriate for outpatient management      Jessie Flores M.D.  [address] [id number] [phone]              FINAL DIAGNOSIS  1. Heat syncope, initial encounter         Electronically signed by: Derrick Nam M.D., 8/28/2024 11:23 AM

## 2024-08-28 NOTE — DISCHARGE INSTRUCTIONS
Follow-up with your doctor.  Return if you get dizzy or lightheaded or if you pass out again.  Return for chest pain shortness of breath or fever.  Her blood pressure rechecked within a week it was elevated.  Review all labs and workup with your doctor for further workup as an outpatient.

## 2024-09-06 ENCOUNTER — APPOINTMENT (OUTPATIENT)
Dept: RADIOLOGY | Facility: MEDICAL CENTER | Age: 74
End: 2024-09-06
Attending: EMERGENCY MEDICINE
Payer: COMMERCIAL

## 2024-09-06 ENCOUNTER — HOSPITAL ENCOUNTER (EMERGENCY)
Facility: MEDICAL CENTER | Age: 74
End: 2024-09-06
Attending: EMERGENCY MEDICINE
Payer: COMMERCIAL

## 2024-09-06 VITALS
TEMPERATURE: 98 F | BODY MASS INDEX: 28.52 KG/M2 | SYSTOLIC BLOOD PRESSURE: 160 MMHG | HEART RATE: 79 BPM | OXYGEN SATURATION: 95 % | HEIGHT: 62 IN | RESPIRATION RATE: 16 BRPM | DIASTOLIC BLOOD PRESSURE: 75 MMHG | WEIGHT: 155 LBS

## 2024-09-06 DIAGNOSIS — R35.0 URINARY FREQUENCY: ICD-10-CM

## 2024-09-06 DIAGNOSIS — R10.9 FLANK PAIN: ICD-10-CM

## 2024-09-06 DIAGNOSIS — R91.1 PULMONARY NODULE: ICD-10-CM

## 2024-09-06 LAB
ALBUMIN SERPL BCP-MCNC: 3.5 G/DL (ref 3.2–4.9)
ALBUMIN/GLOB SERPL: 0.9 G/DL
ALP SERPL-CCNC: 108 U/L (ref 30–99)
ALT SERPL-CCNC: 16 U/L (ref 2–50)
ANION GAP SERPL CALC-SCNC: 11 MMOL/L (ref 7–16)
APPEARANCE UR: CLEAR
AST SERPL-CCNC: 26 U/L (ref 12–45)
BACTERIA #/AREA URNS HPF: NEGATIVE /HPF
BASOPHILS # BLD AUTO: 0.6 % (ref 0–1.8)
BASOPHILS # BLD: 0.05 K/UL (ref 0–0.12)
BILIRUB SERPL-MCNC: 0.8 MG/DL (ref 0.1–1.5)
BILIRUB UR QL STRIP.AUTO: NEGATIVE
BUN SERPL-MCNC: 12 MG/DL (ref 8–22)
CALCIUM ALBUM COR SERPL-MCNC: 9.8 MG/DL (ref 8.5–10.5)
CALCIUM SERPL-MCNC: 9.4 MG/DL (ref 8.5–10.5)
CHLORIDE SERPL-SCNC: 104 MMOL/L (ref 96–112)
CO2 SERPL-SCNC: 23 MMOL/L (ref 20–33)
COLOR UR: YELLOW
CREAT SERPL-MCNC: 1.21 MG/DL (ref 0.5–1.4)
EOSINOPHIL # BLD AUTO: 0.13 K/UL (ref 0–0.51)
EOSINOPHIL NFR BLD: 1.5 % (ref 0–6.9)
EPI CELLS #/AREA URNS HPF: ABNORMAL /HPF
ERYTHROCYTE [DISTWIDTH] IN BLOOD BY AUTOMATED COUNT: 44.5 FL (ref 35.9–50)
GFR SERPLBLD CREATININE-BSD FMLA CKD-EPI: 47 ML/MIN/1.73 M 2
GLOBULIN SER CALC-MCNC: 4 G/DL (ref 1.9–3.5)
GLUCOSE SERPL-MCNC: 199 MG/DL (ref 65–99)
GLUCOSE UR STRIP.AUTO-MCNC: NEGATIVE MG/DL
HCT VFR BLD AUTO: 41.2 % (ref 37–47)
HGB BLD-MCNC: 14.1 G/DL (ref 12–16)
HYALINE CASTS #/AREA URNS LPF: ABNORMAL /LPF
IMM GRANULOCYTES # BLD AUTO: 0.03 K/UL (ref 0–0.11)
IMM GRANULOCYTES NFR BLD AUTO: 0.4 % (ref 0–0.9)
KETONES UR STRIP.AUTO-MCNC: NEGATIVE MG/DL
LEUKOCYTE ESTERASE UR QL STRIP.AUTO: ABNORMAL
LIPASE SERPL-CCNC: 36 U/L (ref 11–82)
LYMPHOCYTES # BLD AUTO: 2.07 K/UL (ref 1–4.8)
LYMPHOCYTES NFR BLD: 24.3 % (ref 22–41)
MCH RBC QN AUTO: 32.3 PG (ref 27–33)
MCHC RBC AUTO-ENTMCNC: 34.2 G/DL (ref 32.2–35.5)
MCV RBC AUTO: 94.5 FL (ref 81.4–97.8)
MICRO URNS: ABNORMAL
MONOCYTES # BLD AUTO: 0.76 K/UL (ref 0–0.85)
MONOCYTES NFR BLD AUTO: 8.9 % (ref 0–13.4)
NEUTROPHILS # BLD AUTO: 5.48 K/UL (ref 1.82–7.42)
NEUTROPHILS NFR BLD: 64.3 % (ref 44–72)
NITRITE UR QL STRIP.AUTO: NEGATIVE
NRBC # BLD AUTO: 0 K/UL
NRBC BLD-RTO: 0 /100 WBC (ref 0–0.2)
PH UR STRIP.AUTO: 7 [PH] (ref 5–8)
PLATELET # BLD AUTO: 273 K/UL (ref 164–446)
PMV BLD AUTO: 9.3 FL (ref 9–12.9)
POTASSIUM SERPL-SCNC: 3.6 MMOL/L (ref 3.6–5.5)
PROT SERPL-MCNC: 7.5 G/DL (ref 6–8.2)
PROT UR QL STRIP: NEGATIVE MG/DL
RBC # BLD AUTO: 4.36 M/UL (ref 4.2–5.4)
RBC # URNS HPF: ABNORMAL /HPF
RBC UR QL AUTO: NEGATIVE
SODIUM SERPL-SCNC: 138 MMOL/L (ref 135–145)
SP GR UR STRIP.AUTO: 1.01
UROBILINOGEN UR STRIP.AUTO-MCNC: 0.2 MG/DL
WBC # BLD AUTO: 8.5 K/UL (ref 4.8–10.8)
WBC #/AREA URNS HPF: ABNORMAL /HPF

## 2024-09-06 PROCEDURE — 81001 URINALYSIS AUTO W/SCOPE: CPT

## 2024-09-06 PROCEDURE — 74176 CT ABD & PELVIS W/O CONTRAST: CPT

## 2024-09-06 PROCEDURE — 85025 COMPLETE CBC W/AUTO DIFF WBC: CPT

## 2024-09-06 PROCEDURE — 36415 COLL VENOUS BLD VENIPUNCTURE: CPT

## 2024-09-06 PROCEDURE — 99284 EMERGENCY DEPT VISIT MOD MDM: CPT

## 2024-09-06 PROCEDURE — 83690 ASSAY OF LIPASE: CPT

## 2024-09-06 PROCEDURE — 80053 COMPREHEN METABOLIC PANEL: CPT

## 2024-09-06 ASSESSMENT — FIBROSIS 4 INDEX
FIB4 SCORE: 1.785714285714285714
FIB4 SCORE: 2.07

## 2024-09-06 NOTE — ED PROVIDER NOTES
ED Provider Note    CHIEF COMPLAINT  Chief Complaint   Patient presents with    Urinary Frequency    Painful Urination       EXTERNAL RECORDS REVIEWED  Inpatient Notes ED note 8/28/24 when the patient was evaluated after a syncopal episode    HPI/ROS  LIMITATION TO HISTORY   Select: : None  OUTSIDE HISTORIAN(S):  None    Serenity Howe is a 74 y.o. female who presents to the emergency department for evaluation of increased urinary frequency.  The patient states that starting last night she was having increased urinary frequency and had to use the bathroom every 3 hours.  She denies any dysuria.  She states that she has had some minimal bilateral flank pain.  She denies any midline back pain.  She denies any diarrhea, melena, or hematochezia.  She denies abdominal pain, fever, nausea, or vomiting.  She denies any chest pain or shortness of breath.    PAST MEDICAL HISTORY   has a past medical history of Acute exacerbation of chronic obstructive pulmonary disease (COPD) ([facility]) (7/5/2024), ASTHMA, Cancer ([facility]), Gout, History of breast cancer (07/29/2015), Hypertension, Shoulder pain, right (07/29/2015), and Tremor (07/29/2015).    SURGICAL HISTORY   has a past surgical history that includes mastectomy and gyn surgery.    FAMILY HISTORY  Family History   Problem Relation Age of Onset    Hyperlipidemia Mother     No Known Problems Father     No Known Problems Sister     No Known Problems Brother     No Known Problems Daughter     No Known Problems Daughter     No Known Problems Son        SOCIAL HISTORY  Social History     Tobacco Use    Smoking status: Never    Smokeless tobacco: Never   Vaping Use    Vaping status: Never Used   Substance and Sexual Activity    Alcohol use: No    Drug use: No    Sexual activity: Never     Partners: Male       CURRENT MEDICATIONS  Home Medications       Reviewed by Sharonda Buckley R.N. (Registered Nurse) on 09/06/24 at 0759  Med List Status: Partial     Medication Last Dose Status  "  acetaminophen (TYLENOL) 325 MG Tab  Active   albuterol (PROVENTIL) 2.5mg/3ml Nebu Soln solution for nebulization  Active   albuterol 108 (90 Base) MCG/ACT Aero Soln inhalation aerosol  Active   fluticasone-salmeterol (ADVAIR) 250-50 MCG/ACT AEROSOL POWDER, BREATH ACTIVATED  Active   valsartan (DIOVAN) 160 MG Tab  Active   verapamil ER (CALAN SR) 240 MG Tab CR  Active                  Audit from Redirected Encounters    **Home medications have not yet been reviewed for this encounter**         ALLERGIES  Allergies   Allergen Reactions    Shellfish Allergy Hives and Shortness of Breath       PHYSICAL EXAM  VITAL SIGNS: BP (!) 160/75   Pulse 79   Temp 36.7 °C (98.1 °F)   Resp 16   Ht 1.575 m (5' 2\")   Wt 70.3 kg (155 lb)   SpO2 95%   BMI 28.35 kg/m²   Constitutional: Alert and in no apparent distress.  HENT: Normocephalic atraumatic. Bilateral external ears normal. Nose normal. Mucous membranes are moist.  Eyes: Pupils are equal and reactive. Conjunctiva normal. Non-icteric sclera.   Neck: Normal range of motion without tenderness. Supple. No meningeal signs.  Cardiovascular: Regular rate and rhythm. No murmurs, gallops or rubs.  Thorax & Lungs: No increased work of breathing. Breath sounds are clear to auscultation bilaterally. No wheezing, rhonchi or rales.  Abdomen: Soft, nontender and nondistended. No hepatosplenomegaly.  Skin: Warm and dry. No rashes are noted.  Back: No midline bony tenderness, No CVA tenderness.   Extremities: 2+ peripheral pulses. Cap refill is less than 2 seconds. No edema, cyanosis, or clubbing.  Musculoskeletal: Good range of motion in all major joints. No tenderness to palpation or major deformities noted.   Neurologic: Alert and appropriate for age. The patient moves all 4 extremities without obvious deficits.    LABS  Results for orders placed or performed during the hospital encounter of 09/06/24   CBC WITH DIFFERENTIAL   Result Value Ref Range    WBC 8.5 4.8 - 10.8 K/uL    RBC " 4.36 4.20 - 5.40 M/uL    Hemoglobin 14.1 12.0 - 16.0 g/dL    Hematocrit 41.2 37.0 - 47.0 %    MCV 94.5 81.4 - 97.8 fL    MCH 32.3 27.0 - 33.0 pg    MCHC 34.2 32.2 - 35.5 g/dL    RDW 44.5 35.9 - 50.0 fL    Platelet Count 273 164 - 446 K/uL    MPV 9.3 9.0 - 12.9 fL    Neutrophils-Polys 64.30 44.00 - 72.00 %    Lymphocytes 24.30 22.00 - 41.00 %    Monocytes 8.90 0.00 - 13.40 %    Eosinophils 1.50 0.00 - 6.90 %    Basophils 0.60 0.00 - 1.80 %    Immature Granulocytes 0.40 0.00 - 0.90 %    Nucleated RBC 0.00 0.00 - 0.20 /100 WBC    Neutrophils (Absolute) 5.48 1.82 - 7.42 K/uL    Lymphs (Absolute) 2.07 1.00 - 4.80 K/uL    Monos (Absolute) 0.76 0.00 - 0.85 K/uL    Eos (Absolute) 0.13 0.00 - 0.51 K/uL    Baso (Absolute) 0.05 0.00 - 0.12 K/uL    Immature Granulocytes (abs) 0.03 0.00 - 0.11 K/uL    NRBC (Absolute) 0.00 K/uL   COMP METABOLIC PANEL   Result Value Ref Range    Sodium 138 135 - 145 mmol/L    Potassium 3.6 3.6 - 5.5 mmol/L    Chloride 104 96 - 112 mmol/L    Co2 23 20 - 33 mmol/L    Anion Gap 11.0 7.0 - 16.0    Glucose 199 (H) 65 - 99 mg/dL    Bun 12 8 - 22 mg/dL    Creatinine 1.21 0.50 - 1.40 mg/dL    Calcium 9.4 8.5 - 10.5 mg/dL    Correct Calcium 9.8 8.5 - 10.5 mg/dL    AST(SGOT) 26 12 - 45 U/L    ALT(SGPT) 16 2 - 50 U/L    Alkaline Phosphatase 108 (H) 30 - 99 U/L    Total Bilirubin 0.8 0.1 - 1.5 mg/dL    Albumin 3.5 3.2 - 4.9 g/dL    Total Protein 7.5 6.0 - 8.2 g/dL    Globulin 4.0 (H) 1.9 - 3.5 g/dL    A-G Ratio 0.9 g/dL   URINALYSIS CULTURE, IF INDICATED    Specimen: Urine, Clean Catch   Result Value Ref Range    Color Yellow     Character Clear     Specific Gravity 1.013 <1.035    Ph 7.0 5.0 - 8.0    Glucose Negative Negative mg/dL    Ketones Negative Negative mg/dL    Protein Negative Negative mg/dL    Bilirubin Negative Negative    Urobilinogen, Urine 0.2 Negative    Nitrite Negative Negative    Leukocyte Esterase Trace (A) Negative    Occult Blood Negative Negative    Micro Urine Req Microscopic    LIPASE    Result Value Ref Range    Lipase 36 11 - 82 U/L   ESTIMATED GFR   Result Value Ref Range    GFR (CKD-EPI) 47 (A) >60 mL/min/1.73 m 2   URINE MICROSCOPIC (W/UA)   Result Value Ref Range    WBC 2-5 /hpf    RBC 2-5 (A) /hpf    Bacteria Negative None /hpf    Epithelial Cells Few /hpf    Hyaline Cast 0-2 /lpf     RADIOLOGY/PROCEDURES   I have independently interpreted the diagnostic imaging associated with this visit and am waiting the final reading from the radiologist.   My preliminary interpretation is as follows: No hydronephrosis noted.    Radiologist interpretation:  CT-RENAL COLIC EVALUATION(A/P W/O)   Final Result      1.  7 mm nonobstructive right renal stone. No evidence of hydronephrosis or hydroureter.   2.  Stable 5 mm right lower lobe pulmonary nodule.   3.  Colonic diverticulosis without evidence of diverticulitis.   4.  Atherosclerosis.   5.  Fat-containing umbilical hernia.   6.  Calcified uterine fibroid.        COURSE & MEDICAL DECISION MAKING    ASSESSMENT, COURSE AND PLAN  Care Narrative: This is a 74-year-old female presenting to the emergency department for evaluation of increased urinary frequency and flank pain.  On initial evaluation, the patient did not appear to be in any acute distress.  Vital signs are reassuring.  Physical exam was reassuring with no midline spine tenderness.  She had no CVA tenderness either.  However, given her history workup was initiated.    White blood cell count was normal and reassuring.  Electrolytes were without significant derangement other than a glucose of 199.  Urinalysis was clean with no evidence of infection.  She had 2-5 RBCs but this is minimal.  LFTs and lipase were normal.  Renal function appeared to be at her baseline.  LFTs and lipase were normal as well.    CT was performed and revealed a 7 mm nonobstructive stone in the right kidney.  No evidence of hydronephrosis or hydroureter were noted.  No acute processes were noted.  A stable right lower lobe  pulmonary nodule was again demonstrated.      Upon reevaluation, the patient was resting comfortably.  Her blood pressure was slightly elevated but she does have a history of hypertension.  I do think she stable for discharge at this time.  I encouraged her to follow-up with her primary care physician and to return to the ED with any worsening signs or symptoms.    The patient presents with abdominal pain without signs of peritonitis or other life-threatening or serious etiology. The patient appears stable for discharge and has been instructed to return immediately if the symptoms worsen in any way, or in 8-12hr if not improved for re-evaluation. The patient has been instructed to return if the symptoms worsen or change in any way.    ADDITIONAL PROBLEMS MANAGED  Increased urinary frequency, flank pain    DISPOSITION AND DISCUSSIONS  I have discussed management of the patient with the following physicians and JOSSELIN's:  None    Discussion of management with other Q or appropriate source(s): None     Escalation of care considered, and ultimately not performed:acute inpatient care management, however at this time, the patient is most appropriate for outpatient management    Barriers to care at this time, including but not limited to:  None .     Decision tools and prescription drugs considered including, but not limited to:  None .    FINAL IMPRESSION  1. Urinary frequency    2. Flank pain    3. Pulmonary nodule      PRESCRIPTIONS  New Prescriptions    No medications on file     FOLLOW UP  Jessie Flores M.D.  [address] [address] [phone]    Call   To schedule a follow up appointment    [facility], Emergency Dept  [address] [facility] [address] [phone]  Go to   As needed    -DISCHARGE-    Electronically signed by: Jaye Leonard D.O., 9/6/2024 10:47 AM

## 2024-09-06 NOTE — ED TRIAGE NOTES
Pt to triage in .  Chief Complaint   Patient presents with    Urinary Frequency    Painful Urination     Symptoms x2d

## 2024-09-08 ENCOUNTER — HOSPITAL ENCOUNTER (EMERGENCY)
Facility: MEDICAL CENTER | Age: 74
End: 2024-09-08
Attending: EMERGENCY MEDICINE
Payer: COMMERCIAL

## 2024-09-08 ENCOUNTER — APPOINTMENT (OUTPATIENT)
Dept: RADIOLOGY | Facility: MEDICAL CENTER | Age: 74
End: 2024-09-08
Attending: EMERGENCY MEDICINE
Payer: COMMERCIAL

## 2024-09-08 VITALS
BODY MASS INDEX: 27.34 KG/M2 | DIASTOLIC BLOOD PRESSURE: 78 MMHG | WEIGHT: 148.59 LBS | TEMPERATURE: 97.2 F | SYSTOLIC BLOOD PRESSURE: 153 MMHG | HEIGHT: 62 IN | HEART RATE: 69 BPM | OXYGEN SATURATION: 95 % | RESPIRATION RATE: 16 BRPM

## 2024-09-08 DIAGNOSIS — M72.2 PLANTAR FASCIITIS: ICD-10-CM

## 2024-09-08 PROCEDURE — 93970 EXTREMITY STUDY: CPT | Mod: 26 | Performed by: INTERNAL MEDICINE

## 2024-09-08 PROCEDURE — 99284 EMERGENCY DEPT VISIT MOD MDM: CPT

## 2024-09-08 PROCEDURE — 700111 HCHG RX REV CODE 636 W/ 250 OVERRIDE (IP): Performed by: EMERGENCY MEDICINE

## 2024-09-08 PROCEDURE — 93970 EXTREMITY STUDY: CPT

## 2024-09-08 PROCEDURE — 700102 HCHG RX REV CODE 250 W/ 637 OVERRIDE(OP): Performed by: EMERGENCY MEDICINE

## 2024-09-08 PROCEDURE — A9270 NON-COVERED ITEM OR SERVICE: HCPCS | Performed by: EMERGENCY MEDICINE

## 2024-09-08 RX ORDER — ONDANSETRON 4 MG/1
4 TABLET, ORALLY DISINTEGRATING ORAL ONCE
Status: COMPLETED | OUTPATIENT
Start: 2024-09-08 | End: 2024-09-08

## 2024-09-08 RX ORDER — IBUPROFEN 800 MG/1
800 TABLET, FILM COATED ORAL EVERY 8 HOURS PRN
Qty: 15 TABLET | Refills: 0 | Status: SHIPPED | OUTPATIENT
Start: 2024-09-08

## 2024-09-08 RX ORDER — HYDROCODONE BITARTRATE AND ACETAMINOPHEN 5; 325 MG/1; MG/1
1 TABLET ORAL ONCE
Status: COMPLETED | OUTPATIENT
Start: 2024-09-08 | End: 2024-09-08

## 2024-09-08 RX ORDER — TRAMADOL HYDROCHLORIDE 50 MG/1
50 TABLET ORAL EVERY 6 HOURS PRN
Qty: 12 TABLET | Refills: 0 | Status: SHIPPED | OUTPATIENT
Start: 2024-09-08 | End: 2024-09-11

## 2024-09-08 RX ADMIN — HYDROCODONE BITARTRATE AND ACETAMINOPHEN 1 TABLET: 5; 325 TABLET ORAL at 08:09

## 2024-09-08 RX ADMIN — ONDANSETRON 4 MG: 4 TABLET, ORALLY DISINTEGRATING ORAL at 08:09

## 2024-09-08 ASSESSMENT — PAIN DESCRIPTION - PAIN TYPE: TYPE: ACUTE PAIN

## 2024-09-08 ASSESSMENT — FIBROSIS 4 INDEX: FIB4 SCORE: 1.785714285714285714

## 2024-09-08 NOTE — ED TRIAGE NOTES
Chief Complaint   Patient presents with    Foot Pain     Bilateral foot and leg pain x2 days; pt states she feels like she can't walk d/t the pain     Pt ambulatory to triage with walker for above complaint. Pt states someone here at the hospital provided her the walker because when her son dropped her off he was worried she would fall. Pt denies injury to feet or legs and has hx of gout but states that this pain feels different. Pt states that she was unable to sleep d/t the pain.    Pt is alert/oriented and follows commands. Pt speaking in full sentences and responds appropriately to questions. No acute distress noted in triage and respirations are even and unlabored.     Pt placed in lobby and educated on triage process. Pt encouraged to alert staff for any changes in condition.

## 2024-09-08 NOTE — ED PROVIDER NOTES
ED Provider Note    CHIEF COMPLAINT  Chief Complaint   Patient presents with    Foot Pain     Bilateral foot and leg pain x2 days; pt states she feels like she can't walk d/t the pain       EXTERNAL RECORDS REVIEWED  Outpatient Notes   none    HPI/ROS  LIMITATION TO HISTORY   Select: : None  OUTSIDE HISTORIAN(S):  none    Serenity Howe is a 74 y.o. female who presents here for evaluation of bilateral foot and calf pain. The pt states that it started about 2 days ago. She states she has pain to the top of her feet and calf pain.  She has no vomiting, no fever/ no cp/sob.  The pt used 'lotion' to help her feet, but it 'really didn't do much.'      PAST MEDICAL HISTORY   has a past medical history of Acute exacerbation of chronic obstructive pulmonary disease (COPD) ([facility]) (7/5/2024), ASTHMA, Cancer ([facility]), Gout, History of breast cancer (07/29/2015), Hypertension, Shoulder pain, right (07/29/2015), and Tremor (07/29/2015).    SURGICAL HISTORY   has a past surgical history that includes mastectomy and gyn surgery.    FAMILY HISTORY  Family History   Problem Relation Age of Onset    Hyperlipidemia Mother     No Known Problems Father     No Known Problems Sister     No Known Problems Brother     No Known Problems Daughter     No Known Problems Daughter     No Known Problems Son        SOCIAL HISTORY  Social History     Tobacco Use    Smoking status: Never    Smokeless tobacco: Never   Vaping Use    Vaping status: Never Used   Substance and Sexual Activity    Alcohol use: No    Drug use: No    Sexual activity: Never     Partners: Male       CURRENT MEDICATIONS  Home Medications       Reviewed by Geri Carroll R.N. (Registered Nurse) on 09/08/24 at 0713  Med List Status: Not Addressed     Medication Last Dose Status   acetaminophen (TYLENOL) 325 MG Tab  Active   albuterol (PROVENTIL) 2.5mg/3ml Nebu Soln solution for nebulization  Active   albuterol 108 (90 Base) MCG/ACT Aero Soln inhalation aerosol  Active  "  fluticasone-salmeterol (ADVAIR) 250-50 MCG/ACT AEROSOL POWDER, BREATH ACTIVATED  Active   valsartan (DIOVAN) 160 MG Tab  Active   verapamil ER (CALAN SR) 240 MG Tab CR  Active                    ALLERGIES  Allergies   Allergen Reactions    Shellfish Allergy Hives and Shortness of Breath       PHYSICAL EXAM  VITAL SIGNS: BP (!) 153/77   Pulse 88   Temp 36.1 °C (97 °F) (Temporal)   Resp 16   Ht 1.575 m (5' 2\")   Wt 67.4 kg (148 lb 9.4 oz)   SpO2 94%   BMI 27.18 kg/m²    Constitutional: Well developed, well nourished. No acute distress.  HEENT: Normocephalic, atraumatic. Posterior pharynx clear and moist.  Eyes:  EOMI. Normal sclera.  Neck: Supple, Full range of motion, nontender.  Chest/Pulmonary: clear to ausculation. Symmetrical expansion.   Musculoskeletal: No deformity, no edema, neurovascular intact. Tenderness to bilateral calves.  Dpp present.   Neuro: Clear speech, appropriate, cooperative, cranial nerves II-XII grossly intact.  Psych: Normal mood and affect      EKG/LABS  none    RADIOLOGY/PROCEDURES   I have independently interpreted the diagnostic imaging associated with this visit and am waiting the final reading from the radiologist.   My preliminary interpretation is as follows: below     Radiologist interpretation:  US-EXTREMITY VENOUS LOWER BILAT   Final Result      Neg for dvt    COURSE & MEDICAL DECISION MAKING    ASSESSMENT, COURSE AND PLAN  Care Narrative: This is a 74-year-old female here for evaluation of bilateral foot pain.  Patient has history of plantar fasciitis, and appears to be consistent with this.  Pt will be given medications and will follow up.         DISPOSITION AND DISCUSSIONS  I have discussed management of the patient with the following physicians and JOSSELIN's:  none    Discussion of management with other QHP or appropriate source(s): None     Escalation of care considered, and ultimately not performed:none    Barriers to care at this time, including but not limited to: " Patient does not have established PCP.     Decision tools and prescription drugs considered including, but not limited to: none.    FINAL DIAGNOSIS  1. Plantar fasciitis  ibuprofen (MOTRIN) 800 MG Tab    traMADol (ULTRAM) 50 MG Tab             Electronically signed by: Bo Jacobs D.O., 9/8/2024 8:16 AM

## 2024-09-08 NOTE — ED NOTES
Discharge instructions given to pt including follow up with pcp or returning if no improvement of symptoms or to return if worse. Prescription x 2 provided to pt. Educated not to drive and not to drink alcohol while on prescribed tramadol. Questions answered by RN. Denies any new complaints. Discharged w/stable vitals and wheeled to the lobby by this RN. Will call family member to pick her up.

## 2024-10-16 NOTE — CARE PLAN
Problem: Oxygenation:  Goal: Maintain adequate oxygenation dependent on patient condition  Outcome: PROGRESSING AS EXPECTED    Intervention: Manage oxygen therapy by monitoring pulse oximetry and/or ABG values  Pt on RA, SPO2 94%      Problem: Bronchoconstriction:  Goal: Improve in air movement and diminished wheezing  Outcome: PROGRESSING AS EXPECTED    Intervention: Implement inhaled treatments  Duoneb Q4  Symbicort BID         Status post ileostomy reversal postop day #0, pain well-controlled, symptoms Leana, postoperative management per colorectal surgery

## 2024-10-23 ENCOUNTER — APPOINTMENT (OUTPATIENT)
Dept: RADIOLOGY | Facility: MEDICAL CENTER | Age: 74
End: 2024-10-23
Attending: EMERGENCY MEDICINE
Payer: COMMERCIAL

## 2024-10-23 ENCOUNTER — HOSPITAL ENCOUNTER (EMERGENCY)
Facility: MEDICAL CENTER | Age: 74
End: 2024-10-23
Attending: EMERGENCY MEDICINE
Payer: COMMERCIAL

## 2024-10-23 VITALS
RESPIRATION RATE: 20 BRPM | WEIGHT: 151.01 LBS | DIASTOLIC BLOOD PRESSURE: 91 MMHG | HEART RATE: 90 BPM | BODY MASS INDEX: 27.79 KG/M2 | TEMPERATURE: 97 F | HEIGHT: 62 IN | SYSTOLIC BLOOD PRESSURE: 150 MMHG | OXYGEN SATURATION: 93 %

## 2024-10-23 DIAGNOSIS — M25.572 ACUTE LEFT ANKLE PAIN: ICD-10-CM

## 2024-10-23 DIAGNOSIS — M10.9 ACUTE GOUT OF LEFT ANKLE, UNSPECIFIED CAUSE: ICD-10-CM

## 2024-10-23 PROCEDURE — 700111 HCHG RX REV CODE 636 W/ 250 OVERRIDE (IP): Performed by: EMERGENCY MEDICINE

## 2024-10-23 PROCEDURE — RXMED WILLOW AMBULATORY MEDICATION CHARGE: Performed by: EMERGENCY MEDICINE

## 2024-10-23 PROCEDURE — 73610 X-RAY EXAM OF ANKLE: CPT | Mod: LT

## 2024-10-23 PROCEDURE — 99284 EMERGENCY DEPT VISIT MOD MDM: CPT

## 2024-10-23 RX ORDER — PREDNISONE 20 MG/1
60 TABLET ORAL ONCE
Status: COMPLETED | OUTPATIENT
Start: 2024-10-23 | End: 2024-10-23

## 2024-10-23 RX ORDER — PREDNISONE 20 MG/1
60 TABLET ORAL DAILY
Qty: 12 TABLET | Refills: 0 | Status: SHIPPED | OUTPATIENT
Start: 2024-10-24 | End: 2024-10-28

## 2024-10-23 RX ADMIN — PREDNISONE 60 MG: 20 TABLET ORAL at 08:33

## 2024-10-23 ASSESSMENT — FIBROSIS 4 INDEX: FIB4 SCORE: 1.4

## 2024-10-24 ENCOUNTER — PHARMACY VISIT (OUTPATIENT)
Dept: PHARMACY | Facility: MEDICAL CENTER | Age: 74
End: 2024-10-24
Payer: COMMERCIAL

## 2024-11-03 ENCOUNTER — HOSPITAL ENCOUNTER (EMERGENCY)
Facility: MEDICAL CENTER | Age: 74
End: 2024-11-03
Attending: EMERGENCY MEDICINE
Payer: COMMERCIAL

## 2024-11-03 VITALS
BODY MASS INDEX: 27.26 KG/M2 | HEART RATE: 79 BPM | WEIGHT: 148.15 LBS | TEMPERATURE: 97.2 F | DIASTOLIC BLOOD PRESSURE: 78 MMHG | OXYGEN SATURATION: 95 % | HEIGHT: 62 IN | SYSTOLIC BLOOD PRESSURE: 143 MMHG | RESPIRATION RATE: 18 BRPM

## 2024-11-03 DIAGNOSIS — R35.0 URINARY FREQUENCY: ICD-10-CM

## 2024-11-03 LAB
ANION GAP SERPL CALC-SCNC: 15 MMOL/L (ref 7–16)
APPEARANCE UR: CLEAR
BACTERIA #/AREA URNS HPF: ABNORMAL /HPF
BASOPHILS # BLD AUTO: 0.3 % (ref 0–1.8)
BASOPHILS # BLD: 0.03 K/UL (ref 0–0.12)
BILIRUB UR QL STRIP.AUTO: NEGATIVE
BUN SERPL-MCNC: 18 MG/DL (ref 8–22)
CALCIUM SERPL-MCNC: 9.5 MG/DL (ref 8.5–10.5)
CASTS URNS QL MICRO: ABNORMAL /LPF (ref 0–2)
CHLORIDE SERPL-SCNC: 106 MMOL/L (ref 96–112)
CO2 SERPL-SCNC: 20 MMOL/L (ref 20–33)
COLOR UR: YELLOW
CREAT SERPL-MCNC: 1.14 MG/DL (ref 0.5–1.4)
EOSINOPHIL # BLD AUTO: 0.17 K/UL (ref 0–0.51)
EOSINOPHIL NFR BLD: 1.8 % (ref 0–6.9)
EPITHELIAL CELLS 1715: ABNORMAL /HPF (ref 0–5)
ERYTHROCYTE [DISTWIDTH] IN BLOOD BY AUTOMATED COUNT: 44 FL (ref 35.9–50)
GFR SERPLBLD CREATININE-BSD FMLA CKD-EPI: 50 ML/MIN/1.73 M 2
GLUCOSE SERPL-MCNC: 149 MG/DL (ref 65–99)
GLUCOSE UR STRIP.AUTO-MCNC: NEGATIVE MG/DL
HCT VFR BLD AUTO: 44.2 % (ref 37–47)
HGB BLD-MCNC: 14.9 G/DL (ref 12–16)
IMM GRANULOCYTES # BLD AUTO: 0.04 K/UL (ref 0–0.11)
IMM GRANULOCYTES NFR BLD AUTO: 0.4 % (ref 0–0.9)
KETONES UR STRIP.AUTO-MCNC: NEGATIVE MG/DL
LEUKOCYTE ESTERASE UR QL STRIP.AUTO: ABNORMAL
LYMPHOCYTES # BLD AUTO: 1.85 K/UL (ref 1–4.8)
LYMPHOCYTES NFR BLD: 19.4 % (ref 22–41)
MCH RBC QN AUTO: 31 PG (ref 27–33)
MCHC RBC AUTO-ENTMCNC: 33.7 G/DL (ref 32.2–35.5)
MCV RBC AUTO: 91.9 FL (ref 81.4–97.8)
MICRO URNS: ABNORMAL
MONOCYTES # BLD AUTO: 1.08 K/UL (ref 0–0.85)
MONOCYTES NFR BLD AUTO: 11.3 % (ref 0–13.4)
NEUTROPHILS # BLD AUTO: 6.37 K/UL (ref 1.82–7.42)
NEUTROPHILS NFR BLD: 66.8 % (ref 44–72)
NITRITE UR QL STRIP.AUTO: NEGATIVE
NRBC # BLD AUTO: 0 K/UL
NRBC BLD-RTO: 0 /100 WBC (ref 0–0.2)
PH UR STRIP.AUTO: 5.5 [PH] (ref 5–8)
PLATELET # BLD AUTO: 275 K/UL (ref 164–446)
PMV BLD AUTO: 9.1 FL (ref 9–12.9)
POTASSIUM SERPL-SCNC: 3.3 MMOL/L (ref 3.6–5.5)
PROT UR QL STRIP: NEGATIVE MG/DL
RBC # BLD AUTO: 4.81 M/UL (ref 4.2–5.4)
RBC # URNS HPF: ABNORMAL /HPF (ref 0–2)
RBC UR QL AUTO: NEGATIVE
SODIUM SERPL-SCNC: 141 MMOL/L (ref 135–145)
SP GR UR STRIP.AUTO: 1.02
UROBILINOGEN UR STRIP.AUTO-MCNC: 0.2 EU/DL
WBC # BLD AUTO: 9.5 K/UL (ref 4.8–10.8)
WBC #/AREA URNS HPF: ABNORMAL /HPF

## 2024-11-03 PROCEDURE — 99284 EMERGENCY DEPT VISIT MOD MDM: CPT

## 2024-11-03 PROCEDURE — 81003 URINALYSIS AUTO W/O SCOPE: CPT

## 2024-11-03 PROCEDURE — 81015 MICROSCOPIC EXAM OF URINE: CPT

## 2024-11-03 PROCEDURE — 80048 BASIC METABOLIC PNL TOTAL CA: CPT

## 2024-11-03 PROCEDURE — 36415 COLL VENOUS BLD VENIPUNCTURE: CPT

## 2024-11-03 PROCEDURE — 85025 COMPLETE CBC W/AUTO DIFF WBC: CPT

## 2024-11-03 ASSESSMENT — FIBROSIS 4 INDEX: FIB4 SCORE: 1.4

## 2024-11-03 NOTE — ED TRIAGE NOTES
Chief Complaint   Patient presents with    Urinary Frequency     Frequent urination since last night. Denies pain with urination. Denies blood in urine. Patient reports associated lower back pain.

## 2024-11-03 NOTE — ED NOTES
Discharge instructions provided. Patient to follow with PCP 11/5. Discussed to return to ER if symptoms worsen. Patient verbalized understanding. Pt ambulated to lobby to call mtm.   
Pt ambulated to BR with FWW, UA collected & sent.   
Pt provided with water.   
Report to Gaudencio PADILLA.   
negative

## 2024-11-03 NOTE — ED PROVIDER NOTES
ER Provider Note    Scribed for Dr. Yo Jesus M.D. by Vanessa Rasmussen. 11/3/2024  11:32 AM    Primary Care Provider: Jessie Flores M.D.    CHIEF COMPLAINT  Chief Complaint   Patient presents with    Urinary Frequency     Frequent urination since last night. Denies pain with urination. Denies blood in urine. Patient reports associated lower back pain.        EXTERNAL RECORDS REVIEWED  External ED Note Seen at [place] for urinary frequency, had negative urinalysis.    HPI/ROS  LIMITATION TO HISTORY   Select: : None    OUTSIDE HISTORIAN(S):  None    Serenity Howe is a 74 y.o. female who presents to the ED for urinary frequency onset one night ago. The patient reports intermittent pain in her lower back. Denies dysuria. Denies medical history of diabetes.     PAST MEDICAL HISTORY  Past Medical History:   Diagnosis Date    Acute exacerbation of chronic obstructive pulmonary disease (COPD) ([facility]) 2024    ASTHMA     Cancer ([facility])     Gout     History of breast cancer 2015    Hypertension     Shoulder pain, right 2015    Tremor 2015     SURGICAL HISTORY  Past Surgical History:   Procedure Laterality Date    GYN SURGERY       x1    MASTECTOMY      right        FAMILY HISTORY  Family History   Problem Relation Age of Onset    Hyperlipidemia Mother     No Known Problems Father     No Known Problems Sister     No Known Problems Brother     No Known Problems Daughter     No Known Problems Daughter     No Known Problems Son        SOCIAL HISTORY   reports that she has never smoked. She has never used smokeless tobacco. She reports that she does not drink alcohol and does not use drugs.    CURRENT MEDICATIONS  Discharge Medication List as of 11/3/2024  1:29 PM        CONTINUE these medications which have NOT CHANGED    Details   ibuprofen (MOTRIN) 800 MG Tab Take 1 Tablet by mouth every 8 hours as needed for Mild Pain., Disp-15 Tablet, R-0, Normal      albuterol 108 (90 Base) MCG/ACT  "Aero Soln inhalation aerosol Inhale 1-2 Puffs every four hours as needed for Shortness of Breath., Historical Med      albuterol (PROVENTIL) 2.5mg/3ml Nebu Soln solution for nebulization Take 3 mL by nebulization every four hours as needed for Shortness of Breath., Disp-75 mL, R-0, Normal      valsartan (DIOVAN) 160 MG Tab Take 1 Tablet by mouth every day., Disp-30 Tablet, R-1, Normal      acetaminophen (TYLENOL) 325 MG Tab Take 650 mg by mouth every 6 hours as needed for Mild Pain., Historical Med      fluticasone-salmeterol (ADVAIR) 250-50 MCG/ACT AEROSOL POWDER, BREATH ACTIVATED Inhale 1 Puff 2 times a day., Historical Med      verapamil ER (CALAN SR) 240 MG Tab CR Take 240 mg by mouth every evening., Historical Med             ALLERGIES  Shellfish allergy    PHYSICAL EXAM  BP (!) 152/72   Pulse 90   Temp 36.4 °C (97.5 °F) (Temporal)   Resp 16   Ht 1.575 m (5' 2\")   Wt 67.2 kg (148 lb 2.4 oz)   SpO2 95%   BMI 27.10 kg/m²   Constitutional: Alert in no apparent distress.  HENT: No signs of trauma, Bilateral external ears normal, Nose normal.   Eyes: Pupils are equal and reactive, Conjunctiva normal, Non-icteric.   Neck: Normal range of motion, No tenderness, Supple,   Cardiovascular: Regular rate and rhythm, no murmurs.   Thorax & Lungs: Normal breath sounds, No respiratory distress, No wheezing, No chest tenderness.   Abdomen: Bowel sounds normal, Soft, No tenderness,   Skin: Warm, Dry, No erythema, No rash.   Back: No bony tenderness, No CVA tenderness.   Extremities: No edema, No tenderness, No cyanosis, no tenderness  Psychiatric: Affect normal     DIAGNOSTIC STUDIES & PROCEDURES    Labs:   Labs Reviewed   URINALYSIS,CULTURE IF INDICATED - Abnormal; Notable for the following components:       Result Value    Leukocyte Esterase Small (*)     All other components within normal limits   BASIC METABOLIC PANEL - Abnormal; Notable for the following components:    Potassium 3.3 (*)     Glucose 149 (*)     All " other components within normal limits   CBC WITH DIFFERENTIAL - Abnormal; Notable for the following components:    Lymphocytes 19.40 (*)     Monos (Absolute) 1.08 (*)     All other components within normal limits   ESTIMATED GFR - Abnormal; Notable for the following components:    GFR (CKD-EPI) 50 (*)     All other components within normal limits   URINE MICROSCOPIC (W/UA) - Abnormal; Notable for the following components:    WBC 6-10 (*)     All other components within normal limits     All labs reviewed by me.    COURSE & MEDICAL DECISION MAKING    ED Observation Status? No; Patient does not meet criteria for ED Observation.     INITIAL ASSESSMENT AND PLAN  Care Narrative:       11:32 AM - Patient seen and evaluated at bedside. Patient presents to the ED with urinary frequency. Discussed plan of care, including obtaining labs. Patient agrees to plan of care. Ordered UA, CBC w/ diff, BMP, and Urine Microscopic to evaluate.    1:44 PM - I reevaluated the patient at bedside. I discussed plan for discharge and follow up as outlined below. The patient is stable for discharge at this time and will return for any new or worsening symptoms. Patient verbalizes understanding and support with my plan for discharge.     ADDITIONAL PROBLEM LIST AND DISPOSITION  Patient with urinary frequency.  At this time I will obtain electrolytes and creatinine to assure that there is no renal dysfunction.  Ultimately there is no significant concerns here other than a mildly low GFR but improved from prior.  Ultimately the patient has no infection in the urine.  Glucose mildly elevated.  This might be related to this.  She is prediabetic.  Will discharge patient home with strict return precautions and follow-up.               DISPOSITION AND DISCUSSIONS  I have discussed management of the patient with the following physicians and JOSSELIN's: None    Discussion of management with other QHP or appropriate source(s): None     Escalation of care  considered, and ultimately not performed: acute inpatient care management, however at this time, the patient is most appropriate for outpatient management.    Barriers to care at this time, including but not limited to:  None .     Decision tools and prescription drugs considered including, but not limited to:  none .    The patient will return for new or worsening symptoms and is stable at the time of discharge.    The patient is referred to a primary physician for blood pressure management, diabetic screening, and for all other preventative health concerns.    DISPOSITION:  Patient will be discharged home in stable condition.    FOLLOW UP:  Jessie Flores M.D.  [address] [address]  [phone]    In 2 days        OUTPATIENT MEDICATIONS:  Discharge Medication List as of 11/3/2024  1:29 PM            FINAL IMPRESSION   1. Urinary frequency         Vanessa ZIMMERMAN ([place]), am scribing for, and in the presence of, Yo Jesus M.D..    Electronically signed by: Vanessa Navarro), 11/3/2024    Yo ZIMMERMAN M.D. personally performed the services described in this documentation, as scribed by Vanessa Rasmussen in my presence, and it is both accurate and complete.    The note accurately reflects work and decisions made by me.  Yo Jesus M.D.  11/3/2024  3:05 PM

## 2024-12-03 ENCOUNTER — APPOINTMENT (OUTPATIENT)
Dept: RADIOLOGY | Facility: MEDICAL CENTER | Age: 74
End: 2024-12-03
Attending: EMERGENCY MEDICINE
Payer: COMMERCIAL

## 2024-12-03 ENCOUNTER — HOSPITAL ENCOUNTER (EMERGENCY)
Facility: MEDICAL CENTER | Age: 74
End: 2024-12-03
Attending: EMERGENCY MEDICINE
Payer: COMMERCIAL

## 2024-12-03 VITALS
BODY MASS INDEX: 27.18 KG/M2 | SYSTOLIC BLOOD PRESSURE: 149 MMHG | WEIGHT: 147.71 LBS | TEMPERATURE: 97 F | HEIGHT: 62 IN | DIASTOLIC BLOOD PRESSURE: 76 MMHG | OXYGEN SATURATION: 94 % | RESPIRATION RATE: 18 BRPM | HEART RATE: 89 BPM

## 2024-12-03 DIAGNOSIS — Z87.09 HISTORY OF ASTHMA: ICD-10-CM

## 2024-12-03 DIAGNOSIS — J45.21 MILD INTERMITTENT ASTHMA WITH ACUTE EXACERBATION: ICD-10-CM

## 2024-12-03 DIAGNOSIS — J44.1 ACUTE EXACERBATION OF CHRONIC OBSTRUCTIVE PULMONARY DISEASE (COPD) (HCC): ICD-10-CM

## 2024-12-03 LAB
ALBUMIN SERPL BCP-MCNC: 3.5 G/DL (ref 3.2–4.9)
ALBUMIN/GLOB SERPL: 0.9 G/DL
ALP SERPL-CCNC: 113 U/L (ref 30–99)
ALT SERPL-CCNC: 12 U/L (ref 2–50)
ANION GAP SERPL CALC-SCNC: 12 MMOL/L (ref 7–16)
AST SERPL-CCNC: 22 U/L (ref 12–45)
BASOPHILS # BLD AUTO: 0.5 % (ref 0–1.8)
BASOPHILS # BLD: 0.06 K/UL (ref 0–0.12)
BILIRUB SERPL-MCNC: 0.3 MG/DL (ref 0.1–1.5)
BUN SERPL-MCNC: 15 MG/DL (ref 8–22)
CALCIUM ALBUM COR SERPL-MCNC: 10.3 MG/DL (ref 8.5–10.5)
CALCIUM SERPL-MCNC: 9.9 MG/DL (ref 8.5–10.5)
CHLORIDE SERPL-SCNC: 107 MMOL/L (ref 96–112)
CO2 SERPL-SCNC: 20 MMOL/L (ref 20–33)
CREAT SERPL-MCNC: 1.15 MG/DL (ref 0.5–1.4)
EKG IMPRESSION: NORMAL
EOSINOPHIL # BLD AUTO: 0.21 K/UL (ref 0–0.51)
EOSINOPHIL NFR BLD: 1.7 % (ref 0–6.9)
ERYTHROCYTE [DISTWIDTH] IN BLOOD BY AUTOMATED COUNT: 48.4 FL (ref 35.9–50)
FLUAV RNA SPEC QL NAA+PROBE: NEGATIVE
FLUBV RNA SPEC QL NAA+PROBE: NEGATIVE
GFR SERPLBLD CREATININE-BSD FMLA CKD-EPI: 50 ML/MIN/1.73 M 2
GLOBULIN SER CALC-MCNC: 3.9 G/DL (ref 1.9–3.5)
GLUCOSE SERPL-MCNC: 144 MG/DL (ref 65–99)
HCT VFR BLD AUTO: 43.1 % (ref 37–47)
HGB BLD-MCNC: 14.7 G/DL (ref 12–16)
IMM GRANULOCYTES # BLD AUTO: 0.06 K/UL (ref 0–0.11)
IMM GRANULOCYTES NFR BLD AUTO: 0.5 % (ref 0–0.9)
LYMPHOCYTES # BLD AUTO: 1.79 K/UL (ref 1–4.8)
LYMPHOCYTES NFR BLD: 14.7 % (ref 22–41)
MCH RBC QN AUTO: 32.1 PG (ref 27–33)
MCHC RBC AUTO-ENTMCNC: 34.1 G/DL (ref 32.2–35.5)
MCV RBC AUTO: 94.1 FL (ref 81.4–97.8)
MONOCYTES # BLD AUTO: 1.38 K/UL (ref 0–0.85)
MONOCYTES NFR BLD AUTO: 11.3 % (ref 0–13.4)
NEUTROPHILS # BLD AUTO: 8.71 K/UL (ref 1.82–7.42)
NEUTROPHILS NFR BLD: 71.3 % (ref 44–72)
NRBC # BLD AUTO: 0 K/UL
NRBC BLD-RTO: 0 /100 WBC (ref 0–0.2)
NT-PROBNP SERPL IA-MCNC: 230 PG/ML (ref 0–125)
PLATELET # BLD AUTO: 251 K/UL (ref 164–446)
PMV BLD AUTO: 9.2 FL (ref 9–12.9)
POTASSIUM SERPL-SCNC: 3.5 MMOL/L (ref 3.6–5.5)
PROT SERPL-MCNC: 7.4 G/DL (ref 6–8.2)
RBC # BLD AUTO: 4.58 M/UL (ref 4.2–5.4)
RSV RNA SPEC QL NAA+PROBE: NEGATIVE
SARS-COV-2 RNA RESP QL NAA+PROBE: NOTDETECTED
SODIUM SERPL-SCNC: 139 MMOL/L (ref 135–145)
TROPONIN T SERPL-MCNC: 12 NG/L (ref 6–19)
WBC # BLD AUTO: 12.2 K/UL (ref 4.8–10.8)

## 2024-12-03 PROCEDURE — 700111 HCHG RX REV CODE 636 W/ 250 OVERRIDE (IP): Performed by: EMERGENCY MEDICINE

## 2024-12-03 PROCEDURE — 700101 HCHG RX REV CODE 250: Performed by: EMERGENCY MEDICINE

## 2024-12-03 PROCEDURE — 71045 X-RAY EXAM CHEST 1 VIEW: CPT

## 2024-12-03 PROCEDURE — 94640 AIRWAY INHALATION TREATMENT: CPT

## 2024-12-03 PROCEDURE — 0241U HCHG SARS-COV-2 COVID-19 NFCT DS RESP RNA 4 TRGT ED POC: CPT

## 2024-12-03 PROCEDURE — 99284 EMERGENCY DEPT VISIT MOD MDM: CPT

## 2024-12-03 PROCEDURE — 85025 COMPLETE CBC W/AUTO DIFF WBC: CPT

## 2024-12-03 PROCEDURE — 80053 COMPREHEN METABOLIC PANEL: CPT

## 2024-12-03 PROCEDURE — 84484 ASSAY OF TROPONIN QUANT: CPT

## 2024-12-03 PROCEDURE — 93005 ELECTROCARDIOGRAM TRACING: CPT | Mod: TC

## 2024-12-03 PROCEDURE — 36415 COLL VENOUS BLD VENIPUNCTURE: CPT

## 2024-12-03 PROCEDURE — 93005 ELECTROCARDIOGRAM TRACING: CPT | Mod: TC | Performed by: EMERGENCY MEDICINE

## 2024-12-03 PROCEDURE — 83880 ASSAY OF NATRIURETIC PEPTIDE: CPT

## 2024-12-03 RX ORDER — ALBUTEROL SULFATE 90 UG/1
1-2 INHALANT RESPIRATORY (INHALATION) EVERY 4 HOURS PRN
Qty: 8.5 G | Refills: 0 | Status: SHIPPED | OUTPATIENT
Start: 2024-12-03

## 2024-12-03 RX ORDER — ALBUTEROL SULFATE 0.83 MG/ML
2.5 SOLUTION RESPIRATORY (INHALATION) EVERY 4 HOURS PRN
Qty: 75 ML | Refills: 0 | Status: SHIPPED | OUTPATIENT
Start: 2024-12-03

## 2024-12-03 RX ORDER — IPRATROPIUM BROMIDE AND ALBUTEROL SULFATE 2.5; .5 MG/3ML; MG/3ML
3 SOLUTION RESPIRATORY (INHALATION)
Status: DISCONTINUED | OUTPATIENT
Start: 2024-12-03 | End: 2024-12-03 | Stop reason: HOSPADM

## 2024-12-03 RX ORDER — PREDNISONE 20 MG/1
20 TABLET ORAL DAILY
Qty: 5 TABLET | Refills: 0 | Status: SHIPPED | OUTPATIENT
Start: 2024-12-03 | End: 2024-12-08

## 2024-12-03 RX ORDER — PREDNISONE 20 MG/1
60 TABLET ORAL ONCE
Status: COMPLETED | OUTPATIENT
Start: 2024-12-03 | End: 2024-12-03

## 2024-12-03 RX ADMIN — IPRATROPIUM BROMIDE AND ALBUTEROL SULFATE 3 ML: 2.5; .5 SOLUTION RESPIRATORY (INHALATION) at 15:58

## 2024-12-03 RX ADMIN — PREDNISONE 60 MG: 20 TABLET ORAL at 16:41

## 2024-12-03 ASSESSMENT — FIBROSIS 4 INDEX: FIB4 SCORE: 1.772727272727272727

## 2024-12-03 NOTE — ED PROVIDER NOTES
ED Provider Note    CHIEF COMPLAINT  Chief Complaint   Patient presents with    Cough     Dry productive cough x2 days. Reports worse at night. Hx asthma, used her inhaler x1 this morning. Denies CP.     Shortness of Breath     X2 days with exertion.          HPI/ROS      Serenity Howe is a 74 y.o. female who presents with chief complaint of dry cough.  Patient reports cough for the last 2 days.  Cough is typically worse at night.  Patient has a longstanding history of COPD.  She has mild improvement of her symptoms with use of her albuterol inhaler.  Patient reports a mild shortness of breath.  She denies significant dyspnea on exertion, she uses a walker to ambulate and has not noticed any recent decrease in her ability to ambulate or the distance which she can go.  She denies any associated lower extremity edema.  She reports a mild sore throat.  She denies any associated fever, she does report a mild runny nose and some chills.  Patient denies any associated neck or back pain.  She denies any associated chest pain.  Symptoms feel very similar to prior episodes of COPD exacerbations.    PAST MEDICAL HISTORY   has a past medical history of Acute exacerbation of chronic obstructive pulmonary disease (COPD) ([facility]) (7/5/2024), ASTHMA, Cancer ([facility]), Gout, History of breast cancer (07/29/2015), Hypertension, Shoulder pain, right (07/29/2015), and Tremor (07/29/2015).    SURGICAL HISTORY   has a past surgical history that includes mastectomy and gyn surgery.    FAMILY HISTORY  Family History   Problem Relation Age of Onset    Hyperlipidemia Mother     No Known Problems Father     No Known Problems Sister     No Known Problems Brother     No Known Problems Daughter     No Known Problems Daughter     No Known Problems Son        SOCIAL HISTORY  Social History     Tobacco Use    Smoking status: Never    Smokeless tobacco: Never   Vaping Use    Vaping status: Never Used   Substance and Sexual Activity    Alcohol use:  "No    Drug use: No    Sexual activity: Never     Partners: Male       CURRENT MEDICATIONS  Home Medications       Reviewed by Erinn Dowd R.N. (Registered Nurse) on 12/03/24 at 1439  Med List Status: Partial     Medication Last Dose Status   acetaminophen (TYLENOL) 325 MG Tab  Active   albuterol (PROVENTIL) 2.5mg/3ml Nebu Soln solution for nebulization  Active   albuterol 108 (90 Base) MCG/ACT Aero Soln inhalation aerosol  Active   fluticasone-salmeterol (ADVAIR) 250-50 MCG/ACT AEROSOL POWDER, BREATH ACTIVATED  Active   ibuprofen (MOTRIN) 800 MG Tab  Active   valsartan (DIOVAN) 160 MG Tab  Active   verapamil ER (CALAN SR) 240 MG Tab CR  Active                  Audit from Redirected Encounters    **Home medications have not yet been reviewed for this encounter**         ALLERGIES  Allergies   Allergen Reactions    Shellfish Allergy Hives and Shortness of Breath       PHYSICAL EXAM  VITAL SIGNS: BP (!) 151/73   Pulse 88   Temp 35.9 °C (96.7 °F) (Temporal)   Resp 16   Ht 1.575 m (5' 2\")   Wt 67 kg (147 lb 11.3 oz)   SpO2 92%   BMI 27.02 kg/m²    Physical Exam  Constitutional:       Appearance: She is well-developed.   HENT:      Head: Normocephalic and atraumatic.   Eyes:      Pupils: Pupils are equal, round, and reactive to light.   Cardiovascular:      Rate and Rhythm: Normal rate and regular rhythm.   Pulmonary:      Effort: Pulmonary effort is normal. No accessory muscle usage or respiratory distress.      Comments: Diffuse wheezing and rhonchi throughout  Abdominal:      General: Bowel sounds are normal.      Palpations: Abdomen is soft. There is no mass.      Tenderness: There is no abdominal tenderness.   Musculoskeletal:         General: Normal range of motion.      Right lower leg: No edema.      Left lower leg: No edema.   Skin:     General: Skin is warm.      Capillary Refill: Capillary refill takes less than 2 seconds.   Neurological:      General: No focal deficit present.      Mental " Status: She is alert.   Psychiatric:         Mood and Affect: Mood normal. Mood is not anxious.           EKG/LABS  Results for orders placed or performed during the hospital encounter of 24   POC CoV-2, FLU A/B, RSV by PCR    Collection Time: 24  2:46 PM   Result Value Ref Range    POC Influenza A RNA, PCR Negative Negative    POC Influenza B RNA, PCR Negative Negative    POC RSV, by PCR Negative Negative    POC SARS-CoV-2, PCR NotDetected NotDetected   EKG    Collection Time: 24  2:48 PM   Result Value Ref Range    Report       [facility]    Test Date:  2024  Pt Name:    STEPHEN BETANCUR                Department: ER  MRN:        [id number]                      Room:  Gender:     Female                       Technician: [id number]  :        1950                   Requested By:ER TRIAGE PROTOCOL  Order #:    [id number]                    Reading MD: Pippa Floyd MD    Measurements  Intervals                                Axis  Rate:       89                           P:          21  MI:         150                          QRS:        34  QRSD:       90                           T:          58  QT:         349  QTc:        425    Interpretive Statements  EKG is normal sinus rhythm, normal axis normal intervals no ST changes  consistent with acute regional ischemia  Electronically Signed On 2024 15:50:13 PST by Pippa Floyd MD     CBC with Differential    Collection Time: 24  2:58 PM   Result Value Ref Range    WBC 12.2 (H) 4.8 - 10.8 K/uL    RBC 4.58 4.20 - 5.40 M/uL    Hemoglobin 14.7 12.0 - 16.0 g/dL    Hematocrit 43.1 37.0 - 47.0 %    MCV 94.1 81.4 - 97.8 fL    MCH 32.1 27.0 - 33.0 pg    MCHC 34.1 32.2 - 35.5 g/dL    RDW 48.4 35.9 - 50.0 fL    Platelet Count 251 164 - 446 K/uL    MPV 9.2 9.0 - 12.9 fL    Neutrophils-Polys 71.30 44.00 - 72.00 %    Lymphocytes 14.70 (L) 22.00 - 41.00 %    Monocytes 11.30 0.00 - 13.40 %    Eosinophils 1.70  0.00 - 6.90 %    Basophils 0.50 0.00 - 1.80 %    Immature Granulocytes 0.50 0.00 - 0.90 %    Nucleated RBC 0.00 0.00 - 0.20 /100 WBC    Neutrophils (Absolute) 8.71 (H) 1.82 - 7.42 K/uL    Lymphs (Absolute) 1.79 1.00 - 4.80 K/uL    Monos (Absolute) 1.38 (H) 0.00 - 0.85 K/uL    Eos (Absolute) 0.21 0.00 - 0.51 K/uL    Baso (Absolute) 0.06 0.00 - 0.12 K/uL    Immature Granulocytes (abs) 0.06 0.00 - 0.11 K/uL    NRBC (Absolute) 0.00 K/uL   Comp Metabolic Panel    Collection Time: 12/03/24  2:58 PM   Result Value Ref Range    Sodium 139 135 - 145 mmol/L    Potassium 3.5 (L) 3.6 - 5.5 mmol/L    Chloride 107 96 - 112 mmol/L    Co2 20 20 - 33 mmol/L    Anion Gap 12.0 7.0 - 16.0    Glucose 144 (H) 65 - 99 mg/dL    Bun 15 8 - 22 mg/dL    Creatinine 1.15 0.50 - 1.40 mg/dL    Calcium 9.9 8.5 - 10.5 mg/dL    Correct Calcium 10.3 8.5 - 10.5 mg/dL    AST(SGOT) 22 12 - 45 U/L    ALT(SGPT) 12 2 - 50 U/L    Alkaline Phosphatase 113 (H) 30 - 99 U/L    Total Bilirubin 0.3 0.1 - 1.5 mg/dL    Albumin 3.5 3.2 - 4.9 g/dL    Total Protein 7.4 6.0 - 8.2 g/dL    Globulin 3.9 (H) 1.9 - 3.5 g/dL    A-G Ratio 0.9 g/dL   proBrain Natriuretic Peptide, NT    Collection Time: 12/03/24  2:58 PM   Result Value Ref Range    NT-proBNP 230 (H) 0 - 125 pg/mL   Troponin    Collection Time: 12/03/24  2:58 PM   Result Value Ref Range    Troponin T 12 6 - 19 ng/L   ESTIMATED GFR    Collection Time: 12/03/24  2:58 PM   Result Value Ref Range    GFR (CKD-EPI) 50 (A) >60 mL/min/1.73 m 2     *Note: Due to a large number of results and/or encounters for the requested time period, some results have not been displayed. A complete set of results can be found in Results Review.       I have independently interpreted this EKG    RADIOLOGY/PROCEDURES   I have independently interpreted the diagnostic imaging associated with this visit and am waiting the final reading from the radiologist.   My preliminary interpretation is as follows: X-ray appears largely unchanged from  last chest x-ray 8/20/2024    Radiologist interpretation:  DX-CHEST-PORTABLE (1 VIEW)   Final Result      Patchy LEFT basilar opacity could be atelectasis or pneumonia            COURSE & MEDICAL DECISION MAKING    ASSESSMENT, COURSE AND PLAN  Care Narrative: Very pleasant patient here with symptoms most consistent with COPD exacerbation likely secondary to viral URI.  Patient exam is consistent with this.  Giving DuoNeb and prednisone.  EKG is very reassuring.  My suspicion of ACS the cause of symptoms is low here.  Patient x-ray fails to reveal any evidence of lobar pneumonia.  Viral testing was checked as symptoms do appear most consistent with likely COVID influenza RSV, certainly a untested viral infection is also possible.  Patient's COVID, RSV and flu test are negative.  Basic labs reassuring without significant leukocytosis.  Following breathing treatment patient is feeling much improved.  Lungs are now clear.  She remains without any associate hypoxia or increased work of breathing.  Patient home with prednisone.  Patient also requesting refills of her MDI and albuterol nebulization solution which I provided.  Patient x-ray is read as possible pneumonia however it does not appear significantly changed from x-ray on 8/28/2024.  My suspicion of bacterial pneumonia is low in this patient who is not hypoxic, without any associated fever and no focal findings on lung exam to suggest lobar pneumonia            DISPOSITION AND DISCUSSIONS      Decision tools and prescription drugs considered including, but not limited to: Antibiotics were deferred as my suspicion of lobar pneumonia is very low here    FINAL DIAGNOSIS  1. Acute exacerbation of chronic obstructive pulmonary disease (COPD) ([facility])  predniSONE (DELTASONE) 20 MG Tab    albuterol 108 (90 Base) MCG/ACT Aero Soln inhalation aerosol      2. History of asthma  albuterol (PROVENTIL) 2.5mg/3ml Nebu Soln solution for nebulization      3. Mild intermittent asthma  with acute exacerbation  albuterol (PROVENTIL) 2.5mg/3ml Nebu Soln solution for nebulization

## 2024-12-03 NOTE — ED TRIAGE NOTES
Chief Complaint   Patient presents with    Cough     Dry productive cough x2 days. Reports worse at night. Hx asthma, used her inhaler x1 this morning. Denies CP.     Shortness of Breath     X2 days with exertion.       Patient ambulatory to triage for above complaint. Patient A&Ox4, GCS 15, patient speaking in full sentences. Equal and unlabored respirations. Patient educated on triage process and encouraged to notify staff if condition worsens. Appropriate protocols ordered. Patient returned to the lobby in stable condition.

## 2024-12-04 NOTE — ED NOTES
Pt medicated per MAR.   Pt provided discharge instructions, and prescription. Pt verbalized understanding of all instructions. Pt ambulatory to lobby w/ steady gait.

## 2024-12-04 NOTE — ED NOTES
Pt ambulated to Y[facility] from [facility] w/ steady gait. On monitor, call light in reach. Chart up for ERP.

## 2024-12-10 ENCOUNTER — APPOINTMENT (OUTPATIENT)
Dept: RADIOLOGY | Facility: MEDICAL CENTER | Age: 74
End: 2024-12-10
Attending: EMERGENCY MEDICINE
Payer: COMMERCIAL

## 2024-12-10 ENCOUNTER — HOSPITAL ENCOUNTER (EMERGENCY)
Facility: MEDICAL CENTER | Age: 74
End: 2024-12-10
Attending: EMERGENCY MEDICINE
Payer: COMMERCIAL

## 2024-12-10 VITALS
OXYGEN SATURATION: 94 % | BODY MASS INDEX: 27.05 KG/M2 | HEART RATE: 74 BPM | WEIGHT: 147 LBS | TEMPERATURE: 97.3 F | HEIGHT: 62 IN | RESPIRATION RATE: 17 BRPM | SYSTOLIC BLOOD PRESSURE: 197 MMHG | DIASTOLIC BLOOD PRESSURE: 93 MMHG

## 2024-12-10 DIAGNOSIS — R51.9 ACUTE NONINTRACTABLE HEADACHE, UNSPECIFIED HEADACHE TYPE: ICD-10-CM

## 2024-12-10 LAB — EKG IMPRESSION: NORMAL

## 2024-12-10 PROCEDURE — 99284 EMERGENCY DEPT VISIT MOD MDM: CPT

## 2024-12-10 PROCEDURE — 93005 ELECTROCARDIOGRAM TRACING: CPT | Mod: TC | Performed by: EMERGENCY MEDICINE

## 2024-12-10 PROCEDURE — A9270 NON-COVERED ITEM OR SERVICE: HCPCS | Performed by: EMERGENCY MEDICINE

## 2024-12-10 PROCEDURE — 700102 HCHG RX REV CODE 250 W/ 637 OVERRIDE(OP): Performed by: EMERGENCY MEDICINE

## 2024-12-10 PROCEDURE — 70450 CT HEAD/BRAIN W/O DYE: CPT

## 2024-12-10 PROCEDURE — 93005 ELECTROCARDIOGRAM TRACING: CPT | Mod: TC

## 2024-12-10 RX ORDER — PROCHLORPERAZINE MALEATE 10 MG
10 TABLET ORAL ONCE
Status: COMPLETED | OUTPATIENT
Start: 2024-12-10 | End: 2024-12-10

## 2024-12-10 RX ORDER — VERAPAMIL HYDROCHLORIDE 120 MG/1
120 CAPSULE, EXTENDED RELEASE ORAL ONCE
Status: COMPLETED | OUTPATIENT
Start: 2024-12-10 | End: 2024-12-10

## 2024-12-10 RX ORDER — DIPHENHYDRAMINE HCL 25 MG
25 TABLET ORAL ONCE
Status: COMPLETED | OUTPATIENT
Start: 2024-12-10 | End: 2024-12-10

## 2024-12-10 RX ADMIN — PROCHLORPERAZINE MALEATE 10 MG: 10 TABLET ORAL at 12:07

## 2024-12-10 RX ADMIN — DIPHENHYDRAMINE HYDROCHLORIDE 25 MG: 25 TABLET ORAL at 12:07

## 2024-12-10 RX ADMIN — VERAPAMIL HYDROCHLORIDE 120 MG: 120 CAPSULE, DELAYED RELEASE PELLETS ORAL at 15:19

## 2024-12-10 ASSESSMENT — FIBROSIS 4 INDEX: FIB4 SCORE: 1.9

## 2024-12-10 NOTE — ED TRIAGE NOTES
"Chief Complaint   Patient presents with    Headache     BIBA from home for c/o headache x1 hour.  Pt states her \"head feels full\".  Denies prior headaches like this, denies recent trauma.  Pt received 1g tylenol and 600 ibuprofen from EMS en en route.  Neurologically intact.      BP (!) 178/98   Pulse 78   Temp 36.3 °C (97.3 °F) (Temporal)   Resp 20   Ht 1.575 m (5' 2\")   Wt 66.7 kg (147 lb)   SpO2 94%   BMI 26.89 kg/m²     Pt connected to monitor.  Pt GCS 15.  Pt unsure how to describe headache. EKG completed upon arrival.   "

## 2024-12-10 NOTE — ED NOTES
Bedside report to RANDY Santos.  Pt ambulatory to bathroom with steady gait and standby assist.  Pt brought back to room and connected to monitor, on room air.  Call light within reach.

## 2024-12-10 NOTE — ED PROVIDER NOTES
"ER Provider Note    Scribed for Ramon Palomino M.d. by Kimber Trammell. 12/10/2024  11:08 AM    Primary Care Provider: Jessie Flores M.D.    CHIEF COMPLAINT  Chief Complaint   Patient presents with    Headache     BIBA from home for c/o headache x1 hour.  Pt states her \"head feels full\".  Denies prior headaches like this, denies recent trauma.  Pt received 1g tylenol and 600 ibuprofen from EMS en en route.  Neurologically intact.      EXTERNAL RECORDS REVIEWED  Care everywhere Records show 13 emergency department visits this year. Last admitted in August for tenosynovitis.    HPI/ROS  LIMITATION TO HISTORY   Caveat: very vague historian, she has difficulty describing her symptoms    OUTSIDE HISTORIAN(S):  None    Serenity Howe is a 74 y.o. female who presents to the ED by EMS complaining of head discomfort onset one hour ago. Patient reports headache. She describes this symptoms to be pressure-like, and radiates to her right neck. She feels as if her head is \"full\". She reports a slight cough. Denies trauma, tingly, numbness, weakness, or falls. Denies vomiting, nausea, fever, or chills. Patient is improved from the tylenol 1g and ibuprofen 600mg given en route by EMS. She has a history of hypertension.    PAST MEDICAL HISTORY  Past Medical History:   Diagnosis Date    Acute exacerbation of chronic obstructive pulmonary disease (COPD) ([facility]) 2024    ASTHMA     Cancer ([facility])     Gout     History of breast cancer 2015    Hypertension     Shoulder pain, right 2015    Tremor 2015       SURGICAL HISTORY  Past Surgical History:   Procedure Laterality Date    GYN SURGERY       x1    MASTECTOMY      right        FAMILY HISTORY  Family History   Problem Relation Age of Onset    Hyperlipidemia Mother     No Known Problems Father     No Known Problems Sister     No Known Problems Brother     No Known Problems Daughter     No Known Problems Daughter     No Known Problems Son  " "      SOCIAL HISTORY   reports that she has never smoked. She has never used smokeless tobacco. She reports that she does not drink alcohol and does not use drugs.    CURRENT MEDICATIONS  Previous Medications    ACETAMINOPHEN (TYLENOL) 325 MG TAB    Take 650 mg by mouth every 6 hours as needed for Mild Pain.    ALBUTEROL (PROVENTIL) 2.5MG/3ML NEBU SOLN SOLUTION FOR NEBULIZATION    Take 3 mL by nebulization every four hours as needed for Shortness of Breath.    ALBUTEROL 108 (90 BASE) MCG/ACT AERO SOLN INHALATION AEROSOL    Inhale 1-2 Puffs every four hours as needed for Shortness of Breath.    FLUTICASONE-SALMETEROL (ADVAIR) 250-50 MCG/ACT AEROSOL POWDER, BREATH ACTIVATED    Inhale 1 Puff 2 times a day.    IBUPROFEN (MOTRIN) 800 MG TAB    Take 1 Tablet by mouth every 8 hours as needed for Mild Pain.    VALSARTAN (DIOVAN) 160 MG TAB    Take 1 Tablet by mouth every day.    VERAPAMIL ER (CALAN SR) 240 MG TAB CR    Take 240 mg by mouth every evening.       ALLERGIES  Shellfish allergy    PHYSICAL EXAM  VITAL SIGNS: BP (!) 178/98   Pulse 78   Temp 36.3 °C (97.3 °F) (Temporal)   Resp 20   Ht 1.575 m (5' 2\")   Wt 66.7 kg (147 lb)   SpO2 94%   BMI 26.89 kg/m²   Pulse ox interpretation: I interpret this pulse ox as normal.  Constitutional: Alert in no apparent distress. She has difficulty describing her symptoms.  HENT: No signs of trauma, Bilateral external ears normal, Nose normal.   Eyes: Conjunctiva normal, Non-icteric.   Neck: Normal range of motion, Supple, No stridor.   Lymphatic: No lymphadenopathy noted.   Cardiovascular: Regular rate and rhythm, no murmurs.   Thorax & Lungs: Normal breath sounds, No respiratory distress, No wheezing  Abdomen: Bowel sounds normal, Soft, No tenderness, No masses, No pulsatile masses. No peritoneal signs.  Skin: Warm, Dry, No erythema, No rash.   Back: No midline bony tenderness.   Extremities: Intact distal pulses, No edema, No cyanosis.  Musculoskeletal: Good range of motion " in all major joints. No or major deformities noted.   Neurologic: Alert , Normal motor function, Normal sensory function, No focal deficits noted.   Psychiatric: Affect normal, Judgment normal, Mood normal.     DIAGNOSTIC STUDIES    EKG:   I have independently interpreted this EKG  Results for orders placed or performed during the hospital encounter of 12/10/24   EKG   Result Value Ref Range    Report       [facility] Emergency Dept.    Test Date:  2024-12-10  Pt Name:    STEPHEN BETANCUR                Department: ER  MRN:        [id number]                      Room:       [facility]  Gender:     Female                       Technician: [id number]  :        1950                   Requested By:ER TRIAGE PROTOCOL  Order #:    [id number]                    Reading MD:    Measurements  Intervals                                Axis  Rate:       75                           P:          21  NJ:         151                          QRS:        24  QRSD:       87                           T:          59  QT:         401  QTc:        448    Interpretive Statements  Sinus rhythm  Compared to ECG 2024 14:48:38  ST (T wave) deviation no longer present  Possible ischemia no longer present           Radiology:   The attending emergency physician has independently interpreted the diagnostic imaging associated with this visit and am waiting the final reading from the radiologist.     Preliminary interpretation is a follows: No bleed.    Radiologist interpretation:   CT-HEAD W/O   Final Result         1. No acute intracranial abnormality. No evidence of acute intracranial hemorrhage or mass lesion.                               COURSE & MEDICAL DECISION MAKING     INITIAL ASSESSMENT, COURSE AND PLAN  Care Narrative:       11:08 AM Patient presents to the ED with headache, though she describes this as her head feeling full, and denies pain per se.  Per triage protocol, EKG was ordered.  Unremarkable.  Unclear why  was ordered.  Patient denies chest pain or shortness of breath.  Patient evaluated at bedside and discussed plan of care, including ordering imaging for evaluation. Patient will be treated with Benadryl 25 mg PO and Compazine 10 mg PO.  She received Tylenol and ibuprofen and route.  Ordered for CT-Head w/o to evaluate her symptoms. Differential diagnoses include but not limited to: Favors tension headache, but sudden onset of headache with no significant headache history raises the possibility of SAH.  She has no focal deficits, and is a limited historian, but I do not believe this is hypertensive encephalopathy.  She has a known history of chronic hypertension.    1:39 PM - I reevaluated the patient at bedside. The patient informs me they feel improved following medication administration. I discussed the patient's diagnostic study results which show no sign of blood, tumor, or any other abnormality. I discussed plan for discharge and follow up as outlined below. She can take tylenol or ibuprofen as needed for headache. The patient is stable for discharge at this time and will return for any new or worsening symptoms. She is to follow up with her primary care provider. Patient verbalizes understanding and support with my plan for discharge.     1:53 PM - Patient with gradually increasing hypertension throughout her stay.  She remains asymptomatic.  Given a half dose of her home verapamil. Advised to schedule prompt follow up with primary care regarding possibly poor blood pressure control.     2:30 PM blood pressure stable.  Slight decrease.  Patient remains asymptomatic.     ADDITIONAL PROBLEM MANAGED  Poorly controlled hypertension    DISPOSITION AND DISCUSSIONS  I have discussed management of the patient with the following physicians and JOSSELIN's:  None    Discussion of management with other [facility] or appropriate source(s): None     Escalation of care considered, and ultimately not performed: acute inpatient care  management, however at this time, the patient is most appropriate for outpatient management.    Decision tools and prescription drugs considered including, but not limited to: Medication modification considered, but patient showed monitor her blood pressure and discussed with her primary care provider before making changes to her regimen .  No signs of hypertensive emergency.    The patient will return for new or worsening symptoms and is stable at the time of discharge.    The patient is referred to a primary physician for blood pressure management, diabetic screening, and for all other preventative health concerns.    DISPOSITION:  Patient will be discharged home in stable condition.    FOLLOW UP:  Jessie Flores M.D.  [address] [id number] [phone]    Schedule an appointment as soon as possible for a visit       [facility], Emergency Dept  [facility] [facility] [address] [phone]    If symptoms worsen      OUTPATIENT MEDICATIONS:  New Prescriptions    No medications on file         FINAL DIAGNOSIS  1. Acute nonintractable headache, unspecified headache type          I, Kimber Trammell ([place]), am scribing for, and in the presence of, Ramon Palomino M.D..    Electronically signed by: Kimber Trammell ([place]), 12/10/2024    IRamon M.D. personally performed the services described in this documentation, as scribed by Kimber Trammell in my presence, and it is both accurate and complete.

## 2024-12-10 NOTE — DISCHARGE INSTRUCTIONS
Your scan was normal.  There is no sign of blood or tumor or any other abnormality.  Please take Tylenol or ibuprofen as needed for headache.  Schedule follow-up with your primary care provider.  Return here for severe or worsening symptoms.

## 2024-12-10 NOTE — ED NOTES
Assist RN:     Discharge teaching and paperwork provided regarding headache and all questions/concerns answered. VSS,  assessment stable. Given information regarding home care and reasons to follow up with ED or primary MD. Patient discharged to the care of self and wheeled out of the ED to have son pick her up.

## 2025-01-06 ENCOUNTER — APPOINTMENT (OUTPATIENT)
Dept: RADIOLOGY | Facility: MEDICAL CENTER | Age: 75
End: 2025-01-06
Attending: EMERGENCY MEDICINE
Payer: MEDICARE

## 2025-01-06 ENCOUNTER — HOSPITAL ENCOUNTER (EMERGENCY)
Facility: MEDICAL CENTER | Age: 75
End: 2025-01-06
Attending: EMERGENCY MEDICINE
Payer: MEDICARE

## 2025-01-06 ENCOUNTER — PHARMACY VISIT (OUTPATIENT)
Dept: PHARMACY | Facility: MEDICAL CENTER | Age: 75
End: 2025-01-06
Payer: COMMERCIAL

## 2025-01-06 VITALS
HEIGHT: 62 IN | HEART RATE: 83 BPM | SYSTOLIC BLOOD PRESSURE: 163 MMHG | RESPIRATION RATE: 18 BRPM | TEMPERATURE: 96.7 F | DIASTOLIC BLOOD PRESSURE: 82 MMHG | BODY MASS INDEX: 27.05 KG/M2 | OXYGEN SATURATION: 94 % | WEIGHT: 147 LBS

## 2025-01-06 DIAGNOSIS — M25.421 ELBOW SWELLING, RIGHT: ICD-10-CM

## 2025-01-06 DIAGNOSIS — J22 LOWER RESPIRATORY INFECTION: ICD-10-CM

## 2025-01-06 PROCEDURE — 73060 X-RAY EXAM OF HUMERUS: CPT | Mod: RT

## 2025-01-06 PROCEDURE — 71046 X-RAY EXAM CHEST 2 VIEWS: CPT

## 2025-01-06 PROCEDURE — 73080 X-RAY EXAM OF ELBOW: CPT | Mod: RT

## 2025-01-06 PROCEDURE — 700102 HCHG RX REV CODE 250 W/ 637 OVERRIDE(OP): Performed by: EMERGENCY MEDICINE

## 2025-01-06 PROCEDURE — 99284 EMERGENCY DEPT VISIT MOD MDM: CPT

## 2025-01-06 PROCEDURE — 73030 X-RAY EXAM OF SHOULDER: CPT | Mod: RT

## 2025-01-06 PROCEDURE — A9270 NON-COVERED ITEM OR SERVICE: HCPCS | Performed by: EMERGENCY MEDICINE

## 2025-01-06 PROCEDURE — RXMED WILLOW AMBULATORY MEDICATION CHARGE: Performed by: EMERGENCY MEDICINE

## 2025-01-06 RX ORDER — AZITHROMYCIN 250 MG/1
500 TABLET, FILM COATED ORAL ONCE
Status: COMPLETED | OUTPATIENT
Start: 2025-01-06 | End: 2025-01-06

## 2025-01-06 RX ORDER — OXYCODONE AND ACETAMINOPHEN 5; 325 MG/1; MG/1
1 TABLET ORAL ONCE
Status: COMPLETED | OUTPATIENT
Start: 2025-01-06 | End: 2025-01-06

## 2025-01-06 RX ORDER — OXYCODONE AND ACETAMINOPHEN 5; 325 MG/1; MG/1
1 TABLET ORAL EVERY 6 HOURS PRN
Qty: 7 TABLET | Refills: 0 | Status: SHIPPED | OUTPATIENT
Start: 2025-01-06 | End: 2025-01-09

## 2025-01-06 RX ORDER — AZITHROMYCIN 250 MG/1
250 TABLET, FILM COATED ORAL DAILY
Qty: 4 TABLET | Refills: 0 | Status: ACTIVE | OUTPATIENT
Start: 2025-01-06 | End: 2025-01-10

## 2025-01-06 RX ADMIN — OXYCODONE HYDROCHLORIDE AND ACETAMINOPHEN 1 TABLET: 5; 325 TABLET ORAL at 10:20

## 2025-01-06 RX ADMIN — AZITHROMYCIN DIHYDRATE 500 MG: 250 TABLET ORAL at 11:07

## 2025-01-06 ASSESSMENT — FIBROSIS 4 INDEX: FIB4 SCORE: 1.9

## 2025-01-06 NOTE — ED PROVIDER NOTES
CHIEF COMPLAINT  Chief Complaint   Patient presents with    Arm Pain     X 3 days        LIMITATION TO HISTORY   None    HPI    Serenity Howe is a 74 y.o. female for evaluation of arm pain. The patient explains 3 days ago she noticed sudden right sided arm pain.    It is localized in the elbow and the shoulder.  And it radiates up to the just trapezius.  Duration has been now for 3 days.  Is atraumatic.  There is no associated weakness or numbness.  She is hard to lift up because the pain but she is not specifically weak.    She denies any numbness or tingling but notes to have pain on her right side fingers. The patient did take ibuprofen and tylenol with no relief.  Concerns of symptoms the patient presents to the ED for further evaluation. No additional pain or symptoms noted at this time.     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  She also has a history of gouty arthritis    REVIEW OF SYSTEMS  Noted    PAST MEDICAL HISTORY  Past Medical History:   Diagnosis Date    Acute exacerbation of chronic obstructive pulmonary disease (COPD) ([facility]) 7/5/2024    ASTHMA     Cancer ([facility])     Gout     History of breast cancer 07/29/2015    Hypertension     Shoulder pain, right 07/29/2015    Tremor 07/29/2015       FAMILY HISTORY  Family History   Problem Relation Age of Onset    Hyperlipidemia Mother     No Known Problems Father     No Known Problems Sister     No Known Problems Brother     No Known Problems Daughter     No Known Problems Daughter     No Known Problems Son        SOCIAL HISTORY  Social History     Tobacco Use    Smoking status: Never    Smokeless tobacco: Never   Vaping Use    Vaping status: Never Used   Substance Use Topics    Alcohol use: No    Drug use: No     Social History     Substance and Sexual Activity   Drug Use No       SURGICAL  "HISTORY  Past Surgical History:   Procedure Laterality Date    GYN SURGERY       x1    MASTECTOMY      right        CURRENT MEDICATIONS  No current facility-administered medications for this encounter.    Current Outpatient Medications:     azithromycin (ZITHROMAX) 250 MG Tab, Take 1 Tablet by mouth every day for 4 days., Disp: 4 Tablet, Rfl: 0    oxyCODONE-acetaminophen (PERCOCET) 5-325 MG Tab, Take 1 Tablet by mouth every 6 hours as needed for Moderate Pain (pain) for up to 3 days., Disp: 7 Tablet, Rfl: 0    albuterol (PROVENTIL) 2.5mg/3ml Nebu Soln solution for nebulization, Take 3 mL by nebulization every four hours as needed for Shortness of Breath., Disp: 75 mL, Rfl: 0    albuterol 108 (90 Base) MCG/ACT Aero Soln inhalation aerosol, Inhale 1-2 Puffs every four hours as needed for Shortness of Breath., Disp: 8.5 g, Rfl: 0    ibuprofen (MOTRIN) 800 MG Tab, Take 1 Tablet by mouth every 8 hours as needed for Mild Pain., Disp: 15 Tablet, Rfl: 0    valsartan (DIOVAN) 160 MG Tab, Take 1 Tablet by mouth every day., Disp: 30 Tablet, Rfl: 1    acetaminophen (TYLENOL) 325 MG Tab, Take 650 mg by mouth every 6 hours as needed for Mild Pain., Disp: , Rfl:     fluticasone-salmeterol (ADVAIR) 250-50 MCG/ACT AEROSOL POWDER, BREATH ACTIVATED, Inhale 1 Puff 2 times a day., Disp: , Rfl:     verapamil ER (CALAN SR) 240 MG Tab CR, Take 240 mg by mouth every evening., Disp: , Rfl:     ALLERGIES  Allergies   Allergen Reactions    Shellfish Allergy Hives and Shortness of Breath       PHYSICAL EXAM  VITAL SIGNS: BP (!) 187/99   Pulse 94   Temp 35.9 °C (96.7 °F) (Temporal)   Resp 18   Ht 1.575 m (5' 2\")   Wt 66.7 kg (147 lb)   SpO2 94%   BMI 26.89 kg/m²   Reviewed and elevated blood pressure appreciated.  Patient is noted to take elevated blood pressure medicine  Constitutional: Well developed, Well nourished.  HENT: Normocephalic, atraumatic, bilateral external ears normal, No intraoral erythema, edema, exudate  Eyes: " PERRLA, conjunctiva pink, no scleral icterus.   Cardiovascular: Regular rate and rhythm. No murmurs, rubs or gallops.  No dependent edema or calf tenderness. Good radial pulse.   Respiratory: Slight wheezes, no rales. Sight rhonchi right lower lobe that does not clear with coughing.  Abdominal:  Abdomen soft, non-tender, non distended. No rebound, or guarding.    Skin: No erythema, no rash. No wounds or bruising.  Genitourinary: No costovertebral angle tenderness.   Musculoskeletal: no deformities. Good movement of the right wrist. Can supinate, flex, and extend right elbow.  It is with pain.  There is effusion swelling noted flex AB duct with pain to shoulder, Point tenderness to the deltoid. Point tenderness and muscle spasms to the posterior trapeziums, miminmim C spine tenderness, no step off.   Neurologic: Alert, no facial droop noted. All extra ocular muscles intact. Moves all extremities with out weakness noted. Positive OK sign. Good sensation to the median ulnar.   Psychiatric: Affect normal, Judgment normal, Mood normal.       MEDICAL DECISION MAKING:  PROBLEMS EVALUATED THIS VISIT:  Right arm pain.  Duration 3 days atraumatic.  Shoulder to elbow and trapezius.  Patient has no fever here neurologic tach and slight elbow effusion.  History of osteoarthritis and gout.  Patient neurovasc intact distally.  Cough.  3 days.  Yellow productive sputum.  History of asthma.  Patient and not be hypoxic some slight wheezes noted with some rhonchi noted in the lower lobe that did not clear with coughing      Medical Decision Making the effusion appears most likely reactive.  Patient has no fever here does not appear to be extremely hot warm with erythema to suggest an infected joint.  This would cause significant pain in the elbow area.  In addition the patient appears to have unknown clearing rhonchi in the lobe suggestive of pneumonia.  She is not hypoxic.  No current signs of sepsis at this time.    PLAN:  9:18 AM -  Patient seen and examined at bedside. Discussed plan of care, including obtaining imagining for further evaluation. Patient agrees to the plan of care. The patient will be medicated with Percocet. Ordered for DX-chest. DX-shoulder, and DX-Humerus to evaluate her symptoms.      10:18 AM- Patient was reevaluated at bedside. Imagining is reassuring at this time and was discusses with patient. At bedside the patient now complains of elbow pain. Exam shows swelling with warmth and slight swelling consistent with an effusion and secondary to arthritis. Plan is to obtain elbow X-ray for further evaluation.     11:47 AM- Patient was reevaluated at bedside. Imagining is reassuring at this time. The patient was given the opportunity to ask questions at this time. The patient will be referred to orthopedics. The patient will be discharged with Zithromax 250 mg, Percocet 5-325 mg and should return if symptoms worsen or if new symptoms arise. The patient understands and agrees to plan.      RISK:  Medium    Diagnostic tests and prescription drugs considered including, but not limited to: Select: Zithromax 250 mg, Percocet 5-325 mg.    Escalation of care considered, and ultimately not performed: Aspiration at this point was not performed as patient has no fever and concern at this time.     Barriers to care at this time, including but not limited to: Select: None.     RESULTS    RADIOLOGY      Radiologist interpretation:   DX-ELBOW-COMPLETE 3+ RIGHT   Final Result      No acute osseous abnormality.      Possible elbow joint effusion.      DX-CHEST-2 VIEWS   Final Result      No acute cardiopulmonary abnormality.      DX-HUMERUS 2+ RIGHT   Final Result      No acute osseous abnormality.      DX-SHOULDER 2+ RIGHT   Final Result      Mild degenerative changes without acute osseous abnormality.            ED COURSE:    ED Observation Status? No   No noted need for observation for developing issue    INTERVENTIONS BY ME:  Medications    oxyCODONE-acetaminophen (Percocet) 5-325 MG per tablet 1 Tablet (1 Tablet Oral Given 1/6/25 1020)   azithromycin (Zithromax) tablet 500 mg (500 mg Oral Given 1/6/25 1107)     In prescribing controlled substances to this patient, I certify that I have obtained and reviewed the medical history of Serenity Howe. I have also made a good rosario effort to obtain applicable records from other providers who have treated the patient and records did not demonstrate any increased risk of substance abuse that would prevent me from prescribing controlled substances.     I have conducted a physical exam and documented it. I have reviewed Ms. Howe’s prescription history as maintained by the [place] Prescription Monitoring Program.     I have assessed the patient’s risk for abuse, dependency, and addiction using the validated Opioid Risk Tool available at https://www.mdcalc.com/knntpp-eopo-ffhm-ort-narcotic-abuse.     Given the above, I believe the benefits of controlled substance therapy outweigh the risks. The reasons for prescribing controlled substances include non-narcotic, oral analgesic alternatives have been inadequate for pain control. Accordingly, I have discussed the risk and benefits, treatment plan, and alternative therapies with the patient.      CONSULTANTS/OTHER GROUPS CONTACTED  None    FINAL DISPO PLAN   New Prescriptions    AZITHROMYCIN (ZITHROMAX) 250 MG TAB    Take 1 Tablet by mouth every day for 4 days.    OXYCODONE-ACETAMINOPHEN (PERCOCET) 5-325 MG TAB    Take 1 Tablet by mouth every 6 hours as needed for Moderate Pain (pain) for up to 3 days.     Followup:  [facility], Emergency Dept  [address] [address] [phone]  Go to   If symptoms worsen      CONDITION: Stable.     FINAL IMPRESSION  1. Elbow swelling, right    2. Lower respiratory infection    3.  Right arm pain  I, Nitesh Karimi (Scribe), am scribing for, and in the presence of, Teto Sosa  *.    Electronically signed by: Nitesh Karimi (Scribe), 1/6/2025    ITeto, * personally performed the services described in this documentation, as scribed by Nitesh Karimi in my presence, and it is both accurate and complete.

## 2025-01-06 NOTE — ED NOTES
Patient resting in bed watching TV. Reports pain improved after the pain medication. Discussed POC. Denied further needs at this time.

## 2025-01-06 NOTE — ED TRIAGE NOTES
"Chief Complaint   Patient presents with    Arm Pain     X 3 days      Pt states she has had R arm pain for 3 days, pt states it is painful to the touch and swollen and has a hard time using it. Pt has ROM and sensation intact. Denies numbness or tingling. PT states she has tried to take ibuprofen and tylenol with no relief. Patient Ax0x4, ambulatory to triage. Pt educated on wait times and triage process.     BP (!) 187/99   Pulse 94   Temp 35.9 °C (96.7 °F) (Temporal)   Resp 18   Ht 1.575 m (5' 2\")   Wt 66.7 kg (147 lb)   SpO2 94%   BMI 26.89 kg/m²     "                 Counseling given: Not Answered      Vital Signs (Current): There were no vitals filed for this visit.                                           BP Readings from Last 3 Encounters:   09/06/24 120/78 (91%, Z = 1.34 /  94%, Z = 1.55)*   08/06/24 108/78 (59%, Z = 0.23 /  94%, Z = 1.55)*   03/31/18 98/74 (89%, Z = 1.23 /  98%, Z = 2.05)*     *BP percentiles are based on the 2017 AAP Clinical Practice Guideline for girls       NPO Status:                                                                                 BMI:   Wt Readings from Last 3 Encounters:   09/18/24 92.4 kg (203 lb 9.6 oz) (>99%, Z= 2.56)*   09/06/24 92.1 kg (203 lb) (>99%, Z= 2.56)*   08/06/24 90.3 kg (199 lb) (>99%, Z= 2.52)*     * Growth percentiles are based on [place] (Girls, 2-20 Years) data.     There is no height or weight on file to calculate BMI.    CBC:   Lab Results   Component Value Date/Time    WBC 9.9 08/06/2024 12:17 PM    RBC 4.64 08/06/2024 12:17 PM    HGB 13.3 08/06/2024 12:17 PM    HCT 40.5 08/06/2024 12:17 PM    MCV 87.3 08/06/2024 12:17 PM    RDW 12.1 08/06/2024 12:17 PM     08/06/2024 12:17 PM       CMP:   Lab Results   Component Value Date/Time     08/06/2024 12:17 PM    K 4.2 08/06/2024 12:17 PM     08/06/2024 12:17 PM    CO2 28 08/06/2024 12:17 PM    BUN 11 08/06/2024 12:17 PM    CREATININE 0.5 08/06/2024 12:17 PM    LABGLOM Can not be calculated 08/06/2024 12:17 PM    GLUCOSE 127 08/06/2024 12:17 PM    CALCIUM 9.7 08/06/2024 12:17 PM    BILITOT 0.3 08/06/2024 12:17 PM    ALKPHOS 266 08/06/2024 12:17 PM    AST 27 08/06/2024 12:17 PM    ALT 27 08/06/2024 12:17 PM       POC Tests: No results for input(s): \"POCGLU\", \"POCNA\", \"POCK\", \"POCCL\", \"POCBUN\", \"POCHEMO\", \"POCHCT\" in the last 72 hours.    Coags: No results found for: \"PROTIME\", \"INR\", \"APTT\"    HCG (If Applicable): No results found for: \"PREGTESTUR\", \"PREGSERUM\", \"HCG\", \"HCGQUANT\"     ABGs: No results found for: \"PHART\", \"PO2ART\", \"BAJ4FGM\",

## 2025-01-06 NOTE — ED NOTES
Bedside report received from off going RN/tech: Rick, assumed care of patient.  POC discussed with patient. Call light within reach, all needs addressed at this time.       Fall risk interventions in place: Patient's personal possessions are with in their safe reach, Keep floor surfaces clean and dry, and Accompanied to restroom (all applicable per [place] Fall risk assessment)   Continuous monitoring: Pulse Ox or Blood Pressure  IVF/IV medications: Not Applicable   Oxygen: Room Air  Bedside sitter: Not Applicable   Isolation: Not Applicable

## 2025-01-06 NOTE — ED NOTES
Pt discharged to home. Pt was given follow up instructions and prescriptions for Azithromycin and Percocet. Pt verbalized understanding of all instructions for discharge and is ambulatory out of ED with steady gait with assistance from her personal walker. AOx4

## 2025-01-06 NOTE — ED NOTES
Sling applied to patients right arm without incident. CMS intact before and after sling application.

## 2025-01-21 ENCOUNTER — PHARMACY VISIT (OUTPATIENT)
Dept: PHARMACY | Facility: MEDICAL CENTER | Age: 75
End: 2025-01-21
Payer: COMMERCIAL

## 2025-01-21 ENCOUNTER — HOSPITAL ENCOUNTER (EMERGENCY)
Facility: MEDICAL CENTER | Age: 75
End: 2025-01-21
Attending: EMERGENCY MEDICINE
Payer: MEDICARE

## 2025-01-21 ENCOUNTER — APPOINTMENT (OUTPATIENT)
Dept: RADIOLOGY | Facility: MEDICAL CENTER | Age: 75
End: 2025-01-21
Attending: EMERGENCY MEDICINE
Payer: MEDICARE

## 2025-01-21 VITALS
RESPIRATION RATE: 20 BRPM | SYSTOLIC BLOOD PRESSURE: 182 MMHG | BODY MASS INDEX: 26.33 KG/M2 | TEMPERATURE: 97.6 F | WEIGHT: 143.08 LBS | DIASTOLIC BLOOD PRESSURE: 98 MMHG | HEIGHT: 62 IN | HEART RATE: 87 BPM | OXYGEN SATURATION: 95 %

## 2025-01-21 DIAGNOSIS — N30.00 ACUTE CYSTITIS WITHOUT HEMATURIA: ICD-10-CM

## 2025-01-21 DIAGNOSIS — M54.12 CERVICAL RADICULOPATHY: ICD-10-CM

## 2025-01-21 LAB
APPEARANCE UR: ABNORMAL
BACTERIA #/AREA URNS HPF: ABNORMAL /HPF
BILIRUB UR QL STRIP.AUTO: NEGATIVE
CASTS URNS QL MICRO: ABNORMAL /LPF (ref 0–2)
COLOR UR: ABNORMAL
EPITHELIAL CELLS 1715: ABNORMAL /HPF (ref 0–5)
GLUCOSE UR STRIP.AUTO-MCNC: NEGATIVE MG/DL
KETONES UR STRIP.AUTO-MCNC: NEGATIVE MG/DL
LEUKOCYTE ESTERASE UR QL STRIP.AUTO: ABNORMAL
MICRO URNS: ABNORMAL
NITRITE UR QL STRIP.AUTO: NEGATIVE
PH UR STRIP.AUTO: 6.5 [PH] (ref 5–8)
PROT UR QL STRIP: NEGATIVE MG/DL
RBC # URNS HPF: >100 /HPF (ref 0–2)
RBC UR QL AUTO: ABNORMAL
SP GR UR STRIP.AUTO: 1.01
UROBILINOGEN UR STRIP.AUTO-MCNC: 1 EU/DL
WBC #/AREA URNS HPF: ABNORMAL /HPF

## 2025-01-21 PROCEDURE — 700102 HCHG RX REV CODE 250 W/ 637 OVERRIDE(OP): Performed by: EMERGENCY MEDICINE

## 2025-01-21 PROCEDURE — 81001 URINALYSIS AUTO W/SCOPE: CPT

## 2025-01-21 PROCEDURE — 99284 EMERGENCY DEPT VISIT MOD MDM: CPT

## 2025-01-21 PROCEDURE — A9270 NON-COVERED ITEM OR SERVICE: HCPCS | Performed by: EMERGENCY MEDICINE

## 2025-01-21 PROCEDURE — 72040 X-RAY EXAM NECK SPINE 2-3 VW: CPT

## 2025-01-21 PROCEDURE — RXMED WILLOW AMBULATORY MEDICATION CHARGE: Performed by: EMERGENCY MEDICINE

## 2025-01-21 RX ORDER — SULFAMETHOXAZOLE AND TRIMETHOPRIM 800; 160 MG/1; MG/1
1 TABLET ORAL 2 TIMES DAILY
Qty: 6 TABLET | Refills: 0 | Status: ACTIVE | OUTPATIENT
Start: 2025-01-21

## 2025-01-21 RX ORDER — SULFAMETHOXAZOLE AND TRIMETHOPRIM 800; 160 MG/1; MG/1
1 TABLET ORAL 2 TIMES DAILY
Qty: 10 TABLET | Refills: 0 | Status: SHIPPED | OUTPATIENT
Start: 2025-01-21 | End: 2025-01-21

## 2025-01-21 RX ORDER — METHYLPREDNISOLONE 4 MG/1
TABLET ORAL
Qty: 21 TABLET | Refills: 0 | Status: SHIPPED | OUTPATIENT
Start: 2025-01-21

## 2025-01-21 RX ORDER — SULFAMETHOXAZOLE AND TRIMETHOPRIM 800; 160 MG/1; MG/1
1 TABLET ORAL ONCE
Status: COMPLETED | OUTPATIENT
Start: 2025-01-21 | End: 2025-01-21

## 2025-01-21 RX ADMIN — SULFAMETHOXAZOLE AND TRIMETHOPRIM 1 TABLET: 800; 160 TABLET ORAL at 11:32

## 2025-01-21 ASSESSMENT — FIBROSIS 4 INDEX: FIB4 SCORE: 1.58

## 2025-01-21 NOTE — ED NOTES
Discharge instructions provided. Patient to follow with PCP as needed. Discussed to return to ER if symptoms worsen. Patient verbalized understanding. Pt ambulated to lobby to wait for ride

## 2025-01-21 NOTE — ED TRIAGE NOTES
"Chief Complaint   Patient presents with    Urinary Frequency     Symptoms started yesterday.    Arm Pain     R arm x3 days. No trauma.      BP (!) 187/114   Pulse (!) 106   Temp 36.4 °C (97.6 °F) (Temporal)   Resp 18   Ht 1.575 m (5' 2\")   Wt 64.9 kg (143 lb 1.3 oz)   SpO2 94%   BMI 26.17 kg/m²     Pt ambulated into triage with home walker. Urine cup given. Educated on triage process. Instructed to notify staff for any worsening symptoms.  "

## 2025-01-21 NOTE — ED PROVIDER NOTES
ED Provider Note    CHIEF COMPLAINT  Chief Complaint   Patient presents with    Urinary Frequency     Symptoms started yesterday.    Arm Pain     R arm x3 days. No trauma.         EXTERNAL RECORDS REVIEWED  Hospitalist discharge summary reviewed from August 9 for hand pain and urinary frequency.  He has a history of hypertension and stage III kidney disease and asthma.  She was thought to have a compartment syndrome.  He was treated with IV antibiotics.  She did have a UTI.    LATISHA Howe is a 74 y.o. female who presents to the Emergency Department with 3 days of right dominant arm pain ranging from the shoulder to the hand.  Pain seems to be more severe in the hand wrist and the shoulder than in the elbow arm and forearm.  She denies trauma.  She does have some pain in the neck and the right trapezius.  She has had sciatica but never a radicular pain in the arm.  No disc herniation or surgeries of the neck.  She has some tingling in the hand but no weakness.    \The patient also reports urinary frequency since last night.  Denies dysuria urgency or frequency.  No abdominal pain fever nausea vomiting or diabetes.  She does have some back pain and has a history of chronic back pain but today's back pain is slightly different from baseline.      LIMITATION TO HISTORY   None     OUTSIDE HISTORIAN(S):  None    REVIEW OF SYSTEMS  Pertinent positives include: Right arm and trapezius pain, urinary frequency.  Pertinent negatives include: Fever diabetes immune compromise.    PAST MEDICAL HISTORY  Past Medical History:   Diagnosis Date    Acute exacerbation of chronic obstructive pulmonary disease (COPD) ([facility]) 7/5/2024    ASTHMA     Cancer ([facility])     Gout     History of breast cancer 07/29/2015    Hypertension     Shoulder pain, right 07/29/2015    Tremor 07/29/2015       SOCIAL HISTORY  Social History     Tobacco Use    Smoking status: Never    Smokeless tobacco: Never   Vaping Use    Vaping status: Never Used  subcutaneous tissue "  Substance Use Topics    Alcohol use: No    Drug use: No     Social History     Substance and Sexual Activity   Drug Use No       CURRENT MEDICATIONS  No current facility-administered medications for this encounter.    Current Outpatient Medications:     albuterol (PROVENTIL) 2.5mg/3ml Nebu Soln solution for nebulization, Take 3 mL by nebulization every four hours as needed for Shortness of Breath., Disp: 75 mL, Rfl: 0    albuterol 108 (90 Base) MCG/ACT Aero Soln inhalation aerosol, Inhale 1-2 Puffs every four hours as needed for Shortness of Breath., Disp: 8.5 g, Rfl: 0    ibuprofen (MOTRIN) 800 MG Tab, Take 1 Tablet by mouth every 8 hours as needed for Mild Pain., Disp: 15 Tablet, Rfl: 0    valsartan (DIOVAN) 160 MG Tab, Take 1 Tablet by mouth every day., Disp: 30 Tablet, Rfl: 1    acetaminophen (TYLENOL) 325 MG Tab, Take 650 mg by mouth every 6 hours as needed for Mild Pain., Disp: , Rfl:     fluticasone-salmeterol (ADVAIR) 250-50 MCG/ACT AEROSOL POWDER, BREATH ACTIVATED, Inhale 1 Puff 2 times a day., Disp: , Rfl:     verapamil ER (CALAN SR) 240 MG Tab CR, Take 240 mg by mouth every evening., Disp: , Rfl:     ALLERGIES  Allergies   Allergen Reactions    Shellfish Allergy Hives and Shortness of Breath       PHYSICAL EXAM  VITAL SIGNS: BP (!) 187/114   Pulse (!) 106   Temp 36.4 °C (97.6 °F) (Temporal)   Resp 18   Ht 1.575 m (5' 2\")   Wt 64.9 kg (143 lb 1.3 oz)   SpO2 94%   BMI 26.17 kg/m²   Reviewed and hypertensive tachycardic afebrile  Constitutional: Well developed, Well nourished, well-appearing elevated BMI.  HENT: Normocephalic, atraumatic, bilateral external ears normal.  Cardiovascular: Regular rate and rhythm. No murmurs, rubs or gallops.  No dependent edema or calf tenderness  Respiratory: Lungs clear to auscultation bilaterally. No wheezes, rales, or rhonchi.  Abdominal:  Abdomen soft, non-tender, non distended. No rebound, or guarding.  No CVA tenderness.  Skin: No erythema, no rash. No bruising " or wounds.  No edema of any joint or bone in the right arm  Musculoskeletal: no deformities.  No tenderness of the cervical spine thoracic or lumbar spine.  Right trapezius pain without tenderness.  On range of motion right shoulder normal.  No tenderness of clavicle scapula humerus forearm wrist or hand.  Neurologic: Finger flexion extension abduction and thumb opposition biceps triceps and shoulder abduction 5-5 and symmetric.  Sensation in tact to light touch pads of first and fifth finger, first dorsal webspace of the hand and over the deltoid.  Deep tendon reflexes 1+ biceps bilateral..    LABS Ordered and Reviewed by Me:  Results for orders placed or performed during the hospital encounter of 01/21/25   Urinalysis, Culture if Indicated    Collection Time: 01/21/25  9:24 AM    Specimen: Urine, Clean Catch   Result Value Ref Range    Color Dark Yellow     Character Cloudy (A)     Specific Gravity 1.012 <1.035    Ph 6.5 5.0 - 8.0    Glucose Negative Negative mg/dL    Ketones Negative Negative mg/dL    Protein Negative Negative mg/dL    Bilirubin Negative Negative    Urobilinogen, Urine 1.0 <=1.0 EU/dL    Nitrite Negative Negative    Leukocyte Esterase Large (A) Negative    Occult Blood Large (A) Negative    Micro Urine Req Microscopic    URINE MICROSCOPIC (W/UA)    Collection Time: 01/21/25  9:24 AM   Result Value Ref Range    WBC  (A) /hpf    RBC >100 (A) 0 - 2 /hpf    Bacteria Moderate (A) None /hpf    Epithelial Cells 11-20 (A) 0 - 5 /hpf    Urine Casts 3-5 (A) 0 - 2 /lpf     *Note: Due to a large number of results and/or encounters for the requested time period, some results have not been displayed. A complete set of results can be found in Results Review.     Urine culture pending    RADIOLOGY  I have independently interpreted the cervical spine x-ray associated with this visit demonstrating no fracture.  I am awaiting the final reading from the radiologist which will be documented below.     Final  Radiology Report  DX-CERVICAL SPINE-2 OR 3 VIEWS   Final Result      Multilevel facet degeneration. Grade 1 degenerative anterolisthesis C4 on C5.      Mild disc degeneration.        Radiologist interpretation have been reviewed by me.       ED COURSE:    INTERVENTIONS BY ME:  Medications   sulfamethoxazole-trimethoprim (Bactrim DS) 800-160 MG tablet 1 Tablet (1 Tablet Oral Given 1/21/25 1132)         ASSESSMENT, COURSE AND PLAN:  PROBLEMS EVALUATED THIS VISIT:    This patient presents with urinary urgency without fever flank pain abdominal pain nausea or vomiting.  She is not immune compromise.  On urinalysis she has evidence of cystitis.  It is doubtful this is pyelonephritis or sepsis.  Interstitial cystitis is unlikely.  She is a good candidate for an outpatient trial of therapy.  Baseline creatinine checked and 1.3 which is mildly elevated.  Patient also has right arm pain that is likely a radicular pain.  She has some evidence of arthritis in her cervical spine.  There is no focal weakness.  There is no evidence of myelopathy.    DISPOSITION AND DISCUSSIONS    RISK:  Moderate given need for prescription medication management    MY PLAN:  Discharge Medication List as of 1/21/2025 11:17 AM        START taking these medications    Details   methylPREDNISolone (MEDROL DOSEPAK) 4 MG Tablet Therapy Pack Follow as scheduled per instructions on pack, Disp-21 Tablet, R-0, Normal      sulfamethoxazole-trimethoprim (BACTRIM DS) 800-160 MG tablet Take 1 Tablet by mouth 2 times a day., Disp-10 Tablet, R-0, Normal             Tylenol for pain  Urine culture  UTI and cervical radiculopathy handout given    Return for ill appearance uncontrolled vomiting dizziness persistent high fever neurologic weakness    Followup:  [facility]  [address] [place] [address] [address]  [facility] [address]  [phone]  Schedule an appointment as soon as possible for a visit   if urination not better 3 days or if arm not better 7  days      CONDITION: Stable.     FINAL IMPRESSION  1. Acute cystitis without hematuria    2. Cervical radiculopathy         George Steven M.D., 01/21/25  1:22 PM

## 2025-01-21 NOTE — DISCHARGE INSTRUCTIONS
Take antibiotics starting tonight.  Take Medrol Dosepak as prescribed for arm pain.  Add Tylenol as needed for persistent pain.  See your doctor if bladder symptoms not improving in 3 days.  Return for neurologic weakness high fever uncontrolled vomiting or dizziness.  None of this is expected.

## 2025-01-21 NOTE — ED NOTES
Patient ambulated to room YEL [facility] with assistance of a walker. Patient changed into gown and placed on blood pressure and pulse oxygen monitoring. Chart up for ERP.

## 2025-02-04 ENCOUNTER — APPOINTMENT (OUTPATIENT)
Dept: RADIOLOGY | Facility: MEDICAL CENTER | Age: 75
End: 2025-02-04
Attending: EMERGENCY MEDICINE
Payer: MEDICARE

## 2025-02-04 ENCOUNTER — PHARMACY VISIT (OUTPATIENT)
Dept: PHARMACY | Facility: MEDICAL CENTER | Age: 75
End: 2025-02-04
Payer: COMMERCIAL

## 2025-02-04 ENCOUNTER — HOSPITAL ENCOUNTER (EMERGENCY)
Facility: MEDICAL CENTER | Age: 75
End: 2025-02-04
Attending: EMERGENCY MEDICINE
Payer: MEDICARE

## 2025-02-04 VITALS
SYSTOLIC BLOOD PRESSURE: 158 MMHG | DIASTOLIC BLOOD PRESSURE: 88 MMHG | OXYGEN SATURATION: 97 % | TEMPERATURE: 96.5 F | HEART RATE: 96 BPM | BODY MASS INDEX: 26.13 KG/M2 | WEIGHT: 141.98 LBS | HEIGHT: 62 IN | RESPIRATION RATE: 16 BRPM

## 2025-02-04 DIAGNOSIS — R06.2 WHEEZING: ICD-10-CM

## 2025-02-04 DIAGNOSIS — J18.9 PNEUMONIA OF LEFT LOWER LOBE DUE TO INFECTIOUS ORGANISM: ICD-10-CM

## 2025-02-04 LAB
ALBUMIN SERPL BCP-MCNC: 3.6 G/DL (ref 3.2–4.9)
ALBUMIN/GLOB SERPL: 0.9 G/DL
ALP SERPL-CCNC: 96 U/L (ref 30–99)
ALT SERPL-CCNC: 22 U/L (ref 2–50)
ANION GAP SERPL CALC-SCNC: 11 MMOL/L (ref 7–16)
AST SERPL-CCNC: 31 U/L (ref 12–45)
BASOPHILS # BLD AUTO: 3.4 % (ref 0–1.8)
BASOPHILS # BLD: 0.32 K/UL (ref 0–0.12)
BILIRUB SERPL-MCNC: 0.6 MG/DL (ref 0.1–1.5)
BUN SERPL-MCNC: 16 MG/DL (ref 8–22)
CALCIUM ALBUM COR SERPL-MCNC: 10.4 MG/DL (ref 8.5–10.5)
CALCIUM SERPL-MCNC: 10.1 MG/DL (ref 8.5–10.5)
CHLORIDE SERPL-SCNC: 110 MMOL/L (ref 96–112)
CO2 SERPL-SCNC: 20 MMOL/L (ref 20–33)
CREAT SERPL-MCNC: 1.22 MG/DL (ref 0.5–1.4)
EKG IMPRESSION: NORMAL
EOSINOPHIL # BLD AUTO: 0.08 K/UL (ref 0–0.51)
EOSINOPHIL NFR BLD: 0.9 % (ref 0–6.9)
ERYTHROCYTE [DISTWIDTH] IN BLOOD BY AUTOMATED COUNT: 49.2 FL (ref 35.9–50)
GFR SERPLBLD CREATININE-BSD FMLA CKD-EPI: 46 ML/MIN/1.73 M 2
GLOBULIN SER CALC-MCNC: 3.8 G/DL (ref 1.9–3.5)
GLUCOSE SERPL-MCNC: 94 MG/DL (ref 65–99)
HCT VFR BLD AUTO: 47.1 % (ref 37–47)
HGB BLD-MCNC: 15.5 G/DL (ref 12–16)
LYMPHOCYTES # BLD AUTO: 2.54 K/UL (ref 1–4.8)
LYMPHOCYTES NFR BLD: 27.3 % (ref 22–41)
MANUAL DIFF BLD: NORMAL
MCH RBC QN AUTO: 32.3 PG (ref 27–33)
MCHC RBC AUTO-ENTMCNC: 32.9 G/DL (ref 32.2–35.5)
MCV RBC AUTO: 98.1 FL (ref 81.4–97.8)
MONOCYTES # BLD AUTO: 0.87 K/UL (ref 0–0.85)
MONOCYTES NFR BLD AUTO: 9.4 % (ref 0–13.4)
MORPHOLOGY BLD-IMP: NORMAL
NEUTROPHILS # BLD AUTO: 5.49 K/UL (ref 1.82–7.42)
NEUTROPHILS NFR BLD: 59 % (ref 44–72)
NRBC # BLD AUTO: 0 K/UL
NRBC BLD-RTO: 0 /100 WBC (ref 0–0.2)
NT-PROBNP SERPL IA-MCNC: 200 PG/ML (ref 0–125)
PLATELET # BLD AUTO: 228 K/UL (ref 164–446)
PLATELET BLD QL SMEAR: NORMAL
PMV BLD AUTO: 8.8 FL (ref 9–12.9)
POTASSIUM SERPL-SCNC: 4.2 MMOL/L (ref 3.6–5.5)
PROT SERPL-MCNC: 7.4 G/DL (ref 6–8.2)
RBC # BLD AUTO: 4.8 M/UL (ref 4.2–5.4)
RBC BLD AUTO: NORMAL
SODIUM SERPL-SCNC: 141 MMOL/L (ref 135–145)
TROPONIN T SERPL-MCNC: 13 NG/L (ref 6–19)
WBC # BLD AUTO: 9.3 K/UL (ref 4.8–10.8)

## 2025-02-04 PROCEDURE — A9270 NON-COVERED ITEM OR SERVICE: HCPCS | Performed by: EMERGENCY MEDICINE

## 2025-02-04 PROCEDURE — 94640 AIRWAY INHALATION TREATMENT: CPT

## 2025-02-04 PROCEDURE — 99284 EMERGENCY DEPT VISIT MOD MDM: CPT

## 2025-02-04 PROCEDURE — 700101 HCHG RX REV CODE 250: Performed by: EMERGENCY MEDICINE

## 2025-02-04 PROCEDURE — 36415 COLL VENOUS BLD VENIPUNCTURE: CPT

## 2025-02-04 PROCEDURE — 85027 COMPLETE CBC AUTOMATED: CPT

## 2025-02-04 PROCEDURE — 71045 X-RAY EXAM CHEST 1 VIEW: CPT

## 2025-02-04 PROCEDURE — 84484 ASSAY OF TROPONIN QUANT: CPT

## 2025-02-04 PROCEDURE — 700102 HCHG RX REV CODE 250 W/ 637 OVERRIDE(OP): Performed by: EMERGENCY MEDICINE

## 2025-02-04 PROCEDURE — 85007 BL SMEAR W/DIFF WBC COUNT: CPT

## 2025-02-04 PROCEDURE — 80053 COMPREHEN METABOLIC PANEL: CPT

## 2025-02-04 PROCEDURE — 83880 ASSAY OF NATRIURETIC PEPTIDE: CPT

## 2025-02-04 PROCEDURE — RXMED WILLOW AMBULATORY MEDICATION CHARGE: Performed by: EMERGENCY MEDICINE

## 2025-02-04 PROCEDURE — 93005 ELECTROCARDIOGRAM TRACING: CPT | Mod: TC | Performed by: EMERGENCY MEDICINE

## 2025-02-04 PROCEDURE — 93005 ELECTROCARDIOGRAM TRACING: CPT | Mod: TC

## 2025-02-04 RX ORDER — IPRATROPIUM BROMIDE AND ALBUTEROL SULFATE 2.5; .5 MG/3ML; MG/3ML
3 SOLUTION RESPIRATORY (INHALATION) ONCE
Status: COMPLETED | OUTPATIENT
Start: 2025-02-04 | End: 2025-02-04

## 2025-02-04 RX ADMIN — IPRATROPIUM BROMIDE AND ALBUTEROL SULFATE 3 ML: .5; 2.5 SOLUTION RESPIRATORY (INHALATION) at 13:13

## 2025-02-04 RX ADMIN — AMOXICILLIN AND CLAVULANATE POTASSIUM 1 TABLET: 875; 125 TABLET, FILM COATED ORAL at 13:04

## 2025-02-04 ASSESSMENT — PAIN DESCRIPTION - PAIN TYPE: TYPE: ACUTE PAIN

## 2025-02-04 ASSESSMENT — FIBROSIS 4 INDEX: FIB4 SCORE: 1.58

## 2025-02-04 NOTE — ED PROVIDER NOTES
ED Provider Note    CHIEF COMPLAINT  Chief Complaint   Patient presents with    Sore Throat     Due to productive cough over the last two days. -n/v/d    Shortness of Breath     Intermittent SOB over the last two days.        EXTERNAL RECORDS REVIEWED  External ED Note patient was seen at [facility] emergency department 1/8/2025 for a gout attack    HPI/ROS  LIMITATION TO HISTORY   Select: : None  OUTSIDE HISTORIAN(S):  none    Serenity Howe is a 74 y.o. female who presents complaining about 2 days of shortness of breath cough productive of yellow sputum and congestion.  She denies any nausea vomiting or diarrhea she denies any leg swelling.  She denies any chest pain.  She does have some slight shortness of breath.  She has had chills but does not get her temperature.  She does have a history of asthma and has been using her inhaler.  She denies any smoking.    PAST MEDICAL HISTORY   has a past medical history of Acute exacerbation of chronic obstructive pulmonary disease (COPD) ([facility]) (7/5/2024), ASTHMA, Cancer ([facility]), Gout, History of breast cancer (07/29/2015), Hypertension, Shoulder pain, right (07/29/2015), and Tremor (07/29/2015).    SURGICAL HISTORY   has a past surgical history that includes mastectomy and gyn surgery.    FAMILY HISTORY  Family History   Problem Relation Age of Onset    Hyperlipidemia Mother     No Known Problems Father     No Known Problems Sister     No Known Problems Brother     No Known Problems Daughter     No Known Problems Daughter     No Known Problems Son        SOCIAL HISTORY  Social History     Tobacco Use    Smoking status: Never    Smokeless tobacco: Never   Vaping Use    Vaping status: Never Used   Substance and Sexual Activity    Alcohol use: No    Drug use: No    Sexual activity: Never     Partners: Male       CURRENT MEDICATIONS  Home Medications       Reviewed by Brenda Larios R.N. (Registered Nurse) on 02/04/25 at 1109  Med List Status: Partial     Medication  "Last Dose Status   acetaminophen (TYLENOL) 325 MG Tab  Active   albuterol (PROVENTIL) 2.5mg/3ml Nebu Soln solution for nebulization  Active   albuterol 108 (90 Base) MCG/ACT Aero Soln inhalation aerosol  Active   fluticasone-salmeterol (ADVAIR) 250-50 MCG/ACT AEROSOL POWDER, BREATH ACTIVATED  Active   ibuprofen (MOTRIN) 800 MG Tab  Active   methylPREDNISolone (MEDROL DOSEPAK) 4 MG Tablet Therapy Pack  Active   sulfamethoxazole-trimethoprim (BACTRIM DS) 800-160 MG tablet  Active   valsartan (DIOVAN) 160 MG Tab  Active   verapamil ER (CALAN SR) 240 MG Tab CR  Active                  Audit from Redirected Encounters    **Home medications have not yet been reviewed for this encounter**         ALLERGIES  Allergies   Allergen Reactions    Shellfish Allergy Hives and Shortness of Breath       PHYSICAL EXAM  VITAL SIGNS: BP (!) 147/97   Pulse (!) 102   Temp 36.2 °C (97.2 °F) (Temporal)   Resp 20   Ht 1.575 m (5' 2\")   Wt 64.4 kg (141 lb 15.6 oz)   SpO2 94%   BMI 25.97 kg/m²      Constitutional: Well developed, Well nourished, No acute distress, Non-toxic appearance.   HEENT: Normocephalic, Atraumatic,  external ears normal, pharynx pink,  Mucous  Membranes moist, No rhinorrhea or mucosal edema  Eyes: PERRL, EOMI, Conjunctiva normal, No discharge.   Neck: Normal range of motion, No tenderness, Supple, No stridor.   Lymphatic: No lymphadenopathy    Cardiovascular: Regular Rate and Rhythm, No murmurs,  rubs, or gallops.   Thorax & Lungs: Lungs clear to auscultation bilaterally, No respiratory distress, No wheezes, rhales or rhonchi, No chest wall tenderness.   Abdomen: Bowel sounds normal, Soft, non tender, non distended,  No pulsatile masses., no rebound guarding or peritoneal signs.   Skin: Warm, Dry, No erythema, No rash,   Back:  No CVA tenderness,  No spinal tenderness, bony crepitance step offs or instability.   Extremities: Equal, intact distal pulses, No cyanosis, clubbing or edema,  No tenderness. "   Musculoskeletal: Good range of motion in all major joints. No tenderness to palpation or major deformities noted.   Neurologic: Alert & oriented No focal deficits noted.  Psychiatric: Affect normal, Judgment normal, Mood normal.      EKG/LABS  Results for orders placed or performed during the hospital encounter of 02/04/25   CBC with Differential    Collection Time: 02/04/25 11:24 AM   Result Value Ref Range    WBC 9.3 4.8 - 10.8 K/uL    RBC 4.80 4.20 - 5.40 M/uL    Hemoglobin 15.5 12.0 - 16.0 g/dL    Hematocrit 47.1 (H) 37.0 - 47.0 %    MCV 98.1 (H) 81.4 - 97.8 fL    MCH 32.3 27.0 - 33.0 pg    MCHC 32.9 32.2 - 35.5 g/dL    RDW 49.2 35.9 - 50.0 fL    Platelet Count 228 164 - 446 K/uL    MPV 8.8 (L) 9.0 - 12.9 fL    Neutrophils-Polys 59.00 44.00 - 72.00 %    Lymphocytes 27.30 22.00 - 41.00 %    Monocytes 9.40 0.00 - 13.40 %    Eosinophils 0.90 0.00 - 6.90 %    Basophils 3.40 (H) 0.00 - 1.80 %    Nucleated RBC 0.00 0.00 - 0.20 /100 WBC    Neutrophils (Absolute) 5.49 1.82 - 7.42 K/uL    Lymphs (Absolute) 2.54 1.00 - 4.80 K/uL    Monos (Absolute) 0.87 (H) 0.00 - 0.85 K/uL    Eos (Absolute) 0.08 0.00 - 0.51 K/uL    Baso (Absolute) 0.32 (H) 0.00 - 0.12 K/uL    NRBC (Absolute) 0.00 K/uL   Comp Metabolic Panel    Collection Time: 02/04/25 11:24 AM   Result Value Ref Range    Sodium 141 135 - 145 mmol/L    Potassium 4.2 3.6 - 5.5 mmol/L    Chloride 110 96 - 112 mmol/L    Co2 20 20 - 33 mmol/L    Anion Gap 11.0 7.0 - 16.0    Glucose 94 65 - 99 mg/dL    Bun 16 8 - 22 mg/dL    Creatinine 1.22 0.50 - 1.40 mg/dL    Calcium 10.1 8.5 - 10.5 mg/dL    Correct Calcium 10.4 8.5 - 10.5 mg/dL    AST(SGOT) 31 12 - 45 U/L    ALT(SGPT) 22 2 - 50 U/L    Alkaline Phosphatase 96 30 - 99 U/L    Total Bilirubin 0.6 0.1 - 1.5 mg/dL    Albumin 3.6 3.2 - 4.9 g/dL    Total Protein 7.4 6.0 - 8.2 g/dL    Globulin 3.8 (H) 1.9 - 3.5 g/dL    A-G Ratio 0.9 g/dL   proBrain Natriuretic Peptide, NT    Collection Time: 02/04/25 11:24 AM   Result Value Ref  Range    NT-proBNP 200 (H) 0 - 125 pg/mL   Troponin    Collection Time: 25 11:24 AM   Result Value Ref Range    Troponin T 13 6 - 19 ng/L   ESTIMATED GFR    Collection Time: 25 11:24 AM   Result Value Ref Range    GFR (CKD-EPI) 46 (A) >60 mL/min/1.73 m 2   DIFFERENTIAL MANUAL    Collection Time: 25 11:24 AM   Result Value Ref Range    Manual Diff Status PERFORMED    PERIPHERAL SMEAR REVIEW    Collection Time: 25 11:24 AM   Result Value Ref Range    Peripheral Smear Review see below    PLATELET ESTIMATE    Collection Time: 25 11:24 AM   Result Value Ref Range    Plt Estimation Normal    MORPHOLOGY    Collection Time: 25 11:24 AM   Result Value Ref Range    RBC Morphology Normal    EKG    Collection Time: 25 12:41 PM   Result Value Ref Range    Report       [facility] Emergency Dept.    Test Date:  2025  Pt Name:    STEPHEN BEATNCUR                Department: ER  MRN:        [id number]                      Room:  Gender:     Female                       Technician: [id number]  :        [id number]                   Requested By:ER TRIAGE PROTOCOL  Order #:    [id number]                    Reading MD: ELZBIETA CORDERO MD    Measurements  Intervals                                Axis  Rate:       88                           P:          0  ID:         [id number]                          QRS:        50  QRSD:       88                           T:          72  QT:         370  QTc:        448    Interpretive Statements  Sinus rhythm  Baseline wander in lead(s) V3  Compared to ECG 12/10/2024 10:38:50  No significant changes  Electronically Signed On 2025 12:41:49 PST by ELZBIETA CORDERO MD         I have independently interpreted this EKG    RADIOLOGY/PROCEDURES   I have independently interpreted the diagnostic imaging associated with this visit and am waiting the final reading from the radiologist.   My preliminary interpretation is as follows: pcxr left lung  infiltrate    Radiologist interpretation:  DX-CHEST-PORTABLE (1 VIEW)   Final Result      Left basilar atelectasis and/or consolidation. Underlying infection is possible.          COURSE & MEDICAL DECISION MAKING    ASSESSMENT, COURSE AND PLAN  Care Narrative: Serenity Howe is a 74 y.o. female who presents complaining about 2 days of shortness of breath cough productive of yellow sputum and congestion.  She denies any nausea vomiting or diarrhea she denies any leg swelling.  She denies any chest pain.  She does have some slight shortness of breath.  She has had chills but does not get her temperature.  She does have a history of asthma and has been using her inhaler.  She denies any smoking.  On physical exam she is alert awake in no acute distress she speaking full sentences heart is regular rhythm no murmur rubs or gallops lungs are clear but she does have some wheezes in the bilateral lung bases abdomen is soft and nontender she has no extremity edema she is speaking full sentences and not in any respiratory distress she is nontoxic.              ADDITIONAL PROBLEMS MANAGED      DISPOSITION AND DISCUSSIONS  Patient's white count is normal at 9.3 hemoglobin 15.5 plate count 228 with 0.4 basophil's.  Comprehensive metabolic panel is normal electrolytes normal kidney function and normal liver function test.  Troponin is normal at 13 BNP is 200 which is only slightly elevated.  EKG shows normal sinus rhythm without ischemic changes.  Chest x-ray shows left middle shoulder atelectasis versus consolidation concerning for pneumonia.    The patient's chest x-ray is consistent with pneumonia and her cough is productive.  I gave her a DuoNeb breathing treatment with improvement of her wheezing.  I will discharge her home with a prescription for Augmentin as she is not in respiratory stress or hypoxic.  Advised her to take all the antibiotics until gone and return for any worsening fevers cough shortness of breath  or worsening symptoms.  The patient we discharged home in stable condition with follow-up with her primary care.    I have discussed management of the patient with the following physicians and JOSSELIN's:  none    Discussion of management with other [facility] or appropriate source(s): None     Escalation of care considered, and ultimately not performed:acute inpatient care management, however at this time, the patient is most appropriate for outpatient management    Barriers to care at this time, including but not limited to:  Patient has asthma .     Decision tools and prescription drugs considered including, but not limited to: Antibiotics augmentin .      The patient will return for new or worsening symptoms and is stable at the time of discharge.    The patient is referred to a primary physician for blood pressure management, diabetic screening, and for all other preventative health concerns.        DISPOSITION:  Patient will be discharged home in stable condition.    FOLLOW UP:  Jessie Flores M.D.  [address] [id number] [phone]    Call in 2 days  for recheck      OUTPATIENT MEDICATIONS:  Current Discharge Medication List        START taking these medications    Details   amoxicillin-clavulanate (AUGMENTIN) 875-125 MG Tab Take 1 Tablet by mouth 2 times a day for 7 days.  Qty: 14 Tablet, Refills: 0    Associated Diagnoses: Pneumonia of left lower lobe due to infectious organism             FINAL DIAGNOSIS  1. Pneumonia of left lower lobe due to infectious organism    2. Wheezing         Electronically signed by: Dayanna Liu M.D., 2/4/2025 11:35 AM

## 2025-02-04 NOTE — ED NOTES
Placed in cardiac monitor, pulse ox and bp. Labs and nasal swab already collected in triage.  Call light within reach.

## 2025-02-04 NOTE — ED TRIAGE NOTES
"Chief Complaint   Patient presents with    Sore Throat     Due to productive cough over the last two days. -n/v/d    Shortness of Breath     Intermittent SOB over the last two days.          Pt ambulated to triage for above complaint.  Pt is AO x 4, follows commands, and responds appropriately to questions. Patient's breathing is unlabored and pain is currently 8/10 on the 0-10 pain scale.  Pt placed in lobby. Patient educated on triage process and encouraged to alert staff for any changes.      BP (!) 147/97   Pulse (!) 102   Temp 36.2 °C (97.2 °F) (Temporal)   Resp 20   Ht 1.575 m (5' 2\")   Wt 64.4 kg (141 lb 15.6 oz)   SpO2 94%     "

## 2025-02-27 ENCOUNTER — HOSPITAL ENCOUNTER (EMERGENCY)
Facility: MEDICAL CENTER | Age: 75
End: 2025-02-27
Attending: EMERGENCY MEDICINE
Payer: MEDICARE

## 2025-02-27 ENCOUNTER — APPOINTMENT (OUTPATIENT)
Dept: RADIOLOGY | Facility: MEDICAL CENTER | Age: 75
End: 2025-02-27
Attending: EMERGENCY MEDICINE
Payer: MEDICARE

## 2025-02-27 VITALS
HEIGHT: 62 IN | RESPIRATION RATE: 16 BRPM | DIASTOLIC BLOOD PRESSURE: 96 MMHG | SYSTOLIC BLOOD PRESSURE: 199 MMHG | TEMPERATURE: 98.9 F | BODY MASS INDEX: 27.18 KG/M2 | OXYGEN SATURATION: 98 % | HEART RATE: 87 BPM | WEIGHT: 147.71 LBS

## 2025-02-27 DIAGNOSIS — R39.15 URINARY URGENCY: ICD-10-CM

## 2025-02-27 DIAGNOSIS — R35.0 URINARY FREQUENCY: ICD-10-CM

## 2025-02-27 LAB
ANION GAP SERPL CALC-SCNC: 12 MMOL/L (ref 7–16)
APPEARANCE UR: CLEAR
BACTERIA #/AREA URNS HPF: ABNORMAL /HPF
BASOPHILS # BLD AUTO: 0.5 % (ref 0–1.8)
BASOPHILS # BLD: 0.05 K/UL (ref 0–0.12)
BILIRUB UR QL STRIP.AUTO: NEGATIVE
BUN SERPL-MCNC: 13 MG/DL (ref 8–22)
CALCIUM SERPL-MCNC: 9.4 MG/DL (ref 8.5–10.5)
CASTS URNS QL MICRO: ABNORMAL /LPF (ref 0–2)
CHLORIDE SERPL-SCNC: 103 MMOL/L (ref 96–112)
CO2 SERPL-SCNC: 23 MMOL/L (ref 20–33)
COLOR UR: YELLOW
CREAT SERPL-MCNC: 1.06 MG/DL (ref 0.5–1.4)
EOSINOPHIL # BLD AUTO: 0.12 K/UL (ref 0–0.51)
EOSINOPHIL NFR BLD: 1.3 % (ref 0–6.9)
EPITHELIAL CELLS 1715: ABNORMAL /HPF (ref 0–5)
ERYTHROCYTE [DISTWIDTH] IN BLOOD BY AUTOMATED COUNT: 45.2 FL (ref 35.9–50)
GFR SERPLBLD CREATININE-BSD FMLA CKD-EPI: 55 ML/MIN/1.73 M 2
GLUCOSE SERPL-MCNC: 84 MG/DL (ref 65–99)
GLUCOSE UR STRIP.AUTO-MCNC: NEGATIVE MG/DL
HCT VFR BLD AUTO: 40.2 % (ref 37–47)
HGB BLD-MCNC: 13.8 G/DL (ref 12–16)
IMM GRANULOCYTES # BLD AUTO: 0.04 K/UL (ref 0–0.11)
IMM GRANULOCYTES NFR BLD AUTO: 0.4 % (ref 0–0.9)
KETONES UR STRIP.AUTO-MCNC: NEGATIVE MG/DL
LEUKOCYTE ESTERASE UR QL STRIP.AUTO: ABNORMAL
LYMPHOCYTES # BLD AUTO: 2.66 K/UL (ref 1–4.8)
LYMPHOCYTES NFR BLD: 28.8 % (ref 22–41)
MCH RBC QN AUTO: 32 PG (ref 27–33)
MCHC RBC AUTO-ENTMCNC: 34.3 G/DL (ref 32.2–35.5)
MCV RBC AUTO: 93.3 FL (ref 81.4–97.8)
MICRO URNS: ABNORMAL
MONOCYTES # BLD AUTO: 0.88 K/UL (ref 0–0.85)
MONOCYTES NFR BLD AUTO: 9.5 % (ref 0–13.4)
NEUTROPHILS # BLD AUTO: 5.49 K/UL (ref 1.82–7.42)
NEUTROPHILS NFR BLD: 59.5 % (ref 44–72)
NITRITE UR QL STRIP.AUTO: NEGATIVE
NRBC # BLD AUTO: 0 K/UL
NRBC BLD-RTO: 0 /100 WBC (ref 0–0.2)
PH UR STRIP.AUTO: 6 [PH] (ref 5–8)
PLATELET # BLD AUTO: 289 K/UL (ref 164–446)
PMV BLD AUTO: 8.7 FL (ref 9–12.9)
POTASSIUM SERPL-SCNC: 3.9 MMOL/L (ref 3.6–5.5)
PROT UR QL STRIP: NEGATIVE MG/DL
RBC # BLD AUTO: 4.31 M/UL (ref 4.2–5.4)
RBC # URNS HPF: ABNORMAL /HPF (ref 0–2)
RBC UR QL AUTO: NEGATIVE
SODIUM SERPL-SCNC: 138 MMOL/L (ref 135–145)
SP GR UR STRIP.AUTO: 1.01
UROBILINOGEN UR STRIP.AUTO-MCNC: 0.2 EU/DL
WBC # BLD AUTO: 9.2 K/UL (ref 4.8–10.8)
WBC #/AREA URNS HPF: ABNORMAL /HPF

## 2025-02-27 PROCEDURE — 80048 BASIC METABOLIC PNL TOTAL CA: CPT

## 2025-02-27 PROCEDURE — 99284 EMERGENCY DEPT VISIT MOD MDM: CPT

## 2025-02-27 PROCEDURE — 74177 CT ABD & PELVIS W/CONTRAST: CPT

## 2025-02-27 PROCEDURE — 81001 URINALYSIS AUTO W/SCOPE: CPT

## 2025-02-27 PROCEDURE — 700117 HCHG RX CONTRAST REV CODE 255: Performed by: EMERGENCY MEDICINE

## 2025-02-27 PROCEDURE — 85025 COMPLETE CBC W/AUTO DIFF WBC: CPT

## 2025-02-27 PROCEDURE — 87086 URINE CULTURE/COLONY COUNT: CPT

## 2025-02-27 PROCEDURE — 700111 HCHG RX REV CODE 636 W/ 250 OVERRIDE (IP): Mod: JZ | Performed by: EMERGENCY MEDICINE

## 2025-02-27 PROCEDURE — 96374 THER/PROPH/DIAG INJ IV PUSH: CPT | Mod: XU

## 2025-02-27 PROCEDURE — 36415 COLL VENOUS BLD VENIPUNCTURE: CPT

## 2025-02-27 RX ORDER — HYDRALAZINE HYDROCHLORIDE 20 MG/ML
10 INJECTION INTRAMUSCULAR; INTRAVENOUS ONCE
Status: COMPLETED | OUTPATIENT
Start: 2025-02-27 | End: 2025-02-27

## 2025-02-27 RX ADMIN — IOHEXOL 80 ML: 350 INJECTION, SOLUTION INTRAVENOUS at 12:30

## 2025-02-27 RX ADMIN — HYDRALAZINE HYDROCHLORIDE 10 MG: 20 INJECTION, SOLUTION INTRAMUSCULAR; INTRAVENOUS at 12:30

## 2025-02-27 ASSESSMENT — FIBROSIS 4 INDEX: FIB4 SCORE: 2.17

## 2025-02-27 ASSESSMENT — PAIN DESCRIPTION - PAIN TYPE: TYPE: ACUTE PAIN

## 2025-02-27 NOTE — ED NOTES
Patient given instructions, v/u instructions.  Patient ambulated out of ER with steady gate. All belongings with patient.

## 2025-02-27 NOTE — ED PROVIDER NOTES
ER Provider Note    Scribed for Sravan Bernstein M.D. by Reginaldo Huerta. 2025   8:20 AM    Primary Care Provider: Jessie Flroes M.D.    CHIEF COMPLAINT  Chief Complaint   Patient presents with    Difficulty Urinating     Patient c/o frequency urination x 1 day.      EXTERNAL RECORDS REVIEWED  Outpatient Notes Patient last seen in the ED  for shortness of breath. Her imaging was consistent with pneumonia and she was prescribed Augmentin.     HPI/ROS  LIMITATION TO HISTORY   Select: : None  OUTSIDE HISTORIAN(S):  None.     Serenity Howe is a 74 y.o. female who presents to the ED for evaluation of difficulty urinating onset one day ago. Patient reports that she has the urge to urinate at this time, but has been unable to do so. She denies any pain with urination, hematuria, or abdominal pain. She denies any other symptoms or complaints at this time.      PAST MEDICAL HISTORY  Past Medical History:   Diagnosis Date    Acute exacerbation of chronic obstructive pulmonary disease (COPD) ([facility]) 2024    ASTHMA     Cancer ([facility])     Gout     History of breast cancer 2015    Hypertension     Shoulder pain, right 2015    Tremor 2015       SURGICAL HISTORY  Past Surgical History:   Procedure Laterality Date    GYN SURGERY       x1    MASTECTOMY      right        FAMILY HISTORY  Family History   Problem Relation Age of Onset    Hyperlipidemia Mother     No Known Problems Father     No Known Problems Sister     No Known Problems Brother     No Known Problems Daughter     No Known Problems Daughter     No Known Problems Son        SOCIAL HISTORY   reports that she has never smoked. She has never used smokeless tobacco. She reports that she does not drink alcohol and does not use drugs.    CURRENT MEDICATIONS  Previous Medications    ACETAMINOPHEN (TYLENOL) 325 MG TAB    Take 650 mg by mouth every 6 hours as needed for Mild Pain.    ALBUTEROL (PROVENTIL) 2.5MG/3ML NEBU SOLN SOLUTION  "FOR NEBULIZATION    Take 3 mL by nebulization every four hours as needed for Shortness of Breath.    ALBUTEROL 108 (90 BASE) MCG/ACT AERO SOLN INHALATION AEROSOL    Inhale 1-2 Puffs every four hours as needed for Shortness of Breath.    FLUTICASONE-SALMETEROL (ADVAIR) 250-50 MCG/ACT AEROSOL POWDER, BREATH ACTIVATED    Inhale 1 Puff 2 times a day.    IBUPROFEN (MOTRIN) 800 MG TAB    Take 1 Tablet by mouth every 8 hours as needed for Mild Pain.    METHYLPREDNISOLONE (MEDROL DOSEPAK) 4 MG TABLET THERAPY PACK    Follow as scheduled per instructions on pack    SULFAMETHOXAZOLE-TRIMETHOPRIM (BACTRIM DS) 800-160 MG TABLET    Take 1 Tablet by mouth 2 times a day.    VALSARTAN (DIOVAN) 160 MG TAB    Take 1 Tablet by mouth every day.    VERAPAMIL ER (CALAN SR) 240 MG TAB CR    Take 240 mg by mouth every evening.       ALLERGIES  Allergies   Allergen Reactions    Shellfish Allergy Hives and Shortness of Breath        PHYSICAL EXAM  BP (!) 170/98   Pulse 87   Temp 37.2 °C (99 °F) (Temporal)   Resp 18   Ht 1.575 m (5' 2\")   Wt 67 kg (147 lb 11.3 oz)   SpO2 92%   BMI 27.02 kg/m²    Nursing note and vitals reviewed.  Constitutional: Well-developed and well-nourished. No distress.   HENT: Head is normocephalic and atraumatic. Oropharynx is clear and moist without exudate or erythema.   Eyes: Pupils are equal, round, and reactive to light. Conjunctiva are normal.   Cardiovascular: Normal rate and regular rhythm. No murmur heard. Normal radial pulses.  Pulmonary/Chest: Breath sounds normal. No wheezes or rales.   Abdominal: Soft and non-tender. Unable to palpate bladder fundus. No distention. No CVA tenderness.   Musculoskeletal: Extremities exhibit normal range of motion without edema or tenderness.   Neurological: Awake, alert and oriented to person, place, and time. No focal deficits noted.  Skin: Skin is warm and dry. No rash.   Psychiatric: Normal mood and affect. Appropriate for clinical situation    DIAGNOSTIC " STUDIES    Labs:   Results for orders placed or performed during the hospital encounter of 02/27/25   Urinalysis, Culture if Indicated    Collection Time: 02/27/25  8:15 AM    Specimen: Urine, Clean Catch   Result Value Ref Range    Color Yellow     Character Clear     Specific Gravity 1.012 <1.035    Ph 6.0 5.0 - 8.0    Glucose Negative Negative mg/dL    Ketones Negative Negative mg/dL    Protein Negative Negative mg/dL    Bilirubin Negative Negative    Urobilinogen, Urine 0.2 <=1.0 EU/dL    Nitrite Negative Negative    Leukocyte Esterase Small (A) Negative    Occult Blood Negative Negative    Micro Urine Req Microscopic    URINE MICROSCOPIC (W/UA)    Collection Time: 02/27/25  8:15 AM   Result Value Ref Range    WBC 3-5 /hpf    RBC 0-2 0 - 2 /hpf    Bacteria Rare (A) None /hpf    Epithelial Cells 3-5 0 - 5 /hpf    Urine Casts 0-2 0 - 2 /lpf   CBC WITH DIFFERENTIAL    Collection Time: 02/27/25 10:16 AM   Result Value Ref Range    WBC 9.2 4.8 - 10.8 K/uL    RBC 4.31 4.20 - 5.40 M/uL    Hemoglobin 13.8 12.0 - 16.0 g/dL    Hematocrit 40.2 37.0 - 47.0 %    MCV 93.3 81.4 - 97.8 fL    MCH 32.0 27.0 - 33.0 pg    MCHC 34.3 32.2 - 35.5 g/dL    RDW 45.2 35.9 - 50.0 fL    Platelet Count 289 164 - 446 K/uL    MPV 8.7 (L) 9.0 - 12.9 fL    Neutrophils-Polys 59.50 44.00 - 72.00 %    Lymphocytes 28.80 22.00 - 41.00 %    Monocytes 9.50 0.00 - 13.40 %    Eosinophils 1.30 0.00 - 6.90 %    Basophils 0.50 0.00 - 1.80 %    Immature Granulocytes 0.40 0.00 - 0.90 %    Nucleated RBC 0.00 0.00 - 0.20 /100 WBC    Neutrophils (Absolute) 5.49 1.82 - 7.42 K/uL    Lymphs (Absolute) 2.66 1.00 - 4.80 K/uL    Monos (Absolute) 0.88 (H) 0.00 - 0.85 K/uL    Eos (Absolute) 0.12 0.00 - 0.51 K/uL    Baso (Absolute) 0.05 0.00 - 0.12 K/uL    Immature Granulocytes (abs) 0.04 0.00 - 0.11 K/uL    NRBC (Absolute) 0.00 K/uL   BASIC METABOLIC PANEL    Collection Time: 02/27/25 10:16 AM   Result Value Ref Range    Sodium 138 135 - 145 mmol/L    Potassium 3.9 3.6  - 5.5 mmol/L    Chloride 103 96 - 112 mmol/L    Co2 23 20 - 33 mmol/L    Glucose 84 65 - 99 mg/dL    Bun 13 8 - 22 mg/dL    Creatinine 1.06 0.50 - 1.40 mg/dL    Calcium 9.4 8.5 - 10.5 mg/dL    Anion Gap 12.0 7.0 - 16.0   ESTIMATED GFR    Collection Time: 02/27/25 10:16 AM   Result Value Ref Range    GFR (CKD-EPI) 55 (A) >60 mL/min/1.73 m 2     *Note: Due to a large number of results and/or encounters for the requested time period, some results have not been displayed. A complete set of results can be found in Results Review.       Radiology:   This attending emergency physician has independently interpreted the diagnostic imaging associated with this visit and is awaiting the final reading from the radiologist.   Preliminary interpretation is a follows: CT scan shows no acute abnormality.  No inflammatory process.    Radiologist interpretation:   CT-ABDOMEN-PELVIS WITH   Final Result      1.  Nonobstructing right-sided nephrolithiasis.      2.  Multiple small benign hepatic cysts.      3.  Small fatty umbilical hernia      4.  Calcified exophytic uterine fibroid.           ASSESSMENT AND PLAN    8:20 AM - Patient was evaluated at bedside. Patient arrives today with difficulty urinating, and denies any other urinary symptoms. Ordered for urinalysis culture if indicated to evaluate. Patient verbalizes understanding and support with my plan of care.  Differential diagnoses include but not limited to: UTI vs. Urinary retention.      Urinalysis shows no evidence of urinary tract infection.  Bladder scan not consistent with urinary retention.  Therefore further evaluation undertaken with laboratory testing and CT scan.  CT scan shows no acute abnormality.  Laboratory studies are unrevealing.  Etiology of the patient's symptoms unclear.  Will culture her urine and have her follow-up.    12:37 PM - I reevaluated the patient at bedside. The patient informs me they feel improved following Apresoline administration. I  discussed the patient's diagnostic study results which show no acute abnormalities. I discussed plan for discharge and follow up as outlined below. The patient is stable for discharge at this time and will return for any new or worsening symptoms. Patient verbalizes understanding and support with my plan for discharge.      DISPOSITION AND DISCUSSIONS    I have discussed management of the patient with the following physicians and JOSSELIN's:  None.     Discussion of management with other [facility] or appropriate source(s): None     Escalation of care considered, and ultimately not performed: acute inpatient care management, however at this time, the patient is most appropriate for outpatient management.    Barriers to care at this time, including but not limited to:  None reported .     Decision tools and prescription drugs considered including, but not limited to:  None.  .     The patient will return for new or worsening symptoms and is stable at the time of discharge.    DISPOSITION:  Patient will be discharged home in stable condition.    FOLLOW UP:  Jessie Flores M.D.  [address] [id number] [phone]    Schedule an appointment as soon as possible for a visit       [facility], Emergency Dept  [address] [facility] [address] [phone]    If symptoms worsen      FINAL DIAGNOSIS  1. Urinary urgency    2. Urinary frequency         Reginaldo ZIMMERMAN ([place]), am scribing for, and in the presence of, Sravan Bernstein M.D..    Electronically signed by: Reginaldo Huerta ([place]), 2/27/2025    Sravan ZIMMERMAN M.D. personally performed the services described in this documentation, as scribed by Reginaldo Huerta in my presence, and it is both accurate and complete.      The note accurately reflects work and decisions made by me.  Sravan Bernstein M.D.  2/27/2025  12:59 PM

## 2025-02-27 NOTE — ED TRIAGE NOTES
.Serenity Rhoades [place]  .  Chief Complaint   Patient presents with    Difficulty Urinating     Patient c/o frequency urination x 1 day.      Patient ambulatory to triage with above complaint. Patient denies any pain or blood in urine.     Patient to lobby and instructed to inform staff of any needs.

## 2025-02-27 NOTE — ED NOTES
Patient resting on stretcher in no acute distress. Equal chest rise noted. VS stable on monitor. Bed locked and in low position. Skip andrade within reach.

## 2025-03-02 LAB
BACTERIA UR CULT: NORMAL
SIGNIFICANT IND 70042: NORMAL
SITE SITE: NORMAL
SOURCE SOURCE: NORMAL

## 2025-03-03 ENCOUNTER — HOSPITAL ENCOUNTER (EMERGENCY)
Facility: MEDICAL CENTER | Age: 75
End: 2025-03-03
Attending: EMERGENCY MEDICINE
Payer: MEDICARE

## 2025-03-03 ENCOUNTER — PHARMACY VISIT (OUTPATIENT)
Dept: PHARMACY | Facility: MEDICAL CENTER | Age: 75
End: 2025-03-03
Payer: COMMERCIAL

## 2025-03-03 VITALS
RESPIRATION RATE: 18 BRPM | TEMPERATURE: 98.8 F | WEIGHT: 148.59 LBS | BODY MASS INDEX: 27.18 KG/M2 | OXYGEN SATURATION: 98 % | HEART RATE: 78 BPM | DIASTOLIC BLOOD PRESSURE: 94 MMHG | SYSTOLIC BLOOD PRESSURE: 139 MMHG

## 2025-03-03 DIAGNOSIS — S39.012A BACK STRAIN, INITIAL ENCOUNTER: ICD-10-CM

## 2025-03-03 PROCEDURE — A9270 NON-COVERED ITEM OR SERVICE: HCPCS | Performed by: EMERGENCY MEDICINE

## 2025-03-03 PROCEDURE — 99284 EMERGENCY DEPT VISIT MOD MDM: CPT

## 2025-03-03 PROCEDURE — 700102 HCHG RX REV CODE 250 W/ 637 OVERRIDE(OP): Performed by: EMERGENCY MEDICINE

## 2025-03-03 PROCEDURE — RXMED WILLOW AMBULATORY MEDICATION CHARGE: Performed by: EMERGENCY MEDICINE

## 2025-03-03 RX ORDER — MELOXICAM 7.5 MG/1
7.5 TABLET ORAL DAILY
Qty: 14 TABLET | Refills: 0 | Status: SHIPPED | OUTPATIENT
Start: 2025-03-03

## 2025-03-03 RX ORDER — ACETAMINOPHEN 325 MG/1
650 TABLET ORAL ONCE
Status: COMPLETED | OUTPATIENT
Start: 2025-03-03 | End: 2025-03-03

## 2025-03-03 RX ADMIN — ACETAMINOPHEN 650 MG: 325 TABLET ORAL at 16:19

## 2025-03-03 ASSESSMENT — FIBROSIS 4 INDEX: FIB4 SCORE: 1.72

## 2025-03-03 NOTE — ED TRIAGE NOTES
Chief Complaint   Patient presents with    Back Pain     L posterior back pain for 2-3 days.  denies trauma or other injury     Resp easy, denies cough

## 2025-03-04 NOTE — ED NOTES
Patient provided with meds to bed. She ambulated out of ED with a steady gait using her walker. Discharge instructions with patient

## 2025-03-04 NOTE — ED PROVIDER NOTES
ED PHYSICIAN NOTE    CHIEF COMPLAINT  Chief Complaint   Patient presents with    Back Pain     L posterior back pain for 2-3 days.  denies trauma or other injury       EXTERNAL RECORDS REVIEWED  Laboratory data 2/27 no leukocytosis left shift.  BMP normal.  Urinalysis negative.  CT abdomen and pelvis without any acute findings.  She has a right-sided nephrolith.    HPI/ROS      Serenity Howe is a 74 y.o. female who presents with back pain started 2 to 3 days ago.  Does not know what happened.  Localized over the left posterior thoracic region.  Worse with bending and movement.  No shortness of breath or chest pain.  No fevers.  She has had pain like this in the past that has improved with Mobic.  She tried taking ibuprofen without relief.    Denies fever, injection drug abuse, immunocompromise, chronic corticosteroids or recent bacteremia  Denies bowel or bladder dysfunction, saddle anesthesia, weakness in the extremities.  Denies abdominal pain or known history of abdominal aortic aneurysm  Denies history of cancer  Denies rash  Denies dysuria hematuria frequency        PAST MEDICAL HISTORY  Past Medical History:   Diagnosis Date    Acute exacerbation of chronic obstructive pulmonary disease (COPD) ([facility]) 7/5/2024    ASTHMA     Cancer ([facility])     Gout     History of breast cancer 07/29/2015    Hypertension     Shoulder pain, right 07/29/2015    Tremor 07/29/2015       SOCIAL HISTORY  Social History     Tobacco Use    Smoking status: Never    Smokeless tobacco: Never   Vaping Use    Vaping status: Never Used   Substance Use Topics    Alcohol use: No    Drug use: No       CURRENT MEDICATIONS  Home Medications    **Home medications have not yet been reviewed for this encounter**         ALLERGIES  Allergies   Allergen Reactions    Shellfish Allergy Hives and Shortness of Breath       PHYSICAL EXAM  VITAL SIGNS: BP (!) 139/94   Pulse 80   Temp 37.1 °C (98.8 °F) (Temporal)   Resp 20   Wt 67.4 kg (148 lb 9.4 oz)    SpO2 96%   BMI 27.18 kg/m²    Constitutional: Awake and alert.  Appears uncomfortable  HENT: Normal inspection  Eyes: Normal inspection  Neck: Grossly normal range of motion.  Cardiovascular: Normal heart rate  Thorax & Lungs: No respiratory distress  Abdomen: Soft, nondistended, nontender, no palpable mass  Extremities: Well perfused.  Strong peripheral pulses at dorsalis pedis bilaterally  Back: Left paraspinous thoracic muscular tenderness.  No step-off or deformity.  Limited range of motion.  Neurologic: Awake alert oriented.  5/5 EHL, FHL, gastrocnemius, tibialis anterior, quadriceps and hamstrings.  Symmetric DTRs at the patella and Achilles.  Sensory is intact  Psychiatric: Normal for situation      DIAGNOSTIC STUDIES / PROCEDURES        COURSE & MEDICAL DECISION MAKING      INITIAL ASSESSMENT, COURSE AND PLAN  Care Narrative: Patient presents with back pain.   Patient does not have any bowel or bladder dysfunction, saddle anesthesia, weakness in the extremity.  Unlikely emergency spinal cord impingement syndrome.  Patient without fever, immunocompromise, HIV, prolonged corticosteroid use, recent bacteremia.  Unlikely discitis, osteomyelitis, epidural abscess.  Patient does not have dysuria nonspecific flank pain therefore no history of UTI or renal colic.  No known history of abdominal aortic aneurysm and no palpable mass on examination.  No suggestion of ACS or PE..  Patient has no history of cancer.  There is no indication for emergency diagnostic imaging such as CT or MRI or laboratory testing.  Given history and physical suspect most likely cause of back pain is thoracic strain.  Patient will be treated symptomatically with Mobic as this has worked in the past.  Have advised follow-up with primary as soon as possible.  I precautioned the patient to return to the ER for any fever, uncontrolled pain, weakness in the extremities, bowel or bladder dysfunction or concern.        DISPOSITION AND  DISCUSSIONS    Escalation of care considered, and ultimately not performed:blood analysis and diagnostic imaging as documented above      Prescription drugs considered and/or prescribed: Considered prescription opiate but nonnarcotic analgesic would be most appropriate in the setting.  Prescribed Mobic    FINAL IMPRESSION  1.  Acute thoracic strain    This dictation was created using voice recognition software. The accuracy of the dictation is limited to the abilities of the software. I expect there may be some errors of grammar and possibly content. The nursing notes were reviewed and certain aspects of this information were incorporated into this note.    Electronically signed by: Jordy Dorman M.D., 3/3/2025

## 2025-03-07 ENCOUNTER — PHARMACY VISIT (OUTPATIENT)
Dept: PHARMACY | Facility: MEDICAL CENTER | Age: 75
End: 2025-03-07
Payer: COMMERCIAL

## 2025-03-07 ENCOUNTER — HOSPITAL ENCOUNTER (EMERGENCY)
Facility: MEDICAL CENTER | Age: 75
End: 2025-03-07
Attending: EMERGENCY MEDICINE
Payer: MEDICARE

## 2025-03-07 ENCOUNTER — APPOINTMENT (OUTPATIENT)
Dept: RADIOLOGY | Facility: MEDICAL CENTER | Age: 75
End: 2025-03-07
Attending: EMERGENCY MEDICINE
Payer: MEDICARE

## 2025-03-07 VITALS
RESPIRATION RATE: 12 BRPM | BODY MASS INDEX: 28.89 KG/M2 | OXYGEN SATURATION: 95 % | SYSTOLIC BLOOD PRESSURE: 208 MMHG | DIASTOLIC BLOOD PRESSURE: 98 MMHG | TEMPERATURE: 97.4 F | WEIGHT: 157 LBS | HEART RATE: 70 BPM | HEIGHT: 62 IN

## 2025-03-07 DIAGNOSIS — S29.019A STRAIN OF THORACIC REGION, INITIAL ENCOUNTER: ICD-10-CM

## 2025-03-07 LAB
EKG IMPRESSION: NORMAL
TROPONIN T SERPL-MCNC: 12 NG/L (ref 6–19)

## 2025-03-07 PROCEDURE — 93005 ELECTROCARDIOGRAM TRACING: CPT | Mod: TC | Performed by: EMERGENCY MEDICINE

## 2025-03-07 PROCEDURE — 99284 EMERGENCY DEPT VISIT MOD MDM: CPT

## 2025-03-07 PROCEDURE — 36415 COLL VENOUS BLD VENIPUNCTURE: CPT

## 2025-03-07 PROCEDURE — 84484 ASSAY OF TROPONIN QUANT: CPT

## 2025-03-07 PROCEDURE — 700117 HCHG RX CONTRAST REV CODE 255: Performed by: EMERGENCY MEDICINE

## 2025-03-07 PROCEDURE — 71275 CT ANGIOGRAPHY CHEST: CPT

## 2025-03-07 PROCEDURE — RXMED WILLOW AMBULATORY MEDICATION CHARGE: Performed by: EMERGENCY MEDICINE

## 2025-03-07 RX ORDER — CELECOXIB 200 MG/1
200 CAPSULE ORAL 2 TIMES DAILY
Qty: 60 CAPSULE | Refills: 0 | Status: SHIPPED | OUTPATIENT
Start: 2025-03-07

## 2025-03-07 RX ADMIN — IOHEXOL 50 ML: 350 INJECTION, SOLUTION INTRAVENOUS at 05:17

## 2025-03-07 ASSESSMENT — FIBROSIS 4 INDEX: FIB4 SCORE: 1.69

## 2025-03-07 ASSESSMENT — HEART SCORE
RISK FACTORS: 1-2 RISK FACTORS
ECG: NORMAL
HISTORY: SLIGHTLY SUSPICIOUS
AGE: 65+
TROPONIN: LESS THAN OR EQUAL TO NORMAL LIMIT
HEART SCORE: 3

## 2025-03-07 ASSESSMENT — PAIN DESCRIPTION - PAIN TYPE: TYPE: ACUTE PAIN

## 2025-03-07 NOTE — ED TRIAGE NOTES
Chief Complaint   Patient presents with    Back Pain     Whole left back pain since 3 days ago, unrelieved by Tylenol    Denied any trauma, chest pain or shortness of breath     Pain:  9/10  Ambulatory:  on wheelchair  Alert and Oriented: x 4  Oxygen Treatment: No    Pt came in to triage for the above complaints.     Pt is speaking in full sentences, follows commands and responds appropriately to questions.     Respirations are even and unlabored.    Pt placed in lobby. Pt educated on triage process.     Pt encouraged to inform staff for any changes in condition or if needs help while waiting to be room in.    Vitals:    03/07/25 0346   BP: (!) 173/109   Pulse: 89   Resp: (!) 22   Temp: 36.4 °C (97.5 °F)   SpO2: 93%

## 2025-03-07 NOTE — ED NOTES
Patient discharged home per ERP. Discharge teaching, education, and POC discussed with patient who verbalizes understanding. Patient instructed to return to the ER if symptoms worsen. No other questions at this time.    Rx sent to patient's pharmacy.     PIV removed and dressing applied. Vitals are stable. Patient alert and oriented.  Pt ambulated off unit safely.

## 2025-03-07 NOTE — DISCHARGE INSTRUCTIONS
some Lidoderm patches.  You can take the Celebrex I have prescribed for your pain, you can stop taking ibuprofen.  Follow-up closely with your primary care provider.  Your workup today was very reassuring.  You can also  some IcyHot and put this on the area of pain.

## 2025-03-07 NOTE — ED PROVIDER NOTES
ED Provider Note    CHIEF COMPLAINT  Chief Complaint   Patient presents with    Back Pain     Whole left back pain since 3 days ago, unrelieved by Tylenol    Denied any trauma, chest pain or shortness of breath       EXTERNAL RECORDS REVIEWED  External ED Note external ER note 3/5/2025, patient seen for ongoing flank pain, this pain was in her left.  Ultrasound revealed a nonobstructive stone in the right kidney which was known and seen on CT that was done 2/27/2025.  Patient basic labs revealed no associated leukocytosis, normal hemoglobin, and urinalysis negative for both nitrites and leukoesterase.    HPI/ROS      Serenity Howe is a 74 y.o. female who presents with back pain. Pt seen for similar problem 4 days ago. Pt also seen at [facility] for this issue. Nonobstructive R renal stone seen on US.  Patient reports pain is on her left mid to upper back.  She denies any associated shortness of breath or decreased exertional tolerance.  She denies any pleuritic component of her pain.  Patient reports pain is worse when she moves.  denies recent trauma  denies bowel or bladder incontinence  denies ivdu. denies fevers/constitutional sxs  denies saddle paresthesias  denies focal weakness/numbness  Able to ambulate      PAST MEDICAL HISTORY   has a past medical history of Acute exacerbation of chronic obstructive pulmonary disease (COPD) ([facility]) (7/5/2024), ASTHMA, Cancer ([facility]), Gout, History of breast cancer (07/29/2015), Hypertension, Shoulder pain, right (07/29/2015), and Tremor (07/29/2015).    SURGICAL HISTORY   has a past surgical history that includes mastectomy and gyn surgery.    FAMILY HISTORY  Family History   Problem Relation Age of Onset    Hyperlipidemia Mother     No Known Problems Father     No Known Problems Sister     No Known Problems Brother     No Known Problems Daughter     No Known Problems Daughter     No Known Problems Son        SOCIAL HISTORY  Social History     Tobacco Use    Smoking status:  "Never    Smokeless tobacco: Never   Vaping Use    Vaping status: Never Used   Substance and Sexual Activity    Alcohol use: No    Drug use: No    Sexual activity: Never     Partners: Male       CURRENT MEDICATIONS  Home Medications       Reviewed by Stephanie Canas R.N. (Registered Nurse) on 03/07/25 at 0351  Med List Status: Partial     Medication Last Dose Status   acetaminophen (TYLENOL) 325 MG Tab  Active   albuterol (PROVENTIL) 2.5mg/3ml Nebu Soln solution for nebulization  Active   albuterol 108 (90 Base) MCG/ACT Aero Soln inhalation aerosol  Active   fluticasone-salmeterol (ADVAIR) 250-50 MCG/ACT AEROSOL POWDER, BREATH ACTIVATED  Active   ibuprofen (MOTRIN) 800 MG Tab  Active   meloxicam (MOBIC) 7.5 MG Tab  Active   valsartan (DIOVAN) 160 MG Tab  Active   verapamil ER (CALAN SR) 240 MG Tab CR  Active                    ALLERGIES  Allergies   Allergen Reactions    Shellfish Allergy Hives and Shortness of Breath       PHYSICAL EXAM  VITAL SIGNS: BP (!) 173/109   Pulse 89   Temp 36.4 °C (97.5 °F) (Temporal)   Resp (!) 22   Ht 1.575 m (5' 2\")   Wt 71.2 kg (157 lb)   SpO2 93%   BMI 28.72 kg/m²    Physical Exam  Constitutional:       Appearance: She is well-developed.   HENT:      Head: Normocephalic and atraumatic.   Eyes:      Pupils: Pupils are equal, round, and reactive to light.   Cardiovascular:      Rate and Rhythm: Normal rate and regular rhythm.   Pulmonary:      Effort: Pulmonary effort is normal. No accessory muscle usage or respiratory distress.      Breath sounds: Normal breath sounds.   Abdominal:      General: Bowel sounds are normal.      Palpations: Abdomen is soft. There is no mass.      Tenderness: There is no abdominal tenderness.   Musculoskeletal:         General: Normal range of motion.      Comments: No surgical clonus, bilateral lower extremity strength is 5 of 5.  Sensation intact throughout.   Skin:     General: Skin is warm.      Capillary Refill: Capillary refill takes less than " 2 seconds.   Neurological:      General: No focal deficit present.      Mental Status: She is alert.   Psychiatric:         Mood and Affect: Mood normal. Mood is not anxious.           EKG/LABS  Results for orders placed or performed during the hospital encounter of 25   TROPONIN    Collection Time: 25  4:32 AM   Result Value Ref Range    Troponin T 12 6 - 19 ng/L   EKG    Collection Time: 25  5:13 AM   Result Value Ref Range    Report       [facility] Emergency Dept.    Test Date:  2025  Pt Name:    STEPHEN BETANCUR                Department: ER  MRN:        [id number]                      Room:        13  Gender:     Female                       Technician: [id number]  :        [id number]                   Requested By:FADI SCOTT  Order #:    [id number]                    Reading MD: Pippa Floyd MD    Measurements  Intervals                                Axis  Rate:       75                           P:          -46  OK:         161                          QRS:        51  QRSD:       88                           T:          69  QT:         387  QTc:        433    Interpretive Statements  EKG is normal sinus rhythm, normal axis normal intervals no ST changes  consistent with acute regional ischemia, there is some artifact but otherwise  is unchanged from EKG 25  Electronically Signed On 2025 05:13:08 PST by Pippa Floyd MD       *Note: Due to a large number of results and/or encounters for the requested time period, some results have not been displayed. A complete set of results can be found in Results Review.       I have independently interpreted this EKG    RADIOLOGY/PROCEDURES   I have independently interpreted the diagnostic imaging associated with this visit and am waiting the final reading from the radiologist.   My preliminary interpretation is as follows: No evidence of concerning findings.    Radiologist interpretation:  CT-CTA CHEST PULMONARY  ARTERY W/ RECONS   Final Result         1.  No pulmonary embolus appreciated.   2.  Atherosclerosis and atherosclerotic coronary artery disease.   3.  Pulmonary nodule, see nodule follow-up recommendations below.      Fleischner Society pulmonary nodule recommendations:   Low Risk: No routine follow-up      High Risk: Optional CT at 12 months      Comments: Nodules less than 6 mm do not require routine follow-up, but certain patients at high risk with suspicious nodule morphology, upper lobe location, or both may warrant 12-month follow-up.      Low Risk - Minimal or absent history of smoking and of other known risk factors.      High Risk - History of smoking or of other known risk factors.      Note: These recommendations do not apply to lung cancer screening, patients with immunosuppression, or patients with known primary cancer.      Fleischner Society 2017 Guidelines for Management of Incidentally Detected Pulmonary Nodules in Adults               COURSE & MEDICAL DECISION MAKING    ASSESSMENT, COURSE AND PLAN  Care Narrative: Patient here with some left-sided thoracic back pain.  She appears very well otherwise.  She does have some minimal wheezing on the left side, chest x-ray done on the fifth at [facility] was read as negative, no evidence of pneumonia.  Patient however does have some focal findings here, given this discrepancy will check CT for further evaluation.  Given the persistence of pain, patient's age, and patient's multiple ER visits for identical complaint will expand the workup to include a CTA of her chest to evaluate for possible pulmonary embolus as the cause of her symptoms.  Is unilateral, suspicion of dissection is low here.  Patient with likely musculoskeletal pain.  I did check a screening troponin though my suspicion of ACS is low here, patient was symptoms for greater than 3 hours, I believe a single troponin is sufficient here.  I also checked a screening EKG.  Other labs were  deferred as she had multiple visits in the last week which revealed baseline labs without any significant concerning findings.  Patient without any immediate red flags for acute cord compression, low suspicion of discitis, osteomyelitis, or epidural hematoma or abscess.    Pt with recent CT abdomen pelvis which failed to reveal any evidence of aneurysm or ectasia of abdominal aorta, or bony abnormality of her spine.  She is a known nonobstructive stone of the right kidney.     Patient troponin is within normal limits.  Patient's EKG is unchanged from prior.    CTPE does reveal any concerning findings. Pulmonary nodule disclosed to patient, she understands to follow-up with her primary care provider. Patient discharged in good condition.      CHEST PAIN:   HEART Score for Major Cardiac Events  HEART Score     History: Slightly suspicious  ECG: Normal  Age: 65+  Risk Factors: 1-2 risk factors  Troponin: Less than or equal to normal limit    Heart Score: 3    Total Score   0-3 Points = Low Score, risk of MACE 0.9-1.7%.  4-6 Points = Moderate Score, risk of MACE 12-16.6%  7-10 Points = High Score, risk of MACE 50-65%              DISPOSITION AND DISCUSSIONS      Decision tools and prescription drugs considered including, but not limited to: Heart score of 3    FINAL DIAGNOSIS  1. Strain of thoracic region, initial encounter  celecoxib (CELEBREX) 200 MG Cap

## 2025-05-04 ENCOUNTER — APPOINTMENT (OUTPATIENT)
Dept: RADIOLOGY | Facility: MEDICAL CENTER | Age: 75
End: 2025-05-04
Attending: EMERGENCY MEDICINE
Payer: MEDICARE

## 2025-05-04 ENCOUNTER — APPOINTMENT (OUTPATIENT)
Dept: RADIOLOGY | Facility: MEDICAL CENTER | Age: 75
End: 2025-05-04
Attending: STUDENT IN AN ORGANIZED HEALTH CARE EDUCATION/TRAINING PROGRAM
Payer: MEDICARE

## 2025-05-04 ENCOUNTER — PHARMACY VISIT (OUTPATIENT)
Dept: PHARMACY | Facility: MEDICAL CENTER | Age: 75
End: 2025-05-04
Payer: COMMERCIAL

## 2025-05-04 ENCOUNTER — HOSPITAL ENCOUNTER (EMERGENCY)
Facility: MEDICAL CENTER | Age: 75
End: 2025-05-04
Attending: STUDENT IN AN ORGANIZED HEALTH CARE EDUCATION/TRAINING PROGRAM
Payer: MEDICARE

## 2025-05-04 ENCOUNTER — HOSPITAL ENCOUNTER (EMERGENCY)
Facility: MEDICAL CENTER | Age: 75
End: 2025-05-04
Attending: EMERGENCY MEDICINE
Payer: MEDICARE

## 2025-05-04 VITALS
DIASTOLIC BLOOD PRESSURE: 74 MMHG | WEIGHT: 157 LBS | TEMPERATURE: 99.1 F | BODY MASS INDEX: 28.89 KG/M2 | OXYGEN SATURATION: 92 % | SYSTOLIC BLOOD PRESSURE: 164 MMHG | HEIGHT: 62 IN | RESPIRATION RATE: 18 BRPM | HEART RATE: 78 BPM

## 2025-05-04 VITALS
WEIGHT: 157 LBS | RESPIRATION RATE: 14 BRPM | SYSTOLIC BLOOD PRESSURE: 170 MMHG | HEART RATE: 66 BPM | DIASTOLIC BLOOD PRESSURE: 78 MMHG | BODY MASS INDEX: 28.89 KG/M2 | OXYGEN SATURATION: 95 % | HEIGHT: 62 IN | TEMPERATURE: 98.4 F

## 2025-05-04 DIAGNOSIS — R07.9 LEFT-SIDED CHEST PAIN: ICD-10-CM

## 2025-05-04 DIAGNOSIS — M10.9 ACUTE GOUT OF LEFT KNEE, UNSPECIFIED CAUSE: ICD-10-CM

## 2025-05-04 DIAGNOSIS — M25.462 KNEE EFFUSION, LEFT: ICD-10-CM

## 2025-05-04 DIAGNOSIS — M10.062 ACUTE IDIOPATHIC GOUT OF LEFT KNEE: ICD-10-CM

## 2025-05-04 LAB
ALBUMIN SERPL BCP-MCNC: 3.5 G/DL (ref 3.2–4.9)
ALBUMIN SERPL BCP-MCNC: 3.6 G/DL (ref 3.2–4.9)
ALBUMIN/GLOB SERPL: 0.8 G/DL
ALBUMIN/GLOB SERPL: 0.8 G/DL
ALP SERPL-CCNC: 91 U/L (ref 30–99)
ALP SERPL-CCNC: 93 U/L (ref 30–99)
ALT SERPL-CCNC: 11 U/L (ref 2–50)
ALT SERPL-CCNC: 12 U/L (ref 2–50)
ANION GAP SERPL CALC-SCNC: 11 MMOL/L (ref 7–16)
ANION GAP SERPL CALC-SCNC: 12 MMOL/L (ref 7–16)
APPEARANCE FLD: NORMAL
AST SERPL-CCNC: 19 U/L (ref 12–45)
AST SERPL-CCNC: 21 U/L (ref 12–45)
BASOPHILS # BLD AUTO: 0.6 % (ref 0–1.8)
BASOPHILS # BLD AUTO: 0.7 % (ref 0–1.8)
BASOPHILS # BLD: 0.05 K/UL (ref 0–0.12)
BASOPHILS # BLD: 0.06 K/UL (ref 0–0.12)
BILIRUB SERPL-MCNC: 0.5 MG/DL (ref 0.1–1.5)
BILIRUB SERPL-MCNC: 0.6 MG/DL (ref 0.1–1.5)
BODY FLD TYPE: NORMAL
BUN SERPL-MCNC: 14 MG/DL (ref 8–22)
BUN SERPL-MCNC: 20 MG/DL (ref 8–22)
CALCIUM ALBUM COR SERPL-MCNC: 10.2 MG/DL (ref 8.5–10.5)
CALCIUM ALBUM COR SERPL-MCNC: 10.2 MG/DL (ref 8.5–10.5)
CALCIUM SERPL-MCNC: 9.8 MG/DL (ref 8.5–10.5)
CALCIUM SERPL-MCNC: 9.9 MG/DL (ref 8.5–10.5)
CHLORIDE SERPL-SCNC: 106 MMOL/L (ref 96–112)
CHLORIDE SERPL-SCNC: 108 MMOL/L (ref 96–112)
CO2 SERPL-SCNC: 21 MMOL/L (ref 20–33)
CO2 SERPL-SCNC: 23 MMOL/L (ref 20–33)
COLOR FLD: YELLOW
CREAT SERPL-MCNC: 1.03 MG/DL (ref 0.5–1.4)
CREAT SERPL-MCNC: 1.21 MG/DL (ref 0.5–1.4)
CRP SERPL HS-MCNC: 4.77 MG/DL (ref 0–0.75)
CRYSTALS FLD MICRO: NORMAL
EKG IMPRESSION: NORMAL
EOSINOPHIL # BLD AUTO: 0.11 K/UL (ref 0–0.51)
EOSINOPHIL # BLD AUTO: 0.13 K/UL (ref 0–0.51)
EOSINOPHIL NFR BLD: 1.2 % (ref 0–6.9)
EOSINOPHIL NFR BLD: 1.5 % (ref 0–6.9)
ERYTHROCYTE [DISTWIDTH] IN BLOOD BY AUTOMATED COUNT: 41.7 FL (ref 35.9–50)
ERYTHROCYTE [DISTWIDTH] IN BLOOD BY AUTOMATED COUNT: 42.5 FL (ref 35.9–50)
ERYTHROCYTE [SEDIMENTATION RATE] IN BLOOD BY WESTERGREN METHOD: 60 MM/HOUR (ref 0–25)
GFR SERPLBLD CREATININE-BSD FMLA CKD-EPI: 46 ML/MIN/1.73 M 2
GFR SERPLBLD CREATININE-BSD FMLA CKD-EPI: 56 ML/MIN/1.73 M 2
GLOBULIN SER CALC-MCNC: 4.5 G/DL (ref 1.9–3.5)
GLOBULIN SER CALC-MCNC: 4.7 G/DL (ref 1.9–3.5)
GLUCOSE FLD-MCNC: 82 MG/DL
GLUCOSE SERPL-MCNC: 100 MG/DL (ref 65–99)
GLUCOSE SERPL-MCNC: 99 MG/DL (ref 65–99)
GRAM STN SPEC: NORMAL
HCT VFR BLD AUTO: 39.1 % (ref 37–47)
HCT VFR BLD AUTO: 39.8 % (ref 37–47)
HGB BLD-MCNC: 13.3 G/DL (ref 12–16)
HGB BLD-MCNC: 13.6 G/DL (ref 12–16)
IMM GRANULOCYTES # BLD AUTO: 0.02 K/UL (ref 0–0.11)
IMM GRANULOCYTES # BLD AUTO: 0.02 K/UL (ref 0–0.11)
IMM GRANULOCYTES NFR BLD AUTO: 0.2 % (ref 0–0.9)
IMM GRANULOCYTES NFR BLD AUTO: 0.2 % (ref 0–0.9)
LYMPHOCYTES # BLD AUTO: 1.45 K/UL (ref 1–4.8)
LYMPHOCYTES # BLD AUTO: 2.17 K/UL (ref 1–4.8)
LYMPHOCYTES NFR BLD: 17 % (ref 22–41)
LYMPHOCYTES NFR BLD: 23.7 % (ref 22–41)
LYMPHOCYTES NFR FLD: 6 %
MCH RBC QN AUTO: 32.5 PG (ref 27–33)
MCH RBC QN AUTO: 32.5 PG (ref 27–33)
MCHC RBC AUTO-ENTMCNC: 34 G/DL (ref 32.2–35.5)
MCHC RBC AUTO-ENTMCNC: 34.2 G/DL (ref 32.2–35.5)
MCV RBC AUTO: 95 FL (ref 81.4–97.8)
MCV RBC AUTO: 95.6 FL (ref 81.4–97.8)
MONOCYTES # BLD AUTO: 0.94 K/UL (ref 0–0.85)
MONOCYTES # BLD AUTO: 1.15 K/UL (ref 0–0.85)
MONOCYTES NFR BLD AUTO: 11 % (ref 0–13.4)
MONOCYTES NFR BLD AUTO: 12.6 % (ref 0–13.4)
MONOS+MACROS NFR FLD MANUAL: 4 %
NEUTROPHILS # BLD AUTO: 5.65 K/UL (ref 1.82–7.42)
NEUTROPHILS # BLD AUTO: 5.93 K/UL (ref 1.82–7.42)
NEUTROPHILS NFR BLD: 61.6 % (ref 44–72)
NEUTROPHILS NFR BLD: 69.7 % (ref 44–72)
NEUTROPHILS NFR FLD: 90 %
NRBC # BLD AUTO: 0 K/UL
NRBC # BLD AUTO: 0 K/UL
NRBC BLD-RTO: 0 /100 WBC (ref 0–0.2)
NRBC BLD-RTO: 0 /100 WBC (ref 0–0.2)
NT-PROBNP SERPL IA-MCNC: 1039 PG/ML (ref 0–125)
NUC CELL # FLD: 7717 CELLS/UL
PLATELET # BLD AUTO: 262 K/UL (ref 164–446)
PLATELET # BLD AUTO: 283 K/UL (ref 164–446)
PMV BLD AUTO: 9 FL (ref 9–12.9)
PMV BLD AUTO: 9 FL (ref 9–12.9)
POTASSIUM SERPL-SCNC: 3.5 MMOL/L (ref 3.6–5.5)
POTASSIUM SERPL-SCNC: 3.6 MMOL/L (ref 3.6–5.5)
PROT FLD-MCNC: 4.1 G/DL
PROT SERPL-MCNC: 8 G/DL (ref 6–8.2)
PROT SERPL-MCNC: 8.3 G/DL (ref 6–8.2)
RBC # BLD AUTO: 4.09 M/UL (ref 4.2–5.4)
RBC # BLD AUTO: 4.19 M/UL (ref 4.2–5.4)
RBC # FLD: <2000 CELLS/UL
SIGNIFICANT IND 70042: NORMAL
SITE SITE: NORMAL
SODIUM SERPL-SCNC: 140 MMOL/L (ref 135–145)
SODIUM SERPL-SCNC: 141 MMOL/L (ref 135–145)
SOURCE SOURCE: NORMAL
TROPONIN T SERPL-MCNC: 8 NG/L (ref 6–19)
URATE SERPL-MCNC: 9.5 MG/DL (ref 1.9–8.2)
WBC # BLD AUTO: 8.5 K/UL (ref 4.8–10.8)
WBC # BLD AUTO: 9.2 K/UL (ref 4.8–10.8)

## 2025-05-04 PROCEDURE — 84484 ASSAY OF TROPONIN QUANT: CPT

## 2025-05-04 PROCEDURE — 89060 EXAM SYNOVIAL FLUID CRYSTALS: CPT

## 2025-05-04 PROCEDURE — 82945 GLUCOSE OTHER FLUID: CPT

## 2025-05-04 PROCEDURE — 99285 EMERGENCY DEPT VISIT HI MDM: CPT

## 2025-05-04 PROCEDURE — 89051 BODY FLUID CELL COUNT: CPT

## 2025-05-04 PROCEDURE — 85652 RBC SED RATE AUTOMATED: CPT

## 2025-05-04 PROCEDURE — 700111 HCHG RX REV CODE 636 W/ 250 OVERRIDE (IP): Mod: JZ | Performed by: EMERGENCY MEDICINE

## 2025-05-04 PROCEDURE — 84550 ASSAY OF BLOOD/URIC ACID: CPT

## 2025-05-04 PROCEDURE — 71045 X-RAY EXAM CHEST 1 VIEW: CPT

## 2025-05-04 PROCEDURE — 87070 CULTURE OTHR SPECIMN AEROBIC: CPT

## 2025-05-04 PROCEDURE — 700102 HCHG RX REV CODE 250 W/ 637 OVERRIDE(OP): Performed by: EMERGENCY MEDICINE

## 2025-05-04 PROCEDURE — 86140 C-REACTIVE PROTEIN: CPT

## 2025-05-04 PROCEDURE — 73562 X-RAY EXAM OF KNEE 3: CPT | Mod: LT

## 2025-05-04 PROCEDURE — 93005 ELECTROCARDIOGRAM TRACING: CPT | Mod: TC | Performed by: EMERGENCY MEDICINE

## 2025-05-04 PROCEDURE — 36415 COLL VENOUS BLD VENIPUNCTURE: CPT

## 2025-05-04 PROCEDURE — RXMED WILLOW AMBULATORY MEDICATION CHARGE: Performed by: STUDENT IN AN ORGANIZED HEALTH CARE EDUCATION/TRAINING PROGRAM

## 2025-05-04 PROCEDURE — A9270 NON-COVERED ITEM OR SERVICE: HCPCS | Performed by: EMERGENCY MEDICINE

## 2025-05-04 PROCEDURE — 83880 ASSAY OF NATRIURETIC PEPTIDE: CPT

## 2025-05-04 PROCEDURE — 85025 COMPLETE CBC W/AUTO DIFF WBC: CPT | Mod: 91

## 2025-05-04 PROCEDURE — 84157 ASSAY OF PROTEIN OTHER: CPT

## 2025-05-04 PROCEDURE — 96374 THER/PROPH/DIAG INJ IV PUSH: CPT

## 2025-05-04 PROCEDURE — 96374 THER/PROPH/DIAG INJ IV PUSH: CPT | Mod: XU

## 2025-05-04 PROCEDURE — 93005 ELECTROCARDIOGRAM TRACING: CPT | Mod: TC

## 2025-05-04 PROCEDURE — 85025 COMPLETE CBC W/AUTO DIFF WBC: CPT

## 2025-05-04 PROCEDURE — 20610 DRAIN/INJ JOINT/BURSA W/O US: CPT

## 2025-05-04 PROCEDURE — 700111 HCHG RX REV CODE 636 W/ 250 OVERRIDE (IP): Performed by: STUDENT IN AN ORGANIZED HEALTH CARE EDUCATION/TRAINING PROGRAM

## 2025-05-04 PROCEDURE — 87205 SMEAR GRAM STAIN: CPT

## 2025-05-04 PROCEDURE — 99284 EMERGENCY DEPT VISIT MOD MDM: CPT

## 2025-05-04 PROCEDURE — 80053 COMPREHEN METABOLIC PANEL: CPT | Mod: 91

## 2025-05-04 PROCEDURE — 700101 HCHG RX REV CODE 250: Performed by: STUDENT IN AN ORGANIZED HEALTH CARE EDUCATION/TRAINING PROGRAM

## 2025-05-04 PROCEDURE — 80053 COMPREHEN METABOLIC PANEL: CPT

## 2025-05-04 RX ORDER — METHYLPREDNISOLONE 4 MG/1
TABLET ORAL
Qty: 21 EACH | Refills: 0 | Status: SHIPPED | OUTPATIENT
Start: 2025-05-04

## 2025-05-04 RX ORDER — OXYCODONE AND ACETAMINOPHEN 5; 325 MG/1; MG/1
1 TABLET ORAL ONCE
Refills: 0 | Status: COMPLETED | OUTPATIENT
Start: 2025-05-04 | End: 2025-05-04

## 2025-05-04 RX ORDER — BUPIVACAINE HYDROCHLORIDE AND EPINEPHRINE 5; 5 MG/ML; UG/ML
20 INJECTION, SOLUTION EPIDURAL; INTRACAUDAL; PERINEURAL ONCE
Status: COMPLETED | OUTPATIENT
Start: 2025-05-04 | End: 2025-05-04

## 2025-05-04 RX ORDER — MORPHINE SULFATE 15 MG/1
15 TABLET ORAL EVERY 12 HOURS PRN
Qty: 7 TABLET | Refills: 0 | Status: SHIPPED | OUTPATIENT
Start: 2025-05-04 | End: 2025-05-11

## 2025-05-04 RX ORDER — KETOROLAC TROMETHAMINE 15 MG/ML
15 INJECTION, SOLUTION INTRAMUSCULAR; INTRAVENOUS ONCE
Status: COMPLETED | OUTPATIENT
Start: 2025-05-04 | End: 2025-05-04

## 2025-05-04 RX ORDER — MORPHINE SULFATE 4 MG/ML
4 INJECTION INTRAVENOUS ONCE
Status: COMPLETED | OUTPATIENT
Start: 2025-05-04 | End: 2025-05-04

## 2025-05-04 RX ADMIN — KETOROLAC TROMETHAMINE 15 MG: 15 INJECTION, SOLUTION INTRAMUSCULAR; INTRAVENOUS at 22:04

## 2025-05-04 RX ADMIN — MORPHINE SULFATE 4 MG: 4 INJECTION INTRAVENOUS at 04:46

## 2025-05-04 RX ADMIN — BUPIVACAINE HYDROCHLORIDE AND EPINEPHRINE BITARTRATE 20 ML: 5; .0091 INJECTION, SOLUTION EPIDURAL; INTRACAUDAL; PERINEURAL at 03:15

## 2025-05-04 RX ADMIN — OXYCODONE AND ACETAMINOPHEN 1 TABLET: 5; 325 TABLET ORAL at 22:26

## 2025-05-04 ASSESSMENT — PAIN DESCRIPTION - PAIN TYPE
TYPE: CHRONIC PAIN
TYPE: ACUTE PAIN
TYPE: ACUTE PAIN;CHRONIC PAIN

## 2025-05-04 ASSESSMENT — FIBROSIS 4 INDEX
FIB4 SCORE: 1.69
FIB4 SCORE: 1.81

## 2025-05-04 NOTE — ED NOTES
Bedside report received from off going RN/tech: Kari RN, assumed care of patient.  POC discussed with patient. Call light within reach, all needs addressed at this time.       Fall risk interventions in place: Patient's personal possessions are with in their safe reach, Place fall risk sign on patient's door, and Keep floor surfaces clean and dry (all applicable per [place] Fall risk assessment)   Continuous monitoring: Pulse Ox or Blood Pressure  IVF/IV medications: Not Applicable   Oxygen: Room Air  Bedside sitter: Not Applicable   Isolation: Not Applicable

## 2025-05-04 NOTE — ED PROVIDER NOTES
ER Provider Note    Scribed for Estefania Ellis M.D. by Gina Berrios. 2025   2:47 AM    Primary Care Provider: Jessie Flores M.D.    CHIEF COMPLAINT  Chief Complaint   Patient presents with    Knee Pain     Left knee pain since 1 week     Denied any trauma     EXTERNAL RECORDS REVIEWED  Outpatient Notes Patient was seen at primary care 2025 for hypertension, chronic low back pain without sciatica, insomnia follow up.     Patient seen at outside ED 2025 for left knee pain.    HPI/ROS  LIMITATION TO HISTORY   Select: : None  OUTSIDE HISTORIAN(S):  None    Serenity Howe is a 74 y.o. female, with a history of Gout, who presents to the ED for evaluation of left knee pain onset one week ago. The patient reports that no trauma has occurred, however she started to develop left knee pain that is a 9/10 in severity. She reports pain in this knee before, multiple years ago, and she needed a shot to her knee for pain relief. She denies fevers, chills, nausea, vomiting.There are no known alleviating or exacerbating factors.      PAST MEDICAL HISTORY  Past Medical History:   Diagnosis Date    Acute exacerbation of chronic obstructive pulmonary disease (COPD) (HCC) 2024    ASTHMA     Cancer (HCC)     Gout     History of breast cancer 2015    Hypertension     Shoulder pain, right 2015    Tremor 2015       SURGICAL HISTORY  Past Surgical History:   Procedure Laterality Date    GYN SURGERY       x1    MASTECTOMY      right        FAMILY HISTORY  Family History   Problem Relation Age of Onset    Hyperlipidemia Mother     No Known Problems Father     No Known Problems Sister     No Known Problems Brother     No Known Problems Daughter     No Known Problems Daughter     No Known Problems Son        SOCIAL HISTORY   reports that she has never smoked. She has never used smokeless tobacco. She reports that she does not drink alcohol and does not use drugs.    CURRENT  "MEDICATIONS  Previous Medications    ACETAMINOPHEN (TYLENOL) 325 MG TAB    Take 650 mg by mouth every 6 hours as needed for Mild Pain.    ALBUTEROL (PROVENTIL) 2.5MG/3ML NEBU SOLN SOLUTION FOR NEBULIZATION    Take 3 mL by nebulization every four hours as needed for Shortness of Breath.    ALBUTEROL 108 (90 BASE) MCG/ACT AERO SOLN INHALATION AEROSOL    Inhale 1-2 Puffs every four hours as needed for Shortness of Breath.    CELECOXIB (CELEBREX) 200 MG CAP    Take 1 Capsule by mouth 2 times a day.    FLUTICASONE-SALMETEROL (ADVAIR) 250-50 MCG/ACT AEROSOL POWDER, BREATH ACTIVATED    Inhale 1 Puff 2 times a day.    IBUPROFEN (MOTRIN) 800 MG TAB    Take 1 Tablet by mouth every 8 hours as needed for Mild Pain.    MELOXICAM (MOBIC) 7.5 MG TAB    Take 1 Tablet by mouth every day.    VALSARTAN (DIOVAN) 160 MG TAB    Take 1 Tablet by mouth every day.    VERAPAMIL ER (CALAN SR) 240 MG TAB CR    Take 240 mg by mouth every evening.       ALLERGIES  Allergies   Allergen Reactions    Shellfish Allergy Hives and Shortness of Breath        PHYSICAL EXAM  BP (!) 172/106   Pulse 92   Temp 37.2 °C (98.9 °F) (Temporal)   Resp 18   Ht 1.575 m (5' 2\")   Wt 71.2 kg (157 lb)   SpO2 92%   BMI 28.72 kg/m²    General: Pleasant female, uncomfortable appearing, non-toxic, laying in the bed.  Head: Normocephalic atraumatic  Eyes: Extraocular motion intact  Neck: Supple, no rigidity  Cardiovascular: Regular rate and rhythm via peripheral pulses   Respiratory: equal chest rise and fall, no increased work of breathing  Abdomen: Soft nontender no guarding  Musculoskeletal: Warm and well perfused, no peripheral edema. Left knee effusion, pain with ROM, slightly hot to the touch compared to right.  Flexes and extends toes, hallices, able to dorsiflex/plantar flex the ankle, sensation intact to light touch, deep, superficial peroneal, tibial, sural and plantar distributions.   Neuro: Alert, no focal deficits  Integumentary: No wounds or rashes "     DIAGNOSTIC STUDIES    Labs:   Labs Reviewed   CBC WITH DIFFERENTIAL - Abnormal; Notable for the following components:       Result Value    RBC 4.09 (*)     Lymphocytes 17.00 (*)     Monos (Absolute) 0.94 (*)     All other components within normal limits   COMP METABOLIC PANEL - Abnormal; Notable for the following components:    Glucose 100 (*)     Globulin 4.5 (*)     All other components within normal limits   URIC ACID - Abnormal; Notable for the following components:    Uric Acid 9.5 (*)     All other components within normal limits   CRP QUANTITIVE (NON-CARDIAC) - Abnormal; Notable for the following components:    Stat C-Reactive Protein 4.77 (*)     All other components within normal limits   SED RATE - Abnormal; Notable for the following components:    Sed Rate Westergren 60 (*)     All other components within normal limits   ESTIMATED GFR - Abnormal; Notable for the following components:    GFR (CKD-EPI) 56 (*)     All other components within normal limits   FLUID CELL COUNT   FLUID TOTAL PROTEIN   FLUID GLUCOSE   FLUID CRYSTALS   GRAM STAIN   FLUID CULTURE W/GRAM STAIN       Radiology:   This attending emergency physician has independently interpreted the diagnostic imaging associated with this visit and is awaiting the final reading from the radiologist.   Preliminary interpretation is a follows: No acute abnormalities    Radiologist interpretation:   DX-KNEE 3 VIEWS LEFT   Final Result      Moderate osteoarthritic degenerative changes of the knee without acute fracture or malalignment.         Point of Care Ultrasound    ED ULTRASOUND GUIDED ARTHROCENTESIS    Indication: Joint pain    Consent: The patient was counseled regarding the procedure, it's indications, risks, potential complications and alternatives and any questions were answered. Consent was obtained.  Bedside Ultrasound guidance utilized.   Static image obtained with skin marked  Other Findings: n/a      Procedure: The left knee was  positioned appropriately.  Local anesthesia was obtained by infiltration using 0.5% Bupivacaine with epinephrine.  The area was then prepped and draped in the usual sterile fashion.  A needle was then introduced into the joint space at which point 15 cc of turbid fluid was aspirated.  A joint injection was performed using 15 cc of 0.5% bupivacaine.  The aspirated fluid was sent to the lab for appropriate testing.  A sterile dressing was then applied to the site.    The patient tolerated the procedure well.    Complications: None     Image retained through [facility] as seen below:       This study is a limited ultrasound examination performed and interpreted to evaluate for limited conditions as outlined above. There may be other clinically important information contained in the images that is outside this scope. When clinically warranted, a comprehensive ultrasound through the appropriate department is considered.        INITIAL ASSESSMENT COURSE AND PLAN  Care Narrative     2:47 AM - Patient was evaluated at bedside. They present for left knee pain, without trauma, for the past week. Pertinent PE findings include: Left knee effusion, pain with ROM, slightly hot to the touch compared to right. Differential diagnoses include but not limited to: Gout, inflammatory arthritis, septic arthritis     4:48 AM  - Arthrocentesis procedure done at this time, as noted above. Sample sent for appropriate testing.  Patient feels significantly better after likely an inflammatory condition    Arthrocentesis and pain medications.  Her synovial fluid studies are notable for crystals, as well as PMNs, 7700 which is also suggestive of an inflammatory condition Gram stain is negative.    - Reviewed patient's laboratory, imaging results at the bedside, patient is agreeable to discharge, we discussed strict return precautions, and outpatient follow-up, patient was discharged in no acute distress.  All questions were answered prior to  discharge.     Narcotics Script: In prescribing controlled substances to this patient, I certify that I have obtained and reviewed the medical history of Serenity Howe. I have also made a good rosario effort to obtain applicable records from other providers who have treated the patient and records did not demonstrate any increased risk of substance abuse that would prevent me from prescribing controlled substances.     I have conducted a physical exam and documented it. I have reviewed Ms. Howe’s prescription history as maintained by the [place] Prescription Monitoring Program.     I have assessed the patient’s risk for abuse, dependency, and addiction using the validated Opioid Risk Tool available at https://www.mdcalc.com/qjwnvy-gqvh-ocwa-ort-narcotic-abuse.     Given the above, I believe the benefits of controlled substance therapy outweigh the risks. The reasons for prescribing controlled substances include non-narcotic, oral analgesic alternatives have been inadequate for pain control. Accordingly, I have discussed the risk and benefits, treatment plan, and alternative therapies with the patient.          DISPOSITION AND DISCUSSIONS    I have discussed management of the patient with the following physicians and JOSSELIN's:  None    Discussion of management with other [facility] or appropriate source(s): None     Escalation of care considered, and ultimately not performed: acute inpatient care management, however at this time, the patient is most appropriate for outpatient management.    Barriers to care at this time, including but not limited to: None.     Decision tools and prescription drugs considered including, but not limited to: Pain Medications Tylenol and Motrin .    The patient will return for new or worsening symptoms and is stable at the time of discharge.    The patient is referred to a primary physician for blood pressure management, diabetic screening, and for all other preventative health  concerns.    DISPOSITION:  Patient will be discharged home in stable condition.    FOLLOW UP:  Jessie Flores M.D.  [address] [address] [phone]    In 3 days        OUTPATIENT MEDICATIONS:  New Prescriptions    METHYLPREDNISOLONE (MEDROL DOSEPAK) 4 MG TABLET THERAPY PACK    Use as directed    MORPHINE (MS IR) 15 MG TABLET    Take 1 Tablet by mouth every 12 hours as needed for Severe Pain for up to 7 days.     FINAL DIAGNOSIS  1. Knee effusion, left    2. Acute gout of left knee, unspecified cause      2. Arthrocentesis procedure, POCUS     Gina ZIMMERMAN (Scribe), am scribing for, and in the presence of, Teto Ellis M.D..    Electronically signed by: Gina Berrios ([place]), 5/4/2025    Teto ZIMMERMAN M.D. personally performed the services described in this documentation, as scribed by Gina Berrios in my presence, and it is both accurate and complete.      The note accurately reflects work and decisions made by me.  Teto Ellis M.D.  5/4/2025  7:37 AM

## 2025-05-04 NOTE — DISCHARGE INSTRUCTIONS
As discussed, gout is likely to recur, please talk to your primary about restarting allopurinol.  Take the pain medications as needed, take the steroids as prescribed.  Try to avoid taking excessive pain medications as they have multiple bad effects such as constipation, if you are taking them regularly you need to take a stool softener.

## 2025-05-04 NOTE — ED NOTES
Pt wheeled to restroom. Pt back on stretcher in locked in lowest position. VS stable, on monitor. All belongings with patient. Call light in reach.

## 2025-05-04 NOTE — ED TRIAGE NOTES
Chief Complaint   Patient presents with    Knee Pain     Left knee pain since 1 week     Denied any trauma     Pain:  9/10  Ambulatory:  on wheelchair  Alert and Oriented: x 4  Oxygen Treatment: No    Pt came in to triage for the above complaints.     Pt is speaking in full sentences, follows commands and responds appropriately to questions.     Respirations are even and unlabored.    Pt placed in lobby. Pt educated on triage process.     Pt encouraged to inform staff for any changes in condition or if needs help while waiting to be room in.      Vitals:    05/04/25 0216   BP: (!) 172/106   Pulse: 92   Resp: 18   Temp: 37.2 °C (98.9 °F)   SpO2: 92%

## 2025-05-04 NOTE — ED NOTES
Pt d/c home in wheelchair.  Pt given d/c instructions and signed d/c paper. IV d/c with tip intact and bleeding controlled. Pt educated on follow up plan and medication usage, pt verbalized understanding of d/c instructions. PT vs stable. Pt had all belongings at d/c.

## 2025-05-05 NOTE — ED NOTES
Pt escorted via wheelchair to room, placed on o2 and cardiac monitor reports 9/10 left knee pain that is chronically getting worse

## 2025-05-05 NOTE — ED NOTES
Assist RN:  Pt brought from lob to [place] [address] via wheelchair. Pt placed in hospital gown and is now sitting up in bed with even and unlabored breaths, in no apparent distress at this time. Primary RN at bedside getting pt hooked up on monitor.

## 2025-05-05 NOTE — DISCHARGE INSTRUCTIONS
Take your medications as prescribed.  Referral has been placed for cardiology and they will contact you to make a follow-up appointment for any further concerns of chest pain.  Return for any change or worsening symptoms

## 2025-05-05 NOTE — ED PROVIDER NOTES
ER Provider Note    Scribed for Franklyn Bartlett D.O. by Jessica Jackson. 2025  9:42 PM    Primary Care Provider: Jessie Flores M.D.    CHIEF COMPLAINT  Chief Complaint   Patient presents with    Chest Pain     Chest pain for about 2 days. Pain non-reproducible, non-radiating, denies N/V, pt states pain is consistent upon inspiration.     Knee Pain     Worsening Knee pain.       HPI/ROS    Serenity Howe is a 74 y.o. female who presents to the Emergency Department for acute, intermittent chest pain onset 2 days ago. The patient is also complaining of left knee pain, noting that she was seen here previously where she had some fluid drained. She was told at that time that she had gout. Patient notes that her chest pain is present with movement and deep breaths. The patient adds that she had vomited earlier. Denies any cough, diarrhea, or difficulty breathing. There were no alleviating factors reported. The patient denies any heart problem history. She reports that she has a history of asthma. Patient states that she did not grab her prescription she was prescribed with her previous visit due to having pain and difficulty moving around. The patient has no known drug allergies.     ROS as per HPI.    PAST MEDICAL HISTORY  Past Medical History:   Diagnosis Date    Acute exacerbation of chronic obstructive pulmonary disease (COPD) ([facility]) 2024    ASTHMA     Cancer ([facility])     Gout     History of breast cancer 2015    Hypertension     Shoulder pain, right 2015    Tremor 2015     SURGICAL HISTORY  Past Surgical History:   Procedure Laterality Date    GYN SURGERY       x1    MASTECTOMY      right      FAMILY HISTORY  Family History   Problem Relation Age of Onset    Hyperlipidemia Mother     No Known Problems Father     No Known Problems Sister     No Known Problems Brother     No Known Problems Daughter     No Known Problems Daughter     No Known Problems Son      SOCIAL HISTORY    "reports that she has never smoked. She has never used smokeless tobacco. She reports that she does not drink alcohol and does not use drugs.    CURRENT MEDICATIONS  Previous Medications    ACETAMINOPHEN (TYLENOL) 325 MG TAB    Take 650 mg by mouth every 6 hours as needed for Mild Pain.    ALBUTEROL (PROVENTIL) 2.5MG/3ML NEBU SOLN SOLUTION FOR NEBULIZATION    Take 3 mL by nebulization every four hours as needed for Shortness of Breath.    ALBUTEROL 108 (90 BASE) MCG/ACT AERO SOLN INHALATION AEROSOL    Inhale 1-2 Puffs every four hours as needed for Shortness of Breath.    CELECOXIB (CELEBREX) 200 MG CAP    Take 1 Capsule by mouth 2 times a day.    FLUTICASONE-SALMETEROL (ADVAIR) 250-50 MCG/ACT AEROSOL POWDER, BREATH ACTIVATED    Inhale 1 Puff 2 times a day.    IBUPROFEN (MOTRIN) 800 MG TAB    Take 1 Tablet by mouth every 8 hours as needed for Mild Pain.    MELOXICAM (MOBIC) 7.5 MG TAB    Take 1 Tablet by mouth every day.    METHYLPREDNISOLONE (MEDROL DOSEPAK) 4 MG TABLET THERAPY PACK    Follow as scheduled per package instructions    MORPHINE (MS IR) 15 MG TABLET    Take 1 Tablet by mouth every 12 hours as needed for Severe Pain for up to 7 days.    VALSARTAN (DIOVAN) 160 MG TAB    Take 1 Tablet by mouth every day.    VERAPAMIL ER (CALAN SR) 240 MG TAB CR    Take 240 mg by mouth every evening.     ALLERGIES  Shellfish allergy    PHYSICAL EXAM  BP (!) 152/74   Pulse 80   Temp 37.3 °C (99.1 °F) (Temporal)   Resp 18   Ht 1.575 m (5' 2\")   Wt 71.2 kg (157 lb)   SpO2 92%   BMI 28.72 kg/m²     General: No acute distress.  HENT: Normocephalic, Mucus membranes are moist.   Chest: Lungs have even and unlabored respirations, Clear to auscultation.   Cardiovascular: Regular rate and regular rhythm, No peripheral cyanosis.  Abdomen: Non distended.  Extremities: Left knee effusion  Neuro: Awake, Conversive, Able to relay recent events.  Psychiatric: Calm and cooperative.       INITIAL ASSESSMENT  Patient was seen here " yesterday and had an arthrocentesis done and was diagnosed with gout. She did not get her prescription filled, so she is having pain. The patient is also complaining of intermittent left chest pain. Her pain is only with deep breaths. She has no shortness of breath or cough. Will evaluate for cardiac injury.     ED Observation Status? No; Patient does not meet criteria for ED Observation.     DIAGNOSTIC STUDIES    Labs:   Results for orders placed or performed during the hospital encounter of 05/04/25   CBC with Differential    Collection Time: 05/04/25  8:59 PM   Result Value Ref Range    WBC 9.2 4.8 - 10.8 K/uL    RBC 4.19 (L) 4.20 - 5.40 M/uL    Hemoglobin 13.6 12.0 - 16.0 g/dL    Hematocrit 39.8 37.0 - 47.0 %    MCV 95.0 81.4 - 97.8 fL    MCH 32.5 27.0 - 33.0 pg    MCHC 34.2 32.2 - 35.5 g/dL    RDW 42.5 35.9 - 50.0 fL    Platelet Count 283 164 - 446 K/uL    MPV 9.0 9.0 - 12.9 fL    Neutrophils-Polys 61.60 44.00 - 72.00 %    Lymphocytes 23.70 22.00 - 41.00 %    Monocytes 12.60 0.00 - 13.40 %    Eosinophils 1.20 0.00 - 6.90 %    Basophils 0.70 0.00 - 1.80 %    Immature Granulocytes 0.20 0.00 - 0.90 %    Nucleated RBC 0.00 0.00 - 0.20 /100 WBC    Neutrophils (Absolute) 5.65 1.82 - 7.42 K/uL    Lymphs (Absolute) 2.17 1.00 - 4.80 K/uL    Monos (Absolute) 1.15 (H) 0.00 - 0.85 K/uL    Eos (Absolute) 0.11 0.00 - 0.51 K/uL    Baso (Absolute) 0.06 0.00 - 0.12 K/uL    Immature Granulocytes (abs) 0.02 0.00 - 0.11 K/uL    NRBC (Absolute) 0.00 K/uL   Complete Metabolic Panel (CMP)    Collection Time: 05/04/25  8:59 PM   Result Value Ref Range    Sodium 140 135 - 145 mmol/L    Potassium 3.5 (L) 3.6 - 5.5 mmol/L    Chloride 106 96 - 112 mmol/L    Co2 23 20 - 33 mmol/L    Anion Gap 11.0 7.0 - 16.0    Glucose 99 65 - 99 mg/dL    Bun 20 8 - 22 mg/dL    Creatinine 1.21 0.50 - 1.40 mg/dL    Calcium 9.9 8.5 - 10.5 mg/dL    Correct Calcium 10.2 8.5 - 10.5 mg/dL    AST(SGOT) 19 12 - 45 U/L    ALT(SGPT) 12 2 - 50 U/L    Alkaline  Phosphatase 93 30 - 99 U/L    Total Bilirubin 0.5 0.1 - 1.5 mg/dL    Albumin 3.6 3.2 - 4.9 g/dL    Total Protein 8.3 (H) 6.0 - 8.2 g/dL    Globulin 4.7 (H) 1.9 - 3.5 g/dL    A-G Ratio 0.8 g/dL   proBrain Natriuretic Peptide, NT (BNP)    Collection Time: 25  8:59 PM   Result Value Ref Range    NT-proBNP 1039 (H) 0 - 125 pg/mL   Troponins NOW    Collection Time: 25  8:59 PM   Result Value Ref Range    Troponin T 8 6 - 19 ng/L   ESTIMATED GFR    Collection Time: 25  8:59 PM   Result Value Ref Range    GFR (CKD-EPI) 46 (A) >60 mL/min/1.73 m 2   EKG    Collection Time: 25 10:16 PM   Result Value Ref Range    Report       [facility] Emergency Dept.    Test Date:  2025  Pt Name:    STEPHEN BETANCUR                Department: ER  MRN:        [id number]                      Room:  Gender:     Female                       Technician: [id number]  :        [id number]                   Requested By:ER TRIAGE PROTOCOL  Order #:    [id number]                    Reading MD: JACLYN CAPELLAN D.O.    Measurements  Intervals                                Axis  Rate:       82                           P:          69  PA:         156                          QRS:        53  QRSD:       84                           T:          61  QT:         387  QTc:        452    Interpretive Statements  Sinus rhythm  Compared to ECG 2025 04:36:40  ST (T wave) deviation no longer present  Possible ischemia no longer present  Electronically Signed On 2025 22:16:38 PDT by JACLYN CAPELLAN D.O.        EKG:   I have independently interpreted the above EKG.    Radiology:   The attending emergency physician has independently interpreted the diagnostic imaging associated with this visit and am waiting the final reading from the radiologist.   Preliminary interpretation is as follows: No pneumonia  Radiologist interpretation:   DX-CHEST-PORTABLE (1 VIEW)   Final Result         1.  Bilateral basilar  atelectasis, no focal infiltrate   2.  Cardiomegaly   3.  Atherosclerosis         COURSE & MEDICAL DECISION MAKING     COURSE AND PLAN  9:42 PM - Patient seen and examined at bedside. Discussed plan of care, including ordering imaging, labs and EKG to further evaluate. Patient agrees to the plan of care. The patient will be medicated with Toradol injection 15 mg. Ordered for DX-chest, CBC with diff, CMP, BNP, Troponins NOW, EKG to evaluate her symptoms.     10:17 PM - Patient was reevaluated at bedside. I updated the patient on her diagnostic results. Patient reports that her chest pain has resolved after receiving Toradol. She is, however, still complaining of left knee pain. I discussed plan for discharge with a referral to cardiology, as outlined below. I advised the patient to take her medications are prescribed. The patient is stable for discharge at this time and will return for any new or worsening symptoms. The patient was given the opportunity to ask questions. Patient verbalizes understanding and support with my plan for discharge. The patient will be medicated with Percocet 5-325 mg per tablet 1 tablet for her symptoms prior to discharge.       ED Summary: The patient complains of a left-sided chest pain for several days, is worse and really only noticeable when she takes deep breaths or moves.  She was seen here yesterday and was diagnosed with gout after a arthrocentesis.  She did not get her prescriptions filled.  She did not mention her chest pain yesterday.  On evaluation she was medicated with Toradol this resolved her chest pain her cardiac evaluation shows a mildly elevated BNP which is a chronic condition for her.    The patient's chest pain is not concerning for cardiac and she is discharged home with a referral to cardiology.    Decision tools and prescription drugs considered including, but not limited to: Heart score 3      DISPOSITION AND DISCUSSIONS  Discussion of management with other [facility]  or appropriate source(s): Respiratory       The patient will return for new or worsening symptoms and is stable at the time of discharge.    The patient is referred to a primary physician for blood pressure management, diabetic screening, and for all other preventative health concerns.    DISPOSITION:  Patient will be discharged home in stable condition.    FOLLOW UP:  No follow-up provider specified.    FINAL DIAGNOSIS  1. Left-sided chest pain    2. Acute idiopathic gout of left knee        Jessica ZIMMERMAN ([place]), am scribing for, and in the presence of, Franklyn Bartlett D.O..    Electronically signed by: Jessica Jackson ([place]), 5/4/2025    IFranklyn D.O. personally performed the services described in this documentation, as scribed by Jessica Jackson in my presence, and it is both accurate and complete.     The note accurately reflects work and decisions made by me.  Franklyn Bartlett D.O.  5/4/2025  11:10 PM

## 2025-05-05 NOTE — ED TRIAGE NOTES
"Chief Complaint   Patient presents with    Chest Pain     Chest pain for about 2 days. Pain non-reproducible, non-radiating, denies N/V, pt states pain is consistent upon inspiration.     Knee Pain     Worsening Knee pain.     Pt came in for above complaint. Pt educated to notify staff if symptoms worsen.    BP (!) 152/74   Pulse 80   Temp 37.3 °C (99.1 °F) (Temporal)   Resp 18   Ht 1.575 m (5' 2\")   Wt 71.2 kg (157 lb)   SpO2 92%   BMI 28.72 kg/m²     "

## 2025-05-06 ENCOUNTER — TELEPHONE (OUTPATIENT)
Dept: HEALTH INFORMATION MANAGEMENT | Facility: OTHER | Age: 75
End: 2025-05-06
Payer: MEDICARE

## 2025-05-20 ENCOUNTER — HOSPITAL ENCOUNTER (EMERGENCY)
Facility: MEDICAL CENTER | Age: 75
End: 2025-05-20
Attending: EMERGENCY MEDICINE
Payer: MEDICARE

## 2025-05-20 VITALS
DIASTOLIC BLOOD PRESSURE: 80 MMHG | OXYGEN SATURATION: 95 % | WEIGHT: 144.4 LBS | HEIGHT: 62 IN | BODY MASS INDEX: 26.57 KG/M2 | TEMPERATURE: 97.1 F | HEART RATE: 88 BPM | RESPIRATION RATE: 18 BRPM | SYSTOLIC BLOOD PRESSURE: 138 MMHG

## 2025-05-20 DIAGNOSIS — M76.62 ACHILLES TENDINITIS OF LEFT LOWER EXTREMITY: Primary | ICD-10-CM

## 2025-05-20 PROCEDURE — 99284 EMERGENCY DEPT VISIT MOD MDM: CPT

## 2025-05-20 RX ORDER — MELOXICAM 7.5 MG/1
7.5 TABLET ORAL DAILY
Qty: 30 TABLET | Refills: 0 | Status: SHIPPED | OUTPATIENT
Start: 2025-05-20

## 2025-05-20 ASSESSMENT — FIBROSIS 4 INDEX: FIB4 SCORE: 1.45

## 2025-05-20 NOTE — ED TRIAGE NOTES
Pt ambulated into triage with c/o left leg pain. Sts she tried ibuprofen with no relief. Pt denies any trauma or injury. Extremity does not appear swollen and no redness noted. Pt is A&O and ambulatory. Placed in lobby pending ER room.

## 2025-05-20 NOTE — ED NOTES
Walking boot applied to patients left leg without incident. CMS intact before and after walking boot application.

## 2025-05-20 NOTE — ED PROVIDER NOTES
CHIEF COMPLAINT  Chief Complaint   Patient presents with    Leg Pain       LIMITATION TO HISTORY   Select: none    HPI    Serenity Howe is a 74 y.o. female who presents to the Emergency Department ending of left leg pain.  The patient states for the past 2 days she been having pain primarily down around the left Achilles tendon area.  She states she denies any gross injury to the area but describes this pain down in that left leg.  Denies any paresthesias or any other symptoms presents emergency department for evaluation.    OUTSIDE HISTORIAN(S):  Select: None    EXTERNAL RECORDS REVIEWED  Select: Other patient was seen in the emergency department on 5/4/2025 for left knee pain history of gout had a arthrocentesis done at the time diagnosed with gout left knee      PAST MEDICAL HISTORY  Past Medical History[1]  .    SURGICAL HISTORY  Past Surgical History[2]      FAMILY HISTORY  Family History   Problem Relation Age of Onset    Hyperlipidemia Mother     No Known Problems Father     No Known Problems Sister     No Known Problems Brother     No Known Problems Daughter     No Known Problems Daughter     No Known Problems Son           SOCIAL HISTORY  Social History     Socioeconomic History    Marital status:      Spouse name: Not on file    Number of children: Not on file    Years of education: Not on file    Highest education level: Not on file   Occupational History    Not on file   Tobacco Use    Smoking status: Never    Smokeless tobacco: Never   Vaping Use    Vaping status: Never Used   Substance and Sexual Activity    Alcohol use: No    Drug use: No    Sexual activity: Never     Partners: Male   Other Topics Concern    Not on file   Social History Narrative    Not on file     Social Drivers of Health     Financial Resource Strain: Not on file   Food Insecurity: No Food Insecurity (7/5/2024)    Hunger Vital Sign     Worried About Running Out of Food in the Last Year: Never true     Ran Out of Food  "in the Last Year: Never true   Transportation Needs: No Transportation Needs (7/5/2024)    PRAPARE - Transportation     Lack of Transportation (Medical): No     Lack of Transportation (Non-Medical): No   Physical Activity: Not on file   Stress: Not on file   Social Connections: Not on file   Intimate Partner Violence: Not At Risk (8/8/2024)    Humiliation, Afraid, Rape, and Kick questionnaire     Fear of Current or Ex-Partner: No     Emotionally Abused: No     Physically Abused: No     Sexually Abused: No   Housing Stability: Low Risk  (7/5/2024)    Housing Stability Vital Sign     Unable to Pay for Housing in the Last Year: No     Number of Places Lived in the Last Year: 1     Unstable Housing in the Last Year: No         CURRENT MEDICATIONS  Medications Ordered Prior to Encounter[3]        ALLERGIES  Allergies[4]    PHYSICAL EXAM  VITAL SIGNS:BP (!) 147/71   Pulse 89   Temp 36.2 °C (97.1 °F) (Temporal)   Resp 18   Ht 1.575 m (5' 2\")   Wt 65.5 kg (144 lb 6.4 oz)   SpO2 95%   BMI 26.41 kg/m²     Constitutional: Well-developed no acute distress   HENT: Normocephalic, Atraumatic,   Eyes:  conjunctiva are normal.   Neck: Nontender midline  Thorax & Lungs: Chest wall is nontender.  Abdomen: Soft, nontender nondistended.  Skin: Warm, Dry, No erythema,   Back: No tenderness  Musculoskeletal: Left knee there is no effusion.  The patient has no calf tenderness is tender about the attachment point of the Achilles tendon.  Distal pulses are grossly intact.  Neurologic: Alert & oriented x 3, normal sensation moving all extremities appears normal   Psychiatric: Affect normal, Judgment normal, Mood normal.       DIAGNOSTIC STUDIES / PROCEDURES      COURSE & MEDICAL DECISION MAKING    ED COURSE:    ED Observation Status?  No, the patient does not meet observation criteria.  INTERVENTIONS BY ME:  Medications - No data to display        INITIAL ASSESSMENT, COURSE AND PLAN  Care Narrative: Patient presents emerged part for " evaluation.  Clinically I do feel the patient has Achilles tendinitis.  There is no significant fusion no erythema.  With a history of gout could very well be inflammatory gout deposits.  At this point have recommended range of motion exercises physical therapy referral and a referral to see orthopedics primarily for continued treatment and care of her multiple joint ailments.  Patient is to return as needed.  I will give her a prescription of Mobic.  The patient is to follow-up as above.        ADDITIONAL PROBLEM LIST  None  DISPOSITION AND DISCUSSIONS    I have discussed management of the patient with the following physicians and JOSSELIN's: None    Escalation of care considered, and ultimately not performed: None    Barriers to care at this time, including but not limited to: None.     Decision tools and prescription drugs considered including, but not limited to: Recommended OTC medications for pain control as well as the description as above     FINAL DIAGNOSIS  1. Achilles tendinitis of left lower extremity           The patient will return for new or worsening symptoms and is stable at the time of discharge.    The patient is referred to a primary physician for blood pressure management, diabetic screening, and for all other preventative health concerns.        DISPOSITION:  Patient will be discharged home in stable condition.    FOLLOW UP:  Jessie Flores M.D.  [address] [id number] [phone]          Jeancarlos Singh M.D.  [address] [facility] [id number] [phone]    Schedule an appointment as soon as possible for a visit         OUTPATIENT MEDICATIONS:  New Prescriptions    No medications on file   Mobic 7.5 mg          Electronically signed by: Bobo Jackson M.D.,9:33 AM 05/20/25                      [1]   Past Medical History:  Diagnosis Date    Acute exacerbation of chronic obstructive pulmonary disease (COPD) (HCC) 7/5/2024    ASTHMA     Cancer (HCC)     Gout      History of breast cancer 2015    Hypertension     Shoulder pain, right 2015    Tremor 2015   [2]   Past Surgical History:  Procedure Laterality Date    GYN SURGERY       x1    MASTECTOMY      right    [3]   No current facility-administered medications on file prior to encounter.     Current Outpatient Medications on File Prior to Encounter   Medication Sig Dispense Refill    methylPREDNISolone (MEDROL DOSEPAK) 4 MG Tablet Therapy Pack Follow as scheduled per package instructions 21 Each 0    celecoxib (CELEBREX) 200 MG Cap Take 1 Capsule by mouth 2 times a day. 60 Capsule 0    albuterol (PROVENTIL) 2.5mg/3ml Nebu Soln solution for nebulization Take 3 mL by nebulization every four hours as needed for Shortness of Breath. 75 mL 0    albuterol 108 (90 Base) MCG/ACT Aero Soln inhalation aerosol Inhale 1-2 Puffs every four hours as needed for Shortness of Breath. 8.5 g 0    ibuprofen (MOTRIN) 800 MG Tab Take 1 Tablet by mouth every 8 hours as needed for Mild Pain. 15 Tablet 0    valsartan (DIOVAN) 160 MG Tab Take 1 Tablet by mouth every day. 30 Tablet 1    acetaminophen (TYLENOL) 325 MG Tab Take 650 mg by mouth every 6 hours as needed for Mild Pain.      fluticasone-salmeterol (ADVAIR) 250-50 MCG/ACT AEROSOL POWDER, BREATH ACTIVATED Inhale 1 Puff 2 times a day.      verapamil ER (CALAN SR) 240 MG Tab CR Take 240 mg by mouth every evening.     [4]   Allergies  Allergen Reactions    Shellfish Allergy Hives and Shortness of Breath

## 2025-05-20 NOTE — ED NOTES
Assist RN: Patient given discharge instructions and education. Verbalizes understanding. Given chance to ask questions. Patient educated on signs and symptoms to returned to ER, Educated patient they can return for any concerning symptoms. Patient states they have their belongings. Denies additional needs at this time. Reports pain is well controlled. Patient monitored every hour and PRN for safety and comfort. Patient ambulatory to wheelchair. Patient transported out of department via wheelchair.

## 2025-06-28 ENCOUNTER — HOSPITAL ENCOUNTER (EMERGENCY)
Facility: MEDICAL CENTER | Age: 75
End: 2025-06-28
Attending: EMERGENCY MEDICINE
Payer: MEDICARE

## 2025-06-28 ENCOUNTER — APPOINTMENT (OUTPATIENT)
Dept: RADIOLOGY | Facility: MEDICAL CENTER | Age: 75
End: 2025-06-28
Attending: EMERGENCY MEDICINE
Payer: MEDICARE

## 2025-06-28 VITALS
SYSTOLIC BLOOD PRESSURE: 193 MMHG | HEIGHT: 62 IN | RESPIRATION RATE: 16 BRPM | DIASTOLIC BLOOD PRESSURE: 91 MMHG | BODY MASS INDEX: 26.65 KG/M2 | WEIGHT: 144.84 LBS | OXYGEN SATURATION: 94 % | TEMPERATURE: 97.2 F | HEART RATE: 76 BPM

## 2025-06-28 DIAGNOSIS — R51.9 NONINTRACTABLE HEADACHE, UNSPECIFIED CHRONICITY PATTERN, UNSPECIFIED HEADACHE TYPE: Primary | ICD-10-CM

## 2025-06-28 LAB
ALBUMIN SERPL BCP-MCNC: 3.3 G/DL (ref 3.2–4.9)
ALBUMIN/GLOB SERPL: 1 G/DL
ALP SERPL-CCNC: 90 U/L (ref 30–99)
ALT SERPL-CCNC: 12 U/L (ref 2–50)
ANION GAP SERPL CALC-SCNC: 11 MMOL/L (ref 7–16)
AST SERPL-CCNC: 22 U/L (ref 12–45)
BASOPHILS # BLD AUTO: 1.1 % (ref 0–1.8)
BASOPHILS # BLD: 0.07 K/UL (ref 0–0.12)
BILIRUB SERPL-MCNC: 0.6 MG/DL (ref 0.1–1.5)
BUN SERPL-MCNC: 14 MG/DL (ref 8–22)
CALCIUM ALBUM COR SERPL-MCNC: 9.7 MG/DL (ref 8.5–10.5)
CALCIUM SERPL-MCNC: 9.1 MG/DL (ref 8.5–10.5)
CHLORIDE SERPL-SCNC: 109 MMOL/L (ref 96–112)
CO2 SERPL-SCNC: 21 MMOL/L (ref 20–33)
CREAT SERPL-MCNC: 1.05 MG/DL (ref 0.5–1.4)
EOSINOPHIL # BLD AUTO: 0.28 K/UL (ref 0–0.51)
EOSINOPHIL NFR BLD: 4.2 % (ref 0–6.9)
ERYTHROCYTE [DISTWIDTH] IN BLOOD BY AUTOMATED COUNT: 50.2 FL (ref 35.9–50)
ERYTHROCYTE [SEDIMENTATION RATE] IN BLOOD BY WESTERGREN METHOD: 41 MM/HOUR (ref 0–25)
GFR SERPLBLD CREATININE-BSD FMLA CKD-EPI: 55 ML/MIN/1.73 M 2
GLOBULIN SER CALC-MCNC: 3.2 G/DL (ref 1.9–3.5)
GLUCOSE SERPL-MCNC: 96 MG/DL (ref 65–99)
HCT VFR BLD AUTO: 39.4 % (ref 37–47)
HGB BLD-MCNC: 13.5 G/DL (ref 12–16)
IMM GRANULOCYTES # BLD AUTO: 0.02 K/UL (ref 0–0.11)
IMM GRANULOCYTES NFR BLD AUTO: 0.3 % (ref 0–0.9)
LYMPHOCYTES # BLD AUTO: 1.89 K/UL (ref 1–4.8)
LYMPHOCYTES NFR BLD: 28.7 % (ref 22–41)
MCH RBC QN AUTO: 32.5 PG (ref 27–33)
MCHC RBC AUTO-ENTMCNC: 34.3 G/DL (ref 32.2–35.5)
MCV RBC AUTO: 94.7 FL (ref 81.4–97.8)
MONOCYTES # BLD AUTO: 0.73 K/UL (ref 0–0.85)
MONOCYTES NFR BLD AUTO: 11.1 % (ref 0–13.4)
NEUTROPHILS # BLD AUTO: 3.6 K/UL (ref 1.82–7.42)
NEUTROPHILS NFR BLD: 54.6 % (ref 44–72)
NRBC # BLD AUTO: 0 K/UL
NRBC BLD-RTO: 0 /100 WBC (ref 0–0.2)
PLATELET # BLD AUTO: 250 K/UL (ref 164–446)
PMV BLD AUTO: 9.5 FL (ref 9–12.9)
POTASSIUM SERPL-SCNC: 3.8 MMOL/L (ref 3.6–5.5)
PROT SERPL-MCNC: 6.5 G/DL (ref 6–8.2)
RBC # BLD AUTO: 4.16 M/UL (ref 4.2–5.4)
SODIUM SERPL-SCNC: 141 MMOL/L (ref 135–145)
WBC # BLD AUTO: 6.6 K/UL (ref 4.8–10.8)

## 2025-06-28 PROCEDURE — 85025 COMPLETE CBC W/AUTO DIFF WBC: CPT

## 2025-06-28 PROCEDURE — 80053 COMPREHEN METABOLIC PANEL: CPT

## 2025-06-28 PROCEDURE — 96365 THER/PROPH/DIAG IV INF INIT: CPT

## 2025-06-28 PROCEDURE — 99285 EMERGENCY DEPT VISIT HI MDM: CPT

## 2025-06-28 PROCEDURE — 70450 CT HEAD/BRAIN W/O DYE: CPT

## 2025-06-28 PROCEDURE — 700105 HCHG RX REV CODE 258: Performed by: STUDENT IN AN ORGANIZED HEALTH CARE EDUCATION/TRAINING PROGRAM

## 2025-06-28 PROCEDURE — 36415 COLL VENOUS BLD VENIPUNCTURE: CPT

## 2025-06-28 PROCEDURE — 700111 HCHG RX REV CODE 636 W/ 250 OVERRIDE (IP): Mod: JZ | Performed by: STUDENT IN AN ORGANIZED HEALTH CARE EDUCATION/TRAINING PROGRAM

## 2025-06-28 PROCEDURE — 85652 RBC SED RATE AUTOMATED: CPT

## 2025-06-28 PROCEDURE — 700111 HCHG RX REV CODE 636 W/ 250 OVERRIDE (IP): Mod: JZ | Performed by: EMERGENCY MEDICINE

## 2025-06-28 PROCEDURE — 96375 TX/PRO/DX INJ NEW DRUG ADDON: CPT

## 2025-06-28 RX ORDER — KETOROLAC TROMETHAMINE 15 MG/ML
15 INJECTION, SOLUTION INTRAMUSCULAR; INTRAVENOUS ONCE
Status: COMPLETED | OUTPATIENT
Start: 2025-06-28 | End: 2025-06-28

## 2025-06-28 RX ORDER — ACETAMINOPHEN 10 MG/ML
1000 INJECTION, SOLUTION INTRAVENOUS ONCE
Status: COMPLETED | OUTPATIENT
Start: 2025-06-28 | End: 2025-06-28

## 2025-06-28 RX ORDER — SODIUM CHLORIDE 9 MG/ML
1000 INJECTION, SOLUTION INTRAVENOUS ONCE
Status: COMPLETED | OUTPATIENT
Start: 2025-06-28 | End: 2025-06-28

## 2025-06-28 RX ADMIN — KETOROLAC TROMETHAMINE 15 MG: 15 INJECTION, SOLUTION INTRAMUSCULAR; INTRAVENOUS at 08:51

## 2025-06-28 RX ADMIN — ACETAMINOPHEN 1000 MG: 10 INJECTION INTRAVENOUS at 06:16

## 2025-06-28 RX ADMIN — SODIUM CHLORIDE 1000 ML: 9 INJECTION, SOLUTION INTRAVENOUS at 06:21

## 2025-06-28 ASSESSMENT — PAIN DESCRIPTION - PAIN TYPE
TYPE: ACUTE PAIN
TYPE: ACUTE PAIN

## 2025-06-28 ASSESSMENT — FIBROSIS 4 INDEX: FIB4 SCORE: 1.04

## 2025-06-28 NOTE — ED NOTES
Pt medicated per MAR and educated on POC. Pt reports that she missed her BP medications this AM, High BP noted.

## 2025-06-28 NOTE — DISCHARGE PLANNING
Per request of bedside RN, after multi attempts to secure Pt a ride home per family writer issued a cab voucher to her home at [address] [address]  Bedside RN confirmed that Pt does have keys to get into her own home.

## 2025-06-28 NOTE — ED NOTES
Ride coordinated for patient through [employer]. [facility] would not accept patient's insurance. Discharge instructions given. All questions answered. PIV removed. Patient ambulated to lobby with steady gait.

## 2025-06-28 NOTE — ED NOTES
Bedside report received from off going RN/tech: Tee, assumed care of patient. Call light within reach.    Fall risk interventions in place: Patient's personal possessions are with in their safe reach, Keep floor surfaces clean and dry, and Accompanied to restroom (all applicable per [place] Fall risk assessment)   Continuous monitoring: Pulse Ox or Blood Pressure  IVF/IV medications: Not Applicable   Oxygen: Room Air  Bedside sitter: Not Applicable   Isolation: Not Applicable

## 2025-06-28 NOTE — ED NOTES
Patient ambulated to bathroom without difficulty, reported that headache is completely gone after toradol.

## 2025-06-28 NOTE — ED PROVIDER NOTES
ED Provider Note    CHIEF COMPLAINT  Chief Complaint   Patient presents with    Headache     Right side; started yesterday       EXTERNAL RECORDS REVIEWED  Most recently admitted to outside facility 1 month prior for hypotension and COPD exacerbation    HPI/ROS  LIMITATION TO HISTORY     OUTSIDE HISTORIAN(S):      Serenity Howe is a 75 y.o. female who presents to the emergency department with a chief complaint of headache.  Patient reports 2 days of progressive right posterior headache.  She states that she has never had such a headache before.  She reports a shocking sensation in the right occipital region.  She has had no neck pain she has had no visual changes no hearing change.  She has a remote history of breast cancer as well as history of COPD and hypertension.  No falls no trauma no cough congestion chest pain shortness of breath fevers chills sore throat abdominal pain problems urination bowel movement any other acute symptom change or concern at this time.    PAST MEDICAL HISTORY   has a past medical history of Acute exacerbation of chronic obstructive pulmonary disease (COPD) ([facility]) (7/5/2024), ASTHMA, Cancer ([facility]), Gout, History of breast cancer (07/29/2015), Hypertension, Shoulder pain, right (07/29/2015), and Tremor (07/29/2015).    SURGICAL HISTORY   has a past surgical history that includes mastectomy and gyn surgery.    FAMILY HISTORY  Family History   Problem Relation Age of Onset    Hyperlipidemia Mother     No Known Problems Father     No Known Problems Sister     No Known Problems Brother     No Known Problems Daughter     No Known Problems Daughter     No Known Problems Son        SOCIAL HISTORY  Social History     Tobacco Use    Smoking status: Never    Smokeless tobacco: Never   Vaping Use    Vaping status: Never Used   Substance and Sexual Activity    Alcohol use: No    Drug use: No    Sexual activity: Never     Partners: Male       CURRENT MEDICATIONS  Home Medications       Reviewed  "by Yue Pike R.N. (Registered Nurse) on 06/28/25 at 0516  Med List Status: Partial     Medication Last Dose Status   acetaminophen (TYLENOL) 325 MG Tab  Active   albuterol (PROVENTIL) 2.5mg/3ml Nebu Soln solution for nebulization  Active   albuterol 108 (90 Base) MCG/ACT Aero Soln inhalation aerosol  Active   celecoxib (CELEBREX) 200 MG Cap  Active   fluticasone-salmeterol (ADVAIR) 250-50 MCG/ACT AEROSOL POWDER, BREATH ACTIVATED  Active   ibuprofen (MOTRIN) 800 MG Tab  Active   meloxicam (MOBIC) 7.5 MG Tab  Active   methylPREDNISolone (MEDROL DOSEPAK) 4 MG Tablet Therapy Pack  Active   valsartan (DIOVAN) 160 MG Tab  Active   verapamil ER (CALAN SR) 240 MG Tab CR  Active                    ALLERGIES  Allergies[1]    PHYSICAL EXAM  VITAL SIGNS: BP (!) (P) 177/81   Pulse (P) 83   Temp 36.2 °C (97.2 °F) (Temporal)   Resp 14   Ht 1.575 m (5' 2\")   Wt 65.7 kg (144 lb 13.5 oz)   SpO2 (P) 96%   BMI 26.49 kg/m²      Pulse ox interpretation: I interpret this pulse ox as normal.  Constitutional: Alert and oriented x 3, minimal distress  HEENT: Atraumatic normocephalic, pupils are equal round reactive to light extraocular movements are intact. The nares is clear, external ears are normal, mouth shows moist mucous membranes normal dentition for age  Neck: Supple, no JVD no tracheal deviation  Cardiovascular: Regular rate and rhythm no murmur rub or gallop 2+ pulses peripherally x4  Thorax & Lungs: No respiratory distress, no wheezes rales or rhonchi, No chest tenderness.   GI: Soft nontender nondistended positive bowel sounds, no peritoneal signs  Skin: Warm dry no acute rash or lesion  Musculoskeletal: Moving all extremities with full range and 5 of 5 strength no acute  deformity  Neurologic: Cranial nerves III through XII are grossly intact no sensory deficit no cerebellar dysfunction   Psychiatric: Appropriate affect for situation at this time      EKG/LABS  Results for orders placed or performed during the " hospital encounter of 06/28/25   CBC WITH DIFFERENTIAL    Collection Time: 06/28/25  6:12 AM   Result Value Ref Range    WBC 6.6 4.8 - 10.8 K/uL    RBC 4.16 (L) 4.20 - 5.40 M/uL    Hemoglobin 13.5 12.0 - 16.0 g/dL    Hematocrit 39.4 37.0 - 47.0 %    MCV 94.7 81.4 - 97.8 fL    MCH 32.5 27.0 - 33.0 pg    MCHC 34.3 32.2 - 35.5 g/dL    RDW 50.2 (H) 35.9 - 50.0 fL    Platelet Count 250 164 - 446 K/uL    MPV 9.5 9.0 - 12.9 fL    Neutrophils-Polys 54.60 44.00 - 72.00 %    Lymphocytes 28.70 22.00 - 41.00 %    Monocytes 11.10 0.00 - 13.40 %    Eosinophils 4.20 0.00 - 6.90 %    Basophils 1.10 0.00 - 1.80 %    Immature Granulocytes 0.30 0.00 - 0.90 %    Nucleated RBC 0.00 0.00 - 0.20 /100 WBC    Neutrophils (Absolute) 3.60 1.82 - 7.42 K/uL    Lymphs (Absolute) 1.89 1.00 - 4.80 K/uL    Monos (Absolute) 0.73 0.00 - 0.85 K/uL    Eos (Absolute) 0.28 0.00 - 0.51 K/uL    Baso (Absolute) 0.07 0.00 - 0.12 K/uL    Immature Granulocytes (abs) 0.02 0.00 - 0.11 K/uL    NRBC (Absolute) 0.00 K/uL   Sed Rate    Collection Time: 06/28/25  6:12 AM   Result Value Ref Range    Sed Rate Westergren 41 (H) 0 - 25 mm/hour   Comp Metabolic Panel    Collection Time: 06/28/25  6:58 AM   Result Value Ref Range    Sodium 141 135 - 145 mmol/L    Potassium 3.8 3.6 - 5.5 mmol/L    Chloride 109 96 - 112 mmol/L    Co2 21 20 - 33 mmol/L    Anion Gap 11.0 7.0 - 16.0    Glucose 96 65 - 99 mg/dL    Bun 14 8 - 22 mg/dL    Creatinine 1.05 0.50 - 1.40 mg/dL    Calcium 9.1 8.5 - 10.5 mg/dL    Correct Calcium 9.7 8.5 - 10.5 mg/dL    AST(SGOT) 22 12 - 45 U/L    ALT(SGPT) 12 2 - 50 U/L    Alkaline Phosphatase 90 30 - 99 U/L    Total Bilirubin 0.6 0.1 - 1.5 mg/dL    Albumin 3.3 3.2 - 4.9 g/dL    Total Protein 6.5 6.0 - 8.2 g/dL    Globulin 3.2 1.9 - 3.5 g/dL    A-G Ratio 1.0 g/dL   ESTIMATED GFR    Collection Time: 06/28/25  6:58 AM   Result Value Ref Range    GFR (CKD-EPI) 55 (A) >60 mL/min/1.73 m 2     *Note: Due to a large number of results and/or encounters for the  "requested time period, some results have not been displayed. A complete set of results can be found in Results Review.       I have independently interpreted this EKG    RADIOLOGY/PROCEDURES   I have independently interpreted the diagnostic imaging associated with this visit and am waiting the final reading from the radiologist.   My preliminary interpretation is as follows: No acute intracranial abnormality    Radiologist interpretation:  No orders to display       COURSE & MEDICAL DECISION MAKING    ASSESSMENT, COURSE AND PLAN  Care Narrative: Patient feeling much better after interventions here no further headache vital signs are normal with the exception of slight hypertension with hypertension did not take her antihypertensives prior to coming in today.  She is given instructions to take her blood pressure medicine upon returning home return for any further headache chest pain shortness of breath altered mental status dizziness weakness in extremities any other acute symptom change or concern otherwise discharged in stable and improved condition.        ADDITIONAL PROBLEMS MANAGED    DISPOSITION AND DISCUSSIONS    I have discussed management of the patient with the following physicians and JOSSELIN's:      Discussion of management with other QHP or appropriate source(s):      Escalation of care considered, and ultimately not performed:acute inpatient care management, however at this time, the patient is most appropriate for outpatient management    Barriers to care at this time, including but not limited to: .     Decision tools and prescription drugs considered including, but not limited to: .  BP (!) 193/91 Comment: MD aware. Ok for DC. Educated pt to take BP medications upon getting home.  Pulse 76   Temp 36.2 °C (97.2 °F) (Temporal)   Resp 16   Ht 1.575 m (5' 2\")   Wt 65.7 kg (144 lb 13.5 oz)   SpO2 94%   BMI 26.49 kg/m²     Jessie Flores M.D.  [address]" [address]  [phone]          [facility], Emergency Dept  [address] [facility] [address] [phone]    in 12-24 hours if symptoms persist, immediately If symptoms worsen, or if you develop any other symptoms or concerns      FINAL DIAGNOSIS  1. Nonintractable headache, unspecified chronicity pattern, unspecified headache type Inactive        Electronically signed by: Scot Rodriguez M.D.           [1]   Allergies  Allergen Reactions    Shellfish Allergy Hives and Shortness of Breath

## 2025-07-09 ENCOUNTER — APPOINTMENT (OUTPATIENT)
Dept: RADIOLOGY | Facility: MEDICAL CENTER | Age: 75
End: 2025-07-09
Attending: STUDENT IN AN ORGANIZED HEALTH CARE EDUCATION/TRAINING PROGRAM
Payer: MEDICARE

## 2025-07-09 ENCOUNTER — HOSPITAL ENCOUNTER (EMERGENCY)
Facility: MEDICAL CENTER | Age: 75
End: 2025-07-09
Attending: STUDENT IN AN ORGANIZED HEALTH CARE EDUCATION/TRAINING PROGRAM
Payer: MEDICARE

## 2025-07-09 VITALS
HEIGHT: 62 IN | OXYGEN SATURATION: 94 % | DIASTOLIC BLOOD PRESSURE: 81 MMHG | BODY MASS INDEX: 28.89 KG/M2 | HEART RATE: 81 BPM | RESPIRATION RATE: 20 BRPM | SYSTOLIC BLOOD PRESSURE: 176 MMHG | TEMPERATURE: 97.5 F | WEIGHT: 157 LBS

## 2025-07-09 DIAGNOSIS — R55 SYNCOPE AND COLLAPSE: Primary | ICD-10-CM

## 2025-07-09 DIAGNOSIS — S09.90XA CLOSED HEAD INJURY, INITIAL ENCOUNTER: ICD-10-CM

## 2025-07-09 LAB
ALBUMIN SERPL BCP-MCNC: 3.8 G/DL (ref 3.2–4.9)
ALBUMIN/GLOB SERPL: 1.1 G/DL
ALP SERPL-CCNC: 102 U/L (ref 30–99)
ALT SERPL-CCNC: 21 U/L (ref 2–50)
ANION GAP SERPL CALC-SCNC: 16 MMOL/L (ref 7–16)
APPEARANCE UR: CLEAR
AST SERPL-CCNC: 30 U/L (ref 12–45)
BASOPHILS # BLD AUTO: 0.3 % (ref 0–1.8)
BASOPHILS # BLD: 0.04 K/UL (ref 0–0.12)
BILIRUB SERPL-MCNC: 0.4 MG/DL (ref 0.1–1.5)
BILIRUB UR QL STRIP.AUTO: NEGATIVE
BUN SERPL-MCNC: 20 MG/DL (ref 8–22)
CALCIUM ALBUM COR SERPL-MCNC: 10.1 MG/DL (ref 8.5–10.5)
CALCIUM SERPL-MCNC: 9.9 MG/DL (ref 8.5–10.5)
CHLORIDE SERPL-SCNC: 104 MMOL/L (ref 96–112)
CO2 SERPL-SCNC: 20 MMOL/L (ref 20–33)
COLOR UR: YELLOW
CREAT SERPL-MCNC: 1.74 MG/DL (ref 0.5–1.4)
EOSINOPHIL # BLD AUTO: 0.04 K/UL (ref 0–0.51)
EOSINOPHIL NFR BLD: 0.3 % (ref 0–6.9)
ERYTHROCYTE [DISTWIDTH] IN BLOOD BY AUTOMATED COUNT: 49.2 FL (ref 35.9–50)
GFR SERPLBLD CREATININE-BSD FMLA CKD-EPI: 30 ML/MIN/1.73 M 2
GLOBULIN SER CALC-MCNC: 3.5 G/DL (ref 1.9–3.5)
GLUCOSE SERPL-MCNC: 117 MG/DL (ref 65–99)
GLUCOSE UR STRIP.AUTO-MCNC: NEGATIVE MG/DL
HCT VFR BLD AUTO: 40.8 % (ref 37–47)
HGB BLD-MCNC: 13.8 G/DL (ref 12–16)
IMM GRANULOCYTES # BLD AUTO: 0.18 K/UL (ref 0–0.11)
IMM GRANULOCYTES NFR BLD AUTO: 1.4 % (ref 0–0.9)
KETONES UR STRIP.AUTO-MCNC: NEGATIVE MG/DL
LEUKOCYTE ESTERASE UR QL STRIP.AUTO: NEGATIVE
LYMPHOCYTES # BLD AUTO: 1.24 K/UL (ref 1–4.8)
LYMPHOCYTES NFR BLD: 9.4 % (ref 22–41)
MAGNESIUM SERPL-MCNC: 2 MG/DL (ref 1.5–2.5)
MCH RBC QN AUTO: 32.3 PG (ref 27–33)
MCHC RBC AUTO-ENTMCNC: 33.8 G/DL (ref 32.2–35.5)
MCV RBC AUTO: 95.6 FL (ref 81.4–97.8)
MICRO URNS: NORMAL
MONOCYTES # BLD AUTO: 1.14 K/UL (ref 0–0.85)
MONOCYTES NFR BLD AUTO: 8.7 % (ref 0–13.4)
NEUTROPHILS # BLD AUTO: 10.51 K/UL (ref 1.82–7.42)
NEUTROPHILS NFR BLD: 79.9 % (ref 44–72)
NITRITE UR QL STRIP.AUTO: NEGATIVE
NRBC # BLD AUTO: 0 K/UL
NRBC BLD-RTO: 0 /100 WBC (ref 0–0.2)
NT-PROBNP SERPL IA-MCNC: 1093 PG/ML (ref 0–125)
PH UR STRIP.AUTO: 7 [PH] (ref 5–8)
PLATELET # BLD AUTO: 223 K/UL (ref 164–446)
PMV BLD AUTO: 9.4 FL (ref 9–12.9)
POTASSIUM SERPL-SCNC: 4.1 MMOL/L (ref 3.6–5.5)
PROT SERPL-MCNC: 7.3 G/DL (ref 6–8.2)
PROT UR QL STRIP: NEGATIVE MG/DL
RBC # BLD AUTO: 4.27 M/UL (ref 4.2–5.4)
RBC UR QL AUTO: NEGATIVE
SODIUM SERPL-SCNC: 140 MMOL/L (ref 135–145)
SP GR UR STRIP.AUTO: 1.01
TROPONIN T SERPL-MCNC: 13 NG/L (ref 6–19)
TSH SERPL-ACNC: 1.11 UIU/ML (ref 0.38–5.33)
UROBILINOGEN UR STRIP.AUTO-MCNC: 0.2 EU/DL
WBC # BLD AUTO: 13.2 K/UL (ref 4.8–10.8)

## 2025-07-09 PROCEDURE — 700105 HCHG RX REV CODE 258: Performed by: STUDENT IN AN ORGANIZED HEALTH CARE EDUCATION/TRAINING PROGRAM

## 2025-07-09 PROCEDURE — 72125 CT NECK SPINE W/O DYE: CPT

## 2025-07-09 PROCEDURE — 83735 ASSAY OF MAGNESIUM: CPT

## 2025-07-09 PROCEDURE — 81003 URINALYSIS AUTO W/O SCOPE: CPT

## 2025-07-09 PROCEDURE — 99285 EMERGENCY DEPT VISIT HI MDM: CPT

## 2025-07-09 PROCEDURE — 83880 ASSAY OF NATRIURETIC PEPTIDE: CPT

## 2025-07-09 PROCEDURE — 36415 COLL VENOUS BLD VENIPUNCTURE: CPT

## 2025-07-09 PROCEDURE — 71045 X-RAY EXAM CHEST 1 VIEW: CPT

## 2025-07-09 PROCEDURE — 93005 ELECTROCARDIOGRAM TRACING: CPT | Mod: TC

## 2025-07-09 PROCEDURE — 84443 ASSAY THYROID STIM HORMONE: CPT

## 2025-07-09 PROCEDURE — 80053 COMPREHEN METABOLIC PANEL: CPT

## 2025-07-09 PROCEDURE — 84484 ASSAY OF TROPONIN QUANT: CPT

## 2025-07-09 PROCEDURE — 70450 CT HEAD/BRAIN W/O DYE: CPT

## 2025-07-09 PROCEDURE — 85025 COMPLETE CBC W/AUTO DIFF WBC: CPT

## 2025-07-09 RX ORDER — SODIUM CHLORIDE, SODIUM LACTATE, POTASSIUM CHLORIDE, CALCIUM CHLORIDE 600; 310; 30; 20 MG/100ML; MG/100ML; MG/100ML; MG/100ML
1000 INJECTION, SOLUTION INTRAVENOUS ONCE
Status: COMPLETED | OUTPATIENT
Start: 2025-07-09 | End: 2025-07-09

## 2025-07-09 RX ADMIN — SODIUM CHLORIDE, POTASSIUM CHLORIDE, SODIUM LACTATE AND CALCIUM CHLORIDE 1000 ML: 600; 310; 30; 20 INJECTION, SOLUTION INTRAVENOUS at 18:58

## 2025-07-09 ASSESSMENT — FIBROSIS 4 INDEX: FIB4 SCORE: 1.93

## 2025-07-10 NOTE — ED TRIAGE NOTES
"Chief Complaint   Patient presents with    Syncope     [place] onset of dizziness followed by syncopal event. Pt reports waking up on the ground. +head strike, +LOC, +thinners (ASA)    T-5000 Head Injury     Pt BIB EMS for above complaint. Pt received 250 ml NS and 1G Tylenol prior to arrival. Pt activated as TBI and evaluated at the charge desk by Dr. Nair.    Pt transported to [place] then [place] [address]. Report to Reynaldo PADILLA.    Blood drawn and sent to lab.     BP (!) 151/68   Pulse 75   Temp 36.4 °C (97.5 °F) (Temporal)   Resp 20   Ht 1.575 m (5' 2\")   Wt 71.2 kg (157 lb)   SpO2 91%   BMI 28.72 kg/m²     "

## 2025-07-10 NOTE — ED PROVIDER NOTES
ED Provider Note    CHIEF COMPLAINT  Chief Complaint   Patient presents with    Syncope     [place] had sudden onset of dizziness followed by syncopal event. Pt reports waking up on the ground. +head strike, +LOC, +thinners (ASA)    T-5000 Head Injury       EXTERNAL RECORDS REVIEWED  Inpatient Notes patient was admitted to [facility] for left knee pain which was felt to be secondary to gout.  She was also hypotensive due to taking double dose of her antihypertensive medicines    HPI/ROS  LIMITATION TO HISTORY   Select: : None  OUTSIDE HISTORIAN(S):  EMS who transported patient    Serenity Howe is a 75 y.o. female who presents for evaluation after syncopal episode.  [facility].  She seen at the bus station and she stood up and got dizzy and passed out.  She denies any chest pain or shortness of breath prior to passing out and has none currently.  She had the back of her head.  She has a slight headache.  She is on aspirin.  She otherwise states that she is feeling well today.  She has no chest pain.    PAST MEDICAL HISTORY   has a past medical history of Acute exacerbation of chronic obstructive pulmonary disease (COPD) ([facility]) (7/5/2024), ASTHMA, Cancer ([facility]), Gout, History of breast cancer (07/29/2015), Hypertension, Shoulder pain, right (07/29/2015), and Tremor (07/29/2015).    SURGICAL HISTORY   has a past surgical history that includes mastectomy and gyn surgery.    FAMILY HISTORY  Family History   Problem Relation Age of Onset    Hyperlipidemia Mother     No Known Problems Father     No Known Problems Sister     No Known Problems Brother     No Known Problems Daughter     No Known Problems Daughter     No Known Problems Son        SOCIAL HISTORY  Social History     Tobacco Use    Smoking status: Never    Smokeless tobacco: Never   Vaping Use    Vaping status: Never Used   Substance and Sexual Activity    Alcohol use: No     "Drug use: No    Sexual activity: Never     Partners: Male       CURRENT MEDICATIONS  Home Medications       Reviewed by Payton Patel R.N. (Registered Nurse) on 07/09/25 at 1730  Med List Status: Not Addressed     Medication Last Dose Status   acetaminophen (TYLENOL) 325 MG Tab  Active   albuterol (PROVENTIL) 2.5mg/3ml Nebu Soln solution for nebulization  Active   albuterol 108 (90 Base) MCG/ACT Aero Soln inhalation aerosol  Active   celecoxib (CELEBREX) 200 MG Cap  Active   fluticasone-salmeterol (ADVAIR) 250-50 MCG/ACT AEROSOL POWDER, BREATH ACTIVATED  Active   ibuprofen (MOTRIN) 800 MG Tab  Active   meloxicam (MOBIC) 7.5 MG Tab  Active   methylPREDNISolone (MEDROL DOSEPAK) 4 MG Tablet Therapy Pack  Active   valsartan (DIOVAN) 160 MG Tab  Active   verapamil ER (CALAN SR) 240 MG Tab CR  Active                    ALLERGIES  Allergies[1]    PHYSICAL EXAM  VITAL SIGNS: BP (!) 176/81   Pulse 81   Temp 36.4 °C (97.5 °F) (Temporal)   Resp 20   Ht 1.575 m (5' 2\")   Wt 71.2 kg (157 lb)   SpO2 94%   BMI 28.72 kg/m²      Constitutional: Well developed, Well nourished, No acute distress, Non-toxic appearance.   HEENT: Normocephalic, Occipital hematoma noted,  external ears normal, pharynx pink,  Mucous  Membranes moist, No rhinorrhea or mucosal edema  Eyes: PERRL, EOMI, Conjunctiva normal, No discharge.   Neck: Normal range of motion, No tenderness, Supple, No stridor.   Cardiovascular: Regular Rate and Rhythm, No murmurs,  rubs, or gallops.   Thorax & Lungs: Lungs clear to auscultation bilaterally, No respiratory distress, No wheezes, rhales or rhonchi, No chest wall tenderness.   Abdomen:Soft, non tender, non distended,  No pulsatile masses., no rebound guarding or peritoneal signs.   Skin: Warm, Dry, No erythema, No rash,   Back:  No CVA tenderness,  No spinal tenderness, bony crepitance step offs or instability.   Extremities: Equal, intact distal pulses, No cyanosis, clubbing or edema,  No tenderness. "   Musculoskeletal: Good range of motion in all major joints. No tenderness to palpation or major deformities noted.   Neurologic: Alert & oriented No focal deficits noted.  Strength 5 out of 5 to bilateral upper lower extremities, sensation tact light touch.  Cranial nerves II through XII intact  Psychiatric: Affect normal, Judgment normal, Mood normal.      EKG/LABS  Results for orders placed or performed during the hospital encounter of 07/09/25   CBC WITH DIFFERENTIAL    Collection Time: 07/09/25  5:32 PM   Result Value Ref Range    WBC 13.2 (H) 4.8 - 10.8 K/uL    RBC 4.27 4.20 - 5.40 M/uL    Hemoglobin 13.8 12.0 - 16.0 g/dL    Hematocrit 40.8 37.0 - 47.0 %    MCV 95.6 81.4 - 97.8 fL    MCH 32.3 27.0 - 33.0 pg    MCHC 33.8 32.2 - 35.5 g/dL    RDW 49.2 35.9 - 50.0 fL    Platelet Count 223 164 - 446 K/uL    MPV 9.4 9.0 - 12.9 fL    Neutrophils-Polys 79.90 (H) 44.00 - 72.00 %    Lymphocytes 9.40 (L) 22.00 - 41.00 %    Monocytes 8.70 0.00 - 13.40 %    Eosinophils 0.30 0.00 - 6.90 %    Basophils 0.30 0.00 - 1.80 %    Immature Granulocytes 1.40 (H) 0.00 - 0.90 %    Nucleated RBC 0.00 0.00 - 0.20 /100 WBC    Neutrophils (Absolute) 10.51 (H) 1.82 - 7.42 K/uL    Lymphs (Absolute) 1.24 1.00 - 4.80 K/uL    Monos (Absolute) 1.14 (H) 0.00 - 0.85 K/uL    Eos (Absolute) 0.04 0.00 - 0.51 K/uL    Baso (Absolute) 0.04 0.00 - 0.12 K/uL    Immature Granulocytes (abs) 0.18 (H) 0.00 - 0.11 K/uL    NRBC (Absolute) 0.00 K/uL   COMP METABOLIC PANEL    Collection Time: 07/09/25  5:32 PM   Result Value Ref Range    Sodium 140 135 - 145 mmol/L    Potassium 4.1 3.6 - 5.5 mmol/L    Chloride 104 96 - 112 mmol/L    Co2 20 20 - 33 mmol/L    Anion Gap 16.0 7.0 - 16.0    Glucose 117 (H) 65 - 99 mg/dL    Bun 20 8 - 22 mg/dL    Creatinine 1.74 (H) 0.50 - 1.40 mg/dL    Calcium 9.9 8.5 - 10.5 mg/dL    Correct Calcium 10.1 8.5 - 10.5 mg/dL    AST(SGOT) 30 12 - 45 U/L    ALT(SGPT) 21 2 - 50 U/L    Alkaline Phosphatase 102 (H) 30 - 99 U/L    Total  Bilirubin 0.4 0.1 - 1.5 mg/dL    Albumin 3.8 3.2 - 4.9 g/dL    Total Protein 7.3 6.0 - 8.2 g/dL    Globulin 3.5 1.9 - 3.5 g/dL    A-G Ratio 1.1 g/dL   TROPONIN    Collection Time: 25  5:32 PM   Result Value Ref Range    Troponin T 13 6 - 19 ng/L   proBrain Natriuretic Peptide, NT    Collection Time: 25  5:32 PM   Result Value Ref Range    NT-proBNP 1093 (H) 0 - 125 pg/mL   TSH    Collection Time: 25  5:32 PM   Result Value Ref Range    TSH 1.110 0.380 - 5.330 uIU/mL   MAGNESIUM    Collection Time: 25  5:32 PM   Result Value Ref Range    Magnesium 2.0 1.5 - 2.5 mg/dL   ESTIMATED GFR    Collection Time: 25  5:32 PM   Result Value Ref Range    GFR (CKD-EPI) 30 (A) >60 mL/min/1.73 m 2   URINALYSIS,CULTURE IF INDICATED    Collection Time: 25  9:38 PM    Specimen: Urine, Clean Catch   Result Value Ref Range    Color Yellow     Character Clear     Specific Gravity 1.012 <1.035    Ph 7.0 5.0 - 8.0    Glucose Negative Negative mg/dL    Ketones Negative Negative mg/dL    Protein Negative Negative mg/dL    Bilirubin Negative Negative    Urobilinogen, Urine 0.2 <=1.0 EU/dL    Nitrite Negative Negative    Leukocyte Esterase Negative Negative    Occult Blood Negative Negative    Micro Urine Req see below    EKG    Collection Time: 25  1:57 PM   Result Value Ref Range    Report       [facility] Emergency Dept.    Test Date:  2025  Pt Name:    STEPHEN BETANCUR                Department: ER  MRN:        [id number]                      Room:       BL 15  Gender:     Female                       Technician: [id number]  :        [id number]                   Requested By:KAITLIN NAIR  Order #:    [id number]                    Mara MD: Kaitlin Nair    Measurements  Intervals                                Axis  Rate:       77                           P:          -6  GA:         158                          QRS:        31  QRSD:       84                           T:           60  QT:         412  QTc:        467    Interpretive Statements  Sinus rhythm  Normal axis  Normal intervals  No ST changes  Compared to ECG 05/04/2025 20:30:11  No significant changes  Electronically Signed On 07- 13:57:49 PDT by Kaitlin Nair         I have independently interpreted this EKG    RADIOLOGY/PROCEDURES   I have independently interpreted the diagnostic imaging associated with this visit and am waiting the final reading from the radiologist.   My preliminary interpretation is as follows: no focal consolidation    Radiologist interpretation:  DX-CHEST-PORTABLE (1 VIEW)   Final Result      No acute cardiac or pulmonary abnormalities are identified.      CT-CSPINE WITHOUT PLUS RECONS   Final Result         1. Multilevel degenerative disease without a definite fracture.   2. Heterogeneity of the right lobe of the thyroid with calcification. Further evaluation with ultrasound is suggested when clinically appropriate.      CT-HEAD W/O   Final Result      1.  No acute intracranial abnormality.   2.  Atrophy and diffuse chronic microangiopathic white matter changes.   3.  Occipital scalp soft tissue swelling.                      COURSE & MEDICAL DECISION MAKING    ASSESSMENT, COURSE AND PLAN  Care Narrative:   This 75-year-old female who presents after a syncopal episode.  This caused her to fall and hit her head.  On arrival, her vital signs are stable.  She is nontoxic in appearance.  She is seen as a TBI alert.  She is GCS 15 and has normal neurologic exam.  She was taken to the CT scanner.    CT head with no evidence of ICH or skull fracture.  CT C-spine was obtained given unable to rule out neck injury due to Nexus criteria.  It shows no evidence of fracture but patient was found to have calcification of the right thyroid lobe and was updated on this and instructed to follow-up with her primary care doctor as an outpatient for ultrasound.  She voices understanding.    In terms of her  syncope, she states that it happened after she went from sitting to standing.  She was outside in the heat.  She denies history of passing out before.  She has no chest pain or difficulty breathing.  I doubt PE.  EKG demonstrates no signs of arrhythmia.  There is no QT prolongation.  No evidence of Brugada syndrome.  Labs were obtained and she does have leukocytosis at 13,200 although there is no evidence of infection.  Chest x-ray shows no signs of pneumonia.  Urinalysis negative for infection.  This may be reactive.  She does look a bit dehydrated on labs with her creatinine 1.74 although creatinine was 1.5 a month ago.  She was given IV fluids.  Otherwise her electrolytes are normal.    I do suspect that the patient likely passed out due to being outside in the hot weather and likely dehydration.  She has been improved here.  Did consider admission to hospital but I am less concerned for cardiogenic syncope.    9:33 PM patient reevaluated bedside.  She is sitting up on the bed and asking when she can go home.  She is also hungry.  Discussed with patient that urinalysis needs to be obtained given her slight leukocytosis.  She voices understanding.    10:12 PM Patient reevaluated, discussed urinalysis results.  Patient has been ambulatory here in the emergency room.  She wants to go home.  She is feeling much better.  Patient is advised to return for any recurrent syncope, chest pain, difficulty breathing or any other concerns.    Hydration: Based on the patient's presentation of Dehydration the patient was given IV fluids. IV Hydration was used because oral hydration was not adequate alone. Upon recheck following hydration, the patient was improved.          ADDITIONAL PROBLEMS MANAGED  None    DISPOSITION AND DISCUSSIONS  I have discussed management of the patient with the following physicians and JOSSELIN's:  None    Discussion of management with other QHP or appropriate source(s): None     Escalation of care  considered, and ultimately not performed:acute inpatient care management, however at this time, the patient is most appropriate for outpatient management    Barriers to care at this time, including but not limited to: NOne.     Decision tools and prescription drugs considered including, but not limited to: None.     The patient will return for new or worsening symptoms and is stable at the time of discharge.    The patient is referred to a primary physician for blood pressure management, diabetic screening, and for all other preventative health concerns.        DISPOSITION:  Patient will be discharged home in stable condition.    FOLLOW UP:  Jessie Flores M.D.  [address] [address]  [phone]          [facility], Emergency Dept  [address] [facility] [address] [phone]          OUTPATIENT MEDICATIONS:  Discharge Medication List as of 7/10/2025  5:58 AM            FINAL DIAGNOSIS  1. Syncope and collapse Acute   2. Closed head injury, initial encounter Acute        Electronically signed by: Kaitlin Nair M.D., 7/9/2025 5:17 PM           [1]   Allergies  Allergen Reactions    Shellfish Allergy Hives and Shortness of Breath

## 2025-07-10 NOTE — ED NOTES
Discharge instructions given.  All questions answered.  Pt to follow-up with PCP, return to ER if symptoms worsen as discussed.  Pt verbalized understanding.  All belongings with pt.  Pt to lobby via WC.

## 2025-07-10 NOTE — ED NOTES
Bedside report to Bessie PADILLA.  POC discussed with patient. Call light within reach, all needs addressed at this time.         Fall risk interventions in place: In place (all applicable per [place] Fall risk assessment)   Continuous monitoring: Cardiac Leads, Pulse Ox, or Blood Pressure  IVF/IV medications: Not Applicable   Oxygen: Room Air  Bedside sitter: Not Applicable   Isolation: Not Applicable

## 2025-07-10 NOTE — ED NOTES
Bedside report received from off going RN/tech: Chantale PADILLA, assumed care of patient.  POC discussed with patient. Call light within reach, all needs addressed at this time.       Fall risk interventions in place: Patient's personal possessions are with in their safe reach, Place fall risk sign on patient's door, and Keep floor surfaces clean and dry (all applicable per [place] Fall risk assessment)   Continuous monitoring: Cardiac Leads, Pulse Ox, or Blood Pressure  IVF/IV medications: Infusion per MAR (List Med(s)) LR  Oxygen: Room Air  Bedside sitter: Not Applicable   Isolation: Not Applicable

## 2025-07-10 NOTE — DISCHARGE INSTRUCTIONS
You were seen in the emergency room for evaluation after you passed out.  The CAT scan of your head and neck shows no evidence of brain bleed, skull fracture or neck fracture. You were found to have some calcification of the right thyroid lobe therefore please follow-up with your primary care doctor in regards to this for a thyroid ultrasound.  Your labs did show that you are slightly dehydrated therefore you are given IV fluids.  Return if you have any further episodes of passing out or any other concerns.    Please discuss the following findings with your regular doctor:  DX-CHEST-PORTABLE (1 VIEW)   Final Result      No acute cardiac or pulmonary abnormalities are identified.      CT-CSPINE WITHOUT PLUS RECONS   Final Result         1. Multilevel degenerative disease without a definite fracture.   2. Heterogeneity of the right lobe of the thyroid with calcification. Further evaluation with ultrasound is suggested when clinically appropriate.      CT-HEAD W/O   Final Result      1.  No acute intracranial abnormality.   2.  Atrophy and diffuse chronic microangiopathic white matter changes.   3.  Occipital scalp soft tissue swelling.                    Labs Reviewed   CBC WITH DIFFERENTIAL - Abnormal; Notable for the following components:       Result Value    WBC 13.2 (*)     Neutrophils-Polys 79.90 (*)     Lymphocytes 9.40 (*)     Immature Granulocytes 1.40 (*)     Neutrophils (Absolute) 10.51 (*)     Monos (Absolute) 1.14 (*)     Immature Granulocytes (abs) 0.18 (*)     All other components within normal limits   COMP METABOLIC PANEL - Abnormal; Notable for the following components:    Glucose 117 (*)     Creatinine 1.74 (*)     Alkaline Phosphatase 102 (*)     All other components within normal limits   PROBRAIN NATRIURETIC PEPTIDE, NT - Abnormal; Notable for the following components:    NT-proBNP 1093 (*)     All other components within normal limits   ESTIMATED GFR - Abnormal; Notable for the following  components:    GFR (CKD-EPI) 30 (*)     All other components within normal limits   TROPONIN   TSH   MAGNESIUM   URINALYSIS,CULTURE IF INDICATED

## 2025-07-11 LAB — EKG IMPRESSION: NORMAL

## 2025-07-28 ENCOUNTER — PHARMACY VISIT (OUTPATIENT)
Dept: PHARMACY | Facility: MEDICAL CENTER | Age: 75
End: 2025-07-28
Payer: COMMERCIAL

## 2025-07-28 ENCOUNTER — APPOINTMENT (OUTPATIENT)
Dept: RADIOLOGY | Facility: MEDICAL CENTER | Age: 75
End: 2025-07-28
Attending: EMERGENCY MEDICINE
Payer: MEDICARE

## 2025-07-28 ENCOUNTER — HOSPITAL ENCOUNTER (EMERGENCY)
Facility: MEDICAL CENTER | Age: 75
End: 2025-07-28
Attending: EMERGENCY MEDICINE
Payer: MEDICARE

## 2025-07-28 PROCEDURE — RXMED WILLOW AMBULATORY MEDICATION CHARGE: Performed by: EMERGENCY MEDICINE

## 2025-07-28 ASSESSMENT — FIBROSIS 4 INDEX: FIB4 SCORE: 2.23

## 2025-07-28 ASSESSMENT — HEART SCORE
RISK FACTORS: NO KNOWN RISK FACTORS
TROPONIN: LESS THAN OR EQUAL TO NORMAL LIMIT
HISTORY: SLIGHTLY SUSPICIOUS
ECG: NON-SPECIFIC REPOLARIZATION DISTURBANCE
HEART SCORE: 3
AGE: 65+

## 2025-07-28 ASSESSMENT — PAIN DESCRIPTION - PAIN TYPE: TYPE: ACUTE PAIN

## 2025-07-28 NOTE — ED TRIAGE NOTES
Patient to ED with complaints of dizziness that started yesterday. Worse when walking. Denies feeling faint or lightheaded. No HA. Did have a fall 1 weeks ago and was seen for that in ER.     She also complains of bilateral foot pain x2 days. No trauma. No obvious swelling. Decreased as aching. Did take OTC Tylenol and topical pain cream without relief.     Pt educated on ED process and asked to wait in lobby. Patient educated on importance of alerting staff to new or worsening symptoms or concerns.

## 2025-07-28 NOTE — ED PROVIDER NOTES
ED Provider Note    CHIEF COMPLAINT  Chief Complaint   Patient presents with    Foot Pain    Dizziness     Fall 1 weeks ago       EXTERNAL RECORDS REVIEWED  Patient was seen at [facility] an admitted for left knee pain COPD exacerbation and hypotension she was negative for DVT x-ray of the knee showed no fractures or dislocations and she had some arthritis she stated she has a history of gout she has a history of coronary disease chronic kidney disease hypertension breast cancer and gout.      HPI/ROS  LIMITATION TO HISTORY   Select: : None  OUTSIDE HISTORIAN(S):  none    Serenity Howe is a 75 y.o. female who presents complaining of dizziness with walking and standing.  She feels unsteady as if she will fall.  The patient states that she fell last week and hit her head and had a scan of her head which was unremarkable.  She states that she still having dull aching pain in the back of her head and feels unsteady when she walks.  She denies any vision changes she denies any unilateral weakness or numbness.  She denies any nausea or vomiting or diarrhea.  She has no chest pain or shortness of breath or palpitations.  She denies any smoking or drinking.  She does have a history of COPD.  She also is complaining of pain on the tops of both of her feet and her ankles.  She denies any joint swelling falls or trauma.  She has been taking Tylenol for the pain but it is not really helping her symptoms.  She states that she did not follow-up from her [facility] physician with Dr. Flores and then was discharged from the practice and is requesting to have a new primary care physician referred to her.    PAST MEDICAL HISTORY   has a past medical history of Acute exacerbation of chronic obstructive pulmonary disease (COPD) ([facility]) (7/5/2024), ASTHMA, Cancer ([facility]), Gout, History of breast cancer (07/29/2015), Hypertension, Shoulder pain, right (07/29/2015), and Tremor (07/29/2015).    SURGICAL HISTORY   has a past surgical  "history that includes mastectomy and gyn surgery.    FAMILY HISTORY  Family History   Problem Relation Age of Onset    Hyperlipidemia Mother     No Known Problems Father     No Known Problems Sister     No Known Problems Brother     No Known Problems Daughter     No Known Problems Daughter     No Known Problems Son        SOCIAL HISTORY  Social History     Tobacco Use    Smoking status: Never    Smokeless tobacco: Never   Vaping Use    Vaping status: Never Used   Substance and Sexual Activity    Alcohol use: No    Drug use: No    Sexual activity: Never     Partners: Male       CURRENT MEDICATIONS  Home Medications       Reviewed by Olivia Payne R.N. (Registered Nurse) on 07/28/25 at 1333  Med List Status: Partial     Medication Last Dose Status   acetaminophen (TYLENOL) 325 MG Tab  Active   albuterol (PROVENTIL) 2.5mg/3ml Nebu Soln solution for nebulization  Active   albuterol 108 (90 Base) MCG/ACT Aero Soln inhalation aerosol  Active   celecoxib (CELEBREX) 200 MG Cap  Active   fluticasone-salmeterol (ADVAIR) 250-50 MCG/ACT AEROSOL POWDER, BREATH ACTIVATED  Active   ibuprofen (MOTRIN) 800 MG Tab  Active   meloxicam (MOBIC) 7.5 MG Tab  Active   methylPREDNISolone (MEDROL DOSEPAK) 4 MG Tablet Therapy Pack  Active   valsartan (DIOVAN) 160 MG Tab  Active   verapamil ER (CALAN SR) 240 MG Tab CR  Active                  Audit from Redirected Encounters    **Home medications have not yet been reviewed for this encounter**         ALLERGIES  Allergies[1]    PHYSICAL EXAM  VITAL SIGNS: /65   Pulse 82   Temp 36.1 °C (96.9 °F) (Temporal)   Resp 16   Ht 1.575 m (5' 2\")   Wt 71.2 kg (157 lb)   SpO2 95%   BMI 28.72 kg/m²      Constitutional: Well developed, Well nourished, No acute distress, Non-toxic appearance.   HEENT: Normocephalic, Atraumatic,  external ears normal, pharynx pink,  Mucous  Membranes moist, No rhinorrhea or mucosal edema  Eyes: PERRL, EOMI, Conjunctiva normal, No discharge.   Neck: Normal " range of motion, No tenderness, Supple, No stridor.   Lymphatic: No lymphadenopathy    Cardiovascular: Regular Rate and Rhythm, No murmurs,  rubs, or gallops.   Thorax & Lungs: Lungs clear to auscultation bilaterally, No respiratory distress, No wheezes, rhales or rhonchi, No chest wall tenderness.   Abdomen: Bowel sounds normal, Soft, non tender, non distended,  No pulsatile masses., no rebound guarding or peritoneal signs.   Skin: Warm, Dry, No erythema, No rash,   Back:  No CVA tenderness,  No spinal tenderness, bony crepitance step offs or instability.   Extremities: Equal, intact distal pulses, No cyanosis, clubbing or edema, diffuse tenderness to the tops of both feet and ankles without effusion or swelling or bruising  Musculoskeletal: Good range of motion in all major joints. No tenderness to palpation or major deformities noted.   Neurologic: Alert & oriented No focal deficits noted.  NIH is 0  Psychiatric: Affect normal, Judgment normal, Mood normal.      EKG/LABS  Results for orders placed or performed during the hospital encounter of 07/28/25   CBC WITH DIFFERENTIAL    Collection Time: 07/28/25  2:31 PM   Result Value Ref Range    WBC 7.5 4.8 - 10.8 K/uL    RBC 4.35 4.20 - 5.40 M/uL    Hemoglobin 14.0 12.0 - 16.0 g/dL    Hematocrit 41.5 37.0 - 47.0 %    MCV 95.4 81.4 - 97.8 fL    MCH 32.2 27.0 - 33.0 pg    MCHC 33.7 32.2 - 35.5 g/dL    RDW 45.5 35.9 - 50.0 fL    Platelet Count 302 164 - 446 K/uL    MPV 9.0 9.0 - 12.9 fL    Neutrophils-Polys 56.60 44.00 - 72.00 %    Lymphocytes 28.30 22.00 - 41.00 %    Monocytes 11.30 0.00 - 13.40 %    Eosinophils 2.80 0.00 - 6.90 %    Basophils 0.90 0.00 - 1.80 %    Immature Granulocytes 0.10 0.00 - 0.90 %    Nucleated RBC 0.00 0.00 - 0.20 /100 WBC    Neutrophils (Absolute) 4.25 1.82 - 7.42 K/uL    Lymphs (Absolute) 2.13 1.00 - 4.80 K/uL    Monos (Absolute) 0.85 0.00 - 0.85 K/uL    Eos (Absolute) 0.21 0.00 - 0.51 K/uL    Baso (Absolute) 0.07 0.00 - 0.12 K/uL    Immature  Granulocytes (abs) 0.01 0.00 - 0.11 K/uL    NRBC (Absolute) 0.00 K/uL   COMP METABOLIC PANEL    Collection Time: 25  2:31 PM   Result Value Ref Range    Sodium 140 135 - 145 mmol/L    Potassium 3.5 (L) 3.6 - 5.5 mmol/L    Chloride 106 96 - 112 mmol/L    Co2 21 20 - 33 mmol/L    Anion Gap 13.0 7.0 - 16.0    Glucose 100 (H) 65 - 99 mg/dL    Bun 14 8 - 22 mg/dL    Creatinine 1.17 0.50 - 1.40 mg/dL    Calcium 9.7 8.5 - 10.5 mg/dL    Correct Calcium 9.9 8.5 - 10.5 mg/dL    AST(SGOT) 21 12 - 45 U/L    ALT(SGPT) 10 2 - 50 U/L    Alkaline Phosphatase 137 (H) 30 - 99 U/L    Total Bilirubin 0.3 0.1 - 1.5 mg/dL    Albumin 3.7 3.2 - 4.9 g/dL    Total Protein 7.7 6.0 - 8.2 g/dL    Globulin 4.0 (H) 1.9 - 3.5 g/dL    A-G Ratio 0.9 g/dL   TROPONIN    Collection Time: 25  2:31 PM   Result Value Ref Range    Troponin T 9 6 - 19 ng/L   PRTOTHROMBIN TIME (INR)    Collection Time: 25  2:31 PM   Result Value Ref Range    PT 14.0 12.0 - 14.6 sec    INR 1.08 0.87 - 1.13   APTT    Collection Time: 25  2:31 PM   Result Value Ref Range    APTT 28.8 24.7 - 36.0 sec   TSH    Collection Time: 25  2:31 PM   Result Value Ref Range    TSH 0.516 0.380 - 5.330 uIU/mL   proBrain Natriuretic Peptide, NT    Collection Time: 25  2:31 PM   Result Value Ref Range    NT-proBNP 462 (H) 0 - 125 pg/mL   ESTIMATED GFR    Collection Time: 25  2:31 PM   Result Value Ref Range    GFR (CKD-EPI) 48 (A) >60 mL/min/1.73 m 2   EKG    Collection Time: 25  3:25 PM   Result Value Ref Range    Report       [facility] Emergency Dept.    Test Date:  2025  Pt Name:    STEPHEN BETANCUR                Department: ER  MRN:        [id number]                      Room:  Gender:     Female                       Technician: [id number]  :        [id number]                   Requested By:ER TRIAGE PROTOCOL  Order #:    [id number]                    Reading MD: ELZBIETA CORDERO MD    Measurements  Intervals                                 Axis  Rate:       74                           P:          19  NY:         137                          QRS:        43  QRSD:       96                           T:          73  QT:         408  QTc:        453    Interpretive Statements  Sinus rhythm  Minimal ST depression, inferior leads  Compared to ECG 07/09/2025 17:48:31  No significant changes  Electronically Signed On 07- 15:25:46 PDT by ELZBIETA CORDERO MD        I have independently interpreted this EKG    RADIOLOGY/PROCEDURES   I have independently interpreted the diagnostic imaging associated with this visit and am waiting the final reading from the radiologist.   My preliminary interpretation is as follows: Chest x-ray no infiltrate or pleural effusion    Radiologist interpretation:  DX-CHEST-PORTABLE (1 VIEW)   Final Result      No acute cardiac or pulmonary abnormalities are identified.      DX-FOOT-COMPLETE 3+ RIGHT   Final Result      Osteopenia and mild osteoarthrosis.      DX-FOOT-COMPLETE 3+ LEFT   Final Result      Mild degenerative changes.      CT-HEAD W/O   Final Result      There is no definite acute intracranial abnormality.                      COURSE & MEDICAL DECISION MAKING    ASSESSMENT, COURSE AND PLAN  Care Narrative: This is a 75-year-old female who had a fall last week and had a CT of her head which was normal.  She states that she has been dizzy with walking around ever since.  She also is complaining of pain on the tops of both of her feet and a little bit in her ankles.  She denies any fevers or chills chest pain shortness of breath nausea or vomiting.  She denies abdominal pain.  She is eating and drinking well she denies any smoking drinking or drug use.  On physical exam she has equal strong pulses bilateral lower extremities without any erythema or swelling but just diffuse pain on the tops of her feet.  She has no joint effusion.  She has no venous cording or Homans' sign or leg pain.  She has normal cap  refill and range of motion and sensation.  Abdomen is soft and nontender lungs are clear auscultation bilateral heart is regular rhythm murmurs gallops lungs are clear to auscultation bilaterally pupils equal and reactive to light she has extract muscles are intact is equal strength station upper and lower extremities bilaterally with no focal deficits.    CHEST PAIN:   HEART Score for Major Cardiac Events  HEART Score     History: Slightly suspicious  ECG: Non-specific repolarization disturbance  Age: 65+  Risk Factors: No known risk factors  Troponin: Less than or equal to normal limit    Heart Score: 3    Total Score   0-3 Points = Low Score, risk of MACE 0.9-1.7%.  4-6 Points = Moderate Score, risk of MACE 12-16.6%  7-10 Points = High Score, risk of MACE 50-65%          ADDITIONAL PROBLEMS MANAGED      DISPOSITION AND DISCUSSIONS  Patient's white count is normal at 7.5 hemoglobin 14 platelet count 302 with a normal differential.  Comprehensive metabolic panel has a potassium slightly low at 3.5 glucose 100 kidney function is normal liver function tests are normal except for slight elevated alk phos of 137 the patient's troponin is normal at 9  coags are normal.  TSH is 0.56 which is normal.  EKG shows normal sinus rhythm with no significant changes.  CT of the head does not show any intracranial hemorrhage or skull fracture or other abnormality.  Chest x-ray shows no infiltrate or pleural effusions.  Heart score is 3.  Upon recheck the patient's blood pressure is down to 153 and she feels improved.  She does have x-rays of her bilateral feet which show arthritic changes but no acute fractures.  Explained to her that her foot pain is most likely due to arthritis and we will prescribe her some prednisone.  I explained to her dizziness is most likely from hitting her head falling 1 week ago and she has a concussion.  I advised her to rest and get up slowly.  I will discharge her home she is requesting a new  primary care physician show I have placed that referral.  She is advised to return for any worsening pain weakness or worsening symptoms.    I have discussed management of the patient with the following physicians and JOSSELIN's:  None    Discussion of management with other [facility] [place] or appropriate source(s): None     Escalation of care considered, and ultimately not performed:acute inpatient care management, however at this time, the patient is most appropriate for outpatient management    Barriers to care at this time, including but not limited to: Patient does not have established PCP.     Decision tools and prescription drugs considered including, but not limited to: HEART SCORE 3.      The patient will return for new or worsening symptoms and is stable at the time of discharge.    The patient is referred to a primary physician for blood pressure management, diabetic screening, and for all other preventative health concerns.        DISPOSITION:  Patient will be discharged home in stable condition.    FOLLOW UP:  [facility], Emergency Dept  [address] [place] [id number] [phone]    As needed, If symptoms worsen      OUTPATIENT MEDICATIONS:  New Prescriptions    PREDNISONE (DELTASONE) 20 MG TAB    Take 2 Tablets by mouth every day for 6 days.       FINAL DIAGNOSIS  1. Dizziness    2. Bilateral foot pain    3. Concussion without loss of consciousness, initial encounter         Electronically signed by: Dayanna Liu M.D., 7/28/2025 2:09 PM           [1]   Allergies  Allergen Reactions    Shellfish Allergy Hives and Shortness of Breath

## 2025-08-11 ENCOUNTER — APPOINTMENT (OUTPATIENT)
Dept: RADIOLOGY | Facility: MEDICAL CENTER | Age: 75
DRG: 066 | End: 2025-08-11
Attending: EMERGENCY MEDICINE
Payer: MEDICARE

## 2025-08-11 ENCOUNTER — HOSPITAL ENCOUNTER (INPATIENT)
Facility: MEDICAL CENTER | Age: 75
LOS: 2 days | DRG: 066 | End: 2025-08-15
Attending: EMERGENCY MEDICINE | Admitting: HOSPITALIST
Payer: MEDICARE

## 2025-08-11 DIAGNOSIS — M77.50 ANKLE TENDINITIS: ICD-10-CM

## 2025-08-11 DIAGNOSIS — M79.671 RIGHT FOOT PAIN: ICD-10-CM

## 2025-08-11 DIAGNOSIS — I63.9 CEREBROVASCULAR ACCIDENT (CVA), UNSPECIFIED MECHANISM (HCC): ICD-10-CM

## 2025-08-11 DIAGNOSIS — I16.0 HYPERTENSIVE URGENCY: ICD-10-CM

## 2025-08-11 DIAGNOSIS — H53.9 VISION CHANGES: Primary | ICD-10-CM

## 2025-08-11 PROBLEM — H53.8 BLURRED VISION: Status: ACTIVE | Noted: 2025-08-11

## 2025-08-11 LAB
ANION GAP SERPL CALC-SCNC: 15 MMOL/L (ref 7–16)
APPEARANCE UR: CLEAR
BASOPHILS # BLD AUTO: 0.6 % (ref 0–1.8)
BASOPHILS # BLD: 0.07 K/UL (ref 0–0.12)
BILIRUB UR QL STRIP.AUTO: NEGATIVE
BUN SERPL-MCNC: 14 MG/DL (ref 8–22)
CALCIUM SERPL-MCNC: 9.9 MG/DL (ref 8.5–10.5)
CHLORIDE SERPL-SCNC: 105 MMOL/L (ref 96–112)
CO2 SERPL-SCNC: 19 MMOL/L (ref 20–33)
COLOR UR: YELLOW
CREAT SERPL-MCNC: 1.07 MG/DL (ref 0.5–1.4)
EKG IMPRESSION: NORMAL
EOSINOPHIL # BLD AUTO: 0.28 K/UL (ref 0–0.51)
EOSINOPHIL NFR BLD: 2.6 % (ref 0–6.9)
ERYTHROCYTE [DISTWIDTH] IN BLOOD BY AUTOMATED COUNT: 45.1 FL (ref 35.9–50)
GFR SERPLBLD CREATININE-BSD FMLA CKD-EPI: 54 ML/MIN/1.73 M 2
GLUCOSE SERPL-MCNC: 77 MG/DL (ref 65–99)
GLUCOSE UR STRIP.AUTO-MCNC: NEGATIVE MG/DL
HCT VFR BLD AUTO: 42.4 % (ref 37–47)
HGB BLD-MCNC: 14.5 G/DL (ref 12–16)
IMM GRANULOCYTES # BLD AUTO: 0.04 K/UL (ref 0–0.11)
IMM GRANULOCYTES NFR BLD AUTO: 0.4 % (ref 0–0.9)
KETONES UR STRIP.AUTO-MCNC: ABNORMAL MG/DL
LEUKOCYTE ESTERASE UR QL STRIP.AUTO: NEGATIVE
LYMPHOCYTES # BLD AUTO: 2.71 K/UL (ref 1–4.8)
LYMPHOCYTES NFR BLD: 24.9 % (ref 22–41)
MCH RBC QN AUTO: 31.7 PG (ref 27–33)
MCHC RBC AUTO-ENTMCNC: 34.2 G/DL (ref 32.2–35.5)
MCV RBC AUTO: 92.6 FL (ref 81.4–97.8)
MICRO URNS: ABNORMAL
MONOCYTES # BLD AUTO: 1.21 K/UL (ref 0–0.85)
MONOCYTES NFR BLD AUTO: 11.1 % (ref 0–13.4)
NEUTROPHILS # BLD AUTO: 6.59 K/UL (ref 1.82–7.42)
NEUTROPHILS NFR BLD: 60.4 % (ref 44–72)
NITRITE UR QL STRIP.AUTO: NEGATIVE
NRBC # BLD AUTO: 0 K/UL
NRBC BLD-RTO: 0 /100 WBC (ref 0–0.2)
NT-PROBNP SERPL IA-MCNC: 1110 PG/ML (ref 0–125)
PH UR STRIP.AUTO: 6 [PH] (ref 5–8)
PLATELET # BLD AUTO: 257 K/UL (ref 164–446)
PMV BLD AUTO: 9.1 FL (ref 9–12.9)
POTASSIUM SERPL-SCNC: 3.6 MMOL/L (ref 3.6–5.5)
PROT UR QL STRIP: NEGATIVE MG/DL
RBC # BLD AUTO: 4.58 M/UL (ref 4.2–5.4)
RBC UR QL AUTO: NEGATIVE
SODIUM SERPL-SCNC: 139 MMOL/L (ref 135–145)
SP GR UR STRIP.AUTO: 1.01
TROPONIN T SERPL-MCNC: 12 NG/L (ref 6–19)
UROBILINOGEN UR STRIP.AUTO-MCNC: 1 EU/DL
WBC # BLD AUTO: 10.9 K/UL (ref 4.8–10.8)

## 2025-08-11 PROCEDURE — 83880 ASSAY OF NATRIURETIC PEPTIDE: CPT

## 2025-08-11 PROCEDURE — 99222 1ST HOSP IP/OBS MODERATE 55: CPT | Mod: AI

## 2025-08-11 PROCEDURE — 93005 ELECTROCARDIOGRAM TRACING: CPT | Mod: TC | Performed by: EMERGENCY MEDICINE

## 2025-08-11 PROCEDURE — 81003 URINALYSIS AUTO W/O SCOPE: CPT

## 2025-08-11 PROCEDURE — G0378 HOSPITAL OBSERVATION PER HR: HCPCS

## 2025-08-11 PROCEDURE — 71045 X-RAY EXAM CHEST 1 VIEW: CPT

## 2025-08-11 PROCEDURE — 84484 ASSAY OF TROPONIN QUANT: CPT

## 2025-08-11 PROCEDURE — 700102 HCHG RX REV CODE 250 W/ 637 OVERRIDE(OP)

## 2025-08-11 PROCEDURE — 70450 CT HEAD/BRAIN W/O DYE: CPT

## 2025-08-11 PROCEDURE — 80048 BASIC METABOLIC PNL TOTAL CA: CPT

## 2025-08-11 PROCEDURE — 96374 THER/PROPH/DIAG INJ IV PUSH: CPT

## 2025-08-11 PROCEDURE — 700102 HCHG RX REV CODE 250 W/ 637 OVERRIDE(OP): Performed by: EMERGENCY MEDICINE

## 2025-08-11 PROCEDURE — 85025 COMPLETE CBC W/AUTO DIFF WBC: CPT

## 2025-08-11 PROCEDURE — A9270 NON-COVERED ITEM OR SERVICE: HCPCS

## 2025-08-11 PROCEDURE — 36415 COLL VENOUS BLD VENIPUNCTURE: CPT

## 2025-08-11 PROCEDURE — A9270 NON-COVERED ITEM OR SERVICE: HCPCS | Performed by: EMERGENCY MEDICINE

## 2025-08-11 PROCEDURE — 99285 EMERGENCY DEPT VISIT HI MDM: CPT

## 2025-08-11 PROCEDURE — 700111 HCHG RX REV CODE 636 W/ 250 OVERRIDE (IP): Mod: JZ | Performed by: EMERGENCY MEDICINE

## 2025-08-11 RX ORDER — OXYCODONE HYDROCHLORIDE 5 MG/1
5 TABLET ORAL
Refills: 0 | Status: DISCONTINUED | OUTPATIENT
Start: 2025-08-11 | End: 2025-08-15 | Stop reason: HOSPADM

## 2025-08-11 RX ORDER — ATORVASTATIN CALCIUM 20 MG/1
20 TABLET, FILM COATED ORAL DAILY
COMMUNITY
Start: 2025-07-16

## 2025-08-11 RX ORDER — FLUTICASONE PROPIONATE AND SALMETEROL 250; 50 UG/1; UG/1
1 POWDER RESPIRATORY (INHALATION) 2 TIMES DAILY
Status: DISCONTINUED | OUTPATIENT
Start: 2025-08-11 | End: 2025-08-11

## 2025-08-11 RX ORDER — ACETAMINOPHEN 325 MG/1
650 TABLET ORAL EVERY 6 HOURS PRN
Status: DISCONTINUED | OUTPATIENT
Start: 2025-08-11 | End: 2025-08-15 | Stop reason: HOSPADM

## 2025-08-11 RX ORDER — VERAPAMIL HYDROCHLORIDE 240 MG/1
240 TABLET, FILM COATED, EXTENDED RELEASE ORAL DAILY
Status: DISCONTINUED | OUTPATIENT
Start: 2025-08-12 | End: 2025-08-15 | Stop reason: HOSPADM

## 2025-08-11 RX ORDER — PRAZOSIN HYDROCHLORIDE 1 MG/1
1 CAPSULE ORAL EVERY EVENING
Status: DISCONTINUED | OUTPATIENT
Start: 2025-08-11 | End: 2025-08-12

## 2025-08-11 RX ORDER — ATORVASTATIN CALCIUM 20 MG/1
20 TABLET, FILM COATED ORAL
Status: DISCONTINUED | OUTPATIENT
Start: 2025-08-11 | End: 2025-08-15 | Stop reason: HOSPADM

## 2025-08-11 RX ORDER — LORAZEPAM 0.5 MG/1
0.5 TABLET ORAL
Status: DISCONTINUED | OUTPATIENT
Start: 2025-08-11 | End: 2025-08-15 | Stop reason: HOSPADM

## 2025-08-11 RX ORDER — ONDANSETRON 4 MG/1
4 TABLET, ORALLY DISINTEGRATING ORAL EVERY 4 HOURS PRN
Status: DISCONTINUED | OUTPATIENT
Start: 2025-08-11 | End: 2025-08-15 | Stop reason: HOSPADM

## 2025-08-11 RX ORDER — ALBUTEROL SULFATE 5 MG/ML
2.5 SOLUTION RESPIRATORY (INHALATION) EVERY 4 HOURS PRN
Status: DISCONTINUED | OUTPATIENT
Start: 2025-08-11 | End: 2025-08-15 | Stop reason: HOSPADM

## 2025-08-11 RX ORDER — ALLOPURINOL 100 MG/1
100 TABLET ORAL
COMMUNITY
Start: 2025-05-29

## 2025-08-11 RX ORDER — OXYCODONE HYDROCHLORIDE 5 MG/1
2.5 TABLET ORAL
Refills: 0 | Status: DISCONTINUED | OUTPATIENT
Start: 2025-08-11 | End: 2025-08-15 | Stop reason: HOSPADM

## 2025-08-11 RX ORDER — VALSARTAN 320 MG/1
320 TABLET ORAL
COMMUNITY
Start: 2025-05-17

## 2025-08-11 RX ORDER — VERAPAMIL HYDROCHLORIDE 120 MG/1
240 CAPSULE, EXTENDED RELEASE ORAL ONCE
Status: DISCONTINUED | OUTPATIENT
Start: 2025-08-11 | End: 2025-08-11

## 2025-08-11 RX ORDER — HYDROMORPHONE HYDROCHLORIDE 1 MG/ML
0.25 INJECTION, SOLUTION INTRAMUSCULAR; INTRAVENOUS; SUBCUTANEOUS
Status: DISCONTINUED | OUTPATIENT
Start: 2025-08-11 | End: 2025-08-15 | Stop reason: HOSPADM

## 2025-08-11 RX ORDER — PRAZOSIN HYDROCHLORIDE 1 MG/1
1 CAPSULE ORAL NIGHTLY
COMMUNITY

## 2025-08-11 RX ORDER — VALSARTAN 80 MG/1
320 TABLET ORAL DAILY
Status: DISCONTINUED | OUTPATIENT
Start: 2025-08-12 | End: 2025-08-15 | Stop reason: HOSPADM

## 2025-08-11 RX ORDER — VALSARTAN 80 MG/1
160 TABLET ORAL ONCE
Status: COMPLETED | OUTPATIENT
Start: 2025-08-11 | End: 2025-08-11

## 2025-08-11 RX ORDER — VALSARTAN 80 MG/1
160 TABLET ORAL DAILY
Status: DISCONTINUED | OUTPATIENT
Start: 2025-08-12 | End: 2025-08-11

## 2025-08-11 RX ORDER — HYDRALAZINE HYDROCHLORIDE 20 MG/ML
10 INJECTION INTRAMUSCULAR; INTRAVENOUS ONCE
Status: COMPLETED | OUTPATIENT
Start: 2025-08-11 | End: 2025-08-11

## 2025-08-11 RX ORDER — VERAPAMIL HYDROCHLORIDE 120 MG/1
240 TABLET, FILM COATED, EXTENDED RELEASE ORAL ONCE
Status: COMPLETED | OUTPATIENT
Start: 2025-08-11 | End: 2025-08-11

## 2025-08-11 RX ORDER — ACETAMINOPHEN 500 MG
500 TABLET ORAL EVERY 6 HOURS PRN
COMMUNITY

## 2025-08-11 RX ORDER — HYDRALAZINE HYDROCHLORIDE 20 MG/ML
20 INJECTION INTRAMUSCULAR; INTRAVENOUS EVERY 4 HOURS PRN
Status: DISCONTINUED | OUTPATIENT
Start: 2025-08-11 | End: 2025-08-15 | Stop reason: HOSPADM

## 2025-08-11 RX ORDER — ONDANSETRON 2 MG/ML
4 INJECTION INTRAMUSCULAR; INTRAVENOUS EVERY 4 HOURS PRN
Status: DISCONTINUED | OUTPATIENT
Start: 2025-08-11 | End: 2025-08-15 | Stop reason: HOSPADM

## 2025-08-11 RX ADMIN — PRAZOSIN HYDROCHLORIDE 1 MG: 1 CAPSULE ORAL at 21:58

## 2025-08-11 RX ADMIN — ATORVASTATIN CALCIUM 20 MG: 20 TABLET, FILM COATED ORAL at 20:31

## 2025-08-11 RX ADMIN — HYDRALAZINE HYDROCHLORIDE 10 MG: 20 INJECTION INTRAMUSCULAR; INTRAVENOUS at 19:31

## 2025-08-11 RX ADMIN — ACETAMINOPHEN 650 MG: 325 TABLET ORAL at 23:44

## 2025-08-11 RX ADMIN — VERAPAMIL HYDROCHLORIDE 240 MG: 120 TABLET, FILM COATED, EXTENDED RELEASE ORAL at 16:51

## 2025-08-11 RX ADMIN — VALSARTAN 160 MG: 80 TABLET ORAL at 15:58

## 2025-08-11 RX ADMIN — OXYCODONE HYDROCHLORIDE 5 MG: 5 TABLET ORAL at 20:31

## 2025-08-11 RX ADMIN — MOMETASONE FUROATE AND FORMOTEROL FUMARATE DIHYDRATE 2 PUFF: 200; 5 AEROSOL RESPIRATORY (INHALATION) at 22:00

## 2025-08-11 ASSESSMENT — ENCOUNTER SYMPTOMS
BLURRED VISION: 1
RESPIRATORY NEGATIVE: 1
GASTROINTESTINAL NEGATIVE: 1
FALLS: 1
CONSTITUTIONAL NEGATIVE: 1
PHOTOPHOBIA: 0
NEUROLOGICAL NEGATIVE: 1
PSYCHIATRIC NEGATIVE: 1
CARDIOVASCULAR NEGATIVE: 1
DOUBLE VISION: 0

## 2025-08-11 ASSESSMENT — LIFESTYLE VARIABLES
DOES PATIENT WANT TO STOP DRINKING: NO
ALCOHOL_USE: NO
CONSUMPTION TOTAL: NEGATIVE
EVER HAD A DRINK FIRST THING IN THE MORNING TO STEADY YOUR NERVES TO GET RID OF A HANGOVER: NO
HOW MANY TIMES IN THE PAST YEAR HAVE YOU HAD 5 OR MORE DRINKS IN A DAY: 0
TOTAL SCORE: 0
AVERAGE NUMBER OF DAYS PER WEEK YOU HAVE A DRINK CONTAINING ALCOHOL: 0
HAVE PEOPLE ANNOYED YOU BY CRITICIZING YOUR DRINKING: NO
TOTAL SCORE: 0
HAVE YOU EVER FELT YOU SHOULD CUT DOWN ON YOUR DRINKING: NO
TOTAL SCORE: 0
EVER FELT BAD OR GUILTY ABOUT YOUR DRINKING: NO
ON A TYPICAL DAY WHEN YOU DRINK ALCOHOL HOW MANY DRINKS DO YOU HAVE: 0

## 2025-08-11 ASSESSMENT — COGNITIVE AND FUNCTIONAL STATUS - GENERAL
STANDING UP FROM CHAIR USING ARMS: A LITTLE
DAILY ACTIVITIY SCORE: 24
CLIMB 3 TO 5 STEPS WITH RAILING: A LITTLE
WALKING IN HOSPITAL ROOM: A LITTLE
SUGGESTED CMS G CODE MODIFIER MOBILITY: CK
SUGGESTED CMS G CODE MODIFIER DAILY ACTIVITY: CH
TURNING FROM BACK TO SIDE WHILE IN FLAT BAD: A LITTLE
MOVING TO AND FROM BED TO CHAIR: A LITTLE
MOBILITY SCORE: 18
MOVING FROM LYING ON BACK TO SITTING ON SIDE OF FLAT BED: A LITTLE

## 2025-08-11 ASSESSMENT — PAIN DESCRIPTION - PAIN TYPE
TYPE: ACUTE PAIN

## 2025-08-11 ASSESSMENT — FIBROSIS 4 INDEX
FIB4 SCORE: 1.67
FIB4 SCORE: 1.96

## 2025-08-11 ASSESSMENT — PATIENT HEALTH QUESTIONNAIRE - PHQ9
1. LITTLE INTEREST OR PLEASURE IN DOING THINGS: NOT AT ALL
2. FEELING DOWN, DEPRESSED, IRRITABLE, OR HOPELESS: NOT AT ALL
SUM OF ALL RESPONSES TO PHQ9 QUESTIONS 1 AND 2: 0

## 2025-08-12 ENCOUNTER — APPOINTMENT (OUTPATIENT)
Dept: RADIOLOGY | Facility: MEDICAL CENTER | Age: 75
DRG: 066 | End: 2025-08-12
Attending: PHYSICIAN ASSISTANT
Payer: MEDICARE

## 2025-08-12 ENCOUNTER — APPOINTMENT (OUTPATIENT)
Dept: RADIOLOGY | Facility: MEDICAL CENTER | Age: 75
DRG: 066 | End: 2025-08-12
Attending: INTERNAL MEDICINE
Payer: MEDICARE

## 2025-08-12 ENCOUNTER — HOSPITAL ENCOUNTER (OUTPATIENT)
Dept: RADIOLOGY | Facility: MEDICAL CENTER | Age: 75
End: 2025-08-12
Payer: MEDICARE

## 2025-08-12 PROBLEM — H54.61 VISION LOSS OF RIGHT EYE: Status: ACTIVE | Noted: 2025-08-12

## 2025-08-12 LAB
ANION GAP SERPL CALC-SCNC: 14 MMOL/L (ref 7–16)
BUN SERPL-MCNC: 16 MG/DL (ref 8–22)
CALCIUM SERPL-MCNC: 9 MG/DL (ref 8.5–10.5)
CHLORIDE SERPL-SCNC: 106 MMOL/L (ref 96–112)
CHOLEST SERPL-MCNC: 119 MG/DL (ref 100–199)
CO2 SERPL-SCNC: 19 MMOL/L (ref 20–33)
CREAT SERPL-MCNC: 1.05 MG/DL (ref 0.5–1.4)
EKG IMPRESSION: NORMAL
ERYTHROCYTE [DISTWIDTH] IN BLOOD BY AUTOMATED COUNT: 43.9 FL (ref 35.9–50)
EST. AVERAGE GLUCOSE BLD GHB EST-MCNC: 103 MG/DL
GFR SERPLBLD CREATININE-BSD FMLA CKD-EPI: 55 ML/MIN/1.73 M 2
GLUCOSE SERPL-MCNC: 161 MG/DL (ref 65–99)
HBA1C MFR BLD: 5.2 % (ref 4–5.6)
HCT VFR BLD AUTO: 37.2 % (ref 37–47)
HDLC SERPL-MCNC: 45 MG/DL
HGB BLD-MCNC: 12.9 G/DL (ref 12–16)
LDLC SERPL CALC-MCNC: 58 MG/DL
MAGNESIUM SERPL-MCNC: 1.7 MG/DL (ref 1.5–2.5)
MCH RBC QN AUTO: 31.9 PG (ref 27–33)
MCHC RBC AUTO-ENTMCNC: 34.7 G/DL (ref 32.2–35.5)
MCV RBC AUTO: 92.1 FL (ref 81.4–97.8)
PLATELET # BLD AUTO: 227 K/UL (ref 164–446)
PMV BLD AUTO: 9 FL (ref 9–12.9)
POTASSIUM SERPL-SCNC: 3 MMOL/L (ref 3.6–5.5)
RBC # BLD AUTO: 4.04 M/UL (ref 4.2–5.4)
SODIUM SERPL-SCNC: 139 MMOL/L (ref 135–145)
TRIGL SERPL-MCNC: 78 MG/DL (ref 0–149)
TSH SERPL DL<=0.005 MIU/L-ACNC: 2.48 UIU/ML (ref 0.38–5.33)
WBC # BLD AUTO: 11.7 K/UL (ref 4.8–10.8)

## 2025-08-12 PROCEDURE — G0378 HOSPITAL OBSERVATION PER HR: HCPCS

## 2025-08-12 PROCEDURE — 700102 HCHG RX REV CODE 250 W/ 637 OVERRIDE(OP)

## 2025-08-12 PROCEDURE — 73630 X-RAY EXAM OF FOOT: CPT | Mod: RT

## 2025-08-12 PROCEDURE — 70553 MRI BRAIN STEM W/O & W/DYE: CPT

## 2025-08-12 PROCEDURE — 83036 HEMOGLOBIN GLYCOSYLATED A1C: CPT

## 2025-08-12 PROCEDURE — 93005 ELECTROCARDIOGRAM TRACING: CPT | Mod: TC | Performed by: PHYSICIAN ASSISTANT

## 2025-08-12 PROCEDURE — 700105 HCHG RX REV CODE 258: Performed by: NURSE PRACTITIONER

## 2025-08-12 PROCEDURE — A9270 NON-COVERED ITEM OR SERVICE: HCPCS | Mod: JZ | Performed by: INTERNAL MEDICINE

## 2025-08-12 PROCEDURE — 36415 COLL VENOUS BLD VENIPUNCTURE: CPT

## 2025-08-12 PROCEDURE — A9270 NON-COVERED ITEM OR SERVICE: HCPCS

## 2025-08-12 PROCEDURE — 99233 SBSQ HOSP IP/OBS HIGH 50: CPT | Mod: FS | Performed by: INTERNAL MEDICINE

## 2025-08-12 PROCEDURE — 700102 HCHG RX REV CODE 250 W/ 637 OVERRIDE(OP): Mod: JZ | Performed by: INTERNAL MEDICINE

## 2025-08-12 PROCEDURE — 80048 BASIC METABOLIC PNL TOTAL CA: CPT

## 2025-08-12 PROCEDURE — 93010 ELECTROCARDIOGRAM REPORT: CPT | Performed by: STUDENT IN AN ORGANIZED HEALTH CARE EDUCATION/TRAINING PROGRAM

## 2025-08-12 PROCEDURE — 83735 ASSAY OF MAGNESIUM: CPT

## 2025-08-12 PROCEDURE — 80061 LIPID PANEL: CPT

## 2025-08-12 PROCEDURE — A9579 GAD-BASE MR CONTRAST NOS,1ML: HCPCS | Mod: JZ | Performed by: PHYSICIAN ASSISTANT

## 2025-08-12 PROCEDURE — 70543 MRI ORBT/FAC/NCK W/O &W/DYE: CPT

## 2025-08-12 PROCEDURE — 700117 HCHG RX CONTRAST REV CODE 255: Mod: JZ | Performed by: PHYSICIAN ASSISTANT

## 2025-08-12 PROCEDURE — 84443 ASSAY THYROID STIM HORMONE: CPT

## 2025-08-12 PROCEDURE — 85027 COMPLETE CBC AUTOMATED: CPT

## 2025-08-12 PROCEDURE — 700105 HCHG RX REV CODE 258: Performed by: PHYSICIAN ASSISTANT

## 2025-08-12 RX ORDER — GADOTERIDOL 279.3 MG/ML
13 INJECTION INTRAVENOUS ONCE
Status: DISCONTINUED | OUTPATIENT
Start: 2025-08-12 | End: 2025-08-13 | Stop reason: HOSPADM

## 2025-08-12 RX ORDER — SODIUM CHLORIDE 9 MG/ML
500 INJECTION, SOLUTION INTRAVENOUS ONCE
Status: COMPLETED | OUTPATIENT
Start: 2025-08-12 | End: 2025-08-12

## 2025-08-12 RX ORDER — POTASSIUM CHLORIDE 1500 MG/1
40 TABLET, EXTENDED RELEASE ORAL ONCE
Status: COMPLETED | OUTPATIENT
Start: 2025-08-12 | End: 2025-08-12

## 2025-08-12 RX ADMIN — GADOTERIDOL 13 ML: 279.3 INJECTION, SOLUTION INTRAVENOUS at 18:55

## 2025-08-12 RX ADMIN — SODIUM CHLORIDE 500 ML: 9 INJECTION, SOLUTION INTRAVENOUS at 02:10

## 2025-08-12 RX ADMIN — VERAPAMIL HYDROCHLORIDE 240 MG: 240 TABLET ORAL at 05:19

## 2025-08-12 RX ADMIN — SODIUM CHLORIDE 500 ML: 9 INJECTION, SOLUTION INTRAVENOUS at 11:20

## 2025-08-12 RX ADMIN — ATORVASTATIN CALCIUM 20 MG: 20 TABLET, FILM COATED ORAL at 19:38

## 2025-08-12 RX ADMIN — VALSARTAN 320 MG: 80 TABLET ORAL at 05:19

## 2025-08-12 RX ADMIN — MOMETASONE FUROATE AND FORMOTEROL FUMARATE DIHYDRATE 2 PUFF: 200; 5 AEROSOL RESPIRATORY (INHALATION) at 19:40

## 2025-08-12 RX ADMIN — OXYCODONE HYDROCHLORIDE 5 MG: 5 TABLET ORAL at 19:39

## 2025-08-12 RX ADMIN — POTASSIUM CHLORIDE 40 MEQ: 1500 TABLET, EXTENDED RELEASE ORAL at 11:29

## 2025-08-12 RX ADMIN — MOMETASONE FUROATE AND FORMOTEROL FUMARATE DIHYDRATE 2 PUFF: 200; 5 AEROSOL RESPIRATORY (INHALATION) at 11:26

## 2025-08-12 ASSESSMENT — ENCOUNTER SYMPTOMS
CARDIOVASCULAR NEGATIVE: 1
PSYCHIATRIC NEGATIVE: 1
CHILLS: 0
FEVER: 0
RESPIRATORY NEGATIVE: 1
SENSORY CHANGE: 1
BLURRED VISION: 1
GASTROINTESTINAL NEGATIVE: 1
MYALGIAS: 1

## 2025-08-12 ASSESSMENT — PAIN DESCRIPTION - PAIN TYPE
TYPE: ACUTE PAIN

## 2025-08-13 ENCOUNTER — APPOINTMENT (OUTPATIENT)
Dept: RADIOLOGY | Facility: MEDICAL CENTER | Age: 75
DRG: 066 | End: 2025-08-13
Attending: NURSE PRACTITIONER
Payer: MEDICARE

## 2025-08-13 PROBLEM — I63.432 CEREBROVASCULAR ACCIDENT (CVA) DUE TO EMBOLISM OF LEFT POSTERIOR CEREBRAL ARTERY (HCC): Status: ACTIVE | Noted: 2025-08-13

## 2025-08-13 LAB
ANION GAP SERPL CALC-SCNC: 12 MMOL/L (ref 7–16)
BUN SERPL-MCNC: 15 MG/DL (ref 8–22)
CALCIUM SERPL-MCNC: 8.9 MG/DL (ref 8.5–10.5)
CHLORIDE SERPL-SCNC: 110 MMOL/L (ref 96–112)
CO2 SERPL-SCNC: 19 MMOL/L (ref 20–33)
CREAT SERPL-MCNC: 1 MG/DL (ref 0.5–1.4)
ERYTHROCYTE [DISTWIDTH] IN BLOOD BY AUTOMATED COUNT: 45.1 FL (ref 35.9–50)
GFR SERPLBLD CREATININE-BSD FMLA CKD-EPI: 58 ML/MIN/1.73 M 2
GLUCOSE SERPL-MCNC: 94 MG/DL (ref 65–99)
HCT VFR BLD AUTO: 35.8 % (ref 37–47)
HGB BLD-MCNC: 12.3 G/DL (ref 12–16)
MCH RBC QN AUTO: 31.5 PG (ref 27–33)
MCHC RBC AUTO-ENTMCNC: 34.4 G/DL (ref 32.2–35.5)
MCV RBC AUTO: 91.8 FL (ref 81.4–97.8)
PLATELET # BLD AUTO: 217 K/UL (ref 164–446)
PMV BLD AUTO: 9.1 FL (ref 9–12.9)
POTASSIUM SERPL-SCNC: 3.6 MMOL/L (ref 3.6–5.5)
RBC # BLD AUTO: 3.9 M/UL (ref 4.2–5.4)
SODIUM SERPL-SCNC: 141 MMOL/L (ref 135–145)
WBC # BLD AUTO: 7.9 K/UL (ref 4.8–10.8)

## 2025-08-13 PROCEDURE — 70496 CT ANGIOGRAPHY HEAD: CPT

## 2025-08-13 PROCEDURE — A9270 NON-COVERED ITEM OR SERVICE: HCPCS | Performed by: INTERNAL MEDICINE

## 2025-08-13 PROCEDURE — 85027 COMPLETE CBC AUTOMATED: CPT

## 2025-08-13 PROCEDURE — 99233 SBSQ HOSP IP/OBS HIGH 50: CPT | Mod: FS | Performed by: INTERNAL MEDICINE

## 2025-08-13 PROCEDURE — 70498 CT ANGIOGRAPHY NECK: CPT

## 2025-08-13 PROCEDURE — 99222 1ST HOSP IP/OBS MODERATE 55: CPT | Performed by: PSYCHIATRY & NEUROLOGY

## 2025-08-13 PROCEDURE — 700102 HCHG RX REV CODE 250 W/ 637 OVERRIDE(OP): Performed by: NURSE PRACTITIONER

## 2025-08-13 PROCEDURE — A9270 NON-COVERED ITEM OR SERVICE: HCPCS | Performed by: NURSE PRACTITIONER

## 2025-08-13 PROCEDURE — 36415 COLL VENOUS BLD VENIPUNCTURE: CPT

## 2025-08-13 PROCEDURE — 700117 HCHG RX CONTRAST REV CODE 255: Performed by: NURSE PRACTITIONER

## 2025-08-13 PROCEDURE — 700102 HCHG RX REV CODE 250 W/ 637 OVERRIDE(OP)

## 2025-08-13 PROCEDURE — A9270 NON-COVERED ITEM OR SERVICE: HCPCS

## 2025-08-13 PROCEDURE — 770006 HCHG ROOM/CARE - MED/SURG/GYN SEMI*

## 2025-08-13 PROCEDURE — 80048 BASIC METABOLIC PNL TOTAL CA: CPT

## 2025-08-13 PROCEDURE — 700102 HCHG RX REV CODE 250 W/ 637 OVERRIDE(OP): Performed by: INTERNAL MEDICINE

## 2025-08-13 RX ORDER — ASPIRIN 81 MG/1
81 TABLET, CHEWABLE ORAL DAILY
Status: DISCONTINUED | OUTPATIENT
Start: 2025-08-13 | End: 2025-08-15 | Stop reason: HOSPADM

## 2025-08-13 RX ORDER — PRAZOSIN HYDROCHLORIDE 1 MG/1
1 CAPSULE ORAL NIGHTLY
Status: DISCONTINUED | OUTPATIENT
Start: 2025-08-13 | End: 2025-08-15 | Stop reason: HOSPADM

## 2025-08-13 RX ADMIN — MOMETASONE FUROATE AND FORMOTEROL FUMARATE DIHYDRATE 2 PUFF: 200; 5 AEROSOL RESPIRATORY (INHALATION) at 20:39

## 2025-08-13 RX ADMIN — OXYCODONE HYDROCHLORIDE 5 MG: 5 TABLET ORAL at 17:31

## 2025-08-13 RX ADMIN — PRAZOSIN HYDROCHLORIDE 1 MG: 1 CAPSULE ORAL at 20:39

## 2025-08-13 RX ADMIN — OXYCODONE HYDROCHLORIDE 5 MG: 5 TABLET ORAL at 04:22

## 2025-08-13 RX ADMIN — VALSARTAN 320 MG: 80 TABLET ORAL at 07:55

## 2025-08-13 RX ADMIN — VERAPAMIL HYDROCHLORIDE 240 MG: 240 TABLET ORAL at 06:03

## 2025-08-13 RX ADMIN — ATORVASTATIN CALCIUM 20 MG: 20 TABLET, FILM COATED ORAL at 20:39

## 2025-08-13 RX ADMIN — IOHEXOL 80 ML: 350 INJECTION, SOLUTION INTRAVENOUS at 14:15

## 2025-08-13 RX ADMIN — ASPIRIN 81 MG: 81 TABLET, CHEWABLE ORAL at 08:15

## 2025-08-13 RX ADMIN — MOMETASONE FUROATE AND FORMOTEROL FUMARATE DIHYDRATE 2 PUFF: 200; 5 AEROSOL RESPIRATORY (INHALATION) at 07:56

## 2025-08-13 ASSESSMENT — PAIN DESCRIPTION - PAIN TYPE
TYPE: ACUTE PAIN

## 2025-08-13 ASSESSMENT — ENCOUNTER SYMPTOMS
MYALGIAS: 1
CHILLS: 0
PSYCHIATRIC NEGATIVE: 1
SENSORY CHANGE: 1
RESPIRATORY NEGATIVE: 1
BLURRED VISION: 1
FEVER: 0
GASTROINTESTINAL NEGATIVE: 1
CARDIOVASCULAR NEGATIVE: 1

## 2025-08-14 ENCOUNTER — PATIENT OUTREACH (OUTPATIENT)
Dept: SCHEDULING | Facility: IMAGING CENTER | Age: 75
End: 2025-08-14
Payer: MEDICARE

## 2025-08-14 PROBLEM — K11.8 PAROTID NODULE: Status: ACTIVE | Noted: 2025-08-14

## 2025-08-14 PROBLEM — M79.671 RIGHT FOOT PAIN: Status: ACTIVE | Noted: 2025-08-14

## 2025-08-14 PROBLEM — E04.1 THYROID NODULE: Status: ACTIVE | Noted: 2025-08-14

## 2025-08-14 LAB
ANION GAP SERPL CALC-SCNC: 11 MMOL/L (ref 7–16)
BUN SERPL-MCNC: 13 MG/DL (ref 8–22)
CALCIUM SERPL-MCNC: 9.5 MG/DL (ref 8.5–10.5)
CHLORIDE SERPL-SCNC: 106 MMOL/L (ref 96–112)
CO2 SERPL-SCNC: 20 MMOL/L (ref 20–33)
CREAT SERPL-MCNC: 1.15 MG/DL (ref 0.5–1.4)
ERYTHROCYTE [DISTWIDTH] IN BLOOD BY AUTOMATED COUNT: 46.8 FL (ref 35.9–50)
GFR SERPLBLD CREATININE-BSD FMLA CKD-EPI: 49 ML/MIN/1.73 M 2
GLUCOSE SERPL-MCNC: 108 MG/DL (ref 65–99)
HCT VFR BLD AUTO: 39.3 % (ref 37–47)
HGB BLD-MCNC: 13.2 G/DL (ref 12–16)
MCH RBC QN AUTO: 32 PG (ref 27–33)
MCHC RBC AUTO-ENTMCNC: 33.6 G/DL (ref 32.2–35.5)
MCV RBC AUTO: 95.2 FL (ref 81.4–97.8)
PLATELET # BLD AUTO: 229 K/UL (ref 164–446)
PMV BLD AUTO: 9.3 FL (ref 9–12.9)
POTASSIUM SERPL-SCNC: 3.9 MMOL/L (ref 3.6–5.5)
RBC # BLD AUTO: 4.13 M/UL (ref 4.2–5.4)
SODIUM SERPL-SCNC: 137 MMOL/L (ref 135–145)
WBC # BLD AUTO: 8.8 K/UL (ref 4.8–10.8)

## 2025-08-14 PROCEDURE — A9270 NON-COVERED ITEM OR SERVICE: HCPCS | Performed by: INTERNAL MEDICINE

## 2025-08-14 PROCEDURE — 700102 HCHG RX REV CODE 250 W/ 637 OVERRIDE(OP): Performed by: NURSE PRACTITIONER

## 2025-08-14 PROCEDURE — 85027 COMPLETE CBC AUTOMATED: CPT

## 2025-08-14 PROCEDURE — 700102 HCHG RX REV CODE 250 W/ 637 OVERRIDE(OP)

## 2025-08-14 PROCEDURE — 36415 COLL VENOUS BLD VENIPUNCTURE: CPT

## 2025-08-14 PROCEDURE — 97535 SELF CARE MNGMENT TRAINING: CPT

## 2025-08-14 PROCEDURE — 92610 EVALUATE SWALLOWING FUNCTION: CPT

## 2025-08-14 PROCEDURE — 700111 HCHG RX REV CODE 636 W/ 250 OVERRIDE (IP): Mod: JZ | Performed by: HOSPITALIST

## 2025-08-14 PROCEDURE — 99233 SBSQ HOSP IP/OBS HIGH 50: CPT | Performed by: HOSPITALIST

## 2025-08-14 PROCEDURE — 80048 BASIC METABOLIC PNL TOTAL CA: CPT

## 2025-08-14 PROCEDURE — A9270 NON-COVERED ITEM OR SERVICE: HCPCS | Performed by: NURSE PRACTITIONER

## 2025-08-14 PROCEDURE — 700102 HCHG RX REV CODE 250 W/ 637 OVERRIDE(OP): Performed by: INTERNAL MEDICINE

## 2025-08-14 PROCEDURE — 770020 HCHG ROOM/CARE - TELE (206)

## 2025-08-14 PROCEDURE — A9270 NON-COVERED ITEM OR SERVICE: HCPCS

## 2025-08-14 PROCEDURE — 97162 PT EVAL MOD COMPLEX 30 MIN: CPT

## 2025-08-14 RX ORDER — ENOXAPARIN SODIUM 100 MG/ML
40 INJECTION SUBCUTANEOUS DAILY
Status: DISCONTINUED | OUTPATIENT
Start: 2025-08-14 | End: 2025-08-15 | Stop reason: HOSPADM

## 2025-08-14 RX ADMIN — ATORVASTATIN CALCIUM 20 MG: 20 TABLET, FILM COATED ORAL at 20:40

## 2025-08-14 RX ADMIN — VALSARTAN 320 MG: 80 TABLET ORAL at 05:43

## 2025-08-14 RX ADMIN — PRAZOSIN HYDROCHLORIDE 1 MG: 1 CAPSULE ORAL at 20:40

## 2025-08-14 RX ADMIN — OXYCODONE HYDROCHLORIDE 5 MG: 5 TABLET ORAL at 14:29

## 2025-08-14 RX ADMIN — VERAPAMIL HYDROCHLORIDE 240 MG: 240 TABLET ORAL at 05:43

## 2025-08-14 RX ADMIN — MOMETASONE FUROATE AND FORMOTEROL FUMARATE DIHYDRATE 2 PUFF: 200; 5 AEROSOL RESPIRATORY (INHALATION) at 08:16

## 2025-08-14 RX ADMIN — ENOXAPARIN SODIUM 40 MG: 100 INJECTION SUBCUTANEOUS at 17:46

## 2025-08-14 RX ADMIN — MOMETASONE FUROATE AND FORMOTEROL FUMARATE DIHYDRATE 2 PUFF: 200; 5 AEROSOL RESPIRATORY (INHALATION) at 20:40

## 2025-08-14 RX ADMIN — OXYCODONE HYDROCHLORIDE 5 MG: 5 TABLET ORAL at 03:01

## 2025-08-14 RX ADMIN — OXYCODONE HYDROCHLORIDE 5 MG: 5 TABLET ORAL at 17:50

## 2025-08-14 RX ADMIN — ASPIRIN 81 MG: 81 TABLET, CHEWABLE ORAL at 05:43

## 2025-08-14 ASSESSMENT — ENCOUNTER SYMPTOMS
BLURRED VISION: 1
ABDOMINAL PAIN: 0
FEVER: 0
NAUSEA: 0
VOMITING: 0

## 2025-08-14 ASSESSMENT — PAIN DESCRIPTION - PAIN TYPE
TYPE: ACUTE PAIN

## 2025-08-14 ASSESSMENT — COGNITIVE AND FUNCTIONAL STATUS - GENERAL
MOVING TO AND FROM BED TO CHAIR: A LITTLE
MOBILITY SCORE: 20
STANDING UP FROM CHAIR USING ARMS: A LITTLE
WALKING IN HOSPITAL ROOM: A LITTLE
SUGGESTED CMS G CODE MODIFIER MOBILITY: CJ
CLIMB 3 TO 5 STEPS WITH RAILING: A LITTLE

## 2025-08-14 ASSESSMENT — GAIT ASSESSMENTS
DEVIATION: ANTALGIC;STEP TO;BRADYKINETIC;SHUFFLED GAIT
ASSISTIVE DEVICE: 4 WHEEL WALKER
DISTANCE (FEET): 150
GAIT LEVEL OF ASSIST: CONTACT GUARD ASSIST

## 2025-08-15 ENCOUNTER — APPOINTMENT (OUTPATIENT)
Dept: CARDIOLOGY | Facility: MEDICAL CENTER | Age: 75
DRG: 066 | End: 2025-08-15
Attending: NURSE PRACTITIONER
Payer: MEDICARE

## 2025-08-15 VITALS
HEART RATE: 78 BPM | RESPIRATION RATE: 18 BRPM | HEIGHT: 62 IN | OXYGEN SATURATION: 93 % | SYSTOLIC BLOOD PRESSURE: 162 MMHG | BODY MASS INDEX: 25.84 KG/M2 | DIASTOLIC BLOOD PRESSURE: 83 MMHG | WEIGHT: 140.43 LBS | TEMPERATURE: 97.7 F

## 2025-08-15 PROBLEM — I63.432 CEREBROVASCULAR ACCIDENT (CVA) DUE TO EMBOLISM OF LEFT POSTERIOR CEREBRAL ARTERY (HCC): Status: RESOLVED | Noted: 2025-08-13 | Resolved: 2025-08-15

## 2025-08-15 PROBLEM — I63.9 ACUTE STROKE DUE TO ISCHEMIA (HCC): Status: ACTIVE | Noted: 2025-08-15

## 2025-08-15 LAB
LV EJECT FRACT MOD 2C 99903: 66.61
LV EJECT FRACT MOD 4C 99902: 61.85
LV EJECT FRACT MOD BP 99901: 62.42

## 2025-08-15 PROCEDURE — A9270 NON-COVERED ITEM OR SERVICE: HCPCS | Performed by: INTERNAL MEDICINE

## 2025-08-15 PROCEDURE — 700102 HCHG RX REV CODE 250 W/ 637 OVERRIDE(OP): Performed by: INTERNAL MEDICINE

## 2025-08-15 PROCEDURE — 99239 HOSP IP/OBS DSCHRG MGMT >30: CPT | Performed by: STUDENT IN AN ORGANIZED HEALTH CARE EDUCATION/TRAINING PROGRAM

## 2025-08-15 PROCEDURE — 97166 OT EVAL MOD COMPLEX 45 MIN: CPT

## 2025-08-15 PROCEDURE — 700102 HCHG RX REV CODE 250 W/ 637 OVERRIDE(OP)

## 2025-08-15 PROCEDURE — A9270 NON-COVERED ITEM OR SERVICE: HCPCS

## 2025-08-15 PROCEDURE — 93306 TTE W/DOPPLER COMPLETE: CPT

## 2025-08-15 PROCEDURE — 93306 TTE W/DOPPLER COMPLETE: CPT | Mod: 26 | Performed by: INTERNAL MEDICINE

## 2025-08-15 RX ORDER — OXYCODONE HYDROCHLORIDE 5 MG/1
5 TABLET ORAL EVERY 6 HOURS PRN
Qty: 12 TABLET | Refills: 0 | Status: SHIPPED | OUTPATIENT
Start: 2025-08-15 | End: 2025-08-18

## 2025-08-15 RX ORDER — DULOXETIN HYDROCHLORIDE 30 MG/1
30 CAPSULE, DELAYED RELEASE ORAL DAILY
Qty: 30 CAPSULE | Refills: 2 | Status: SHIPPED | OUTPATIENT
Start: 2025-08-15

## 2025-08-15 RX ORDER — ASPIRIN 81 MG/1
81 TABLET, CHEWABLE ORAL DAILY
Qty: 100 TABLET | Refills: 2 | Status: SHIPPED | OUTPATIENT
Start: 2025-08-16

## 2025-08-15 RX ADMIN — VALSARTAN 320 MG: 80 TABLET ORAL at 04:10

## 2025-08-15 RX ADMIN — OXYCODONE HYDROCHLORIDE 5 MG: 5 TABLET ORAL at 04:09

## 2025-08-15 RX ADMIN — ASPIRIN 81 MG: 81 TABLET, CHEWABLE ORAL at 04:10

## 2025-08-15 RX ADMIN — MOMETASONE FUROATE AND FORMOTEROL FUMARATE DIHYDRATE 2 PUFF: 200; 5 AEROSOL RESPIRATORY (INHALATION) at 08:17

## 2025-08-15 RX ADMIN — VERAPAMIL HYDROCHLORIDE 240 MG: 240 TABLET ORAL at 04:09

## 2025-08-15 ASSESSMENT — PAIN DESCRIPTION - PAIN TYPE
TYPE: ACUTE PAIN
TYPE: ACUTE PAIN

## 2025-08-15 ASSESSMENT — COGNITIVE AND FUNCTIONAL STATUS - GENERAL
SUGGESTED CMS G CODE MODIFIER DAILY ACTIVITY: CH
DAILY ACTIVITIY SCORE: 24

## 2025-08-15 ASSESSMENT — FIBROSIS 4 INDEX: FIB4 SCORE: 2.2

## 2025-08-15 ASSESSMENT — ACTIVITIES OF DAILY LIVING (ADL): TOILETING: INDEPENDENT

## 2025-08-20 ENCOUNTER — TELEPHONE (OUTPATIENT)
Dept: NEUROLOGY | Facility: MEDICAL CENTER | Age: 75
End: 2025-08-20
Payer: MEDICARE

## 2025-08-22 RX ORDER — GADOTERIDOL 279.3 MG/ML
13 INJECTION INTRAVENOUS ONCE
Status: COMPLETED | OUTPATIENT
Start: 2025-08-12 | End: 2025-08-12

## 2025-08-25 ENCOUNTER — TELEPHONE (OUTPATIENT)
Dept: NEUROLOGY | Facility: MEDICAL CENTER | Age: 75
End: 2025-08-25
Payer: MEDICARE

## 2025-08-26 ENCOUNTER — TELEPHONE (OUTPATIENT)
Dept: NEUROLOGY | Facility: MEDICAL CENTER | Age: 75
End: 2025-08-26
Payer: MEDICARE